# Patient Record
Sex: FEMALE | Race: WHITE | NOT HISPANIC OR LATINO | Employment: OTHER | URBAN - METROPOLITAN AREA
[De-identification: names, ages, dates, MRNs, and addresses within clinical notes are randomized per-mention and may not be internally consistent; named-entity substitution may affect disease eponyms.]

---

## 2017-01-03 ENCOUNTER — TRANSCRIBE ORDERS (OUTPATIENT)
Dept: ADMINISTRATIVE | Facility: HOSPITAL | Age: 67
End: 2017-01-03

## 2017-01-03 DIAGNOSIS — I73.9 CLAUDICATION (HCC): Primary | ICD-10-CM

## 2017-01-05 ENCOUNTER — TELEPHONE (OUTPATIENT)
Dept: GASTROENTEROLOGY | Facility: CLINIC | Age: 67
End: 2017-01-05

## 2017-01-19 ENCOUNTER — HOSPITAL ENCOUNTER (OUTPATIENT)
Facility: HOSPITAL | Age: 67
Setting detail: HOSPITAL OUTPATIENT SURGERY
Discharge: HOME | End: 2017-01-19
Attending: INTERNAL MEDICINE | Admitting: INTERNAL MEDICINE

## 2017-01-19 ENCOUNTER — OFFICE VISIT (OUTPATIENT)
Dept: GASTROENTEROLOGY | Facility: CLINIC | Age: 67
End: 2017-01-19

## 2017-01-19 ENCOUNTER — CONVERSION ENCOUNTER (OUTPATIENT)
Dept: GASTROENTEROLOGY | Facility: CLINIC | Age: 67
End: 2017-01-19

## 2017-01-19 VITALS
BODY MASS INDEX: 35.65 KG/M2 | SYSTOLIC BLOOD PRESSURE: 126 MMHG | WEIGHT: 188.8 LBS | HEIGHT: 61 IN | DIASTOLIC BLOOD PRESSURE: 80 MMHG

## 2017-01-19 DIAGNOSIS — K27.9 PUD (PEPTIC ULCER DISEASE): Primary | ICD-10-CM

## 2017-01-19 PROCEDURE — 99213 OFFICE O/P EST LOW 20 MIN: CPT | Performed by: INTERNAL MEDICINE

## 2017-01-19 RX ORDER — INSULIN DETEMIR 100 [IU]/ML
40 INJECTION, SOLUTION SUBCUTANEOUS NIGHTLY
COMMUNITY
Start: 2016-11-09 | End: 2018-01-18 | Stop reason: SDUPTHER

## 2017-01-19 RX ORDER — ISOPROPYL ALCOHOL 0.75 G/1
SWAB TOPICAL
COMMUNITY
Start: 2016-11-09 | End: 2017-05-25

## 2017-01-19 RX ORDER — METFORMIN HYDROCHLORIDE 500 MG/1
500 TABLET, EXTENDED RELEASE ORAL 2 TIMES DAILY
COMMUNITY
Start: 2016-11-09 | End: 2017-10-30 | Stop reason: SDUPTHER

## 2017-01-19 RX ORDER — BLOOD SUGAR DIAGNOSTIC
STRIP MISCELLANEOUS
COMMUNITY
Start: 2016-11-09 | End: 2017-05-25

## 2017-01-19 RX ORDER — ATORVASTATIN CALCIUM 80 MG/1
80 TABLET, FILM COATED ORAL NIGHTLY
COMMUNITY
Start: 2016-12-26 | End: 2017-10-30 | Stop reason: SDUPTHER

## 2017-01-19 RX ORDER — INSULIN ASPART 100 [IU]/ML
8-12 INJECTION, SOLUTION INTRAVENOUS; SUBCUTANEOUS
COMMUNITY
Start: 2016-11-08 | End: 2017-08-30 | Stop reason: SDUPTHER

## 2017-01-19 RX ORDER — PANTOPRAZOLE SODIUM 40 MG/1
40 TABLET, DELAYED RELEASE ORAL DAILY
Qty: 60 TABLET | Refills: 1 | Status: SHIPPED | OUTPATIENT
Start: 2017-01-19 | End: 2017-10-30 | Stop reason: SDUPTHER

## 2017-01-19 RX ORDER — NIACIN 500 MG/1
TABLET, EXTENDED RELEASE ORAL
COMMUNITY
Start: 2016-11-05 | End: 2017-05-25

## 2017-01-19 RX ORDER — LANCETS 28 GAUGE
EACH MISCELLANEOUS
COMMUNITY
Start: 2016-11-05 | End: 2017-05-25

## 2017-01-19 RX ORDER — BLOOD-GLUCOSE METER
KIT MISCELLANEOUS
COMMUNITY
Start: 2016-12-01 | End: 2017-05-25

## 2017-01-19 RX ORDER — FENOFIBRATE 160 MG/1
160 TABLET ORAL DAILY
COMMUNITY
Start: 2016-11-09 | End: 2018-01-18 | Stop reason: SDUPTHER

## 2017-01-19 RX ORDER — LIRAGLUTIDE 6 MG/ML
1.8 INJECTION SUBCUTANEOUS EVERY MORNING
COMMUNITY
Start: 2016-11-08 | End: 2018-01-18 | Stop reason: SDUPTHER

## 2017-01-19 NOTE — MR AVS SNAPSHOT
Randi Martines   1/19/2017 3:45 PM   Office Visit    Dept Phone:  [phone]   Encounter #:  [id number]    Provider:  Craig Hsu MD   Department:  [facility] GASTROENTEROLOGY                Your Full Care Plan              Where to Get Your Medications      These medications were sent to [address] - [address] [facility] AT [facility] - [phone]  - [phone] [place] [address]     Phone:  [phone]     pantoprazole 40 MG EC tablet            Your Updated Medication List          This list is accurate as of: 1/19/17  5:08 PM.  Always use your most recent med list.                aspirin 81 MG EC tablet       atorvastatin 80 MG tablet   Commonly known as:  LIPITOR       B-D SINGLE USE SWABS REGULAR pads       clopidogrel 75 MG tablet   Commonly known as:  PLAVIX       dicyclomine 20 MG tablet   Commonly known as:  BENTYL       fenofibrate 160 MG tablet       gabapentin 300 MG capsule   Commonly known as:  NEURONTIN       * insulin aspart 100 UNIT/ML injection   Commonly known as:  novoLOG       * NOVOLOG FLEXPEN 100 UNIT/ML solution pen-injector sc pen   Generic drug:  insulin aspart       * insulin detemir 100 UNIT/ML injection   Commonly known as:  LEVEMIR       * LEVEMIR FLEXTOUCH 100 UNIT/ML injection   Generic drug:  insulin detemir       metFORMIN  MG 24 hr tablet   Commonly known as:  GLUCOPHAGE-XR       metoclopramide 5 MG tablet   Commonly known as:  REGLAN       niacin 500 MG CR tablet   Commonly known as:  NIASPAN       pantoprazole 40 MG EC tablet   Commonly known as:  PROTONIX   Take 1 tablet by mouth Daily.       PRODIGY NO CODING BLOOD GLUC test strip   Generic drug:  glucose blood       PRODIGY TWIST TOP LANCETS 28G misc       PRODIGY VOICE BLOOD GLUCOSE W/DEVICE kit       sucralfate 1 G tablet   Commonly known as:  CARAFATE       * VICTOZA SC       * VICTOZA 18 MG/3ML  "solution pen-injector   Generic drug:  Liraglutide       * Notice:  This list has 6 medication(s) that are the same as other medications prescribed for you. Read the directions carefully, and ask your doctor or other care provider to review them with you.            You Were Diagnosed With        Codes Comments    PUD (peptic ulcer disease)    -  Primary ICD-10-CM: K27.9  ICD-9-CM: 533.90       Instructions     None    Patient Instructions History      Upcoming Appointments     Visit Type Date Time Department    FOLLOW UP 1/19/2017  3:45 PM [facility] TG     TG SCHED 1 ARTERIAL LOWER EXTREMITY 3/10/2017  3:45 PM [facility] NON-INV VASC      MyChart Signup     Our records indicate that you have declined [facility] signup. If you would like to sign up for [facility] [place], please email [email or web address] or call [phone] to obtain an activation code.             Other Info from Your Visit           Your Appointments     Mar 10, 2017  3:45 PM EST   ARTERIAL LOWER EXTREMITY VISIT with TG US NIVAS ARTERIAL CRT 1   [facility] NON-INV VASC ([place])    [address] [facility] [address] [phone]              Allergies     Augmentin [Amoxicillin-pot Clavulanate] Intolerance GI Intolerance    Codeine Intolerance GI Intolerance      Reason for Visit     Gastric Ulcer           Vital Signs     Blood Pressure Height Weight Body Mass Index Smoking Status       126/80 61\" (154.9 cm) 188 lb 12.8 oz (85.6 kg) 35.67 kg/m2 Never Smoker       Problems and Diagnoses Noted     PUD (peptic ulcer disease)    -  Primary        "

## 2017-01-19 NOTE — PROGRESS NOTES
Chief Complaint   Patient presents with   • Gastric Ulcer       Randi Martines is a  66 y.o. 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  Underwent an EGD showing a large cratered ulcer of the body and antrum of the stomach.  There was concern for vascular insufficiency and she was seen by the vascular surgeons.  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  H. pylori biopsies not done nor breath test nor serology.  Unfortunately, she did not know to take her Protonix and has been off it for 2 months.  She went back on it last week when she visited with the vascular surgeons.  Currently taking Protonix 40 mg every morning.  No abdominal pain, weight loss, rectal bleeding or melena.  She is here to schedule her 3 month follow-up scope.      Past Medical History   Diagnosis Date   • CAD (coronary artery disease)    • Chronic kidney disease    • Diabetes mellitus    • History of stomach ulcers    • Hyperlipemia    • Hypertension    • Peptic ulcer disease    • PVD (peripheral vascular disease)        Past Surgical History   Procedure Laterality Date   • Coronary artery bypass graft     • Endoscopy N/A 11/23/2016     Procedure: ESOPHAGOGASTRODUODENOSCOPY ;  Surgeon: Katherine Green MD;  Location: [facility] ENDOSCOPY;  Service:    • Appendectomy  2015   • Hernia repair  2015   • Hysterectomy     • Cataract extraction     • Cholecystectomy     • Upper gastrointestinal endoscopy  2015     scar in gastric body, gastric ulcers   • Colonoscopy  2015       Scheduled Meds:    Continuous Infusions:  No current facility-administered medications for this visit.     PRN Meds:.    Allergies   Allergen Reactions   • Augmentin [Amoxicillin-Pot Clavulanate] GI Intolerance   • Codeine GI  Intolerance       Social History     Social History   • Marital status:      Spouse name: N/A   • Number of children: N/A   • Years of education: N/A     Occupational History   • Not on file.     Social History Main Topics   • Smoking status: Never Smoker   • Smokeless tobacco: Never Used   • Alcohol use No   • Drug use: No   • Sexual activity: Defer     Other Topics Concern   • Not on file     Social History Narrative       History reviewed. No pertinent family history.    Review of Systems   All other systems reviewed and are negative.      Vitals:    01/19/17 1611   BP: 126/80       Physical Exam   Constitutional: She is oriented to person, place, and time. She appears well-developed and well-nourished.   HENT:   Head: Normocephalic and atraumatic.   Eyes: Pupils are equal, round, and reactive to light.   Cardiovascular: Normal rate, regular rhythm and normal heart sounds.    Pulmonary/Chest: Effort normal and breath sounds normal.   Abdominal: Soft. Bowel sounds are normal. She exhibits no shifting dullness, no distension, no pulsatile liver, no fluid wave, no abdominal bruit, no ascites, no pulsatile midline mass and no mass. There is no hepatosplenomegaly. There is no tenderness. There is no rigidity and no guarding. No hernia.   Musculoskeletal: Normal range of motion.   Neurological: She is alert and oriented to person, place, and time.   Skin: Skin is warm and dry.   Psychiatric: She has a normal mood and affect. Her behavior is normal. Thought content normal.   Nursing note and vitals reviewed.      No images are attached to the encounter.    Problem list    Chronic mesenteric ischemia, vascular insufficiency, but while likely contributing to the ulcer, the vascular surgeons have asked for medical management at this point only  Peptic ulcer disease  H. pylori status unknown  She has not been taking her proton pump inhibitor as requested      Assessment/Plan    I'm recommending Protonix 40 mg by  mouth every morning.  Nexium 24 hour over-the-counter every afternoon.  She will do this for 3 months and I will perform upper endoscopy to check for healing of the ulcer.  She will also have H. pylori serologies today.  If they are positive I will treat her for H. pylori with quadruple therapy.  If she fails to heal the  ulcer on twice a day PPI we will obtain a workup for gastrinoma and also ask the vascular surgeons to reconsider bypass.    Further recommendations after we perform EGD in April 2017

## 2017-01-20 LAB
H PYLORI IGA SER-ACNC: <9 UNITS (ref 0–8.9)
H PYLORI IGG SER IA-ACNC: <0.9 U/ML (ref 0–0.8)
H PYLORI IGM SER-ACNC: <9 UNITS (ref 0–8.9)

## 2017-01-24 ENCOUNTER — RESULTS ENCOUNTER (OUTPATIENT)
Dept: GASTROENTEROLOGY | Facility: CLINIC | Age: 67
End: 2017-01-24

## 2017-01-24 DIAGNOSIS — K27.9 PUD (PEPTIC ULCER DISEASE): ICD-10-CM

## 2017-02-09 ENCOUNTER — TELEPHONE (OUTPATIENT)
Dept: GASTROENTEROLOGY | Facility: CLINIC | Age: 67
End: 2017-02-09

## 2017-02-10 NOTE — TELEPHONE ENCOUNTER
Pt called back. Advised that per Dr Hsu: the labwork was negative for H pylori. Pt verb understanding.

## 2017-03-10 ENCOUNTER — HOSPITAL ENCOUNTER (OUTPATIENT)
Dept: CARDIOLOGY | Facility: HOSPITAL | Age: 67
Discharge: HOME | End: 2017-03-10
Attending: SURGERY | Admitting: SURGERY

## 2017-03-10 DIAGNOSIS — I73.9 CLAUDICATION (HCC): ICD-10-CM

## 2017-03-10 LAB
BH CV LOWER ARTERIAL LEFT ABI RATIO: 0.22
BH CV LOWER ARTERIAL LEFT GREAT TOE SYS MAX: 0 MMHG
BH CV LOWER ARTERIAL LEFT HIGH THIGH SYS MAX: 61 MMHG
BH CV LOWER ARTERIAL LEFT LOW THIGH SYS MAX: 40 MMHG
BH CV LOWER ARTERIAL LEFT POST TIBIAL SYS MAX: 31 MMHG
BH CV LOWER ARTERIAL LEFT TBI RATIO: 0
BH CV LOWER ARTERIAL RIGHT ABI RATIO: 0.91
BH CV LOWER ARTERIAL RIGHT DORSALIS PEDIS SYS MAX: 131 MMHG
BH CV LOWER ARTERIAL RIGHT GREAT TOE SYS MAX: 148 MMHG
BH CV LOWER ARTERIAL RIGHT POST TIBIAL SYS MAX: 131 MMHG
BH CV LOWER ARTERIAL RIGHT TBI RATIO: 1
UPPER ARTERIAL LEFT ARM BRACHIAL SYS MAX: 144 MMHG
UPPER ARTERIAL RIGHT ARM BRACHIAL SYS MAX: 79 MMHG

## 2017-03-10 PROCEDURE — 93923 UPR/LXTR ART STDY 3+ LVLS: CPT

## 2017-04-04 ENCOUNTER — TRANSCRIBE ORDERS (OUTPATIENT)
Dept: ADMINISTRATIVE | Facility: HOSPITAL | Age: 67
End: 2017-04-04

## 2017-04-04 DIAGNOSIS — I73.9 PAD (PERIPHERAL ARTERY DISEASE) (HCC): Primary | ICD-10-CM

## 2017-04-13 ENCOUNTER — HOSPITAL ENCOUNTER (OUTPATIENT)
Dept: CT IMAGING | Facility: HOSPITAL | Age: 67
Discharge: HOME | End: 2017-04-13
Attending: SURGERY

## 2017-04-13 DIAGNOSIS — I73.9 PAD (PERIPHERAL ARTERY DISEASE) (HCC): ICD-10-CM

## 2017-04-13 LAB — CREAT BLDA-MCNC: 0.9 MG/DL (ref 0.6–1.3)

## 2017-04-13 PROCEDURE — 75635 CT ANGIO ABDOMINAL ARTERIES: CPT

## 2017-04-13 PROCEDURE — 0 IOPAMIDOL 61 % SOLUTION: Performed by: SURGERY

## 2017-04-13 PROCEDURE — 82565 ASSAY OF CREATININE: CPT

## 2017-04-13 RX ADMIN — IOPAMIDOL 95 ML: 612 INJECTION, SOLUTION INTRAVENOUS at 17:07

## 2017-05-25 ENCOUNTER — APPOINTMENT (OUTPATIENT)
Dept: PREADMISSION TESTING | Facility: HOSPITAL | Age: 67
End: 2017-05-25

## 2017-05-25 ENCOUNTER — HOSPITAL ENCOUNTER (OUTPATIENT)
Dept: GENERAL RADIOLOGY | Facility: HOSPITAL | Age: 67
Discharge: HOME | End: 2017-05-25
Admitting: SURGERY

## 2017-05-25 VITALS
OXYGEN SATURATION: 99 % | HEART RATE: 38 BPM | HEIGHT: 61 IN | WEIGHT: 187 LBS | TEMPERATURE: 98.9 F | BODY MASS INDEX: 35.3 KG/M2

## 2017-05-25 LAB
ALBUMIN SERPL-MCNC: 3.2 G/DL (ref 3.5–5.2)
ALBUMIN/GLOB SERPL: 0.7 G/DL
ALP SERPL-CCNC: 62 U/L (ref 39–117)
ALT SERPL W P-5'-P-CCNC: 10 U/L (ref 1–33)
ANION GAP SERPL CALCULATED.3IONS-SCNC: 14.2 MMOL/L
APTT PPP: 28.2 SECONDS (ref 22.7–35.4)
AST SERPL-CCNC: 14 U/L (ref 1–32)
BACTERIA UR QL AUTO: ABNORMAL /HPF
BILIRUB SERPL-MCNC: <0.2 MG/DL (ref 0.1–1.2)
BILIRUB UR QL STRIP: NEGATIVE
BUN BLD-MCNC: 24 MG/DL (ref 8–23)
BUN/CREAT SERPL: 24.7 (ref 7–25)
CALCIUM SPEC-SCNC: 9.6 MG/DL (ref 8.6–10.5)
CHLORIDE SERPL-SCNC: 100 MMOL/L (ref 98–107)
CLARITY UR: CLEAR
CO2 SERPL-SCNC: 25.8 MMOL/L (ref 22–29)
COLOR UR: YELLOW
CREAT BLD-MCNC: 0.97 MG/DL (ref 0.57–1)
DEPRECATED RDW RBC AUTO: 48.1 FL (ref 37–54)
ERYTHROCYTE [DISTWIDTH] IN BLOOD BY AUTOMATED COUNT: 14.3 % (ref 11.7–13)
GFR SERPL CREATININE-BSD FRML MDRD: 57 ML/MIN/1.73
GLOBULIN UR ELPH-MCNC: 4.5 GM/DL
GLUCOSE BLD-MCNC: 144 MG/DL (ref 65–99)
GLUCOSE UR STRIP-MCNC: NEGATIVE MG/DL
HCT VFR BLD AUTO: 35.4 % (ref 35.6–45.5)
HGB BLD-MCNC: 10.7 G/DL (ref 11.9–15.5)
HGB UR QL STRIP.AUTO: NEGATIVE
HYALINE CASTS UR QL AUTO: ABNORMAL /LPF
INR PPP: 1.04 (ref 0.9–1.1)
KETONES UR QL STRIP: NEGATIVE
LEUKOCYTE ESTERASE UR QL STRIP.AUTO: ABNORMAL
MCH RBC QN AUTO: 28.1 PG (ref 26.9–32)
MCHC RBC AUTO-ENTMCNC: 30.2 G/DL (ref 32.4–36.3)
MCV RBC AUTO: 92.9 FL (ref 80.5–98.2)
NITRITE UR QL STRIP: POSITIVE
PH UR STRIP.AUTO: <=5 [PH] (ref 5–8)
PLATELET # BLD AUTO: 450 10*3/MM3 (ref 140–500)
PMV BLD AUTO: 9.9 FL (ref 6–12)
POTASSIUM BLD-SCNC: 4.4 MMOL/L (ref 3.5–5.2)
PROT SERPL-MCNC: 7.7 G/DL (ref 6–8.5)
PROT UR QL STRIP: ABNORMAL
PROTHROMBIN TIME: 13.2 SECONDS (ref 11.7–14.2)
RBC # BLD AUTO: 3.81 10*6/MM3 (ref 3.9–5.2)
RBC # UR: ABNORMAL /HPF
REF LAB TEST METHOD: ABNORMAL
SODIUM BLD-SCNC: 140 MMOL/L (ref 136–145)
SP GR UR STRIP: 1.02 (ref 1–1.03)
SQUAMOUS #/AREA URNS HPF: ABNORMAL /HPF
UROBILINOGEN UR QL STRIP: ABNORMAL
WBC NRBC COR # BLD: 13.97 10*3/MM3 (ref 4.5–10.7)
WBC UR QL AUTO: ABNORMAL /HPF

## 2017-05-25 PROCEDURE — 85730 THROMBOPLASTIN TIME PARTIAL: CPT | Performed by: SURGERY

## 2017-05-25 PROCEDURE — 87086 URINE CULTURE/COLONY COUNT: CPT | Performed by: SURGERY

## 2017-05-25 PROCEDURE — 80053 COMPREHEN METABOLIC PANEL: CPT | Performed by: SURGERY

## 2017-05-25 PROCEDURE — 93005 ELECTROCARDIOGRAM TRACING: CPT

## 2017-05-25 PROCEDURE — 93010 ELECTROCARDIOGRAM REPORT: CPT | Performed by: INTERNAL MEDICINE

## 2017-05-25 PROCEDURE — 36415 COLL VENOUS BLD VENIPUNCTURE: CPT

## 2017-05-25 PROCEDURE — 71020 HC CHEST PA AND LATERAL: CPT

## 2017-05-25 PROCEDURE — 85610 PROTHROMBIN TIME: CPT | Performed by: SURGERY

## 2017-05-25 PROCEDURE — 85027 COMPLETE CBC AUTOMATED: CPT | Performed by: SURGERY

## 2017-05-25 PROCEDURE — 81001 URINALYSIS AUTO W/SCOPE: CPT | Performed by: SURGERY

## 2017-05-25 PROCEDURE — 87186 SC STD MICRODIL/AGAR DIL: CPT | Performed by: SURGERY

## 2017-05-25 RX ORDER — MULTIVITAMIN WITH IRON
1 TABLET ORAL 3 TIMES DAILY
COMMUNITY
End: 2017-11-29 | Stop reason: DRUGHIGH

## 2017-05-27 LAB — BACTERIA SPEC AEROBE CULT: ABNORMAL

## 2017-05-30 ENCOUNTER — HOSPITAL ENCOUNTER (OUTPATIENT)
Dept: CARDIOLOGY | Facility: HOSPITAL | Age: 67
Discharge: HOME | End: 2017-05-30
Attending: INTERNAL MEDICINE | Admitting: INTERNAL MEDICINE

## 2017-05-30 ENCOUNTER — OFFICE VISIT (OUTPATIENT)
Dept: CARDIOLOGY | Facility: CLINIC | Age: 67
End: 2017-05-30

## 2017-05-30 VITALS
HEART RATE: 76 BPM | DIASTOLIC BLOOD PRESSURE: 62 MMHG | SYSTOLIC BLOOD PRESSURE: 98 MMHG | BODY MASS INDEX: 35.3 KG/M2 | WEIGHT: 187 LBS | HEIGHT: 61 IN

## 2017-05-30 DIAGNOSIS — E11.49 DM (DIABETES MELLITUS), TYPE 2 WITH NEUROLOGICAL COMPLICATIONS (HCC): ICD-10-CM

## 2017-05-30 DIAGNOSIS — I25.10 CORONARY ARTERY DISEASE INVOLVING NATIVE CORONARY ARTERY OF NATIVE HEART WITHOUT ANGINA PECTORIS: Primary | ICD-10-CM

## 2017-05-30 DIAGNOSIS — I10 ESSENTIAL HYPERTENSION: ICD-10-CM

## 2017-05-30 DIAGNOSIS — Z95.1 S/P CABG (CORONARY ARTERY BYPASS GRAFT): ICD-10-CM

## 2017-05-30 DIAGNOSIS — K55.9 MESENTERIC ISCHEMIA (HCC): ICD-10-CM

## 2017-05-30 DIAGNOSIS — I73.9 PVD (PERIPHERAL VASCULAR DISEASE) (HCC): ICD-10-CM

## 2017-05-30 LAB
ASCENDING AORTA: 2.6 CM
BH CV ECHO MEAS - ACS: 1.6 CM
BH CV ECHO MEAS - AO MAX PG (FULL): 7.9 MMHG
BH CV ECHO MEAS - AO MAX PG: 12.8 MMHG
BH CV ECHO MEAS - AO MEAN PG (FULL): 4 MMHG
BH CV ECHO MEAS - AO MEAN PG: 7 MMHG
BH CV ECHO MEAS - AO ROOT AREA (BSA CORRECTED): 1.5
BH CV ECHO MEAS - AO ROOT AREA: 6.2 CM^2
BH CV ECHO MEAS - AO ROOT DIAM: 2.8 CM
BH CV ECHO MEAS - AO V2 MAX: 179 CM/SEC
BH CV ECHO MEAS - AO V2 MEAN: 128 CM/SEC
BH CV ECHO MEAS - AO V2 VTI: 44 CM
BH CV ECHO MEAS - AVA(I,A): 1.4 CM^2
BH CV ECHO MEAS - AVA(I,D): 1.4 CM^2
BH CV ECHO MEAS - AVA(V,A): 1.4 CM^2
BH CV ECHO MEAS - AVA(V,D): 1.4 CM^2
BH CV ECHO MEAS - BSA(HAYCOCK): 2 M^2
BH CV ECHO MEAS - BSA: 1.8 M^2
BH CV ECHO MEAS - BZI_BMI: 35.3 KILOGRAMS/M^2
BH CV ECHO MEAS - BZI_METRIC_HEIGHT: 154.9 CM
BH CV ECHO MEAS - BZI_METRIC_WEIGHT: 84.8 KG
BH CV ECHO MEAS - CONTRAST EF (2CH): 58.2 ML/M^2
BH CV ECHO MEAS - CONTRAST EF 4CH: 61.5 ML/M^2
BH CV ECHO MEAS - EDV(MOD-SP2): 79 ML
BH CV ECHO MEAS - EDV(MOD-SP4): 78 ML
BH CV ECHO MEAS - EDV(TEICH): 210 ML
BH CV ECHO MEAS - EF(CUBED): 73.2 %
BH CV ECHO MEAS - EF(MOD-SP2): 58.2 %
BH CV ECHO MEAS - EF(MOD-SP4): 61.5 %
BH CV ECHO MEAS - EF(TEICH): 63.8 %
BH CV ECHO MEAS - ESV(MOD-SP2): 33 ML
BH CV ECHO MEAS - ESV(MOD-SP4): 30 ML
BH CV ECHO MEAS - ESV(TEICH): 75.9 ML
BH CV ECHO MEAS - FS: 35.5 %
BH CV ECHO MEAS - IVS/LVPW: 1
BH CV ECHO MEAS - IVSD: 1.3 CM
BH CV ECHO MEAS - LAT PEAK E' VEL: 8 CM/SEC
BH CV ECHO MEAS - LV DIASTOLIC VOL/BSA (35-75): 42.5 ML/M^2
BH CV ECHO MEAS - LV MASS(C)D: 391 GRAMS
BH CV ECHO MEAS - LV MASS(C)DI: 213 GRAMS/M^2
BH CV ECHO MEAS - LV MAX PG: 4.9 MMHG
BH CV ECHO MEAS - LV MEAN PG: 3 MMHG
BH CV ECHO MEAS - LV SYSTOLIC VOL/BSA (12-30): 16.3 ML/M^2
BH CV ECHO MEAS - LV V1 MAX: 111 CM/SEC
BH CV ECHO MEAS - LV V1 MEAN: 79.1 CM/SEC
BH CV ECHO MEAS - LV V1 VTI: 26.7 CM
BH CV ECHO MEAS - LVIDD: 6.4 CM
BH CV ECHO MEAS - LVIDS: 4.1 CM
BH CV ECHO MEAS - LVLD AP2: 6.9 CM
BH CV ECHO MEAS - LVLD AP4: 7.3 CM
BH CV ECHO MEAS - LVLS AP2: 6.3 CM
BH CV ECHO MEAS - LVLS AP4: 6.7 CM
BH CV ECHO MEAS - LVOT AREA (M): 2.3 CM^2
BH CV ECHO MEAS - LVOT AREA: 2.3 CM^2
BH CV ECHO MEAS - LVOT DIAM: 1.7 CM
BH CV ECHO MEAS - LVPWD: 1.3 CM
BH CV ECHO MEAS - MED PEAK E' VEL: 10 CM/SEC
BH CV ECHO MEAS - MR MAX PG: 17.3 MMHG
BH CV ECHO MEAS - MR MAX VEL: 208 CM/SEC
BH CV ECHO MEAS - MV A DUR: 0.12 SEC
BH CV ECHO MEAS - MV A MAX VEL: 103 CM/SEC
BH CV ECHO MEAS - MV DEC SLOPE: 403 CM/SEC^2
BH CV ECHO MEAS - MV DEC TIME: 0.2 SEC
BH CV ECHO MEAS - MV E MAX VEL: 78.5 CM/SEC
BH CV ECHO MEAS - MV E/A: 0.76
BH CV ECHO MEAS - MV MAX PG: 4.2 MMHG
BH CV ECHO MEAS - MV MEAN PG: 2 MMHG
BH CV ECHO MEAS - MV P1/2T MAX VEL: 78.5 CM/SEC
BH CV ECHO MEAS - MV P1/2T: 57.1 MSEC
BH CV ECHO MEAS - MV V2 MAX: 103 CM/SEC
BH CV ECHO MEAS - MV V2 MEAN: 66.6 CM/SEC
BH CV ECHO MEAS - MV V2 VTI: 29 CM
BH CV ECHO MEAS - MVA P1/2T LCG: 2.8 CM^2
BH CV ECHO MEAS - MVA(P1/2T): 3.9 CM^2
BH CV ECHO MEAS - MVA(VTI): 2.1 CM^2
BH CV ECHO MEAS - PA MAX PG (FULL): 2.7 MMHG
BH CV ECHO MEAS - PA MAX PG: 7.1 MMHG
BH CV ECHO MEAS - PA V2 MAX: 133 CM/SEC
BH CV ECHO MEAS - PULM A REVS DUR: 0.07 SEC
BH CV ECHO MEAS - PULM A REVS VEL: 23.7 CM/SEC
BH CV ECHO MEAS - PULM DIAS VEL: 57.3 CM/SEC
BH CV ECHO MEAS - PULM S/D: 1.5
BH CV ECHO MEAS - PULM SYS VEL: 85.9 CM/SEC
BH CV ECHO MEAS - PVA(V,A): 2.5 CM^2
BH CV ECHO MEAS - PVA(V,D): 2.5 CM^2
BH CV ECHO MEAS - QP/QS: 1.3
BH CV ECHO MEAS - RV MAX PG: 4.3 MMHG
BH CV ECHO MEAS - RV MEAN PG: 3 MMHG
BH CV ECHO MEAS - RV V1 MAX: 104 CM/SEC
BH CV ECHO MEAS - RV V1 MEAN: 75 CM/SEC
BH CV ECHO MEAS - RV V1 VTI: 25.1 CM
BH CV ECHO MEAS - RVOT AREA: 3.1 CM^2
BH CV ECHO MEAS - RVOT DIAM: 2 CM
BH CV ECHO MEAS - SI(AO): 147.6 ML/M^2
BH CV ECHO MEAS - SI(CUBED): 105.5 ML/M^2
BH CV ECHO MEAS - SI(LVOT): 33 ML/M^2
BH CV ECHO MEAS - SI(MOD-SP2): 25.1 ML/M^2
BH CV ECHO MEAS - SI(MOD-SP4): 26.1 ML/M^2
BH CV ECHO MEAS - SI(TEICH): 73 ML/M^2
BH CV ECHO MEAS - SUP REN AO DIAM: 1.5 CM
BH CV ECHO MEAS - SV(AO): 270.9 ML
BH CV ECHO MEAS - SV(CUBED): 193.7 ML
BH CV ECHO MEAS - SV(LVOT): 60.6 ML
BH CV ECHO MEAS - SV(MOD-SP2): 46 ML
BH CV ECHO MEAS - SV(MOD-SP4): 48 ML
BH CV ECHO MEAS - SV(RVOT): 78.9 ML
BH CV ECHO MEAS - SV(TEICH): 134.1 ML
BH CV ECHO MEAS - TAPSE (>1.6): 1.8 CM2
BH CV XLRA - RV BASE: 2.5 CM
BH CV XLRA - TDI S': 14 CM/SEC
E/E' RATIO: 9
LEFT ATRIUM VOLUME INDEX: 28 ML/M2
SINUS: 2.6 CM
STJ: 1.7 CM

## 2017-05-30 PROCEDURE — 93306 TTE W/DOPPLER COMPLETE: CPT | Performed by: INTERNAL MEDICINE

## 2017-05-30 PROCEDURE — 93000 ELECTROCARDIOGRAM COMPLETE: CPT | Performed by: INTERNAL MEDICINE

## 2017-05-30 PROCEDURE — 99214 OFFICE O/P EST MOD 30 MIN: CPT | Performed by: INTERNAL MEDICINE

## 2017-05-30 RX ORDER — SULFAMETHOXAZOLE AND TRIMETHOPRIM 800; 160 MG/1; MG/1
1 TABLET ORAL 2 TIMES DAILY
COMMUNITY
End: 2017-08-30

## 2017-05-31 ENCOUNTER — ANESTHESIA EVENT (OUTPATIENT)
Dept: PERIOP | Facility: HOSPITAL | Age: 67
End: 2017-05-31

## 2017-05-31 ENCOUNTER — APPOINTMENT (OUTPATIENT)
Dept: GENERAL RADIOLOGY | Facility: HOSPITAL | Age: 67
End: 2017-05-31

## 2017-05-31 ENCOUNTER — ANESTHESIA (OUTPATIENT)
Dept: PERIOP | Facility: HOSPITAL | Age: 67
End: 2017-05-31

## 2017-05-31 ENCOUNTER — HOSPITAL ENCOUNTER (INPATIENT)
Facility: HOSPITAL | Age: 67
LOS: 12 days | Discharge: NURSING, REHAB OR LONG-TERM CARE | End: 2017-06-12
Attending: SURGERY | Admitting: SURGERY

## 2017-05-31 DIAGNOSIS — R53.1 GENERALIZED WEAKNESS: Primary | ICD-10-CM

## 2017-05-31 PROBLEM — I70.209 ATHEROSCLEROTIC PVD WITH ULCERATION (HCC): Status: ACTIVE | Noted: 2017-05-31

## 2017-05-31 PROBLEM — L98.499 ATHEROSCLEROTIC PVD WITH ULCERATION (HCC): Status: ACTIVE | Noted: 2017-05-31

## 2017-05-31 LAB
ABO GROUP BLD: NORMAL
APTT PPP: 28.3 SECONDS (ref 22.7–35.4)
BLD GP AB SCN SERPL QL: NEGATIVE
DEPRECATED RDW RBC AUTO: 46.1 FL (ref 37–54)
ERYTHROCYTE [DISTWIDTH] IN BLOOD BY AUTOMATED COUNT: 14.2 % (ref 11.7–13)
GLUCOSE BLDC GLUCOMTR-MCNC: 123 MG/DL (ref 70–130)
GLUCOSE BLDC GLUCOMTR-MCNC: 172 MG/DL (ref 70–130)
GLUCOSE BLDC GLUCOMTR-MCNC: 187 MG/DL (ref 70–130)
GLUCOSE BLDC GLUCOMTR-MCNC: 205 MG/DL (ref 70–130)
HCT VFR BLD AUTO: 36.2 % (ref 35.6–45.5)
HGB BLD-MCNC: 11.7 G/DL (ref 11.9–15.5)
MCH RBC QN AUTO: 28.7 PG (ref 26.9–32)
MCHC RBC AUTO-ENTMCNC: 32.3 G/DL (ref 32.4–36.3)
MCV RBC AUTO: 88.9 FL (ref 80.5–98.2)
PLATELET # BLD AUTO: 307 10*3/MM3 (ref 140–500)
PMV BLD AUTO: 9.5 FL (ref 6–12)
RBC # BLD AUTO: 4.07 10*6/MM3 (ref 3.9–5.2)
RH BLD: POSITIVE
WBC NRBC COR # BLD: 17.82 10*3/MM3 (ref 4.5–10.7)

## 2017-05-31 PROCEDURE — 86850 RBC ANTIBODY SCREEN: CPT | Performed by: SURGERY

## 2017-05-31 PROCEDURE — C1887 CATHETER, GUIDING: HCPCS | Performed by: SURGERY

## 2017-05-31 PROCEDURE — 25010000002 PROTAMINE SULFATE PER 10 MG: Performed by: ANESTHESIOLOGY

## 2017-05-31 PROCEDURE — 25010000002 HEPARIN (PORCINE) PER 1000 UNITS: Performed by: ANESTHESIOLOGY

## 2017-05-31 PROCEDURE — C1769 GUIDE WIRE: HCPCS | Performed by: SURGERY

## 2017-05-31 PROCEDURE — 25010000002 PROMETHAZINE PER 50 MG: Performed by: ANESTHESIOLOGY

## 2017-05-31 PROCEDURE — C1725 CATH, TRANSLUMIN NON-LASER: HCPCS | Performed by: SURGERY

## 2017-05-31 PROCEDURE — C1894 INTRO/SHEATH, NON-LASER: HCPCS | Performed by: SURGERY

## 2017-05-31 PROCEDURE — 36430 TRANSFUSION BLD/BLD COMPNT: CPT

## 2017-05-31 PROCEDURE — 0 IOPAMIDOL PER 1 ML: Performed by: SURGERY

## 2017-05-31 PROCEDURE — 047D3ZZ DILATION OF LEFT COMMON ILIAC ARTERY, PERCUTANEOUS APPROACH: ICD-10-PCS | Performed by: SURGERY

## 2017-05-31 PROCEDURE — 25010000002 MORPHINE PER 10 MG: Performed by: SURGERY

## 2017-05-31 PROCEDURE — 86901 BLOOD TYPING SEROLOGIC RH(D): CPT | Performed by: SURGERY

## 2017-05-31 PROCEDURE — 047J3DZ DILATION OF LEFT EXTERNAL ILIAC ARTERY WITH INTRALUMINAL DEVICE, PERCUTANEOUS APPROACH: ICD-10-PCS | Performed by: SURGERY

## 2017-05-31 PROCEDURE — C1760 CLOSURE DEV, VASC: HCPCS | Performed by: SURGERY

## 2017-05-31 PROCEDURE — C1768 GRAFT, VASCULAR: HCPCS | Performed by: SURGERY

## 2017-05-31 PROCEDURE — 25010000002 HEPARIN (PORCINE) PER 1000 UNITS: Performed by: SURGERY

## 2017-05-31 PROCEDURE — 85730 THROMBOPLASTIN TIME PARTIAL: CPT | Performed by: SURGERY

## 2017-05-31 PROCEDURE — 25010000002 FENTANYL CITRATE (PF) 100 MCG/2ML SOLUTION: Performed by: ANESTHESIOLOGY

## 2017-05-31 PROCEDURE — 86920 COMPATIBILITY TEST SPIN: CPT

## 2017-05-31 PROCEDURE — C1876 STENT, NON-COA/NON-COV W/DEL: HCPCS | Performed by: SURGERY

## 2017-05-31 PROCEDURE — 04CN0ZZ EXTIRPATION OF MATTER FROM LEFT POPLITEAL ARTERY, OPEN APPROACH: ICD-10-PCS | Performed by: SURGERY

## 2017-05-31 PROCEDURE — 25010000003 CEFAZOLIN PER 500 MG: Performed by: SURGERY

## 2017-05-31 PROCEDURE — 04CJ0ZZ EXTIRPATION OF MATTER FROM LEFT EXTERNAL ILIAC ARTERY, OPEN APPROACH: ICD-10-PCS | Performed by: SURGERY

## 2017-05-31 PROCEDURE — P9016 RBC LEUKOCYTES REDUCED: HCPCS

## 2017-05-31 PROCEDURE — 25010000002 ALBUMIN HUMAN 5% PER 50 ML: Performed by: SURGERY

## 2017-05-31 PROCEDURE — 85027 COMPLETE CBC AUTOMATED: CPT | Performed by: SURGERY

## 2017-05-31 PROCEDURE — 047N3ZZ DILATION OF LEFT POPLITEAL ARTERY, PERCUTANEOUS APPROACH: ICD-10-PCS | Performed by: SURGERY

## 2017-05-31 PROCEDURE — 86900 BLOOD TYPING SEROLOGIC ABO: CPT | Performed by: SURGERY

## 2017-05-31 PROCEDURE — 25010000003 CEFAZOLIN IN DEXTROSE 2-4 GM/100ML-% SOLUTION: Performed by: SURGERY

## 2017-05-31 PROCEDURE — 041L0JL BYPASS LEFT FEMORAL ARTERY TO POPLITEAL ARTERY WITH SYNTHETIC SUBSTITUTE, OPEN APPROACH: ICD-10-PCS | Performed by: SURGERY

## 2017-05-31 PROCEDURE — 25010000002 ONDANSETRON PER 1 MG

## 2017-05-31 PROCEDURE — 25010000002 MIDAZOLAM PER 1 MG: Performed by: ANESTHESIOLOGY

## 2017-05-31 PROCEDURE — 25010000002 PHENYLEPHRINE PER 1 ML: Performed by: ANESTHESIOLOGY

## 2017-05-31 PROCEDURE — 04CL0ZZ EXTIRPATION OF MATTER FROM LEFT FEMORAL ARTERY, OPEN APPROACH: ICD-10-PCS | Performed by: SURGERY

## 2017-05-31 PROCEDURE — 25010000002 PROPOFOL 10 MG/ML EMULSION: Performed by: ANESTHESIOLOGY

## 2017-05-31 PROCEDURE — B4101ZZ FLUOROSCOPY OF ABDOMINAL AORTA USING LOW OSMOLAR CONTRAST: ICD-10-PCS | Performed by: SURGERY

## 2017-05-31 PROCEDURE — B41G1ZZ FLUOROSCOPY OF LEFT LOWER EXTREMITY ARTERIES USING LOW OSMOLAR CONTRAST: ICD-10-PCS | Performed by: SURGERY

## 2017-05-31 PROCEDURE — 86900 BLOOD TYPING SEROLOGIC ABO: CPT

## 2017-05-31 PROCEDURE — P9041 ALBUMIN (HUMAN),5%, 50ML: HCPCS | Performed by: SURGERY

## 2017-05-31 PROCEDURE — 04UL0JZ SUPPLEMENT LEFT FEMORAL ARTERY WITH SYNTHETIC SUBSTITUTE, OPEN APPROACH: ICD-10-PCS | Performed by: SURGERY

## 2017-05-31 PROCEDURE — 85347 COAGULATION TIME ACTIVATED: CPT

## 2017-05-31 PROCEDURE — 82962 GLUCOSE BLOOD TEST: CPT

## 2017-05-31 DEVICE — GRFT VASC COLLGN 6MM 45CM: Type: IMPLANTABLE DEVICE | Status: FUNCTIONAL

## 2017-05-31 DEVICE — VASCU-GUARD PERIPHERAL VASCULAR PATCH IS PREPARED FROM BOVINE PERICARDIUM WHICH IS CROSS-LINKED WITH GLUTARALDEHYDE. THE PERICARDIUM IS PROCURED FROM CATTLE ORIGINATING IN THE UNITED STATES. VASCU-GUARD PERIPHERAL VASCULAR PATCH IS CHEMICALLY STERILIZED USING ETHANOL AND PROPYLENE OXIDE. VASCU-GUARD PERIPHERAL VASCULAR PATCH HAS BEEN TREATED WITH 1 MOLAR SODIUM HYDROXIDE FOR A MINIMUM OF 60 MINUTES AT 20 - 25°C.  VASCU-GUARD PERIPHERAL VASCULAR PATCH IS PACKAGED IN A CONTAINER FILLED WITH STERILE, NON-PYROGENIC WATER CONTAINING PROPYLENE OXIDE. THE CONTENTS OF THE UNOPENED, UNDAMAGED CONTAINER ARE STERILE.
Type: IMPLANTABLE DEVICE | Status: FUNCTIONAL
Brand: VASCU-GUARD PERIPHERAL VASCULAR PATCH

## 2017-05-31 DEVICE — IMPLANTABLE DEVICE: Type: IMPLANTABLE DEVICE | Status: FUNCTIONAL

## 2017-05-31 RX ORDER — NALOXONE HCL 0.4 MG/ML
0.2 VIAL (ML) INJECTION AS NEEDED
Status: DISCONTINUED | OUTPATIENT
Start: 2017-05-31 | End: 2017-05-31 | Stop reason: HOSPADM

## 2017-05-31 RX ORDER — PROMETHAZINE HYDROCHLORIDE 25 MG/ML
12.5 INJECTION, SOLUTION INTRAMUSCULAR; INTRAVENOUS ONCE AS NEEDED
Status: COMPLETED | OUTPATIENT
Start: 2017-05-31 | End: 2017-05-31

## 2017-05-31 RX ORDER — PROMETHAZINE HYDROCHLORIDE 25 MG/1
25 TABLET ORAL ONCE AS NEEDED
Status: COMPLETED | OUTPATIENT
Start: 2017-05-31 | End: 2017-05-31

## 2017-05-31 RX ORDER — PROPOFOL 10 MG/ML
VIAL (ML) INTRAVENOUS AS NEEDED
Status: DISCONTINUED | OUTPATIENT
Start: 2017-05-31 | End: 2017-05-31 | Stop reason: SURG

## 2017-05-31 RX ORDER — HYDROMORPHONE HYDROCHLORIDE 1 MG/ML
0.5 INJECTION, SOLUTION INTRAMUSCULAR; INTRAVENOUS; SUBCUTANEOUS
Status: DISCONTINUED | OUTPATIENT
Start: 2017-05-31 | End: 2017-05-31 | Stop reason: HOSPADM

## 2017-05-31 RX ORDER — SULFAMETHOXAZOLE AND TRIMETHOPRIM 800; 160 MG/1; MG/1
1 TABLET ORAL 2 TIMES DAILY
Status: DISCONTINUED | OUTPATIENT
Start: 2017-05-31 | End: 2017-06-13 | Stop reason: HOSPADM

## 2017-05-31 RX ORDER — HEPARIN SODIUM 1000 [USP'U]/ML
INJECTION, SOLUTION INTRAVENOUS; SUBCUTANEOUS AS NEEDED
Status: DISCONTINUED | OUTPATIENT
Start: 2017-05-31 | End: 2017-05-31 | Stop reason: SURG

## 2017-05-31 RX ORDER — ALBUMIN, HUMAN INJ 5% 5 %
500 SOLUTION INTRAVENOUS ONCE
Status: COMPLETED | OUTPATIENT
Start: 2017-05-31 | End: 2017-05-31

## 2017-05-31 RX ORDER — PROTAMINE SULFATE 10 MG/ML
INJECTION, SOLUTION INTRAVENOUS AS NEEDED
Status: DISCONTINUED | OUTPATIENT
Start: 2017-05-31 | End: 2017-05-31 | Stop reason: SURG

## 2017-05-31 RX ORDER — HYDRALAZINE HYDROCHLORIDE 20 MG/ML
5 INJECTION INTRAMUSCULAR; INTRAVENOUS
Status: DISCONTINUED | OUTPATIENT
Start: 2017-05-31 | End: 2017-05-31 | Stop reason: HOSPADM

## 2017-05-31 RX ORDER — ASPIRIN 81 MG/1
81 TABLET ORAL DAILY
Status: DISCONTINUED | OUTPATIENT
Start: 2017-05-31 | End: 2017-06-13 | Stop reason: HOSPADM

## 2017-05-31 RX ORDER — MIDAZOLAM HYDROCHLORIDE 1 MG/ML
1 INJECTION INTRAMUSCULAR; INTRAVENOUS
Status: DISCONTINUED | OUTPATIENT
Start: 2017-05-31 | End: 2017-05-31 | Stop reason: HOSPADM

## 2017-05-31 RX ORDER — FENTANYL CITRATE 50 UG/ML
50 INJECTION, SOLUTION INTRAMUSCULAR; INTRAVENOUS
Status: DISCONTINUED | OUTPATIENT
Start: 2017-05-31 | End: 2017-05-31 | Stop reason: HOSPADM

## 2017-05-31 RX ORDER — OXYCODONE AND ACETAMINOPHEN 7.5; 325 MG/1; MG/1
1 TABLET ORAL ONCE AS NEEDED
Status: DISCONTINUED | OUTPATIENT
Start: 2017-05-31 | End: 2017-05-31 | Stop reason: HOSPADM

## 2017-05-31 RX ORDER — PROMETHAZINE HYDROCHLORIDE 25 MG/ML
12.5 INJECTION, SOLUTION INTRAMUSCULAR; INTRAVENOUS EVERY 6 HOURS PRN
Status: DISCONTINUED | OUTPATIENT
Start: 2017-05-31 | End: 2017-06-13 | Stop reason: HOSPADM

## 2017-05-31 RX ORDER — SODIUM CHLORIDE, SODIUM LACTATE, POTASSIUM CHLORIDE, CALCIUM CHLORIDE 600; 310; 30; 20 MG/100ML; MG/100ML; MG/100ML; MG/100ML
9 INJECTION, SOLUTION INTRAVENOUS CONTINUOUS
Status: DISCONTINUED | OUTPATIENT
Start: 2017-05-31 | End: 2017-06-01

## 2017-05-31 RX ORDER — FAMOTIDINE 10 MG/ML
20 INJECTION, SOLUTION INTRAVENOUS ONCE
Status: COMPLETED | OUTPATIENT
Start: 2017-05-31 | End: 2017-05-31

## 2017-05-31 RX ORDER — METOCLOPRAMIDE 5 MG/1
5 TABLET ORAL EVERY MORNING
Status: DISCONTINUED | OUTPATIENT
Start: 2017-06-01 | End: 2017-06-13 | Stop reason: HOSPADM

## 2017-05-31 RX ORDER — PANTOPRAZOLE SODIUM 40 MG/1
40 TABLET, DELAYED RELEASE ORAL 2 TIMES DAILY
Status: DISCONTINUED | OUTPATIENT
Start: 2017-05-31 | End: 2017-06-13 | Stop reason: HOSPADM

## 2017-05-31 RX ORDER — DIPHENHYDRAMINE HYDROCHLORIDE 50 MG/ML
12.5 INJECTION INTRAMUSCULAR; INTRAVENOUS
Status: DISCONTINUED | OUTPATIENT
Start: 2017-05-31 | End: 2017-05-31 | Stop reason: HOSPADM

## 2017-05-31 RX ORDER — ONDANSETRON 2 MG/ML
INJECTION INTRAMUSCULAR; INTRAVENOUS
Status: COMPLETED
Start: 2017-05-31 | End: 2017-05-31

## 2017-05-31 RX ORDER — ONDANSETRON 2 MG/ML
4 INJECTION INTRAMUSCULAR; INTRAVENOUS ONCE AS NEEDED
Status: COMPLETED | OUTPATIENT
Start: 2017-05-31 | End: 2017-05-31

## 2017-05-31 RX ORDER — PROMETHAZINE HYDROCHLORIDE 25 MG/1
25 SUPPOSITORY RECTAL ONCE AS NEEDED
Status: COMPLETED | OUTPATIENT
Start: 2017-05-31 | End: 2017-05-31

## 2017-05-31 RX ORDER — LABETALOL HYDROCHLORIDE 5 MG/ML
5 INJECTION, SOLUTION INTRAVENOUS
Status: DISCONTINUED | OUTPATIENT
Start: 2017-05-31 | End: 2017-05-31 | Stop reason: HOSPADM

## 2017-05-31 RX ORDER — SUCRALFATE 1 G/1
1 TABLET ORAL 4 TIMES DAILY
Status: DISCONTINUED | OUTPATIENT
Start: 2017-05-31 | End: 2017-06-13 | Stop reason: HOSPADM

## 2017-05-31 RX ORDER — SODIUM CHLORIDE, SODIUM LACTATE, POTASSIUM CHLORIDE, CALCIUM CHLORIDE 600; 310; 30; 20 MG/100ML; MG/100ML; MG/100ML; MG/100ML
75 INJECTION, SOLUTION INTRAVENOUS CONTINUOUS
Status: DISCONTINUED | OUTPATIENT
Start: 2017-05-31 | End: 2017-06-03

## 2017-05-31 RX ORDER — PROMETHAZINE HYDROCHLORIDE 25 MG/1
12.5 TABLET ORAL ONCE AS NEEDED
Status: DISCONTINUED | OUTPATIENT
Start: 2017-05-31 | End: 2017-05-31 | Stop reason: HOSPADM

## 2017-05-31 RX ORDER — HYDROCODONE BITARTRATE AND ACETAMINOPHEN 7.5; 325 MG/1; MG/1
1 TABLET ORAL EVERY 4 HOURS PRN
Status: DISCONTINUED | OUTPATIENT
Start: 2017-05-31 | End: 2017-06-01

## 2017-05-31 RX ORDER — CEFAZOLIN SODIUM 2 G/100ML
2 INJECTION, SOLUTION INTRAVENOUS ONCE
Status: COMPLETED | OUTPATIENT
Start: 2017-05-31 | End: 2017-05-31

## 2017-05-31 RX ORDER — CLOPIDOGREL BISULFATE 75 MG/1
75 TABLET ORAL DAILY
Status: DISCONTINUED | OUTPATIENT
Start: 2017-05-31 | End: 2017-06-13 | Stop reason: HOSPADM

## 2017-05-31 RX ORDER — ROCURONIUM BROMIDE 10 MG/ML
INJECTION, SOLUTION INTRAVENOUS AS NEEDED
Status: DISCONTINUED | OUTPATIENT
Start: 2017-05-31 | End: 2017-05-31 | Stop reason: SURG

## 2017-05-31 RX ORDER — MIDAZOLAM HYDROCHLORIDE 1 MG/ML
2 INJECTION INTRAMUSCULAR; INTRAVENOUS
Status: DISCONTINUED | OUTPATIENT
Start: 2017-05-31 | End: 2017-05-31 | Stop reason: HOSPADM

## 2017-05-31 RX ORDER — FLUMAZENIL 0.1 MG/ML
0.2 INJECTION INTRAVENOUS AS NEEDED
Status: DISCONTINUED | OUTPATIENT
Start: 2017-05-31 | End: 2017-05-31 | Stop reason: HOSPADM

## 2017-05-31 RX ORDER — ROSUVASTATIN CALCIUM 40 MG/1
40 TABLET, COATED ORAL NIGHTLY
Status: DISCONTINUED | OUTPATIENT
Start: 2017-05-31 | End: 2017-06-13 | Stop reason: HOSPADM

## 2017-05-31 RX ORDER — ATORVASTATIN CALCIUM 80 MG/1
80 TABLET, FILM COATED ORAL NIGHTLY
Status: DISCONTINUED | OUTPATIENT
Start: 2017-05-31 | End: 2017-05-31 | Stop reason: CLARIF

## 2017-05-31 RX ORDER — SODIUM CHLORIDE 0.9 % (FLUSH) 0.9 %
1-10 SYRINGE (ML) INJECTION AS NEEDED
Status: DISCONTINUED | OUTPATIENT
Start: 2017-05-31 | End: 2017-05-31 | Stop reason: HOSPADM

## 2017-05-31 RX ORDER — HYDROCODONE BITARTRATE AND ACETAMINOPHEN 7.5; 325 MG/1; MG/1
1 TABLET ORAL ONCE AS NEEDED
Status: DISCONTINUED | OUTPATIENT
Start: 2017-05-31 | End: 2017-05-31 | Stop reason: HOSPADM

## 2017-05-31 RX ADMIN — MORPHINE SULFATE 4 MG: 4 INJECTION, SOLUTION INTRAMUSCULAR; INTRAVENOUS at 18:43

## 2017-05-31 RX ADMIN — PROPOFOL 50 MG: 10 INJECTION, EMULSION INTRAVENOUS at 09:42

## 2017-05-31 RX ADMIN — PROTAMINE SULFATE 40 MG: 10 INJECTION, SOLUTION INTRAVENOUS at 12:28

## 2017-05-31 RX ADMIN — PHENYLEPHRINE HYDROCHLORIDE 100 MCG: 10 INJECTION INTRAVENOUS at 12:04

## 2017-05-31 RX ADMIN — ONDANSETRON 4 MG: 2 INJECTION INTRAMUSCULAR; INTRAVENOUS at 14:05

## 2017-05-31 RX ADMIN — HEPARIN SODIUM 2500 UNITS: 1000 INJECTION, SOLUTION INTRAVENOUS; SUBCUTANEOUS at 09:11

## 2017-05-31 RX ADMIN — ROCURONIUM BROMIDE 50 MG: 10 INJECTION INTRAVENOUS at 08:12

## 2017-05-31 RX ADMIN — SODIUM CHLORIDE, POTASSIUM CHLORIDE, SODIUM LACTATE AND CALCIUM CHLORIDE: 600; 310; 30; 20 INJECTION, SOLUTION INTRAVENOUS at 06:46

## 2017-05-31 RX ADMIN — MORPHINE SULFATE 4 MG: 4 INJECTION, SOLUTION INTRAMUSCULAR; INTRAVENOUS at 16:48

## 2017-05-31 RX ADMIN — ROSUVASTATIN CALCIUM 40 MG: 40 TABLET, FILM COATED ORAL at 20:56

## 2017-05-31 RX ADMIN — PHENYLEPHRINE HYDROCHLORIDE 100 MCG: 10 INJECTION INTRAVENOUS at 09:51

## 2017-05-31 RX ADMIN — FAMOTIDINE 20 MG: 10 INJECTION, SOLUTION INTRAVENOUS at 07:31

## 2017-05-31 RX ADMIN — SODIUM CHLORIDE, POTASSIUM CHLORIDE, SODIUM LACTATE AND CALCIUM CHLORIDE 125 ML/HR: 600; 310; 30; 20 INJECTION, SOLUTION INTRAVENOUS at 16:23

## 2017-05-31 RX ADMIN — SUCRALFATE 1 G: 1 TABLET ORAL at 18:37

## 2017-05-31 RX ADMIN — CLOPIDOGREL 75 MG: 75 TABLET, FILM COATED ORAL at 18:37

## 2017-05-31 RX ADMIN — GABAPENTIN 700 MG: 400 CAPSULE ORAL at 20:57

## 2017-05-31 RX ADMIN — HEPARIN SODIUM 2500 UNITS: 1000 INJECTION, SOLUTION INTRAVENOUS; SUBCUTANEOUS at 09:45

## 2017-05-31 RX ADMIN — FENTANYL CITRATE 50 MCG: 50 INJECTION INTRAMUSCULAR; INTRAVENOUS at 07:25

## 2017-05-31 RX ADMIN — PROTAMINE SULFATE 20 MG: 10 INJECTION, SOLUTION INTRAVENOUS at 12:33

## 2017-05-31 RX ADMIN — FENTANYL CITRATE 50 MCG: 50 INJECTION INTRAMUSCULAR; INTRAVENOUS at 11:30

## 2017-05-31 RX ADMIN — PHENYLEPHRINE HYDROCHLORIDE 100 MCG: 10 INJECTION INTRAVENOUS at 12:57

## 2017-05-31 RX ADMIN — SULFAMETHOXAZOLE AND TRIMETHOPRIM 160 MG: 800; 160 TABLET ORAL at 18:37

## 2017-05-31 RX ADMIN — CEFAZOLIN SODIUM 2 G: 2 INJECTION, SOLUTION INTRAVENOUS at 08:20

## 2017-05-31 RX ADMIN — PROMETHAZINE HYDROCHLORIDE 12.5 MG: 25 INJECTION INTRAMUSCULAR; INTRAVENOUS at 14:15

## 2017-05-31 RX ADMIN — ASPIRIN 81 MG: 81 TABLET ORAL at 18:37

## 2017-05-31 RX ADMIN — FENTANYL CITRATE 100 MCG: 50 INJECTION INTRAMUSCULAR; INTRAVENOUS at 08:42

## 2017-05-31 RX ADMIN — CEFAZOLIN SODIUM 2 G: 2 INJECTION, SOLUTION INTRAVENOUS at 11:51

## 2017-05-31 RX ADMIN — PHENYLEPHRINE HYDROCHLORIDE 100 MCG: 10 INJECTION INTRAVENOUS at 11:39

## 2017-05-31 RX ADMIN — ALBUMIN HUMAN 500 ML: 0.05 INJECTION, SOLUTION INTRAVENOUS at 23:28

## 2017-05-31 RX ADMIN — SODIUM CHLORIDE 500 ML: 9 INJECTION, SOLUTION INTRAVENOUS at 16:23

## 2017-05-31 RX ADMIN — HEPARIN SODIUM 7500 UNITS: 1000 INJECTION, SOLUTION INTRAVENOUS; SUBCUTANEOUS at 08:57

## 2017-05-31 RX ADMIN — FENTANYL CITRATE 50 MCG: 50 INJECTION INTRAMUSCULAR; INTRAVENOUS at 12:53

## 2017-05-31 RX ADMIN — PROPOFOL 150 MG: 10 INJECTION, EMULSION INTRAVENOUS at 08:12

## 2017-05-31 RX ADMIN — PHENYLEPHRINE HYDROCHLORIDE 100 MCG: 10 INJECTION INTRAVENOUS at 11:34

## 2017-05-31 RX ADMIN — IOPAMIDOL 87.8 ML: 510 INJECTION, SOLUTION INTRAVASCULAR at 07:30

## 2017-05-31 RX ADMIN — PHENYLEPHRINE HYDROCHLORIDE 100 MCG: 10 INJECTION INTRAVENOUS at 11:50

## 2017-05-31 RX ADMIN — FENTANYL CITRATE 50 MCG: 50 INJECTION INTRAMUSCULAR; INTRAVENOUS at 08:12

## 2017-05-31 RX ADMIN — PANTOPRAZOLE SODIUM 40 MG: 40 TABLET, DELAYED RELEASE ORAL at 18:37

## 2017-05-31 RX ADMIN — PHENYLEPHRINE HYDROCHLORIDE 200 MCG: 10 INJECTION INTRAVENOUS at 11:49

## 2017-05-31 RX ADMIN — SUCRALFATE 1 G: 1 TABLET ORAL at 20:56

## 2017-05-31 RX ADMIN — MIDAZOLAM 2 MG: 1 INJECTION INTRAMUSCULAR; INTRAVENOUS at 07:26

## 2017-05-31 RX ADMIN — FENTANYL CITRATE 100 MCG: 50 INJECTION INTRAMUSCULAR; INTRAVENOUS at 08:33

## 2017-05-31 RX ADMIN — HEPARIN SODIUM 5000 UNITS: 1000 INJECTION, SOLUTION INTRAVENOUS; SUBCUTANEOUS at 10:14

## 2017-05-31 RX ADMIN — PHENYLEPHRINE HYDROCHLORIDE 100 MCG: 10 INJECTION INTRAVENOUS at 12:12

## 2017-05-31 RX ADMIN — FENTANYL CITRATE 50 MCG: 50 INJECTION INTRAMUSCULAR; INTRAVENOUS at 09:42

## 2017-05-31 RX ADMIN — FENTANYL CITRATE 100 MCG: 50 INJECTION INTRAMUSCULAR; INTRAVENOUS at 13:25

## 2017-06-01 LAB
ABO + RH BLD: NORMAL
ABO + RH BLD: NORMAL
ANION GAP SERPL CALCULATED.3IONS-SCNC: 9.8 MMOL/L
BH BB BLOOD EXPIRATION DATE: NORMAL
BH BB BLOOD EXPIRATION DATE: NORMAL
BH BB BLOOD TYPE BARCODE: 5100
BH BB BLOOD TYPE BARCODE: 5100
BH BB DISPENSE STATUS: NORMAL
BH BB DISPENSE STATUS: NORMAL
BH BB PRODUCT CODE: NORMAL
BH BB PRODUCT CODE: NORMAL
BH BB UNIT NUMBER: NORMAL
BH BB UNIT NUMBER: NORMAL
BUN BLD-MCNC: 14 MG/DL (ref 8–23)
BUN/CREAT SERPL: 16.5 (ref 7–25)
CALCIUM SPEC-SCNC: 8.3 MG/DL (ref 8.6–10.5)
CHLORIDE SERPL-SCNC: 102 MMOL/L (ref 98–107)
CO2 SERPL-SCNC: 23.2 MMOL/L (ref 22–29)
CREAT BLD-MCNC: 0.85 MG/DL (ref 0.57–1)
CROSSMATCH INTERPRETATION: NORMAL
CROSSMATCH INTERPRETATION: NORMAL
DEPRECATED RDW RBC AUTO: 49.8 FL (ref 37–54)
ERYTHROCYTE [DISTWIDTH] IN BLOOD BY AUTOMATED COUNT: 14.6 % (ref 11.7–13)
GFR SERPL CREATININE-BSD FRML MDRD: 67 ML/MIN/1.73
GLUCOSE BLD-MCNC: 130 MG/DL (ref 65–99)
GLUCOSE BLDC GLUCOMTR-MCNC: 124 MG/DL (ref 70–130)
GLUCOSE BLDC GLUCOMTR-MCNC: 141 MG/DL (ref 70–130)
GLUCOSE BLDC GLUCOMTR-MCNC: 180 MG/DL (ref 70–130)
GLUCOSE BLDC GLUCOMTR-MCNC: 218 MG/DL (ref 70–130)
HCT VFR BLD AUTO: 32.6 % (ref 35.6–45.5)
HGB BLD-MCNC: 10.2 G/DL (ref 11.9–15.5)
MCH RBC QN AUTO: 29.1 PG (ref 26.9–32)
MCHC RBC AUTO-ENTMCNC: 31.3 G/DL (ref 32.4–36.3)
MCV RBC AUTO: 92.9 FL (ref 80.5–98.2)
PLATELET # BLD AUTO: 263 10*3/MM3 (ref 140–500)
PMV BLD AUTO: 9.9 FL (ref 6–12)
POTASSIUM BLD-SCNC: 5.1 MMOL/L (ref 3.5–5.2)
RBC # BLD AUTO: 3.51 10*6/MM3 (ref 3.9–5.2)
SODIUM BLD-SCNC: 135 MMOL/L (ref 136–145)
UNIT  ABO: NORMAL
UNIT  ABO: NORMAL
UNIT  RH: NORMAL
UNIT  RH: NORMAL
WBC NRBC COR # BLD: 10.08 10*3/MM3 (ref 4.5–10.7)

## 2017-06-01 PROCEDURE — 25010000002 PROMETHAZINE PER 50 MG: Performed by: SURGERY

## 2017-06-01 PROCEDURE — 63710000001 INSULIN ASPART PER 5 UNITS: Performed by: SURGERY

## 2017-06-01 PROCEDURE — 80048 BASIC METABOLIC PNL TOTAL CA: CPT | Performed by: SURGERY

## 2017-06-01 PROCEDURE — 85027 COMPLETE CBC AUTOMATED: CPT | Performed by: SURGERY

## 2017-06-01 PROCEDURE — 86900 BLOOD TYPING SEROLOGIC ABO: CPT

## 2017-06-01 PROCEDURE — 82962 GLUCOSE BLOOD TEST: CPT

## 2017-06-01 PROCEDURE — 63710000001 INSULIN DETEMER PER 5 UNITS: Performed by: SURGERY

## 2017-06-01 PROCEDURE — 86901 BLOOD TYPING SEROLOGIC RH(D): CPT

## 2017-06-01 PROCEDURE — 25010000002 MORPHINE PER 10 MG: Performed by: SURGERY

## 2017-06-01 RX ORDER — GABAPENTIN 400 MG/1
1200 CAPSULE ORAL NIGHTLY
Status: DISCONTINUED | OUTPATIENT
Start: 2017-06-01 | End: 2017-06-13 | Stop reason: HOSPADM

## 2017-06-01 RX ORDER — GABAPENTIN 400 MG/1
1200 CAPSULE ORAL NIGHTLY
Status: DISCONTINUED | OUTPATIENT
Start: 2017-06-01 | End: 2017-06-01 | Stop reason: SDUPTHER

## 2017-06-01 RX ORDER — OXYCODONE AND ACETAMINOPHEN 7.5; 325 MG/1; MG/1
1 TABLET ORAL EVERY 4 HOURS PRN
Status: DISPENSED | OUTPATIENT
Start: 2017-06-01 | End: 2017-06-11

## 2017-06-01 RX ADMIN — SODIUM CHLORIDE, POTASSIUM CHLORIDE, SODIUM LACTATE AND CALCIUM CHLORIDE 75 ML/HR: 600; 310; 30; 20 INJECTION, SOLUTION INTRAVENOUS at 11:34

## 2017-06-01 RX ADMIN — SUCRALFATE 1 G: 1 TABLET ORAL at 23:15

## 2017-06-01 RX ADMIN — ROSUVASTATIN CALCIUM 40 MG: 40 TABLET, FILM COATED ORAL at 23:16

## 2017-06-01 RX ADMIN — SULFAMETHOXAZOLE AND TRIMETHOPRIM 160 MG: 800; 160 TABLET ORAL at 09:10

## 2017-06-01 RX ADMIN — INSULIN DETEMIR 40 UNITS: 100 INJECTION, SOLUTION SUBCUTANEOUS at 23:16

## 2017-06-01 RX ADMIN — SILVER SULFADIAZINE: 10 CREAM TOPICAL at 11:34

## 2017-06-01 RX ADMIN — INSULIN ASPART 8 UNITS: 100 INJECTION, SOLUTION INTRAVENOUS; SUBCUTANEOUS at 11:37

## 2017-06-01 RX ADMIN — ASPIRIN 81 MG: 81 TABLET ORAL at 09:10

## 2017-06-01 RX ADMIN — OXYCODONE HYDROCHLORIDE AND ACETAMINOPHEN 1 TABLET: 7.5; 325 TABLET ORAL at 19:48

## 2017-06-01 RX ADMIN — PANTOPRAZOLE SODIUM 40 MG: 40 TABLET, DELAYED RELEASE ORAL at 06:16

## 2017-06-01 RX ADMIN — SILVER SULFADIAZINE: 10 CREAM TOPICAL at 17:11

## 2017-06-01 RX ADMIN — SULFAMETHOXAZOLE AND TRIMETHOPRIM 160 MG: 800; 160 TABLET ORAL at 17:12

## 2017-06-01 RX ADMIN — SUCRALFATE 1 G: 1 TABLET ORAL at 17:12

## 2017-06-01 RX ADMIN — PANTOPRAZOLE SODIUM 40 MG: 40 TABLET, DELAYED RELEASE ORAL at 17:12

## 2017-06-01 RX ADMIN — CLOPIDOGREL 75 MG: 75 TABLET, FILM COATED ORAL at 09:10

## 2017-06-01 RX ADMIN — MORPHINE SULFATE 4 MG: 4 INJECTION, SOLUTION INTRAMUSCULAR; INTRAVENOUS at 00:54

## 2017-06-01 RX ADMIN — SUCRALFATE 1 G: 1 TABLET ORAL at 12:07

## 2017-06-01 RX ADMIN — SUCRALFATE 1 G: 1 TABLET ORAL at 09:10

## 2017-06-01 RX ADMIN — LIDOCAINE HYDROCHLORIDE: 20 JELLY TOPICAL at 17:12

## 2017-06-01 RX ADMIN — PROMETHAZINE HYDROCHLORIDE 12.5 MG: 25 INJECTION INTRAMUSCULAR; INTRAVENOUS at 06:14

## 2017-06-01 RX ADMIN — METOCLOPRAMIDE 5 MG: 5 TABLET ORAL at 09:10

## 2017-06-01 RX ADMIN — LIDOCAINE HYDROCHLORIDE: 20 JELLY TOPICAL at 11:34

## 2017-06-01 RX ADMIN — GABAPENTIN 1200 MG: 400 CAPSULE ORAL at 23:15

## 2017-06-01 NOTE — PROGRESS NOTES
Discharge Planning Assessment  [facility]     Patient Name: Randi Martines  MRN: [id number]  Today's Date: 6/1/2017    Admit Date: 5/31/2017          Discharge Needs Assessment       06/01/17 0948    Living Environment    Lives With child(magan), adult   Son Stevan    Living Arrangements house    Home Accessibility stairs to enter home    Number of Stairs to Enter Home 1    Stair Railings at Home none    Type of Financial/Environmental Concern none    Transportation Available car;family or friend will provide    Living Environment    Provides Primary Care For no one    Quality Of Family Relationships supportive;helpful;involved    Able to Return to Prior Living Arrangements yes    Living Arrangement Comments Son can assist.    Discharge Needs Assessment    Concerns To Be Addressed basic needs concerns;discharge planning concerns;home safety concerns    Concerns Comments Patient has used [facility] and would like them again to assist with dressing changes. Called referral to [facility]. Patient is on O2 but does not feel she will need at home. CCP to follow.     Outpatient/Agency/Support Group Needs homecare agency (specify level of care)    Community Agency Name(S) [facility]    Anticipated Changes Related to Illness none    Equipment Currently Used at Home cane, straight;walker, standard    Equipment Needed After Discharge none    Current Discharge Risk physical impairment    Discharge Disposition still a patient    Discharge Contact Information if Applicable Stevan Martines son [phone]    Discharge Planning Comments Plans home with [facility]             Discharge Plan       06/01/17 0951    Case Management/Social Work Plan    Plan Plans home with [facility]     Patient/Family In Agreement With Plan yes    Additional Comments Confirmed face sheet correct. IMM 5/31/17.        Discharge Placement     No information found                Demographic Summary       06/01/17 0947    Referral Information    Arrived From home or  self-care    Referral Source admission list    Reason For Consult discharge planning    Record Reviewed clinical discipline documentation;history and physical;medical record    Contact Information    Permission Granted to Share Information With     Primary Care Physician Information    Name Dr. Shah            Functional Status       06/01/17 0947    Functional Status Current    Ambulation 2-->assistive person    Transferring 2-->assistive person    Toileting 2-->assistive person    Bathing 2-->assistive person    Dressing 2-->assistive person    Eating 0-->independent    Communication 0-->understands/communicates without difficulty    Swallowing (if score 2 or more for any item, consult Rehab Services) 0-->swallows foods/liquids without difficulty    Change in Functional Status Since Onset of Current Illness/Injury yes    Functional Status Prior    Ambulation 1-->assistive equipment    Transferring 1-->assistive equipment    Toileting 1-->assistive equipment    Bathing 0-->independent    Dressing 0-->independent    Eating 0-->independent    Communication 0-->understands/communicates without difficulty    Swallowing 0-->swallows foods/liquids without difficulty    Activity Tolerance    Usual Activity Tolerance good    Current Activity Tolerance moderate            Psychosocial     None            Abuse/Neglect     None            Legal     None            Substance Abuse     None            Patient Forms     None          Evangelina Ball RN  Continued Stay Note  [facility]     Patient Name: Randi Martines  MRN: [id number]  Today's Date: 6/1/2017    Admit Date: 5/31/2017          Discharge Plan       06/01/17 0951    Case Management/Social Work Plan    Plan Plans home with [facility]     Patient/Family In Agreement With Plan yes    Additional Comments Confirmed face sheet correct. IMM 5/31/17.              Discharge Codes     None            Evangelina Ball RN

## 2017-06-01 NOTE — PLAN OF CARE
Problem: Patient Care Overview (Adult)  Goal: Plan of Care Review  Outcome: Ongoing (interventions implemented as appropriate)    06/01/17 3516   Coping/Psychosocial Response Interventions   Plan Of Care Reviewed With patient   Patient Care Overview   Progress progress toward functional goals as expected         Problem: Perioperative Period (Adult)  Goal: Signs and Symptoms of Listed Potential Problems Will be Absent or Manageable (Perioperative Period)  Outcome: Ongoing (interventions implemented as appropriate)    Problem: Diabetes, Type 2 (Adult)  Goal: Signs and Symptoms of Listed Potential Problems Will be Absent or Manageable (Diabetes, Type 2)  Outcome: Ongoing (interventions implemented as appropriate)    Problem: Tissue Perfusion, Ineffective Peripheral (Adult)  Goal: Adequate Tissue Perfusion  Outcome: Ongoing (interventions implemented as appropriate)

## 2017-06-01 NOTE — DISCHARGE PLACEMENT REQUEST
"Marce Martines (66 y.o. Female)     Date of Birth Social Security Number Address Home Phone MRN    1950  [address] [address] [phone] [id number]    [employer] Marital Status          None        Admission Date Admission Type Admitting Provider Attending Provider Department, Room/Bed    5/31/17 Elective Jayda Barrientos MD Rachel, Elizabeth S, MD [address], 549/1    Discharge Date Discharge Disposition Discharge Destination                      Attending Provider: Jayda Barrientos MD     Allergies:  Augmentin [Amoxicillin-pot Clavulanate], Codeine, Dakins [Sodium Hypochlorite]    Isolation:  None   Infection:  None   Code Status:  FULL    Ht:  61\" (154.9 cm)   Wt:  187 lb (84.8 kg)    Admission Cmt:  None   Principal Problem:  None                Active Insurance as of 5/31/2017     Primary Coverage     Payor Plan Insurance Group Employer/Plan Group    HUMANA MEDICARE REPLACEMENT HUMANA MEDICARE REPL [id number]     Payor Plan Address Payor Plan Phone Number Effective From Effective To    [address] [address] [phone] 1/1/2014     [place] [address]       Subscriber Name Subscriber Birth Date Member ID       MARCE MARTINES 1950 [id number]                 Emergency Contacts      (Rel.) Home Phone Work Phone Mobile Phone    Stevan Martines (Son) -- -- [phone]              "

## 2017-06-01 NOTE — PLAN OF CARE
Problem: Patient Care Overview (Adult)  Goal: Plan of Care Review  Outcome: Ongoing (interventions implemented as appropriate)    06/01/17 0407   Coping/Psychosocial Response Interventions   Plan Of Care Reviewed With patient   Patient Care Overview   Progress progress toward functional goals as expected   Outcome Evaluation   Outcome Summary/Follow up Plan albumin given for low bp, medicated for pain per mar, uop adquate,        Goal: Adult Individualization and Mutuality  Outcome: Ongoing (interventions implemented as appropriate)    06/01/17 0407   Individualization   Patient Specific Goals pain control, healing of wound L foot,    Patient Specific Interventions neurovasc checks, pain control, manage bp,          Problem: Pain, Acute (Adult)  Goal: Identify Related Risk Factors and Signs and Symptoms  Outcome: Ongoing (interventions implemented as appropriate)    06/01/17 0407   Pain, Acute   Related Risk Factors (Acute Pain) disease process;persistent pain;surgery   Signs and Symptoms (Acute Pain) verbalization of pain descriptors       Goal: Acceptable Pain Control/Comfort Level  Outcome: Ongoing (interventions implemented as appropriate)    06/01/17 0407   Pain, Acute (Adult)   Acceptable Pain Control/Comfort Level making progress toward outcome         Problem: Diabetes, Type 2 (Adult)  Goal: Signs and Symptoms of Listed Potential Problems Will be Absent or Manageable (Diabetes, Type 2)  Outcome: Ongoing (interventions implemented as appropriate)    06/01/17 0407   Diabetes, Type 2   Problems Assessed (Type 2 Diabetes) all   Problems Present (Type 2 Diabetes) impaired glycemic control         Problem: Tissue Perfusion, Ineffective Peripheral (Adult)  Goal: Identify Related Risk Factors and Signs and Symptoms  Outcome: Ongoing (interventions implemented as appropriate)    06/01/17 0407   Tissue Perfusion, Ineffective Peripheral   Tissue Perfusion, Ineffective Peripheral: Related Risk Factors anemia;arterial  flow reduced;diabetes mellitus;disease process   Signs and Symptoms (Ineffective Peripheral Tissue Perfusion) slow healing wounds;skin ulcerations;peripheral pulses absent/decreased       Goal: Adequate Tissue Perfusion  Outcome: Ongoing (interventions implemented as appropriate)    06/01/17 0407   Tissue Perfusion, Ineffective Peripheral (Adult)   Adequate Tissue Perfusion making progress toward outcome

## 2017-06-01 NOTE — PROGRESS NOTES
Jayda Barrientos MD       LOS: 1 day   Patient Care Team:  Canelo Shah MD as PCP - General  Canelo Shah MD as PCP - Family Medicine  Samantha Uriostegui MD as Consulting Physician (Cardiology)    Subjective     66 y.o. female pod 1 left inflow/outflow PTA, left fem-pop w artegraft  Burn injury left foot    Review of Systems  Review of Systems - nausea improving  Foot pain      Objective     Vital Signs  Temp:  [97.2 °F (36.2 °C)-98.6 °F (37 °C)] 98.6 °F (37 °C)  Heart Rate:  [73-90] 86  Resp:  [16-20] 19  BP: ()/() 102/39  Arterial Line BP: (56-77)/(41-54) 56/47    Physical Exam  General: No acute distress. Alert and oriented x 4  HEENT: No jugular venous distension, trachea is midline  CV: RRR  Resp: unlabored breathing on ra  Extremities: great phasic PT signal  FT skin injury left foot without infection    Results Review:       Recent Results (from the past 12 hour(s))   CBC (No Diff)    Collection Time: 06/01/17  5:38 AM   Result Value Ref Range    WBC 10.08 4.50 - 10.70 10*3/mm3    RBC 3.51 (L) 3.90 - 5.20 10*6/mm3    Hemoglobin 10.2 (L) 11.9 - 15.5 g/dL    Hematocrit 32.6 (L) 35.6 - 45.5 %    MCV 92.9 80.5 - 98.2 fL    MCH 29.1 26.9 - 32.0 pg    MCHC 31.3 (L) 32.4 - 36.3 g/dL    RDW 14.6 (H) 11.7 - 13.0 %    RDW-SD 49.8 37.0 - 54.0 fl    MPV 9.9 6.0 - 12.0 fL    Platelets 263 140 - 500 10*3/mm3   Basic Metabolic Panel    Collection Time: 06/01/17  5:38 AM   Result Value Ref Range    Glucose 130 (H) 65 - 99 mg/dL    BUN 14 8 - 23 mg/dL    Creatinine 0.85 0.57 - 1.00 mg/dL    Sodium 135 (L) 136 - 145 mmol/L    Potassium 5.1 3.5 - 5.2 mmol/L    Chloride 102 98 - 107 mmol/L    CO2 23.2 22.0 - 29.0 mmol/L    Calcium 8.3 (L) 8.6 - 10.5 mg/dL    eGFR Non African Amer 67 >60 mL/min/1.73    BUN/Creatinine Ratio 16.5 7.0 - 25.0    Anion Gap 9.8 mmol/L   Prepare RBC, 2 Units    Collection Time: 06/01/17  6:00 AM   Result Value Ref Range    Product Code H7642F82     Unit Number [id number]     UNIT   ABO O     UNIT  RH POS     CROSSMATCH 1 INTERPRETATION Compatible     Dispense Status PT     Blood Type OPOS     Blood Expiration Date 201706232359     Blood Type Barcode 5100     Product Code U6361R74     Unit Number [id number]     UNIT  ABO O     UNIT  RH POS     CROSSMATCH 1 INTERPRETATION Compatible     Dispense Status PT     Blood Type OPOS     Blood Expiration Date [id number]     Blood Type Barcode 5100    POC Glucose Fingerstick    Collection Time: 06/01/17  7:14 AM   Result Value Ref Range    Glucose 124 70 - 130 mg/dL   ]      Assessment/Plan           Active Problems:    Atherosclerotic PVD with ulceration  burn to left foot  Post op nausea    Assessment & Plan  66 y.o. female pod 1 left Le arterial reconstruction  Anti emetic  Start xylocaine jelly with silvadene to left foot bid after wash/dry  Will ask Dr Quinones to see at his convenience, ?  FT graft  FC out tmrw due to immobilization  Try up to chair today      Jayda Barrientos MD  06/01/17  9:50 AM

## 2017-06-02 LAB
ACT BLD: 141 SECONDS (ref 82–152)
ACT BLD: 209 SECONDS (ref 82–152)
ACT BLD: 240 SECONDS (ref 82–152)
ACT BLD: 265 SECONDS (ref 82–152)
ACT BLD: 265 SECONDS (ref 82–152)
ACT BLD: 307 SECONDS (ref 82–152)
GLUCOSE BLDC GLUCOMTR-MCNC: 142 MG/DL (ref 70–130)
GLUCOSE BLDC GLUCOMTR-MCNC: 161 MG/DL (ref 70–130)
GLUCOSE BLDC GLUCOMTR-MCNC: 201 MG/DL (ref 70–130)
GLUCOSE BLDC GLUCOMTR-MCNC: 237 MG/DL (ref 70–130)

## 2017-06-02 PROCEDURE — 82962 GLUCOSE BLOOD TEST: CPT

## 2017-06-02 PROCEDURE — 63710000001 INSULIN DETEMER PER 5 UNITS: Performed by: SURGERY

## 2017-06-02 PROCEDURE — 63710000001 INSULIN ASPART PER 5 UNITS: Performed by: SURGERY

## 2017-06-02 PROCEDURE — 25010000002 MORPHINE PER 10 MG: Performed by: SURGERY

## 2017-06-02 RX ADMIN — SULFAMETHOXAZOLE AND TRIMETHOPRIM 160 MG: 800; 160 TABLET ORAL at 09:08

## 2017-06-02 RX ADMIN — LIDOCAINE HYDROCHLORIDE: 20 JELLY TOPICAL at 17:19

## 2017-06-02 RX ADMIN — ASPIRIN 81 MG: 81 TABLET ORAL at 09:09

## 2017-06-02 RX ADMIN — SILVER SULFADIAZINE: 10 CREAM TOPICAL at 07:01

## 2017-06-02 RX ADMIN — PANTOPRAZOLE SODIUM 40 MG: 40 TABLET, DELAYED RELEASE ORAL at 09:13

## 2017-06-02 RX ADMIN — SULFAMETHOXAZOLE AND TRIMETHOPRIM 160 MG: 800; 160 TABLET ORAL at 18:50

## 2017-06-02 RX ADMIN — PANTOPRAZOLE SODIUM 40 MG: 40 TABLET, DELAYED RELEASE ORAL at 17:19

## 2017-06-02 RX ADMIN — SUCRALFATE 1 G: 1 TABLET ORAL at 11:29

## 2017-06-02 RX ADMIN — MORPHINE SULFATE 4 MG: 4 INJECTION, SOLUTION INTRAMUSCULAR; INTRAVENOUS at 21:02

## 2017-06-02 RX ADMIN — OXYCODONE HYDROCHLORIDE AND ACETAMINOPHEN 1 TABLET: 7.5; 325 TABLET ORAL at 04:38

## 2017-06-02 RX ADMIN — GABAPENTIN 1200 MG: 400 CAPSULE ORAL at 20:57

## 2017-06-02 RX ADMIN — METOCLOPRAMIDE 5 MG: 5 TABLET ORAL at 09:13

## 2017-06-02 RX ADMIN — SODIUM CHLORIDE, POTASSIUM CHLORIDE, SODIUM LACTATE AND CALCIUM CHLORIDE 75 ML/HR: 600; 310; 30; 20 INJECTION, SOLUTION INTRAVENOUS at 00:54

## 2017-06-02 RX ADMIN — SUCRALFATE 1 G: 1 TABLET ORAL at 17:19

## 2017-06-02 RX ADMIN — LIDOCAINE HYDROCHLORIDE: 20 JELLY TOPICAL at 07:01

## 2017-06-02 RX ADMIN — SUCRALFATE 1 G: 1 TABLET ORAL at 09:09

## 2017-06-02 RX ADMIN — INSULIN DETEMIR 40 UNITS: 100 INJECTION, SOLUTION SUBCUTANEOUS at 21:02

## 2017-06-02 RX ADMIN — INSULIN ASPART 8 UNITS: 100 INJECTION, SOLUTION INTRAVENOUS; SUBCUTANEOUS at 09:13

## 2017-06-02 RX ADMIN — INSULIN ASPART 8 UNITS: 100 INJECTION, SOLUTION INTRAVENOUS; SUBCUTANEOUS at 11:32

## 2017-06-02 RX ADMIN — SUCRALFATE 1 G: 1 TABLET ORAL at 20:57

## 2017-06-02 RX ADMIN — OXYCODONE HYDROCHLORIDE AND ACETAMINOPHEN 1 TABLET: 7.5; 325 TABLET ORAL at 11:28

## 2017-06-02 RX ADMIN — CLOPIDOGREL 75 MG: 75 TABLET, FILM COATED ORAL at 09:08

## 2017-06-02 RX ADMIN — SILVER SULFADIAZINE: 10 CREAM TOPICAL at 17:19

## 2017-06-02 RX ADMIN — ROSUVASTATIN CALCIUM 40 MG: 40 TABLET, FILM COATED ORAL at 20:57

## 2017-06-02 RX ADMIN — OXYCODONE HYDROCHLORIDE AND ACETAMINOPHEN 1 TABLET: 7.5; 325 TABLET ORAL at 18:50

## 2017-06-02 NOTE — PLAN OF CARE
Problem: Patient Care Overview (Adult)  Goal: Plan of Care Review  Outcome: Ongoing (interventions implemented as appropriate)    06/02/17 6459   Coping/Psychosocial Response Interventions   Plan Of Care Reviewed With patient   Patient Care Overview   Progress progress toward functional goals as expected         Problem: Pain, Acute (Adult)  Goal: Acceptable Pain Control/Comfort Level  Outcome: Ongoing (interventions implemented as appropriate)    Problem: Diabetes, Type 2 (Adult)  Goal: Signs and Symptoms of Listed Potential Problems Will be Absent or Manageable (Diabetes, Type 2)  Outcome: Ongoing (interventions implemented as appropriate)    Problem: Tissue Perfusion, Ineffective Peripheral (Adult)  Goal: Adequate Tissue Perfusion  Outcome: Ongoing (interventions implemented as appropriate)

## 2017-06-02 NOTE — PLAN OF CARE
Problem: Patient Care Overview (Adult)  Goal: Plan of Care Review  Outcome: Ongoing (interventions implemented as appropriate)    06/02/17 0548   Coping/Psychosocial Response Interventions   Plan Of Care Reviewed With patient   Patient Care Overview   Progress progress toward functional goals as expected   Outcome Evaluation   Outcome Summary/Follow up Plan v/s stable, medicated for pain control per mar,        Goal: Adult Individualization and Mutuality  Outcome: Ongoing (interventions implemented as appropriate)    06/02/17 0548   Individualization   Patient Specific Goals pain control, healing of wound l foot   Patient Specific Interventions neurovasc checks, pain control          Problem: Pain, Acute (Adult)  Goal: Identify Related Risk Factors and Signs and Symptoms  Outcome: Ongoing (interventions implemented as appropriate)    06/02/17 0548   Pain, Acute   Related Risk Factors (Acute Pain) disease process;persistent pain;surgery   Signs and Symptoms (Acute Pain) verbalization of pain descriptors       Goal: Acceptable Pain Control/Comfort Level  Outcome: Ongoing (interventions implemented as appropriate)    06/02/17 0548   Pain, Acute (Adult)   Acceptable Pain Control/Comfort Level making progress toward outcome         Problem: Diabetes, Type 2 (Adult)  Goal: Signs and Symptoms of Listed Potential Problems Will be Absent or Manageable (Diabetes, Type 2)  Outcome: Ongoing (interventions implemented as appropriate)    06/02/17 0548   Diabetes, Type 2   Problems Assessed (Type 2 Diabetes) all   Problems Present (Type 2 Diabetes) impaired glycemic control         Problem: Tissue Perfusion, Ineffective Peripheral (Adult)  Goal: Identify Related Risk Factors and Signs and Symptoms  Outcome: Ongoing (interventions implemented as appropriate)    06/02/17 0548   Tissue Perfusion, Ineffective Peripheral   Tissue Perfusion, Ineffective Peripheral: Related Risk Factors anemia;arterial flow reduced;diabetes  mellitus;disease process   Signs and Symptoms (Ineffective Peripheral Tissue Perfusion) edema;muscle weakness;peripheral pulses absent/decreased;sensation decreased;slow healing wounds       Goal: Adequate Tissue Perfusion  Outcome: Ongoing (interventions implemented as appropriate)    06/02/17 0548   Tissue Perfusion, Ineffective Peripheral (Adult)   Adequate Tissue Perfusion making progress toward outcome

## 2017-06-02 NOTE — NURSING NOTE
Spoke with Dr. Barrientos regarding Dr. Quinones, Dr. Howard and Dr. Andrews do not take the pts current insurance.

## 2017-06-02 NOTE — DISCHARGE PLACEMENT REQUEST
"Marce Martines (66 y.o. Female)     Date of Birth Social Security Number Address Home Phone MRN    1950  [id number] [facility] [address] [phone] [id number]    Rastafarian Marital Status          None        Admission Date Admission Type Admitting Provider Attending Provider Department, Room/Bed    5/31/17 Elective Jayda Barrientos MD Rachel, Elizabeth S, MD [address], 549/1    Discharge Date Discharge Disposition Discharge Destination                      Attending Provider: Jayda Barrientos MD     Allergies:  Augmentin [Amoxicillin-pot Clavulanate], Codeine, Dakins [Sodium Hypochlorite]    Isolation:  None   Infection:  None   Code Status:  FULL    Ht:  61\" (154.9 cm)   Wt:  187 lb (84.8 kg)    Admission Cmt:  None   Principal Problem:  None                Active Insurance as of 5/31/2017     Primary Coverage     Payor Plan Insurance Group Employer/Plan Group    HUMANA MEDICARE REPLACEMENT HUMANA MEDICARE REPL [id number]     Payor Plan Address Payor Plan Phone Number Effective From Effective To    [address] [address] [phone] 1/1/2014     [place] [address]       Subscriber Name Subscriber Birth Date Member ID       MARCE MARTINES 1950 [id number]                 Emergency Contacts      (Rel.) Home Phone Work Phone Mobile Phone    Stevan Martines (Son) -- -- [phone]              "

## 2017-06-02 NOTE — PROGRESS NOTES
Continued Stay Note  [facility]     Patient Name: Rnadi Martines  MRN: [id number]  Today's Date: 6/2/2017    Admit Date: 5/31/2017          Discharge Plan       06/02/17 1657    Case Management/Social Work Plan    Plan [facility] skilled - will need [employer] precert    Patient/Family In Agreement With Plan yes    Additional Comments Followed up with patient and she is not sure she can reach her foot to do dressing changes. MD says she will need BID changes. She is interested in rehab at [facility] as it is by her house. [facility] She will check to see if patient is in network and will let us know.               Discharge Codes     None            Evangelina Ball RN
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
## 2017-06-03 LAB
ANION GAP SERPL CALCULATED.3IONS-SCNC: 13.9 MMOL/L
BUN BLD-MCNC: 18 MG/DL (ref 8–23)
BUN/CREAT SERPL: 17.3 (ref 7–25)
CALCIUM SPEC-SCNC: 9.6 MG/DL (ref 8.6–10.5)
CHLORIDE SERPL-SCNC: 99 MMOL/L (ref 98–107)
CO2 SERPL-SCNC: 24.1 MMOL/L (ref 22–29)
CREAT BLD-MCNC: 1.04 MG/DL (ref 0.57–1)
DEPRECATED RDW RBC AUTO: 47.6 FL (ref 37–54)
ERYTHROCYTE [DISTWIDTH] IN BLOOD BY AUTOMATED COUNT: 14.2 % (ref 11.7–13)
GFR SERPL CREATININE-BSD FRML MDRD: 53 ML/MIN/1.73
GLUCOSE BLD-MCNC: 205 MG/DL (ref 65–99)
GLUCOSE BLDC GLUCOMTR-MCNC: 117 MG/DL (ref 70–130)
GLUCOSE BLDC GLUCOMTR-MCNC: 148 MG/DL (ref 70–130)
GLUCOSE BLDC GLUCOMTR-MCNC: 173 MG/DL (ref 70–130)
GLUCOSE BLDC GLUCOMTR-MCNC: 199 MG/DL (ref 70–130)
HCT VFR BLD AUTO: 33.7 % (ref 35.6–45.5)
HGB BLD-MCNC: 10.6 G/DL (ref 11.9–15.5)
MAGNESIUM SERPL-MCNC: 1.3 MG/DL (ref 1.6–2.4)
MCH RBC QN AUTO: 28.6 PG (ref 26.9–32)
MCHC RBC AUTO-ENTMCNC: 31.5 G/DL (ref 32.4–36.3)
MCV RBC AUTO: 90.8 FL (ref 80.5–98.2)
PLATELET # BLD AUTO: 246 10*3/MM3 (ref 140–500)
PMV BLD AUTO: 9.8 FL (ref 6–12)
POTASSIUM BLD-SCNC: 5 MMOL/L (ref 3.5–5.2)
RBC # BLD AUTO: 3.71 10*6/MM3 (ref 3.9–5.2)
SODIUM BLD-SCNC: 137 MMOL/L (ref 136–145)
WBC NRBC COR # BLD: 9.6 10*3/MM3 (ref 4.5–10.7)

## 2017-06-03 PROCEDURE — 85027 COMPLETE CBC AUTOMATED: CPT | Performed by: SURGERY

## 2017-06-03 PROCEDURE — 82962 GLUCOSE BLOOD TEST: CPT

## 2017-06-03 PROCEDURE — 80048 BASIC METABOLIC PNL TOTAL CA: CPT | Performed by: SURGERY

## 2017-06-03 PROCEDURE — 83735 ASSAY OF MAGNESIUM: CPT | Performed by: SURGERY

## 2017-06-03 PROCEDURE — 63710000001 INSULIN ASPART PER 5 UNITS: Performed by: SURGERY

## 2017-06-03 RX ORDER — MAGNESIUM OXIDE 400 MG/1
400 TABLET ORAL DAILY
Status: DISCONTINUED | OUTPATIENT
Start: 2017-06-03 | End: 2017-06-13 | Stop reason: HOSPADM

## 2017-06-03 RX ADMIN — GABAPENTIN 1200 MG: 400 CAPSULE ORAL at 21:19

## 2017-06-03 RX ADMIN — CLOPIDOGREL 75 MG: 75 TABLET, FILM COATED ORAL at 09:43

## 2017-06-03 RX ADMIN — SILVER SULFADIAZINE: 10 CREAM TOPICAL at 21:24

## 2017-06-03 RX ADMIN — INSULIN DETEMIR 40 UNITS: 100 INJECTION, SOLUTION SUBCUTANEOUS at 21:19

## 2017-06-03 RX ADMIN — LIDOCAINE HYDROCHLORIDE: 20 JELLY TOPICAL at 21:24

## 2017-06-03 RX ADMIN — OXYCODONE HYDROCHLORIDE AND ACETAMINOPHEN 1 TABLET: 7.5; 325 TABLET ORAL at 21:18

## 2017-06-03 RX ADMIN — ASPIRIN 81 MG: 81 TABLET ORAL at 09:43

## 2017-06-03 RX ADMIN — SUCRALFATE 1 G: 1 TABLET ORAL at 17:56

## 2017-06-03 RX ADMIN — OXYCODONE HYDROCHLORIDE AND ACETAMINOPHEN 1 TABLET: 7.5; 325 TABLET ORAL at 07:20

## 2017-06-03 RX ADMIN — INSULIN ASPART 12 UNITS: 100 INJECTION, SOLUTION INTRAVENOUS; SUBCUTANEOUS at 13:51

## 2017-06-03 RX ADMIN — Medication 400 MG: at 15:14

## 2017-06-03 RX ADMIN — SULFAMETHOXAZOLE AND TRIMETHOPRIM 160 MG: 800; 160 TABLET ORAL at 17:56

## 2017-06-03 RX ADMIN — METOCLOPRAMIDE 5 MG: 5 TABLET ORAL at 07:20

## 2017-06-03 RX ADMIN — SUCRALFATE 1 G: 1 TABLET ORAL at 13:51

## 2017-06-03 RX ADMIN — SILVER SULFADIAZINE: 10 CREAM TOPICAL at 11:07

## 2017-06-03 RX ADMIN — SUCRALFATE 1 G: 1 TABLET ORAL at 21:19

## 2017-06-03 RX ADMIN — ROSUVASTATIN CALCIUM 40 MG: 40 TABLET, FILM COATED ORAL at 21:19

## 2017-06-03 RX ADMIN — SULFAMETHOXAZOLE AND TRIMETHOPRIM 160 MG: 800; 160 TABLET ORAL at 10:02

## 2017-06-03 RX ADMIN — SUCRALFATE 1 G: 1 TABLET ORAL at 09:43

## 2017-06-03 RX ADMIN — PANTOPRAZOLE SODIUM 40 MG: 40 TABLET, DELAYED RELEASE ORAL at 09:44

## 2017-06-03 RX ADMIN — PANTOPRAZOLE SODIUM 40 MG: 40 TABLET, DELAYED RELEASE ORAL at 17:56

## 2017-06-03 RX ADMIN — LIDOCAINE HYDROCHLORIDE: 20 JELLY TOPICAL at 11:07
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
## 2017-06-03 NOTE — PLAN OF CARE
Problem: Patient Care Overview (Adult)  Goal: Plan of Care Review  Outcome: Ongoing (interventions implemented as appropriate)    06/03/17 0259   Coping/Psychosocial Response Interventions   Plan Of Care Reviewed With patient   Patient Care Overview   Progress improving   Outcome Evaluation   Outcome Summary/Follow up Plan VSS. Pain controlled with Percocet and morphine for breakthrough pain. Dr. Waterhouse consulted to see in am, will do foot dressing change then. Plan for home VNA or placement for rehab. Will continue to monitor.        Goal: Adult Individualization and Mutuality  Outcome: Ongoing (interventions implemented as appropriate)  Goal: Discharge Needs Assessment  Outcome: Ongoing (interventions implemented as appropriate)    Problem: Pain, Acute (Adult)  Goal: Identify Related Risk Factors and Signs and Symptoms  Outcome: Ongoing (interventions implemented as appropriate)  Goal: Acceptable Pain Control/Comfort Level  Outcome: Ongoing (interventions implemented as appropriate)    Problem: Diabetes, Type 2 (Adult)  Goal: Signs and Symptoms of Listed Potential Problems Will be Absent or Manageable (Diabetes, Type 2)  Outcome: Ongoing (interventions implemented as appropriate)    Problem: Tissue Perfusion, Ineffective Peripheral (Adult)  Goal: Identify Related Risk Factors and Signs and Symptoms  Outcome: Ongoing (interventions implemented as appropriate)  Goal: Adequate Tissue Perfusion  Outcome: Ongoing (interventions implemented as appropriate)

## 2017-06-03 NOTE — PLAN OF CARE
Problem: Patient Care Overview (Adult)  Goal: Plan of Care Review    06/03/17 0259 06/03/17 1712   Coping/Psychosocial Response Interventions   Plan Of Care Reviewed With patient --    Patient Care Overview   Progress improving --    Outcome Evaluation   Outcome Summary/Follow up Plan --  VSS, pain controlled with Percocet, Dr. Waterhouse saw patient today and recommended at skin becka possibly on Monday       Goal: Adult Individualization and Mutuality  Outcome: Ongoing (interventions implemented as appropriate)    Problem: Perioperative Period (Adult)  Goal: Signs and Symptoms of Listed Potential Problems Will be Absent or Manageable (Perioperative Period)  Outcome: Ongoing (interventions implemented as appropriate)    Problem: Pain, Acute (Adult)  Goal: Identify Related Risk Factors and Signs and Symptoms  Outcome: Ongoing (interventions implemented as appropriate)  Goal: Acceptable Pain Control/Comfort Level  Outcome: Ongoing (interventions implemented as appropriate)    Problem: Diabetes, Type 2 (Adult)  Goal: Signs and Symptoms of Listed Potential Problems Will be Absent or Manageable (Diabetes, Type 2)  Outcome: Ongoing (interventions implemented as appropriate)    Problem: Tissue Perfusion, Ineffective Peripheral (Adult)  Goal: Identify Related Risk Factors and Signs and Symptoms  Outcome: Ongoing (interventions implemented as appropriate)  Goal: Adequate Tissue Perfusion  Outcome: Ongoing (interventions implemented as appropriate)

## 2017-06-04 LAB
GLUCOSE BLDC GLUCOMTR-MCNC: 151 MG/DL (ref 70–130)
GLUCOSE BLDC GLUCOMTR-MCNC: 170 MG/DL (ref 70–130)
GLUCOSE BLDC GLUCOMTR-MCNC: 207 MG/DL (ref 70–130)
GLUCOSE BLDC GLUCOMTR-MCNC: 228 MG/DL (ref 70–130)

## 2017-06-04 PROCEDURE — 63710000001 INSULIN ASPART PER 5 UNITS: Performed by: SURGERY

## 2017-06-04 PROCEDURE — 82962 GLUCOSE BLOOD TEST: CPT

## 2017-06-04 RX ADMIN — SILVER SULFADIAZINE: 10 CREAM TOPICAL at 08:42

## 2017-06-04 RX ADMIN — INSULIN ASPART 10 UNITS: 100 INJECTION, SOLUTION INTRAVENOUS; SUBCUTANEOUS at 17:27

## 2017-06-04 RX ADMIN — SUCRALFATE 1 G: 1 TABLET ORAL at 12:09

## 2017-06-04 RX ADMIN — PANTOPRAZOLE SODIUM 40 MG: 40 TABLET, DELAYED RELEASE ORAL at 08:20

## 2017-06-04 RX ADMIN — GABAPENTIN 1200 MG: 400 CAPSULE ORAL at 20:41

## 2017-06-04 RX ADMIN — LIDOCAINE HYDROCHLORIDE: 20 JELLY TOPICAL at 08:42

## 2017-06-04 RX ADMIN — SUCRALFATE 1 G: 1 TABLET ORAL at 08:21

## 2017-06-04 RX ADMIN — METOCLOPRAMIDE 5 MG: 5 TABLET ORAL at 08:21

## 2017-06-04 RX ADMIN — INSULIN DETEMIR 40 UNITS: 100 INJECTION, SOLUTION SUBCUTANEOUS at 20:41

## 2017-06-04 RX ADMIN — ASPIRIN 81 MG: 81 TABLET ORAL at 08:21

## 2017-06-04 RX ADMIN — CLOPIDOGREL 75 MG: 75 TABLET, FILM COATED ORAL at 08:21

## 2017-06-04 RX ADMIN — SUCRALFATE 1 G: 1 TABLET ORAL at 17:27

## 2017-06-04 RX ADMIN — INSULIN ASPART 8 UNITS: 100 INJECTION, SOLUTION INTRAVENOUS; SUBCUTANEOUS at 12:09

## 2017-06-04 RX ADMIN — SULFAMETHOXAZOLE AND TRIMETHOPRIM 160 MG: 800; 160 TABLET ORAL at 17:27

## 2017-06-04 RX ADMIN — OXYCODONE HYDROCHLORIDE AND ACETAMINOPHEN 1 TABLET: 7.5; 325 TABLET ORAL at 10:54

## 2017-06-04 RX ADMIN — ROSUVASTATIN CALCIUM 40 MG: 40 TABLET, FILM COATED ORAL at 20:41

## 2017-06-04 RX ADMIN — Medication 400 MG: at 08:20

## 2017-06-04 RX ADMIN — Medication 1 APPLICATION: at 20:42

## 2017-06-04 RX ADMIN — OXYCODONE HYDROCHLORIDE AND ACETAMINOPHEN 1 TABLET: 7.5; 325 TABLET ORAL at 20:41

## 2017-06-04 RX ADMIN — PANTOPRAZOLE SODIUM 40 MG: 40 TABLET, DELAYED RELEASE ORAL at 17:27

## 2017-06-04 RX ADMIN — SUCRALFATE 1 G: 1 TABLET ORAL at 20:41

## 2017-06-04 RX ADMIN — SULFAMETHOXAZOLE AND TRIMETHOPRIM 160 MG: 800; 160 TABLET ORAL at 08:21

## 2017-06-04 NOTE — PLAN OF CARE
Problem: Patient Care Overview (Adult)  Goal: Plan of Care Review  Outcome: Ongoing (interventions implemented as appropriate)    06/04/17 1239   Coping/Psychosocial Response Interventions   Plan Of Care Reviewed With patient   Patient Care Overview   Progress progress toward functional goals is gradual   Outcome Evaluation   Outcome Summary/Follow up Plan VSS. pain controlled, drsg changed LFT foot pt. tolerated well. no hyper/hypoglecemia noted. will continue to monitor        Goal: Adult Individualization and Mutuality  Outcome: Ongoing (interventions implemented as appropriate)    Problem: Perioperative Period (Adult)  Goal: Signs and Symptoms of Listed Potential Problems Will be Absent or Manageable (Perioperative Period)  Outcome: Ongoing (interventions implemented as appropriate)    Problem: Pain, Acute (Adult)  Goal: Acceptable Pain Control/Comfort Level  Outcome: Ongoing (interventions implemented as appropriate)    Problem: Diabetes, Type 2 (Adult)  Goal: Signs and Symptoms of Listed Potential Problems Will be Absent or Manageable (Diabetes, Type 2)  Outcome: Ongoing (interventions implemented as appropriate)    Problem: Tissue Perfusion, Ineffective Peripheral (Adult)  Goal: Identify Related Risk Factors and Signs and Symptoms  Outcome: Ongoing (interventions implemented as appropriate)  Goal: Adequate Tissue Perfusion  Outcome: Ongoing (interventions implemented as appropriate)

## 2017-06-04 NOTE — PLAN OF CARE
Problem: Patient Care Overview (Adult)  Goal: Plan of Care Review  Outcome: Ongoing (interventions implemented as appropriate)    06/04/17 0332   Coping/Psychosocial Response Interventions   Plan Of Care Reviewed With patient   Patient Care Overview   Progress progress toward functional goals as expected   Outcome Evaluation   Outcome Summary/Follow up Plan VSS. Pain controlled with oral pain meds as ordered. Dressing change completed as ordered - pt expressed gratefulness for lidocaine as it helps greatly helps her pain. Will continue to monitor.        Goal: Adult Individualization and Mutuality  Outcome: Ongoing (interventions implemented as appropriate)  Goal: Discharge Needs Assessment  Outcome: Ongoing (interventions implemented as appropriate)    Problem: Perioperative Period (Adult)  Goal: Signs and Symptoms of Listed Potential Problems Will be Absent or Manageable (Perioperative Period)  Outcome: Ongoing (interventions implemented as appropriate)    Problem: Pain, Acute (Adult)  Goal: Identify Related Risk Factors and Signs and Symptoms  Outcome: Ongoing (interventions implemented as appropriate)  Goal: Acceptable Pain Control/Comfort Level  Outcome: Ongoing (interventions implemented as appropriate)    Problem: Diabetes, Type 2 (Adult)  Goal: Signs and Symptoms of Listed Potential Problems Will be Absent or Manageable (Diabetes, Type 2)  Outcome: Ongoing (interventions implemented as appropriate)    Problem: Tissue Perfusion, Ineffective Peripheral (Adult)  Goal: Identify Related Risk Factors and Signs and Symptoms  Outcome: Ongoing (interventions implemented as appropriate)  Goal: Adequate Tissue Perfusion  Outcome: Ongoing (interventions implemented as appropriate)

## 2017-06-04 NOTE — PROGRESS NOTES
"Bola Leonardo MD       LOS: 4 days   Patient Care Team:  Canelo Shah MD as PCP - General  Canelo Shah MD as PCP - Family Medicine  Samantha Uriostegui MD as Consulting Physician (Cardiology)    Subjective     66 y.o. female POD #4 s/p left fem pop with iliac stent, popliteal angioplasty    Feels \"pretty good\", having some leg pain    Review of Systems  Review of Systems - ezequiel diet.No emesis.no CP    Objective     Vital Signs  Temp:  [98.3 °F (36.8 °C)-99.1 °F (37.3 °C)] 98.3 °F (36.8 °C)  Heart Rate:  [69-72] 70  Resp:  [16] 16  BP: ()/(37-52) 110/45    Physical Exam  General: No acute distress. Alert and oriented x 4  CV: RRR  Resp: unlabored breathing on ra  Abd: Abdomen is soft  Extremities:groin and AK incisions clean and dry.foot is stable with no new areas of compromise.Great PT signal    Results Review:       Recent Results (from the past 12 hour(s))   POC Glucose Fingerstick    Collection Time: 06/03/17  9:07 PM   Result Value Ref Range    Glucose 173 (H) 70 - 130 mg/dL   POC Glucose Fingerstick    Collection Time: 06/04/17  7:43 AM   Result Value Ref Range    Glucose 151 (H) 70 - 130 mg/dL   ]      Assessment/Plan           Active Problems:    Atherosclerotic PVD with ulceration    Burn to left foot    Assessment & Plan  66 y.o. female POD 3 LLE revasc- doing well, on aspirin, plavix, crestor    -PT    plastic eval by Dr. Waterhouse- possible skin graft tomorrow.   History of hypomagnesemia, - is on scheduled PO magnesium now due to low level yesterday.     May need temp placement for rehab and dressings after surgery        Bola Leonardo MD  06/04/17  8:33 AM      "

## 2017-06-05 LAB
GLUCOSE BLDC GLUCOMTR-MCNC: 127 MG/DL (ref 70–130)
GLUCOSE BLDC GLUCOMTR-MCNC: 128 MG/DL (ref 70–130)
GLUCOSE BLDC GLUCOMTR-MCNC: 133 MG/DL (ref 70–130)
GLUCOSE BLDC GLUCOMTR-MCNC: 198 MG/DL (ref 70–130)
GLUCOSE BLDC GLUCOMTR-MCNC: 205 MG/DL (ref 70–130)

## 2017-06-05 PROCEDURE — 82962 GLUCOSE BLOOD TEST: CPT

## 2017-06-05 PROCEDURE — 63710000001 INSULIN ASPART PER 5 UNITS: Performed by: SURGERY

## 2017-06-05 RX ADMIN — SUCRALFATE 1 G: 1 TABLET ORAL at 12:23

## 2017-06-05 RX ADMIN — SULFAMETHOXAZOLE AND TRIMETHOPRIM 160 MG: 800; 160 TABLET ORAL at 18:41

## 2017-06-05 RX ADMIN — SUCRALFATE 1 G: 1 TABLET ORAL at 08:43

## 2017-06-05 RX ADMIN — INSULIN ASPART 10 UNITS: 100 INJECTION, SOLUTION INTRAVENOUS; SUBCUTANEOUS at 18:41

## 2017-06-05 RX ADMIN — INSULIN DETEMIR 40 UNITS: 100 INJECTION, SOLUTION SUBCUTANEOUS at 21:35

## 2017-06-05 RX ADMIN — SULFAMETHOXAZOLE AND TRIMETHOPRIM 160 MG: 800; 160 TABLET ORAL at 08:43

## 2017-06-05 RX ADMIN — SUCRALFATE 1 G: 1 TABLET ORAL at 21:33

## 2017-06-05 RX ADMIN — PANTOPRAZOLE SODIUM 40 MG: 40 TABLET, DELAYED RELEASE ORAL at 18:41

## 2017-06-05 RX ADMIN — GABAPENTIN 1200 MG: 400 CAPSULE ORAL at 21:33

## 2017-06-05 RX ADMIN — PANTOPRAZOLE SODIUM 40 MG: 40 TABLET, DELAYED RELEASE ORAL at 08:43

## 2017-06-05 RX ADMIN — Medication 400 MG: at 08:43

## 2017-06-05 RX ADMIN — OXYCODONE HYDROCHLORIDE AND ACETAMINOPHEN 1 TABLET: 7.5; 325 TABLET ORAL at 12:26

## 2017-06-05 RX ADMIN — Medication: at 08:44

## 2017-06-05 RX ADMIN — ROSUVASTATIN CALCIUM 40 MG: 40 TABLET, FILM COATED ORAL at 21:33

## 2017-06-05 RX ADMIN — METOCLOPRAMIDE 5 MG: 5 TABLET ORAL at 08:43

## 2017-06-05 RX ADMIN — Medication: at 21:34

## 2017-06-05 RX ADMIN — SUCRALFATE 1 G: 1 TABLET ORAL at 18:41

## 2017-06-05 RX ADMIN — OXYCODONE HYDROCHLORIDE AND ACETAMINOPHEN 1 TABLET: 7.5; 325 TABLET ORAL at 22:21

## 2017-06-05 NOTE — PLAN OF CARE
Problem: Patient Care Overview (Adult)  Goal: Plan of Care Review  Outcome: Ongoing (interventions implemented as appropriate)    06/05/17 0121   Coping/Psychosocial Response Interventions   Plan Of Care Reviewed With patient   Patient Care Overview   Progress progress toward functional goals is gradual   Outcome Evaluation   Outcome Summary/Follow up Plan VSS. Dressing changed to Left foot - pain pill given prior. Pt stated she has an EGD appt on Tue 6-6 and is unsure if she can proceed - need clarification from MDs. TBD about skin graft tomorrow as Waterhouse did not see pt 6-4. Will continue to monitor.        Goal: Adult Individualization and Mutuality  Outcome: Ongoing (interventions implemented as appropriate)  Goal: Discharge Needs Assessment  Outcome: Ongoing (interventions implemented as appropriate)    Problem: Perioperative Period (Adult)  Goal: Signs and Symptoms of Listed Potential Problems Will be Absent or Manageable (Perioperative Period)  Outcome: Ongoing (interventions implemented as appropriate)    Problem: Pain, Acute (Adult)  Goal: Identify Related Risk Factors and Signs and Symptoms  Outcome: Ongoing (interventions implemented as appropriate)    Problem: Diabetes, Type 2 (Adult)  Goal: Signs and Symptoms of Listed Potential Problems Will be Absent or Manageable (Diabetes, Type 2)  Outcome: Ongoing (interventions implemented as appropriate)    Problem: Tissue Perfusion, Ineffective Peripheral (Adult)  Goal: Identify Related Risk Factors and Signs and Symptoms  Outcome: Ongoing (interventions implemented as appropriate)    06/05/17 0121   Tissue Perfusion, Ineffective Peripheral   Tissue Perfusion, Ineffective Peripheral: Related Risk Factors diabetes mellitus;obesity;venous flow reduced       Goal: Adequate Tissue Perfusion  Outcome: Ongoing (interventions implemented as appropriate)

## 2017-06-05 NOTE — PROGRESS NOTES
Continued Stay Note  [facility]     Patient Name: Randi Martines  MRN: [id number]  Today's Date: 6/5/2017    Admit Date: 5/31/2017          Discharge Plan       06/05/17 1406    Case Management/Social Work Plan    Plan [facility] to [facility] 6/6/17.........Garett HUFFMAN    Additional Comments Call back from [facility] stating they are in network and she will come see patient tomorrow. Patient updated..............Garett HUFFMAN       06/05/17 1340    Case Management/Social Work Plan    Plan Referral to [facility]...........Garett HUFFMAN     Patient/Family In Agreement With Plan yes    Additional Comments Call to [facility], states [facility] not in Network with patient's [employer] plan. [facility] is her only facility that can take [employer] Medicare as they are participating/in network if patient interested. Met with patient at bedside, introduced self and role. Informed of [facility] not being in network with provider and reviwed [facility] with patient. Patient would like referral to [facility]. VM left for [facility], await call back..........Garett HUFFMAN               Discharge Codes     None            Marisel De Los Santos, RN

## 2017-06-05 NOTE — DISCHARGE PLACEMENT REQUEST
"Marce Martines (66 y.o. Female)     Date of Birth Social Security Number Address Home Phone MRN    1950  [id number] [phone] [id number]    Sabianism Marital Status          None        Admission Date Admission Type Admitting Provider Attending Provider Department, Room/Bed    5/31/17 Elective Jayda Barrientos MD Rachel, Elizabeth S, MD [address], 549/1    Discharge Date Discharge Disposition Discharge Destination                      Attending Provider: Jayda Barrientos MD     Allergies:  Augmentin [Amoxicillin-pot Clavulanate], Codeine, Dakins [Sodium Hypochlorite]    Isolation:  None   Infection:  None   Code Status:  FULL    Ht:  61\" (154.9 cm)   Wt:  187 lb (84.8 kg)    Admission Cmt:  None   Principal Problem:  None                Active Insurance as of 5/31/2017     Primary Coverage     Payor Plan Insurance Group Employer/Plan Group    [employer] MEDICARE REPLACEMENT [employer] MEDICARE REPL [id number]     Payor Plan Address Payor Plan Phone Number Effective From Effective To    [address] [address] [phone] 1/1/2014     [place] [address]       Subscriber Name Subscriber Birth Date Member ID       MARCE MARTINES 1950 [id number]                 Emergency Contacts      (Rel.) Home Phone Work Phone Mobile Phone    Stevan Martines (Son) -- -- [phone]              "

## 2017-06-05 NOTE — PROGRESS NOTES
Continued Stay Note  [facility]     Patient Name: Randi Martines  MRN: [id number]  Today's Date: 6/5/2017    Admit Date: 5/31/2017          Discharge Plan       06/05/17 1340    Case Management/Social Work Plan    Plan Referral to [facility]...........Garett HUFFMAN     Patient/Family In Agreement With Plan yes    Additional Comments Call to [facility] not in Network with patient's [employer] plan. [facility] is her only facility that can take [employer] Medicare as they are participating/in network if patient interested. Met with patient at bedside, introduced self and role. Informed of [facility] not being in network with provider and reviewed [facility] with patient. Patient would like referral to [facility]. VM left for [facility], await call back..........Garett HUFFMAN               Discharge Codes     None            Marisel De Los Santos, RN

## 2017-06-05 NOTE — PROGRESS NOTES
Will plan for skin graft Wednesday afternoon. Pt will be non weight bearing for approx 10 days after that. Walker will be useful

## 2017-06-06 LAB
GLUCOSE BLDC GLUCOMTR-MCNC: 171 MG/DL (ref 70–130)
GLUCOSE BLDC GLUCOMTR-MCNC: 209 MG/DL (ref 70–130)
GLUCOSE BLDC GLUCOMTR-MCNC: 220 MG/DL (ref 70–130)
GLUCOSE BLDC GLUCOMTR-MCNC: 90 MG/DL (ref 70–130)
MAGNESIUM SERPL-MCNC: 1.4 MG/DL (ref 1.6–2.4)

## 2017-06-06 PROCEDURE — 25010000002 MAGNESIUM SULFATE 2 GM/50ML SOLUTION: Performed by: SURGERY

## 2017-06-06 PROCEDURE — 82962 GLUCOSE BLOOD TEST: CPT

## 2017-06-06 PROCEDURE — 63710000001 INSULIN ASPART PER 5 UNITS: Performed by: SURGERY

## 2017-06-06 PROCEDURE — 83735 ASSAY OF MAGNESIUM: CPT | Performed by: SURGERY

## 2017-06-06 PROCEDURE — 63710000001 INSULIN DETEMER PER 5 UNITS: Performed by: PLASTIC SURGERY

## 2017-06-06 RX ORDER — MAGNESIUM SULFATE HEPTAHYDRATE 40 MG/ML
2 INJECTION, SOLUTION INTRAVENOUS AS NEEDED
Status: DISCONTINUED | OUTPATIENT
Start: 2017-06-06 | End: 2017-06-07

## 2017-06-06 RX ORDER — CEFAZOLIN SODIUM 2 G/100ML
2 INJECTION, SOLUTION INTRAVENOUS
Status: DISCONTINUED | OUTPATIENT
Start: 2017-06-07 | End: 2017-06-07

## 2017-06-06 RX ORDER — MAGNESIUM SULFATE HEPTAHYDRATE 40 MG/ML
4 INJECTION, SOLUTION INTRAVENOUS AS NEEDED
Status: DISCONTINUED | OUTPATIENT
Start: 2017-06-06 | End: 2017-06-07

## 2017-06-06 RX ADMIN — CLOPIDOGREL 75 MG: 75 TABLET, FILM COATED ORAL at 16:11

## 2017-06-06 RX ADMIN — SULFAMETHOXAZOLE AND TRIMETHOPRIM 160 MG: 800; 160 TABLET ORAL at 09:11

## 2017-06-06 RX ADMIN — SUCRALFATE 1 G: 1 TABLET ORAL at 09:11

## 2017-06-06 RX ADMIN — ASPIRIN 81 MG: 81 TABLET ORAL at 09:11

## 2017-06-06 RX ADMIN — OXYCODONE HYDROCHLORIDE AND ACETAMINOPHEN 1 TABLET: 7.5; 325 TABLET ORAL at 15:07

## 2017-06-06 RX ADMIN — METOCLOPRAMIDE 5 MG: 5 TABLET ORAL at 09:11

## 2017-06-06 RX ADMIN — PANTOPRAZOLE SODIUM 40 MG: 40 TABLET, DELAYED RELEASE ORAL at 09:15

## 2017-06-06 RX ADMIN — SUCRALFATE 1 G: 1 TABLET ORAL at 17:11

## 2017-06-06 RX ADMIN — SUCRALFATE 1 G: 1 TABLET ORAL at 11:35

## 2017-06-06 RX ADMIN — OXYCODONE HYDROCHLORIDE AND ACETAMINOPHEN 1 TABLET: 7.5; 325 TABLET ORAL at 21:01

## 2017-06-06 RX ADMIN — Medication 400 MG: at 09:11

## 2017-06-06 RX ADMIN — SULFAMETHOXAZOLE AND TRIMETHOPRIM 160 MG: 800; 160 TABLET ORAL at 17:11

## 2017-06-06 RX ADMIN — Medication: at 21:02

## 2017-06-06 RX ADMIN — INSULIN DETEMIR 30 UNITS: 100 INJECTION, SOLUTION SUBCUTANEOUS at 21:02

## 2017-06-06 RX ADMIN — Medication: at 09:59

## 2017-06-06 RX ADMIN — INSULIN ASPART 10 UNITS: 100 INJECTION, SOLUTION INTRAVENOUS; SUBCUTANEOUS at 17:11

## 2017-06-06 RX ADMIN — MAGNESIUM SULFATE HEPTAHYDRATE 2 G: 40 INJECTION, SOLUTION INTRAVENOUS at 11:37

## 2017-06-06 RX ADMIN — PANTOPRAZOLE SODIUM 40 MG: 40 TABLET, DELAYED RELEASE ORAL at 17:11

## 2017-06-06 RX ADMIN — ROSUVASTATIN CALCIUM 40 MG: 40 TABLET, FILM COATED ORAL at 21:00

## 2017-06-06 RX ADMIN — GABAPENTIN 1200 MG: 400 CAPSULE ORAL at 21:01

## 2017-06-06 RX ADMIN — MAGNESIUM SULFATE HEPTAHYDRATE 2 G: 40 INJECTION, SOLUTION INTRAVENOUS at 09:59

## 2017-06-06 RX ADMIN — SUCRALFATE 1 G: 1 TABLET ORAL at 21:01

## 2017-06-06 NOTE — PROGRESS NOTES
Continued Stay Note  [facility]     Patient Name: Randi Martines  MRN: [id number]  Today's Date: 6/6/2017    Admit Date: 5/31/2017          Discharge Plan       06/06/17 1341    Case Management/Social Work Plan    Plan [facility] - will need [employer] precert     Patient/Family In Agreement With Plan yes    Additional Comments [facility] here to see pt. She will check in network benefits and let me know if they can accept.     6/6/17 1510 - Inbound call from [facility] and they are out of network with pt's insurance.               Discharge Codes     None            Evangelina Ball RN

## 2017-06-06 NOTE — PROGRESS NOTES
Jayda Barrientos MD       LOS: 6 days   Patient Care Team:  Canelo Shah MD as PCP - General  Canelo Shah MD as PCP - Family Medicine  Samantha Uriostegui MD as Consulting Physician (Cardiology)    Subjective     66 y.o. female PAD with burn to L foot with tissue loss s/p revasc LLE.        Review of Systems  Review of Systems - pain is better      Objective     Vital Signs  Temp:  [97.6 °F (36.4 °C)-98.7 °F (37.1 °C)] 98.5 °F (36.9 °C)  Heart Rate:  [65-77] 77  Resp:  [16-20] 16  BP: (112-138)/(53-68) 128/68    Physical Exam  General: No acute distress. Alert and oriented x 4  CV: RRR  Resp: unlabored breathing on ra  Abd: Abdomen is soft  Extremities: great PT signal.  Digit tips ae all viable, even the GT.  Does not look to have infection    Results Review:       Recent Results (from the past 12 hour(s))   Magnesium    Collection Time: 06/06/17  4:55 AM   Result Value Ref Range    Magnesium 1.4 (C) 1.6 - 2.4 mg/dL   POC Glucose Fingerstick    Collection Time: 06/06/17  7:54 AM   Result Value Ref Range    Glucose 90 70 - 130 mg/dL   POC Glucose Fingerstick    Collection Time: 06/06/17 11:14 AM   Result Value Ref Range    Glucose 171 (H) 70 - 130 mg/dL   ]      Assessment/Plan           Active Problems:    Atherosclerotic PVD with ulceration  burn to left foot    Assessment & Plan  66 y.o. female LLE revasc  Pending skin graft  She will need temporary placement due to NWB status   Will need to continue Plavix due to PAD      Jayda Barrientos MD  06/06/17  4:38 PM

## 2017-06-06 NOTE — PROGRESS NOTES
Continued Stay Note  [facility]     Patient Name: Randi Martines  MRN: [id number]  Today's Date: 6/6/2017    Admit Date: 5/31/2017          Discharge Plan       06/06/17 1543    Case Management/Social Work Plan    Additional Comments CCP met with pt to inform [facility] is out of network with her insurance. Pt agreeable to additional referrals and selects [facility] and [facility] as they are near her home. CCP made referrals to [facility] and will follow for facility evals. Abigail Gan LCSW           Discharge Codes     None            Marisel Gan LCSW

## 2017-06-06 NOTE — PLAN OF CARE
Problem: Patient Care Overview (Adult)  Goal: Plan of Care Review  Outcome: Ongoing (interventions implemented as appropriate)    06/06/17 0157   Coping/Psychosocial Response Interventions   Plan Of Care Reviewed With patient   Patient Care Overview   Progress no change   Outcome Evaluation   Outcome Summary/Follow up Plan VSS, dressing changed to left foot per MD order. Patient take oral pain pills which seems to help, patient will have sx Wed afternoon for debridement and skin graft per Dr. Waterhouse, consent is signed and on chart. Will continue to monitor.          Problem: Pain, Acute (Adult)  Goal: Acceptable Pain Control/Comfort Level  Outcome: Ongoing (interventions implemented as appropriate)    Problem: Diabetes, Type 2 (Adult)  Goal: Signs and Symptoms of Listed Potential Problems Will be Absent or Manageable (Diabetes, Type 2)  Outcome: Ongoing (interventions implemented as appropriate)    Problem: Tissue Perfusion, Ineffective Peripheral (Adult)  Goal: Adequate Tissue Perfusion  Outcome: Ongoing (interventions implemented as appropriate)

## 2017-06-06 NOTE — PLAN OF CARE
Problem: Patient Care Overview (Adult)  Goal: Plan of Care Review  Outcome: Ongoing (interventions implemented as appropriate)    06/06/17 0168   Coping/Psychosocial Response Interventions   Plan Of Care Reviewed With patient   Outcome Evaluation   Outcome Summary/Follow up Plan VSS. dressing changed to L foot as ordered. Pain controlled with PRN pain medication. Debridement and skin graft scheduled for tomorrow per Dr. Waterhouse, pt will be NPO after midnight. Magnesium 1.4 this morning, recieved mag sulfae per protocol. Will coninue to monitor.       Goal: Adult Individualization and Mutuality  Outcome: Ongoing (interventions implemented as appropriate)    Problem: Perioperative Period (Adult)  Goal: Signs and Symptoms of Listed Potential Problems Will be Absent or Manageable (Perioperative Period)  Outcome: Ongoing (interventions implemented as appropriate)    Problem: Pain, Acute (Adult)  Goal: Identify Related Risk Factors and Signs and Symptoms  Outcome: Ongoing (interventions implemented as appropriate)  Goal: Acceptable Pain Control/Comfort Level  Outcome: Ongoing (interventions implemented as appropriate)    Problem: Diabetes, Type 2 (Adult)  Goal: Signs and Symptoms of Listed Potential Problems Will be Absent or Manageable (Diabetes, Type 2)  Outcome: Ongoing (interventions implemented as appropriate)    Problem: Tissue Perfusion, Ineffective Peripheral (Adult)  Goal: Identify Related Risk Factors and Signs and Symptoms  Outcome: Ongoing (interventions implemented as appropriate)  Goal: Adequate Tissue Perfusion  Outcome: Ongoing (interventions implemented as appropriate)

## 2017-06-07 ENCOUNTER — ANESTHESIA (OUTPATIENT)
Dept: PERIOP | Facility: HOSPITAL | Age: 67
End: 2017-06-07

## 2017-06-07 ENCOUNTER — ANESTHESIA EVENT (OUTPATIENT)
Dept: PERIOP | Facility: HOSPITAL | Age: 67
End: 2017-06-07

## 2017-06-07 LAB
GLUCOSE BLDC GLUCOMTR-MCNC: 105 MG/DL (ref 70–130)
GLUCOSE BLDC GLUCOMTR-MCNC: 109 MG/DL (ref 70–130)
GLUCOSE BLDC GLUCOMTR-MCNC: 114 MG/DL (ref 70–130)
GLUCOSE BLDC GLUCOMTR-MCNC: 120 MG/DL (ref 70–130)
GLUCOSE BLDC GLUCOMTR-MCNC: 128 MG/DL (ref 70–130)
MAGNESIUM SERPL-MCNC: 2 MG/DL (ref 1.6–2.4)

## 2017-06-07 PROCEDURE — 25010000002 FENTANYL CITRATE (PF) 100 MCG/2ML SOLUTION: Performed by: ANESTHESIOLOGY

## 2017-06-07 PROCEDURE — 0HB7XZZ EXCISION OF ABDOMEN SKIN, EXTERNAL APPROACH: ICD-10-PCS | Performed by: PLASTIC SURGERY

## 2017-06-07 PROCEDURE — 0HRNX73 REPLACEMENT OF LEFT FOOT SKIN WITH AUTOLOGOUS TISSUE SUBSTITUTE, FULL THICKNESS, EXTERNAL APPROACH: ICD-10-PCS | Performed by: PLASTIC SURGERY

## 2017-06-07 PROCEDURE — 25010000002 PROMETHAZINE PER 50 MG: Performed by: ANESTHESIOLOGY

## 2017-06-07 PROCEDURE — 25010000002 ONDANSETRON PER 1 MG: Performed by: ANESTHESIOLOGY

## 2017-06-07 PROCEDURE — 25010000002 PROPOFOL 10 MG/ML EMULSION: Performed by: ANESTHESIOLOGY

## 2017-06-07 PROCEDURE — 0JBR0ZZ EXCISION OF LEFT FOOT SUBCUTANEOUS TISSUE AND FASCIA, OPEN APPROACH: ICD-10-PCS | Performed by: PLASTIC SURGERY

## 2017-06-07 PROCEDURE — 25010000002 PHENYLEPHRINE PER 1 ML: Performed by: ANESTHESIOLOGY

## 2017-06-07 PROCEDURE — 83735 ASSAY OF MAGNESIUM: CPT | Performed by: SURGERY

## 2017-06-07 PROCEDURE — 25010000003 CEFAZOLIN IN DEXTROSE 2-4 GM/100ML-% SOLUTION: Performed by: PLASTIC SURGERY

## 2017-06-07 PROCEDURE — 25010000002 MIDAZOLAM PER 1 MG: Performed by: ANESTHESIOLOGY

## 2017-06-07 PROCEDURE — 82962 GLUCOSE BLOOD TEST: CPT

## 2017-06-07 RX ORDER — FENTANYL CITRATE 50 UG/ML
INJECTION, SOLUTION INTRAMUSCULAR; INTRAVENOUS AS NEEDED
Status: DISCONTINUED | OUTPATIENT
Start: 2017-06-07 | End: 2017-06-07 | Stop reason: SURG

## 2017-06-07 RX ORDER — MIDAZOLAM HYDROCHLORIDE 1 MG/ML
1 INJECTION INTRAMUSCULAR; INTRAVENOUS
Status: DISCONTINUED | OUTPATIENT
Start: 2017-06-07 | End: 2017-06-07 | Stop reason: HOSPADM

## 2017-06-07 RX ORDER — LABETALOL HYDROCHLORIDE 5 MG/ML
5 INJECTION, SOLUTION INTRAVENOUS
Status: DISCONTINUED | OUTPATIENT
Start: 2017-06-07 | End: 2017-06-07 | Stop reason: SDUPTHER

## 2017-06-07 RX ORDER — HYDROCODONE BITARTRATE AND ACETAMINOPHEN 7.5; 325 MG/1; MG/1
1 TABLET ORAL ONCE AS NEEDED
Status: DISCONTINUED | OUTPATIENT
Start: 2017-06-07 | End: 2017-06-07 | Stop reason: HOSPADM

## 2017-06-07 RX ORDER — FLUMAZENIL 0.1 MG/ML
0.2 INJECTION INTRAVENOUS AS NEEDED
Status: DISCONTINUED | OUTPATIENT
Start: 2017-06-07 | End: 2017-06-07 | Stop reason: SDUPTHER

## 2017-06-07 RX ORDER — HYDROMORPHONE HYDROCHLORIDE 1 MG/ML
0.5 INJECTION, SOLUTION INTRAMUSCULAR; INTRAVENOUS; SUBCUTANEOUS
Status: DISCONTINUED | OUTPATIENT
Start: 2017-06-07 | End: 2017-06-07 | Stop reason: HOSPADM

## 2017-06-07 RX ORDER — SODIUM CHLORIDE, SODIUM LACTATE, POTASSIUM CHLORIDE, CALCIUM CHLORIDE 600; 310; 30; 20 MG/100ML; MG/100ML; MG/100ML; MG/100ML
9 INJECTION, SOLUTION INTRAVENOUS CONTINUOUS
Status: DISCONTINUED | OUTPATIENT
Start: 2017-06-07 | End: 2017-06-07

## 2017-06-07 RX ORDER — PROMETHAZINE HYDROCHLORIDE 25 MG/ML
12.5 INJECTION, SOLUTION INTRAMUSCULAR; INTRAVENOUS ONCE AS NEEDED
Status: DISCONTINUED | OUTPATIENT
Start: 2017-06-07 | End: 2017-06-07 | Stop reason: SDUPTHER

## 2017-06-07 RX ORDER — PROMETHAZINE HYDROCHLORIDE 25 MG/1
25 TABLET ORAL ONCE AS NEEDED
Status: DISCONTINUED | OUTPATIENT
Start: 2017-06-07 | End: 2017-06-07 | Stop reason: SDUPTHER

## 2017-06-07 RX ORDER — MIDAZOLAM HYDROCHLORIDE 1 MG/ML
2 INJECTION INTRAMUSCULAR; INTRAVENOUS
Status: DISCONTINUED | OUTPATIENT
Start: 2017-06-07 | End: 2017-06-07 | Stop reason: HOSPADM

## 2017-06-07 RX ORDER — PROMETHAZINE HYDROCHLORIDE 25 MG/1
25 SUPPOSITORY RECTAL ONCE AS NEEDED
Status: DISCONTINUED | OUTPATIENT
Start: 2017-06-07 | End: 2017-06-07 | Stop reason: SDUPTHER

## 2017-06-07 RX ORDER — PROMETHAZINE HYDROCHLORIDE 25 MG/1
12.5 TABLET ORAL ONCE AS NEEDED
Status: DISCONTINUED | OUTPATIENT
Start: 2017-06-07 | End: 2017-06-07 | Stop reason: SDUPTHER

## 2017-06-07 RX ORDER — PROMETHAZINE HYDROCHLORIDE 25 MG/1
25 TABLET ORAL ONCE AS NEEDED
Status: DISCONTINUED | OUTPATIENT
Start: 2017-06-07 | End: 2017-06-07 | Stop reason: HOSPADM

## 2017-06-07 RX ORDER — SODIUM CHLORIDE 0.9 % (FLUSH) 0.9 %
1-10 SYRINGE (ML) INJECTION AS NEEDED
Status: DISCONTINUED | OUTPATIENT
Start: 2017-06-07 | End: 2017-06-07 | Stop reason: HOSPADM

## 2017-06-07 RX ORDER — DIPHENHYDRAMINE HYDROCHLORIDE 50 MG/ML
12.5 INJECTION INTRAMUSCULAR; INTRAVENOUS
Status: DISCONTINUED | OUTPATIENT
Start: 2017-06-07 | End: 2017-06-07 | Stop reason: SDUPTHER

## 2017-06-07 RX ORDER — NALOXONE HCL 0.4 MG/ML
0.2 VIAL (ML) INJECTION AS NEEDED
Status: DISCONTINUED | OUTPATIENT
Start: 2017-06-07 | End: 2017-06-07 | Stop reason: SDUPTHER

## 2017-06-07 RX ORDER — PROMETHAZINE HYDROCHLORIDE 25 MG/1
25 SUPPOSITORY RECTAL ONCE AS NEEDED
Status: DISCONTINUED | OUTPATIENT
Start: 2017-06-07 | End: 2017-06-07 | Stop reason: HOSPADM

## 2017-06-07 RX ORDER — BACITRACIN ZINC 500 [USP'U]/G
OINTMENT TOPICAL AS NEEDED
Status: DISCONTINUED | OUTPATIENT
Start: 2017-06-07 | End: 2017-06-07 | Stop reason: HOSPADM

## 2017-06-07 RX ORDER — NALOXONE HCL 0.4 MG/ML
0.2 VIAL (ML) INJECTION AS NEEDED
Status: DISCONTINUED | OUTPATIENT
Start: 2017-06-07 | End: 2017-06-07 | Stop reason: HOSPADM

## 2017-06-07 RX ORDER — PROMETHAZINE HYDROCHLORIDE 25 MG/1
12.5 TABLET ORAL ONCE AS NEEDED
Status: DISCONTINUED | OUTPATIENT
Start: 2017-06-07 | End: 2017-06-07 | Stop reason: HOSPADM

## 2017-06-07 RX ORDER — FAMOTIDINE 10 MG/ML
20 INJECTION, SOLUTION INTRAVENOUS ONCE
Status: COMPLETED | OUTPATIENT
Start: 2017-06-07 | End: 2017-06-07

## 2017-06-07 RX ORDER — HYDROCODONE BITARTRATE AND ACETAMINOPHEN 7.5; 325 MG/1; MG/1
1 TABLET ORAL EVERY 4 HOURS PRN
Status: DISCONTINUED | OUTPATIENT
Start: 2017-06-07 | End: 2017-06-13 | Stop reason: HOSPADM

## 2017-06-07 RX ORDER — HYDRALAZINE HYDROCHLORIDE 20 MG/ML
5 INJECTION INTRAMUSCULAR; INTRAVENOUS
Status: DISCONTINUED | OUTPATIENT
Start: 2017-06-07 | End: 2017-06-07 | Stop reason: HOSPADM

## 2017-06-07 RX ORDER — EPHEDRINE SULFATE 50 MG/ML
5 INJECTION, SOLUTION INTRAVENOUS ONCE AS NEEDED
Status: DISCONTINUED | OUTPATIENT
Start: 2017-06-07 | End: 2017-06-07 | Stop reason: HOSPADM

## 2017-06-07 RX ORDER — MAGNESIUM HYDROXIDE 1200 MG/15ML
LIQUID ORAL AS NEEDED
Status: DISCONTINUED | OUTPATIENT
Start: 2017-06-07 | End: 2017-06-07 | Stop reason: HOSPADM

## 2017-06-07 RX ORDER — OXYCODONE AND ACETAMINOPHEN 7.5; 325 MG/1; MG/1
1 TABLET ORAL ONCE AS NEEDED
Status: DISCONTINUED | OUTPATIENT
Start: 2017-06-07 | End: 2017-06-07 | Stop reason: SDUPTHER

## 2017-06-07 RX ORDER — OXYCODONE AND ACETAMINOPHEN 7.5; 325 MG/1; MG/1
1 TABLET ORAL ONCE AS NEEDED
Status: DISCONTINUED | OUTPATIENT
Start: 2017-06-07 | End: 2017-06-07 | Stop reason: HOSPADM

## 2017-06-07 RX ORDER — EPHEDRINE SULFATE 50 MG/ML
5 INJECTION, SOLUTION INTRAVENOUS ONCE AS NEEDED
Status: DISCONTINUED | OUTPATIENT
Start: 2017-06-07 | End: 2017-06-07 | Stop reason: SDUPTHER

## 2017-06-07 RX ORDER — ONDANSETRON 2 MG/ML
4 INJECTION INTRAMUSCULAR; INTRAVENOUS ONCE AS NEEDED
Status: COMPLETED | OUTPATIENT
Start: 2017-06-07 | End: 2017-06-07

## 2017-06-07 RX ORDER — LABETALOL HYDROCHLORIDE 5 MG/ML
5 INJECTION, SOLUTION INTRAVENOUS
Status: DISCONTINUED | OUTPATIENT
Start: 2017-06-07 | End: 2017-06-07 | Stop reason: HOSPADM

## 2017-06-07 RX ORDER — MORPHINE SULFATE 2 MG/ML
2 INJECTION, SOLUTION INTRAMUSCULAR; INTRAVENOUS
Status: DISCONTINUED | OUTPATIENT
Start: 2017-06-07 | End: 2017-06-07 | Stop reason: HOSPADM

## 2017-06-07 RX ORDER — ONDANSETRON 2 MG/ML
4 INJECTION INTRAMUSCULAR; INTRAVENOUS ONCE AS NEEDED
Status: DISCONTINUED | OUTPATIENT
Start: 2017-06-07 | End: 2017-06-07 | Stop reason: SDUPTHER

## 2017-06-07 RX ORDER — PROPOFOL 10 MG/ML
VIAL (ML) INTRAVENOUS AS NEEDED
Status: DISCONTINUED | OUTPATIENT
Start: 2017-06-07 | End: 2017-06-07 | Stop reason: SURG

## 2017-06-07 RX ORDER — PROMETHAZINE HYDROCHLORIDE 25 MG/ML
12.5 INJECTION, SOLUTION INTRAMUSCULAR; INTRAVENOUS ONCE AS NEEDED
Status: DISCONTINUED | OUTPATIENT
Start: 2017-06-07 | End: 2017-06-07 | Stop reason: HOSPADM

## 2017-06-07 RX ORDER — LIDOCAINE HYDROCHLORIDE 20 MG/ML
INJECTION, SOLUTION INFILTRATION; PERINEURAL AS NEEDED
Status: DISCONTINUED | OUTPATIENT
Start: 2017-06-07 | End: 2017-06-07 | Stop reason: SURG

## 2017-06-07 RX ORDER — FLUMAZENIL 0.1 MG/ML
0.2 INJECTION INTRAVENOUS AS NEEDED
Status: DISCONTINUED | OUTPATIENT
Start: 2017-06-07 | End: 2017-06-07 | Stop reason: HOSPADM

## 2017-06-07 RX ORDER — BUPIVACAINE HYDROCHLORIDE AND EPINEPHRINE 2.5; 5 MG/ML; UG/ML
INJECTION, SOLUTION INFILTRATION; PERINEURAL AS NEEDED
Status: DISCONTINUED | OUTPATIENT
Start: 2017-06-07 | End: 2017-06-07 | Stop reason: HOSPADM

## 2017-06-07 RX ORDER — HYDRALAZINE HYDROCHLORIDE 20 MG/ML
5 INJECTION INTRAMUSCULAR; INTRAVENOUS
Status: DISCONTINUED | OUTPATIENT
Start: 2017-06-07 | End: 2017-06-07 | Stop reason: SDUPTHER

## 2017-06-07 RX ORDER — HYDROCODONE BITARTRATE AND ACETAMINOPHEN 7.5; 325 MG/1; MG/1
1 TABLET ORAL ONCE AS NEEDED
Status: DISCONTINUED | OUTPATIENT
Start: 2017-06-07 | End: 2017-06-07 | Stop reason: SDUPTHER

## 2017-06-07 RX ORDER — DIPHENHYDRAMINE HYDROCHLORIDE 50 MG/ML
12.5 INJECTION INTRAMUSCULAR; INTRAVENOUS
Status: DISCONTINUED | OUTPATIENT
Start: 2017-06-07 | End: 2017-06-07 | Stop reason: HOSPADM

## 2017-06-07 RX ORDER — FENTANYL CITRATE 50 UG/ML
50 INJECTION, SOLUTION INTRAMUSCULAR; INTRAVENOUS
Status: DISCONTINUED | OUTPATIENT
Start: 2017-06-07 | End: 2017-06-07 | Stop reason: HOSPADM

## 2017-06-07 RX ADMIN — Medication 400 MG: at 09:12

## 2017-06-07 RX ADMIN — GABAPENTIN 1200 MG: 400 CAPSULE ORAL at 21:36

## 2017-06-07 RX ADMIN — PHENYLEPHRINE HYDROCHLORIDE 200 MCG: 10 INJECTION INTRAVENOUS at 17:45

## 2017-06-07 RX ADMIN — SULFAMETHOXAZOLE AND TRIMETHOPRIM 160 MG: 800; 160 TABLET ORAL at 09:12

## 2017-06-07 RX ADMIN — PROPOFOL 50 MG: 10 INJECTION, EMULSION INTRAVENOUS at 19:00

## 2017-06-07 RX ADMIN — ONDANSETRON 4 MG: 2 INJECTION INTRAMUSCULAR; INTRAVENOUS at 20:08

## 2017-06-07 RX ADMIN — CLOPIDOGREL 75 MG: 75 TABLET, FILM COATED ORAL at 09:11

## 2017-06-07 RX ADMIN — FENTANYL CITRATE 50 MCG: 50 INJECTION, SOLUTION INTRAMUSCULAR; INTRAVENOUS at 19:51

## 2017-06-07 RX ADMIN — FENTANYL CITRATE 50 MCG: 50 INJECTION, SOLUTION INTRAMUSCULAR; INTRAVENOUS at 20:03

## 2017-06-07 RX ADMIN — ASPIRIN 81 MG: 81 TABLET ORAL at 09:12

## 2017-06-07 RX ADMIN — PHENYLEPHRINE HYDROCHLORIDE 100 MCG: 10 INJECTION INTRAVENOUS at 17:21

## 2017-06-07 RX ADMIN — CEFAZOLIN SODIUM 2 G: 2 INJECTION, SOLUTION INTRAVENOUS at 16:51

## 2017-06-07 RX ADMIN — HYDROCODONE BITARTRATE AND ACETAMINOPHEN 1 TABLET: 7.5; 325 TABLET ORAL at 21:36

## 2017-06-07 RX ADMIN — FAMOTIDINE 20 MG: 10 INJECTION, SOLUTION INTRAVENOUS at 16:40

## 2017-06-07 RX ADMIN — SODIUM CHLORIDE, POTASSIUM CHLORIDE, SODIUM LACTATE AND CALCIUM CHLORIDE 9 ML/HR: 600; 310; 30; 20 INJECTION, SOLUTION INTRAVENOUS at 16:40

## 2017-06-07 RX ADMIN — SODIUM CHLORIDE, POTASSIUM CHLORIDE, SODIUM LACTATE AND CALCIUM CHLORIDE: 600; 310; 30; 20 INJECTION, SOLUTION INTRAVENOUS at 16:30

## 2017-06-07 RX ADMIN — SUCRALFATE 1 G: 1 TABLET ORAL at 12:44

## 2017-06-07 RX ADMIN — LIDOCAINE HYDROCHLORIDE 60 MG: 20 INJECTION, SOLUTION INFILTRATION; PERINEURAL at 16:50

## 2017-06-07 RX ADMIN — SUCRALFATE 1 G: 1 TABLET ORAL at 09:12

## 2017-06-07 RX ADMIN — PROPOFOL 200 MG: 10 INJECTION, EMULSION INTRAVENOUS at 16:50

## 2017-06-07 RX ADMIN — MIDAZOLAM 1 MG: 1 INJECTION INTRAMUSCULAR; INTRAVENOUS at 16:39

## 2017-06-07 RX ADMIN — PHENYLEPHRINE HYDROCHLORIDE 200 MCG: 10 INJECTION INTRAVENOUS at 17:30

## 2017-06-07 RX ADMIN — PANTOPRAZOLE SODIUM 40 MG: 40 TABLET, DELAYED RELEASE ORAL at 09:12

## 2017-06-07 RX ADMIN — PHENYLEPHRINE HYDROCHLORIDE 100 MCG: 10 INJECTION INTRAVENOUS at 17:29

## 2017-06-07 RX ADMIN — Medication: at 10:24

## 2017-06-07 RX ADMIN — METOCLOPRAMIDE 5 MG: 5 TABLET ORAL at 09:11

## 2017-06-07 RX ADMIN — PROMETHAZINE HYDROCHLORIDE 12.5 MG: 25 INJECTION INTRAMUSCULAR; INTRAVENOUS at 20:27

## 2017-06-07 RX ADMIN — FENTANYL CITRATE 50 MCG: 50 INJECTION INTRAMUSCULAR; INTRAVENOUS at 16:50

## 2017-06-07 NOTE — PROGRESS NOTES
Jayda Barrientos MD       LOS: 7 days   Patient Care Team:  Canelo Shah MD as PCP - General  Canelo Shah MD as PCP - Family Medicine  Samantha Uriostegui MD as Consulting Physician (Cardiology)    Subjective     66 y.o. female left foot burn, POD 7 Left fem pop bypass, pop PTA with DCB    Review of Systems  Review of Systems - no complaints    Objective     Vital Signs  Temp:  [98 °F (36.7 °C)-98.7 °F (37.1 °C)] 98.3 °F (36.8 °C)  Heart Rate:  [65-67] 66  Resp:  [16-20] 20  BP: (122-132)/(47-67) 132/67    Physical Exam  General: No acute distress. Alert and oriented x 4  HEENT: No jugular venous distension, trachea is midline  CV: RRR  Resp:  unlabored breathing on ra  Abd: Abdomen is soft, nontender, nondistended  Extremities: great PT signal.    Results Review:       Recent Results (from the past 12 hour(s))   Magnesium    Collection Time: 06/07/17  4:50 AM   Result Value Ref Range    Magnesium 2.0 1.6 - 2.4 mg/dL   POC Glucose Fingerstick    Collection Time: 06/07/17  7:16 AM   Result Value Ref Range    Glucose 114 70 - 130 mg/dL   POC Glucose Fingerstick    Collection Time: 06/07/17 11:23 AM   Result Value Ref Range    Glucose 128 70 - 130 mg/dL   POC Glucose Fingerstick    Collection Time: 06/07/17  3:25 PM   Result Value Ref Range    Glucose 105 70 - 130 mg/dL   ]      Assessment/Plan           Active Problems:    Atherosclerotic PVD with ulceration      Assessment & Plan  66 y.o. female burn left foot, POD 7 fem poop bypass  Skin graft today  She is waiting on facility for post op care      Jayda Barrientos MD  06/07/17  4:41 PM

## 2017-06-07 NOTE — PLAN OF CARE
Problem: Patient Care Overview (Adult)  Goal: Plan of Care Review  Outcome: Ongoing (interventions implemented as appropriate)    06/07/17 1252   Coping/Psychosocial Response Interventions   Plan Of Care Reviewed With patient   Outcome Evaluation   Outcome Summary/Follow up Plan VSS. Debridement and skin graft scheduled for 1600 today. Pt NPO effective 1200, clear liquid diet this AM. No c/o pain or discomfort. Dressing changed to left foot as ordered.        Goal: Adult Individualization and Mutuality  Outcome: Ongoing (interventions implemented as appropriate)    Problem: Perioperative Period (Adult)  Goal: Signs and Symptoms of Listed Potential Problems Will be Absent or Manageable (Perioperative Period)  Outcome: Ongoing (interventions implemented as appropriate)    Problem: Pain, Acute (Adult)  Goal: Identify Related Risk Factors and Signs and Symptoms  Outcome: Ongoing (interventions implemented as appropriate)  Goal: Acceptable Pain Control/Comfort Level  Outcome: Ongoing (interventions implemented as appropriate)    Problem: Diabetes, Type 2 (Adult)  Goal: Signs and Symptoms of Listed Potential Problems Will be Absent or Manageable (Diabetes, Type 2)  Outcome: Ongoing (interventions implemented as appropriate)    Problem: Tissue Perfusion, Ineffective Peripheral (Adult)  Goal: Identify Related Risk Factors and Signs and Symptoms  Outcome: Ongoing (interventions implemented as appropriate)  Goal: Adequate Tissue Perfusion  Outcome: Ongoing (interventions implemented as appropriate)

## 2017-06-07 NOTE — ANESTHESIA PREPROCEDURE EVALUATION
Anesthesia Evaluation     Patient summary reviewed and Nursing notes reviewed   history of anesthetic complications: PONV  NPO Solid Status: > 8 hours  NPO Liquid Status: > 8 hours     Airway   Mallampati: II  TM distance: >3 FB  Neck ROM: full  no difficulty expected  Dental    (+) edentulous    Pulmonary - normal exam    breath sounds clear to auscultation  (+) a smoker Former,   Cardiovascular - normal exam    ECG reviewed  Rhythm: regular  Rate: normal    (+) CAD, CABG, CHF, PVD, hyperlipidemia  Hypertension: hx , on no meds now.      Neuro/Psych- negative ROS  GI/Hepatic/Renal/Endo    (+) obesity,  PUD, diabetes mellitus type 2 using insulin,     Musculoskeletal (-) negative ROS    Abdominal   (+) obese,    Substance History - negative use     OB/GYN negative ob/gyn ROS         Other - negative ROS                                       Anesthesia Plan    ASA 3     general     intravenous induction   Anesthetic plan and risks discussed with patient.

## 2017-06-07 NOTE — PLAN OF CARE
Problem: Perioperative Period (Adult)  Goal: Signs and Symptoms of Listed Potential Problems Will be Absent or Manageable (Perioperative Period)  Outcome: Ongoing (interventions implemented as appropriate)    06/07/17 1505   Perioperative Period   Problems Assessed (Perioperative Period) pain   Problems Present (Perioperative Period) none

## 2017-06-07 NOTE — PROGRESS NOTES
Continued Stay Note  [facility]     Patient Name: Randi Martines  MRN: [id number]  Today's Date: 6/7/2017    Admit Date: 5/31/2017          Discharge Plan       06/07/17 1054    Case Management/Social Work Plan    Plan SNF - pending precert    Patient/Family In Agreement With Plan yes    Additional Comments Called to [facility] and they iin network with patient's insurance and can accept bed and insurance pending (precert). Called to [facility] and they are in network with pt's insurance. Their beds are tight but can accept bed and insurance pending.               Discharge Codes     None            Evangelina Ball RN
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
## 2017-06-07 NOTE — PLAN OF CARE
Problem: Patient Care Overview (Adult)  Goal: Plan of Care Review  Outcome: Ongoing (interventions implemented as appropriate)    06/07/17 0110   Coping/Psychosocial Response Interventions   Plan Of Care Reviewed With patient   Patient Care Overview   Progress improving   Outcome Evaluation   Outcome Summary/Follow up Plan VSS, dressing changed to the left foot per MD orders. Pain in controlled with PO meds, patient will go to sx 6/7, she may have clear liquids for breakfast and NPO @ noon. Patient understands POC, will continue to monitor patient Mg and pain controll.          Problem: Pain, Acute (Adult)  Goal: Acceptable Pain Control/Comfort Level  Outcome: Ongoing (interventions implemented as appropriate)    Problem: Diabetes, Type 2 (Adult)  Goal: Signs and Symptoms of Listed Potential Problems Will be Absent or Manageable (Diabetes, Type 2)  Outcome: Ongoing (interventions implemented as appropriate)    Problem: Tissue Perfusion, Ineffective Peripheral (Adult)  Goal: Adequate Tissue Perfusion  Outcome: Ongoing (interventions implemented as appropriate)

## 2017-06-08 LAB
GLUCOSE BLDC GLUCOMTR-MCNC: 147 MG/DL (ref 70–130)
GLUCOSE BLDC GLUCOMTR-MCNC: 154 MG/DL (ref 70–130)
GLUCOSE BLDC GLUCOMTR-MCNC: 207 MG/DL (ref 70–130)
GLUCOSE BLDC GLUCOMTR-MCNC: 233 MG/DL (ref 70–130)

## 2017-06-08 PROCEDURE — 82962 GLUCOSE BLOOD TEST: CPT

## 2017-06-08 PROCEDURE — 25010000002 MORPHINE PER 10 MG: Performed by: PLASTIC SURGERY

## 2017-06-08 PROCEDURE — 63710000001 INSULIN ASPART PER 5 UNITS: Performed by: SURGERY

## 2017-06-08 RX ORDER — BISACODYL 5 MG/1
10 TABLET, DELAYED RELEASE ORAL DAILY PRN
Status: DISCONTINUED | OUTPATIENT
Start: 2017-06-08 | End: 2017-06-13 | Stop reason: HOSPADM

## 2017-06-08 RX ORDER — BISACODYL 10 MG
10 SUPPOSITORY, RECTAL RECTAL DAILY PRN
Status: DISCONTINUED | OUTPATIENT
Start: 2017-06-08 | End: 2017-06-13 | Stop reason: HOSPADM

## 2017-06-08 RX ADMIN — SUCRALFATE 1 G: 1 TABLET ORAL at 18:50

## 2017-06-08 RX ADMIN — BISACODYL 10 MG: 10 SUPPOSITORY RECTAL at 18:50

## 2017-06-08 RX ADMIN — CLOPIDOGREL 75 MG: 75 TABLET, FILM COATED ORAL at 09:12

## 2017-06-08 RX ADMIN — SUCRALFATE 1 G: 1 TABLET ORAL at 21:07

## 2017-06-08 RX ADMIN — HYDROCODONE BITARTRATE AND ACETAMINOPHEN 1 TABLET: 7.5; 325 TABLET ORAL at 12:11

## 2017-06-08 RX ADMIN — ROSUVASTATIN CALCIUM 40 MG: 40 TABLET, FILM COATED ORAL at 21:07

## 2017-06-08 RX ADMIN — SULFAMETHOXAZOLE AND TRIMETHOPRIM 160 MG: 800; 160 TABLET ORAL at 09:12

## 2017-06-08 RX ADMIN — HYDROCODONE BITARTRATE AND ACETAMINOPHEN 1 TABLET: 7.5; 325 TABLET ORAL at 04:03

## 2017-06-08 RX ADMIN — ASPIRIN 81 MG: 81 TABLET ORAL at 09:12

## 2017-06-08 RX ADMIN — SUCRALFATE 1 G: 1 TABLET ORAL at 09:12

## 2017-06-08 RX ADMIN — Medication 400 MG: at 09:12

## 2017-06-08 RX ADMIN — SUCRALFATE 1 G: 1 TABLET ORAL at 12:11

## 2017-06-08 RX ADMIN — INSULIN DETEMIR 40 UNITS: 100 INJECTION, SOLUTION SUBCUTANEOUS at 21:42

## 2017-06-08 RX ADMIN — INSULIN ASPART 12 UNITS: 100 INJECTION, SOLUTION INTRAVENOUS; SUBCUTANEOUS at 09:12

## 2017-06-08 RX ADMIN — PANTOPRAZOLE SODIUM 40 MG: 40 TABLET, DELAYED RELEASE ORAL at 04:04

## 2017-06-08 RX ADMIN — PANTOPRAZOLE SODIUM 40 MG: 40 TABLET, DELAYED RELEASE ORAL at 18:51

## 2017-06-08 RX ADMIN — MORPHINE SULFATE 4 MG: 4 INJECTION, SOLUTION INTRAMUSCULAR; INTRAVENOUS at 21:38

## 2017-06-08 RX ADMIN — GABAPENTIN 1200 MG: 400 CAPSULE ORAL at 21:07

## 2017-06-08 RX ADMIN — METOCLOPRAMIDE 5 MG: 5 TABLET ORAL at 09:12

## 2017-06-08 RX ADMIN — SULFAMETHOXAZOLE AND TRIMETHOPRIM 160 MG: 800; 160 TABLET ORAL at 18:50

## 2017-06-08 NOTE — PROGRESS NOTES
Jayda Barrientos MD       LOS: 8 days   Patient Care Team:  Canelo Shah MD as PCP - General  Canelo Shah MD as PCP - Family Medicine  Samantha Uriostegui MD as Consulting Physician (Cardiology)    Subjective     66 y.o. female pod 8 left fem-pop, pop PTA with DCB, pod 1 skin graft left foot  Feels pretty good  No rest pain  Foot splinted  Toes pink with ggod CRF    Review of Systems  Review of Systems - no cp/soa      Objective     Vital Signs  Temp:  [98.2 °F (36.8 °C)-98.7 °F (37.1 °C)] 98.4 °F (36.9 °C)  Heart Rate:  [66-83] 68  Resp:  [16-20] 20  BP: ()/(39-59) 106/54    Physical Exam  General: No acute distress. Alert and oriented x 4  CV: RRR  Resp: unlabored breathing on ra  Abd: Abdomen is soft, nontender, dressing intact  Extremities: both viable    Results Review:       Recent Results (from the past 12 hour(s))   POC Glucose Fingerstick    Collection Time: 06/08/17 11:28 AM   Result Value Ref Range    Glucose 154 (H) 70 - 130 mg/dL   POC Glucose Fingerstick    Collection Time: 06/08/17  4:43 PM   Result Value Ref Range    Glucose 147 (H) 70 - 130 mg/dL   ]      Assessment/Plan           Active Problems:    Atherosclerotic PVD with ulceration  burn    Assessment & Plan  66 y.o. female pod 8 left fem-pop, pod 1 skin graft  Awaiting placement as she needs strict nonwt bearing  Dr Waterhouse wants to do graft eval next week    She is ready for discharge but waiting on placement      Jayda Barrientos MD  06/08/17  7:20 PM

## 2017-06-08 NOTE — PLAN OF CARE
Problem: Patient Care Overview (Adult)  Goal: Plan of Care Review  Outcome: Ongoing (interventions implemented as appropriate)    06/08/17 0506   Coping/Psychosocial Response Interventions   Plan Of Care Reviewed With patient   Patient Care Overview   Progress improving   Outcome Evaluation   Outcome Summary/Follow up Plan VSS, A &Ox4. Minimal complaints of pain post debridement & skin graft. Surgical dressing intact. Will continue to monitor closely          Problem: Perioperative Period (Adult)  Goal: Signs and Symptoms of Listed Potential Problems Will be Absent or Manageable (Perioperative Period)  Outcome: Ongoing (interventions implemented as appropriate)  Goal: Signs and Symptoms of Listed Potential Problems Will be Absent or Manageable (Perioperative Period)  Outcome: Ongoing (interventions implemented as appropriate)    Problem: Pain, Acute (Adult)  Goal: Identify Related Risk Factors and Signs and Symptoms  Outcome: Ongoing (interventions implemented as appropriate)  Goal: Acceptable Pain Control/Comfort Level  Outcome: Ongoing (interventions implemented as appropriate)    Problem: Diabetes, Type 2 (Adult)  Goal: Signs and Symptoms of Listed Potential Problems Will be Absent or Manageable (Diabetes, Type 2)  Outcome: Ongoing (interventions implemented as appropriate)    Problem: Tissue Perfusion, Ineffective Peripheral (Adult)  Goal: Identify Related Risk Factors and Signs and Symptoms  Outcome: Ongoing (interventions implemented as appropriate)  Goal: Adequate Tissue Perfusion  Outcome: Ongoing (interventions implemented as appropriate)

## 2017-06-08 NOTE — PROGRESS NOTES
Continued Stay Note  [facility]     Patient Name: Randi Martines  MRN: [id number]  Today's Date: 6/8/2017    Admit Date: 5/31/2017          Discharge Plan       06/08/17 1313    Case Management/Social Work Plan    Plan [facility] -- will need precert    Patient/Family In Agreement With Plan yes    Additional Comments [facility] and [facility] are both following for discharge. Patient will need [employer] precert. Packet is in the [facility] office.               Discharge Codes     None            Evangelina Ball RN

## 2017-06-08 NOTE — ANESTHESIA POSTPROCEDURE EVALUATION
Patient: Randi Martines    Procedure Summary     Date Anesthesia Start Anesthesia Stop Room / Location    06/07/17 5062 0989  [facility] OR 06 / [facility] MAIN OR       Procedure Diagnosis Surgeon Provider    DEBRIDEMENT AND SKIN GRAFT TO  LEFT DORSAL FOOT AND TOES (Left ) No diagnosis on file. Maurine Waterhouse, MD Louise A Smith, MD          Anesthesia Type: general  Last vitals  /56 (06/07/17 2000)    Temp 36.6 °C (97.9 °F) (06/07/17 1917)    Pulse 70 (06/07/17 2000)   Resp 16 (06/07/17 2000)    SpO2 95 % (06/07/17 2000)      Post Anesthesia Care and Evaluation    Patient location during evaluation: PACU  Patient participation: complete - patient participated  Level of consciousness: awake and alert  Pain management: adequate  Airway patency: patent  Anesthetic complications: No anesthetic complications    Cardiovascular status: acceptable  Respiratory status: acceptable  Hydration status: acceptable

## 2017-06-09 LAB
GLUCOSE BLDC GLUCOMTR-MCNC: 133 MG/DL (ref 70–130)
GLUCOSE BLDC GLUCOMTR-MCNC: 143 MG/DL (ref 70–130)
GLUCOSE BLDC GLUCOMTR-MCNC: 145 MG/DL (ref 70–130)
GLUCOSE BLDC GLUCOMTR-MCNC: 227 MG/DL (ref 70–130)

## 2017-06-09 PROCEDURE — 97162 PT EVAL MOD COMPLEX 30 MIN: CPT

## 2017-06-09 PROCEDURE — 25010000002 ENOXAPARIN PER 10 MG: Performed by: SURGERY

## 2017-06-09 PROCEDURE — 63710000001 INSULIN ASPART PER 5 UNITS: Performed by: SURGERY

## 2017-06-09 PROCEDURE — 25010000002 MORPHINE PER 10 MG: Performed by: PLASTIC SURGERY

## 2017-06-09 PROCEDURE — 97110 THERAPEUTIC EXERCISES: CPT

## 2017-06-09 PROCEDURE — 82962 GLUCOSE BLOOD TEST: CPT

## 2017-06-09 PROCEDURE — 63710000001 INSULIN DETEMER PER 5 UNITS: Performed by: SURGERY

## 2017-06-09 RX ADMIN — Medication 400 MG: at 08:16

## 2017-06-09 RX ADMIN — MORPHINE SULFATE 4 MG: 4 INJECTION, SOLUTION INTRAMUSCULAR; INTRAVENOUS at 04:03

## 2017-06-09 RX ADMIN — SUCRALFATE 1 G: 1 TABLET ORAL at 08:16

## 2017-06-09 RX ADMIN — GABAPENTIN 1200 MG: 400 CAPSULE ORAL at 20:33

## 2017-06-09 RX ADMIN — METOCLOPRAMIDE 5 MG: 5 TABLET ORAL at 08:17

## 2017-06-09 RX ADMIN — SUCRALFATE 1 G: 1 TABLET ORAL at 18:02

## 2017-06-09 RX ADMIN — HYDROCODONE BITARTRATE AND ACETAMINOPHEN 1 TABLET: 7.5; 325 TABLET ORAL at 10:13

## 2017-06-09 RX ADMIN — HYDROCODONE BITARTRATE AND ACETAMINOPHEN 1 TABLET: 7.5; 325 TABLET ORAL at 00:32

## 2017-06-09 RX ADMIN — INSULIN DETEMIR 40 UNITS: 100 INJECTION, SOLUTION SUBCUTANEOUS at 22:21

## 2017-06-09 RX ADMIN — HYDROCODONE BITARTRATE AND ACETAMINOPHEN 1 TABLET: 7.5; 325 TABLET ORAL at 20:33

## 2017-06-09 RX ADMIN — CLOPIDOGREL 75 MG: 75 TABLET, FILM COATED ORAL at 08:16

## 2017-06-09 RX ADMIN — ROSUVASTATIN CALCIUM 40 MG: 40 TABLET, FILM COATED ORAL at 20:33

## 2017-06-09 RX ADMIN — PANTOPRAZOLE SODIUM 40 MG: 40 TABLET, DELAYED RELEASE ORAL at 18:02

## 2017-06-09 RX ADMIN — INSULIN ASPART 12 UNITS: 100 INJECTION, SOLUTION INTRAVENOUS; SUBCUTANEOUS at 12:46

## 2017-06-09 RX ADMIN — SULFAMETHOXAZOLE AND TRIMETHOPRIM 160 MG: 800; 160 TABLET ORAL at 08:16

## 2017-06-09 RX ADMIN — SULFAMETHOXAZOLE AND TRIMETHOPRIM 160 MG: 800; 160 TABLET ORAL at 18:02

## 2017-06-09 RX ADMIN — ENOXAPARIN SODIUM 30 MG: 30 INJECTION SUBCUTANEOUS at 12:46

## 2017-06-09 RX ADMIN — SUCRALFATE 1 G: 1 TABLET ORAL at 20:33

## 2017-06-09 RX ADMIN — BISACODYL 10 MG: 5 TABLET, COATED ORAL at 15:24

## 2017-06-09 RX ADMIN — HYDROCODONE BITARTRATE AND ACETAMINOPHEN 1 TABLET: 7.5; 325 TABLET ORAL at 15:25

## 2017-06-09 RX ADMIN — PANTOPRAZOLE SODIUM 40 MG: 40 TABLET, DELAYED RELEASE ORAL at 08:16

## 2017-06-09 RX ADMIN — SUCRALFATE 1 G: 1 TABLET ORAL at 12:46

## 2017-06-09 RX ADMIN — ASPIRIN 81 MG: 81 TABLET ORAL at 08:16

## 2017-06-09 NOTE — PROGRESS NOTES
Continued Stay Note  [facility]     Patient Name: Randi Martines  MRN: [id number]  Today's Date: 6/9/2017    Admit Date: 5/31/2017          Discharge Plan       06/09/17 0913    Case Management/Social Work Plan    Plan [facility] vs [facility] - will need precert    Patient/Family In Agreement With Plan yes    Additional Comments Emailed to [facility] and [facility] to check bed status. Obtained PT order to eval for rehab precert. Called to inpt PT office and left vmm to see if they could eval patient early as she will need precert. CCP will continue to follow and assist as needed. Packet is in the  [facility] office.               Discharge Codes     None        Expected Discharge Date and Time     Expected Discharge Date Expected Discharge Time    Jun 9, 2017             Evangelina Ball RN

## 2017-06-09 NOTE — PROGRESS NOTES
Continued Stay Note  [facility]     Patient Name: Randi Martines  MRN: [id number]  Today's Date: 6/9/2017    Admit Date: 5/31/2017          Discharge Plan       06/09/17 1117    Case Management/Social Work Plan    Plan [facility] -- Need [employer] precert    Patient/Family In Agreement With Plan yes    Additional Comments Spoke with [facility] and they only have a semi-private room. Spoke with [facility] [place] and they have a private. Spoke with Hayden on Site and she is checking for possible DC today/precert. Followed up with patient and she wants to go to [facility] which is closer to her son and has a private room. Updated [place]. Awaiting PT notes.       06/09/17 0913    Case Management/Social Work Plan    Plan [facility] - will need precert    Patient/Family In Agreement With Plan yes    Additional Comments Emailed to [facility] and [facility] to check bed status. Obtained PT order to eval for rehab precert. Called to inpt PT office and left m to see if they could eval patient early as she will need precert. CCP will continue to follow and assist as needed.               Discharge Codes     None        Expected Discharge Date and Time     Expected Discharge Date Expected Discharge Time    Jun 9, 2017             Evangelina Ball RN

## 2017-06-09 NOTE — PLAN OF CARE
Problem: Patient Care Overview (Adult)  Goal: Plan of Care Review    06/09/17 1458   Outcome Evaluation   Outcome Summary/Follow up Plan patient presents with generalized weakness, decreased activity tolerance, NWB status on LLE which limit independence with functional mobility and patient would benefit from skilled PT. Anticipate patient would benefit from SNU upon d/c.          Problem: Inpatient Physical Therapy  Goal: Bed Mobility Goal LTG- PT    06/09/17 1458   Bed Mobility PT LTG   Bed Mobility PT LTG, Date Established 06/09/17   Bed Mobility PT LTG, Time to Achieve 5 days   Bed Mobility PT LTG, Activity Type all bed mobility   Bed Mobility PT LTG, [place] Level supervision required       Goal: Transfer Training Goal 1 LTG- PT    06/09/17 1458   Transfer Training PT LTG   Transfer Training PT LTG, Date Established 06/09/17   Transfer Training PT LTG, Time to Achieve 5 days   Transfer Training PT LTG, Activity Type all transfers   Transfer Training PT LTG, [place] contact guard assist       Goal: Patient Education Goal LTG- PT    06/09/17 1458   Patient Education PT LTG   Patient Education PT LTG, Date Established 06/09/17   Patient Education PT LTG, Time to Achieve 5 days   Patient Education PT LTG, Education Type HEP   Patient Education PT LTG, Education Understanding demonstrate adequately;verbalize understanding

## 2017-06-09 NOTE — PROGRESS NOTES
Jayda Barrientos MD       LOS: 9 days   Patient Care Team:  Canelo Shah MD as PCP - General  Canelo Shah MD as PCP - Family Medicine  Samantha Uriostegui MD as Consulting Physician (Cardiology)    Subjective     66 y.o. female pod 9 left fem-pop, pod 2 skin graft to foot    Review of Systems  Review of Systems - no complaints      Objective     Vital Signs  Temp:  [97.9 °F (36.6 °C)-98.8 °F (37.1 °C)] 97.9 °F (36.6 °C)  Heart Rate:  [62-73] 64  Resp:  [18-20] 20  BP: ()/(51-67) 108/51    Physical Exam  General: No acute distress. Alert and oriented x 4  CV: RRR  Resp:  unlabored breathing on ra  Abd: Abdomen is soft, nontender  Extremities: good CRF bilat.  Left foot post splint to immobilize skin graft    Results Review:       Recent Results (from the past 12 hour(s))   POC Glucose Fingerstick    Collection Time: 06/09/17  7:22 AM   Result Value Ref Range    Glucose 133 (H) 70 - 130 mg/dL   ]      Assessment/Plan           Active Problems:    Atherosclerotic PVD with ulceration  burn to foot    Assessment & Plan  66 y.o. female pod 9/2 distal bypass and skin graft  Ready for discharge to NH  Start prophylactic lovenox      Jayda Barrientos MD  06/09/17  11:17 AM

## 2017-06-09 NOTE — PLAN OF CARE
Problem: Pain, Acute (Adult)  Goal: Identify Related Risk Factors and Signs and Symptoms  Outcome: Ongoing (interventions implemented as appropriate)    06/08/17 0509   Pain, Acute   Related Risk Factors (Acute Pain) disease process;procedure/treatment   Signs and Symptoms (Acute Pain) verbalization of pain descriptors       Goal: Acceptable Pain Control/Comfort Level  Outcome: Ongoing (interventions implemented as appropriate)    06/09/17 1931   Pain, Acute (Adult)   Acceptable Pain Control/Comfort Level making progress toward outcome         Problem: Tissue Perfusion, Ineffective Peripheral (Adult)  Goal: Identify Related Risk Factors and Signs and Symptoms  Outcome: Ongoing (interventions implemented as appropriate)    06/08/17 0509   Tissue Perfusion, Ineffective Peripheral   Tissue Perfusion, Ineffective Peripheral: Related Risk Factors diabetes mellitus;obesity;venous flow reduced   Signs and Symptoms (Ineffective Peripheral Tissue Perfusion) aching pain;edema;skin ulcerations;motor function decreased;pain at rest       Goal: Adequate Tissue Perfusion  Outcome: Ongoing (interventions implemented as appropriate)    06/09/17 1931   Tissue Perfusion, Ineffective Peripheral (Adult)   Adequate Tissue Perfusion making progress toward outcome

## 2017-06-09 NOTE — THERAPY EVALUATION
Acute Care - Physical Therapy Initial Evaluation  [facility]     Patient Name: Randi Martines  : 1950  MRN: [id number]  Today's Date: 2017   Onset of Illness/Injury or Date of Surgery Date: 17     Referring Physician: Iliana      Admit Date: 2017     Visit Dx:    ICD-10-CM ICD-9-CM   1. Generalized weakness R53.1 780.79     Patient Active Problem List   Diagnosis   • Abdominal pain   • Intractable abdominal pain   • Essential hypertension   • DM (diabetes mellitus), type 2 with neurological complications   • PVD (peripheral vascular disease)   • Mesenteric ischemia   • Acute gastric ulcer   • S/P CABG (coronary artery bypass graft)   • Coronary artery disease involving native coronary artery of native heart without angina pectoris   • Atherosclerotic PVD with ulceration     Past Medical History:   Diagnosis Date   • CAD (coronary artery disease)    • Chronic kidney disease    • Chronic systolic congestive heart failure    • Diabetes mellitus    • Dyslipidemia    • Foot ulcer    • History of diverticulitis    • History of stomach ulcers    • History of transfusion    • Hyperlipemia    • Hypertension    • Peptic ulcer disease    • PONV (postoperative nausea and vomiting)    • PVD (peripheral vascular disease)      Past Surgical History:   Procedure Laterality Date   • APPENDECTOMY     • CATARACT EXTRACTION     • CHOLECYSTECTOMY     • COLONOSCOPY     • CORONARY ARTERY BYPASS GRAFT      7 coronary bypass surgery   • ENDOSCOPY N/A 2016    Procedure: ESOPHAGOGASTRODUODENOSCOPY ;  Surgeon: Katherine Green MD;  Location: [facility] ENDOSCOPY;  Service:    • HERNIA REPAIR     • HYSTERECTOMY     • FL VEIN IN SITU BYPASS GRAFT,FEM-POP Left 2017    Procedure: ULTRASOUND RIGHT AIF, ARTERIOGRAM W/ LEFT COMMON ILIAC ANGIOPLASTY STENT, LT FEMORAL POPLITEAL BYPASS WITH ARTEGRAFT, LEFT POPLITEAL ENDARTERECTOMY PATCH, LEFT COMMON ARTERIOGRAM, LEFT EXTERNAL ILIAC ANGIOPLASTY STENT  PLACEMENT, AORTA,  EXTERNAL, DISTAL PRESSURES;  Surgeon: Jayda Barrientos MD;  Location: [facility] OR 18/19;  Service: Vascular   • SKIN GRAFT SPLIT THICKNESS Left 6/7/2017    Procedure: DEBRIDEMENT AND SKIN GRAFT TO  LEFT DORSAL FOOT AND TOES;  Surgeon: Maurine Waterhouse, MD;  Location: [facility] MAIN OR;  Service:    • UPPER GASTROINTESTINAL ENDOSCOPY  2015    scar in gastric body, gastric ulcers          PT ASSESSMENT (last 72 hours)      PT Evaluation       06/09/17 1450       Rehab Evaluation    Document Type evaluation  -EM     Subjective Information agree to therapy;no complaints  -EM     General Information    Onset of Illness/Injury or Date of Surgery Date 05/31/17  -EM     Referring Physician Iliana  -EM     General Observations obese  female in supine, sleeping but easily awakened   -EM     Pertinent History Of Current Problem atherosclerotic PVD L foot, s/p fem pop bypass and skin graft   -EM     Precautions/Limitations non-weight bearing status  -EM     Prior Level of Function independent:;community mobility  -EM     Equipment Currently Used at Home cane, straight;walker, standard  -EM     Plans/Goals Discussed With patient  -EM     Living Environment    Lives With child(magan), adult  -EM     Living Arrangements house  -EM     Home Accessibility no concerns  -EM     Clinical Impression    Patient/Family Goals Statement maintain NWB so foot can heal  -EM     Criteria for Skilled Therapeutic Interventions Met yes;treatment indicated  -EM     Impairments Found (describe specific impairments) gait, locomotion, and balance  -EM     Rehab Potential good, to achieve stated therapy goals  -EM     Pain Assessment    Pain Assessment No/denies pain  -EM     Vision Assessment/Intervention    Visual Impairment --   pt states she is legally blind  -EM     Cognitive Assessment/Intervention    Current Cognitive/Communication Assessment functional  -EM     Orientation Status oriented x 4  -EM     Follows  "Commands/Answers Questions 100% of the time  -EM     Personal Safety WNL/WFL  -EM     ROM (Range of Motion)    General ROM no range of motion deficits identified  -EM     MMT (Manual Muscle Testing)    General MMT Assessment no strength deficits identified   no focal deficits  -EM     Mobility Assessment/Training    Extremity Weight-Bearing Status left lower extremity  -EM     Left Lower Extremity Weight-Bearing non weight-bearing  -EM     Bed Mobility, Assessment/Treatment    Bed Mob, Supine to Sit, [place] supervision required  -EM     Transfer Assessment/Treatment    Transfers, Bed-Chair [place] minimum assist (75% patient effort)  -EM     Transfers, Bed-Chair-Bed, Assist Device rolling walker  -EM     Transfers, Sit-Stand [place] minimum assist (75% patient effort);verbal cues required  -EM     Transfer, Comment does not \"hop\", scoots right foot along floor, maintains NWB during tsf   -EM     Positioning and Restraints    Pre-Treatment Position in bed  -EM     Post Treatment Position chair  -EM     In Chair reclined;call light within reach;notified nsg  -EM       User Key  (r) = Recorded By, (t) = Taken By, (c) = Cosigned By    Initials Name Provider Type    EM Jayda Ellington PT Physical Therapist          Physical Therapy Education     Title: PT OT SLP Therapies (Active)     Topic: Physical Therapy (Active)     Point: Mobility training (Done)    Learning Progress Summary    Learner Readiness Method Response Comment Documented by Status   Patient Acceptance E [facility] 06/09/17 1457 Done               Point: Precautions (Done)    Learning Progress Summary    Learner Readiness Method Response Comment Documented by Status   Patient Acceptance E [facility] 06/09/17 1457 Done                      User Key     Initials Effective Dates Name Provider Type Discipline     12/01/15 -  Jayda Ellington PT Physical Therapist PT                PT Recommendation and Plan  Anticipated Discharge Disposition: " skilled nursing facility  Planned Therapy Interventions: home exercise program, transfer training  PT Frequency: daily  Plan of Care Review  Outcome Summary/Follow up Plan: patient presents with generalized weakness, decreased activity tolerance, NWB status on LLE which limit independence with functional mobility and patient would benefit from skilled PT. Anticipate patient would benefit from SNU upon d/c.           IP PT Goals       06/09/17 1458          Bed Mobility PT LTG    Bed Mobility PT LTG, Date Established 06/09/17  -EM      Bed Mobility PT LTG, Time to Achieve 5 days  -EM      Bed Mobility PT LTG, Activity Type all bed mobility  -EM      Bed Mobility PT LTG, [place] Level supervision required  -EM      Transfer Training PT LTG    Transfer Training PT LTG, Date Established 06/09/17  -EM      Transfer Training PT LTG, Time to Achieve 5 days  -EM      Transfer Training PT LTG, Activity Type all transfers  -EM      Transfer Training PT LTG, [place] Level contact guard assist  -EM      Patient Education PT LTG    Patient Education PT LTG, Date Established 06/09/17  -EM      Patient Education PT LTG, Time to Achieve 5 days  -EM      Patient Education PT LTG, Education Type HEP  -EM      Patient Education PT LTG, Education Understanding demonstrate adequately;verbalize understanding  -EM        User Key  (r) = Recorded By, (t) = Taken By, (c) = Cosigned By    Initials Name Provider Type    SHAYLEE Ellington, PT Physical Therapist                Outcome Measures       06/09/17 1500          How much help from another person do you currently need...    Turning from your back to your side while in flat bed without using bedrails? 4  -EM      Moving from lying on back to sitting on the side of a flat bed without bedrails? 3  -EM      Moving to and from a bed to a chair (including a wheelchair)? 3  -EM      Standing up from a chair using your arms (e.g., wheelchair, bedside chair)? 3  -EM      Climbing  3-5 steps with a railing? 2  -EM      To walk in hospital room? 2  -EM      AM-PAC 6 Clicks Score 17  -EM      Functional Assessment    Outcome Measure Options AM-PAC 6 Clicks Basic Mobility (PT)  -EM        User Key  (r) = Recorded By, (t) = Taken By, (c) = Cosigned By    Initials Name Provider Type    EM Jayda Ellington PT Physical Therapist           Time Calculation:         PT Charges       06/09/17 1500          Time Calculation    Start Time 1405  -EM      Stop Time 1429  -EM      Time Calculation (min) 24 min  -EM      PT Received On 06/09/17  -EM      PT - Next Appointment 06/10/17  -EM      PT Goal Re-Cert Due Date 06/14/17  -EM        User Key  (r) = Recorded By, (t) = Taken By, (c) = Cosigned By    Initials Name Provider Type    EM Jayda Ellington, PT Physical Therapist          Therapy Charges for Today     Code Description Service Date Service Provider Modifiers Qty    89500060533 HC PT EVAL MOD COMPLEXITY 2 6/9/2017 Jayda Ellington, PT GP 1    64214008798 HC PT THER PROC EA 15 MIN 6/9/2017 Jayda Ellington, PT GP 1          PT G-Codes  Outcome Measure Options: AM-PAC 6 Clicks Basic Mobility (PT)      Jayda Ellington, PT  6/9/2017
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
## 2017-06-10 LAB
GLUCOSE BLDC GLUCOMTR-MCNC: 130 MG/DL (ref 70–130)
GLUCOSE BLDC GLUCOMTR-MCNC: 175 MG/DL (ref 70–130)
GLUCOSE BLDC GLUCOMTR-MCNC: 225 MG/DL (ref 70–130)
GLUCOSE BLDC GLUCOMTR-MCNC: 270 MG/DL (ref 70–130)

## 2017-06-10 PROCEDURE — 25010000002 ENOXAPARIN PER 10 MG: Performed by: SURGERY

## 2017-06-10 PROCEDURE — 63710000001 INSULIN ASPART PER 5 UNITS: Performed by: SURGERY

## 2017-06-10 PROCEDURE — 25010000002 PROMETHAZINE PER 50 MG: Performed by: SURGERY

## 2017-06-10 PROCEDURE — 82962 GLUCOSE BLOOD TEST: CPT

## 2017-06-10 PROCEDURE — 97110 THERAPEUTIC EXERCISES: CPT | Performed by: PHYSICAL THERAPIST

## 2017-06-10 RX ADMIN — INSULIN DETEMIR 40 UNITS: 100 INJECTION, SOLUTION SUBCUTANEOUS at 21:41

## 2017-06-10 RX ADMIN — ENOXAPARIN SODIUM 30 MG: 30 INJECTION SUBCUTANEOUS at 12:33

## 2017-06-10 RX ADMIN — OXYCODONE HYDROCHLORIDE AND ACETAMINOPHEN 1 TABLET: 7.5; 325 TABLET ORAL at 17:37

## 2017-06-10 RX ADMIN — BISACODYL 10 MG: 5 TABLET, COATED ORAL at 17:37

## 2017-06-10 RX ADMIN — METOCLOPRAMIDE 5 MG: 5 TABLET ORAL at 09:21

## 2017-06-10 RX ADMIN — PROMETHAZINE HYDROCHLORIDE 12.5 MG: 25 INJECTION INTRAMUSCULAR; INTRAVENOUS at 06:38

## 2017-06-10 RX ADMIN — OXYCODONE HYDROCHLORIDE AND ACETAMINOPHEN 1 TABLET: 7.5; 325 TABLET ORAL at 21:49

## 2017-06-10 RX ADMIN — ROSUVASTATIN CALCIUM 40 MG: 40 TABLET, FILM COATED ORAL at 20:24

## 2017-06-10 RX ADMIN — SUCRALFATE 1 G: 1 TABLET ORAL at 09:21

## 2017-06-10 RX ADMIN — INSULIN ASPART 12 UNITS: 100 INJECTION, SOLUTION INTRAVENOUS; SUBCUTANEOUS at 21:52

## 2017-06-10 RX ADMIN — INSULIN ASPART 12 UNITS: 100 INJECTION, SOLUTION INTRAVENOUS; SUBCUTANEOUS at 12:33

## 2017-06-10 RX ADMIN — SUCRALFATE 1 G: 1 TABLET ORAL at 17:37

## 2017-06-10 RX ADMIN — SULFAMETHOXAZOLE AND TRIMETHOPRIM 160 MG: 800; 160 TABLET ORAL at 17:37

## 2017-06-10 RX ADMIN — SUCRALFATE 1 G: 1 TABLET ORAL at 20:24

## 2017-06-10 RX ADMIN — Medication 400 MG: at 09:21

## 2017-06-10 RX ADMIN — ASPIRIN 81 MG: 81 TABLET ORAL at 09:21

## 2017-06-10 RX ADMIN — PANTOPRAZOLE SODIUM 40 MG: 40 TABLET, DELAYED RELEASE ORAL at 09:21

## 2017-06-10 RX ADMIN — SULFAMETHOXAZOLE AND TRIMETHOPRIM 160 MG: 800; 160 TABLET ORAL at 09:21

## 2017-06-10 RX ADMIN — SUCRALFATE 1 G: 1 TABLET ORAL at 12:45

## 2017-06-10 RX ADMIN — PANTOPRAZOLE SODIUM 40 MG: 40 TABLET, DELAYED RELEASE ORAL at 17:37

## 2017-06-10 RX ADMIN — GABAPENTIN 1200 MG: 400 CAPSULE ORAL at 20:24

## 2017-06-10 RX ADMIN — CLOPIDOGREL 75 MG: 75 TABLET, FILM COATED ORAL at 09:21

## 2017-06-10 NOTE — OP NOTE
DATE OF PROCEDURE:  06/07/2017    PREOPERATIVE DIAGNOSIS:  Full-thickness skin burn left dorsal foot and toes 1 through 4.     POSTOPERATIVE DIAGNOSIS:  Full-thickness skin burn left dorsal foot and toes 1 through 4.    PROCEDURES PERFORMED:  Debridement and full-thickness skin grafting to left dorsal foot and toes 1 through 4; 54 sq cm in total area.    SURGEON:  Maurine A. Waterhouse, MD    ANESTHESIA:  General endotracheal anesthesia, supplemented with local injection using 0.25% Marcaine with epinephrine    INDICATIONS:  The patient is a 66-year-old female who was recently admitted with a full-thickness skin burn to the left foot and toes. She underwent revascularization including endarterectomies and sent placement to provide improved inflow to the foot. She was maintained on aspirin and Plavix. She has undergone dressing changes to the foot; however due to the full-thickness nature of this burn, she is now brought to the operating room for skin grafting     DESCRIPTION OF PROCEDURE:  The patient was brought to the operating room, where she was placed under general endotracheal anesthesia. The left foot and lower abdomen were prepped and draped out. An ellipse was marked along the left lower abdomen at the site of a previous skin resection. This was chosen in order to allow direct closure and prevent postoperative bleeding from the donor site. This site was injected with local anesthetic using 0.25% Marcaine with epinephrine. A skin ellipse was made sharply and the skin then elevated with a #10 blade in the deep dermal plane. This large ellipse of skin was placed in a moist lap. The donor site incision was then closed, first resecting a small amount of underlying fat in order to improve closure. The incision was then closed with a running 2-0 Vicryl subcutaneous suture, followed by 3-0 Vicryl dermal stitch and 4-0 Monocryl subcuticular skin stitch. Benzoin and Steri-Strips were placed across this incision.      The left foot and toes were then debrided using a #10 blade, and excising the burn directly with this blade in a subcutaneous level, obtaining excellent blood flow over the surface. A full-thickness debridement was done on the great toe along the plantar, medial, and anterior surface, extending into the subcutaneous tissue. The periphery of this wound  was injected with 0.25% Marcaine with epinephrine for postoperative pain control and hemostasis.     The graft was then laid in place over the dorsal foot, and sutured by tacking with 5-0 chromic suture and then trimming to fit the wound. Additional pieces of graft were then placed over the dorsal toes and sutured in place. A separate graft was wrapped over the great toe, including the plantar, medial, and anterior surface.     The foot was then dressed with Xeroform, bacitracin ointment, fluffs, Kerlix, Ace wrap, and then a padded plaster splint, fit to hold the foot in neutral position, and wrapped in place with additional Kerlix and Ace wrap. A sterile dressing was applied over the abdominal incision donor site.     There were no specimens and no cultures taken. Blood loss was approximately 50 mL. The patient tolerated the procedure well and was discharged to recovery room in stable condition.        Maurine A. Waterhouse, M.D. MAW:ms  D:   06/10/2017 18:53:35  T:   06/10/2017 19:33:53  Job ID:   [id number]  Document ID:   [id number]  cc:

## 2017-06-10 NOTE — THERAPY TREATMENT NOTE
Acute Care - Physical Therapy Treatment Note  [facility]     Patient Name: Randi Martines  : 1950  MRN: [id number]  Today's Date: 6/10/2017  Onset of Illness/Injury or Date of Surgery Date: 17     Referring Physician: Iliana    Admit Date: 2017    Visit Dx:    ICD-10-CM ICD-9-CM   1. Generalized weakness R53.1 780.79     Patient Active Problem List   Diagnosis   • Abdominal pain   • Intractable abdominal pain   • Essential hypertension   • DM (diabetes mellitus), type 2 with neurological complications   • PVD (peripheral vascular disease)   • Mesenteric ischemia   • Acute gastric ulcer   • S/P CABG (coronary artery bypass graft)   • Coronary artery disease involving native coronary artery of native heart without angina pectoris   • Atherosclerotic PVD with ulceration               Adult Rehabilitation Note       06/10/17 1411          Rehab Assessment/Intervention    Discipline physical therapist  -SC      Document Type therapy note (daily note)  -SC      Subjective Information agree to therapy;no complaints  -SC      Patient Effort, Rehab Treatment good  -SC      Symptoms Noted During/After Treatment none  -SC      Precautions/Limitations non-weight bearing status  -SC      Recorded by [SC] Katherine Morrell, PT      Pain Assessment    Pain Assessment No/denies pain  -SC      Recorded by [SC] Katherine Morrell PT      Vision Assessment/Intervention    Visual Impairment WFL with corrective lenses   legally blind  -SC      Recorded by [SC] Katherine Morrell, JASPER      Cognitive Assessment/Intervention    Current Cognitive/Communication Assessment functional  -SC      Orientation Status oriented x 4  -SC      Follows Commands/Answers Questions 100% of the time  -SC      Personal Safety WNL/WFL  -SC      Recorded by [SC] Kathreine Morrell, JASPER      Mobility Assessment/Training    Extremity Weight-Bearing Status left upper extremity  -SC      Left Upper Extremity Weight-Bearing non  "weight-bearing  -SC      Recorded by [SC] Katherine Morrell, PT      Bed Mobility, Assessment/Treatment    Bed Mob, Supine to Sit, [place] verbal cues required;supervision required  -SC      Recorded by [SC] Katherine Morrell, PT      Transfer Assessment/Treatment    Transfers, Bed-Chair [place] contact guard assist;verbal cues required;set up required  -SC      Transfers, Bed-Chair-Bed, Assist Device rolling walker  -SC      Transfers, Sit-Stand [place] contact guard assist;set up required;verbal cues required  -SC      Transfer, Comment does not \"hop\", scoots right foot along floor, maintains NWB during tsf   -SC      Recorded by [SC] Katherine Morrell, PT      Positioning and Restraints    Pre-Treatment Position in bed  -SC      Post Treatment Position chair  -SC      In Chair reclined;sitting;call light within reach;encouraged to call for assist;notified nsg  -SC      Recorded by [SC] Katherine Morrell, PT        User Key  (r) = Recorded By, (t) = Taken By, (c) = Cosigned By    Initials Name Effective Dates    SC Katherine Morrell, PT 03/26/15 -                 IP PT Goals       06/09/17 1458          Bed Mobility PT LTG    Bed Mobility PT LTG, Date Established 06/09/17  -EM      Bed Mobility PT LTG, Time to Achieve 5 days  -EM      Bed Mobility PT LTG, Activity Type all bed mobility  -EM      Bed Mobility PT LTG, [place] Level supervision required  -EM      Transfer Training PT LTG    Transfer Training PT LTG, Date Established 06/09/17  -EM      Transfer Training PT LTG, Time to Achieve 5 days  -EM      Transfer Training PT LTG, Activity Type all transfers  -EM      Transfer Training PT LTG, [place] Level contact guard assist  -EM      Patient Education PT LTG    Patient Education PT LTG, Date Established 06/09/17  -EM      Patient Education PT LTG, Time to Achieve 5 days  -EM      Patient Education PT LTG, Education Type HEP  -EM      Patient Education PT LTG, Education " Understanding demonstrate adequately;verbalize understanding  -EM        User Key  (r) = Recorded By, (t) = Taken By, (c) = Cosigned By    Initials Name Provider Type    EM Jayda Ellington, PT Physical Therapist          Physical Therapy Education     Title: PT OT SLP Therapies (Done)     Topic: Physical Therapy (Done)     Point: Mobility training (Done)    Learning Progress Summary    Learner Readiness Method Response Comment Documented by Status   Patient Acceptance E DONTE,[facility] 06/10/17 1445 Done    Acceptance E [facility] 06/09/17 1457 Done               Point: Home exercise program (Done)    Learning Progress Summary    Learner Readiness Method Response Comment Documented by Status   Patient Acceptance E VU,[facility] 06/10/17 1445 Done               Point: Precautions (Done)    Learning Progress Summary    Learner Readiness Method Response Comment Documented by Status   Patient Acceptance E ,[facility] 06/10/17 1445 Done    Acceptance E [facility] 06/09/17 1457 Done                      User Key     Initials Effective Dates Name Provider Type [facility] 03/26/15 -  Katherine Morrell, PT Physical Therapist PT     12/01/15 -  Jayda Ellington, PT Physical Therapist PT                    PT Recommendation and Plan  Anticipated Discharge Disposition: skilled nursing facility  Planned Therapy Interventions: home exercise program, transfer training  PT Frequency: daily  Plan of Care Review  Plan Of Care Reviewed With: patient  Progress: improving  Outcome Summary/Follow up Plan: Noted improved ability to transfer bed to chair with RW with decreased assistance today, Progressing well. Good candadite for rehab.           Outcome Measures       06/10/17 1411 06/09/17 1500       How much help from another person do you currently need...    Turning from your back to your side while in flat bed without using bedrails? 4  -SC 4  -EM     Moving from lying on back to sitting on the side of a flat bed without bedrails? 3   -SC 3  -EM     Moving to and from a bed to a chair (including a wheelchair)? 3  -SC 3  -EM     Standing up from a chair using your arms (e.g., wheelchair, bedside chair)? 3  -SC 3  -EM     Climbing 3-5 steps with a railing? 2  -SC 2  -EM     To walk in hospital room? 2  -SC 2  -EM     AM-PAC 6 Clicks Score 17  -SC 17  -EM     Functional Assessment    Outcome Measure Options AM-PAC 6 Clicks Basic Mobility (PT)  -SC AM-PAC 6 Clicks Basic Mobility (PT)  -EM       User Key  (r) = Recorded By, (t) = Taken By, (c) = Cosigned By    Initials Name Provider Type    SC Katherine Morrell, PT Physical Therapist    EM Jayda Ellington PT Physical Therapist           Time Calculation:         PT Charges       06/10/17 1411          Time Calculation    Start Time 1411  -SC      Stop Time 1425  -SC      Time Calculation (min) 14 min  -SC      PT Received On 06/10/17  -SC      PT - Next Appointment 06/11/17  -SC        User Key  (r) = Recorded By, (t) = Taken By, (c) = Cosigned By    Initials Name Provider Type    SC Katherine Morrell, PT Physical Therapist          Therapy Charges for Today     Code Description Service Date Service Provider Modifiers Qty    [id number]  PT THER PROC EA 15 MIN 6/10/2017 Katherine Morrell, PT GP 1          PT G-Codes  Outcome Measure Options: AM-PAC 6 Clicks Basic Mobility (PT)    Katherine Yusuf PT  6/10/2017

## 2017-06-10 NOTE — PROGRESS NOTES
Name: Randi Martines ADMIT: 2017   : 1950  PCP: Canelo Shah MD    MRN: [id number] LOS: 10 days   AGE/SEX: 66 y.o. female  ROOM: [facility]     Subjective     HPI: 66 y.o. female status post revascularization for tissue loss.  She had a femoropopliteal with bovine carotid artery and popliteal artery angioplasty because of an outflow stenosis.  No complaints overnight.  Overall seems to be doing reasonably well.    Review of Systems    Objective     Vital Signs  Temp:  [98.1 °F (36.7 °C)-98.9 °F (37.2 °C)] 98.6 °F (37 °C)  Heart Rate:  [65-69] 66  Resp:  [20] 20  BP: ()/(35-65) 144/56         Physical Exam  Vascular: Lower left abdomen harvest site is well approximated without problems.  Surgical site in the lower leg are good.  Skin graft not evaluated and dressing remains intact.  Good Doppler signals.      Results from last 7 days  Lab Units 17  1201   WBC 10*3/mm3 9.60   HEMOGLOBIN g/dL 10.6*   PLATELETS 10*3/mm3 246     Results from last 7 days  Lab Units 17  1201   SODIUM mmol/L 137   POTASSIUM mmol/L 5.0   CHLORIDE mmol/L 99   TOTAL CO2 mmol/L 24.1   BUN mg/dL 18   CREATININE mg/dL 1.04*   GLUCOSE mg/dL 205*   Estimated Creatinine Clearance: 51.9 mL/min (by C-G formula based on Cr of 1.04).      Billin, Post Op Global      Assessment/Plan     Active Hospital Problems (** Indicates Principal Problem)    Diagnosis Date Noted   • Atherosclerotic PVD with ulceration [I73.9, L98.499] 2017      Resolved Hospital Problems    Diagnosis Date Noted Date Resolved   No resolved problems to display.        Assessment & Plan  66 y.o. female status post revascularization and skin graft for tissue loss.  We're awaiting placement.      Darrin Calles MD  06/10/17  8:57 AM  Office Number [phone]

## 2017-06-10 NOTE — PLAN OF CARE
Problem: Patient Care Overview (Adult)  Goal: Plan of Care Review  Outcome: Ongoing (interventions implemented as appropriate)    06/10/17 0434   Coping/Psychosocial Response Interventions   Plan Of Care Reviewed With patient   Patient Care Overview   Progress improving   Outcome Evaluation   Outcome Summary/Follow up Plan Noted improved ability to transfer bed to chair with RW with decreased assistance today, Progressing well. Good candadite for rehab.

## 2017-06-10 NOTE — PLAN OF CARE
Problem: Patient Care Overview (Adult)  Goal: Plan of Care Review  Outcome: Ongoing (interventions implemented as appropriate)    06/10/17 1701   Coping/Psychosocial Response Interventions   Plan Of Care Reviewed With patient   Patient Care Overview   Progress improving   Outcome Evaluation   Outcome Summary/Follow up Plan pt vss stable. activity increased. pt up to chair. possible d/c monday if precert obtained.        Goal: Adult Individualization and Mutuality  Outcome: Ongoing (interventions implemented as appropriate)    06/02/17 0548   Individualization   Patient Specific Goals pain control, healing of wound l foot   Patient Specific Interventions neurovasc checks, pain control        Goal: Discharge Needs Assessment  Outcome: Ongoing (interventions implemented as appropriate)    06/10/17 1701   Discharge Needs Assessment   Concerns To Be Addressed basic needs concerns   Discharge Disposition still a patient         Problem: Tissue Perfusion, Ineffective Peripheral (Adult)  Goal: Identify Related Risk Factors and Signs and Symptoms  Outcome: Ongoing (interventions implemented as appropriate)    06/10/17 1701   Tissue Perfusion, Ineffective Peripheral   Tissue Perfusion, Ineffective Peripheral: Related Risk Factors disease process;diabetes mellitus;sedentary lifestyle   Signs and Symptoms (Ineffective Peripheral Tissue Perfusion) aching pain;skin ulcerations       Goal: Adequate Tissue Perfusion  Outcome: Ongoing (interventions implemented as appropriate)    06/10/17 1701   Tissue Perfusion, Ineffective Peripheral (Adult)   Adequate Tissue Perfusion making progress toward outcome

## 2017-06-10 NOTE — PROGRESS NOTES
Continued Stay Note  [facility]     Patient Name: Randi Martines  MRN: [id number]  Today's Date: 6/10/2017    Admit Date: 5/31/2017          Discharge Plan       06/10/17 [facility]    Case Management/Social Work Plan    Plan [facility], await pre-cert from [employer].............Garett HUFFMAN     Patient/Family In Agreement With Plan yes    Additional Comments Call from ARMIDA Bradford requesting update on status of Pre-cert. S/W [facility], patient did not choose [facility] so they signed off. Review of notes and therapy eval from 1500. Call to  who worked with patient yesterday, stated pre-cert started for [facility] but pending. Call to [facility] and  left for Areli Michelle and Weekend Case Management Team, await call back. Call to [facility] and S/W House Supervisor Mercedes who states pre-cert not yet obtained so they are unable to accept patient this weekend. RN updated that pre-cert pending and patient unable to DC before Monday. Samara Langston/Director Patient Mangement Services aware of situation as well..............Garett RN               Discharge Codes     None        Expected Discharge Date and Time     Expected Discharge Date Expected Discharge Time    Jun 9, 2017             Marisel De Los Santos, ARMIDA

## 2017-06-11 LAB
GLUCOSE BLDC GLUCOMTR-MCNC: 105 MG/DL (ref 70–130)
GLUCOSE BLDC GLUCOMTR-MCNC: 153 MG/DL (ref 70–130)
GLUCOSE BLDC GLUCOMTR-MCNC: 161 MG/DL (ref 70–130)
GLUCOSE BLDC GLUCOMTR-MCNC: 239 MG/DL (ref 70–130)

## 2017-06-11 PROCEDURE — 97110 THERAPEUTIC EXERCISES: CPT | Performed by: PHYSICAL THERAPIST

## 2017-06-11 PROCEDURE — 25010000002 ENOXAPARIN PER 10 MG: Performed by: SURGERY

## 2017-06-11 PROCEDURE — 25010000002 MORPHINE PER 10 MG: Performed by: PLASTIC SURGERY

## 2017-06-11 PROCEDURE — 82962 GLUCOSE BLOOD TEST: CPT

## 2017-06-11 RX ORDER — OXYCODONE AND ACETAMINOPHEN 7.5; 325 MG/1; MG/1
1 TABLET ORAL EVERY 4 HOURS PRN
Status: DISCONTINUED | OUTPATIENT
Start: 2017-06-11 | End: 2017-06-13 | Stop reason: HOSPADM

## 2017-06-11 RX ADMIN — BISACODYL 10 MG: 5 TABLET, COATED ORAL at 14:07

## 2017-06-11 RX ADMIN — ASPIRIN 81 MG: 81 TABLET ORAL at 08:52

## 2017-06-11 RX ADMIN — SUCRALFATE 1 G: 1 TABLET ORAL at 08:52

## 2017-06-11 RX ADMIN — OXYCODONE HYDROCHLORIDE AND ACETAMINOPHEN 1 TABLET: 7.5; 325 TABLET ORAL at 08:53

## 2017-06-11 RX ADMIN — SUCRALFATE 1 G: 1 TABLET ORAL at 20:47

## 2017-06-11 RX ADMIN — METOCLOPRAMIDE 5 MG: 5 TABLET ORAL at 08:52

## 2017-06-11 RX ADMIN — SULFAMETHOXAZOLE AND TRIMETHOPRIM 160 MG: 800; 160 TABLET ORAL at 18:38

## 2017-06-11 RX ADMIN — GABAPENTIN 1200 MG: 400 CAPSULE ORAL at 20:47

## 2017-06-11 RX ADMIN — SUCRALFATE 1 G: 1 TABLET ORAL at 11:56

## 2017-06-11 RX ADMIN — ENOXAPARIN SODIUM 30 MG: 30 INJECTION SUBCUTANEOUS at 11:56

## 2017-06-11 RX ADMIN — SUCRALFATE 1 G: 1 TABLET ORAL at 18:38

## 2017-06-11 RX ADMIN — CLOPIDOGREL 75 MG: 75 TABLET, FILM COATED ORAL at 08:52

## 2017-06-11 RX ADMIN — OXYCODONE HYDROCHLORIDE AND ACETAMINOPHEN 1 TABLET: 7.5; 325 TABLET ORAL at 20:46

## 2017-06-11 RX ADMIN — PANTOPRAZOLE SODIUM 40 MG: 40 TABLET, DELAYED RELEASE ORAL at 08:52

## 2017-06-11 RX ADMIN — PANTOPRAZOLE SODIUM 40 MG: 40 TABLET, DELAYED RELEASE ORAL at 18:38

## 2017-06-11 RX ADMIN — MORPHINE SULFATE 4 MG: 4 INJECTION, SOLUTION INTRAMUSCULAR; INTRAVENOUS at 23:21

## 2017-06-11 RX ADMIN — BISACODYL 10 MG: 10 SUPPOSITORY RECTAL at 18:39

## 2017-06-11 RX ADMIN — SULFAMETHOXAZOLE AND TRIMETHOPRIM 160 MG: 800; 160 TABLET ORAL at 08:52

## 2017-06-11 RX ADMIN — OXYCODONE HYDROCHLORIDE AND ACETAMINOPHEN 1 TABLET: 7.5; 325 TABLET ORAL at 14:05

## 2017-06-11 RX ADMIN — Medication 400 MG: at 08:52

## 2017-06-11 RX ADMIN — INSULIN DETEMIR 40 UNITS: 100 INJECTION, SOLUTION SUBCUTANEOUS at 20:43

## 2017-06-11 RX ADMIN — ROSUVASTATIN CALCIUM 40 MG: 40 TABLET, FILM COATED ORAL at 20:47

## 2017-06-11 NOTE — PLAN OF CARE
Problem: Patient Care Overview (Adult)  Goal: Plan of Care Review  Outcome: Ongoing (interventions implemented as appropriate)    06/11/17 1115   Coping/Psychosocial Response Interventions   Plan Of Care Reviewed With patient   Patient Care Overview   Progress improving   Outcome Evaluation   Outcome Summary/Follow up Plan Noted requiring SBA today for transfers, progressing well.

## 2017-06-11 NOTE — PROGRESS NOTES
Name: Randi Martines ADMIT: 2017   : 1950  PCP: Canelo Shah MD    MRN: [id number] LOS: 11 days   AGE/SEX: 66 y.o. female  ROOM: [facility]     Subjective     HPI: 66 y.o. female status post revascularization and split-thickness skin graft for tissue loss    Review of Systems    Objective     Vital Signs  Temp:  [98.1 °F (36.7 °C)-99.5 °F (37.5 °C)] 98.1 °F (36.7 °C)  Heart Rate:  [61-71] 71  Resp:  [16-18] 16  BP: ()/(42-65) 98/50    I/O this shift:  In: 240 [P.O.:240]  Out: -     Physical Exam  Vascular: Surgical site in the groin and the above-knee popliteal location are dry without any evidence of erythema or infection.  Split thickness skin graft dressed        Estimated Creatinine Clearance: 51.9 mL/min (by C-G formula based on Cr of 1.04).      Billin, Post Op Global      Assessment/Plan     Active Hospital Problems (** Indicates Principal Problem)    Diagnosis Date Noted   • Atherosclerotic PVD with ulceration [I73.9, L98.499] 2017      Resolved Hospital Problems    Diagnosis Date Noted Date Resolved   No resolved problems to display.        Assessment & Plan  66 y.o. female status post femoropopliteal bypass to the above-knee popliteal artery with bovine carotid artery.  Patient also had a popliteal artery angioplasty to improve outflow.  Additionally, she has split-thickness skin graft by plastic surgery for tissue loss.  That surgery was done on Wednesday.    Patient is overall doing well.  We were unable to get precertification for nursing home placement this weekend and hopefully we'll be able to do so on Monday.    We will need to discuss with Dr. Waterhouse if she wants to remove dressings prior to discharge or if the patient going to come to our office.      Darrin Calles MD  17  10:42 AM  Office Number [phone]

## 2017-06-11 NOTE — THERAPY TREATMENT NOTE
Acute Care - Physical Therapy Treatment Note  [facility]     Patient Name: Randi Martines  : 1950  MRN: [id number]  Today's Date: 2017  Onset of Illness/Injury or Date of Surgery Date: 17     Referring Physician: Iliana    Admit Date: 2017    Visit Dx:    ICD-10-CM ICD-9-CM   1. Generalized weakness R53.1 780.79     Patient Active Problem List   Diagnosis   • Abdominal pain   • Intractable abdominal pain   • Essential hypertension   • DM (diabetes mellitus), type 2 with neurological complications   • PVD (peripheral vascular disease)   • Mesenteric ischemia   • Acute gastric ulcer   • S/P CABG (coronary artery bypass graft)   • Coronary artery disease involving native coronary artery of native heart without angina pectoris   • Atherosclerotic PVD with ulceration               Adult Rehabilitation Note       17 1050 06/10/17 1411       Rehab Assessment/Intervention    Discipline physical therapist  -SC physical therapist  -SC     Document Type therapy note (daily note)  -SC therapy note (daily note)  -SC     Subjective Information agree to therapy;no complaints  -SC agree to therapy;no complaints  -SC     Patient Effort, Rehab Treatment good  -SC good  -SC     Symptoms Noted During/After Treatment none  -SC none  -SC     Precautions/Limitations fall precautions;non-weight bearing status  -SC non-weight bearing status  -SC     Recorded by [SC] Katherine Morrell PT [SC] Katherine Morrell PT     Pain Assessment    Pain Assessment No/denies pain  -SC No/denies pain  -SC     Recorded by [SC] Katherine Morrell PT [SC] Katherine Morrell PT     Vision Assessment/Intervention    Visual Impairment  WFL with corrective lenses   legally blind  -SC     Recorded by  [SC] Katherine Morrell PT     Cognitive Assessment/Intervention    Current Cognitive/Communication Assessment functional  -SC functional  -SC     Orientation Status oriented x 4  -SC oriented x 4  -SC     Follows  "Commands/Answers Questions 100% of the time  -% of the time  -SC     Personal Safety WNL/WFL  -SC WNL/WFL  -SC     Recorded by [SC] Katherine Morrell PT [SC] Katherine Morrell PT     Mobility Assessment/Training    Extremity Weight-Bearing Status  left upper extremity  -SC     Left Upper Extremity Weight-Bearing  non weight-bearing  -SC     Recorded by  [SC] Katherine Morrell PT     Bed Mobility, Assessment/Treatment    Bed Mob, Supine to Sit, [place] verbal cues required;supervision required  -SC verbal cues required;supervision required  -SC     Recorded by [SC] Katherine Morrell PT [SC] Katherine Morrell PT     Transfer Assessment/Treatment    Transfers, Bed-Chair [place] set up required;contact guard assist  -SC contact guard assist;verbal cues required;set up required  -SC     Transfers, Bed-Chair-Bed, Assist Device rolling walker  -SC rolling walker  -SC     Transfers, Sit-Stand [place] set up required;stand by assist  -SC contact guard assist;set up required;verbal cues required  -SC     Transfer, Comment does not \"hop\", scoots right foot along floor, maintains NWB during tsf   -SC does not \"hop\", scoots right foot along floor, maintains NWB during tsf   -SC     Recorded by [SC] Katherine Morrell PT [SC] Katherine Morrell PT     Positioning and Restraints    Pre-Treatment Position in bed  -SC in bed  -SC     Post Treatment Position chair  -SC chair  -SC     In Chair sitting;call light within reach;encouraged to call for assist;legs elevated  -SC reclined;sitting;call light within reach;encouraged to call for assist;notified nsg  -SC     Recorded by [SC] Katherine Morrell PT [SC] Katherine Morrell PT       User Key  (r) = Recorded By, (t) = Taken By, (c) = Cosigned By    Initials Name Effective Dates    SC Katherine Morrell PT 03/26/15 -                 IP PT Goals       06/09/17 1458          Bed Mobility PT LTG    Bed Mobility PT LTG, Date Established 06/09/17  -EM   "    Bed Mobility PT LTG, Time to Achieve 5 days  -EM      Bed Mobility PT LTG, Activity Type all bed mobility  -EM      Bed Mobility PT LTG, [place] Level supervision required  -EM      Transfer Training PT LTG    Transfer Training PT LTG, Date Established 06/09/17  -EM      Transfer Training PT LTG, Time to Achieve 5 days  -EM      Transfer Training PT LTG, Activity Type all transfers  -EM      Transfer Training PT LTG, [place] Level contact guard assist  -EM      Patient Education PT LTG    Patient Education PT LTG, Date Established 06/09/17  -EM      Patient Education PT LTG, Time to Achieve 5 days  -EM      Patient Education PT LTG, Education Type HEP  -EM      Patient Education PT LTG, Education Understanding demonstrate adequately;verbalize understanding  -EM        User Key  (r) = Recorded By, (t) = Taken By, (c) = Cosigned By    Initials Name Provider Type    EM Jayda Ellington, PT Physical Therapist          Physical Therapy Education     Title: PT OT SLP Therapies (Done)     Topic: Physical Therapy (Done)     Point: Mobility training (Done)    Learning Progress Summary    Learner Readiness Method Response Comment Documented by Status   Patient Acceptance E VU,SHAMAR  SC 06/11/17 1115 Done    Acceptance E VU,DU  SC 06/10/17 1445 Done    Acceptance E VU  EM 06/09/17 1457 Done               Point: Home exercise program (Done)    Learning Progress Summary    Learner Readiness Method Response Comment Documented by Status   Patient Acceptance E VU,SHAMAR  SC 06/11/17 1115 Done    Acceptance E VU,DU  SC 06/10/17 1445 Done               Point: Precautions (Done)    Learning Progress Summary    Learner Readiness Method Response Comment Documented by Status   Patient Acceptance E VU,SHAMAR  SC 06/11/17 1115 Done    Acceptance E VU,[facility] 06/10/17 1445 Done    Acceptance E VU  EM 06/09/17 1457 Done                      User Key     Initials Effective Dates Name Provider Type [facility] 03/26/15 Dayne HEWITT  Porsche, PT Physical Therapist PT    EM 12/01/15 -  Jayda Ellington, PT Physical Therapist PT                    PT Recommendation and Plan  Anticipated Discharge Disposition: skilled nursing facility  Planned Therapy Interventions: home exercise program, transfer training  PT Frequency: daily  Plan of Care Review  Plan Of Care Reviewed With: patient  Progress: improving  Outcome Summary/Follow up Plan: Noted requiring SBA today for transfers, progressing well.           Outcome Measures       06/11/17 1050 06/10/17 1411 06/09/17 1500    How much help from another person do you currently need...    Turning from your back to your side while in flat bed without using bedrails? 4  -SC 4  -SC 4  -EM    Moving from lying on back to sitting on the side of a flat bed without bedrails? 3  -SC 3  -SC 3  -EM    Moving to and from a bed to a chair (including a wheelchair)? 3  -SC 3  -SC 3  -EM    Standing up from a chair using your arms (e.g., wheelchair, bedside chair)? 3  -SC 3  -SC 3  -EM    Climbing 3-5 steps with a railing? 2  -SC 2  -SC 2  -EM    To walk in hospital room? 2  -SC 2  -SC 2  -EM    AM-PAC 6 Clicks Score 17  -SC 17  -SC 17  -EM    Functional Assessment    Outcome Measure Options AM-PAC 6 Clicks Basic Mobility (PT)  -SC AM-PAC 6 Clicks Basic Mobility (PT)  -SC AM-PAC 6 Clicks Basic Mobility (PT)  -EM      User Key  (r) = Recorded By, (t) = Taken By, (c) = Cosigned By    Initials Name Provider Type    SC Katherine Morrell, PT Physical Therapist    EM Jayda Ellington, PT Physical Therapist           Time Calculation:         PT Charges       06/11/17 1050          Time Calculation    Start Time 1050  -SC      Stop Time 1110  -SC      Time Calculation (min) 20 min  -SC      PT Received On 06/11/17  -SC      PT - Next Appointment 06/12/17  -SC        User Key  (r) = Recorded By, (t) = Taken By, (c) = Cosigned By    Initials Name Provider Type    SONYA Morrell, PT Physical Therapist           Therapy Charges for Today     Code Description Service Date Service Provider Modifiers Qty    42564899891 HC PT THER PROC EA 15 MIN 6/10/2017 Katherine Morrell, PT GP 1    23817830079 HC PT THER PROC EA 15 MIN 6/11/2017 Katherine Morrell, PT GP 1          PT G-Codes  Outcome Measure Options: AM-PAC 6 Clicks Basic Mobility (PT)    Katherine Yusuf, PT  6/11/2017

## 2017-06-12 VITALS
OXYGEN SATURATION: 99 % | WEIGHT: 187 LBS | HEART RATE: 59 BPM | SYSTOLIC BLOOD PRESSURE: 107 MMHG | HEIGHT: 61 IN | BODY MASS INDEX: 35.3 KG/M2 | TEMPERATURE: 98.7 F | DIASTOLIC BLOOD PRESSURE: 48 MMHG | RESPIRATION RATE: 18 BRPM

## 2017-06-12 LAB
GLUCOSE BLDC GLUCOMTR-MCNC: 120 MG/DL (ref 70–130)
GLUCOSE BLDC GLUCOMTR-MCNC: 151 MG/DL (ref 70–130)
GLUCOSE BLDC GLUCOMTR-MCNC: 213 MG/DL (ref 70–130)
GLUCOSE BLDC GLUCOMTR-MCNC: 75 MG/DL (ref 70–130)

## 2017-06-12 PROCEDURE — 82962 GLUCOSE BLOOD TEST: CPT

## 2017-06-12 RX ORDER — HYDROCODONE BITARTRATE AND ACETAMINOPHEN 7.5; 325 MG/1; MG/1
1 TABLET ORAL EVERY 4 HOURS PRN
Qty: 30 TABLET | Refills: 0 | Status: SHIPPED | OUTPATIENT
Start: 2017-06-12 | End: 2017-06-17

## 2017-06-12 RX ADMIN — ASPIRIN 81 MG: 81 TABLET ORAL at 09:49

## 2017-06-12 RX ADMIN — SUCRALFATE 1 G: 1 TABLET ORAL at 09:49

## 2017-06-12 RX ADMIN — PANTOPRAZOLE SODIUM 40 MG: 40 TABLET, DELAYED RELEASE ORAL at 18:38

## 2017-06-12 RX ADMIN — PANTOPRAZOLE SODIUM 40 MG: 40 TABLET, DELAYED RELEASE ORAL at 09:52

## 2017-06-12 RX ADMIN — SUCRALFATE 1 G: 1 TABLET ORAL at 18:37

## 2017-06-12 RX ADMIN — SUCRALFATE 1 G: 1 TABLET ORAL at 20:22

## 2017-06-12 RX ADMIN — SULFAMETHOXAZOLE AND TRIMETHOPRIM 160 MG: 800; 160 TABLET ORAL at 18:37

## 2017-06-12 RX ADMIN — CLOPIDOGREL 75 MG: 75 TABLET, FILM COATED ORAL at 09:49

## 2017-06-12 RX ADMIN — OXYCODONE HYDROCHLORIDE AND ACETAMINOPHEN 1 TABLET: 7.5; 325 TABLET ORAL at 09:53

## 2017-06-12 RX ADMIN — ROSUVASTATIN CALCIUM 40 MG: 40 TABLET, FILM COATED ORAL at 20:22

## 2017-06-12 RX ADMIN — OXYCODONE HYDROCHLORIDE AND ACETAMINOPHEN 1 TABLET: 7.5; 325 TABLET ORAL at 18:37

## 2017-06-12 RX ADMIN — SULFAMETHOXAZOLE AND TRIMETHOPRIM 160 MG: 800; 160 TABLET ORAL at 09:49

## 2017-06-12 RX ADMIN — METOCLOPRAMIDE 5 MG: 5 TABLET ORAL at 09:49

## 2017-06-12 RX ADMIN — Medication 400 MG: at 09:49

## 2017-06-12 RX ADMIN — INSULIN DETEMIR 40 UNITS: 100 INJECTION, SOLUTION SUBCUTANEOUS at 20:27

## 2017-06-12 RX ADMIN — OXYCODONE HYDROCHLORIDE AND ACETAMINOPHEN 1 TABLET: 7.5; 325 TABLET ORAL at 14:50

## 2017-06-12 RX ADMIN — GABAPENTIN 1200 MG: 400 CAPSULE ORAL at 20:21

## 2017-06-12 NOTE — NURSING NOTE
Dr. Waterhouse will be over after office rounds to change the dressing before being discharged to [facility].

## 2017-06-12 NOTE — PLAN OF CARE
Problem: Inpatient Physical Therapy  Goal: Bed Mobility Goal LTG- PT  Outcome: Outcome(s) achieved Date Met:  06/12/17 06/12/17 1519   Bed Mobility PT LTG   Bed Mobility PT LTG, Activity Type all bed mobility   Bed Mobility PT LTG, [place] Level supervision required   Bed Mobility PT LTG, Outcome goal met       Goal: Transfer Training Goal 1 LTG- PT  Outcome: Outcome(s) achieved Date Met:  06/12/17 06/12/17 1519   Transfer Training PT LTG   Transfer Training PT LTG, Activity Type all transfers   Transfer Training PT LTG, [place] Level contact guard assist   Transfer Training PT LTG, Assist Device walker, rolling   Transfer Training PT LTG, Outcome goal met       Goal: Patient Education Goal LTG- PT  Outcome: Unable to achieve outcome(s) by discharge Date Met:  06/12/17 06/12/17 1519   Patient Education PT LTG   Patient Education PT LTG Outcome goal not met   Patient Education PT LTG, Reason Goal Not Met discharged from facility

## 2017-06-12 NOTE — DISCHARGE INSTR - ACTIVITY
Dressing Change on left foot every other day with zeroform or adaptic non stick dressing , gauze foam and ace wrap.  Pt may weight bear on left for bathroom and to shower. Can sit in shower chair and keep leg out    Incision Care: paint incision with betadine daily to the left groin and left thigh.

## 2017-06-12 NOTE — PROGRESS NOTES
Continued Stay Note  [facility]     Patient Name: Randi Martines  MRN: [id number]  Today's Date: 6/12/2017    Admit Date: 5/31/2017          Discharge Plan       06/12/17 1143    Case Management/Social Work Plan    Plan [facility] Pre[facility] done    Additional Comments Per Samira Barron has obtained pre-cert.  Report # [phone] and fax# [phone]....................Josefa Conde RN      06/12/17 0910    Case Management/Social Work Plan    Plan [facility], pre-cert pending [employer]    Additional Comments Faxed latest PT and progress notes to [facility] per Samira Mirza request fax #[phone].  Awaiting pre-cert...................................E ARMIDA Conde              Discharge Codes     None        Expected Discharge Date and Time     Expected Discharge Date Expected Discharge Time    Jun 12, 2017             Josefa Conde RN

## 2017-06-12 NOTE — PLAN OF CARE
Problem: Patient Care Overview (Adult)  Goal: Plan of Care Review  Outcome: Ongoing (interventions implemented as appropriate)    06/12/17 0823   Coping/Psychosocial Response Interventions   Plan Of Care Reviewed With patient   Patient Care Overview   Progress improving   Outcome Evaluation   Outcome Summary/Follow up Plan dressing dry and intact to left foot complains of pain medicated with relief

## 2017-06-12 NOTE — PROGRESS NOTES
[facility]    Physicians Statement of Medical Necessity for Ambulance Transportation    It is medically necessary for:    Patient Name: Randi Martines    Insurance Information:      To be transported by ambulance:    From (if nursing facility, specify level of care: skilled, jail, etc): [facility]  To (specify level of care if nursing facility):[facility] skilled rehab    Date of Service:6/12/2017      For dialysis patients state date dialysis began:     Diagnosis: PVD atherosclerotic with ulceration    Past Medical/Surgical History:  Past Medical History:   Diagnosis Date   • CAD (coronary artery disease)    • Chronic kidney disease    • Chronic systolic congestive heart failure    • Diabetes mellitus    • Dyslipidemia    • Foot ulcer    • History of diverticulitis    • History of stomach ulcers    • History of transfusion    • Hyperlipemia    • Hypertension    • Peptic ulcer disease    • PONV (postoperative nausea and vomiting)    • PVD (peripheral vascular disease)       Past Surgical History:   Procedure Laterality Date   • APPENDECTOMY  2015   • CATARACT EXTRACTION     • CHOLECYSTECTOMY     • COLONOSCOPY  2015   • CORONARY ARTERY BYPASS GRAFT      7 coronary bypass surgery   • ENDOSCOPY N/A 11/23/2016    Procedure: ESOPHAGOGASTRODUODENOSCOPY ;  Surgeon: Katherine Green MD;  Location: [facility] ENDOSCOPY;  Service:    • HERNIA REPAIR  2015   • HYSTERECTOMY     • IN VEIN IN SITU BYPASS GRAFT,FEM-POP Left 5/31/2017    Procedure: ULTRASOUND RIGHT AIF, ARTERIOGRAM W/ LEFT COMMON ILIAC ANGIOPLASTY STENT, LT FEMORAL POPLITEAL BYPASS WITH ARTEGRAFT, LEFT POPLITEAL ENDARTERECTOMY PATCH, LEFT COMMON ARTERIOGRAM, LEFT EXTERNAL ILIAC ANGIOPLASTY STENT PLACEMENT, AORTA,  EXTERNAL, DISTAL PRESSURES;  Surgeon: Jayda Barrientos MD;  Location: [facility] HYBRID OR 18/19;  Service: Vascular   • SKIN GRAFT SPLIT THICKNESS Left 6/7/2017    Procedure: DEBRIDEMENT AND SKIN GRAFT TO  LEFT  DORSAL FOOT AND TOES;  Surgeon: Maurine Waterhouse, MD;  Location: [facility] OR;  Service:    • UPPER GASTROINTESTINAL ENDOSCOPY  2015    scar in gastric body, gastric ulcers        Current Objective Medical Evidence(including physical exam finding to support reason for limitations):        Other: needs to elevate LLE, NWB LLE    Physician Signature:           (RN,NP,PA,CAN, Discharge Planner) Josefa Conde RN   Date/Time: 6/12/2017  11:54 AM       Printed Name:    __________________________________    [facility]   Phone: [phone] Phone: [phone] Phone: [phone]   Fax: [phone] Fax: [phone] Fax: [phone]

## 2017-06-12 NOTE — PROGRESS NOTES
Continued Stay Note  [facility]     Patient Name: Randi Martines  MRN: [id number]  Today's Date: 6/12/2017    Admit Date: 5/31/2017          Discharge Plan       06/12/17 1644    Case Management/Social Work Plan    Plan [facility] Pre[facility]cert obtained    Additional Comments Spoke with Nadeen/ Hermes to inform DC held up waiting for MD to round for dressing change.  Nursing was informed of need for neurontin script.  DC summary and Hydocodone script was faxed to [phone].  Nursing will arrange ambulance.  OK to dc tonight to [facility] and they can accept...........................Josefa Conde RN              Discharge Codes     None        Expected Discharge Date and Time     Expected Discharge Date Expected Discharge Time    Jun 12, 2017             Josefa Conde RN

## 2017-06-12 NOTE — PROGRESS NOTES
Stable today.  We'll discuss with plastic surgery on whether they want to do dressing change prior to transfer.  We'll go ahead and set up transferred to subacute nursing facility in case a bed is available.

## 2017-06-12 NOTE — THERAPY DISCHARGE NOTE
Acute Care - Physical Therapy Treatment Note/Discharge  [facility]     Patient Name: Randi Martines  : 1950  MRN: [id number]  Today's Date: 2017  Onset of Illness/Injury or Date of Surgery Date: 17     Referring Physician: Iliana    Admit Date: 2017    Visit Dx:    ICD-10-CM ICD-9-CM   1. Generalized weakness R53.1 780.79     Patient Active Problem List   Diagnosis   • Abdominal pain   • Intractable abdominal pain   • Essential hypertension   • DM (diabetes mellitus), type 2 with neurological complications   • PVD (peripheral vascular disease)   • Mesenteric ischemia   • Acute gastric ulcer   • S/P CABG (coronary artery bypass graft)   • Coronary artery disease involving native coronary artery of native heart without angina pectoris   • Atherosclerotic PVD with ulceration       Physical Therapy Education     Title: PT OT SLP Therapies (Resolved)     Topic: Physical Therapy (Resolved)     Point: Mobility training (Resolved)    Learning Progress Summary    Learner Readiness Method Response Comment Documented by Status   Patient Acceptance E VU,DU  SC 17 1115 Done    Acceptance E VU,[facility] 06/10/17 1445 Done    Acceptance E VU  EM 17 1457 Done               Point: Home exercise program (Resolved)    Learning Progress Summary    Learner Readiness Method Response Comment Documented by Status   Patient Acceptance E VU,DU  SC 17 1115 Done    Acceptance E VU,[facility] 06/10/17 1445 Done               Point: Precautions (Resolved)    Learning Progress Summary    Learner Readiness Method Response Comment Documented by Status   Patient Acceptance E VU,DU  SC 17 1115 Done    Acceptance E VU,[facility] 06/10/17 1445 Done    Acceptance E VU  EM 17 1457 Done                      User Key     Initials Effective Dates Name Provider Type Discipline    SC 03/26/15 -  Katherine Morrell, PT Physical Therapist PT    EM 12/01/15 -  Jayad Ellington, PT Physical Therapist PT                     IP PT Goals       06/12/17 1519 06/09/17 1458       Bed Mobility PT LTG    Bed Mobility PT LTG, Date Established  06/09/17  -EM     Bed Mobility PT LTG, Time to Achieve  5 days  -EM     Bed Mobility PT LTG, Activity Type all bed mobility  -RW all bed mobility  -EM     Bed Mobility PT LTG, [place] Level supervision required  -RW supervision required  -EM     Bed Mobility PT LTG, Outcome goal met  -RW      Transfer Training PT LTG    Transfer Training PT LTG, Date Established  06/09/17  -EM     Transfer Training PT LTG, Time to Achieve  5 days  -EM     Transfer Training PT LTG, Activity Type all transfers  -RW all transfers  -EM     Transfer Training PT LTG, [place] Level contact guard assist  -RW contact guard assist  -EM     Transfer Training PT LTG, Assist Device walker, rolling  -RW      Transfer Training PT LTG, Outcome goal met  -RW      Patient Education PT LTG    Patient Education PT LTG, Date Established  06/09/17  -EM     Patient Education PT LTG, Time to Achieve  5 days  -EM     Patient Education PT LTG, Education Type  HEP  -EM     Patient Education PT LTG, Education Understanding  demonstrate adequately;verbalize understanding  -EM     Patient Education PT LTG Outcome goal not met  -RW      Patient Education PT LTG, Reason Goal Not Met discharged from facility  -RW        User Key  (r) = Recorded By, (t) = Taken By, (c) = Cosigned By    Initials Name Provider Type    EM Jayda Ellington, PT Physical Therapist    MITCH Tejada, PTA Physical Therapy Assistant              Adult Rehabilitation Note       06/11/17 1050 06/10/17 1411       Rehab Assessment/Intervention    Discipline physical therapist  -SC physical therapist  -SC     Document Type therapy note (daily note)  -SC therapy note (daily note)  -SC     Subjective Information agree to therapy;no complaints  -SC agree to therapy;no complaints  -SC     Patient Effort, Rehab Treatment good  -SC good  -SC     Symptoms Noted  "During/After Treatment none  -SC none  -SC     Precautions/Limitations fall precautions;non-weight bearing status  -SC non-weight bearing status  -SC     Recorded by [SC] Katherine Morrell, PT [SC] Katherine Morrell PT     Pain Assessment    Pain Assessment No/denies pain  -SC No/denies pain  -SC     Recorded by [SC] Katherine Morrell PT [SC] Katherine Morrell PT     Vision Assessment/Intervention    Visual Impairment  WFL with corrective lenses   legally blind  -SC     Recorded by  [SC] Katherine Morrell PT     Cognitive Assessment/Intervention    Current Cognitive/Communication Assessment functional  -SC functional  -SC     Orientation Status oriented x 4  -SC oriented x 4  -SC     Follows Commands/Answers Questions 100% of the time  -% of the time  -SC     Personal Safety WNL/WFL  -SC WNL/WFL  -SC     Recorded by [SC] Katherine Morrell PT [SC] Katherine Morrell PT     Mobility Assessment/Training    Extremity Weight-Bearing Status  left upper extremity  -SC     Left Upper Extremity Weight-Bearing  non weight-bearing  -SC     Recorded by  [SC] Katherine Morrell PT     Bed Mobility, Assessment/Treatment    Bed Mob, Supine to Sit, [place] verbal cues required;supervision required  -SC verbal cues required;supervision required  -SC     Recorded by [SC] Katherine Morrell PT [SC] Katherine Morrell PT     Transfer Assessment/Treatment    Transfers, Bed-Chair [place] set up required;contact guard assist  -SC contact guard assist;verbal cues required;set up required  -SC     Transfers, Bed-Chair-Bed, Assist Device rolling walker  -SC rolling walker  -SC     Transfers, Sit-Stand [place] set up required;stand by assist  -SC contact guard assist;set up required;verbal cues required  -SC     Transfer, Comment does not \"hop\", scoots right foot along floor, maintains NWB during tsf   -SC does not \"hop\", scoots right foot along floor, maintains NWB during tsf   -SC     Recorded by [SC] " Katherine Morrell PT [SC] Katherine Morrell PT     Positioning and Restraints    Pre-Treatment Position in bed  -SC in bed  -SC     Post Treatment Position chair  -SC chair  -SC     In Chair sitting;call light within reach;encouraged to call for assist;legs elevated  -SC reclined;sitting;call light within reach;encouraged to call for assist;notified nsg  -SC     Recorded by [SC] Katherine Morrell PT [SC] Katherine Morrell PT       User Key  (r) = Recorded By, (t) = Taken By, (c) = Cosigned By    Initials Name Effective Dates    SONYA Morrell PT 03/26/15 -           PT Recommendation and Plan  Anticipated Discharge Disposition: skilled nursing facility  Planned Therapy Interventions: home exercise program, transfer training  PT Frequency: daily             Outcome Measures       06/11/17 1050 06/10/17 1411       How much help from another person do you currently need...    Turning from your back to your side while in flat bed without using bedrails? 4  -SC 4  -SC     Moving from lying on back to sitting on the side of a flat bed without bedrails? 3  -SC 3  -SC     Moving to and from a bed to a chair (including a wheelchair)? 3  -SC 3  -SC     Standing up from a chair using your arms (e.g., wheelchair, bedside chair)? 3  -SC 3  -SC     Climbing 3-5 steps with a railing? 2  -SC 2  -SC     To walk in hospital room? 2  -SC 2  -SC     AM-PAC 6 Clicks Score 17  -SC 17  -SC     Functional Assessment    Outcome Measure Options AM-PAC 6 Clicks Basic Mobility (PT)  -SC AM-PAC 6 Clicks Basic Mobility (PT)  -SC       User Key  (r) = Recorded By, (t) = Taken By, (c) = Cosigned By    Initials Name Provider Type    SONYA Morrell PT Physical Therapist           Time Calculation:           PT G-Codes  Outcome Measure Options: AM-PAC 6 Clicks Basic Mobility (PT)    PT Discharge Summary  Anticipated Discharge Disposition: skilled nursing facility  Reason for Discharge: Discharge from facility  Outcomes  Achieved: Refer to plan of care for updates on goals achieved  Discharge Destination: SNF    Iliana Tejada, PTA  6/12/2017

## 2017-06-12 NOTE — PROGRESS NOTES
Continued Stay Note  [facility]     Patient Name: Randi Martines  MRN: [id number]  Today's Date: 6/12/2017    Admit Date: 5/31/2017          Discharge Plan       06/12/17 0910    Case Management/Social Work Plan    Plan [facility]cert pending [employer]    Additional Comments Faxed latest PT and progress notes to [facility] per Nadeen/ Hermes request fax #[phone].  Awaiting pre-cert...................................E ARMIDA Conde              Discharge Codes     None        Expected Discharge Date and Time     Expected Discharge Date Expected Discharge Time    Jun 12, 2017             Josefa Conde RN

## 2017-06-12 NOTE — DISCHARGE INSTRUCTIONS
[facility]  Aftab Palomino, Elsa Fletcher Rachel, Scherrer, Thomas  [address]  [place] [address]  [phone]      Discharge Instructions for Angiogram    1. Go home, rest and take it easy today.      2. You may experience some dizziness or memory loss from the anesthesia.  This may last for the next 24 hours.  Someone should plan on staying with you for the first 24 hours for your safety.    3. Do not make any important legal decisions or sign any legal papers for the next 24 hours.      4. Eat and drink lightly today.  Start off with liquids, jello, soup, crackers or other bland foods at first. You may advance your diet tomorrow as tolerated as long as you do not experience any nausea or vomiting.    5. You may resume routine medications including blood thinners. However, Glucophage should be not be started for 72 hours after the dye is given.      6. No lifting over 10 pounds and no strenuous activity for the next 2-3 days.    7. Try not to strain or bear down when your bowels move or when you empty your bladder.    8. Limit going up and down steps for 2 days.    9. No driving for the remainder of the day.  You may resume limited driving tomorrow if necessary.     10.  You may shower tomorrow.  No bath or hot tubs for at least 2 days after the procedure.          11. Leave the pressure dressing on until tomorrow morning.  You may then take this off, as well as the small see through dressing with gauze tomorrow.  If it doesn’t come off easily, do not pull this off.  If it is stuck, shower and let the warm water loosen it before removal.       12. Check your groin dressing regularly for bleeding through the dressing (under the pressure dressing).  A small amount of blood contained by the gauze is normal; the whole dressing should not be filled with blood or leaking out under the sides.     13. If you experience bleeding through the gauze/clear sticky dressing, lay flat  and have someone apply direct pressure for 15 minutes.  If bleeding has stopped after this, you may put a clean gauze and tape over the area.  Continue to lie flat for 1-2 hours.  If bleeding continues after 15 minutes of pressure, call us at the number listed above.  There is an MD available after hours.      14. If you experience heavy bleeding or large swelling, continue to hold firm pressure and              call 911.  Do not call the MD on call.     15.  If you experience pain or discomfort you may take Tylenol or Ibuprofen, whichever you normally use for minor discomfort, unless otherwise instructed.       16.  If the MD gives you a prescription for narcotic pain pills (Tylenol #3, Vicodin, Hydrocodone, Oxycodone or Percocet), you cannot drive a vehicle or operate machinery while taking these.    17.  Severe pain is not expected after this procedure.  If you experience severe pain, please call our office at [phone].    18.  Remember to contact our office for any of the following:    • Fever > 101 degrees  • Severe pain that cannot be controlled by taking your pain pills  • Severe nausea or vomiting   • Significant bleeding of your incisions  • Drainage that has a bad smell or is yellow or green in appearance  • Any other questions or concerns
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
## 2017-06-27 ENCOUNTER — TRANSCRIBE ORDERS (OUTPATIENT)
Dept: ADMINISTRATIVE | Facility: HOSPITAL | Age: 67
End: 2017-06-27

## 2017-06-27 DIAGNOSIS — I73.9 PERIPHERAL VASCULAR DISEASE, UNSPECIFIED (HCC): Primary | ICD-10-CM

## 2017-07-11 ENCOUNTER — HOSPITAL ENCOUNTER (OUTPATIENT)
Dept: NUCLEAR MEDICINE | Facility: HOSPITAL | Age: 67
Discharge: HOME | End: 2017-07-11
Attending: SURGERY

## 2017-07-11 DIAGNOSIS — I73.9 PERIPHERAL VASCULAR DISEASE, UNSPECIFIED (HCC): ICD-10-CM

## 2017-07-11 PROCEDURE — 78315 BONE IMAGING 3 PHASE: CPT

## 2017-07-11 PROCEDURE — A9503 TC99M MEDRONATE: HCPCS | Performed by: SURGERY

## 2017-07-11 PROCEDURE — 0 TECHNETIUM MEDRONATE KIT: Performed by: SURGERY

## 2017-07-11 RX ORDER — TC 99M MEDRONATE 20 MG/10ML
21.3 INJECTION, POWDER, LYOPHILIZED, FOR SOLUTION INTRAVENOUS
Status: COMPLETED | OUTPATIENT
Start: 2017-07-11 | End: 2017-07-11

## 2017-07-11 RX ADMIN — Medication 21.3 MILLICURIE: at 09:00

## 2017-08-30 ENCOUNTER — OFFICE VISIT (OUTPATIENT)
Dept: FAMILY MEDICINE CLINIC | Facility: CLINIC | Age: 67
End: 2017-08-30

## 2017-08-30 VITALS
HEART RATE: 77 BPM | BODY MASS INDEX: 37.76 KG/M2 | SYSTOLIC BLOOD PRESSURE: 108 MMHG | OXYGEN SATURATION: 94 % | WEIGHT: 200 LBS | DIASTOLIC BLOOD PRESSURE: 52 MMHG | HEIGHT: 61 IN | TEMPERATURE: 98.4 F

## 2017-08-30 DIAGNOSIS — Z79.4 TYPE 2 DIABETES MELLITUS WITHOUT COMPLICATION, WITH LONG-TERM CURRENT USE OF INSULIN (HCC): Primary | ICD-10-CM

## 2017-08-30 DIAGNOSIS — K55.9 MESENTERIC ISCHEMIA (HCC): ICD-10-CM

## 2017-08-30 DIAGNOSIS — E78.5 DYSLIPIDEMIA: ICD-10-CM

## 2017-08-30 DIAGNOSIS — K25.3 ACUTE GASTRIC ULCER: ICD-10-CM

## 2017-08-30 DIAGNOSIS — E11.9 TYPE 2 DIABETES MELLITUS WITHOUT COMPLICATION, WITH LONG-TERM CURRENT USE OF INSULIN (HCC): Primary | ICD-10-CM

## 2017-08-30 DIAGNOSIS — I50.22 CHRONIC SYSTOLIC HEART FAILURE (HCC): ICD-10-CM

## 2017-08-30 DIAGNOSIS — I25.10 CORONARY ARTERY DISEASE INVOLVING NATIVE CORONARY ARTERY OF NATIVE HEART WITHOUT ANGINA PECTORIS: ICD-10-CM

## 2017-08-30 DIAGNOSIS — L97.522 TOE ULCER, LEFT, WITH FAT LAYER EXPOSED (HCC): ICD-10-CM

## 2017-08-30 DIAGNOSIS — I73.9 PVD (PERIPHERAL VASCULAR DISEASE) (HCC): ICD-10-CM

## 2017-08-30 DIAGNOSIS — I10 ESSENTIAL HYPERTENSION: ICD-10-CM

## 2017-08-30 PROCEDURE — 99203 OFFICE O/P NEW LOW 30 MIN: CPT | Performed by: FAMILY MEDICINE

## 2017-08-30 RX ORDER — ACETAMINOPHEN 500 MG
500 TABLET ORAL EVERY 6 HOURS PRN
COMMUNITY

## 2017-08-30 RX ORDER — DICYCLOMINE HCL 20 MG
20 TABLET ORAL 3 TIMES DAILY
COMMUNITY
End: 2018-05-31 | Stop reason: SDUPTHER

## 2017-08-31 LAB
ALBUMIN/CREAT UR: 129.6 MG/G CREAT (ref 0–30)
CREAT UR-MCNC: 71.4 MG/DL
MICROALBUMIN UR-MCNC: 92.5 UG/ML

## 2017-09-30 ENCOUNTER — HOSPITAL ENCOUNTER (INPATIENT)
Facility: HOSPITAL | Age: 67
LOS: 3 days | Discharge: HOME HEALTH | End: 2017-10-03
Attending: EMERGENCY MEDICINE | Admitting: INTERNAL MEDICINE

## 2017-09-30 ENCOUNTER — APPOINTMENT (OUTPATIENT)
Dept: CT IMAGING | Facility: HOSPITAL | Age: 67
End: 2017-09-30

## 2017-09-30 ENCOUNTER — APPOINTMENT (OUTPATIENT)
Dept: GENERAL RADIOLOGY | Facility: HOSPITAL | Age: 67
End: 2017-09-30

## 2017-09-30 DIAGNOSIS — K83.1 BILIARY OBSTRUCTION: Primary | ICD-10-CM

## 2017-09-30 DIAGNOSIS — R10.11 RIGHT UPPER QUADRANT ABDOMINAL PAIN: ICD-10-CM

## 2017-09-30 LAB
ALBUMIN SERPL-MCNC: 3.6 G/DL (ref 3.5–5.2)
ALBUMIN/GLOB SERPL: 0.7 G/DL
ALP SERPL-CCNC: 203 U/L (ref 39–117)
ALT SERPL W P-5'-P-CCNC: 341 U/L (ref 1–33)
ANION GAP SERPL CALCULATED.3IONS-SCNC: 16.3 MMOL/L
AST SERPL-CCNC: 406 U/L (ref 1–32)
BACTERIA UR QL AUTO: ABNORMAL /HPF
BASOPHILS # BLD AUTO: 0.04 10*3/MM3 (ref 0–0.2)
BASOPHILS NFR BLD AUTO: 0.3 % (ref 0–1.5)
BILIRUB SERPL-MCNC: 1.8 MG/DL (ref 0.1–1.2)
BILIRUB UR QL STRIP: NEGATIVE
BUN BLD-MCNC: 23 MG/DL (ref 8–23)
BUN/CREAT SERPL: 24.7 (ref 7–25)
CALCIUM SPEC-SCNC: 10.4 MG/DL (ref 8.6–10.5)
CHLORIDE SERPL-SCNC: 99 MMOL/L (ref 98–107)
CLARITY UR: ABNORMAL
CO2 SERPL-SCNC: 22.7 MMOL/L (ref 22–29)
COLOR UR: ABNORMAL
CREAT BLD-MCNC: 0.93 MG/DL (ref 0.57–1)
DEPRECATED RDW RBC AUTO: 45.4 FL (ref 37–54)
EOSINOPHIL # BLD AUTO: 0.14 10*3/MM3 (ref 0–0.7)
EOSINOPHIL NFR BLD AUTO: 1 % (ref 0.3–6.2)
ERYTHROCYTE [DISTWIDTH] IN BLOOD BY AUTOMATED COUNT: 13.7 % (ref 11.7–13)
GFR SERPL CREATININE-BSD FRML MDRD: 60 ML/MIN/1.73
GLOBULIN UR ELPH-MCNC: 4.9 GM/DL
GLUCOSE BLD-MCNC: 187 MG/DL (ref 65–99)
GLUCOSE BLDC GLUCOMTR-MCNC: 161 MG/DL (ref 70–130)
GLUCOSE BLDC GLUCOMTR-MCNC: 192 MG/DL (ref 70–130)
GLUCOSE UR STRIP-MCNC: NEGATIVE MG/DL
HCT VFR BLD AUTO: 40.3 % (ref 35.6–45.5)
HGB BLD-MCNC: 12.8 G/DL (ref 11.9–15.5)
HGB UR QL STRIP.AUTO: NEGATIVE
HOLD SPECIMEN: NORMAL
HOLD SPECIMEN: NORMAL
HYALINE CASTS UR QL AUTO: ABNORMAL /LPF
IMM GRANULOCYTES # BLD: 0.03 10*3/MM3 (ref 0–0.03)
IMM GRANULOCYTES NFR BLD: 0.2 % (ref 0–0.5)
KETONES UR QL STRIP: NEGATIVE
LEUKOCYTE ESTERASE UR QL STRIP.AUTO: NEGATIVE
LIPASE SERPL-CCNC: 66 U/L (ref 13–60)
LYMPHOCYTES # BLD AUTO: 1.39 10*3/MM3 (ref 0.9–4.8)
LYMPHOCYTES NFR BLD AUTO: 10.1 % (ref 19.6–45.3)
MCH RBC QN AUTO: 28.6 PG (ref 26.9–32)
MCHC RBC AUTO-ENTMCNC: 31.8 G/DL (ref 32.4–36.3)
MCV RBC AUTO: 90.2 FL (ref 80.5–98.2)
MONOCYTES # BLD AUTO: 1.13 10*3/MM3 (ref 0.2–1.2)
MONOCYTES NFR BLD AUTO: 8.2 % (ref 5–12)
NEUTROPHILS # BLD AUTO: 11.06 10*3/MM3 (ref 1.9–8.1)
NEUTROPHILS NFR BLD AUTO: 80.2 % (ref 42.7–76)
NITRITE UR QL STRIP: NEGATIVE
NRBC BLD MANUAL-RTO: 0 /100 WBC (ref 0–0)
PH UR STRIP.AUTO: 5.5 [PH] (ref 5–8)
PLATELET # BLD AUTO: 461 10*3/MM3 (ref 140–500)
PMV BLD AUTO: 10.8 FL (ref 6–12)
POTASSIUM BLD-SCNC: 4.1 MMOL/L (ref 3.5–5.2)
PROT SERPL-MCNC: 8.5 G/DL (ref 6–8.5)
PROT UR QL STRIP: ABNORMAL
RBC # BLD AUTO: 4.47 10*6/MM3 (ref 3.9–5.2)
RBC # UR: ABNORMAL /HPF
REF LAB TEST METHOD: ABNORMAL
SODIUM BLD-SCNC: 138 MMOL/L (ref 136–145)
SP GR UR STRIP: 1.02 (ref 1–1.03)
SQUAMOUS #/AREA URNS HPF: ABNORMAL /HPF
TROPONIN T SERPL-MCNC: <0.01 NG/ML (ref 0–0.03)
UROBILINOGEN UR QL STRIP: ABNORMAL
WBC NRBC COR # BLD: 13.79 10*3/MM3 (ref 4.5–10.7)
WBC UR QL AUTO: ABNORMAL /HPF
WHOLE BLOOD HOLD SPECIMEN: NORMAL
WHOLE BLOOD HOLD SPECIMEN: NORMAL

## 2017-09-30 PROCEDURE — 25010000002 HYDROMORPHONE PER 4 MG: Performed by: INTERNAL MEDICINE

## 2017-09-30 PROCEDURE — 84484 ASSAY OF TROPONIN QUANT: CPT | Performed by: EMERGENCY MEDICINE

## 2017-09-30 PROCEDURE — 25010000002 ONDANSETRON PER 1 MG: Performed by: HOSPITALIST

## 2017-09-30 PROCEDURE — 93005 ELECTROCARDIOGRAM TRACING: CPT | Performed by: EMERGENCY MEDICINE

## 2017-09-30 PROCEDURE — 83690 ASSAY OF LIPASE: CPT | Performed by: EMERGENCY MEDICINE

## 2017-09-30 PROCEDURE — 85025 COMPLETE CBC W/AUTO DIFF WBC: CPT | Performed by: EMERGENCY MEDICINE

## 2017-09-30 PROCEDURE — 82962 GLUCOSE BLOOD TEST: CPT

## 2017-09-30 PROCEDURE — 63710000001 INSULIN DETEMER PER 5 UNITS: Performed by: INTERNAL MEDICINE

## 2017-09-30 PROCEDURE — 93010 ELECTROCARDIOGRAM REPORT: CPT | Performed by: INTERNAL MEDICINE

## 2017-09-30 PROCEDURE — 81001 URINALYSIS AUTO W/SCOPE: CPT | Performed by: EMERGENCY MEDICINE

## 2017-09-30 PROCEDURE — 0 IOPAMIDOL 61 % SOLUTION: Performed by: EMERGENCY MEDICINE

## 2017-09-30 PROCEDURE — 80053 COMPREHEN METABOLIC PANEL: CPT | Performed by: EMERGENCY MEDICINE

## 2017-09-30 PROCEDURE — 36415 COLL VENOUS BLD VENIPUNCTURE: CPT | Performed by: EMERGENCY MEDICINE

## 2017-09-30 PROCEDURE — 99284 EMERGENCY DEPT VISIT MOD MDM: CPT

## 2017-09-30 PROCEDURE — 25010000002 LEVOFLOXACIN PER 250 MG: Performed by: EMERGENCY MEDICINE

## 2017-09-30 PROCEDURE — 25010000002 ONDANSETRON PER 1 MG: Performed by: EMERGENCY MEDICINE

## 2017-09-30 PROCEDURE — 25010000002 HYDROMORPHONE PER 4 MG: Performed by: EMERGENCY MEDICINE

## 2017-09-30 PROCEDURE — 71020 HC CHEST PA AND LATERAL: CPT

## 2017-09-30 PROCEDURE — 74177 CT ABD & PELVIS W/CONTRAST: CPT

## 2017-09-30 RX ORDER — HYDROMORPHONE HYDROCHLORIDE 1 MG/ML
0.5 INJECTION, SOLUTION INTRAMUSCULAR; INTRAVENOUS; SUBCUTANEOUS
Status: DISCONTINUED | OUTPATIENT
Start: 2017-09-30 | End: 2017-10-03 | Stop reason: HOSPADM

## 2017-09-30 RX ORDER — GABAPENTIN 400 MG/1
1200 CAPSULE ORAL NIGHTLY
Status: DISCONTINUED | OUTPATIENT
Start: 2017-09-30 | End: 2017-10-03 | Stop reason: HOSPADM

## 2017-09-30 RX ORDER — ASPIRIN 81 MG/1
81 TABLET ORAL DAILY
Status: DISCONTINUED | OUTPATIENT
Start: 2017-10-01 | End: 2017-10-03 | Stop reason: HOSPADM

## 2017-09-30 RX ORDER — SODIUM CHLORIDE 0.9 % (FLUSH) 0.9 %
10 SYRINGE (ML) INJECTION AS NEEDED
Status: DISCONTINUED | OUTPATIENT
Start: 2017-09-30 | End: 2017-10-03 | Stop reason: HOSPADM

## 2017-09-30 RX ORDER — HYDROMORPHONE HYDROCHLORIDE 1 MG/ML
0.5 INJECTION, SOLUTION INTRAMUSCULAR; INTRAVENOUS; SUBCUTANEOUS ONCE
Status: COMPLETED | OUTPATIENT
Start: 2017-09-30 | End: 2017-09-30

## 2017-09-30 RX ORDER — SODIUM CHLORIDE 0.9 % (FLUSH) 0.9 %
1-10 SYRINGE (ML) INJECTION AS NEEDED
Status: DISCONTINUED | OUTPATIENT
Start: 2017-09-30 | End: 2017-10-03 | Stop reason: HOSPADM

## 2017-09-30 RX ORDER — METOCLOPRAMIDE 5 MG/1
5 TABLET ORAL EVERY MORNING
Status: DISCONTINUED | OUTPATIENT
Start: 2017-10-01 | End: 2017-10-03 | Stop reason: HOSPADM

## 2017-09-30 RX ORDER — DICYCLOMINE HYDROCHLORIDE 10 MG/1
20 CAPSULE ORAL 3 TIMES DAILY
Status: DISCONTINUED | OUTPATIENT
Start: 2017-09-30 | End: 2017-10-03 | Stop reason: HOSPADM

## 2017-09-30 RX ORDER — DICYCLOMINE HCL 20 MG
20 TABLET ORAL 3 TIMES DAILY
Status: DISCONTINUED | OUTPATIENT
Start: 2017-09-30 | End: 2017-09-30

## 2017-09-30 RX ORDER — PANTOPRAZOLE SODIUM 40 MG/1
40 TABLET, DELAYED RELEASE ORAL
Status: DISCONTINUED | OUTPATIENT
Start: 2017-10-01 | End: 2017-10-03 | Stop reason: HOSPADM

## 2017-09-30 RX ORDER — NICOTINE POLACRILEX 4 MG
15 LOZENGE BUCCAL
Status: DISCONTINUED | OUTPATIENT
Start: 2017-09-30 | End: 2017-10-03 | Stop reason: HOSPADM

## 2017-09-30 RX ORDER — FENOFIBRATE 145 MG/1
145 TABLET, COATED ORAL DAILY
Status: DISCONTINUED | OUTPATIENT
Start: 2017-09-30 | End: 2017-10-03 | Stop reason: HOSPADM

## 2017-09-30 RX ORDER — LEVOFLOXACIN 5 MG/ML
500 INJECTION, SOLUTION INTRAVENOUS EVERY 24 HOURS
Status: DISCONTINUED | OUTPATIENT
Start: 2017-10-01 | End: 2017-10-01 | Stop reason: DRUGHIGH

## 2017-09-30 RX ORDER — SODIUM CHLORIDE 9 MG/ML
75 INJECTION, SOLUTION INTRAVENOUS CONTINUOUS
Status: DISCONTINUED | OUTPATIENT
Start: 2017-09-30 | End: 2017-10-03 | Stop reason: HOSPADM

## 2017-09-30 RX ORDER — ONDANSETRON 2 MG/ML
4 INJECTION INTRAMUSCULAR; INTRAVENOUS ONCE
Status: COMPLETED | OUTPATIENT
Start: 2017-09-30 | End: 2017-09-30

## 2017-09-30 RX ORDER — SUCRALFATE 1 G/1
1 TABLET ORAL 4 TIMES DAILY
Status: DISCONTINUED | OUTPATIENT
Start: 2017-09-30 | End: 2017-10-03 | Stop reason: HOSPADM

## 2017-09-30 RX ORDER — ONDANSETRON 2 MG/ML
4 INJECTION INTRAMUSCULAR; INTRAVENOUS EVERY 4 HOURS PRN
Status: DISCONTINUED | OUTPATIENT
Start: 2017-09-30 | End: 2017-10-03 | Stop reason: HOSPADM

## 2017-09-30 RX ORDER — DEXTROSE MONOHYDRATE 25 G/50ML
25 INJECTION, SOLUTION INTRAVENOUS
Status: DISCONTINUED | OUTPATIENT
Start: 2017-09-30 | End: 2017-10-03 | Stop reason: HOSPADM

## 2017-09-30 RX ORDER — LEVOFLOXACIN 5 MG/ML
500 INJECTION, SOLUTION INTRAVENOUS ONCE
Status: DISCONTINUED | OUTPATIENT
Start: 2017-09-30 | End: 2017-10-01

## 2017-09-30 RX ADMIN — METRONIDAZOLE 500 MG: 500 INJECTION, SOLUTION INTRAVENOUS at 22:11

## 2017-09-30 RX ADMIN — IOPAMIDOL 85 ML: 612 INJECTION, SOLUTION INTRAVENOUS at 12:15

## 2017-09-30 RX ADMIN — HYDROMORPHONE HYDROCHLORIDE 0.5 MG: 1 INJECTION, SOLUTION INTRAMUSCULAR; INTRAVENOUS; SUBCUTANEOUS at 23:25

## 2017-09-30 RX ADMIN — HYDROMORPHONE HYDROCHLORIDE 0.5 MG: 1 INJECTION, SOLUTION INTRAMUSCULAR; INTRAVENOUS; SUBCUTANEOUS at 15:35

## 2017-09-30 RX ADMIN — GABAPENTIN 1200 MG: 400 CAPSULE ORAL at 22:11

## 2017-09-30 RX ADMIN — SODIUM CHLORIDE 1000 ML: 9 INJECTION, SOLUTION INTRAVENOUS at 11:56

## 2017-09-30 RX ADMIN — DICYCLOMINE HYDROCHLORIDE 20 MG: 10 CAPSULE ORAL at 22:16

## 2017-09-30 RX ADMIN — SODIUM CHLORIDE 75 ML/HR: 9 INJECTION, SOLUTION INTRAVENOUS at 15:49

## 2017-09-30 RX ADMIN — SUCRALFATE 1 G: 1 TABLET ORAL at 16:15

## 2017-09-30 RX ADMIN — FENOFIBRATE 145 MG: 145 TABLET, FILM COATED ORAL at 16:14

## 2017-09-30 RX ADMIN — METRONIDAZOLE 500 MG: 500 INJECTION, SOLUTION INTRAVENOUS at 14:50

## 2017-09-30 RX ADMIN — INSULIN DETEMIR 20 UNITS: 100 INJECTION, SOLUTION SUBCUTANEOUS at 23:21

## 2017-09-30 RX ADMIN — ONDANSETRON 4 MG: 2 INJECTION INTRAMUSCULAR; INTRAVENOUS at 20:17

## 2017-09-30 RX ADMIN — HYDROMORPHONE HYDROCHLORIDE 0.5 MG: 1 INJECTION, SOLUTION INTRAMUSCULAR; INTRAVENOUS; SUBCUTANEOUS at 11:56

## 2017-09-30 RX ADMIN — SUCRALFATE 1 G: 1 TABLET ORAL at 22:11

## 2017-09-30 RX ADMIN — ONDANSETRON 4 MG: 2 INJECTION INTRAMUSCULAR; INTRAVENOUS at 11:56

## 2017-09-30 RX ADMIN — SUCRALFATE 1 G: 1 TABLET ORAL at 17:43

## 2017-10-01 LAB
ALBUMIN SERPL-MCNC: 2.8 G/DL (ref 3.5–5.2)
ALBUMIN/GLOB SERPL: 0.7 G/DL
ALP SERPL-CCNC: 160 U/L (ref 39–117)
ALT SERPL W P-5'-P-CCNC: 233 U/L (ref 1–33)
ANION GAP SERPL CALCULATED.3IONS-SCNC: 11.7 MMOL/L
AST SERPL-CCNC: 220 U/L (ref 1–32)
BASOPHILS # BLD AUTO: 0.03 10*3/MM3 (ref 0–0.2)
BASOPHILS NFR BLD AUTO: 0.2 % (ref 0–1.5)
BILIRUB SERPL-MCNC: 0.8 MG/DL (ref 0.1–1.2)
BUN BLD-MCNC: 17 MG/DL (ref 8–23)
BUN/CREAT SERPL: 22.7 (ref 7–25)
CALCIUM SPEC-SCNC: 9 MG/DL (ref 8.6–10.5)
CHLORIDE SERPL-SCNC: 103 MMOL/L (ref 98–107)
CO2 SERPL-SCNC: 24.3 MMOL/L (ref 22–29)
CREAT BLD-MCNC: 0.75 MG/DL (ref 0.57–1)
DEPRECATED RDW RBC AUTO: 46.8 FL (ref 37–54)
EOSINOPHIL # BLD AUTO: 0.44 10*3/MM3 (ref 0–0.7)
EOSINOPHIL NFR BLD AUTO: 3.4 % (ref 0.3–6.2)
ERYTHROCYTE [DISTWIDTH] IN BLOOD BY AUTOMATED COUNT: 14 % (ref 11.7–13)
GFR SERPL CREATININE-BSD FRML MDRD: 77 ML/MIN/1.73
GLOBULIN UR ELPH-MCNC: 4.3 GM/DL
GLUCOSE BLD-MCNC: 107 MG/DL (ref 65–99)
GLUCOSE BLDC GLUCOMTR-MCNC: 101 MG/DL (ref 70–130)
GLUCOSE BLDC GLUCOMTR-MCNC: 201 MG/DL (ref 70–130)
GLUCOSE BLDC GLUCOMTR-MCNC: 257 MG/DL (ref 70–130)
GLUCOSE BLDC GLUCOMTR-MCNC: 91 MG/DL (ref 70–130)
HCT VFR BLD AUTO: 33.8 % (ref 35.6–45.5)
HGB BLD-MCNC: 10.5 G/DL (ref 11.9–15.5)
IMM GRANULOCYTES # BLD: 0.05 10*3/MM3 (ref 0–0.03)
IMM GRANULOCYTES NFR BLD: 0.4 % (ref 0–0.5)
INR PPP: 1.1 (ref 0.9–1.1)
LYMPHOCYTES # BLD AUTO: 1.84 10*3/MM3 (ref 0.9–4.8)
LYMPHOCYTES NFR BLD AUTO: 14.3 % (ref 19.6–45.3)
MCH RBC QN AUTO: 28.6 PG (ref 26.9–32)
MCHC RBC AUTO-ENTMCNC: 31.1 G/DL (ref 32.4–36.3)
MCV RBC AUTO: 92.1 FL (ref 80.5–98.2)
MONOCYTES # BLD AUTO: 1.34 10*3/MM3 (ref 0.2–1.2)
MONOCYTES NFR BLD AUTO: 10.4 % (ref 5–12)
NEUTROPHILS # BLD AUTO: 9.16 10*3/MM3 (ref 1.9–8.1)
NEUTROPHILS NFR BLD AUTO: 71.3 % (ref 42.7–76)
PLATELET # BLD AUTO: 417 10*3/MM3 (ref 140–500)
PMV BLD AUTO: 10.6 FL (ref 6–12)
POTASSIUM BLD-SCNC: 4 MMOL/L (ref 3.5–5.2)
PROT SERPL-MCNC: 7.1 G/DL (ref 6–8.5)
PROTHROMBIN TIME: 13.8 SECONDS (ref 11.7–14.2)
RBC # BLD AUTO: 3.67 10*6/MM3 (ref 3.9–5.2)
SODIUM BLD-SCNC: 139 MMOL/L (ref 136–145)
WBC NRBC COR # BLD: 12.86 10*3/MM3 (ref 4.5–10.7)

## 2017-10-01 PROCEDURE — 25010000002 ONDANSETRON PER 1 MG: Performed by: HOSPITALIST

## 2017-10-01 PROCEDURE — 63710000001 INSULIN DETEMER PER 5 UNITS: Performed by: INTERNAL MEDICINE

## 2017-10-01 PROCEDURE — 63710000001 INSULIN ASPART PER 5 UNITS: Performed by: INTERNAL MEDICINE

## 2017-10-01 PROCEDURE — 82962 GLUCOSE BLOOD TEST: CPT

## 2017-10-01 PROCEDURE — 25010000002 HYDROMORPHONE PER 4 MG: Performed by: INTERNAL MEDICINE

## 2017-10-01 PROCEDURE — 85610 PROTHROMBIN TIME: CPT | Performed by: INTERNAL MEDICINE

## 2017-10-01 PROCEDURE — 85025 COMPLETE CBC W/AUTO DIFF WBC: CPT | Performed by: INTERNAL MEDICINE

## 2017-10-01 PROCEDURE — 80053 COMPREHEN METABOLIC PANEL: CPT | Performed by: INTERNAL MEDICINE

## 2017-10-01 PROCEDURE — 25010000002 LEVOFLOXACIN PER 250 MG: Performed by: INTERNAL MEDICINE

## 2017-10-01 RX ORDER — LEVOFLOXACIN 5 MG/ML
750 INJECTION, SOLUTION INTRAVENOUS EVERY 24 HOURS
Status: DISCONTINUED | OUTPATIENT
Start: 2017-10-01 | End: 2017-10-03

## 2017-10-01 RX ADMIN — LEVOFLOXACIN 750 MG: 5 INJECTION, SOLUTION INTRAVENOUS at 16:50

## 2017-10-01 RX ADMIN — FENOFIBRATE 145 MG: 145 TABLET, FILM COATED ORAL at 09:58

## 2017-10-01 RX ADMIN — ONDANSETRON 4 MG: 2 INJECTION INTRAMUSCULAR; INTRAVENOUS at 09:59

## 2017-10-01 RX ADMIN — GABAPENTIN 1200 MG: 400 CAPSULE ORAL at 21:28

## 2017-10-01 RX ADMIN — METRONIDAZOLE 500 MG: 500 INJECTION, SOLUTION INTRAVENOUS at 21:29

## 2017-10-01 RX ADMIN — METRONIDAZOLE 500 MG: 500 INJECTION, SOLUTION INTRAVENOUS at 05:18

## 2017-10-01 RX ADMIN — HYDROMORPHONE HYDROCHLORIDE 0.5 MG: 1 INJECTION, SOLUTION INTRAMUSCULAR; INTRAVENOUS; SUBCUTANEOUS at 15:42

## 2017-10-01 RX ADMIN — DICYCLOMINE HYDROCHLORIDE 20 MG: 10 CAPSULE ORAL at 21:29

## 2017-10-01 RX ADMIN — DICYCLOMINE HYDROCHLORIDE 20 MG: 10 CAPSULE ORAL at 09:58

## 2017-10-01 RX ADMIN — ASPIRIN 81 MG: 81 TABLET ORAL at 09:58

## 2017-10-01 RX ADMIN — SUCRALFATE 1 G: 1 TABLET ORAL at 21:29

## 2017-10-01 RX ADMIN — INSULIN ASPART 3 UNITS: 100 INJECTION, SOLUTION INTRAVENOUS; SUBCUTANEOUS at 09:58

## 2017-10-01 RX ADMIN — SUCRALFATE 1 G: 1 TABLET ORAL at 17:55

## 2017-10-01 RX ADMIN — SUCRALFATE 1 G: 1 TABLET ORAL at 14:01

## 2017-10-01 RX ADMIN — SUCRALFATE 1 G: 1 TABLET ORAL at 09:58

## 2017-10-01 RX ADMIN — INSULIN ASPART 6 UNITS: 100 INJECTION, SOLUTION INTRAVENOUS; SUBCUTANEOUS at 21:29

## 2017-10-01 RX ADMIN — INSULIN DETEMIR 20 UNITS: 100 INJECTION, SOLUTION SUBCUTANEOUS at 21:30

## 2017-10-01 RX ADMIN — INSULIN ASPART 3 UNITS: 100 INJECTION, SOLUTION INTRAVENOUS; SUBCUTANEOUS at 17:55

## 2017-10-01 RX ADMIN — METRONIDAZOLE 500 MG: 500 INJECTION, SOLUTION INTRAVENOUS at 15:43

## 2017-10-01 RX ADMIN — HYDROMORPHONE HYDROCHLORIDE 0.5 MG: 1 INJECTION, SOLUTION INTRAMUSCULAR; INTRAVENOUS; SUBCUTANEOUS at 22:42

## 2017-10-01 RX ADMIN — SODIUM CHLORIDE 75 ML/HR: 9 INJECTION, SOLUTION INTRAVENOUS at 05:21

## 2017-10-01 RX ADMIN — PANTOPRAZOLE SODIUM 40 MG: 40 TABLET, DELAYED RELEASE ORAL at 05:25

## 2017-10-01 RX ADMIN — DICYCLOMINE HYDROCHLORIDE 20 MG: 10 CAPSULE ORAL at 16:51

## 2017-10-01 RX ADMIN — HYDROMORPHONE HYDROCHLORIDE 0.5 MG: 1 INJECTION, SOLUTION INTRAMUSCULAR; INTRAVENOUS; SUBCUTANEOUS at 09:58

## 2017-10-01 RX ADMIN — METOCLOPRAMIDE 5 MG: 5 TABLET ORAL at 09:58

## 2017-10-01 NOTE — PROGRESS NOTES
"  Name: Randi Martines  ADMIT: 2017   Age/Sex: 66 y.o.female LOS:  LOS: 1 day    :    1950     ROOM: [facility]   MRN:    [id number]    PCP:    Jose Morrison, DO     Subjective   Had some pain last night but otherwise feels better this am.     Objective   Vital Signs  Temp:  [98 °F (36.7 °C)-99.1 °F (37.3 °C)] 99.1 °F (37.3 °C)  Heart Rate:  [76-85] 76  Resp:  [18-20] 18  BP: (114-163)/(46-83) 131/46  Body mass index is 36.87 kg/(m^2).    Objective:  General Appearance:  Comfortable and well-appearing.    Vital signs: (most recent): Blood pressure 131/46, pulse 76, temperature 99.1 °F (37.3 °C), temperature source Oral, resp. rate 18, height 60\" (152.4 cm), weight 188 lb 12.8 oz (85.6 kg), SpO2 93 %.  Vital signs are normal.    HEENT: Normal HEENT exam.    Lungs:  Normal effort.  Breath sounds clear to auscultation.    Heart: Normal rate.  Regular rhythm.    Abdomen: Abdomen is soft and non-distended.  Bowel sounds are normal.   (Mild RUQ pain that is improved).     Extremities: Normal range of motion.  There is no dependent edema.    Neurological: Patient is alert and oriented to person, place and time.    Skin:  Warm and dry.  No rash.             Results Review:       I reviewed the patient's new clinical results.    Results from last 7 days  Lab Units 10/01/17  0503 17  1047   WBC 10*3/mm3 12.86* 13.79*   HEMOGLOBIN g/dL 10.5* 12.8   PLATELETS 10*3/mm3 417 461     Results from last 7 days  Lab Units 10/01/17  0503 17  1047   SODIUM mmol/L 139 138   POTASSIUM mmol/L 4.0 4.1   CHLORIDE mmol/L 103 99   CO2 mmol/L 24.3 22.7   BUN mg/dL 17 23   CREATININE mg/dL 0.75 0.93   GLUCOSE mg/dL 107* 187*   Estimated Creatinine Clearance: 66.3 mL/min (by C-G formula based on Cr of 0.75).  Results from last 7 days  Lab Units 10/01/17  0503 17  1047   CALCIUM mg/dL 9.0 10.4   ALBUMIN g/dL 2.80* 3.60       RADIOLOGY  10/1/2017  Pending      aspirin 81 mg Oral Daily   dicyclomine 20 mg Oral TID "   fenofibrate 145 mg Oral Daily   gabapentin 1,200 mg Oral Nightly   insulin aspart 0-9 Units Subcutaneous 4x Daily With Meals & Nightly   insulin detemir 20 Units Subcutaneous Nightly   levoFLOXacin 500 mg Intravenous Once   levoFLOXacin 500 mg Intravenous Q24H   metoclopramide 5 mg Oral QAM   metroNIDAZOLE 500 mg Intravenous Q8H   pantoprazole 40 mg Oral Q AM   sucralfate 1 g Oral 4x Daily       sodium chloride 75 mL/hr Last Rate: 75 mL/hr (10/01/17 0521)   NPO Diet Ice chips      Assessment/Plan   Assessment:   Active Problems:    Abdominal pain    Biliary obstruction        Plan:   1. RUQ abdominal pain  - intra- and extrahepatic biliary ductal dilatation with possible stone  - AST/ALT, Alk Phos and TBili improving. May have had stone that passed  - pain control  - WBC improving. Cont abx  - IVF. NPO until GI sees her     2. DM2  - will need to adjust insulin based on NPO status. Follow up after GI sees her  - SSI  - hold metformin     3. PVD/CAD  - holding plavix due to possibility of ERCP  - aspirin restarted       Disposition  TBD.      Wally Harrell MD  [facility]  10/01/17  9:24 AM

## 2017-10-01 NOTE — PLAN OF CARE
Problem: Patient Care Overview (Adult)  Goal: Plan of Care Review  Outcome: Ongoing (interventions implemented as appropriate)    10/01/17 0439   Coping/Psychosocial Response Interventions   Plan Of Care Reviewed With patient   Patient Care Overview   Progress no change   Outcome Evaluation   Outcome Summary/Follow up Plan VSS overnight, required o2 2L during sleep. PRN pain/nausea meds. Tolerating IVF and Abx. Possible ERCP tomorrow, but to be seen per GI. Will closely monitor.         Problem: Infection, Risk/Actual (Adult)  Goal: Identify Related Risk Factors and Signs and Symptoms  Outcome: Outcome(s) achieved Date Met:  10/01/17  Goal: Infection Prevention/Resolution  Outcome: Ongoing (interventions implemented as appropriate)    Problem: Pain, Acute (Adult)  Goal: Identify Related Risk Factors and Signs and Symptoms  Outcome: Outcome(s) achieved Date Met:  10/01/17  Goal: Acceptable Pain Control/Comfort Level  Outcome: Ongoing (interventions implemented as appropriate)    Problem: Skin Integrity Impairment, Risk/Actual (Adult)  Goal: Identify Related Risk Factors and Signs and Symptoms  Outcome: Outcome(s) achieved Date Met:  10/01/17  Goal: Skin Integrity/Wound Healing  Outcome: Ongoing (interventions implemented as appropriate)

## 2017-10-02 ENCOUNTER — APPOINTMENT (OUTPATIENT)
Dept: MRI IMAGING | Facility: HOSPITAL | Age: 67
End: 2017-10-02

## 2017-10-02 ENCOUNTER — ANESTHESIA (OUTPATIENT)
Dept: GASTROENTEROLOGY | Facility: HOSPITAL | Age: 67
End: 2017-10-02

## 2017-10-02 ENCOUNTER — APPOINTMENT (OUTPATIENT)
Dept: GENERAL RADIOLOGY | Facility: HOSPITAL | Age: 67
End: 2017-10-02

## 2017-10-02 ENCOUNTER — ANESTHESIA EVENT (OUTPATIENT)
Dept: GASTROENTEROLOGY | Facility: HOSPITAL | Age: 67
End: 2017-10-02

## 2017-10-02 LAB
ALBUMIN SERPL-MCNC: 2.7 G/DL (ref 3.5–5.2)
ALBUMIN/GLOB SERPL: 0.7 G/DL
ALP SERPL-CCNC: 143 U/L (ref 39–117)
ALT SERPL W P-5'-P-CCNC: 173 U/L (ref 1–33)
ANION GAP SERPL CALCULATED.3IONS-SCNC: 10.8 MMOL/L
AST SERPL-CCNC: 136 U/L (ref 1–32)
BASOPHILS # BLD AUTO: 0.03 10*3/MM3 (ref 0–0.2)
BASOPHILS NFR BLD AUTO: 0.3 % (ref 0–1.5)
BILIRUB SERPL-MCNC: 0.4 MG/DL (ref 0.1–1.2)
BUN BLD-MCNC: 16 MG/DL (ref 8–23)
BUN/CREAT SERPL: 18.2 (ref 7–25)
CALCIUM SPEC-SCNC: 8.6 MG/DL (ref 8.6–10.5)
CHLORIDE SERPL-SCNC: 104 MMOL/L (ref 98–107)
CO2 SERPL-SCNC: 25.2 MMOL/L (ref 22–29)
CREAT BLD-MCNC: 0.88 MG/DL (ref 0.57–1)
DEPRECATED RDW RBC AUTO: 48.1 FL (ref 37–54)
EOSINOPHIL # BLD AUTO: 0.53 10*3/MM3 (ref 0–0.7)
EOSINOPHIL NFR BLD AUTO: 5.9 % (ref 0.3–6.2)
ERYTHROCYTE [DISTWIDTH] IN BLOOD BY AUTOMATED COUNT: 14 % (ref 11.7–13)
GFR SERPL CREATININE-BSD FRML MDRD: 64 ML/MIN/1.73
GLOBULIN UR ELPH-MCNC: 3.8 GM/DL
GLUCOSE BLD-MCNC: 142 MG/DL (ref 65–99)
GLUCOSE BLDC GLUCOMTR-MCNC: 166 MG/DL (ref 70–130)
GLUCOSE BLDC GLUCOMTR-MCNC: 192 MG/DL (ref 70–130)
GLUCOSE BLDC GLUCOMTR-MCNC: 240 MG/DL (ref 70–130)
GLUCOSE BLDC GLUCOMTR-MCNC: 240 MG/DL (ref 70–130)
HCT VFR BLD AUTO: 33.4 % (ref 35.6–45.5)
HGB BLD-MCNC: 10.2 G/DL (ref 11.9–15.5)
IMM GRANULOCYTES # BLD: 0.04 10*3/MM3 (ref 0–0.03)
IMM GRANULOCYTES NFR BLD: 0.4 % (ref 0–0.5)
LYMPHOCYTES # BLD AUTO: 1.52 10*3/MM3 (ref 0.9–4.8)
LYMPHOCYTES NFR BLD AUTO: 16.8 % (ref 19.6–45.3)
MCH RBC QN AUTO: 28.9 PG (ref 26.9–32)
MCHC RBC AUTO-ENTMCNC: 30.5 G/DL (ref 32.4–36.3)
MCV RBC AUTO: 94.6 FL (ref 80.5–98.2)
MONOCYTES # BLD AUTO: 0.98 10*3/MM3 (ref 0.2–1.2)
MONOCYTES NFR BLD AUTO: 10.8 % (ref 5–12)
NEUTROPHILS # BLD AUTO: 5.94 10*3/MM3 (ref 1.9–8.1)
NEUTROPHILS NFR BLD AUTO: 65.8 % (ref 42.7–76)
PLATELET # BLD AUTO: 392 10*3/MM3 (ref 140–500)
PMV BLD AUTO: 10.6 FL (ref 6–12)
POTASSIUM BLD-SCNC: 4.4 MMOL/L (ref 3.5–5.2)
PROT SERPL-MCNC: 6.5 G/DL (ref 6–8.5)
RBC # BLD AUTO: 3.53 10*6/MM3 (ref 3.9–5.2)
SODIUM BLD-SCNC: 140 MMOL/L (ref 136–145)
WBC NRBC COR # BLD: 9.04 10*3/MM3 (ref 4.5–10.7)

## 2017-10-02 PROCEDURE — 25010000002 LEVOFLOXACIN PER 250 MG: Performed by: INTERNAL MEDICINE

## 2017-10-02 PROCEDURE — 99222 1ST HOSP IP/OBS MODERATE 55: CPT | Performed by: INTERNAL MEDICINE

## 2017-10-02 PROCEDURE — 43262 ENDO CHOLANGIOPANCREATOGRAPH: CPT | Performed by: INTERNAL MEDICINE

## 2017-10-02 PROCEDURE — 25010000002 HYDROMORPHONE PER 4 MG: Performed by: INTERNAL MEDICINE

## 2017-10-02 PROCEDURE — 25010000002 PROPOFOL 10 MG/ML EMULSION: Performed by: ANESTHESIOLOGY

## 2017-10-02 PROCEDURE — 63710000001 INSULIN ASPART PER 5 UNITS: Performed by: INTERNAL MEDICINE

## 2017-10-02 PROCEDURE — A9577 INJ MULTIHANCE: HCPCS | Performed by: INTERNAL MEDICINE

## 2017-10-02 PROCEDURE — C1769 GUIDE WIRE: HCPCS | Performed by: INTERNAL MEDICINE

## 2017-10-02 PROCEDURE — 80053 COMPREHEN METABOLIC PANEL: CPT | Performed by: INTERNAL MEDICINE

## 2017-10-02 PROCEDURE — 85025 COMPLETE CBC W/AUTO DIFF WBC: CPT | Performed by: INTERNAL MEDICINE

## 2017-10-02 PROCEDURE — 25010000002 ONDANSETRON PER 1 MG: Performed by: HOSPITALIST

## 2017-10-02 PROCEDURE — 43264 ERCP REMOVE DUCT CALCULI: CPT | Performed by: INTERNAL MEDICINE

## 2017-10-02 PROCEDURE — 0 IOPAMIDOL 61 % SOLUTION: Performed by: INTERNAL MEDICINE

## 2017-10-02 PROCEDURE — 74183 MRI ABD W/O CNTR FLWD CNTR: CPT

## 2017-10-02 PROCEDURE — 0 GADOBENATE DIMEGLUMINE 529 MG/ML SOLUTION: Performed by: INTERNAL MEDICINE

## 2017-10-02 PROCEDURE — 74328 X-RAY BILE DUCT ENDOSCOPY: CPT

## 2017-10-02 PROCEDURE — 82962 GLUCOSE BLOOD TEST: CPT

## 2017-10-02 PROCEDURE — 0FC98ZZ EXTIRPATION OF MATTER FROM COMMON BILE DUCT, VIA NATURAL OR ARTIFICIAL OPENING ENDOSCOPIC: ICD-10-PCS | Performed by: INTERNAL MEDICINE

## 2017-10-02 RX ORDER — SODIUM CHLORIDE, SODIUM LACTATE, POTASSIUM CHLORIDE, CALCIUM CHLORIDE 600; 310; 30; 20 MG/100ML; MG/100ML; MG/100ML; MG/100ML
30 INJECTION, SOLUTION INTRAVENOUS CONTINUOUS PRN
Status: DISCONTINUED | OUTPATIENT
Start: 2017-10-02 | End: 2017-10-03 | Stop reason: HOSPADM

## 2017-10-02 RX ORDER — LIDOCAINE HYDROCHLORIDE 20 MG/ML
INJECTION, SOLUTION INFILTRATION; PERINEURAL AS NEEDED
Status: DISCONTINUED | OUTPATIENT
Start: 2017-10-02 | End: 2017-10-02 | Stop reason: SURG

## 2017-10-02 RX ORDER — PROPOFOL 10 MG/ML
VIAL (ML) INTRAVENOUS CONTINUOUS PRN
Status: DISCONTINUED | OUTPATIENT
Start: 2017-10-02 | End: 2017-10-02 | Stop reason: SURG

## 2017-10-02 RX ADMIN — HYDROMORPHONE HYDROCHLORIDE 0.5 MG: 1 INJECTION, SOLUTION INTRAMUSCULAR; INTRAVENOUS; SUBCUTANEOUS at 13:15

## 2017-10-02 RX ADMIN — HYDROMORPHONE HYDROCHLORIDE 0.5 MG: 1 INJECTION, SOLUTION INTRAMUSCULAR; INTRAVENOUS; SUBCUTANEOUS at 17:30

## 2017-10-02 RX ADMIN — PROPOFOL 160 MCG/KG/MIN: 10 INJECTION, EMULSION INTRAVENOUS at 11:32

## 2017-10-02 RX ADMIN — METRONIDAZOLE 500 MG: 500 INJECTION, SOLUTION INTRAVENOUS at 06:37

## 2017-10-02 RX ADMIN — HYDROMORPHONE HYDROCHLORIDE 0.5 MG: 1 INJECTION, SOLUTION INTRAMUSCULAR; INTRAVENOUS; SUBCUTANEOUS at 21:38

## 2017-10-02 RX ADMIN — INSULIN DETEMIR 20 UNITS: 100 INJECTION, SOLUTION SUBCUTANEOUS at 21:36

## 2017-10-02 RX ADMIN — LEVOFLOXACIN 750 MG: 5 INJECTION, SOLUTION INTRAVENOUS at 15:00

## 2017-10-02 RX ADMIN — SODIUM CHLORIDE, POTASSIUM CHLORIDE, SODIUM LACTATE AND CALCIUM CHLORIDE 30 ML/HR: 600; 310; 30; 20 INJECTION, SOLUTION INTRAVENOUS at 11:14

## 2017-10-02 RX ADMIN — METRONIDAZOLE 500 MG: 500 INJECTION, SOLUTION INTRAVENOUS at 21:26

## 2017-10-02 RX ADMIN — LIDOCAINE HYDROCHLORIDE 40 MG: 20 INJECTION, SOLUTION INFILTRATION; PERINEURAL at 11:32

## 2017-10-02 RX ADMIN — ALFENTANIL HYDROCHLORIDE 250 MCG: 500 INJECTION, SOLUTION INTRAVENOUS at 11:32

## 2017-10-02 RX ADMIN — GABAPENTIN 1200 MG: 400 CAPSULE ORAL at 21:25

## 2017-10-02 RX ADMIN — HYDROMORPHONE HYDROCHLORIDE 1 MG: 1 INJECTION, SOLUTION INTRAMUSCULAR; INTRAVENOUS; SUBCUTANEOUS at 15:16

## 2017-10-02 RX ADMIN — SUCRALFATE 1 G: 1 TABLET ORAL at 21:25

## 2017-10-02 RX ADMIN — HYDROMORPHONE HYDROCHLORIDE 0.5 MG: 1 INJECTION, SOLUTION INTRAMUSCULAR; INTRAVENOUS; SUBCUTANEOUS at 19:49

## 2017-10-02 RX ADMIN — INSULIN ASPART 4 UNITS: 100 INJECTION, SOLUTION INTRAVENOUS; SUBCUTANEOUS at 21:25

## 2017-10-02 RX ADMIN — ONDANSETRON 4 MG: 2 INJECTION INTRAMUSCULAR; INTRAVENOUS at 16:38

## 2017-10-02 RX ADMIN — GADOBENATE DIMEGLUMINE 17 ML: 529 INJECTION, SOLUTION INTRAVENOUS at 08:21

## 2017-10-02 RX ADMIN — DICYCLOMINE HYDROCHLORIDE 20 MG: 10 CAPSULE ORAL at 21:24

## 2017-10-02 RX ADMIN — METRONIDAZOLE 500 MG: 500 INJECTION, SOLUTION INTRAVENOUS at 13:20

## 2017-10-02 RX ADMIN — INSULIN ASPART 4 UNITS: 100 INJECTION, SOLUTION INTRAVENOUS; SUBCUTANEOUS at 16:49

## 2017-10-02 RX ADMIN — SODIUM CHLORIDE, POTASSIUM CHLORIDE, SODIUM LACTATE AND CALCIUM CHLORIDE: 600; 310; 30; 20 INJECTION, SOLUTION INTRAVENOUS at 10:50

## 2017-10-02 NOTE — PROGRESS NOTES
Discharge Planning Assessment  [facility]     Patient Name: Randi Martines  MRN: [id number]  Today's Date: 10/2/2017    Admit Date: 9/30/2017          Discharge Needs Assessment       10/02/17 1546    Living Environment    Lives With child(magan), adult    Living Arrangements house   single story    Home Accessibility stairs to enter home    Number of Stairs to Enter Home 1    Stair Railings at Home none    Type of Financial/Environmental Concern none    Transportation Available car;family or friend will provide    Living Environment    Provides Primary Care For no one    Quality Of Family Relationships supportive    Able to Return to Prior Living Arrangements yes    Discharge Needs Assessment    Concerns To Be Addressed no discharge needs identified;denies needs/concerns at this time    Readmission Within The Last 30 Days no previous admission in last 30 days    Anticipated Changes Related to Illness none    Equipment Currently Used at Home rollator;glucometer;wound care supplies    Equipment Needed After Discharge none    Discharge Facility/Level Of Care Needs home with home health    Discharge Disposition home healthcare service            Discharge Plan       10/02/17 0269    Case Management/Social Work Plan    Plan Home with adult son and [facility]-     Patient/Family In Agreement With Plan yes    Additional Comments Introduced self and explained role of CCP, IMM checked and facesheet verified with patient, who is alert and oriented x 4, at bedside.  Patient states she lives in a single story house and her adult son lives with her.  Patient states she is independent with all ADLs and is current with [facility]-(Piedad notified at [phone]) and wants to continue with them at discharge.  Patient states she currently has a rollator walker, glucometer and wound care supplies and denies DME needs at discharge.  Patient states she uses [facility] on [place], [place].  Patient states she plans to return home  and her son, Mega will probably be the one to provide transportation at discharge.  CCP will continue to follow and assist as needed.....Melody ThaoRN,CCP        Discharge Placement     Facility/Agency Request Status Selected? Address Phone Number Fax Number    [facility] Accepted    Yes [address] [place] [id number] [phone]                Demographic Summary       10/02/17 1548    Referral Information    Arrived From home healthcare service    Contact Information    Permission Granted to Share Information With family/designee   Stevan Martines(son) [phone]    Primary Care Physician Information    Name oJse Morrison             Functional Status       10/02/17 1549    Functional Status Current    Ambulation 2-->assistive person    Transferring 0-->independent    Toileting 2-->assistive person    Bathing 0-->independent    Dressing 0-->independent    Eating 0-->independent    Communication 0-->understands/communicates without difficulty    Change in Functional Status Since Onset of Current Illness/Injury no    Functional Status Prior    Ambulation 0-->independent    Transferring 0-->independent    Toileting 0-->independent    Bathing 0-->independent    Dressing 0-->independent    Eating 0-->independent    Communication 0-->understands/communicates without difficulty    IADL    Medications independent    Meal Preparation independent    Housekeeping independent    Laundry independent    Shopping independent    Oral Care independent    Activity Tolerance    Current Activity Limitations none    Usual Activity Tolerance good    Current Activity Tolerance moderate    Cognitive/Perceptual/Developmental    Current Mental Status/Cognitive Functioning no deficits noted    Recent Changes in Mental Status/Cognitive Functioning no changes    Developmental Stage (Eriksson's Stages of Development) Stage 8 (65 years-death/Late Adulthood) Integrity vs. Despair             Psychosocial     None            Abuse/Neglect     None            Legal     None            Substance Abuse     None            Patient Forms     None          Melody Thao, RN

## 2017-10-02 NOTE — PROGRESS NOTES
"  Name: Randi Martines  ADMIT: 2017   Age/Sex: 66 y.o.female LOS:  LOS: 2 days    :    1950     ROOM: [facility]   MRN:    [id number]    PCP:    Jose Morrison, DO     Subjective   Had a little bit of pain. Much improved though. MRCP this am    Objective   Vital Signs  Temp:  [97.7 °F (36.5 °C)-98.8 °F (37.1 °C)] 98.2 °F (36.8 °C)  Heart Rate:  [68-74] 74  Resp:  [16-20] 16  BP: (114-150)/(57-73) 150/60  Body mass index is 36.44 kg/(m^2).    Objective:  General Appearance:  Comfortable and well-appearing.    Vital signs: (most recent): Blood pressure 150/60, pulse 74, temperature 98.2 °F (36.8 °C), temperature source Oral, resp. rate 16, height 60\" (152.4 cm), weight 186 lb 9.6 oz (84.6 kg), SpO2 97 %.  Vital signs are normal.    HEENT: Normal HEENT exam.    Lungs:  Normal effort.  Breath sounds clear to auscultation.    Heart: Normal rate.  Regular rhythm.    Abdomen: Abdomen is soft and non-distended.  Bowel sounds are normal.   There is no abdominal tenderness.     Extremities: Normal range of motion.  There is no dependent edema.    Neurological: Patient is alert and oriented to person, place and time.    Skin:  Warm and dry.  No rash.             Results Review:       I reviewed the patient's new clinical results.    Results from last 7 days  Lab Units 10/02/17  0350 10/01/17  0503 17  1047   WBC 10*3/mm3 9.04 12.86* 13.79*   HEMOGLOBIN g/dL 10.2* 10.5* 12.8   PLATELETS 10*3/mm3 392 417 461     Results from last 7 days  Lab Units 10/02/17  0350 10/01/17  0503 17  1047   SODIUM mmol/L 140 139 138   POTASSIUM mmol/L 4.4 4.0 4.1   CHLORIDE mmol/L 104 103 99   CO2 mmol/L 25.2 24.3 22.7   BUN mg/dL 16 17 23   CREATININE mg/dL 0.88 0.75 0.93   GLUCOSE mg/dL 142* 107* 187*   Estimated Creatinine Clearance: 59.8 mL/min (by C-G formula based on Cr of 0.88).  Results from last 7 days  Lab Units 10/02/17  0350 10/01/17  0503 17  1047   CALCIUM mg/dL 8.6 9.0 10.4   ALBUMIN g/dL 2.70* 2.80* " 3.60       RADIOLOGY  10/2/2017  Pending      aspirin 81 mg Oral Daily   dicyclomine 20 mg Oral TID   fenofibrate 145 mg Oral Daily   gabapentin 1,200 mg Oral Nightly   insulin aspart 0-9 Units Subcutaneous 4x Daily With Meals & Nightly   insulin detemir 20 Units Subcutaneous Nightly   levoFLOXacin 750 mg Intravenous Q24H   metoclopramide 5 mg Oral QAM   metroNIDAZOLE 500 mg Intravenous Q8H   pantoprazole 40 mg Oral Q AM   sucralfate 1 g Oral 4x Daily       sodium chloride 75 mL/hr Last Rate: 75 mL/hr (10/01/17 0521)   NPO Diet      Assessment/Plan   Assessment:   Active Problems:    Abdominal pain    Biliary obstruction        Plan:   1. RUQ abdominal pain  - intra- and extrahepatic biliary ductal dilatation with possible stone  - AST/ALT, Alk Phos and TBili improving.   - MRCP performed this am. Per pt she is to have ERCP today  - pain control  - WBC normal now. Was on abx for leukocytosis when she came in. Could likely d/c tomorrow after ERCP is performed and stone removed  - IVF.       2. DM2  - will need to adjust insulin based on NPO status. Follow up after GI sees her  - SSI  - hold metformin      3. PVD/CAD  - holding plavix for ERCP  - aspirin restarted       Disposition  Hopefully tomorrow      Wally Harrell MD  [facility]  10/02/17  9:34 AM

## 2017-10-02 NOTE — DISCHARGE PLACEMENT REQUEST
"Marce Martines (66 y.o. Female)     Date of Birth Social Security Number Address Home Phone MRN    1950  [address] [id number] [phone] [id number]    Oriental orthodox Marital Status          None        Admission Date Admission Type Admitting Provider Attending Provider Department, Room/Bed    9/30/17 Emergency Wally Harrell MD Schmitt, Christopher E, MD [address], 419/1    Discharge Date Discharge Disposition Discharge Destination                      Attending Provider: Wally Harrell MD     Allergies:  Augmentin [Amoxicillin-pot Clavulanate], Codeine    Isolation:  None   Infection:  None   Code Status:  FULL    Ht:  60\" (152.4 cm)   Wt:  186 lb 9.6 oz (84.6 kg)    Admission Cmt:  None   Principal Problem:  None                Active Insurance as of 9/30/2017     Primary Coverage     Payor Plan Insurance Group Employer/Plan Group    HUMANA MEDICARE REPLACEMENT HUMANA MEDICARE REPL [id number]     Payor Plan Address Payor Plan Phone Number Effective From Effective To    [address] [address] [phone] 1/1/2014     [place] [address]       Subscriber Name Subscriber Birth Date Member ID       MARCE MARTINES 1950 [id number]                 Emergency Contacts      (Rel.) Home Phone Work Phone Mobile Phone    Stevan Martines (Son) -- -- [phone]              "

## 2017-10-02 NOTE — PLAN OF CARE
Problem: Patient Care Overview (Adult)  Goal: Plan of Care Review  Outcome: Ongoing (interventions implemented as appropriate)    10/01/17 0439 10/02/17 0405   Coping/Psychosocial Response Interventions   Plan Of Care Reviewed With --  patient   Patient Care Overview   Progress no change --    Outcome Evaluation   Outcome Summary/Follow up Plan --  mrcp scheduled for this am; pt resting without complaint; medicated as per orders; vss; will continue to closely monitor       Goal: Adult Individualization and Mutuality  Outcome: Ongoing (interventions implemented as appropriate)  Goal: Discharge Needs Assessment  Outcome: Ongoing (interventions implemented as appropriate)    Problem: Infection, Risk/Actual (Adult)  Goal: Infection Prevention/Resolution  Outcome: Ongoing (interventions implemented as appropriate)    Problem: Pain, Acute (Adult)  Goal: Acceptable Pain Control/Comfort Level  Outcome: Ongoing (interventions implemented as appropriate)    Problem: Skin Integrity Impairment, Risk/Actual (Adult)  Goal: Skin Integrity/Wound Healing  Outcome: Ongoing (interventions implemented as appropriate)

## 2017-10-02 NOTE — ANESTHESIA POSTPROCEDURE EVALUATION
"Patient: Randi DELEON [place]    Procedure Summary     Date Anesthesia Start Anesthesia Stop Room / Location    10/02/17 1124 1206  [facility] 6 /  [facility]       Procedure Diagnosis Surgeon Provider    ENDOSCOPIC RETROGRADE CHOLANGIOPANCREATOGRAPHY with sphincterotomy, balloon sweep (N/A ) Biliary obstruction; Calculus of bile duct without cholangitis with obstruction  (Biliary obstruction [K83.1]) MD Jasiel Mata MD          Anesthesia Type: MAC  Last vitals  BP   135/58 (10/02/17 1208)    Temp        Pulse   84 (10/02/17 1208)   Resp   16 (10/02/17 1208)    SpO2   100 % (10/02/17 1208)      Post Anesthesia Care and Evaluation    Patient location during evaluation: PACU  Patient participation: complete - patient participated  Level of consciousness: awake  Pain score: 0  Pain management: adequate  Airway patency: patent  Anesthetic complications: No anesthetic complications    Cardiovascular status: acceptable  Respiratory status: acceptable  Hydration status: acceptable    Comments: Blood pressure 135/58, pulse 84, temperature 36.8 °C (98.2 °F), temperature source Oral, resp. rate 16, height 60\" (152.4 cm), weight 186 lb 9.6 oz (84.6 kg), SpO2 100 %.    No anesthesia care post op    "

## 2017-10-02 NOTE — ANESTHESIA PREPROCEDURE EVALUATION
Anesthesia Evaluation     Patient summary reviewed and Nursing notes reviewed   history of anesthetic complications: PONV  NPO Solid Status: > 8 hours  NPO Liquid Status: > 8 hours     Airway   Mallampati: II  TM distance: >3 FB  Neck ROM: full  no difficulty expected  Dental - normal exam     Pulmonary - negative pulmonary ROS and normal exam    breath sounds clear to auscultation  (-) rhonchi, decreased breath sounds, wheezes, rales, stridor  Cardiovascular - normal exam    ECG reviewed  Rhythm: regular  Rate: normal    (+) hypertension, past MI  >12 months, CAD, CABG > 6 Months, PVD, hyperlipidemia  (-) murmur, weak pulses, friction rub, systolic click, carotid bruits, JVD, peripheral edema      Neuro/Psych- negative ROS  GI/Hepatic/Renal/Endo    (+) obesity,  PUD, diabetes mellitus type 2 well controlled,     Musculoskeletal (-) negative ROS    Abdominal  - normal exam  (+) obese,     Abdomen: soft.   Substance History - negative use     OB/GYN negative ob/gyn ROS         Other - negative ROS                                       Anesthesia Plan    ASA 3     MAC     intravenous induction   Anesthetic plan and risks discussed with patient.

## 2017-10-02 NOTE — CONSULTS
[facility]  Initial Inpatient Consult Note    Referring Provider: Dr. Wally Harrell    Reason for Consultation: Abnormal CT    Subjective     History of present illness:    66 y.o. female with history of coronary artery disease, peptic ulcer disease, peripheral vascular disease, diabetes presenting with 2-3 weeks intermittent right upper quadrant pain.  Patient reports pain usually starts low and as he gets intense she starts to throw up and then improved.  Patient reports episodes last about an hour and happen several times a day.  The night before admission patient reports the pain did not resolve and came to emergency room.  In the ER patient was found with elevated LFTs and a CT with dilated common bile duct and possible distal common bile duct stone.  Patient this morning had an MRI showing a common duct defects consistent with retained stone.  Patient is 15 years postcholecystectomy and likely high risk for developing primary common duct stones.  Patient now for further evaluation for possible ERCP.    Past Medical History:  Past Medical History:   Diagnosis Date   • CAD (coronary artery disease)    • Chronic kidney disease    • Chronic systolic congestive heart failure    • Diabetes mellitus    • Dyslipidemia    • Foot ulcer    • History of diverticulitis    • History of stomach ulcers    • History of transfusion    • Hyperlipemia    • Hypertension    • Peptic ulcer disease    • PONV (postoperative nausea and vomiting)    • PVD (peripheral vascular disease)      Past Surgical History:  Past Surgical History:   Procedure Laterality Date   • APPENDECTOMY  2015   • CATARACT EXTRACTION     • CHOLECYSTECTOMY     • COLONOSCOPY  2015   • CORONARY ARTERY BYPASS GRAFT      7 coronary bypass surgery   • ENDOSCOPY N/A 11/23/2016    Procedure: ESOPHAGOGASTRODUODENOSCOPY ;  Surgeon: Katherine Green MD;  Location: [facility] ENDOSCOPY;  Service:    • HERNIA REPAIR  2015   • HYSTERECTOMY     • OR  VEIN IN SITU BYPASS GRAFT,FEM-POP Left 5/31/2017    Procedure: ULTRASOUND RIGHT AIF, ARTERIOGRAM W/ LEFT COMMON ILIAC ANGIOPLASTY STENT, LT FEMORAL POPLITEAL BYPASS WITH ARTEGRAFT, LEFT POPLITEAL ENDARTERECTOMY PATCH, LEFT COMMON ARTERIOGRAM, LEFT EXTERNAL ILIAC ANGIOPLASTY STENT PLACEMENT, AORTA,  EXTERNAL, DISTAL PRESSURES;  Surgeon: Jayda Barrientos MD;  Location: [facility] OR 18/19;  Service: Vascular   • SKIN GRAFT SPLIT THICKNESS Left 6/7/2017    Procedure: DEBRIDEMENT AND SKIN GRAFT TO  LEFT DORSAL FOOT AND TOES;  Surgeon: Maurine Waterhouse, MD;  Location: [facility] OR;  Service:    • UPPER GASTROINTESTINAL ENDOSCOPY  2015    scar in gastric body, gastric ulcers      Social History:   Social History   Substance Use Topics   • Smoking status: Former Smoker     Packs/day: 3.00     Years: 20.00   • Smokeless tobacco: Never Used      Comment: caffeine use   • Alcohol use No      Family History:  Family History   Problem Relation Age of Onset   • Heart disease Mother      cardiac disorder   • Cancer Maternal Grandmother    • Stroke Maternal Grandfather    • Hypertension Maternal Grandfather    • Malig Hyperthermia Neg Hx        Home Meds:  Prescriptions Prior to Admission   Medication Sig Dispense Refill Last Dose   • LEVEMIR FLEXTOUCH 100 UNIT/ML injection Inject 40 Units under the skin Every Night.   10/1/2017 at Unknown time   • acetaminophen (TYLENOL) 500 MG tablet Take 500 mg by mouth Every 6 (Six) Hours As Needed for Mild Pain .   Taking   • aspirin 81 MG EC tablet Take 81 mg by mouth Daily. PT HOLDING FOR SURGERY   9/29/2017   • atorvastatin (LIPITOR) 80 MG tablet Take 80 mg by mouth Every Night.   Taking   • clopidogrel (PLAVIX) 75 MG tablet Take 75 mg by mouth Daily. TOLD BY MD TO STOP DAY BEFORE SURGERY   9/29/2017   • dicyclomine (BENTYL) 20 MG tablet Take 20 mg by mouth 3 (Three) Times a Day.   Taking   • fenofibrate 160 MG tablet Take 160 mg by mouth Daily.   Taking   • gabapentin  (NEURONTIN) 300 MG capsule Take 1,200 mg by mouth Every Night.   Taking   • insulin aspart (novoLOG) 100 UNIT/ML injection Inject 8-12 Units under the skin 3 (Three) Times a Day Before Meals. PER SLIDING SCALE   Taking   • Magnesium 250 MG tablet Take 1 tablet by mouth 3 (Three) Times a Day.   Taking   • metFORMIN XR (GLUCOPHAGE-XR) 500 MG 24 hr tablet 500 mg 2 (Two) Times a Day. PT HOLDING FOR SURGERY   Taking   • metoclopramide (REGLAN) 5 MG tablet Take 5 mg by mouth Every Morning.   Taking   • pantoprazole (PROTONIX) 40 MG EC tablet Take 1 tablet by mouth Daily. (Patient taking differently: Take 40 mg by mouth 2 (Two) Times a Day.) 60 tablet 1 Taking   • sucralfate (CARAFATE) 1 G tablet Take 1 g by mouth 4 (Four) Times a Day.   Taking   • VICTOZA 18 MG/3ML solution pen-injector 1.8 mg Every Morning.   Taking     Current Meds:     aspirin 81 mg Oral Daily   dicyclomine 20 mg Oral TID   fenofibrate 145 mg Oral Daily   gabapentin 1,200 mg Oral Nightly   insulin aspart 0-9 Units Subcutaneous 4x Daily With Meals & Nightly   insulin detemir 20 Units Subcutaneous Nightly   levoFLOXacin 750 mg Intravenous Q24H   metoclopramide 5 mg Oral QAM   metroNIDAZOLE 500 mg Intravenous Q8H   pantoprazole 40 mg Oral Q AM   sucralfate 1 g Oral 4x Daily     Allergies:  Allergies   Allergen Reactions   • Augmentin [Amoxicillin-Pot Clavulanate] GI Intolerance and Dizziness   • Codeine GI Intolerance and Dizziness     Review of Systems  All systems were reviewed and negative except for:  Gastrointestinal: postitive for  nausea, pain and vomiting     Objective     Vital Signs  Temp:  [97.7 °F (36.5 °C)-98.8 °F (37.1 °C)] 98.2 °F (36.8 °C)  Heart Rate:  [68-76] 76  Resp:  [16-20] 16  BP: (114-165)/(57-73) 165/69  Physical Exam:  General Appearance:    Alert, cooperative, in no acute distress   Head:    Normocephalic, without obvious abnormality, atraumatic   Eyes:            Lids and lashes normal, conjunctivae and sclerae normal, no    icterus   Throat:   No oral lesions, no thrush, oral mucosa moist   Neck:   No adenopathy, supple, trachea midline, no thyromegaly, no   carotid bruit, no JVD   Lungs:     Clear to auscultation,respirations regular, even and                   unlabored    Heart:    Regular rhythm and normal rate, normal S1 and S2, no            murmur, no gallop, no rub, no click   Chest Wall:    No abnormalities observed   Abdomen:     Normal bowel sounds, no masses, no organomegaly, soft        non-tender, non-distended, no guarding, no rebound                 tenderness   Rectal:     Deferred   Extremities:   no edema, no cyanosis, no redness   Skin:   No bleeding, bruising or rash   Lymph nodes:   No palpable adenopathy   Psychiatric:  Judgement and insight: normal   Orientation to person place and time: normal   Mood and affect: normal   Results Review:   I reviewed the patient's new clinical results.  I reviewed the patient's new imaging results and agree with the interpretation.      Results from last 7 days  Lab Units 10/02/17  0350 10/01/17  0503 09/30/17  1047   WBC 10*3/mm3 9.04 12.86* 13.79*   HEMOGLOBIN g/dL 10.2* 10.5* 12.8   HEMATOCRIT % 33.4* 33.8* 40.3   PLATELETS 10*3/mm3 392 417 461       Results from last 7 days  Lab Units 10/02/17  0350 10/01/17  0503 09/30/17  1047   SODIUM mmol/L 140 139 138   POTASSIUM mmol/L 4.4 4.0 4.1   CHLORIDE mmol/L 104 103 99   CO2 mmol/L 25.2 24.3 22.7   BUN mg/dL 16 17 23   CREATININE mg/dL 0.88 0.75 0.93   CALCIUM mg/dL 8.6 9.0 10.4   BILIRUBIN mg/dL 0.4 0.8 1.8*   ALK PHOS U/L 143* 160* 203*   ALT (SGPT) U/L 173* 233* 341*   AST (SGOT) U/L 136* 220* 406*   GLUCOSE mg/dL 142* 107* 187*       Results from last 7 days  Lab Units 10/01/17  0503   INR  1.10       Lab Results  Lab Value Date/Time   LIPASE 66 (H) 09/30/2017 1047   LIPASE 44 11/21/2016 2056   LIPASE 75 (H) 10/17/2015 1647   LIPASE 30 09/22/2015 0558       Radiology:  CT Abdomen Pelvis With Contrast   Final Result   1.  Intrahepatic and extrahepatic biliary duct dilatation has developed   since exam 04/13/2017. There is a new 1 cm mildly hyperdense filling   defect within the intrapancreatic segment of the common duct that may   represent a stone or mass. There has been previous cholecystectomy.   2. Diffuse vascular disease with SMA and iliac stents. Previous mesh   repair anterior abdominal pelvic wall. Diffuse colonic diverticulosis.   Small hiatal hernia.       Discussed with Dr. Whitt 09/30/2017 at 12:35 PM.       Radiation dose reduction techniques were utilized, including automated   exposure control and exposure modulation based on body size.       This report was finalized on 9/30/2017 6:34 PM by Dr. Grady Castelan MD.          XR Chest 2 View   Final Result   No focal pulmonary consolidation. Borderline heart size.   Follow-up as clinical indications persist.       This report was finalized on 9/30/2017 10:32 AM by Dr. Aftab Esparza MD.          MRI abdomen w wo contrast mrcp    (Results Pending)       Assessment/Plan   Patient Active Problem List   Diagnosis   • Abdominal pain   • Intractable abdominal pain   • Essential hypertension   • DM (diabetes mellitus), type 2 with neurological complications   • PVD (peripheral vascular disease)   • Mesenteric ischemia   • Acute gastric ulcer   • S/P CABG (coronary artery bypass graft)   • Coronary artery disease involving native coronary artery of native heart without angina pectoris   • Atherosclerotic PVD with ulceration   • Chronic systolic heart failure   • Dyslipidemia   • Type 2 diabetes mellitus without complication, with long-term current use of insulin   • Biliary obstruction     Patient with several weeks of recurrent right upper quadrant pain with nausea and vomiting found with abnormal CT confirmed with common duct stone on MRCP on my evaluation.  Radiology interpretation still pending we'll proceed with ERCP for stone removal and follow clinical  response.  I discussed the patients findings and my recommendations with patient.    Christopher Graf MD

## 2017-10-02 NOTE — BRIEF OP NOTE
ENDOSCOPIC RETROGRADE CHOLANGIOPANCREATOGRAPHY  Progress Note    Randi Martines  9/30/2017 - 10/2/2017    Pre-op Diagnosis:   Biliary obstruction [K83.1]    Post-op Diagnosis:     Post-Op Diagnosis Codes:     * Biliary obstruction [K83.1]     * Calculus of bile duct without cholangitis with obstruction [K80.51]    Procedure/CPT® Codes:      Procedure(s):  ENDOSCOPIC RETROGRADE CHOLANGIOPANCREATOGRAPHY with sphincterotomy, balloon sweep    Surgeon(s):  Christopher Graf MD    Anesthesia: Monitor Anesthesia Care    Staff:   Radiology Technologist: Aylin Blackburn  Endo Technician: Gisselle Morrison  Endo Nurse: Bertha Wiseman RN    Estimated Blood Loss: minimal    Urine Voided: * No values recorded between 10/2/2017 11:26 AM and 10/2/2017 11:58 AM *    Specimens:                None      Drains:           Findings: Common bile duct stone    Complications: None      Christopher Graf MD     Date: 10/2/2017  Time: 12:01 PM

## 2017-10-02 NOTE — PLAN OF CARE
Problem: Patient Care Overview (Adult)  Goal: Discharge Needs Assessment  Outcome: Ongoing (interventions implemented as appropriate)    09/30/17 1506 09/30/17 1847   Discharge Needs Assessment   Concerns To Be Addressed --  denies needs/concerns at this time   Readmission Within The Last 30 Days --  no previous admission in last 30 days   Equipment Needed After Discharge --  none   Discharge Facility/Level Of Care Needs --  home with home health   Discharge Disposition --  still a patient   Current Health   Anticipated Changes Related to Illness --  none   Self-Care   Equipment Currently Used at Home --  walker, rolling;wound care supplies   Living Environment   Transportation Available car;family or friend will provide --          Problem: Infection, Risk/Actual (Adult)  Goal: Infection Prevention/Resolution  Outcome: Ongoing (interventions implemented as appropriate)    Problem: Pain, Acute (Adult)  Goal: Acceptable Pain Control/Comfort Level  Outcome: Ongoing (interventions implemented as appropriate)

## 2017-10-02 NOTE — NURSING NOTE
10/02/17 1600   Wound 09/30/17 1900 Left medial first toe   Date first assessed/Time first assessed: 09/30/17 1900   Side: Left  Orientation: medial  Location: first toe   Wound WDL ex   Dressing Appearance dry;intact   Base reddened  (dry)   Periwound Area pink  (tissue soft)   Edges irregular   Length (cm) 2   Width (cm) 2   Drainage Amount none   Dressing (dry dressing applied)   WOCN: Patient has been seen per Dr Barrientos and Waterhouse skin graft left great toe surgery.  Currently seen per HH and treatment silvadene 2 times per day. Instructed patient to follow up with Dr Barrientos.  Patient verbalizes understand and will make an appt. Plan to continue current treatment during hospitalization

## 2017-10-03 VITALS
WEIGHT: 186.6 LBS | RESPIRATION RATE: 18 BRPM | HEIGHT: 60 IN | DIASTOLIC BLOOD PRESSURE: 55 MMHG | HEART RATE: 68 BPM | TEMPERATURE: 98.5 F | BODY MASS INDEX: 36.63 KG/M2 | SYSTOLIC BLOOD PRESSURE: 135 MMHG | OXYGEN SATURATION: 94 %

## 2017-10-03 LAB
ALBUMIN SERPL-MCNC: 2.6 G/DL (ref 3.5–5.2)
ALBUMIN/GLOB SERPL: 0.6 G/DL
ALP SERPL-CCNC: 189 U/L (ref 39–117)
ALT SERPL W P-5'-P-CCNC: 168 U/L (ref 1–33)
ANION GAP SERPL CALCULATED.3IONS-SCNC: 11.5 MMOL/L
AST SERPL-CCNC: 149 U/L (ref 1–32)
BASOPHILS # BLD AUTO: 0.02 10*3/MM3 (ref 0–0.2)
BASOPHILS NFR BLD AUTO: 0.2 % (ref 0–1.5)
BILIRUB SERPL-MCNC: 0.5 MG/DL (ref 0.1–1.2)
BUN BLD-MCNC: 12 MG/DL (ref 8–23)
BUN/CREAT SERPL: 16 (ref 7–25)
CALCIUM SPEC-SCNC: 8.8 MG/DL (ref 8.6–10.5)
CHLORIDE SERPL-SCNC: 103 MMOL/L (ref 98–107)
CO2 SERPL-SCNC: 27.5 MMOL/L (ref 22–29)
CREAT BLD-MCNC: 0.75 MG/DL (ref 0.57–1)
DEPRECATED RDW RBC AUTO: 47.3 FL (ref 37–54)
EOSINOPHIL # BLD AUTO: 0.19 10*3/MM3 (ref 0–0.7)
EOSINOPHIL NFR BLD AUTO: 1.6 % (ref 0.3–6.2)
ERYTHROCYTE [DISTWIDTH] IN BLOOD BY AUTOMATED COUNT: 13.9 % (ref 11.7–13)
GFR SERPL CREATININE-BSD FRML MDRD: 77 ML/MIN/1.73
GLOBULIN UR ELPH-MCNC: 4.2 GM/DL
GLUCOSE BLD-MCNC: 190 MG/DL (ref 65–99)
GLUCOSE BLDC GLUCOMTR-MCNC: 133 MG/DL (ref 70–130)
GLUCOSE BLDC GLUCOMTR-MCNC: 190 MG/DL (ref 70–130)
GLUCOSE BLDC GLUCOMTR-MCNC: 272 MG/DL (ref 70–130)
HCT VFR BLD AUTO: 34.7 % (ref 35.6–45.5)
HGB BLD-MCNC: 10.8 G/DL (ref 11.9–15.5)
IMM GRANULOCYTES # BLD: 0.04 10*3/MM3 (ref 0–0.03)
IMM GRANULOCYTES NFR BLD: 0.3 % (ref 0–0.5)
LYMPHOCYTES # BLD AUTO: 1.92 10*3/MM3 (ref 0.9–4.8)
LYMPHOCYTES NFR BLD AUTO: 16.3 % (ref 19.6–45.3)
MCH RBC QN AUTO: 29.1 PG (ref 26.9–32)
MCHC RBC AUTO-ENTMCNC: 31.1 G/DL (ref 32.4–36.3)
MCV RBC AUTO: 93.5 FL (ref 80.5–98.2)
MONOCYTES # BLD AUTO: 1 10*3/MM3 (ref 0.2–1.2)
MONOCYTES NFR BLD AUTO: 8.5 % (ref 5–12)
NEUTROPHILS # BLD AUTO: 8.62 10*3/MM3 (ref 1.9–8.1)
NEUTROPHILS NFR BLD AUTO: 73.1 % (ref 42.7–76)
PLATELET # BLD AUTO: 434 10*3/MM3 (ref 140–500)
PMV BLD AUTO: 10.4 FL (ref 6–12)
POTASSIUM BLD-SCNC: 4.2 MMOL/L (ref 3.5–5.2)
PROT SERPL-MCNC: 6.8 G/DL (ref 6–8.5)
RBC # BLD AUTO: 3.71 10*6/MM3 (ref 3.9–5.2)
SODIUM BLD-SCNC: 142 MMOL/L (ref 136–145)
WBC NRBC COR # BLD: 11.79 10*3/MM3 (ref 4.5–10.7)

## 2017-10-03 PROCEDURE — 63710000001 INSULIN ASPART PER 5 UNITS: Performed by: INTERNAL MEDICINE

## 2017-10-03 PROCEDURE — 82962 GLUCOSE BLOOD TEST: CPT

## 2017-10-03 PROCEDURE — 97161 PT EVAL LOW COMPLEX 20 MIN: CPT

## 2017-10-03 PROCEDURE — 90661 CCIIV3 VAC ABX FR 0.5 ML IM: CPT | Performed by: INTERNAL MEDICINE

## 2017-10-03 PROCEDURE — G0008 ADMIN INFLUENZA VIRUS VAC: HCPCS | Performed by: INTERNAL MEDICINE

## 2017-10-03 PROCEDURE — 97110 THERAPEUTIC EXERCISES: CPT

## 2017-10-03 PROCEDURE — 80053 COMPREHEN METABOLIC PANEL: CPT | Performed by: INTERNAL MEDICINE

## 2017-10-03 PROCEDURE — 85025 COMPLETE CBC W/AUTO DIFF WBC: CPT | Performed by: INTERNAL MEDICINE

## 2017-10-03 PROCEDURE — 25010000002 INFLUENZA VAC SUBUNIT QUAD 0.5 ML SUSPENSION PREFILLED SYRINGE: Performed by: INTERNAL MEDICINE

## 2017-10-03 PROCEDURE — 25010000002 HYDROMORPHONE PER 4 MG: Performed by: INTERNAL MEDICINE

## 2017-10-03 RX ADMIN — SUCRALFATE 1 G: 1 TABLET ORAL at 18:02

## 2017-10-03 RX ADMIN — A/SINGAPORE/GP1908/2015 IVR-180 (H1N1) (AN A/MICHIGAN/45/2015-LIKE VIRUS), A/SINGAPORE/GP2050/2015 (H3N2) (AN A/HONG KONG/4801/2014 - LIKE VIRUS), B/UTAH/9/2014 (A B/PHUKET/3073/2013-LIKE VIRUS), B/HONG KONG/259/2010 (A B/BRISBANE/60/08-LIKE VIRUS) 0.5 ML: 15; 15; 15; 15 INJECTION, SUSPENSION INTRAMUSCULAR at 18:33

## 2017-10-03 RX ADMIN — DICYCLOMINE HYDROCHLORIDE 20 MG: 10 CAPSULE ORAL at 08:08

## 2017-10-03 RX ADMIN — INSULIN ASPART 6 UNITS: 100 INJECTION, SOLUTION INTRAVENOUS; SUBCUTANEOUS at 18:02

## 2017-10-03 RX ADMIN — HYDROMORPHONE HYDROCHLORIDE 0.5 MG: 1 INJECTION, SOLUTION INTRAMUSCULAR; INTRAVENOUS; SUBCUTANEOUS at 02:57

## 2017-10-03 RX ADMIN — METOCLOPRAMIDE 5 MG: 5 TABLET ORAL at 06:36

## 2017-10-03 RX ADMIN — INSULIN ASPART 2 UNITS: 100 INJECTION, SOLUTION INTRAVENOUS; SUBCUTANEOUS at 11:24

## 2017-10-03 RX ADMIN — FENOFIBRATE 145 MG: 145 TABLET, FILM COATED ORAL at 08:08

## 2017-10-03 RX ADMIN — PANTOPRAZOLE SODIUM 40 MG: 40 TABLET, DELAYED RELEASE ORAL at 05:12

## 2017-10-03 RX ADMIN — SODIUM CHLORIDE 75 ML/HR: 9 INJECTION, SOLUTION INTRAVENOUS at 06:35

## 2017-10-03 RX ADMIN — SUCRALFATE 1 G: 1 TABLET ORAL at 08:08

## 2017-10-03 RX ADMIN — METRONIDAZOLE 500 MG: 500 INJECTION, SOLUTION INTRAVENOUS at 05:12

## 2017-10-03 RX ADMIN — SILVER SULFADIAZINE: 10 CREAM TOPICAL at 08:08

## 2017-10-03 RX ADMIN — SUCRALFATE 1 G: 1 TABLET ORAL at 11:24

## 2017-10-03 RX ADMIN — ASPIRIN 81 MG: 81 TABLET ORAL at 08:08

## 2017-10-03 RX ADMIN — DICYCLOMINE HYDROCHLORIDE 20 MG: 10 CAPSULE ORAL at 16:14

## 2017-10-03 NOTE — PLAN OF CARE
Problem: Patient Care Overview (Adult)  Goal: Plan of Care Review  Outcome: Ongoing (interventions implemented as appropriate)    10/03/17 1430   Coping/Psychosocial Response Interventions   Plan Of Care Reviewed With patient   Patient Care Overview   Progress improving   Outcome Evaluation   Outcome Summary/Follow up Plan no c/o pain today after eating, md okay to d/c as long as patient remains pain free after dinner, will continue to monitor        Goal: Adult Individualization and Mutuality  Outcome: Ongoing (interventions implemented as appropriate)  Goal: Discharge Needs Assessment  Outcome: Ongoing (interventions implemented as appropriate)    Problem: Infection, Risk/Actual (Adult)  Goal: Infection Prevention/Resolution  Outcome: Ongoing (interventions implemented as appropriate)    Problem: Pain, Acute (Adult)  Goal: Acceptable Pain Control/Comfort Level  Outcome: Ongoing (interventions implemented as appropriate)    Problem: Skin Integrity Impairment, Risk/Actual (Adult)  Goal: Skin Integrity/Wound Healing  Outcome: Ongoing (interventions implemented as appropriate)

## 2017-10-03 NOTE — DISCHARGE INSTR - APPOINTMENTS
Appointment scheduled with EBEN Schultz(For Dr. Jose Morrison) on October 10,2017 at 1:00pm for hospital follow up

## 2017-10-03 NOTE — THERAPY DISCHARGE NOTE
Acute Care - Physical Therapy Initial Eval/Discharge  [facility]     Patient Name: Randi Martines  : 1950  MRN: [id number]  Today's Date: 10/3/2017   Onset of Illness/Injury or Date of Surgery Date: 17            Admit Date: 2017    Visit Dx:    ICD-10-CM ICD-9-CM   1. Biliary obstruction K83.1 576.2   2. Right upper quadrant abdominal pain R10.11 789.01     Patient Active Problem List   Diagnosis   • Abdominal pain   • Intractable abdominal pain   • Essential hypertension   • DM (diabetes mellitus), type 2 with neurological complications   • PVD (peripheral vascular disease)   • Mesenteric ischemia   • Acute gastric ulcer   • S/P CABG (coronary artery bypass graft)   • Coronary artery disease involving native coronary artery of native heart without angina pectoris   • Atherosclerotic PVD with ulceration   • Chronic systolic heart failure   • Dyslipidemia   • Type 2 diabetes mellitus without complication, with long-term current use of insulin   • Biliary obstruction     Past Medical History:   Diagnosis Date   • CAD (coronary artery disease)    • Chronic kidney disease    • Chronic systolic congestive heart failure    • Diabetes mellitus    • Dyslipidemia    • Foot ulcer    • History of diverticulitis    • History of stomach ulcers    • History of transfusion    • Hyperlipemia    • Hypertension    • Peptic ulcer disease    • PONV (postoperative nausea and vomiting)    • PVD (peripheral vascular disease)      Past Surgical History:   Procedure Laterality Date   • APPENDECTOMY     • CATARACT EXTRACTION     • CHOLECYSTECTOMY     • COLONOSCOPY     • CORONARY ARTERY BYPASS GRAFT      7 coronary bypass surgery   • ENDOSCOPY N/A 2016    Procedure: ESOPHAGOGASTRODUODENOSCOPY ;  Surgeon: Katherine Green MD;  Location: [facility] ENDOSCOPY;  Service:    • ERCP N/A 10/2/2017    Procedure: ENDOSCOPIC RETROGRADE CHOLANGIOPANCREATOGRAPHY with sphincterotomy, balloon sweep;  Surgeon: Christopher  OTIS Graf MD;  Location: [facility] ENDOSCOPY;  Service:    • HERNIA REPAIR  2015   • HYSTERECTOMY     • MD VEIN IN SITU BYPASS GRAFT,FEM-POP Left 5/31/2017    Procedure: ULTRASOUND RIGHT AIF, ARTERIOGRAM W/ LEFT COMMON ILIAC ANGIOPLASTY STENT, LT FEMORAL POPLITEAL BYPASS WITH ARTEGRAFT, LEFT POPLITEAL ENDARTERECTOMY PATCH, LEFT COMMON ARTERIOGRAM, LEFT EXTERNAL ILIAC ANGIOPLASTY STENT PLACEMENT, AORTA,  EXTERNAL, DISTAL PRESSURES;  Surgeon: Jayda Barrientos MD;  Location: [facility] HYBRID OR 18/19;  Service: Vascular   • SKIN GRAFT SPLIT THICKNESS Left 6/7/2017    Procedure: DEBRIDEMENT AND SKIN GRAFT TO  LEFT DORSAL FOOT AND TOES;  Surgeon: Maurine Waterhouse, MD;  Location: [facility] MAIN OR;  Service:    • UPPER GASTROINTESTINAL ENDOSCOPY  2015    scar in gastric body, gastric ulcers          PT ASSESSMENT (last 72 hours)      PT Evaluation       10/03/17 1056 10/02/17 1549    Rehab Evaluation    Document Type evaluation;discharge summary  -AR     Subjective Information agree to therapy  -AR     Patient Effort, Rehab Treatment good  -AR     Symptoms Noted During/After Treatment none  -AR     General Information    Patient Profile Review yes  -AR     Onset of Illness/Injury or Date of Surgery Date 09/30/17  -AR     Precautions/Limitations fall precautions  -AR     Prior Level of Function independent:  -AR     Equipment Currently Used at Home none   was not using RW  -AR rollator;glucometer;wound care supplies  -SM    Barriers to Rehab none identified  -AR     Living Environment    Lives With child(magan), adult  -AR child(magan), adult  -SM    Living Arrangements house  -AR house   single story  -SM    Home Accessibility no concerns  -AR stairs to enter home  -SM    Number of Stairs to Enter Home  1  -SM    Stair Railings at Home  none  -SM    Type of Financial/Environmental Concern  none  -SM    Transportation Available  car;family or friend will provide  -SM    Clinical Impression    Patient/Family Goals Statement DC  home  -AR     Criteria for Skilled Therapeutic Interventions Met no  -AR     Pain Assessment    Pain Assessment No/denies pain  -AR     Cognitive Assessment/Intervention    Current Cognitive/Communication Assessment functional  -AR     Orientation Status oriented x 4  -AR     Follows Commands/Answers Questions 100% of the time  -AR     Personal Safety WNL/WFL  -AR     Personal Safety Interventions gait belt;muscle strengthening facilitated  -AR     ROM (Range of Motion)    General ROM no range of motion deficits identified  -AR     MMT (Manual Muscle Testing)    General MMT Assessment no strength deficits identified  -AR     Bed Mobility, Assessment/Treatment    Bed Mob, Supine to Sit, Gates conditional independence  -AR     Bed Mob, Sit to Supine, Gates conditional independence  -AR     Transfer Assessment/Treatment    Transfers, Sit-Stand Gates independent  -AR     Transfers, Stand-Sit Gates independent  -AR     Gait Assessment/Treatment    Gait, Gates Level stand by assist;contact guard assist  -AR     Gait, Distance (Feet) 200  -AR     Gait, Gait Deviations kiki decreased  -AR     Gait, Safety Issues step length decreased  -AR     Positioning and Restraints    Pre-Treatment Position in bed  -AR     Post Treatment Position bed  -AR     In Bed supine;call light within reach;encouraged to call for assist  -AR       09/30/17 1842 09/30/17 1506    General Information    Equipment Currently Used at Home walker, rolling;wound care supplies  -AU     Living Environment    Lives With  child(magan), adult  -AU    Living Arrangements  house  -AU    Home Accessibility  no concerns  -AU    Stair Railings at Home  none  -AU    Type of Financial/Environmental Concern  none  -AU    Transportation Available  car;family or friend will provide  -AU      09/30/17 1443       General Information    Equipment Currently Used at Home walker, rolling  -AU       User Key  (r) = Recorded By, (t) = Taken  By, (c) = Cosigned By    Initials Name Provider Type     Melody Thao, RN Case Manager    TALHA Ren PT Physical Therapist    JA Driscoll RN Registered Nurse              PT Recommendation and Plan  Anticipated Equipment Needs At Discharge:  (No equipment needs)  Anticipated Discharge Disposition: home with assist  PT Frequency: evaluation only  Plan of Care Review  Outcome Summary/Follow up Plan: Pt admitted to hospital 9/30 w/ abdominal pain and nausea.  S/p MRCP 10/2.  Pt ambulating in landin w/ stand by assist, no assistive device or safety concerns.  No equipment needs.  Discussed activity recommendations/walking program while inpatient.   DC PT, pt aware.                Outcome Measures       10/03/17 1100          How much help from another person do you currently need...    Turning from your back to your side while in flat bed without using bedrails? 4  -AR      Moving from lying on back to sitting on the side of a flat bed without bedrails? 4  -AR      Moving to and from a bed to a chair (including a wheelchair)? 4  -AR      Standing up from a chair using your arms (e.g., wheelchair, bedside chair)? 4  -AR      Climbing 3-5 steps with a railing? 3  -AR      To walk in hospital room? 3  -AR      AM-PAC 6 Clicks Score 22  -AR      Functional Assessment    Outcome Measure Options AM-PAC 6 Clicks Basic Mobility (PT)  -AR        User Key  (r) = Recorded By, (t) = Taken By, (c) = Cosigned By    Initials Name Provider Type    TALHA Ren PT Physical Therapist           Time Calculation:         PT Charges       10/03/17 1101          Time Calculation    Start Time 1043  -AR      Stop Time 1101  -AR      Time Calculation (min) 18 min  -AR      PT Received On 10/03/17  -AR        User Key  (r) = Recorded By, (t) = Taken By, (c) = Cosigned By    Initials Name Provider Type    TALHA Ren PT Physical Therapist          Therapy Charges for Today     Code Description Service Date  Service Provider Modifiers Qty    47473109460 HC PT EVAL LOW COMPLEXITY 2 10/3/2017 Elisabeth Ren, PT GP 1    81467226124 HC PT THER PROC EA 15 MIN 10/3/2017 Elisabeth Ren PT GP 1          PT G-Codes  Outcome Measure Options: AM-PAC 6 Clicks Basic Mobility (PT)    PT Discharge Summary  Anticipated Discharge Disposition: home with assist  Reason for Discharge: Maximum functional potential achieved  Discharge Destination: Home with assist    Elisabeth Ren PT  10/3/2017

## 2017-10-03 NOTE — DISCHARGE SUMMARY
[facility]    Date of Discharge:  10/3/2017    PCP: Jose Morrison, DO    Discharge Diagnosis:   Active Hospital Problems (** Indicates Principal Problem)    Diagnosis Date Noted   • Biliary obstruction [K83.1] 09/30/2017   • Abdominal pain [R10.9] 11/22/2016      Resolved Hospital Problems    Diagnosis Date Noted Date Resolved   No resolved problems to display.     Procedures Performed  Procedure(s):  ENDOSCOPIC RETROGRADE CHOLANGIOPANCREATOGRAPHY with sphincterotomy, balloon sweep  10/02 1110 ERCP  Consulting Physician(s)     Provider Relationship Specialty    Christopher Graf MD Consulting Physician Gastroenterology        Hospital Course  Please see history and physical for details. Patient is a 66 y.o. female initially admitted by my colleague Dr. Elliott for complaints of abdominal pain and nausea.  She had a remote medical history of cholecystectomy though imaging demonstrated aspects of hepatic biliary ductal dilatation and possible stone retainment.  MRCP demonstrated an 8 mm calculus of the intrahepatic portion of the common bile duct and ERCP was endorsed.  See ERCP report for further clarification but basically was found to have choledocholithiasis and biliary tree was swept as well as sphincterotomy performed.  Patient has tolerated advancement of diet at this juncture.  If she continues to tolerate lunch and dinner she'll be deemed safe for disposition to home.  IV antibiotics have been discontinued at this juncture.  She is already followed by home health and that can be resumed as previously ordered at the time of discharge.  She is followed by Dr. Barrientos with vascular surgery for left lower extremity great toe.  All questions were answered to patient prior to disposition and she is amenable to the above plan.  Case also discussed with RN to ensure smooth transition to home pending diet    Condition on Discharge: Improved    Temp:  [98.1  °F (36.7 °C)-99 °F (37.2 °C)] 98.2 °F (36.8 °C)  Heart Rate:  [68-85] 68  Resp:  [18-20] 18  BP: (140-172)/(59-92) 140/61  Body mass index is 36.44 kg/(m^2).    Physical Exam   Constitutional: She is oriented to person, place, and time. She appears well-developed.   Neck: Neck supple. No JVD present.   Cardiovascular: Normal rate, regular rhythm and normal heart sounds.    Pulmonary/Chest: Effort normal and breath sounds normal. No respiratory distress.   Abdominal: Soft. Bowel sounds are normal. She exhibits no distension. There is no tenderness.   Musculoskeletal: She exhibits no edema.   Neurological: She is alert and oriented to person, place, and time.     Discharge Medications   Randi Martines   Home Medication Instructions PRESLEY:[id number]    Printed on:10/03/17 1223   Medication Information                      acetaminophen (TYLENOL) 500 MG tablet  Take 500 mg by mouth Every 6 (Six) Hours As Needed for Mild Pain .             aspirin 81 MG EC tablet  Take 81 mg by mouth Daily. PT HOLDING FOR SURGERY             atorvastatin (LIPITOR) 80 MG tablet  Take 80 mg by mouth Every Night.             clopidogrel (PLAVIX) 75 MG tablet  Take 75 mg by mouth Daily. TOLD BY MD TO STOP DAY BEFORE SURGERY             dicyclomine (BENTYL) 20 MG tablet  Take 20 mg by mouth 3 (Three) Times a Day.             fenofibrate 160 MG tablet  Take 160 mg by mouth Daily.             gabapentin (NEURONTIN) 300 MG capsule  Take 1,200 mg by mouth Every Night.             insulin aspart (novoLOG) 100 UNIT/ML injection  Inject 8-12 Units under the skin 3 (Three) Times a Day Before Meals. PER SLIDING SCALE             LEVEMIR FLEXTOUCH 100 UNIT/ML injection  Inject 40 Units under the skin Every Night.             Magnesium 250 MG tablet  Take 1 tablet by mouth 3 (Three) Times a Day.             metFORMIN XR (GLUCOPHAGE-XR) 500 MG 24 hr tablet  500 mg 2 (Two) Times a Day. PT HOLDING FOR SURGERY             metoclopramide (REGLAN) 5 MG  tablet  Take 5 mg by mouth Every Morning.             pantoprazole (PROTONIX) 40 MG EC tablet  Take 1 tablet by mouth Daily.             silver sulfadiazine (SILVADENE, SSD) 1 % cream  Apply  topically Every 12 (Twelve) Hours.             sucralfate (CARAFATE) 1 G tablet  Take 1 g by mouth 4 (Four) Times a Day.             VICTOZA 18 MG/3ML solution pen-injector  1.8 mg Every Morning.                  Additional Instructions for the Follow-ups that You Need to Schedule     Discharge Follow-up with PCP    As directed    Follow Up Details:  pcp 2 weeksHomar per his recs, keep Vascular appt as scheduled             Follow-up Information     Follow up with [facility] .    Specialty:  Home Health Services    Contact information:    [address] [id number] [phone]        Follow up with Jose Morrison DO .    Specialties:  Family Medicine, Emergency Medicine    Why:  pcp 2 Homar enrique per his recs, keep Vascular appt as scheduled    Contact information:    [address] [id number] [phone]          Test Results Pending at Discharge     Segun Moreno MD  10/03/17  12:23 PM    Discharge time spent greater than 30 minutes.

## 2017-10-03 NOTE — PLAN OF CARE
Problem: Patient Care Overview (Adult)  Goal: Plan of Care Review  Outcome: Ongoing (interventions implemented as appropriate)    10/03/17 0415   Coping/Psychosocial Response Interventions   Plan Of Care Reviewed With patient   Patient Care Overview   Progress improving   Outcome Evaluation   Outcome Summary/Follow up Plan s/p MRCP yesterday, had considerable pain after procedure, but has improved overnight w/PRN pain meds and rest. IVFs, O2 during sleep at 2L/NC. Anxious to get home. Will closely monitor.         Problem: Infection, Risk/Actual (Adult)  Goal: Infection Prevention/Resolution  Outcome: Ongoing (interventions implemented as appropriate)    Problem: Pain, Acute (Adult)  Goal: Acceptable Pain Control/Comfort Level  Outcome: Ongoing (interventions implemented as appropriate)    Problem: Skin Integrity Impairment, Risk/Actual (Adult)  Goal: Skin Integrity/Wound Healing  Outcome: Ongoing (interventions implemented as appropriate)

## 2017-10-03 NOTE — PLAN OF CARE
Problem: Patient Care Overview (Adult)  Goal: Plan of Care Review    10/03/17 1059   Outcome Evaluation   Outcome Summary/Follow up Plan Pt admitted to hospital 9/30 w/ abdominal pain and nausea. S/p MRCP 10/2. Pt ambulating in [place] w/ stand by assist, no assistive device or safety concerns. No equipment needs. Discussed activity recommendations/walking program while inpatient. DC PT, pt aware.

## 2017-10-04 NOTE — PROGRESS NOTES
Case Management Discharge Note    Final Note: Discharged  10/3/17 to home with [facility]-    Discharge Placement     Facility/Agency Request Status Selected? Address Phone Number Fax Number    [facility] Accepted    Yes [address] [id number] [phone]        Other: Other (family provided transportation at discharge)    Discharge Codes: 06  Discharged/transferred to home under care of organized home health service organization in anticipation of skilled care

## 2017-10-30 ENCOUNTER — TELEPHONE (OUTPATIENT)
Dept: FAMILY MEDICINE CLINIC | Facility: CLINIC | Age: 67
End: 2017-10-30

## 2017-10-30 RX ORDER — PANTOPRAZOLE SODIUM 40 MG/1
40 TABLET, DELAYED RELEASE ORAL 2 TIMES DAILY
Qty: 180 TABLET | Refills: 1 | Status: SHIPPED | OUTPATIENT
Start: 2017-10-30 | End: 2018-05-21 | Stop reason: SDUPTHER

## 2017-10-30 RX ORDER — GABAPENTIN 300 MG/1
1200 CAPSULE ORAL NIGHTLY
Qty: 360 CAPSULE | Refills: 0 | Status: SHIPPED | OUTPATIENT
Start: 2017-10-30 | End: 2018-01-18 | Stop reason: SDUPTHER

## 2017-10-30 RX ORDER — ATORVASTATIN CALCIUM 80 MG/1
80 TABLET, FILM COATED ORAL NIGHTLY
Qty: 90 TABLET | Refills: 3 | Status: SHIPPED | OUTPATIENT
Start: 2017-10-30 | End: 2019-06-26 | Stop reason: SDUPTHER

## 2017-10-30 RX ORDER — METFORMIN HYDROCHLORIDE 500 MG/1
500 TABLET, EXTENDED RELEASE ORAL 2 TIMES DAILY
Qty: 180 TABLET | Refills: 1 | Status: SHIPPED | OUTPATIENT
Start: 2017-10-30 | End: 2018-01-18 | Stop reason: SDUPTHER

## 2017-11-27 ENCOUNTER — OFFICE VISIT (OUTPATIENT)
Dept: FAMILY MEDICINE CLINIC | Facility: CLINIC | Age: 67
End: 2017-11-27

## 2017-11-27 VITALS
BODY MASS INDEX: 37.69 KG/M2 | WEIGHT: 192 LBS | HEART RATE: 70 BPM | TEMPERATURE: 98.6 F | DIASTOLIC BLOOD PRESSURE: 70 MMHG | HEIGHT: 60 IN | OXYGEN SATURATION: 97 % | SYSTOLIC BLOOD PRESSURE: 148 MMHG

## 2017-11-27 DIAGNOSIS — Z79.4 TYPE 2 DIABETES MELLITUS WITHOUT COMPLICATION, WITH LONG-TERM CURRENT USE OF INSULIN (HCC): Primary | ICD-10-CM

## 2017-11-27 DIAGNOSIS — E11.9 TYPE 2 DIABETES MELLITUS WITHOUT COMPLICATION, WITH LONG-TERM CURRENT USE OF INSULIN (HCC): Primary | ICD-10-CM

## 2017-11-27 DIAGNOSIS — E83.42 HYPOMAGNESEMIA: ICD-10-CM

## 2017-11-27 PROCEDURE — 99213 OFFICE O/P EST LOW 20 MIN: CPT | Performed by: FAMILY MEDICINE

## 2017-11-27 RX ORDER — VALSARTAN AND HYDROCHLOROTHIAZIDE 160; 25 MG/1; MG/1
TABLET ORAL
COMMUNITY
Start: 2017-10-27 | End: 2018-01-18 | Stop reason: SDUPTHER

## 2017-11-27 NOTE — PROGRESS NOTES
Subjective   Randi Martines is a 67 y.o. female. Presents today for   Chief Complaint   Patient presents with   • Diabetes     pt here for 3 month follow up visit.       Diabetes   She presents for her follow-up diabetic visit. She has type 2 diabetes mellitus. Her disease course has been stable. Associated symptoms include foot paresthesias and foot ulcerations (great toe ulceration, cannot see wound;  Has wound care from [facility] coming.). Pertinent negatives for diabetes include no chest pain. Symptoms are stable. Current diabetic treatment includes diet and insulin injections. She is compliant with treatment most of the time (Doing victoza;  levemir at night and occly short acting, checking twice daily.). Her weight is stable. An ACE inhibitor/angiotensin II receptor blocker is not being taken.   patient post-d/c for choledocolithiasis, s/p ERCP with stone removal.  Doing well;   Tolerating PO;  Was found to have very low mag, due recheck as well.    Review of Systems   Respiratory: Negative for shortness of breath.    Cardiovascular: Negative for chest pain.   Gastrointestinal: Negative for abdominal pain, nausea and vomiting.   Skin: Positive for wound.       The following portions of the patient's history were reviewed and updated as appropriate: allergies, current medications, past medical history, past surgical history and problem list.    Patient Active Problem List   Diagnosis   • Abdominal pain   • Intractable abdominal pain   • Essential hypertension   • DM (diabetes mellitus), type 2 with neurological complications   • PVD (peripheral vascular disease)   • Mesenteric ischemia   • Acute gastric ulcer   • S/P CABG (coronary artery bypass graft)   • Coronary artery disease involving native coronary artery of native heart without angina pectoris   • Atherosclerotic PVD with ulceration   • Chronic systolic heart failure   • Dyslipidemia   • Type 2 diabetes mellitus without complication, with long-term  current use of insulin   • Biliary obstruction       Allergies   Allergen Reactions   • Augmentin [Amoxicillin-Pot Clavulanate] GI Intolerance and Dizziness   • Codeine GI Intolerance and Dizziness       Current Outpatient Prescriptions on File Prior to Visit   Medication Sig Dispense Refill   • acetaminophen (TYLENOL) 500 MG tablet Take 500 mg by mouth Every 6 (Six) Hours As Needed for Mild Pain .     • aspirin 81 MG EC tablet Take 81 mg by mouth Daily. PT HOLDING FOR SURGERY     • atorvastatin (LIPITOR) 80 MG tablet Take 1 tablet by mouth Every Night. 90 tablet 3   • clopidogrel (PLAVIX) 75 MG tablet Take 75 mg by mouth Daily. TOLD BY MD TO STOP DAY BEFORE SURGERY     • dicyclomine (BENTYL) 20 MG tablet Take 20 mg by mouth 3 (Three) Times a Day.     • fenofibrate 160 MG tablet Take 160 mg by mouth Daily.     • gabapentin (NEURONTIN) 300 MG capsule Take 4 capsules by mouth Every Night. 360 capsule 0   • insulin aspart (novoLOG) 100 UNIT/ML injection Inject 8-12 Units under the skin 3 (Three) Times a Day Before Meals. PER SLIDING SCALE     • LEVEMIR FLEXTOUCH 100 UNIT/ML injection Inject 40 Units under the skin Every Night.     • Magnesium 250 MG tablet Take 1 tablet by mouth 3 (Three) Times a Day.     • metFORMIN ER (GLUCOPHAGE-XR) 500 MG 24 hr tablet Take 1 tablet by mouth 2 (Two) Times a Day. PT HOLDING FOR SURGERY 180 tablet 1   • metoclopramide (REGLAN) 5 MG tablet Take 5 mg by mouth Every Morning.     • pantoprazole (PROTONIX) 40 MG EC tablet Take 1 tablet by mouth 2 (Two) Times a Day. 180 tablet 1   • silver sulfadiazine (SILVADENE, SSD) 1 % cream Apply  topically Every 12 (Twelve) Hours.     • sucralfate (CARAFATE) 1 G tablet Take 1 g by mouth 4 (Four) Times a Day.     • VICTOZA 18 MG/3ML solution pen-injector 1.8 mg Every Morning.       No current facility-administered medications on file prior to visit.        Objective   Vitals:    11/27/17 1654   BP: 148/70   BP Location: Left arm   Patient Position:  "Sitting   Cuff Size: Large Adult   Pulse: 70   Temp: 98.6 °F (37 °C)   TempSrc: Oral   SpO2: 97%   Weight: 192 lb (87.1 kg)   Height: 60\" (152.4 cm)       Physical Exam   Constitutional: She appears well-developed and well-nourished.   HENT:   Head: Normocephalic and atraumatic.   Neck: Neck supple. No JVD present. No thyromegaly present.   Cardiovascular: Normal rate, regular rhythm and normal heart sounds.  Exam reveals no gallop and no friction rub.    No murmur heard.  Pulmonary/Chest: Effort normal and breath sounds normal. No respiratory distress. She has no wheezes. She has no rales.   Abdominal: Soft. Bowel sounds are normal. She exhibits no distension. There is no tenderness. There is no rebound and no guarding.   Musculoskeletal: She exhibits no edema.   Neurological: She is alert.   Skin: Skin is warm and dry.   Psychiatric: She has a normal mood and affect. Her behavior is normal.   Nursing note and vitals reviewed.      Assessment/Plan   Randi was seen today for diabetes.    Diagnoses and all orders for this visit:    Type 2 diabetes mellitus without complication, with long-term current use of insulin  -     Hemoglobin A1c  -     Basic Metabolic Panel    Hypomagnesemia  -     Magnesium    -work on diet, recheck a1c today  -recheck mag;  Continue oral       -Follow up: 6 months       Current Outpatient Prescriptions:   •  acetaminophen (TYLENOL) 500 MG tablet, Take 500 mg by mouth Every 6 (Six) Hours As Needed for Mild Pain ., Disp: , Rfl:   •  aspirin 81 MG EC tablet, Take 81 mg by mouth Daily. PT HOLDING FOR SURGERY, Disp: , Rfl:   •  atorvastatin (LIPITOR) 80 MG tablet, Take 1 tablet by mouth Every Night., Disp: 90 tablet, Rfl: 3  •  clopidogrel (PLAVIX) 75 MG tablet, Take 75 mg by mouth Daily. TOLD BY MD TO STOP DAY BEFORE SURGERY, Disp: , Rfl:   •  dicyclomine (BENTYL) 20 MG tablet, Take 20 mg by mouth 3 (Three) Times a Day., Disp: , Rfl:   •  fenofibrate 160 MG tablet, Take 160 mg by mouth " Daily., Disp: , Rfl:   •  gabapentin (NEURONTIN) 300 MG capsule, Take 4 capsules by mouth Every Night., Disp: 360 capsule, Rfl: 0  •  insulin aspart (novoLOG) 100 UNIT/ML injection, Inject 8-12 Units under the skin 3 (Three) Times a Day Before Meals. PER SLIDING SCALE, Disp: , Rfl:   •  LEVEMIR FLEXTOUCH 100 UNIT/ML injection, Inject 40 Units under the skin Every Night., Disp: , Rfl:   •  Magnesium 250 MG tablet, Take 1 tablet by mouth 3 (Three) Times a Day., Disp: , Rfl:   •  metFORMIN ER (GLUCOPHAGE-XR) 500 MG 24 hr tablet, Take 1 tablet by mouth 2 (Two) Times a Day. PT HOLDING FOR SURGERY, Disp: 180 tablet, Rfl: 1  •  metoclopramide (REGLAN) 5 MG tablet, Take 5 mg by mouth Every Morning., Disp: , Rfl:   •  pantoprazole (PROTONIX) 40 MG EC tablet, Take 1 tablet by mouth 2 (Two) Times a Day., Disp: 180 tablet, Rfl: 1  •  silver sulfadiazine (SILVADENE, SSD) 1 % cream, Apply  topically Every 12 (Twelve) Hours., Disp: , Rfl:   •  sucralfate (CARAFATE) 1 G tablet, Take 1 g by mouth 4 (Four) Times a Day., Disp: , Rfl:   •  valsartan-hydrochlorothiazide (DIOVAN-HCT) 160-25 MG per tablet, , Disp: , Rfl:   •  VICTOZA 18 MG/3ML solution pen-injector, 1.8 mg Every Morning., Disp: , Rfl:

## 2017-11-28 LAB
BUN SERPL-MCNC: 21 MG/DL (ref 8–27)
BUN/CREAT SERPL: 23 (ref 12–28)
CALCIUM SERPL-MCNC: 9.6 MG/DL (ref 8.7–10.3)
CHLORIDE SERPL-SCNC: 104 MMOL/L (ref 96–106)
CO2 SERPL-SCNC: 26 MMOL/L (ref 18–29)
CREAT SERPL-MCNC: 0.93 MG/DL (ref 0.57–1)
GFR SERPLBLD CREATININE-BSD FMLA CKD-EPI: 63 ML/MIN/1.73
GFR SERPLBLD CREATININE-BSD FMLA CKD-EPI: 73 ML/MIN/1.73
GLUCOSE SERPL-MCNC: 165 MG/DL (ref 65–99)
HBA1C MFR BLD: 6.3 % (ref 4.8–5.6)
MAGNESIUM SERPL-MCNC: 1.2 MG/DL (ref 1.6–2.3)
POTASSIUM SERPL-SCNC: 5.9 MMOL/L (ref 3.5–5.2)
SODIUM SERPL-SCNC: 143 MMOL/L (ref 134–144)

## 2017-11-28 NOTE — PROGRESS NOTES
Call results to patient.  Magnesium is very low;  Increase Magnesium oxide 400mg to 3 times daily.  Also order infusion for Magnesium Sulfate IV 4gm x1.  Recheck Mag after infusion and 1 week later.  A1C 6.3 - diabetes is well controlled  Potassium is high, ? Hemolyzed, recheck BMP.

## 2017-11-29 DIAGNOSIS — E87.5 SERUM POTASSIUM ELEVATED: Primary | ICD-10-CM

## 2017-11-29 DIAGNOSIS — R79.0 LOW MAGNESIUM LEVEL: ICD-10-CM

## 2017-11-29 RX ORDER — MAGNESIUM OXIDE 400 MG/1
400 TABLET ORAL 3 TIMES DAILY
Qty: 90 TABLET | Refills: 5 | Status: SHIPPED | OUTPATIENT
Start: 2017-11-29 | End: 2018-01-26 | Stop reason: SDUPTHER

## 2017-12-15 ENCOUNTER — OFFICE VISIT (OUTPATIENT)
Dept: FAMILY MEDICINE CLINIC | Facility: CLINIC | Age: 67
End: 2017-12-15

## 2017-12-15 VITALS
DIASTOLIC BLOOD PRESSURE: 74 MMHG | TEMPERATURE: 98.2 F | HEART RATE: 83 BPM | WEIGHT: 188 LBS | HEIGHT: 60 IN | SYSTOLIC BLOOD PRESSURE: 126 MMHG | OXYGEN SATURATION: 96 % | BODY MASS INDEX: 36.91 KG/M2

## 2017-12-15 DIAGNOSIS — J40 BRONCHITIS: Primary | ICD-10-CM

## 2017-12-15 DIAGNOSIS — E87.6 HYPOKALEMIA: ICD-10-CM

## 2017-12-15 PROCEDURE — 99213 OFFICE O/P EST LOW 20 MIN: CPT | Performed by: NURSE PRACTITIONER

## 2017-12-15 RX ORDER — CEPHALEXIN 500 MG/1
500 CAPSULE ORAL 3 TIMES DAILY
Qty: 30 CAPSULE | Refills: 0 | Status: SHIPPED | OUTPATIENT
Start: 2017-12-15 | End: 2018-06-04

## 2017-12-15 RX ORDER — GUAIFENESIN 600 MG/1
1200 TABLET, EXTENDED RELEASE ORAL 2 TIMES DAILY
Qty: 120 TABLET | Refills: 0 | Status: SHIPPED | OUTPATIENT
Start: 2017-12-15 | End: 2018-06-04

## 2017-12-15 RX ORDER — BENZONATATE 100 MG/1
100 CAPSULE ORAL 3 TIMES DAILY PRN
Qty: 30 CAPSULE | Refills: 0 | Status: SHIPPED | OUTPATIENT
Start: 2017-12-15 | End: 2018-06-04

## 2017-12-15 RX ORDER — FLUTICASONE PROPIONATE 50 MCG
2 SPRAY, SUSPENSION (ML) NASAL DAILY
Qty: 16 G | Refills: 0 | Status: SHIPPED | OUTPATIENT
Start: 2017-12-15 | End: 2018-06-04

## 2017-12-15 NOTE — PROGRESS NOTES
Subjective   Randi Martines is a 67 y.o. female. Cough x 2 or 3 days. 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  States intermittent fevers, chills. Hx: bronchitis and this feels familiar. She has nonproductive cough. She also has nasal congestion. Took Sudafed, Tylenol, Coricidin HBP, nothing really helped.  She also states she was called by Dr. Morrison to have her potassium test repeated because there was something wrong with the previous one. Wants to know if I will put in the order to redraw.     Cough   This is a new problem. The current episode started in the past 7 days. The problem has been gradually worsening. The cough is non-productive. Associated symptoms include chills, a fever, nasal congestion, shortness of breath and sweats. Treatments tried: dayquil, Sudafed, Tylenol, Coricidin. The treatment provided no relief.   Sinusitis   This is a new problem. The current episode started in the past 7 days. The problem has been gradually worsening since onset. The maximum temperature recorded prior to her arrival was 100.4 - 100.9 F. Associated symptoms include chills, congestion, coughing, shortness of breath and sinus pressure. Past treatments include acetaminophen and oral decongestants. The treatment provided mild relief.        The following portions of the patient's history were reviewed and updated as appropriate: allergies, current medications, past medical history, past social history, past surgical history and problem list.    Review of Systems   Constitutional: Positive for chills and fever.   HENT: Positive for congestion and sinus pressure.    Respiratory: Positive for cough, chest tightness and shortness of breath.    Cardiovascular: Negative.        Objective   Physical Exam   Constitutional: Vital signs are normal. She appears well-developed and well-nourished. She is cooperative.   HENT:   Right Ear: Hearing normal. A middle ear effusion is present.   Left Ear: Hearing normal. A middle ear  "effusion is present.   Nose: Mucosal edema and rhinorrhea present.   Mouth/Throat: Uvula is midline, oropharynx is clear and moist and mucous membranes are normal.   Cardiovascular: Normal rate, regular rhythm and normal heart sounds.    Pulmonary/Chest: Effort normal. She has decreased breath sounds. She has wheezes in the right upper field and the left upper field.   Neurological: She is alert.   Nursing note and vitals reviewed.    Vitals:    12/15/17 1542   BP: 126/74   Pulse: 83   Temp: 98.2 °F (36.8 °C)   TempSrc: Oral   SpO2: 96%   Weight: 85.3 kg (188 lb)   Height: 152.4 cm (60\")       Assessment/Plan   Diagnoses and all orders for this visit:    Bronchitis  -     cephalexin (KEFLEX) 500 MG capsule; Take 1 capsule by mouth 3 (Three) Times a Day.  -     benzonatate (TESSALON PERLES) 100 MG capsule; Take 1 capsule by mouth 3 (Three) Times a Day As Needed for Cough.  -     guaiFENesin (MUCINEX) 600 MG 12 hr tablet; Take 2 tablets by mouth 2 (Two) Times a Day.  -     fluticasone (FLONASE) 50 MCG/ACT nasal spray; 2 sprays into each nostril Daily.    Hypokalemia  -     Basic Metabolic Panel    Lots of fluids.  Medication as ordered.  RTC if not improved.             "

## 2017-12-16 LAB
BUN SERPL-MCNC: 26 MG/DL (ref 8–27)
BUN/CREAT SERPL: 25 (ref 12–28)
CALCIUM SERPL-MCNC: 10.5 MG/DL (ref 8.7–10.3)
CHLORIDE SERPL-SCNC: 101 MMOL/L (ref 96–106)
CO2 SERPL-SCNC: 27 MMOL/L (ref 18–29)
CREAT SERPL-MCNC: 1.05 MG/DL (ref 0.57–1)
GFR SERPLBLD CREATININE-BSD FMLA CKD-EPI: 55 ML/MIN/1.73
GFR SERPLBLD CREATININE-BSD FMLA CKD-EPI: 63 ML/MIN/1.73
GLUCOSE SERPL-MCNC: 127 MG/DL (ref 65–99)
POTASSIUM SERPL-SCNC: 4.6 MMOL/L (ref 3.5–5.2)
SODIUM SERPL-SCNC: 141 MMOL/L (ref 134–144)

## 2018-01-17 RX ORDER — GABAPENTIN 300 MG/1
CAPSULE ORAL
Qty: 360 CAPSULE | Refills: 0 | Status: CANCELLED | OUTPATIENT
Start: 2018-01-17

## 2018-01-18 RX ORDER — GABAPENTIN 300 MG/1
1200 CAPSULE ORAL NIGHTLY
Qty: 360 CAPSULE | Refills: 0 | Status: SHIPPED | OUTPATIENT
Start: 2018-01-18 | End: 2018-04-24 | Stop reason: SDUPTHER

## 2018-01-18 RX ORDER — FENOFIBRATE 160 MG/1
160 TABLET ORAL DAILY
Qty: 90 TABLET | Refills: 1 | Status: SHIPPED | OUTPATIENT
Start: 2018-01-18 | End: 2018-10-05 | Stop reason: SDUPTHER

## 2018-01-18 RX ORDER — METOCLOPRAMIDE 5 MG/1
5 TABLET ORAL EVERY MORNING
Qty: 90 TABLET | Refills: 1 | Status: SHIPPED | OUTPATIENT
Start: 2018-01-18 | End: 2018-10-05 | Stop reason: SDUPTHER

## 2018-01-18 RX ORDER — VALSARTAN AND HYDROCHLOROTHIAZIDE 160; 25 MG/1; MG/1
1 TABLET ORAL DAILY
Qty: 90 TABLET | Refills: 1 | Status: SHIPPED | OUTPATIENT
Start: 2018-01-18 | End: 2018-05-21 | Stop reason: SDUPTHER

## 2018-01-18 RX ORDER — METFORMIN HYDROCHLORIDE 500 MG/1
500 TABLET, EXTENDED RELEASE ORAL 2 TIMES DAILY
Qty: 180 TABLET | Refills: 1 | Status: SHIPPED | OUTPATIENT
Start: 2018-01-18 | End: 2018-09-24 | Stop reason: SDDI

## 2018-01-18 RX ORDER — CLOPIDOGREL BISULFATE 75 MG/1
75 TABLET ORAL DAILY
Qty: 90 TABLET | Refills: 1 | Status: SHIPPED | OUTPATIENT
Start: 2018-01-18 | End: 2018-10-05 | Stop reason: SDUPTHER

## 2018-01-18 RX ORDER — INSULIN DETEMIR 100 [IU]/ML
40 INJECTION, SOLUTION SUBCUTANEOUS NIGHTLY
Qty: 3 ML | Refills: 3 | Status: SHIPPED | OUTPATIENT
Start: 2018-01-18 | End: 2018-12-27

## 2018-01-18 RX ORDER — LIRAGLUTIDE 6 MG/ML
1.8 INJECTION SUBCUTANEOUS EVERY MORNING
Qty: 3 PEN | Refills: 1 | Status: SHIPPED | OUTPATIENT
Start: 2018-01-18 | End: 2019-01-14 | Stop reason: SDUPTHER

## 2018-01-26 RX ORDER — MAGNESIUM OXIDE 400 MG/1
400 TABLET ORAL 3 TIMES DAILY
Qty: 270 TABLET | Refills: 1 | Status: SHIPPED | OUTPATIENT
Start: 2018-01-26 | End: 2018-06-04

## 2018-04-03 ENCOUNTER — TELEPHONE (OUTPATIENT)
Dept: FAMILY MEDICINE CLINIC | Facility: CLINIC | Age: 68
End: 2018-04-03

## 2018-04-23 RX ORDER — SUCRALFATE 1 G/1
1 TABLET ORAL 4 TIMES DAILY
Qty: 360 TABLET | Refills: 1 | Status: SHIPPED | OUTPATIENT
Start: 2018-04-23 | End: 2018-04-26 | Stop reason: SDUPTHER

## 2018-04-23 RX ORDER — GABAPENTIN 300 MG/1
CAPSULE ORAL
Qty: 360 CAPSULE | Refills: 0 | Status: CANCELLED | OUTPATIENT
Start: 2018-04-23

## 2018-04-23 RX ORDER — INSULIN ASPART 100 [IU]/ML
INJECTION, SOLUTION INTRAVENOUS; SUBCUTANEOUS
Qty: 45 ML | Refills: 3 | Status: SHIPPED | OUTPATIENT
Start: 2018-04-23 | End: 2019-03-25 | Stop reason: SDUPTHER

## 2018-04-24 RX ORDER — GABAPENTIN 300 MG/1
1200 CAPSULE ORAL NIGHTLY
Qty: 360 CAPSULE | Refills: 0 | Status: SHIPPED | OUTPATIENT
Start: 2018-04-24 | End: 2018-07-19 | Stop reason: SDUPTHER

## 2018-04-26 RX ORDER — SUCRALFATE 1 G/1
1 TABLET ORAL 4 TIMES DAILY
Qty: 360 TABLET | Refills: 1 | Status: SHIPPED | OUTPATIENT
Start: 2018-04-26 | End: 2018-05-04 | Stop reason: SDUPTHER

## 2018-05-04 RX ORDER — SUCRALFATE 1 G/1
1 TABLET ORAL 4 TIMES DAILY
Qty: 360 TABLET | Refills: 1 | Status: SHIPPED | OUTPATIENT
Start: 2018-05-04 | End: 2019-01-14 | Stop reason: SDUPTHER

## 2018-05-22 RX ORDER — VALSARTAN AND HYDROCHLOROTHIAZIDE 160; 25 MG/1; MG/1
TABLET ORAL
Qty: 90 TABLET | Refills: 0 | Status: SHIPPED | OUTPATIENT
Start: 2018-05-22 | End: 2018-10-05 | Stop reason: SDUPTHER

## 2018-05-22 RX ORDER — ATORVASTATIN CALCIUM 40 MG/1
TABLET, FILM COATED ORAL
Qty: 90 TABLET | Refills: 0 | Status: SHIPPED | OUTPATIENT
Start: 2018-05-22 | End: 2018-06-04

## 2018-05-22 RX ORDER — PANTOPRAZOLE SODIUM 40 MG/1
TABLET, DELAYED RELEASE ORAL
Qty: 180 TABLET | Refills: 0 | Status: SHIPPED | OUTPATIENT
Start: 2018-05-22 | End: 2018-07-26 | Stop reason: SDUPTHER

## 2018-05-31 RX ORDER — DICYCLOMINE HCL 20 MG
20 TABLET ORAL 3 TIMES DAILY
Qty: 270 TABLET | Refills: 1 | Status: SHIPPED | OUTPATIENT
Start: 2018-05-31 | End: 2019-01-14 | Stop reason: SDUPTHER

## 2018-06-04 ENCOUNTER — OFFICE VISIT (OUTPATIENT)
Dept: FAMILY MEDICINE CLINIC | Facility: CLINIC | Age: 68
End: 2018-06-04

## 2018-06-04 VITALS
WEIGHT: 200 LBS | BODY MASS INDEX: 39.27 KG/M2 | HEIGHT: 60 IN | SYSTOLIC BLOOD PRESSURE: 138 MMHG | OXYGEN SATURATION: 93 % | TEMPERATURE: 98.3 F | HEART RATE: 78 BPM | DIASTOLIC BLOOD PRESSURE: 68 MMHG

## 2018-06-04 DIAGNOSIS — I73.9 PVD (PERIPHERAL VASCULAR DISEASE) (HCC): ICD-10-CM

## 2018-06-04 DIAGNOSIS — E83.42 HYPOMAGNESEMIA: ICD-10-CM

## 2018-06-04 DIAGNOSIS — R10.11 RUQ ABDOMINAL PAIN: ICD-10-CM

## 2018-06-04 DIAGNOSIS — I25.10 CORONARY ARTERY DISEASE INVOLVING NATIVE CORONARY ARTERY OF NATIVE HEART WITHOUT ANGINA PECTORIS: ICD-10-CM

## 2018-06-04 DIAGNOSIS — Z79.4 TYPE 2 DIABETES MELLITUS WITHOUT COMPLICATION, WITH LONG-TERM CURRENT USE OF INSULIN (HCC): Primary | ICD-10-CM

## 2018-06-04 DIAGNOSIS — I10 ESSENTIAL HYPERTENSION: ICD-10-CM

## 2018-06-04 DIAGNOSIS — J20.9 ACUTE BRONCHITIS, UNSPECIFIED ORGANISM: ICD-10-CM

## 2018-06-04 DIAGNOSIS — E11.9 TYPE 2 DIABETES MELLITUS WITHOUT COMPLICATION, WITH LONG-TERM CURRENT USE OF INSULIN (HCC): Primary | ICD-10-CM

## 2018-06-04 DIAGNOSIS — E78.5 DYSLIPIDEMIA: ICD-10-CM

## 2018-06-04 DIAGNOSIS — I50.22 CHRONIC SYSTOLIC HEART FAILURE (HCC): ICD-10-CM

## 2018-06-04 PROCEDURE — 99214 OFFICE O/P EST MOD 30 MIN: CPT | Performed by: FAMILY MEDICINE

## 2018-06-04 RX ORDER — DOXYCYCLINE HYCLATE 100 MG/1
100 CAPSULE ORAL 2 TIMES DAILY
Qty: 20 CAPSULE | Refills: 0 | Status: SHIPPED | OUTPATIENT
Start: 2018-06-04 | End: 2018-06-06 | Stop reason: SDUPTHER

## 2018-06-04 RX ORDER — MULTIVITAMIN WITH IRON
250 TABLET ORAL 3 TIMES DAILY
COMMUNITY
End: 2019-02-01

## 2018-06-04 NOTE — PROGRESS NOTES
Subjective   Randi Martines is a 67 y.o. female. Presents today for   Chief Complaint   Patient presents with   • Diabetes     pt here for 6 month med review and labs. she has lesion on her toe that she needs looked at. she wants to know what she needs to do to prevent a fib.   • Urinary Tract Infection     pt is having uti symptoms.   • Cough     pt has had cough for over one month. it makes her throw up and her ribs hurt from coughing.   • Hypertension   • Hyperlipidemia   • Coronary Artery Disease       Cough   This is a recurrent problem. Episode onset: 4 or more weeks;  had cold, but settled in chest and not improving. The problem has been unchanged. The problem occurs constantly. The cough is productive of sputum (grey, purulent sputum). Associated symptoms include shortness of breath and wheezing. Pertinent negatives include no chest pain, chills, fever, hemoptysis, nasal congestion (Mild, but better) or postnasal drip. Nothing aggravates the symptoms. Treatments tried: given abx, sinuses better but not chest. The treatment provided mild relief. There is no history of environmental allergies or pneumonia.   Coronary Artery Disease   Presents for follow-up visit. Symptoms include shortness of breath. Pertinent negatives include no chest pain, chest pressure, chest tightness, leg swelling or palpitations. (Patient prior hx of CHF, doing well.   No prior hx of atrial fib, family member had stroke after a. Fib and so frightened.) The symptoms have been stable. Compliance with diet is good. Compliance with medications is good.   Diabetes   She presents for her follow-up diabetic visit. She has type 2 diabetes mellitus. Associated symptoms include foot paresthesias and foot ulcerations (left hallux small ulcer;  Has wound care nurse helping with wound care and healing over time.). Pertinent negatives for diabetes include no chest pain. Symptoms are stable. Diabetic complications include heart disease (hx of  cabg), nephropathy and PVD (hx of fem-pop, reports no claudication). Pertinent negatives for diabetic complications include no CVA. Risk factors for coronary artery disease include diabetes mellitus, dyslipidemia, obesity and post-menopausal. Current diabetic treatment includes insulin injections, diet and oral agent (monotherapy). She is compliant with treatment most of the time. She is following a generally healthy diet. Her home blood glucose trend is fluctuating dramatically (Running 200s in evening;  Am 100s, occly higher.). An ACE inhibitor/angiotensin II receptor blocker is being taken.   Hypertension   This is a chronic problem. The current episode started more than 1 year ago. The problem is unchanged. The problem is controlled. Associated symptoms include peripheral edema (no worse than usual.) and shortness of breath. Pertinent negatives include no chest pain, orthopnea, palpitations or PND. There are no associated agents to hypertension. Risk factors for coronary artery disease include dyslipidemia, diabetes mellitus, post-menopausal state and obesity. Past treatments include angiotensin blockers and diuretics. Current antihypertension treatment includes angiotensin blockers and diuretics. The current treatment provides moderate improvement. Hypertensive end-organ damage includes kidney disease, CAD/MI (hx of cabg) and PVD (hx of fem-pop, reports no claudication). There is no history of CVA. Identifiable causes of hypertension include chronic renal disease.     Has of hx choledocholiathisis, reports off/on RUQ abd pain, can tolerate but wonders if still stone.  No n/v except on sat and sun if sleeps in.    Hx of low mag and on chronic ppi;   Due recheck;  Taking daily.  Review of Systems   Constitutional: Negative for chills and fever.   HENT: Negative for postnasal drip.    Respiratory: Positive for cough, shortness of breath and wheezing. Negative for hemoptysis and chest tightness.    Cardiovascular:  Negative for chest pain, palpitations, orthopnea, leg swelling and PND.   Allergic/Immunologic: Negative for environmental allergies.       The following portions of the patient's history were reviewed and updated as appropriate: allergies, current medications, past medical history, past surgical history and problem list.    Patient Active Problem List   Diagnosis   • Abdominal pain   • Intractable abdominal pain   • Essential hypertension   • DM (diabetes mellitus), type 2 with neurological complications   • PVD (peripheral vascular disease)   • Mesenteric ischemia   • Acute gastric ulcer   • S/P CABG (coronary artery bypass graft)   • Coronary artery disease involving native coronary artery of native heart without angina pectoris   • Atherosclerotic PVD with ulceration   • Chronic systolic heart failure   • Dyslipidemia   • Type 2 diabetes mellitus without complication, with long-term current use of insulin   • Biliary obstruction       Allergies   Allergen Reactions   • Augmentin [Amoxicillin-Pot Clavulanate] GI Intolerance and Dizziness   • Codeine GI Intolerance and Dizziness       Current Outpatient Prescriptions on File Prior to Visit   Medication Sig Dispense Refill   • aspirin 81 MG EC tablet Take 81 mg by mouth Daily. PT HOLDING FOR SURGERY     • atorvastatin (LIPITOR) 80 MG tablet Take 1 tablet by mouth Every Night. 90 tablet 3   • clopidogrel (PLAVIX) 75 MG tablet Take 1 tablet by mouth Daily. TOLD BY MD TO STOP DAY BEFORE SURGERY 90 tablet 1   • dicyclomine (BENTYL) 20 MG tablet Take 1 tablet by mouth 3 (Three) Times a Day. 270 tablet 1   • fenofibrate 160 MG tablet Take 1 tablet by mouth Daily. 90 tablet 1   • gabapentin (NEURONTIN) 300 MG capsule Take 4 capsules by mouth Every Night. 360 capsule 0   • glucose blood (PRODIGY NO CODING BLOOD GLUC) test strip Use as instructed to test glucose three times daily. Diagnosis code E11.9 IDDM 300 each 12   • Insulin Pen Needle 31G X 8 MM misc Use as directed 4  times daily 360 each 1   • LEVEMIR FLEXTOUCH 100 UNIT/ML injection Inject 40 Units under the skin Every Night. 3 mL 3   • metFORMIN ER (GLUCOPHAGE-XR) 500 MG 24 hr tablet Take 1 tablet by mouth 2 (Two) Times a Day. PT HOLDING FOR SURGERY 180 tablet 1   • metoclopramide (REGLAN) 5 MG tablet Take 1 tablet by mouth Every Morning. 90 tablet 1   • NOVOLOG FLEXPEN 100 UNIT/ML solution pen-injector sc pen INJECT  8 TO 12 UNITS SUBCUTANEOUSLY THREE TIMES DAILY BEFORE MEALS PER SLIDING SCALE DISCARD PEN 28 DAYS AFTER OPENING 45 mL 3   • pantoprazole (PROTONIX) 40 MG EC tablet TAKE 1 TABLET TWICE DAILY 180 tablet 0   • sucralfate (CARAFATE) 1 g tablet Take 1 tablet by mouth 4 (Four) Times a Day. 360 tablet 1   • valsartan-hydrochlorothiazide (DIOVAN-HCT) 160-25 MG per tablet TAKE 1 TABLET EVERY DAY 90 tablet 0   • VICTOZA 18 MG/3ML solution pen-injector injection Inject 1.8 mg under the skin Every Morning. 3 pen 1   • [DISCONTINUED] insulin aspart (novoLOG) 100 UNIT/ML injection Inject 8-12 Units under the skin 3 (Three) Times a Day Before Meals. PER SLIDING SCALE 10 mL 3   • acetaminophen (TYLENOL) 500 MG tablet Take 500 mg by mouth Every 6 (Six) Hours As Needed for Mild Pain .     • [DISCONTINUED] atorvastatin (LIPITOR) 40 MG tablet TAKE 1 TABLET EVERY DAY 90 tablet 0   • [DISCONTINUED] benzonatate (TESSALON PERLES) 100 MG capsule Take 1 capsule by mouth 3 (Three) Times a Day As Needed for Cough. 30 capsule 0   • [DISCONTINUED] cephalexin (KEFLEX) 500 MG capsule Take 1 capsule by mouth 3 (Three) Times a Day. 30 capsule 0   • [DISCONTINUED] fluticasone (FLONASE) 50 MCG/ACT nasal spray 2 sprays into each nostril Daily. 16 g 0   • [DISCONTINUED] guaiFENesin (MUCINEX) 600 MG 12 hr tablet Take 2 tablets by mouth 2 (Two) Times a Day. 120 tablet 0   • [DISCONTINUED] magnesium oxide (HM MAGNESIUM) 400 MG tablet Take 1 tablet by mouth 3 (Three) Times a Day. 270 tablet 1   • [DISCONTINUED] silver sulfadiazine (SILVADENE, SSD) 1 %  "cream Apply  topically Every 12 (Twelve) Hours.       No current facility-administered medications on file prior to visit.        Objective   Vitals:    06/04/18 1707   BP: 138/68   BP Location: Left arm   Patient Position: Sitting   Cuff Size: Adult   Pulse: 78   Temp: 98.3 °F (36.8 °C)   TempSrc: Oral   SpO2: 93%   Weight: 90.7 kg (200 lb)   Height: 152.4 cm (60\")       Physical Exam   Constitutional: She appears well-developed and well-nourished.   HENT:   Head: Normocephalic and atraumatic.   Right Ear: External ear normal.   Left Ear: External ear normal.   Nose: Nose normal.   Mouth/Throat: Oropharynx is clear and moist. No oropharyngeal exudate.   Neck: Neck supple. No JVD present. No thyromegaly present.   Cardiovascular: Normal rate, regular rhythm and normal heart sounds.  Exam reveals no gallop and no friction rub.    No murmur heard.  Pulmonary/Chest: Effort normal. No respiratory distress. She has no wheezes. She has rhonchi. She has no rales.   Abdominal: Soft. Bowel sounds are normal. She exhibits no distension. There is no tenderness. There is no rebound and no guarding.   Musculoskeletal: She exhibits no edema.   Neurological: She is alert.   Skin: Skin is warm and dry.   Left hallux [id number] ulceration, healing;  Callous formation.   Psychiatric: She has a normal mood and affect. Her behavior is normal.   Nursing note and vitals reviewed.      Assessment/Plan   Randi was seen today for diabetes, urinary tract infection, cough, hypertension, hyperlipidemia and coronary artery disease.    Diagnoses and all orders for this visit:    Type 2 diabetes mellitus without complication, with long-term current use of insulin  -     Hemoglobin A1c  -     Comprehensive Metabolic Panel  -     Lipid Panel  -     Magnesium  -     Empagliflozin (JARDIANCE) 10 MG tablet; Take 10 mg by mouth Daily.    Essential hypertension  -     Hemoglobin A1c  -     Comprehensive Metabolic Panel  -     Lipid Panel  -     " Magnesium    Dyslipidemia  -     Hemoglobin A1c  -     Comprehensive Metabolic Panel  -     Lipid Panel  -     Magnesium    Coronary artery disease involving native coronary artery of native heart without angina pectoris  -     Hemoglobin A1c  -     Comprehensive Metabolic Panel  -     Lipid Panel  -     Magnesium  -     Empagliflozin (JARDIANCE) 10 MG tablet; Take 10 mg by mouth Daily.    Chronic systolic heart failure  -     Hemoglobin A1c  -     Comprehensive Metabolic Panel  -     Lipid Panel  -     Magnesium    Hypomagnesemia    PVD (peripheral vascular disease)    RUQ abdominal pain  -     US Gallbladder; Future    Acute bronchitis, unspecified organism  -     doxycycline (VIBRAMYCIN) 100 MG capsule; Take 1 capsule by mouth 2 (Two) Times a Day.    -d/w options and hold insulin R and start jardiance 10mg;  Will titrate;  D/w side effects and benefits as hx of CAD.  -hypertension - controlled, continue medications  -cad RF reduction, Lipid, BP and BS control.  -dm2 - high risk foot loss, patient doesn't want to go to wound care and is aware of risks to toe.  Will continue current therapy;  Has PVD as well, but pedal pulses intact and cap ref <3sec.  Will monitor  -RUQ pain - ? GB, will check;  Avoid greasy/fried foods  -recheck mag, continue oral as on ppi  -seek care immediately if respiratory distress;  Will start doxy; Prn - RTC if worse or no improvement.         -Follow up: 3 months       Current Outpatient Prescriptions:   •  aspirin 81 MG EC tablet, Take 81 mg by mouth Daily. PT HOLDING FOR SURGERY, Disp: , Rfl:   •  atorvastatin (LIPITOR) 80 MG tablet, Take 1 tablet by mouth Every Night., Disp: 90 tablet, Rfl: 3  •  clopidogrel (PLAVIX) 75 MG tablet, Take 1 tablet by mouth Daily. TOLD BY MD TO STOP DAY BEFORE SURGERY, Disp: 90 tablet, Rfl: 1  •  dicyclomine (BENTYL) 20 MG tablet, Take 1 tablet by mouth 3 (Three) Times a Day., Disp: 270 tablet, Rfl: 1  •  fenofibrate 160 MG tablet, Take 1 tablet by mouth  Daily., Disp: 90 tablet, Rfl: 1  •  gabapentin (NEURONTIN) 300 MG capsule, Take 4 capsules by mouth Every Night., Disp: 360 capsule, Rfl: 0  •  glucose blood (PRODIGY NO CODING BLOOD GLUC) test strip, Use as instructed to test glucose three times daily. Diagnosis code E11.9 IDDM, Disp: 300 each, Rfl: 12  •  Insulin Pen Needle 31G X 8 MM misc, Use as directed 4 times daily, Disp: 360 each, Rfl: 1  •  LEVEMIR FLEXTOUCH 100 UNIT/ML injection, Inject 40 Units under the skin Every Night., Disp: 3 mL, Rfl: 3  •  Magnesium 250 MG tablet, Take 250 mg by mouth 3 (Three) Times a Day., Disp: , Rfl:   •  metFORMIN ER (GLUCOPHAGE-XR) 500 MG 24 hr tablet, Take 1 tablet by mouth 2 (Two) Times a Day. PT HOLDING FOR SURGERY, Disp: 180 tablet, Rfl: 1  •  metoclopramide (REGLAN) 5 MG tablet, Take 1 tablet by mouth Every Morning., Disp: 90 tablet, Rfl: 1  •  NOVOLOG FLEXPEN 100 UNIT/ML solution pen-injector sc pen, INJECT  8 TO 12 UNITS SUBCUTANEOUSLY THREE TIMES DAILY BEFORE MEALS PER SLIDING SCALE DISCARD PEN 28 DAYS AFTER OPENING, Disp: 45 mL, Rfl: 3  •  pantoprazole (PROTONIX) 40 MG EC tablet, TAKE 1 TABLET TWICE DAILY, Disp: 180 tablet, Rfl: 0  •  sucralfate (CARAFATE) 1 g tablet, Take 1 tablet by mouth 4 (Four) Times a Day., Disp: 360 tablet, Rfl: 1  •  valsartan-hydrochlorothiazide (DIOVAN-HCT) 160-25 MG per tablet, TAKE 1 TABLET EVERY DAY, Disp: 90 tablet, Rfl: 0  •  VICTOZA 18 MG/3ML solution pen-injector injection, Inject 1.8 mg under the skin Every Morning., Disp: 3 pen, Rfl: 1  •  acetaminophen (TYLENOL) 500 MG tablet, Take 500 mg by mouth Every 6 (Six) Hours As Needed for Mild Pain ., Disp: , Rfl:   •  doxycycline (VIBRAMYCIN) 100 MG capsule, Take 1 capsule by mouth 2 (Two) Times a Day., Disp: 20 capsule, Rfl: 0  •  Empagliflozin (JARDIANCE) 10 MG tablet, Take 10 mg by mouth Daily., Disp: 90 tablet, Rfl: 3

## 2018-06-05 LAB
ALBUMIN SERPL-MCNC: 3.9 G/DL (ref 3.6–4.8)
ALBUMIN/GLOB SERPL: 1 {RATIO} (ref 1.2–2.2)
ALP SERPL-CCNC: 49 IU/L (ref 39–117)
ALT SERPL-CCNC: 9 IU/L (ref 0–32)
AST SERPL-CCNC: 14 IU/L (ref 0–40)
BILIRUB SERPL-MCNC: 0.3 MG/DL (ref 0–1.2)
BUN SERPL-MCNC: 36 MG/DL (ref 8–27)
BUN/CREAT SERPL: 28 (ref 12–28)
CALCIUM SERPL-MCNC: 9.6 MG/DL (ref 8.7–10.3)
CHLORIDE SERPL-SCNC: 104 MMOL/L (ref 96–106)
CHOLEST SERPL-MCNC: 128 MG/DL (ref 100–199)
CO2 SERPL-SCNC: 23 MMOL/L (ref 18–29)
CREAT SERPL-MCNC: 1.27 MG/DL (ref 0.57–1)
GFR SERPLBLD CREATININE-BSD FMLA CKD-EPI: 43 ML/MIN/1.73
GFR SERPLBLD CREATININE-BSD FMLA CKD-EPI: 50 ML/MIN/1.73
GLOBULIN SER CALC-MCNC: 3.8 G/DL (ref 1.5–4.5)
GLUCOSE SERPL-MCNC: 131 MG/DL (ref 65–99)
HBA1C MFR BLD: 6.5 % (ref 4.8–5.6)
HDLC SERPL-MCNC: 42 MG/DL
LDLC SERPL CALC-MCNC: 64 MG/DL (ref 0–99)
MAGNESIUM SERPL-MCNC: 2 MG/DL (ref 1.6–2.3)
POTASSIUM SERPL-SCNC: 5.1 MMOL/L (ref 3.5–5.2)
PROT SERPL-MCNC: 7.7 G/DL (ref 6–8.5)
SODIUM SERPL-SCNC: 144 MMOL/L (ref 134–144)
TRIGL SERPL-MCNC: 109 MG/DL (ref 0–149)
VLDLC SERPL CALC-MCNC: 22 MG/DL (ref 5–40)

## 2018-06-06 ENCOUNTER — TELEPHONE (OUTPATIENT)
Dept: FAMILY MEDICINE CLINIC | Facility: CLINIC | Age: 68
End: 2018-06-06

## 2018-06-06 DIAGNOSIS — J20.9 ACUTE BRONCHITIS, UNSPECIFIED ORGANISM: ICD-10-CM

## 2018-06-06 RX ORDER — DOXYCYCLINE HYCLATE 100 MG/1
100 CAPSULE ORAL 2 TIMES DAILY
Qty: 20 CAPSULE | Refills: 0 | Status: SHIPPED | OUTPATIENT
Start: 2018-06-06 | End: 2018-09-24

## 2018-06-06 RX ORDER — DOXYCYCLINE HYCLATE 100 MG/1
100 CAPSULE ORAL 2 TIMES DAILY
Qty: 20 CAPSULE | Refills: 0 | Status: SHIPPED | OUTPATIENT
Start: 2018-06-06 | End: 2018-06-06 | Stop reason: SDUPTHER

## 2018-06-06 NOTE — PROGRESS NOTES
Diabetes is adequately controlled.  Cholesterol is adequately controlled.  Magnesium looks good, continue mag for now  Kidney function decline, avoid nsaids.  Rest of labs normal or stable.

## 2018-06-19 ENCOUNTER — HOSPITAL ENCOUNTER (OUTPATIENT)
Dept: ULTRASOUND IMAGING | Facility: HOSPITAL | Age: 68
Discharge: HOME | End: 2018-06-19
Admitting: FAMILY MEDICINE

## 2018-06-19 DIAGNOSIS — R10.11 RUQ ABDOMINAL PAIN: ICD-10-CM

## 2018-06-19 PROCEDURE — 76705 ECHO EXAM OF ABDOMEN: CPT

## 2018-07-02 RX ORDER — GABAPENTIN 300 MG/1
CAPSULE ORAL
Qty: 360 CAPSULE | Refills: 0 | OUTPATIENT
Start: 2018-07-02

## 2018-07-13 ENCOUNTER — OFFICE VISIT (OUTPATIENT)
Dept: CARDIOLOGY | Facility: CLINIC | Age: 68
End: 2018-07-13

## 2018-07-13 VITALS
BODY MASS INDEX: 38.46 KG/M2 | HEART RATE: 77 BPM | WEIGHT: 209 LBS | SYSTOLIC BLOOD PRESSURE: 152 MMHG | DIASTOLIC BLOOD PRESSURE: 84 MMHG | HEIGHT: 62 IN

## 2018-07-13 DIAGNOSIS — K55.9 MESENTERIC ISCHEMIA (HCC): ICD-10-CM

## 2018-07-13 DIAGNOSIS — I25.10 CORONARY ARTERY DISEASE INVOLVING NATIVE CORONARY ARTERY OF NATIVE HEART WITHOUT ANGINA PECTORIS: Primary | ICD-10-CM

## 2018-07-13 DIAGNOSIS — I10 ESSENTIAL HYPERTENSION: ICD-10-CM

## 2018-07-13 DIAGNOSIS — E78.5 DYSLIPIDEMIA: ICD-10-CM

## 2018-07-13 DIAGNOSIS — I73.9 PVD (PERIPHERAL VASCULAR DISEASE) (HCC): ICD-10-CM

## 2018-07-13 DIAGNOSIS — Z95.1 S/P CABG (CORONARY ARTERY BYPASS GRAFT): ICD-10-CM

## 2018-07-13 DIAGNOSIS — E11.49 DM (DIABETES MELLITUS), TYPE 2 WITH NEUROLOGICAL COMPLICATIONS (HCC): ICD-10-CM

## 2018-07-13 PROBLEM — I50.22 CHRONIC SYSTOLIC HEART FAILURE (HCC): Status: RESOLVED | Noted: 2017-08-30 | Resolved: 2018-07-13

## 2018-07-13 PROCEDURE — 93000 ELECTROCARDIOGRAM COMPLETE: CPT | Performed by: INTERNAL MEDICINE

## 2018-07-13 PROCEDURE — 99213 OFFICE O/P EST LOW 20 MIN: CPT | Performed by: INTERNAL MEDICINE
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
## 2018-07-18 ENCOUNTER — TELEPHONE (OUTPATIENT)
Dept: FAMILY MEDICINE CLINIC | Facility: CLINIC | Age: 68
End: 2018-07-18

## 2018-07-19 RX ORDER — GABAPENTIN 300 MG/1
1200 CAPSULE ORAL NIGHTLY
Qty: 360 CAPSULE | Refills: 0 | Status: SHIPPED | OUTPATIENT
Start: 2018-07-19 | End: 2019-01-08 | Stop reason: SDUPTHER

## 2018-07-26 RX ORDER — PANTOPRAZOLE SODIUM 40 MG/1
TABLET, DELAYED RELEASE ORAL
Qty: 180 TABLET | Refills: 0 | Status: SHIPPED | OUTPATIENT
Start: 2018-07-26 | End: 2018-10-05 | Stop reason: SDUPTHER

## 2018-07-26 RX ORDER — ATORVASTATIN CALCIUM 40 MG/1
TABLET, FILM COATED ORAL
Qty: 90 TABLET | Refills: 0 | Status: SHIPPED | OUTPATIENT
Start: 2018-07-26 | End: 2018-09-24 | Stop reason: DRUGHIGH

## 2018-09-24 ENCOUNTER — OFFICE VISIT (OUTPATIENT)
Dept: FAMILY MEDICINE CLINIC | Facility: CLINIC | Age: 68
End: 2018-09-24

## 2018-09-24 VITALS
DIASTOLIC BLOOD PRESSURE: 78 MMHG | OXYGEN SATURATION: 93 % | HEART RATE: 72 BPM | BODY MASS INDEX: 39.01 KG/M2 | SYSTOLIC BLOOD PRESSURE: 132 MMHG | WEIGHT: 212 LBS | HEIGHT: 62 IN | TEMPERATURE: 98.6 F

## 2018-09-24 DIAGNOSIS — Z79.4 TYPE 2 DIABETES MELLITUS WITHOUT COMPLICATION, WITH LONG-TERM CURRENT USE OF INSULIN (HCC): ICD-10-CM

## 2018-09-24 DIAGNOSIS — B37.31 YEAST VAGINITIS: ICD-10-CM

## 2018-09-24 DIAGNOSIS — I73.9 PVD (PERIPHERAL VASCULAR DISEASE) (HCC): ICD-10-CM

## 2018-09-24 DIAGNOSIS — E11.9 TYPE 2 DIABETES MELLITUS WITHOUT COMPLICATION, WITH LONG-TERM CURRENT USE OF INSULIN (HCC): ICD-10-CM

## 2018-09-24 DIAGNOSIS — I25.10 CORONARY ARTERY DISEASE INVOLVING NATIVE CORONARY ARTERY OF NATIVE HEART WITHOUT ANGINA PECTORIS: ICD-10-CM

## 2018-09-24 DIAGNOSIS — Z95.1 S/P CABG (CORONARY ARTERY BYPASS GRAFT): ICD-10-CM

## 2018-09-24 DIAGNOSIS — I10 ESSENTIAL HYPERTENSION: ICD-10-CM

## 2018-09-24 DIAGNOSIS — Z00.00 MEDICARE ANNUAL WELLNESS VISIT, INITIAL: Primary | ICD-10-CM

## 2018-09-24 DIAGNOSIS — K55.9 MESENTERIC ISCHEMIA (HCC): ICD-10-CM

## 2018-09-24 DIAGNOSIS — E83.42 HYPOMAGNESEMIA: ICD-10-CM

## 2018-09-24 DIAGNOSIS — E78.2 MIXED HYPERLIPIDEMIA: ICD-10-CM

## 2018-09-24 PROBLEM — E78.5 HYPERLIPEMIA: Status: ACTIVE | Noted: 2018-09-24

## 2018-09-24 LAB
POC CREATININE URINE: 100
POC MICROALBUMIN URINE: 150

## 2018-09-24 PROCEDURE — G0438 PPPS, INITIAL VISIT: HCPCS | Performed by: FAMILY MEDICINE

## 2018-09-24 PROCEDURE — 96160 PT-FOCUSED HLTH RISK ASSMT: CPT | Performed by: FAMILY MEDICINE

## 2018-09-24 PROCEDURE — 82044 UR ALBUMIN SEMIQUANTITATIVE: CPT | Performed by: FAMILY MEDICINE

## 2018-09-24 RX ORDER — FLUCONAZOLE 150 MG/1
150 TABLET ORAL DAILY
Qty: 6 TABLET | Refills: 0 | Status: SHIPPED | OUTPATIENT
Start: 2018-09-24 | End: 2019-01-02 | Stop reason: SDUPTHER

## 2018-09-24 NOTE — PROGRESS NOTES
QUICK REFERENCE INFORMATION:  The ABCs of the Annual Wellness Visit    Initial Medicare Wellness Visit    HEALTH RISK ASSESSMENT    1950    Recent Hospitalizations:  No hospitalization(s) within the last year..        Current Medical Providers:  Patient Care Team:  Jose Morrison DO as PCP - General (Family Medicine)  Samantha Uriostegui MD as Consulting Physician (Cardiology)        Smoking Status:  History   Smoking Status   • Former Smoker   • Packs/day: 3.00   • Years: 20.00   Smokeless Tobacco   • Never Used     Comment: caffeine use       Alcohol Consumption:  History   Alcohol Use No       Depression Screen:   PHQ-2/PHQ-9 Depression Screening 9/24/2018   Little interest or pleasure in doing things 0   Feeling down, depressed, or hopeless 0   Trouble falling or staying asleep, or sleeping too much 1   Feeling tired or having little energy 0   Poor appetite or overeating 0   Feeling bad about yourself - or that you are a failure or have let yourself or your family down 2   Trouble concentrating on things, such as reading the newspaper or watching television 0   Moving or speaking so slowly that other people could have noticed. Or the opposite - being so fidgety or restless that you have been moving around a lot more than usual 0   Thoughts that you would be better off dead, or of hurting yourself in some way 0   Total Score 3   If you checked off any problems, how difficult have these problems made it for you to do your work, take care of things at home, or get along with other people? Somewhat difficult       Health Habits and Functional and Cognitive Screening:  Functional & Cognitive Status 9/24/2018   Do you have difficulty preparing food and eating? No   Do you have difficulty bathing yourself, getting dressed or grooming yourself? No   Do you have difficulty using the toilet? No   Do you have difficulty moving around from place to place? No   Do you have trouble with steps or getting out of a bed  or a chair? Yes   In the past year have you fallen or experienced a near fall? No   Current Diet Well Balanced Diet   Dental Exam Not up to date   Eye Exam Up to date   Exercise (times per week) 5 times per week   Current Exercise Activities Include Walking   Do you need help using the phone?  No   Are you deaf or do you have serious difficulty hearing?  No   Do you need help with transportation? Yes   Do you need help shopping? No   Do you need help preparing meals?  No   Do you need help with housework?  No   Do you need help with laundry? No   Do you need help taking your medications? No   Do you need help managing money? No   Do you ever drive or ride in a car without wearing a seat belt? No   Have you felt unusual stress, anger or loneliness in the last month? Yes   Who do you live with? Child   If you need help, do you have trouble finding someone available to you? No   Have you been bothered in the last four weeks by sexual problems? No   Do you have difficulty concentrating, remembering or making decisions? No           Does the patient have evidence of cognitive impairment? No    Asiprin use counseling: Taking ASA appropriately as indicated      Recent Lab Results:    Visual Acuity:  No exam data present    Age-appropriate Screening Schedule:  Refer to the list below for future screening recommendations based on patient's age, sex and/or medical conditions. Orders for these recommended tests are listed in the plan section. The patient has been provided with a written plan.    Health Maintenance   Topic Date Due   • TDAP/TD VACCINES (1 - Tdap) 10/23/1968   • ZOSTER VACCINE (1 of 2) 10/23/1999   • PNEUMOCOCCAL VACCINES (65+ LOW/MEDIUM RISK) (1 of 2 - PCV13) 10/23/2014   • DIABETIC EYE EXAM  05/21/2017   • INFLUENZA VACCINE  08/01/2018   • MAMMOGRAM  08/30/2018   • HEMOGLOBIN A1C  12/04/2018   • LIPID PANEL  06/04/2019   • DIABETIC FOOT EXAM  09/24/2019   • URINE MICROALBUMIN  09/24/2019   • COLONOSCOPY   06/04/2023        Subjective   History of Present Illness    Randi Martines is a 67 y.o. female who presents for an Annual Wellness Visit.  Patient dm2, jardiance causing yeast;  Metformin caused diarrhea;  HLD on statin and fibrate;  CAD and PVD stable, on max meds;  Vaginal d/c non-stop on jardiance.  Dm2 ulcer 99% healed.  Hx of cabg and fem-pop.  CKD III, sees nephrology, has lab orders;  Added a1c, mag and lipid profile to this and copy me.    The following portions of the patient's history were reviewed and updated as appropriate: allergies, current medications, past family history, past medical history, past social history, past surgical history and problem list.    Outpatient Medications Prior to Visit   Medication Sig Dispense Refill   • acetaminophen (TYLENOL) 500 MG tablet Take 500 mg by mouth Every 6 (Six) Hours As Needed for Mild Pain .     • aspirin 81 MG EC tablet Take 81 mg by mouth Daily. PT HOLDING FOR SURGERY     • atorvastatin (LIPITOR) 80 MG tablet Take 1 tablet by mouth Every Night. 90 tablet 3   • clopidogrel (PLAVIX) 75 MG tablet Take 1 tablet by mouth Daily. TOLD BY MD TO STOP DAY BEFORE SURGERY 90 tablet 1   • dicyclomine (BENTYL) 20 MG tablet Take 1 tablet by mouth 3 (Three) Times a Day. 270 tablet 1   • fenofibrate 160 MG tablet Take 1 tablet by mouth Daily. 90 tablet 1   • gabapentin (NEURONTIN) 300 MG capsule Take 4 capsules by mouth Every Night. 360 capsule 0   • glucose blood (PRODIGY NO CODING BLOOD GLUC) test strip Use as instructed to test glucose three times daily. Diagnosis code E11.9 IDDM 300 each 12   • Insulin Pen Needle 31G X 8 MM misc Use as directed 4 times daily 360 each 1   • LEVEMIR FLEXTOUCH 100 UNIT/ML injection Inject 40 Units under the skin Every Night. 3 mL 3   • Magnesium 250 MG tablet Take 250 mg by mouth 3 (Three) Times a Day.     • magnesium oxide (MAGOX) 400 (241.3 Mg) MG tablet tablet TAKE 1 TABLET THREE TIMES DAILY 270 tablet 1   • metoclopramide (REGLAN)  5 MG tablet Take 1 tablet by mouth Every Morning. 90 tablet 1   • NOVOLOG FLEXPEN 100 UNIT/ML solution pen-injector sc pen INJECT  8 TO 12 UNITS SUBCUTANEOUSLY THREE TIMES DAILY BEFORE MEALS PER SLIDING SCALE DISCARD PEN 28 DAYS AFTER OPENING 45 mL 3   • pantoprazole (PROTONIX) 40 MG EC tablet TAKE 1 TABLET TWICE DAILY 180 tablet 0   • sucralfate (CARAFATE) 1 g tablet Take 1 tablet by mouth 4 (Four) Times a Day. 360 tablet 1   • valsartan-hydrochlorothiazide (DIOVAN-HCT) 160-25 MG per tablet TAKE 1 TABLET EVERY DAY 90 tablet 0   • VICTOZA 18 MG/3ML solution pen-injector injection Inject 1.8 mg under the skin Every Morning. 3 pen 1   • Empagliflozin (JARDIANCE) 10 MG tablet Take 10 mg by mouth Daily. 90 tablet 3   • atorvastatin (LIPITOR) 40 MG tablet TAKE 1 TABLET EVERY DAY 90 tablet 0   • doxycycline (VIBRAMYCIN) 100 MG capsule Take 1 capsule by mouth 2 (Two) Times a Day. 20 capsule 0   • metFORMIN ER (GLUCOPHAGE-XR) 500 MG 24 hr tablet Take 1 tablet by mouth 2 (Two) Times a Day. PT HOLDING FOR SURGERY 180 tablet 1     No facility-administered medications prior to visit.        Patient Active Problem List   Diagnosis   • Abdominal pain   • Intractable abdominal pain   • Essential hypertension   • DM (diabetes mellitus), type 2 with neurological complications (CMS/HCC)   • PVD (peripheral vascular disease) (CMS/HCC)   • Mesenteric ischemia (CMS/HCC)   • Acute gastric ulcer   • S/P CABG (coronary artery bypass graft)   • Coronary artery disease involving native coronary artery of native heart without angina pectoris   • Atherosclerotic PVD with ulceration (CMS/HCC)   • Dyslipidemia   • Type 2 diabetes mellitus without complication, with long-term current use of insulin (CMS/HCC)   • Biliary obstruction   • Hyperlipemia       Advance Care Planning:  has NO advance directive - information provided to the patient today    Identification of Risk Factors:  Risk factors include: weight .    Review of Systems   Respiratory:  "Negative for shortness of breath.    Cardiovascular: Negative for chest pain.   Gastrointestinal: Negative for abdominal pain, nausea and vomiting.   Genitourinary: Positive for vaginal discharge.       Compared to one year ago, the patient feels her physical health is the same.  Compared to one year ago, the patient feels her mental health is the same.    Objective     Physical Exam   Constitutional: She appears well-developed and well-nourished.   HENT:   Head: Normocephalic and atraumatic.   Neck: Neck supple. No JVD present. No thyromegaly present.   Cardiovascular: Normal rate, regular rhythm and normal heart sounds.  Exam reveals no gallop and no friction rub.    No murmur heard.  Pulmonary/Chest: Effort normal and breath sounds normal. No respiratory distress. She has no wheezes. She has no rales.   Abdominal: Soft. Bowel sounds are normal. She exhibits no distension. There is no tenderness. There is no rebound and no guarding.   Musculoskeletal: She exhibits no edema.    Randi had a diabetic foot exam performed (see scanned report) today.   During the foot exam she had a monofilament test performed.  Neurological: She is alert.   Skin: Skin is warm and dry.   Psychiatric: She has a normal mood and affect. Her behavior is normal.   Nursing note and vitals reviewed.      Vitals:    09/24/18 1828   BP: 132/78   BP Location: Left arm   Patient Position: Sitting   Cuff Size: Large Adult   Pulse: 72   Temp: 98.6 °F (37 °C)   TempSrc: Oral   SpO2: 93%   Weight: 96.2 kg (212 lb)   Height: 157.5 cm (62.01\")       Patient's Body mass index is 38.77 kg/m². BMI is above normal parameters. Recommendations include: educational material.    MAL/Cr ordered and reviewed.      Assessment/Plan   Patient Self-Management and Personalized Health Advice  The patient has been provided with information about: diet and exercise and preventive services including:   · Nutrition counseling provided.    Visit Diagnoses:    ICD-10-CM " ICD-9-CM   1. Medicare annual wellness visit, initial Z00.00 V70.0   2. Type 2 diabetes mellitus without complication, with long-term current use of insulin (CMS/HCC) E11.9 250.00    Z79.4 V58.67   3. S/P CABG (coronary artery bypass graft) Z95.1 V45.81   4. PVD (peripheral vascular disease) (CMS/HCC) I73.9 443.9   5. Mesenteric ischemia (CMS/HCC) K55.9 557.9   6. Coronary artery disease involving native coronary artery of native heart without angina pectoris I25.10 414.01   7. Essential hypertension I10 401.9   8. Mixed hyperlipidemia E78.2 272.2   9. Yeast vaginitis B37.3 112.1   10. Hypomagnesemia E83.42 275.2       Orders Placed This Encounter   Procedures   • POCT microalbumin       Outpatient Encounter Prescriptions as of 9/24/2018   Medication Sig Dispense Refill   • acetaminophen (TYLENOL) 500 MG tablet Take 500 mg by mouth Every 6 (Six) Hours As Needed for Mild Pain .     • aspirin 81 MG EC tablet Take 81 mg by mouth Daily. PT HOLDING FOR SURGERY     • atorvastatin (LIPITOR) 80 MG tablet Take 1 tablet by mouth Every Night. 90 tablet 3   • clopidogrel (PLAVIX) 75 MG tablet Take 1 tablet by mouth Daily. TOLD BY MD TO STOP DAY BEFORE SURGERY 90 tablet 1   • dicyclomine (BENTYL) 20 MG tablet Take 1 tablet by mouth 3 (Three) Times a Day. 270 tablet 1   • fenofibrate 160 MG tablet Take 1 tablet by mouth Daily. 90 tablet 1   • gabapentin (NEURONTIN) 300 MG capsule Take 4 capsules by mouth Every Night. 360 capsule 0   • glucose blood (PRODIGY NO CODING BLOOD GLUC) test strip Use as instructed to test glucose three times daily. Diagnosis code E11.9 IDDM 300 each 12   • Insulin Pen Needle 31G X 8 MM misc Use as directed 4 times daily 360 each 1   • LEVEMIR FLEXTOUCH 100 UNIT/ML injection Inject 40 Units under the skin Every Night. 3 mL 3   • Magnesium 250 MG tablet Take 250 mg by mouth 3 (Three) Times a Day.     • magnesium oxide (MAGOX) 400 (241.3 Mg) MG tablet tablet TAKE 1 TABLET THREE TIMES DAILY 270 tablet 1   •  metoclopramide (REGLAN) 5 MG tablet Take 1 tablet by mouth Every Morning. 90 tablet 1   • NOVOLOG FLEXPEN 100 UNIT/ML solution pen-injector sc pen INJECT  8 TO 12 UNITS SUBCUTANEOUSLY THREE TIMES DAILY BEFORE MEALS PER SLIDING SCALE DISCARD PEN 28 DAYS AFTER OPENING 45 mL 3   • pantoprazole (PROTONIX) 40 MG EC tablet TAKE 1 TABLET TWICE DAILY 180 tablet 0   • sucralfate (CARAFATE) 1 g tablet Take 1 tablet by mouth 4 (Four) Times a Day. 360 tablet 1   • valsartan-hydrochlorothiazide (DIOVAN-HCT) 160-25 MG per tablet TAKE 1 TABLET EVERY DAY 90 tablet 0   • VICTOZA 18 MG/3ML solution pen-injector injection Inject 1.8 mg under the skin Every Morning. 3 pen 1   • [DISCONTINUED] Empagliflozin (JARDIANCE) 10 MG tablet Take 10 mg by mouth Daily. 90 tablet 3   • Clotrimazole (CLOTRIMAZOLE 3) 2 % vaginal cream Per vagina daily x 3 days, repeat 1 week later 21 g 1   • fluconazole (DIFLUCAN) 150 MG tablet Take 1 tablet by mouth Daily. Then repeat 1 week later 6 tablet 0   • SITagliptin (JANUVIA) 50 MG tablet Take 1 tablet by mouth Daily. 30 tablet 5   • [DISCONTINUED] atorvastatin (LIPITOR) 40 MG tablet TAKE 1 TABLET EVERY DAY 90 tablet 0   • [DISCONTINUED] doxycycline (VIBRAMYCIN) 100 MG capsule Take 1 capsule by mouth 2 (Two) Times a Day. 20 capsule 0   • [DISCONTINUED] metFORMIN ER (GLUCOPHAGE-XR) 500 MG 24 hr tablet Take 1 tablet by mouth 2 (Two) Times a Day. PT HOLDING FOR SURGERY 180 tablet 1     No facility-administered encounter medications on file as of 9/24/2018.        Reviewed use of high risk medication in the elderly: yes  Reviewed for potential of harmful drug interactions in the elderly: yes    Follow Up:  Return in about 3 months (around 12/24/2018), or if symptoms worsen or fail to improve.     An After Visit Summary and PPPS with all of these plans were given to the patient.        Yeast infection with jardiance severe, white d/c.

## 2018-09-24 NOTE — PATIENT INSTRUCTIONS
Medicare Wellness  Personal Prevention Plan of Service     Date of Office Visit:  2018  Encounter Provider:  Jose Morrison DO  Place of Service:  [facility]  Patient Name: Randi Martines  :  1950    As part of the Medicare Wellness portion of your visit today, we are providing you with this personalized preventive plan of services (PPPS). This plan is based upon recommendations of the [place] Preventive Services Task Force (USPSTF) and the Advisory Committee on Immunization Practices (ACIP).    This lists the preventive care services that should be considered, and provides dates of when you are due. Items listed as completed are up-to-date and do not require any further intervention.    Health Maintenance   Topic Date Due   • TDAP/TD VACCINES (1 - Tdap) 10/23/1968   • ZOSTER VACCINE (1 of 2) 10/23/1999   • PNEUMOCOCCAL VACCINES (65+ LOW/MEDIUM RISK) (1 of 2 - PCV13) 10/23/2014   • HEPATITIS C SCREENING  2017   • DIABETIC EYE EXAM  2017   • INFLUENZA VACCINE  2018   • MAMMOGRAM  2018   • HEMOGLOBIN A1C  2018   • LIPID PANEL  2019   • MEDICARE ANNUAL WELLNESS  2019   • DIABETIC FOOT EXAM  2019   • URINE MICROALBUMIN  2019   • COLONOSCOPY  2023       Orders Placed This Encounter   Procedures   • POCT microalbumin       Return in about 3 months (around 2018), or if symptoms worsen or fail to improve.      Exercising to Lose Weight  Exercising can help you to lose weight. In order to lose weight through exercise, you need to do vigorous-intensity exercise. You can tell that you are exercising with vigorous intensity if you are breathing very hard and fast and cannot hold a conversation while exercising.  Moderate-intensity exercise helps to maintain your current weight. You can tell that you are exercising at a moderate level if you have a higher heart rate and faster breathing, but you are  still able to hold a conversation.  How often should I exercise?  Choose an activity that you enjoy and set realistic goals. Your health care provider can help you to make an activity plan that works for you. Exercise regularly as directed by your health care provider. This may include:  · Doing resistance training twice each week, such as:  ? Push-ups.  ? Sit-ups.  ? Lifting weights.  ? Using resistance bands.  · Doing a given intensity of exercise for a given amount of time. Choose from these options:  ? 150 minutes of moderate-intensity exercise every week.  ? 75 minutes of vigorous-intensity exercise every week.  ? A mix of moderate-intensity and vigorous-intensity exercise every week.    Children, pregnant women, people who are out of shape, people who are overweight, and older adults may need to consult a health care provider for individual recommendations. If you have any sort of medical condition, be sure to consult your health care provider before starting a new exercise program.  What are some activities that can help me to lose weight?  · Walking at a rate of at least 4.5 miles an hour.  · Jogging or running at a rate of 5 miles per hour.  · Biking at a rate of at least 10 miles per hour.  · Lap swimming.  · Roller-skating or in-line skating.  · Cross-country skiing.  · Vigorous competitive sports, such as football, basketball, and soccer.  · Jumping rope.  · Aerobic dancing.  How can I be more active in my day-to-day activities?  · Use the stairs instead of the elevator.  · Take a walk during your lunch break.  · If you drive, park your car farther away from work or school.  · If you take public transportation, get off one stop early and walk the rest of the way.  · Make all of your phone calls while standing up and walking around.  · Get up, stretch, and walk around every 30 minutes throughout the day.  What guidelines should I follow while exercising?  · Do not exercise so much that you hurt yourself,  feel dizzy, or get very short of breath.  · Consult your health care provider prior to starting a new exercise program.  · Wear comfortable clothes and shoes with good support.  · Drink plenty of water while you exercise to prevent dehydration or heat stroke. Body water is lost during exercise and must be replaced.  · Work out until you breathe faster and your heart beats faster.  This information is not intended to replace advice given to you by your health care provider. Make sure you discuss any questions you have with your health care provider.  Document Released: 01/20/2012 Document Revised: 05/25/2017 Document Reviewed: 05/21/2015  [employer] Interactive Patient Education © 2018 [employer]    Calorie Counting for Weight Loss  Calories are units of energy. Your body needs a certain amount of calories from food to keep you going throughout the day. When you eat more calories than your body needs, your body stores the extra calories as fat. When you eat fewer calories than your body needs, your body burns fat to get the energy it needs.  Calorie counting means keeping track of how many calories you eat and drink each day. Calorie counting can be helpful if you need to lose weight. If you make sure to eat fewer calories than your body needs, you should lose weight. Ask your health care provider what a healthy weight is for you.  For calorie counting to work, you will need to eat the right number of calories in a day in order to lose a healthy amount of weight per week. A dietitian can help you determine how many calories you need in a day and will give you suggestions on how to reach your calorie goal.  · A healthy amount of weight to lose per week is usually 1-2 lb (0.5-0.9 kg). This usually means that your daily calorie intake should be reduced by 500-750 calories.  · Eating 1,200 - 1,500 calories per day can help most women lose weight.  · Eating 1,500 - 1,800 calories per day can help most men lose  weight.    What do I need to know about calorie counting?  In order to meet your daily calorie goal, you will need to:  · Find out how many calories are in each food you would like to eat. Try to do this before you eat.  · Decide how much of the food you plan to eat.  · Write down what you ate and how many calories it had. Doing this is called keeping a food log.    To successfully lose weight, it is important to balance calorie counting with a healthy lifestyle that includes regular activity. Aim for 150 minutes of moderate exercise (such as walking) or 75 minutes of vigorous exercise (such as running) each week.  Where do I find calorie information?    The number of calories in a food can be found on a Nutrition Facts label. If a food does not have a Nutrition Facts label, try to look up the calories online or ask your dietitian for help.  Remember that calories are listed per serving. If you choose to have more than one serving of a food, you will have to multiply the calories per serving by the amount of servings you plan to eat. For example, the label on a package of bread might say that a serving size is 1 slice and that there are 90 calories in a serving. If you eat 1 slice, you will have eaten 90 calories. If you eat 2 slices, you will have eaten 180 calories.  How do I keep a food log?  Immediately after each meal, record the following information in your food log:  · What you ate. Don't forget to include toppings, sauces, and other extras on the food.  · How much you ate. This can be measured in cups, ounces, or number of items.  · How many calories each food and drink had.  · The total number of calories in the meal.    Keep your food log near you, such as in a small notebook in your pocket, or use a mobile doug or website. Some programs will calculate calories for you and show you how many calories you have left for the day to meet your goal.  What are some calorie counting tips?  · Use your calories on  "foods and drinks that will fill you up and not leave you hungry:  ? Some examples of foods that fill you up are nuts and nut butters, vegetables, lean proteins, and high-fiber foods like whole grains. High-fiber foods are foods with more than 5 g fiber per serving.  ? Drinks such as sodas, specialty coffee drinks, alcohol, and juices have a lot of calories, yet do not fill you up.  · Eat nutritious foods and avoid empty calories. Empty calories are calories you get from foods or beverages that do not have many vitamins or protein, such as candy, sweets, and soda. It is better to have a nutritious high-calorie food (such as an avocado) than a food with few nutrients (such as a bag of chips).  · Know how many calories are in the foods you eat most often. This will help you calculate calorie counts faster.  · Pay attention to calories in drinks. Low-calorie drinks include water and unsweetened drinks.  · Pay attention to nutrition labels for \"low fat\" or \"fat free\" foods. These foods sometimes have the same amount of calories or more calories than the full fat versions. They also often have added sugar, starch, or salt, to make up for flavor that was removed with the fat.  · Find a way of tracking calories that works for you. Get creative. Try different apps or programs if writing down calories does not work for you.  What are some portion control tips?  · Know how many calories are in a serving. This will help you know how many servings of a certain food you can have.  · Use a measuring cup to measure serving sizes. You could also try weighing out portions on a kitchen scale. With time, you will be able to estimate serving sizes for some foods.  · Take some time to put servings of different foods on your favorite plates, bowls, and cups so you know what a serving looks like.  · Try not to eat straight from a bag or box. Doing this can lead to overeating. Put the amount you would like to eat in a cup or on a plate to " make sure you are eating the right portion.  · Use smaller plates, glasses, and bowls to prevent overeating.  · Try not to multitask (for example, watch TV or use your computer) while eating. If it is time to eat, sit down at a table and enjoy your food. This will help you to know when you are full. It will also help you to be aware of what you are eating and how much you are eating.  What are tips for following this plan?  Reading food labels  · Check the calorie count compared to the serving size. The serving size may be smaller than what you are used to eating.  · Check the source of the calories. Make sure the food you are eating is high in vitamins and protein and low in saturated and trans fats.  Shopping  · Read nutrition labels while you shop. This will help you make healthy decisions before you decide to purchase your food.  · Make a grocery list and stick to it.  Cooking  · Try to cook your favorite foods in a healthier way. For example, try baking instead of frying.  · Use low-fat dairy products.  Meal planning  · Use more fruits and vegetables. Half of your plate should be fruits and vegetables.  · Include lean proteins like poultry and fish.  How do I count calories when eating out?  · Ask for smaller portion sizes.  · Consider sharing an entree and sides instead of getting your own entree.  · If you get your own entree, eat only half. Ask for a box at the beginning of your meal and put the rest of your entree in it so you are not tempted to eat it.  · If calories are listed on the menu, choose the lower calorie options.  · Choose dishes that include vegetables, fruits, whole grains, low-fat dairy products, and lean protein.  · Choose items that are boiled, broiled, grilled, or steamed. Stay away from items that are buttered, battered, fried, or served with cream sauce. Items labeled “crispy” are usually fried, unless stated otherwise.  · Choose water, low-fat milk, unsweetened iced tea, or other drinks  without added sugar. If you want an alcoholic beverage, choose a lower calorie option such as a glass of wine or light beer.  · Ask for dressings, sauces, and syrups on the side. These are usually high in calories, so you should limit the amount you eat.  · If you want a salad, choose a garden salad and ask for grilled meats. Avoid extra toppings like jefferson, cheese, or fried items. Ask for the dressing on the side, or ask for olive oil and vinegar or lemon to use as dressing.  · Estimate how many servings of a food you are given. For example, a serving of cooked rice is ½ cup or about the size of half a baseball. Knowing serving sizes will help you be aware of how much food you are eating at restaurants. The list below tells you how big or small some common portion sizes are based on everyday objects:  ? 1 oz--4 stacked dice.  ? 3 oz--1 deck of cards.  ? 1 tsp--1 die.  ? 1 Tbsp--½ a ping-pong ball.  ? 2 Tbsp--1 ping-pong ball.  ? ½ cup--½ baseball.  ? 1 cup--1 baseball.  Summary  · Calorie counting means keeping track of how many calories you eat and drink each day. If you eat fewer calories than your body needs, you should lose weight.  · A healthy amount of weight to lose per week is usually 1-2 lb (0.5-0.9 kg). This usually means reducing your daily calorie intake by 500-750 calories.  · The number of calories in a food can be found on a Nutrition Facts label. If a food does not have a Nutrition Facts label, try to look up the calories online or ask your dietitian for help.  · Use your calories on foods and drinks that will fill you up, and not on foods and drinks that will leave you hungry.  · Use smaller plates, glasses, and bowls to prevent overeating.  This information is not intended to replace advice given to you by your health care provider. Make sure you discuss any questions you have with your health care provider.  Document Released: 12/18/2006 Document Revised: 11/17/2017 Document Reviewed:  11/17/2017  [employer] Interactive Patient Education © 2018 [employer]

## 2018-09-24 NOTE — PROGRESS NOTES
Subjective   Randi Martines is a 67 y.o. female. Presents today for   Chief Complaint   Patient presents with   • Diabetes     pt here for follow up visit for diabetes. she quit taking metformin. pt is having bad side effects from jardiance       History of Present Illness    Review of Systems    Patient Active Problem List   Diagnosis   • Abdominal pain   • Intractable abdominal pain   • Essential hypertension   • DM (diabetes mellitus), type 2 with neurological complications (CMS/HCC)   • PVD (peripheral vascular disease) (CMS/HCC)   • Mesenteric ischemia (CMS/HCC)   • Acute gastric ulcer   • S/P CABG (coronary artery bypass graft)   • Coronary artery disease involving native coronary artery of native heart without angina pectoris   • Atherosclerotic PVD with ulceration (CMS/HCC)   • Dyslipidemia   • Type 2 diabetes mellitus without complication, with long-term current use of insulin (CMS/HCC)   • Biliary obstruction       Social History     Social History   • Marital status:      Social History Main Topics   • Smoking status: Former Smoker     Packs/day: 3.00     Years: 20.00   • Smokeless tobacco: Never Used      Comment: caffeine use   • Alcohol use No   • Drug use: No   • Sexual activity: Defer     Other Topics Concern   • Not on file       Allergies   Allergen Reactions   • Augmentin [Amoxicillin-Pot Clavulanate] GI Intolerance and Dizziness   • Codeine GI Intolerance and Dizziness       Current Outpatient Prescriptions on File Prior to Visit   Medication Sig Dispense Refill   • acetaminophen (TYLENOL) 500 MG tablet Take 500 mg by mouth Every 6 (Six) Hours As Needed for Mild Pain .     • aspirin 81 MG EC tablet Take 81 mg by mouth Daily. PT HOLDING FOR SURGERY     • atorvastatin (LIPITOR) 80 MG tablet Take 1 tablet by mouth Every Night. 90 tablet 3   • clopidogrel (PLAVIX) 75 MG tablet Take 1 tablet by mouth Daily. TOLD BY MD TO STOP DAY BEFORE SURGERY 90 tablet 1   • dicyclomine (BENTYL) 20 MG  tablet Take 1 tablet by mouth 3 (Three) Times a Day. 270 tablet 1   • Empagliflozin (JARDIANCE) 10 MG tablet Take 10 mg by mouth Daily. 90 tablet 3   • fenofibrate 160 MG tablet Take 1 tablet by mouth Daily. 90 tablet 1   • gabapentin (NEURONTIN) 300 MG capsule Take 4 capsules by mouth Every Night. 360 capsule 0   • glucose blood (PRODIGY NO CODING BLOOD GLUC) test strip Use as instructed to test glucose three times daily. Diagnosis code E11.9 IDDM 300 each 12   • Insulin Pen Needle 31G X 8 MM misc Use as directed 4 times daily 360 each 1   • LEVEMIR FLEXTOUCH 100 UNIT/ML injection Inject 40 Units under the skin Every Night. 3 mL 3   • Magnesium 250 MG tablet Take 250 mg by mouth 3 (Three) Times a Day.     • magnesium oxide (MAGOX) 400 (241.3 Mg) MG tablet tablet TAKE 1 TABLET THREE TIMES DAILY 270 tablet 1   • metoclopramide (REGLAN) 5 MG tablet Take 1 tablet by mouth Every Morning. 90 tablet 1   • NOVOLOG FLEXPEN 100 UNIT/ML solution pen-injector sc pen INJECT  8 TO 12 UNITS SUBCUTANEOUSLY THREE TIMES DAILY BEFORE MEALS PER SLIDING SCALE DISCARD PEN 28 DAYS AFTER OPENING 45 mL 3   • pantoprazole (PROTONIX) 40 MG EC tablet TAKE 1 TABLET TWICE DAILY 180 tablet 0   • sucralfate (CARAFATE) 1 g tablet Take 1 tablet by mouth 4 (Four) Times a Day. 360 tablet 1   • valsartan-hydrochlorothiazide (DIOVAN-HCT) 160-25 MG per tablet TAKE 1 TABLET EVERY DAY 90 tablet 0   • VICTOZA 18 MG/3ML solution pen-injector injection Inject 1.8 mg under the skin Every Morning. 3 pen 1   • [DISCONTINUED] atorvastatin (LIPITOR) 40 MG tablet TAKE 1 TABLET EVERY DAY 90 tablet 0   • [DISCONTINUED] doxycycline (VIBRAMYCIN) 100 MG capsule Take 1 capsule by mouth 2 (Two) Times a Day. 20 capsule 0   • [DISCONTINUED] metFORMIN ER (GLUCOPHAGE-XR) 500 MG 24 hr tablet Take 1 tablet by mouth 2 (Two) Times a Day. PT HOLDING FOR SURGERY 180 tablet 1     No current facility-administered medications on file prior to visit.        Objective   Vitals:     "09/24/18 1828   BP: 132/78   BP Location: Left arm   Patient Position: Sitting   Cuff Size: Large Adult   Pulse: 72   Temp: 98.6 °F (37 °C)   TempSrc: Oral   SpO2: 93%   Weight: 96.2 kg (212 lb)   Height: 157.5 cm (62.01\")       Physical Exam    Assessment/Plan   {Assess/PlanSmartLinks:[id number]}         -Follow up:   "

## 2018-10-07 RX ORDER — PANTOPRAZOLE SODIUM 40 MG/1
TABLET, DELAYED RELEASE ORAL
Qty: 180 TABLET | Refills: 0 | Status: SHIPPED | OUTPATIENT
Start: 2018-10-07 | End: 2019-01-14 | Stop reason: SDUPTHER

## 2018-10-07 RX ORDER — FENOFIBRATE 160 MG/1
TABLET ORAL
Qty: 90 TABLET | Refills: 1 | Status: SHIPPED | OUTPATIENT
Start: 2018-10-07 | End: 2019-03-25 | Stop reason: SDUPTHER

## 2018-10-07 RX ORDER — METOCLOPRAMIDE 5 MG/1
TABLET ORAL
Qty: 90 TABLET | Refills: 1 | Status: SHIPPED | OUTPATIENT
Start: 2018-10-07 | End: 2019-03-25 | Stop reason: SDUPTHER

## 2018-10-07 RX ORDER — ATORVASTATIN CALCIUM 40 MG/1
TABLET, FILM COATED ORAL
Qty: 90 TABLET | Refills: 0 | Status: SHIPPED | OUTPATIENT
Start: 2018-10-07 | End: 2019-01-14 | Stop reason: SDUPTHER

## 2018-10-07 RX ORDER — VALSARTAN AND HYDROCHLOROTHIAZIDE 160; 25 MG/1; MG/1
TABLET ORAL
Qty: 90 TABLET | Refills: 1 | Status: SHIPPED | OUTPATIENT
Start: 2018-10-07 | End: 2019-03-25 | Stop reason: SDUPTHER

## 2018-10-07 RX ORDER — CLOPIDOGREL BISULFATE 75 MG/1
TABLET ORAL
Qty: 90 TABLET | Refills: 1 | Status: SHIPPED | OUTPATIENT
Start: 2018-10-07 | End: 2019-03-25 | Stop reason: SDUPTHER

## 2018-10-22 ENCOUNTER — OFFICE VISIT (OUTPATIENT)
Dept: FAMILY MEDICINE CLINIC | Facility: CLINIC | Age: 68
End: 2018-10-22

## 2018-10-22 VITALS
DIASTOLIC BLOOD PRESSURE: 70 MMHG | SYSTOLIC BLOOD PRESSURE: 122 MMHG | HEART RATE: 75 BPM | OXYGEN SATURATION: 94 % | TEMPERATURE: 98.5 F

## 2018-10-22 DIAGNOSIS — R50.9 FEVER, UNSPECIFIED FEVER CAUSE: ICD-10-CM

## 2018-10-22 DIAGNOSIS — J11.1 INFLUENZA-LIKE ILLNESS: Primary | ICD-10-CM

## 2018-10-22 LAB
EXPIRATION DATE: NORMAL
FLUAV AG NPH QL: NORMAL
FLUBV AG NPH QL: NORMAL
INTERNAL CONTROL: NORMAL
Lab: NORMAL

## 2018-10-22 PROCEDURE — 87804 INFLUENZA ASSAY W/OPTIC: CPT | Performed by: FAMILY MEDICINE

## 2018-10-22 PROCEDURE — 99213 OFFICE O/P EST LOW 20 MIN: CPT | Performed by: FAMILY MEDICINE

## 2018-10-22 RX ORDER — OSELTAMIVIR PHOSPHATE 75 MG/1
75 CAPSULE ORAL 2 TIMES DAILY
Qty: 10 CAPSULE | Refills: 0 | Status: SHIPPED | OUTPATIENT
Start: 2018-10-22 | End: 2018-12-27

## 2018-10-22 NOTE — PROGRESS NOTES
Subjective   Randi Martines is a 67 y.o. female. Presents today for   Chief Complaint   Patient presents with   • Illness     pt has fever, chills, body aches       Fever    This is a new problem. Episode onset: 2 days. The problem occurs intermittently. The problem has been waxing and waning. Maximum temperature: subjective. Associated symptoms include muscle aches. Pertinent negatives include no abdominal pain, chest pain, congestion, coughing, diarrhea, ear pain, nausea, sore throat, vomiting or wheezing. Associated symptoms comments: Hurts to breath. She has tried acetaminophen for the symptoms. The treatment provided mild relief.       Review of Systems   Constitutional: Positive for fever.   HENT: Negative for congestion, ear pain and sore throat.    Respiratory: Negative for cough and wheezing.    Cardiovascular: Negative for chest pain.   Gastrointestinal: Negative for abdominal pain, diarrhea, nausea and vomiting.       Patient Active Problem List   Diagnosis   • Abdominal pain   • Intractable abdominal pain   • Essential hypertension   • DM (diabetes mellitus), type 2 with neurological complications (CMS/HCC)   • PVD (peripheral vascular disease) (CMS/[facility])   • Mesenteric ischemia (CMS/HCC)   • Acute gastric ulcer   • S/P CABG (coronary artery bypass graft)   • Coronary artery disease involving native coronary artery of native heart without angina pectoris   • Atherosclerotic PVD with ulceration (CMS/HCC)   • Dyslipidemia   • Type 2 diabetes mellitus without complication, with long-term current use of insulin (CMS/HCC)   • Biliary obstruction   • Hyperlipemia       Social History     Social History   • Marital status:      Social History Main Topics   • Smoking status: Former Smoker     Packs/day: 3.00     Years: 20.00   • Smokeless tobacco: Never Used      Comment: caffeine use   • Alcohol use No   • Drug use: No   • Sexual activity: Defer     Other Topics Concern   • Not on file       Allergies    Allergen Reactions   • Augmentin [Amoxicillin-Pot Clavulanate] GI Intolerance and Dizziness   • Codeine GI Intolerance and Dizziness       Current Outpatient Prescriptions on File Prior to Visit   Medication Sig Dispense Refill   • acetaminophen (TYLENOL) 500 MG tablet Take 500 mg by mouth Every 6 (Six) Hours As Needed for Mild Pain .     • aspirin 81 MG EC tablet Take 81 mg by mouth Daily. PT HOLDING FOR SURGERY     • atorvastatin (LIPITOR) 40 MG tablet TAKE 1 TABLET EVERY DAY 90 tablet 0   • atorvastatin (LIPITOR) 80 MG tablet Take 1 tablet by mouth Every Night. 90 tablet 3   • clopidogrel (PLAVIX) 75 MG tablet TAKE 1 TABLET EVERY DAY 90 tablet 1   • Clotrimazole (CLOTRIMAZOLE 3) 2 % vaginal cream Per vagina daily x 3 days, repeat 1 week later 21 g 1   • dicyclomine (BENTYL) 20 MG tablet Take 1 tablet by mouth 3 (Three) Times a Day. 270 tablet 1   • fenofibrate 160 MG tablet TAKE 1 TABLET EVERY DAY 90 tablet 1   • fluconazole (DIFLUCAN) 150 MG tablet Take 1 tablet by mouth Daily. Then repeat 1 week later 6 tablet 0   • gabapentin (NEURONTIN) 300 MG capsule Take 4 capsules by mouth Every Night. 360 capsule 0   • glucose blood (PRODIGY NO CODING BLOOD GLUC) test strip Use as instructed to test glucose three times daily. Diagnosis code E11.9 IDDM 300 each 12   • Insulin Pen Needle 31G X 8 MM misc Use as directed 4 times daily 360 each 1   • LEVEMIR FLEXTOUCH 100 UNIT/ML injection Inject 40 Units under the skin Every Night. 3 mL 3   • Magnesium 250 MG tablet Take 250 mg by mouth 3 (Three) Times a Day.     • magnesium oxide (MAGOX) 400 (241.3 Mg) MG tablet tablet TAKE 1 TABLET THREE TIMES DAILY 270 tablet 1   • metoclopramide (REGLAN) 5 MG tablet TAKE 1 TABLET EVERY MORNING 90 tablet 1   • NOVOLOG FLEXPEN 100 UNIT/ML solution pen-injector sc pen INJECT  8 TO 12 UNITS SUBCUTANEOUSLY THREE TIMES DAILY BEFORE MEALS PER SLIDING SCALE DISCARD PEN 28 DAYS AFTER OPENING 45 mL 3   • pantoprazole (PROTONIX) 40 MG EC tablet  TAKE 1 TABLET TWICE DAILY 180 tablet 0   • SITagliptin (JANUVIA) 50 MG tablet Take 1 tablet by mouth Daily. 30 tablet 5   • sucralfate (CARAFATE) 1 g tablet Take 1 tablet by mouth 4 (Four) Times a Day. 360 tablet 1   • valsartan-hydrochlorothiazide (DIOVAN-HCT) 160-25 MG per tablet TAKE 1 TABLET EVERY DAY 90 tablet 1   • VICTOZA 18 MG/3ML solution pen-injector injection Inject 1.8 mg under the skin Every Morning. 3 pen 1     No current facility-administered medications on file prior to visit.        Objective   Vitals:    10/22/18 1631   BP: 122/70   BP Location: Left arm   Patient Position: Sitting   Cuff Size: Large Adult   Pulse: 75   Temp: 98.5 °F (36.9 °C)   TempSrc: Oral   SpO2: 94%       Physical Exam   Constitutional: She appears well-developed and well-nourished.   HENT:   Head: Normocephalic and atraumatic.   Right Ear: Tympanic membrane normal.   Left Ear: Tympanic membrane normal.   Nose: Nose normal.   Mouth/Throat: Uvula is midline, oropharynx is clear and moist and mucous membranes are normal.   Neck: Neck supple. No JVD present. No thyromegaly present.   Cardiovascular: Normal rate, regular rhythm and normal heart sounds.  Exam reveals no gallop and no friction rub.    No murmur heard.  Pulmonary/Chest: Effort normal and breath sounds normal. No respiratory distress. She has no wheezes. She has no rales.   Abdominal: Soft. Bowel sounds are normal. She exhibits no distension. There is no tenderness. There is no rebound and no guarding.   Musculoskeletal: She exhibits no edema.   Neurological: She is alert.   Skin: Skin is warm and dry.   Psychiatric: She has a normal mood and affect. Her behavior is normal.   Nursing note and vitals reviewed.      Flu negative  Assessment/Plan   Randi was seen today for illness.    Diagnoses and all orders for this visit:    Influenza-like illness  -     oseltamivir (TAMIFLU) 75 MG capsule; Take 1 capsule by mouth 2 (Two) Times a Day.    Fever, unspecified fever  cause  -     Comprehensive Metabolic Panel  -     CBC & Differential    flu negative, but symptoms consistent, will give tamiflu;  GO er if respiratory issues.  Son concerned as had severe illnesses in past and tends to minimize symptoms.  If any recurrences of mesenteric ischemia, go ER immediately;  Reports currently no abd pain; no n/v;      Last lab check CBC - the WBC up primarily neutrophils, Cr 1.56 and K+ 5.9;  Recheck today and send to nephrology.       -Follow up: Prn - RTC if worse or no improvement.

## 2018-10-23 LAB
ALBUMIN SERPL-MCNC: 3.5 G/DL (ref 3.6–4.8)
ALBUMIN/GLOB SERPL: 1.1 {RATIO} (ref 1.2–2.2)
ALP SERPL-CCNC: 34 IU/L (ref 39–117)
ALT SERPL-CCNC: 10 IU/L (ref 0–32)
AST SERPL-CCNC: 20 IU/L (ref 0–40)
BASOPHILS # BLD AUTO: 0 X10E3/UL (ref 0–0.2)
BASOPHILS NFR BLD AUTO: 0 %
BILIRUB SERPL-MCNC: 0.3 MG/DL (ref 0–1.2)
BUN SERPL-MCNC: 46 MG/DL (ref 8–27)
BUN/CREAT SERPL: 29 (ref 12–28)
CALCIUM SERPL-MCNC: 9.6 MG/DL (ref 8.7–10.3)
CHLORIDE SERPL-SCNC: 99 MMOL/L (ref 96–106)
CO2 SERPL-SCNC: 22 MMOL/L (ref 20–29)
CREAT SERPL-MCNC: 1.59 MG/DL (ref 0.57–1)
EOSINOPHIL # BLD AUTO: 0.2 X10E3/UL (ref 0–0.4)
EOSINOPHIL NFR BLD AUTO: 1 %
ERYTHROCYTE [DISTWIDTH] IN BLOOD BY AUTOMATED COUNT: 13.2 % (ref 12.3–15.4)
GLOBULIN SER CALC-MCNC: 3.1 G/DL (ref 1.5–4.5)
GLUCOSE SERPL-MCNC: 163 MG/DL (ref 65–99)
HCT VFR BLD AUTO: 33.3 % (ref 34–46.6)
HGB BLD-MCNC: 10.9 G/DL (ref 11.1–15.9)
IMM GRANULOCYTES # BLD: 0 X10E3/UL (ref 0–0.1)
IMM GRANULOCYTES NFR BLD: 0 %
LYMPHOCYTES # BLD AUTO: 2.5 X10E3/UL (ref 0.7–3.1)
LYMPHOCYTES NFR BLD AUTO: 11 %
MCH RBC QN AUTO: 28.9 PG (ref 26.6–33)
MCHC RBC AUTO-ENTMCNC: 32.7 G/DL (ref 31.5–35.7)
MCV RBC AUTO: 88 FL (ref 79–97)
MONOCYTES # BLD AUTO: 1.2 X10E3/UL (ref 0.1–0.9)
MONOCYTES NFR BLD AUTO: 6 %
NEUTROPHILS # BLD AUTO: 17.8 X10E3/UL (ref 1.4–7)
NEUTROPHILS NFR BLD AUTO: 82 %
PLATELET # BLD AUTO: 287 X10E3/UL (ref 150–379)
POTASSIUM SERPL-SCNC: 4.7 MMOL/L (ref 3.5–5.2)
PROT SERPL-MCNC: 6.6 G/DL (ref 6–8.5)
RBC # BLD AUTO: 3.77 X10E6/UL (ref 3.77–5.28)
SODIUM SERPL-SCNC: 138 MMOL/L (ref 134–144)
WBC # BLD AUTO: 21.8 X10E3/UL (ref 3.4–10.8)

## 2018-10-23 NOTE — PROGRESS NOTES
Call results to patient.  Patient's wbc is very high, looks primarily inflammation or infectious.  Patient was here for vague flu like symptoms.  How is she feeling?  Cough, shortness of breath?

## 2018-12-27 ENCOUNTER — OFFICE VISIT (OUTPATIENT)
Dept: FAMILY MEDICINE CLINIC | Facility: CLINIC | Age: 68
End: 2018-12-27

## 2018-12-27 VITALS
HEART RATE: 80 BPM | WEIGHT: 215 LBS | BODY MASS INDEX: 39.56 KG/M2 | OXYGEN SATURATION: 93 % | SYSTOLIC BLOOD PRESSURE: 126 MMHG | HEIGHT: 62 IN | DIASTOLIC BLOOD PRESSURE: 70 MMHG

## 2018-12-27 DIAGNOSIS — L97.529 ULCER OF TOE OF LEFT FOOT, UNSPECIFIED ULCER STAGE (HCC): ICD-10-CM

## 2018-12-27 DIAGNOSIS — E11.9 TYPE 2 DIABETES MELLITUS WITHOUT COMPLICATION, WITH LONG-TERM CURRENT USE OF INSULIN (HCC): Primary | ICD-10-CM

## 2018-12-27 DIAGNOSIS — Z79.4 TYPE 2 DIABETES MELLITUS WITHOUT COMPLICATION, WITH LONG-TERM CURRENT USE OF INSULIN (HCC): Primary | ICD-10-CM

## 2018-12-27 DIAGNOSIS — I73.9 PVD (PERIPHERAL VASCULAR DISEASE) (HCC): ICD-10-CM

## 2018-12-27 DIAGNOSIS — D72.828 OTHER ELEVATED WHITE BLOOD CELL (WBC) COUNT: ICD-10-CM

## 2018-12-27 PROCEDURE — 99213 OFFICE O/P EST LOW 20 MIN: CPT | Performed by: FAMILY MEDICINE

## 2018-12-27 RX ORDER — INSULIN DETEMIR 100 [IU]/ML
25 INJECTION, SOLUTION SUBCUTANEOUS 2 TIMES DAILY
Qty: 5 PEN | Refills: 3 | Status: SHIPPED | OUTPATIENT
Start: 2018-12-27 | End: 2019-01-02 | Stop reason: SDUPTHER

## 2018-12-28 LAB
BASOPHILS # BLD AUTO: 0.04 10*3/MM3 (ref 0–0.2)
BASOPHILS NFR BLD AUTO: 0.4 % (ref 0–1.5)
BUN SERPL-MCNC: 42 MG/DL (ref 8–23)
BUN/CREAT SERPL: 29.8 (ref 7–25)
CALCIUM SERPL-MCNC: 10.3 MG/DL (ref 8.6–10.5)
CHLORIDE SERPL-SCNC: 105 MMOL/L (ref 98–107)
CO2 SERPL-SCNC: 23.9 MMOL/L (ref 22–29)
CREAT SERPL-MCNC: 1.41 MG/DL (ref 0.57–1)
EOSINOPHIL # BLD AUTO: 0.4 10*3/MM3 (ref 0–0.7)
EOSINOPHIL NFR BLD AUTO: 3.7 % (ref 0.3–6.2)
ERYTHROCYTE [DISTWIDTH] IN BLOOD BY AUTOMATED COUNT: 14.1 % (ref 11.7–13)
ERYTHROCYTE [SEDIMENTATION RATE] IN BLOOD BY WESTERGREN METHOD: 40 MM/HR (ref 0–30)
GLUCOSE SERPL-MCNC: 131 MG/DL (ref 65–99)
HBA1C MFR BLD: 5.9 % (ref 4.8–5.6)
HCT VFR BLD AUTO: 37.7 % (ref 35.6–45.5)
HGB BLD-MCNC: 11.7 G/DL (ref 11.9–15.5)
IMM GRANULOCYTES # BLD: 0.03 10*3/MM3 (ref 0–0.03)
IMM GRANULOCYTES NFR BLD: 0.3 % (ref 0–0.5)
LYMPHOCYTES # BLD AUTO: 3.13 10*3/MM3 (ref 0.9–4.8)
LYMPHOCYTES NFR BLD AUTO: 28.6 % (ref 19.6–45.3)
MCH RBC QN AUTO: 29.1 PG (ref 26.9–32)
MCHC RBC AUTO-ENTMCNC: 31 G/DL (ref 32.4–36.3)
MCV RBC AUTO: 93.8 FL (ref 80.5–98.2)
MONOCYTES # BLD AUTO: 0.75 10*3/MM3 (ref 0.2–1.2)
MONOCYTES NFR BLD AUTO: 6.9 % (ref 5–12)
NEUTROPHILS # BLD AUTO: 6.61 10*3/MM3 (ref 1.9–8.1)
NEUTROPHILS NFR BLD AUTO: 60.4 % (ref 42.7–76)
PLATELET # BLD AUTO: 301 10*3/MM3 (ref 140–500)
POTASSIUM SERPL-SCNC: 5.9 MMOL/L (ref 3.5–5.2)
RBC # BLD AUTO: 4.02 10*6/MM3 (ref 3.9–5.2)
SODIUM SERPL-SCNC: 140 MMOL/L (ref 136–145)
WBC # BLD AUTO: 10.93 10*3/MM3 (ref 4.5–10.7)

## 2019-01-01 ENCOUNTER — ANESTHESIA (OUTPATIENT)
Dept: GASTROENTEROLOGY | Facility: HOSPITAL | Age: 69
End: 2019-01-01

## 2019-01-01 ENCOUNTER — APPOINTMENT (OUTPATIENT)
Dept: ULTRASOUND IMAGING | Facility: HOSPITAL | Age: 69
End: 2019-01-01

## 2019-01-01 ENCOUNTER — APPOINTMENT (OUTPATIENT)
Dept: GENERAL RADIOLOGY | Facility: HOSPITAL | Age: 69
End: 2019-01-01

## 2019-01-01 ENCOUNTER — ANESTHESIA EVENT (OUTPATIENT)
Dept: GASTROENTEROLOGY | Facility: HOSPITAL | Age: 69
End: 2019-01-01

## 2019-01-01 LAB
ALBUMIN SERPL-MCNC: 3.1 G/DL (ref 3.5–5.2)
ALBUMIN SERPL-MCNC: 3.1 G/DL (ref 3.5–5.2)
ALBUMIN SERPL-MCNC: 3.2 G/DL (ref 3.5–5.2)
ALBUMIN SERPL-MCNC: 3.3 G/DL (ref 3.5–5.2)
ALBUMIN SERPL-MCNC: 3.4 G/DL (ref 3.5–5.2)
ALBUMIN SERPL-MCNC: 3.4 G/DL (ref 3.5–5.2)
ALBUMIN SERPL-MCNC: 3.5 G/DL (ref 3.5–5.2)
ALBUMIN SERPL-MCNC: 3.5 G/DL (ref 3.5–5.2)
ALBUMIN SERPL-MCNC: 3.8 G/DL (ref 3.5–5.2)
ALBUMIN/GLOB SERPL: 0.9 G/DL
ALP SERPL-CCNC: 59 U/L (ref 39–117)
ALT SERPL W P-5'-P-CCNC: 12 U/L (ref 1–33)
ANION GAP SERPL CALCULATED.3IONS-SCNC: 13.2 MMOL/L (ref 5–15)
ANION GAP SERPL CALCULATED.3IONS-SCNC: 14.1 MMOL/L (ref 5–15)
ANION GAP SERPL CALCULATED.3IONS-SCNC: 14.3 MMOL/L (ref 5–15)
ANION GAP SERPL CALCULATED.3IONS-SCNC: 14.6 MMOL/L (ref 5–15)
ANION GAP SERPL CALCULATED.3IONS-SCNC: 15.3 MMOL/L (ref 5–15)
ANION GAP SERPL CALCULATED.3IONS-SCNC: 15.4 MMOL/L (ref 5–15)
ANION GAP SERPL CALCULATED.3IONS-SCNC: 16.6 MMOL/L (ref 5–15)
ANION GAP SERPL CALCULATED.3IONS-SCNC: 17.1 MMOL/L (ref 5–15)
ANION GAP SERPL CALCULATED.3IONS-SCNC: 18.1 MMOL/L (ref 5–15)
ANION GAP SERPL CALCULATED.3IONS-SCNC: 19.3 MMOL/L (ref 5–15)
AST SERPL-CCNC: 17 U/L (ref 1–32)
BACTERIA UR CULT: ABNORMAL
BACTERIA UR CULT: ABNORMAL
BASOPHILS # BLD AUTO: 0.03 10*3/MM3 (ref 0–0.2)
BASOPHILS NFR BLD AUTO: 0.2 % (ref 0–1.5)
BILIRUB SERPL-MCNC: 0.4 MG/DL (ref 0.2–1.2)
BUN BLD-MCNC: 61 MG/DL (ref 8–23)
BUN BLD-MCNC: 65 MG/DL (ref 8–23)
BUN BLD-MCNC: 69 MG/DL (ref 8–23)
BUN BLD-MCNC: 70 MG/DL (ref 8–23)
BUN BLD-MCNC: 79 MG/DL (ref 8–23)
BUN BLD-MCNC: 80 MG/DL (ref 8–23)
BUN BLD-MCNC: 84 MG/DL (ref 8–23)
BUN BLD-MCNC: 88 MG/DL (ref 8–23)
BUN BLD-MCNC: 89 MG/DL (ref 8–23)
BUN BLD-MCNC: 91 MG/DL (ref 8–23)
BUN/CREAT SERPL: 22.7 (ref 7–25)
BUN/CREAT SERPL: 23.4 (ref 7–25)
BUN/CREAT SERPL: 23.6 (ref 7–25)
BUN/CREAT SERPL: 24.3 (ref 7–25)
BUN/CREAT SERPL: 27.9 (ref 7–25)
BUN/CREAT SERPL: 28.1 (ref 7–25)
BUN/CREAT SERPL: 29.9 (ref 7–25)
BUN/CREAT SERPL: 35 (ref 7–25)
BUN/CREAT SERPL: 36.3 (ref 7–25)
BUN/CREAT SERPL: 41.3 (ref 7–25)
CALCIUM SPEC-SCNC: 9.1 MG/DL (ref 8.6–10.5)
CALCIUM SPEC-SCNC: 9.1 MG/DL (ref 8.6–10.5)
CALCIUM SPEC-SCNC: 9.3 MG/DL (ref 8.6–10.5)
CALCIUM SPEC-SCNC: 9.4 MG/DL (ref 8.6–10.5)
CALCIUM SPEC-SCNC: 9.7 MG/DL (ref 8.6–10.5)
CHLORIDE SERPL-SCNC: 100 MMOL/L (ref 98–107)
CHLORIDE SERPL-SCNC: 86 MMOL/L (ref 98–107)
CHLORIDE SERPL-SCNC: 87 MMOL/L (ref 98–107)
CHLORIDE SERPL-SCNC: 89 MMOL/L (ref 98–107)
CHLORIDE SERPL-SCNC: 93 MMOL/L (ref 98–107)
CHLORIDE SERPL-SCNC: 93 MMOL/L (ref 98–107)
CHLORIDE SERPL-SCNC: 95 MMOL/L (ref 98–107)
CHLORIDE SERPL-SCNC: 95 MMOL/L (ref 98–107)
CHLORIDE SERPL-SCNC: 96 MMOL/L (ref 98–107)
CHLORIDE SERPL-SCNC: 98 MMOL/L (ref 98–107)
CHLORIDE UR-SCNC: <20 MMOL/L
CO2 SERPL-SCNC: 24.7 MMOL/L (ref 22–29)
CO2 SERPL-SCNC: 25.9 MMOL/L (ref 22–29)
CO2 SERPL-SCNC: 26.9 MMOL/L (ref 22–29)
CO2 SERPL-SCNC: 27.4 MMOL/L (ref 22–29)
CO2 SERPL-SCNC: 28.9 MMOL/L (ref 22–29)
CO2 SERPL-SCNC: 30.6 MMOL/L (ref 22–29)
CO2 SERPL-SCNC: 31.4 MMOL/L (ref 22–29)
CO2 SERPL-SCNC: 31.7 MMOL/L (ref 22–29)
CO2 SERPL-SCNC: 31.7 MMOL/L (ref 22–29)
CO2 SERPL-SCNC: 33.8 MMOL/L (ref 22–29)
CREAT BLD-MCNC: 2.13 MG/DL (ref 0.57–1)
CREAT BLD-MCNC: 2.31 MG/DL (ref 0.57–1)
CREAT BLD-MCNC: 2.47 MG/DL (ref 0.57–1)
CREAT BLD-MCNC: 2.51 MG/DL (ref 0.57–1)
CREAT BLD-MCNC: 2.54 MG/DL (ref 0.57–1)
CREAT BLD-MCNC: 2.59 MG/DL (ref 0.57–1)
CREAT BLD-MCNC: 2.81 MG/DL (ref 0.57–1)
CREAT BLD-MCNC: 2.99 MG/DL (ref 0.57–1)
CREAT BLD-MCNC: 3.25 MG/DL (ref 0.57–1)
CREAT BLD-MCNC: 3.52 MG/DL (ref 0.57–1)
DEPRECATED RDW RBC AUTO: 41.4 FL (ref 37–54)
DEPRECATED RDW RBC AUTO: 42.9 FL (ref 37–54)
DEPRECATED RDW RBC AUTO: 43.9 FL (ref 37–54)
DEPRECATED RDW RBC AUTO: 44 FL (ref 37–54)
DEPRECATED RDW RBC AUTO: 44.1 FL (ref 37–54)
DEPRECATED RDW RBC AUTO: 44.2 FL (ref 37–54)
DEPRECATED RDW RBC AUTO: 45 FL (ref 37–54)
DEPRECATED RDW RBC AUTO: 45.8 FL (ref 37–54)
EOSINOPHIL # BLD AUTO: 0 10*3/MM3 (ref 0–0.4)
EOSINOPHIL NFR BLD AUTO: 0 % (ref 0.3–6.2)
ERYTHROCYTE [DISTWIDTH] IN BLOOD BY AUTOMATED COUNT: 13.9 % (ref 12.3–15.4)
ERYTHROCYTE [DISTWIDTH] IN BLOOD BY AUTOMATED COUNT: 14 % (ref 12.3–15.4)
ERYTHROCYTE [DISTWIDTH] IN BLOOD BY AUTOMATED COUNT: 14.2 % (ref 12.3–15.4)
ERYTHROCYTE [DISTWIDTH] IN BLOOD BY AUTOMATED COUNT: 14.2 % (ref 12.3–15.4)
ERYTHROCYTE [DISTWIDTH] IN BLOOD BY AUTOMATED COUNT: 14.3 % (ref 12.3–15.4)
ERYTHROCYTE [DISTWIDTH] IN BLOOD BY AUTOMATED COUNT: 14.3 % (ref 12.3–15.4)
ERYTHROCYTE [DISTWIDTH] IN BLOOD BY AUTOMATED COUNT: 14.4 % (ref 12.3–15.4)
ERYTHROCYTE [DISTWIDTH] IN BLOOD BY AUTOMATED COUNT: 14.7 % (ref 12.3–15.4)
GFR SERPL CREATININE-BSD FRML MDRD: 13 ML/MIN/1.73
GFR SERPL CREATININE-BSD FRML MDRD: 14 ML/MIN/1.73
GFR SERPL CREATININE-BSD FRML MDRD: 15 ML/MIN/1.73
GFR SERPL CREATININE-BSD FRML MDRD: 17 ML/MIN/1.73
GFR SERPL CREATININE-BSD FRML MDRD: 18 ML/MIN/1.73
GFR SERPL CREATININE-BSD FRML MDRD: 19 ML/MIN/1.73
GFR SERPL CREATININE-BSD FRML MDRD: 21 ML/MIN/1.73
GFR SERPL CREATININE-BSD FRML MDRD: 23 ML/MIN/1.73
GFR SERPL CREATININE-BSD FRML MDRD: ABNORMAL ML/MIN/{1.73_M2}
GFR SERPL CREATININE-BSD FRML MDRD: ABNORMAL ML/MIN/{1.73_M2}
GLOBULIN UR ELPH-MCNC: 3.8 GM/DL
GLUCOSE BLD-MCNC: 102 MG/DL (ref 65–99)
GLUCOSE BLD-MCNC: 107 MG/DL (ref 65–99)
GLUCOSE BLD-MCNC: 139 MG/DL (ref 65–99)
GLUCOSE BLD-MCNC: 180 MG/DL (ref 65–99)
GLUCOSE BLD-MCNC: 184 MG/DL (ref 65–99)
GLUCOSE BLD-MCNC: 216 MG/DL (ref 65–99)
GLUCOSE BLD-MCNC: 253 MG/DL (ref 65–99)
GLUCOSE BLD-MCNC: 329 MG/DL (ref 65–99)
GLUCOSE BLD-MCNC: 414 MG/DL (ref 65–99)
GLUCOSE BLD-MCNC: 71 MG/DL (ref 65–99)
GLUCOSE BLDC GLUCOMTR-MCNC: 102 MG/DL (ref 70–130)
GLUCOSE BLDC GLUCOMTR-MCNC: 105 MG/DL (ref 70–130)
GLUCOSE BLDC GLUCOMTR-MCNC: 106 MG/DL (ref 70–130)
GLUCOSE BLDC GLUCOMTR-MCNC: 111 MG/DL (ref 70–130)
GLUCOSE BLDC GLUCOMTR-MCNC: 111 MG/DL (ref 70–130)
GLUCOSE BLDC GLUCOMTR-MCNC: 113 MG/DL (ref 70–130)
GLUCOSE BLDC GLUCOMTR-MCNC: 113 MG/DL (ref 70–130)
GLUCOSE BLDC GLUCOMTR-MCNC: 120 MG/DL (ref 70–130)
GLUCOSE BLDC GLUCOMTR-MCNC: 121 MG/DL (ref 70–130)
GLUCOSE BLDC GLUCOMTR-MCNC: 123 MG/DL (ref 70–130)
GLUCOSE BLDC GLUCOMTR-MCNC: 125 MG/DL (ref 70–130)
GLUCOSE BLDC GLUCOMTR-MCNC: 126 MG/DL (ref 70–130)
GLUCOSE BLDC GLUCOMTR-MCNC: 128 MG/DL (ref 70–130)
GLUCOSE BLDC GLUCOMTR-MCNC: 145 MG/DL (ref 70–130)
GLUCOSE BLDC GLUCOMTR-MCNC: 167 MG/DL (ref 70–130)
GLUCOSE BLDC GLUCOMTR-MCNC: 169 MG/DL (ref 70–130)
GLUCOSE BLDC GLUCOMTR-MCNC: 170 MG/DL (ref 70–130)
GLUCOSE BLDC GLUCOMTR-MCNC: 175 MG/DL (ref 70–130)
GLUCOSE BLDC GLUCOMTR-MCNC: 182 MG/DL (ref 70–130)
GLUCOSE BLDC GLUCOMTR-MCNC: 186 MG/DL (ref 70–130)
GLUCOSE BLDC GLUCOMTR-MCNC: 190 MG/DL (ref 70–130)
GLUCOSE BLDC GLUCOMTR-MCNC: 193 MG/DL (ref 70–130)
GLUCOSE BLDC GLUCOMTR-MCNC: 212 MG/DL (ref 70–130)
GLUCOSE BLDC GLUCOMTR-MCNC: 220 MG/DL (ref 70–130)
GLUCOSE BLDC GLUCOMTR-MCNC: 222 MG/DL (ref 70–130)
GLUCOSE BLDC GLUCOMTR-MCNC: 229 MG/DL (ref 70–130)
GLUCOSE BLDC GLUCOMTR-MCNC: 235 MG/DL (ref 70–130)
GLUCOSE BLDC GLUCOMTR-MCNC: 246 MG/DL (ref 70–130)
GLUCOSE BLDC GLUCOMTR-MCNC: 272 MG/DL (ref 70–130)
GLUCOSE BLDC GLUCOMTR-MCNC: 281 MG/DL (ref 70–130)
GLUCOSE BLDC GLUCOMTR-MCNC: 291 MG/DL (ref 70–130)
GLUCOSE BLDC GLUCOMTR-MCNC: 297 MG/DL (ref 70–130)
GLUCOSE BLDC GLUCOMTR-MCNC: 311 MG/DL (ref 70–130)
GLUCOSE BLDC GLUCOMTR-MCNC: 331 MG/DL (ref 70–130)
GLUCOSE BLDC GLUCOMTR-MCNC: 336 MG/DL (ref 70–130)
GLUCOSE BLDC GLUCOMTR-MCNC: 344 MG/DL (ref 70–130)
GLUCOSE BLDC GLUCOMTR-MCNC: 351 MG/DL (ref 70–130)
GLUCOSE BLDC GLUCOMTR-MCNC: 371 MG/DL (ref 70–130)
GLUCOSE BLDC GLUCOMTR-MCNC: 393 MG/DL (ref 70–130)
GLUCOSE BLDC GLUCOMTR-MCNC: 412 MG/DL (ref 70–130)
GLUCOSE BLDC GLUCOMTR-MCNC: 78 MG/DL (ref 70–130)
GLUCOSE BLDC GLUCOMTR-MCNC: 92 MG/DL (ref 70–130)
HCT VFR BLD AUTO: 25.2 % (ref 34–46.6)
HCT VFR BLD AUTO: 26.4 % (ref 34–46.6)
HCT VFR BLD AUTO: 27.3 % (ref 34–46.6)
HCT VFR BLD AUTO: 27.3 % (ref 34–46.6)
HCT VFR BLD AUTO: 27.7 % (ref 34–46.6)
HCT VFR BLD AUTO: 28 % (ref 34–46.6)
HCT VFR BLD AUTO: 28.6 % (ref 34–46.6)
HCT VFR BLD AUTO: 28.7 % (ref 34–46.6)
HCT VFR BLD AUTO: 30.3 % (ref 34–46.6)
HCT VFR BLD AUTO: 30.9 % (ref 34–46.6)
HCT VFR BLD AUTO: 31.1 % (ref 34–46.6)
HCT VFR BLD AUTO: 31.3 % (ref 34–46.6)
HGB BLD-MCNC: 10 G/DL (ref 12–15.9)
HGB BLD-MCNC: 10 G/DL (ref 12–15.9)
HGB BLD-MCNC: 8.2 G/DL (ref 12–15.9)
HGB BLD-MCNC: 8.4 G/DL (ref 12–15.9)
HGB BLD-MCNC: 9 G/DL (ref 12–15.9)
HGB BLD-MCNC: 9 G/DL (ref 12–15.9)
HGB BLD-MCNC: 9.1 G/DL (ref 12–15.9)
HGB BLD-MCNC: 9.1 G/DL (ref 12–15.9)
HGB BLD-MCNC: 9.2 G/DL (ref 12–15.9)
HGB BLD-MCNC: 9.4 G/DL (ref 12–15.9)
HGB BLD-MCNC: 9.6 G/DL (ref 12–15.9)
HGB BLD-MCNC: 9.9 G/DL (ref 12–15.9)
IMM GRANULOCYTES # BLD AUTO: 0.12 10*3/MM3 (ref 0–0.05)
IMM GRANULOCYTES NFR BLD AUTO: 0.6 % (ref 0–0.5)
LAB AP CASE REPORT: NORMAL
LYMPHOCYTES # BLD AUTO: 1.6 10*3/MM3 (ref 0.7–3.1)
LYMPHOCYTES NFR BLD AUTO: 8.3 % (ref 19.6–45.3)
MAGNESIUM SERPL-MCNC: 1.7 MG/DL (ref 1.6–2.4)
MAGNESIUM SERPL-MCNC: 1.8 MG/DL (ref 1.6–2.4)
MAGNESIUM SERPL-MCNC: 2.8 MG/DL (ref 1.6–2.4)
MCH RBC QN AUTO: 27.2 PG (ref 26.6–33)
MCH RBC QN AUTO: 27.2 PG (ref 26.6–33)
MCH RBC QN AUTO: 27.5 PG (ref 26.6–33)
MCH RBC QN AUTO: 27.7 PG (ref 26.6–33)
MCH RBC QN AUTO: 28.1 PG (ref 26.6–33)
MCH RBC QN AUTO: 28.2 PG (ref 26.6–33)
MCH RBC QN AUTO: 28.3 PG (ref 26.6–33)
MCH RBC QN AUTO: 28.5 PG (ref 26.6–33)
MCH RBC QN AUTO: 28.5 PG (ref 26.6–33)
MCH RBC QN AUTO: 29.1 PG (ref 26.6–33)
MCHC RBC AUTO-ENTMCNC: 31.4 G/DL (ref 31.5–35.7)
MCHC RBC AUTO-ENTMCNC: 31.6 G/DL (ref 31.5–35.7)
MCHC RBC AUTO-ENTMCNC: 31.7 G/DL (ref 31.5–35.7)
MCHC RBC AUTO-ENTMCNC: 32.2 G/DL (ref 31.5–35.7)
MCHC RBC AUTO-ENTMCNC: 32.4 G/DL (ref 31.5–35.7)
MCHC RBC AUTO-ENTMCNC: 32.5 G/DL (ref 31.5–35.7)
MCHC RBC AUTO-ENTMCNC: 32.9 G/DL (ref 31.5–35.7)
MCHC RBC AUTO-ENTMCNC: 32.9 G/DL (ref 31.5–35.7)
MCHC RBC AUTO-ENTMCNC: 33 G/DL (ref 31.5–35.7)
MCHC RBC AUTO-ENTMCNC: 33.3 G/DL (ref 31.5–35.7)
MCV RBC AUTO: 84.7 FL (ref 79–97)
MCV RBC AUTO: 86 FL (ref 79–97)
MCV RBC AUTO: 86.4 FL (ref 79–97)
MCV RBC AUTO: 86.6 FL (ref 79–97)
MCV RBC AUTO: 86.7 FL (ref 79–97)
MCV RBC AUTO: 86.7 FL (ref 79–97)
MCV RBC AUTO: 86.8 FL (ref 79–97)
MCV RBC AUTO: 86.9 FL (ref 79–97)
MCV RBC AUTO: 87.2 FL (ref 79–97)
MCV RBC AUTO: 87.6 FL (ref 79–97)
MONOCYTES # BLD AUTO: 0.96 10*3/MM3 (ref 0.1–0.9)
MONOCYTES NFR BLD AUTO: 5 % (ref 5–12)
NEUTROPHILS # BLD AUTO: 16.62 10*3/MM3 (ref 1.7–7)
NEUTROPHILS NFR BLD AUTO: 85.9 % (ref 42.7–76)
NRBC BLD AUTO-RTO: 0 /100 WBC (ref 0–0.2)
OSMOLALITY UR: 467 MOSM/KG
OTHER ANTIBIOTIC SUSC ISLT: ABNORMAL
PATH REPORT.ADDENDUM SPEC: NORMAL
PATH REPORT.FINAL DX SPEC: NORMAL
PATH REPORT.GROSS SPEC: NORMAL
PHOSPHATE SERPL-MCNC: 2.5 MG/DL (ref 2.5–4.5)
PHOSPHATE SERPL-MCNC: 2.7 MG/DL (ref 2.5–4.5)
PHOSPHATE SERPL-MCNC: 2.7 MG/DL (ref 2.5–4.5)
PHOSPHATE SERPL-MCNC: 3.8 MG/DL (ref 2.5–4.5)
PHOSPHATE SERPL-MCNC: 3.9 MG/DL (ref 2.5–4.5)
PHOSPHATE SERPL-MCNC: 4.5 MG/DL (ref 2.5–4.5)
PHOSPHATE SERPL-MCNC: 4.7 MG/DL (ref 2.5–4.5)
PHOSPHATE SERPL-MCNC: 5 MG/DL (ref 2.5–4.5)
PLATELET # BLD AUTO: 264 10*3/MM3 (ref 140–450)
PLATELET # BLD AUTO: 277 10*3/MM3 (ref 140–450)
PLATELET # BLD AUTO: 280 10*3/MM3 (ref 140–450)
PLATELET # BLD AUTO: 307 10*3/MM3 (ref 140–450)
PLATELET # BLD AUTO: 313 10*3/MM3 (ref 140–450)
PLATELET # BLD AUTO: 329 10*3/MM3 (ref 140–450)
PLATELET # BLD AUTO: 336 10*3/MM3 (ref 140–450)
PLATELET # BLD AUTO: 348 10*3/MM3 (ref 140–450)
PLATELET # BLD AUTO: 361 10*3/MM3 (ref 140–450)
PLATELET # BLD AUTO: 362 10*3/MM3 (ref 140–450)
PMV BLD AUTO: 10.4 FL (ref 6–12)
PMV BLD AUTO: 10.5 FL (ref 6–12)
PMV BLD AUTO: 10.6 FL (ref 6–12)
PMV BLD AUTO: 10.7 FL (ref 6–12)
PMV BLD AUTO: 10.9 FL (ref 6–12)
PMV BLD AUTO: 10.9 FL (ref 6–12)
PMV BLD AUTO: 11 FL (ref 6–12)
POTASSIUM BLD-SCNC: 3.3 MMOL/L (ref 3.5–5.2)
POTASSIUM BLD-SCNC: 3.5 MMOL/L (ref 3.5–5.2)
POTASSIUM BLD-SCNC: 3.8 MMOL/L (ref 3.5–5.2)
POTASSIUM BLD-SCNC: 3.9 MMOL/L (ref 3.5–5.2)
POTASSIUM BLD-SCNC: 3.9 MMOL/L (ref 3.5–5.2)
POTASSIUM BLD-SCNC: 4 MMOL/L (ref 3.5–5.2)
POTASSIUM BLD-SCNC: 4.1 MMOL/L (ref 3.5–5.2)
POTASSIUM BLD-SCNC: 4.2 MMOL/L (ref 3.5–5.2)
PROT SERPL-MCNC: 7.3 G/DL (ref 6–8.5)
RBC # BLD AUTO: 2.91 10*6/MM3 (ref 3.77–5.28)
RBC # BLD AUTO: 3.13 10*6/MM3 (ref 3.77–5.28)
RBC # BLD AUTO: 3.16 10*6/MM3 (ref 3.77–5.28)
RBC # BLD AUTO: 3.22 10*6/MM3 (ref 3.77–5.28)
RBC # BLD AUTO: 3.23 10*6/MM3 (ref 3.77–5.28)
RBC # BLD AUTO: 3.31 10*6/MM3 (ref 3.77–5.28)
RBC # BLD AUTO: 3.46 10*6/MM3 (ref 3.77–5.28)
RBC # BLD AUTO: 3.56 10*6/MM3 (ref 3.77–5.28)
RBC # BLD AUTO: 3.6 10*6/MM3 (ref 3.77–5.28)
RBC # BLD AUTO: 3.67 10*6/MM3 (ref 3.77–5.28)
SODIUM BLD-SCNC: 131 MMOL/L (ref 136–145)
SODIUM BLD-SCNC: 135 MMOL/L (ref 136–145)
SODIUM BLD-SCNC: 136 MMOL/L (ref 136–145)
SODIUM BLD-SCNC: 137 MMOL/L (ref 136–145)
SODIUM BLD-SCNC: 137 MMOL/L (ref 136–145)
SODIUM BLD-SCNC: 140 MMOL/L (ref 136–145)
SODIUM BLD-SCNC: 140 MMOL/L (ref 136–145)
SODIUM BLD-SCNC: 141 MMOL/L (ref 136–145)
SODIUM BLD-SCNC: 142 MMOL/L (ref 136–145)
SODIUM BLD-SCNC: 144 MMOL/L (ref 136–145)
SODIUM UR-SCNC: 60 MMOL/L
TROPONIN T SERPL-MCNC: 0.03 NG/ML (ref 0–0.03)
TROPONIN T SERPL-MCNC: 0.37 NG/ML (ref 0–0.03)
TROPONIN T SERPL-MCNC: 0.49 NG/ML (ref 0–0.03)
URATE SERPL-MCNC: 13.1 MG/DL (ref 2.4–5.7)
URATE SERPL-MCNC: 13.2 MG/DL (ref 2.4–5.7)
URATE SERPL-MCNC: 13.3 MG/DL (ref 2.4–5.7)
URATE SERPL-MCNC: 13.6 MG/DL (ref 2.4–5.7)
URATE SERPL-MCNC: 14.2 MG/DL (ref 2.4–5.7)
URATE SERPL-MCNC: 14.2 MG/DL (ref 2.4–5.7)
URATE SERPL-MCNC: 15 MG/DL (ref 2.4–5.7)
URATE SERPL-MCNC: 15.3 MG/DL (ref 2.4–5.7)
URATE SERPL-MCNC: 15.7 MG/DL (ref 2.4–5.7)
URATE SERPL-MCNC: 16.6 MG/DL (ref 2.4–5.7)
WBC NRBC COR # BLD: 13 10*3/MM3 (ref 3.4–10.8)
WBC NRBC COR # BLD: 15.43 10*3/MM3 (ref 3.4–10.8)
WBC NRBC COR # BLD: 15.51 10*3/MM3 (ref 3.4–10.8)
WBC NRBC COR # BLD: 16.57 10*3/MM3 (ref 3.4–10.8)
WBC NRBC COR # BLD: 17.12 10*3/MM3 (ref 3.4–10.8)
WBC NRBC COR # BLD: 17.26 10*3/MM3 (ref 3.4–10.8)
WBC NRBC COR # BLD: 17.54 10*3/MM3 (ref 3.4–10.8)
WBC NRBC COR # BLD: 19.03 10*3/MM3 (ref 3.4–10.8)
WBC NRBC COR # BLD: 19.33 10*3/MM3 (ref 3.4–10.8)
WBC NRBC COR # BLD: 19.66 10*3/MM3 (ref 3.4–10.8)
WRITTEN AUTHORIZATION: NORMAL

## 2019-01-01 PROCEDURE — 99232 SBSQ HOSP IP/OBS MODERATE 35: CPT | Performed by: NURSE PRACTITIONER

## 2019-01-01 PROCEDURE — 63710000001 INSULIN GLARGINE PER 5 UNITS: Performed by: NURSE PRACTITIONER

## 2019-01-01 PROCEDURE — 63710000001 PREDNISONE PER 1 MG: Performed by: INTERNAL MEDICINE

## 2019-01-01 PROCEDURE — 94640 AIRWAY INHALATION TREATMENT: CPT

## 2019-01-01 PROCEDURE — 82962 GLUCOSE BLOOD TEST: CPT

## 2019-01-01 PROCEDURE — 25010000002 MAGNESIUM SULFATE IN D5W 1G/100ML (PREMIX) 1-5 GM/100ML-% SOLUTION: Performed by: INTERNAL MEDICINE

## 2019-01-01 PROCEDURE — 94799 UNLISTED PULMONARY SVC/PX: CPT

## 2019-01-01 PROCEDURE — 84484 ASSAY OF TROPONIN QUANT: CPT | Performed by: INTERNAL MEDICINE

## 2019-01-01 PROCEDURE — 84300 ASSAY OF URINE SODIUM: CPT | Performed by: INTERNAL MEDICINE

## 2019-01-01 PROCEDURE — 84550 ASSAY OF BLOOD/URIC ACID: CPT | Performed by: INTERNAL MEDICINE

## 2019-01-01 PROCEDURE — 85014 HEMATOCRIT: CPT | Performed by: HOSPITALIST

## 2019-01-01 PROCEDURE — P9047 ALBUMIN (HUMAN), 25%, 50ML: HCPCS | Performed by: INTERNAL MEDICINE

## 2019-01-01 PROCEDURE — 63710000001 INSULIN LISPRO (HUMAN) PER 5 UNITS: Performed by: INTERNAL MEDICINE

## 2019-01-01 PROCEDURE — 25010000002 FUROSEMIDE PER 20 MG: Performed by: INTERNAL MEDICINE

## 2019-01-01 PROCEDURE — 63710000001 INSULIN GLARGINE PER 5 UNITS: Performed by: INTERNAL MEDICINE

## 2019-01-01 PROCEDURE — 83935 ASSAY OF URINE OSMOLALITY: CPT | Performed by: INTERNAL MEDICINE

## 2019-01-01 PROCEDURE — 63710000001 PREDNISONE PER 5 MG: Performed by: INTERNAL MEDICINE

## 2019-01-01 PROCEDURE — 93010 ELECTROCARDIOGRAM REPORT: CPT | Performed by: INTERNAL MEDICINE

## 2019-01-01 PROCEDURE — 97110 THERAPEUTIC EXERCISES: CPT

## 2019-01-01 PROCEDURE — 99232 SBSQ HOSP IP/OBS MODERATE 35: CPT | Performed by: INTERNAL MEDICINE

## 2019-01-01 PROCEDURE — 84550 ASSAY OF BLOOD/URIC ACID: CPT | Performed by: HOSPITALIST

## 2019-01-01 PROCEDURE — 99231 SBSQ HOSP IP/OBS SF/LOW 25: CPT | Performed by: INTERNAL MEDICINE

## 2019-01-01 PROCEDURE — 43239 EGD BIOPSY SINGLE/MULTIPLE: CPT | Performed by: INTERNAL MEDICINE

## 2019-01-01 PROCEDURE — 85025 COMPLETE CBC W/AUTO DIFF WBC: CPT | Performed by: NURSE PRACTITIONER

## 2019-01-01 PROCEDURE — 80069 RENAL FUNCTION PANEL: CPT | Performed by: INTERNAL MEDICINE

## 2019-01-01 PROCEDURE — 85027 COMPLETE CBC AUTOMATED: CPT | Performed by: HOSPITALIST

## 2019-01-01 PROCEDURE — 88305 TISSUE EXAM BY PATHOLOGIST: CPT | Performed by: INTERNAL MEDICINE

## 2019-01-01 PROCEDURE — 88342 IMHCHEM/IMCYTCHM 1ST ANTB: CPT | Performed by: INTERNAL MEDICINE

## 2019-01-01 PROCEDURE — 0DB68ZX EXCISION OF STOMACH, VIA NATURAL OR ARTIFICIAL OPENING ENDOSCOPIC, DIAGNOSTIC: ICD-10-PCS | Performed by: INTERNAL MEDICINE

## 2019-01-01 PROCEDURE — 85027 COMPLETE CBC AUTOMATED: CPT | Performed by: INTERNAL MEDICINE

## 2019-01-01 PROCEDURE — 63710000001 INSULIN LISPRO (HUMAN) PER 5 UNITS: Performed by: NURSE PRACTITIONER

## 2019-01-01 PROCEDURE — 25010000002 PROPOFOL 10 MG/ML EMULSION: Performed by: ANESTHESIOLOGY

## 2019-01-01 PROCEDURE — 25010000002 ALBUMIN HUMAN 25% PER 50 ML: Performed by: INTERNAL MEDICINE

## 2019-01-01 PROCEDURE — 25010000002 ONDANSETRON PER 1 MG: Performed by: INTERNAL MEDICINE

## 2019-01-01 PROCEDURE — 63710000001 PROMETHAZINE PER 12.5 MG: Performed by: INTERNAL MEDICINE

## 2019-01-01 PROCEDURE — 80048 BASIC METABOLIC PNL TOTAL CA: CPT | Performed by: INTERNAL MEDICINE

## 2019-01-01 PROCEDURE — 84132 ASSAY OF SERUM POTASSIUM: CPT | Performed by: INTERNAL MEDICINE

## 2019-01-01 PROCEDURE — 25010000002 ONDANSETRON PER 1 MG: Performed by: NURSE PRACTITIONER

## 2019-01-01 PROCEDURE — 85018 HEMOGLOBIN: CPT | Performed by: HOSPITALIST

## 2019-01-01 PROCEDURE — 82436 ASSAY OF URINE CHLORIDE: CPT | Performed by: INTERNAL MEDICINE

## 2019-01-01 PROCEDURE — 71046 X-RAY EXAM CHEST 2 VIEWS: CPT

## 2019-01-01 PROCEDURE — 71045 X-RAY EXAM CHEST 1 VIEW: CPT

## 2019-01-01 PROCEDURE — 83735 ASSAY OF MAGNESIUM: CPT | Performed by: HOSPITALIST

## 2019-01-01 PROCEDURE — 74247: CPT

## 2019-01-01 PROCEDURE — 83735 ASSAY OF MAGNESIUM: CPT | Performed by: INTERNAL MEDICINE

## 2019-01-01 PROCEDURE — 93005 ELECTROCARDIOGRAM TRACING: CPT | Performed by: INTERNAL MEDICINE

## 2019-01-01 PROCEDURE — 80053 COMPREHEN METABOLIC PANEL: CPT | Performed by: INTERNAL MEDICINE

## 2019-01-01 PROCEDURE — 76705 ECHO EXAM OF ABDOMEN: CPT

## 2019-01-01 RX ORDER — FUROSEMIDE 10 MG/ML
80 INJECTION INTRAMUSCULAR; INTRAVENOUS ONCE
Status: COMPLETED | OUTPATIENT
Start: 2019-01-01 | End: 2019-01-01

## 2019-01-01 RX ORDER — PREDNISONE 20 MG/1
40 TABLET ORAL
Status: COMPLETED | OUTPATIENT
Start: 2019-01-01 | End: 2019-01-01

## 2019-01-01 RX ORDER — PREDNISONE 10 MG/1
10 TABLET ORAL
Status: DISCONTINUED | OUTPATIENT
Start: 2020-01-01 | End: 2020-01-01

## 2019-01-01 RX ORDER — PROPOFOL 10 MG/ML
VIAL (ML) INTRAVENOUS AS NEEDED
Status: DISCONTINUED | OUTPATIENT
Start: 2019-01-01 | End: 2019-01-01 | Stop reason: SURG

## 2019-01-01 RX ORDER — SODIUM CHLORIDE 9 MG/ML
30 INJECTION, SOLUTION INTRAVENOUS CONTINUOUS PRN
Status: DISCONTINUED | OUTPATIENT
Start: 2019-01-01 | End: 2020-01-01 | Stop reason: HOSPADM

## 2019-01-01 RX ORDER — LIDOCAINE HYDROCHLORIDE 20 MG/ML
INJECTION, SOLUTION INFILTRATION; PERINEURAL AS NEEDED
Status: DISCONTINUED | OUTPATIENT
Start: 2019-01-01 | End: 2019-01-01 | Stop reason: SURG

## 2019-01-01 RX ORDER — MAGNESIUM SULFATE 1 G/100ML
1 INJECTION INTRAVENOUS ONCE
Status: COMPLETED | OUTPATIENT
Start: 2019-01-01 | End: 2019-01-01

## 2019-01-01 RX ORDER — ASPIRIN 81 MG/1
81 TABLET ORAL DAILY
Status: DISCONTINUED | OUTPATIENT
Start: 2019-01-01 | End: 2020-01-01 | Stop reason: HOSPADM

## 2019-01-01 RX ORDER — PREDNISONE 20 MG/1
40 TABLET ORAL DAILY
Status: DISCONTINUED | OUTPATIENT
Start: 2019-01-01 | End: 2019-01-01

## 2019-01-01 RX ORDER — DEXTROSE MONOHYDRATE 25 G/50ML
25 INJECTION, SOLUTION INTRAVENOUS
Status: DISCONTINUED | OUTPATIENT
Start: 2019-01-01 | End: 2020-01-01 | Stop reason: HOSPADM

## 2019-01-01 RX ORDER — PREDNISONE 20 MG/1
20 TABLET ORAL
Status: COMPLETED | OUTPATIENT
Start: 2019-01-01 | End: 2019-01-01

## 2019-01-01 RX ORDER — FUROSEMIDE 10 MG/ML
120 INJECTION INTRAMUSCULAR; INTRAVENOUS ONCE
Status: COMPLETED | OUTPATIENT
Start: 2019-01-01 | End: 2019-01-01

## 2019-01-01 RX ORDER — INSULIN GLARGINE 100 [IU]/ML
55 INJECTION, SOLUTION SUBCUTANEOUS NIGHTLY
Status: DISCONTINUED | OUTPATIENT
Start: 2019-01-01 | End: 2020-01-01

## 2019-01-01 RX ORDER — ALBUMIN (HUMAN) 12.5 G/50ML
25 SOLUTION INTRAVENOUS EVERY 8 HOURS
Status: COMPLETED | OUTPATIENT
Start: 2019-01-01 | End: 2019-01-01

## 2019-01-01 RX ORDER — MAGNESIUM SULFATE 1 G/100ML
2 INJECTION INTRAVENOUS ONCE
Status: COMPLETED | OUTPATIENT
Start: 2019-01-01 | End: 2019-01-01

## 2019-01-01 RX ORDER — NICOTINE POLACRILEX 4 MG
15 LOZENGE BUCCAL
Status: DISCONTINUED | OUTPATIENT
Start: 2019-01-01 | End: 2020-01-01 | Stop reason: HOSPADM

## 2019-01-01 RX ORDER — GABAPENTIN 300 MG/1
300 CAPSULE ORAL NIGHTLY
Status: DISCONTINUED | OUTPATIENT
Start: 2019-01-01 | End: 2020-01-01 | Stop reason: HOSPADM

## 2019-01-01 RX ORDER — METOLAZONE 5 MG/1
5 TABLET ORAL ONCE
Status: COMPLETED | OUTPATIENT
Start: 2019-01-01 | End: 2019-01-01

## 2019-01-01 RX ORDER — POTASSIUM CHLORIDE 750 MG/1
40 CAPSULE, EXTENDED RELEASE ORAL AS NEEDED
Status: DISCONTINUED | OUTPATIENT
Start: 2019-01-01 | End: 2020-01-01 | Stop reason: HOSPADM

## 2019-01-01 RX ORDER — POTASSIUM CHLORIDE 750 MG/1
40 CAPSULE, EXTENDED RELEASE ORAL ONCE
Status: COMPLETED | OUTPATIENT
Start: 2019-01-01 | End: 2019-01-01

## 2019-01-01 RX ORDER — POTASSIUM CHLORIDE 750 MG/1
20 CAPSULE, EXTENDED RELEASE ORAL ONCE
Status: COMPLETED | OUTPATIENT
Start: 2019-01-01 | End: 2019-01-01

## 2019-01-01 RX ORDER — POTASSIUM CHLORIDE 1.5 G/1.77G
40 POWDER, FOR SOLUTION ORAL AS NEEDED
Status: DISCONTINUED | OUTPATIENT
Start: 2019-01-01 | End: 2020-01-01 | Stop reason: HOSPADM

## 2019-01-01 RX ORDER — FUROSEMIDE 10 MG/ML
INJECTION INTRAMUSCULAR; INTRAVENOUS
Status: DISPENSED
Start: 2019-01-01 | End: 2019-01-01

## 2019-01-01 RX ORDER — MAGNESIUM CARB/ALUMINUM HYDROX 105-160MG
300 TABLET,CHEWABLE ORAL ONCE
Status: COMPLETED | OUTPATIENT
Start: 2019-01-01 | End: 2019-01-01

## 2019-01-01 RX ORDER — SODIUM CHLORIDE, SODIUM LACTATE, POTASSIUM CHLORIDE, CALCIUM CHLORIDE 600; 310; 30; 20 MG/100ML; MG/100ML; MG/100ML; MG/100ML
30 INJECTION, SOLUTION INTRAVENOUS CONTINUOUS
Status: DISCONTINUED | OUTPATIENT
Start: 2019-01-01 | End: 2020-01-01

## 2019-01-01 RX ORDER — PROMETHAZINE HYDROCHLORIDE 12.5 MG/1
12.5 TABLET ORAL EVERY 6 HOURS PRN
Status: DISCONTINUED | OUTPATIENT
Start: 2019-01-01 | End: 2020-01-01 | Stop reason: HOSPADM

## 2019-01-01 RX ORDER — POTASSIUM CHLORIDE 750 MG/1
20 CAPSULE, EXTENDED RELEASE ORAL ONCE
Status: DISCONTINUED | OUTPATIENT
Start: 2019-01-01 | End: 2019-01-01

## 2019-01-01 RX ORDER — SODIUM CHLORIDE 9 MG/ML
100 INJECTION, SOLUTION INTRAVENOUS CONTINUOUS
Status: ACTIVE | OUTPATIENT
Start: 2019-01-01 | End: 2019-01-01

## 2019-01-01 RX ORDER — POLYETHYLENE GLYCOL 3350 17 G/17G
17 POWDER, FOR SOLUTION ORAL DAILY PRN
Status: DISCONTINUED | OUTPATIENT
Start: 2019-01-01 | End: 2020-01-01 | Stop reason: HOSPADM

## 2019-01-01 RX ADMIN — SUCRALFATE 1 G: 1 TABLET ORAL at 09:07

## 2019-01-01 RX ADMIN — INSULIN LISPRO 8 UNITS: 100 INJECTION, SOLUTION INTRAVENOUS; SUBCUTANEOUS at 21:02

## 2019-01-01 RX ADMIN — SODIUM CHLORIDE, PRESERVATIVE FREE 10 ML: 5 INJECTION INTRAVENOUS at 08:50

## 2019-01-01 RX ADMIN — SUCRALFATE 1 G: 1 TABLET ORAL at 11:54

## 2019-01-01 RX ADMIN — ASPIRIN 81 MG: 81 TABLET, COATED ORAL at 10:26

## 2019-01-01 RX ADMIN — METOPROLOL TARTRATE 12.5 MG: 25 TABLET ORAL at 08:51

## 2019-01-01 RX ADMIN — PREDNISONE 40 MG: 20 TABLET ORAL at 12:57

## 2019-01-01 RX ADMIN — SUCRALFATE 1 G: 1 TABLET ORAL at 08:16

## 2019-01-01 RX ADMIN — INSULIN LISPRO 8 UNITS: 100 INJECTION, SOLUTION INTRAVENOUS; SUBCUTANEOUS at 17:46

## 2019-01-01 RX ADMIN — SUCRALFATE 1 G: 1 TABLET ORAL at 13:00

## 2019-01-01 RX ADMIN — PANTOPRAZOLE SODIUM 40 MG: 40 TABLET, DELAYED RELEASE ORAL at 16:57

## 2019-01-01 RX ADMIN — INSULIN LISPRO 8 UNITS: 100 INJECTION, SOLUTION INTRAVENOUS; SUBCUTANEOUS at 11:54

## 2019-01-01 RX ADMIN — IPRATROPIUM BROMIDE AND ALBUTEROL SULFATE 3 ML: 2.5; .5 SOLUTION RESPIRATORY (INHALATION) at 08:30

## 2019-01-01 RX ADMIN — PREDNISONE 20 MG: 20 TABLET ORAL at 11:27

## 2019-01-01 RX ADMIN — FUROSEMIDE 10 MG/HR: 10 INJECTION, SOLUTION INTRAMUSCULAR; INTRAVENOUS at 08:53

## 2019-01-01 RX ADMIN — PANTOPRAZOLE SODIUM 40 MG: 40 TABLET, DELAYED RELEASE ORAL at 17:46

## 2019-01-01 RX ADMIN — SUCRALFATE 1 G: 1 TABLET ORAL at 13:13

## 2019-01-01 RX ADMIN — POTASSIUM CHLORIDE 40 MEQ: 750 CAPSULE, EXTENDED RELEASE ORAL at 12:44

## 2019-01-01 RX ADMIN — INSULIN GLARGINE 55 UNITS: 100 INJECTION, SOLUTION SUBCUTANEOUS at 21:11

## 2019-01-01 RX ADMIN — PANTOPRAZOLE SODIUM 40 MG: 40 TABLET, DELAYED RELEASE ORAL at 18:05

## 2019-01-01 RX ADMIN — ONDANSETRON 4 MG: 2 INJECTION INTRAMUSCULAR; INTRAVENOUS at 07:44

## 2019-01-01 RX ADMIN — MAGNESIUM SULFATE 1 G: 1 INJECTION INTRAVENOUS at 10:27

## 2019-01-01 RX ADMIN — PANTOPRAZOLE SODIUM 40 MG: 40 TABLET, DELAYED RELEASE ORAL at 06:41

## 2019-01-01 RX ADMIN — PANTOPRAZOLE SODIUM 40 MG: 40 TABLET, DELAYED RELEASE ORAL at 18:00

## 2019-01-01 RX ADMIN — Medication 5 MG: at 23:09

## 2019-01-01 RX ADMIN — PROPOFOL 200 MG: 10 INJECTION, EMULSION INTRAVENOUS at 09:15

## 2019-01-01 RX ADMIN — POLYETHYLENE GLYCOL 3350 17 G: 17 POWDER, FOR SOLUTION ORAL at 11:54

## 2019-01-01 RX ADMIN — IPRATROPIUM BROMIDE AND ALBUTEROL SULFATE 3 ML: 2.5; .5 SOLUTION RESPIRATORY (INHALATION) at 06:39

## 2019-01-01 RX ADMIN — PANTOPRAZOLE SODIUM 40 MG: 40 TABLET, DELAYED RELEASE ORAL at 17:34

## 2019-01-01 RX ADMIN — Medication 5 MG: at 22:37

## 2019-01-01 RX ADMIN — ATORVASTATIN CALCIUM 80 MG: 80 TABLET, FILM COATED ORAL at 13:07

## 2019-01-01 RX ADMIN — IPRATROPIUM BROMIDE AND ALBUTEROL SULFATE 3 ML: 2.5; .5 SOLUTION RESPIRATORY (INHALATION) at 11:21

## 2019-01-01 RX ADMIN — IPRATROPIUM BROMIDE AND ALBUTEROL SULFATE 3 ML: 2.5; .5 SOLUTION RESPIRATORY (INHALATION) at 13:18

## 2019-01-01 RX ADMIN — INSULIN LISPRO 12 UNITS: 100 INJECTION, SOLUTION INTRAVENOUS; SUBCUTANEOUS at 21:11

## 2019-01-01 RX ADMIN — ATORVASTATIN CALCIUM 80 MG: 80 TABLET, FILM COATED ORAL at 12:01

## 2019-01-01 RX ADMIN — SUCRALFATE 1 G: 1 TABLET ORAL at 08:51

## 2019-01-01 RX ADMIN — SODIUM CHLORIDE, PRESERVATIVE FREE 10 ML: 5 INJECTION INTRAVENOUS at 08:56

## 2019-01-01 RX ADMIN — METOPROLOL TARTRATE 12.5 MG: 25 TABLET ORAL at 09:27

## 2019-01-01 RX ADMIN — INSULIN LISPRO 2 UNITS: 100 INJECTION, SOLUTION INTRAVENOUS; SUBCUTANEOUS at 12:57

## 2019-01-01 RX ADMIN — PROMETHAZINE HYDROCHLORIDE 12.5 MG: 12.5 TABLET ORAL at 22:51

## 2019-01-01 RX ADMIN — ALBUMIN HUMAN 25 G: 0.25 SOLUTION INTRAVENOUS at 16:14

## 2019-01-01 RX ADMIN — SUCRALFATE 1 G: 1 TABLET ORAL at 17:41

## 2019-01-01 RX ADMIN — SUCRALFATE 1 G: 1 TABLET ORAL at 17:25

## 2019-01-01 RX ADMIN — PROMETHAZINE HYDROCHLORIDE 12.5 MG: 12.5 TABLET ORAL at 09:26

## 2019-01-01 RX ADMIN — SUCRALFATE 1 G: 1 TABLET ORAL at 14:46

## 2019-01-01 RX ADMIN — SUCRALFATE 1 G: 1 TABLET ORAL at 21:06

## 2019-01-01 RX ADMIN — ATORVASTATIN CALCIUM 80 MG: 80 TABLET, FILM COATED ORAL at 08:55

## 2019-01-01 RX ADMIN — INSULIN GLARGINE 50 UNITS: 100 INJECTION, SOLUTION SUBCUTANEOUS at 21:06

## 2019-01-01 RX ADMIN — Medication 5 MG: at 21:41

## 2019-01-01 RX ADMIN — PREDNISONE 20 MG: 20 TABLET ORAL at 11:54

## 2019-01-01 RX ADMIN — INSULIN LISPRO 2 UNITS: 100 INJECTION, SOLUTION INTRAVENOUS; SUBCUTANEOUS at 08:24

## 2019-01-01 RX ADMIN — SUCRALFATE 1 G: 1 TABLET ORAL at 12:08

## 2019-01-01 RX ADMIN — DICYCLOMINE HYDROCHLORIDE 20 MG: 10 CAPSULE ORAL at 16:57

## 2019-01-01 RX ADMIN — Medication 5 MG: at 21:03

## 2019-01-01 RX ADMIN — DICYCLOMINE HYDROCHLORIDE 20 MG: 10 CAPSULE ORAL at 22:36

## 2019-01-01 RX ADMIN — FUROSEMIDE 10 MG/HR: 10 INJECTION, SOLUTION INTRAMUSCULAR; INTRAVENOUS at 22:55

## 2019-01-01 RX ADMIN — POTASSIUM CHLORIDE 40 MEQ: 750 CAPSULE, EXTENDED RELEASE ORAL at 22:36

## 2019-01-01 RX ADMIN — ANTACID/ANTIFLATULENT 1 TABLET: 380; 550; 10; 10 GRANULE, EFFERVESCENT ORAL at 11:30

## 2019-01-01 RX ADMIN — IPRATROPIUM BROMIDE AND ALBUTEROL SULFATE 3 ML: 2.5; .5 SOLUTION RESPIRATORY (INHALATION) at 21:29

## 2019-01-01 RX ADMIN — SUCRALFATE 1 G: 1 TABLET ORAL at 21:41

## 2019-01-01 RX ADMIN — SUCRALFATE 1 G: 1 TABLET ORAL at 10:26

## 2019-01-01 RX ADMIN — INSULIN LISPRO 16 UNITS: 100 INJECTION, SOLUTION INTRAVENOUS; SUBCUTANEOUS at 23:08

## 2019-01-01 RX ADMIN — INSULIN GLARGINE 50 UNITS: 100 INJECTION, SOLUTION SUBCUTANEOUS at 23:18

## 2019-01-01 RX ADMIN — INSULIN LISPRO 2 UNITS: 100 INJECTION, SOLUTION INTRAVENOUS; SUBCUTANEOUS at 11:18

## 2019-01-01 RX ADMIN — INSULIN LISPRO 4 UNITS: 100 INJECTION, SOLUTION INTRAVENOUS; SUBCUTANEOUS at 18:27

## 2019-01-01 RX ADMIN — FUROSEMIDE 10 MG/HR: 10 INJECTION, SOLUTION INTRAMUSCULAR; INTRAVENOUS at 14:46

## 2019-01-01 RX ADMIN — INSULIN LISPRO 12 UNITS: 100 INJECTION, SOLUTION INTRAVENOUS; SUBCUTANEOUS at 09:08

## 2019-01-01 RX ADMIN — PANTOPRAZOLE SODIUM 40 MG: 40 TABLET, DELAYED RELEASE ORAL at 09:27

## 2019-01-01 RX ADMIN — ONDANSETRON 4 MG: 2 INJECTION INTRAMUSCULAR; INTRAVENOUS at 16:24

## 2019-01-01 RX ADMIN — ATORVASTATIN CALCIUM 80 MG: 80 TABLET, FILM COATED ORAL at 09:28

## 2019-01-01 RX ADMIN — Medication 5 MG: at 21:27

## 2019-01-01 RX ADMIN — ALBUMIN HUMAN 25 G: 0.25 SOLUTION INTRAVENOUS at 00:24

## 2019-01-01 RX ADMIN — SUCRALFATE 1 G: 1 TABLET ORAL at 18:27

## 2019-01-01 RX ADMIN — SODIUM CHLORIDE, PRESERVATIVE FREE 10 ML: 5 INJECTION INTRAVENOUS at 23:13

## 2019-01-01 RX ADMIN — SODIUM CHLORIDE 100 ML/HR: 9 INJECTION, SOLUTION INTRAVENOUS at 13:08

## 2019-01-01 RX ADMIN — IPRATROPIUM BROMIDE AND ALBUTEROL SULFATE 3 ML: 2.5; .5 SOLUTION RESPIRATORY (INHALATION) at 11:32

## 2019-01-01 RX ADMIN — METOPROLOL TARTRATE 12.5 MG: 25 TABLET ORAL at 21:41

## 2019-01-01 RX ADMIN — INSULIN LISPRO 8 UNITS: 100 INJECTION, SOLUTION INTRAVENOUS; SUBCUTANEOUS at 20:58

## 2019-01-01 RX ADMIN — SUCRALFATE 1 G: 1 TABLET ORAL at 09:28

## 2019-01-01 RX ADMIN — ONDANSETRON 4 MG: 2 INJECTION INTRAMUSCULAR; INTRAVENOUS at 09:32

## 2019-01-01 RX ADMIN — PANTOPRAZOLE SODIUM 40 MG: 40 TABLET, DELAYED RELEASE ORAL at 06:30

## 2019-01-01 RX ADMIN — METOPROLOL TARTRATE 12.5 MG: 25 TABLET ORAL at 21:38

## 2019-01-01 RX ADMIN — PANTOPRAZOLE SODIUM 40 MG: 40 TABLET, DELAYED RELEASE ORAL at 18:36

## 2019-01-01 RX ADMIN — METOPROLOL TARTRATE 12.5 MG: 25 TABLET ORAL at 08:55

## 2019-01-01 RX ADMIN — FUROSEMIDE 80 MG: 40 INJECTION, SOLUTION INTRAMUSCULAR; INTRAVENOUS at 22:37

## 2019-01-01 RX ADMIN — METOPROLOL TARTRATE 12.5 MG: 25 TABLET ORAL at 22:36

## 2019-01-01 RX ADMIN — PANTOPRAZOLE SODIUM 40 MG: 40 TABLET, DELAYED RELEASE ORAL at 08:16

## 2019-01-01 RX ADMIN — ATORVASTATIN CALCIUM 80 MG: 80 TABLET, FILM COATED ORAL at 08:51

## 2019-01-01 RX ADMIN — DICYCLOMINE HYDROCHLORIDE 20 MG: 10 CAPSULE ORAL at 18:05

## 2019-01-01 RX ADMIN — INSULIN LISPRO 7 UNITS: 100 INJECTION, SOLUTION INTRAVENOUS; SUBCUTANEOUS at 21:41

## 2019-01-01 RX ADMIN — Medication 5 MG: at 21:38

## 2019-01-01 RX ADMIN — PANTOPRAZOLE SODIUM 40 MG: 40 TABLET, DELAYED RELEASE ORAL at 06:45

## 2019-01-01 RX ADMIN — PANTOPRAZOLE SODIUM 40 MG: 40 TABLET, DELAYED RELEASE ORAL at 17:25

## 2019-01-01 RX ADMIN — Medication 300 ML: at 12:08

## 2019-01-01 RX ADMIN — MAGNESIUM SULFATE 2 G: 1 INJECTION INTRAVENOUS at 14:50

## 2019-01-01 RX ADMIN — POTASSIUM CHLORIDE 20 MEQ: 750 CAPSULE, EXTENDED RELEASE ORAL at 14:46

## 2019-01-01 RX ADMIN — Medication 5 MG: at 20:58

## 2019-01-01 RX ADMIN — INSULIN LISPRO 2 UNITS: 100 INJECTION, SOLUTION INTRAVENOUS; SUBCUTANEOUS at 18:05

## 2019-01-01 RX ADMIN — PREDNISONE 40 MG: 20 TABLET ORAL at 13:00

## 2019-01-01 RX ADMIN — METOLAZONE 5 MG: 5 TABLET ORAL at 17:33

## 2019-01-01 RX ADMIN — ATORVASTATIN CALCIUM 80 MG: 80 TABLET, FILM COATED ORAL at 09:07

## 2019-01-01 RX ADMIN — SUCRALFATE 1 G: 1 TABLET ORAL at 13:07

## 2019-01-01 RX ADMIN — METOPROLOL TARTRATE 12.5 MG: 25 TABLET ORAL at 21:11

## 2019-01-01 RX ADMIN — SODIUM CHLORIDE, PRESERVATIVE FREE 10 ML: 5 INJECTION INTRAVENOUS at 08:24

## 2019-01-01 RX ADMIN — INSULIN GLARGINE 50 UNITS: 100 INJECTION, SOLUTION SUBCUTANEOUS at 21:52

## 2019-01-01 RX ADMIN — SUCRALFATE 1 G: 1 TABLET ORAL at 08:56

## 2019-01-01 RX ADMIN — SUCRALFATE 1 G: 1 TABLET ORAL at 21:38

## 2019-01-01 RX ADMIN — INSULIN GLARGINE 50 UNITS: 100 INJECTION, SOLUTION SUBCUTANEOUS at 21:26

## 2019-01-01 RX ADMIN — INSULIN LISPRO 8 UNITS: 100 INJECTION, SOLUTION INTRAVENOUS; SUBCUTANEOUS at 18:14

## 2019-01-01 RX ADMIN — BARIUM SULFATE 135 ML: 980 POWDER, FOR SUSPENSION ORAL at 11:30

## 2019-01-01 RX ADMIN — IPRATROPIUM BROMIDE AND ALBUTEROL SULFATE 3 ML: 2.5; .5 SOLUTION RESPIRATORY (INHALATION) at 02:23

## 2019-01-01 RX ADMIN — ONDANSETRON 4 MG: 2 INJECTION INTRAMUSCULAR; INTRAVENOUS at 12:13

## 2019-01-01 RX ADMIN — ONDANSETRON 4 MG: 2 INJECTION INTRAMUSCULAR; INTRAVENOUS at 05:21

## 2019-01-01 RX ADMIN — INSULIN LISPRO 4 UNITS: 100 INJECTION, SOLUTION INTRAVENOUS; SUBCUTANEOUS at 08:50

## 2019-01-01 RX ADMIN — SUCRALFATE 1 G: 1 TABLET ORAL at 12:01

## 2019-01-01 RX ADMIN — PANTOPRAZOLE SODIUM 40 MG: 40 TABLET, DELAYED RELEASE ORAL at 08:55

## 2019-01-01 RX ADMIN — SODIUM CHLORIDE, PRESERVATIVE FREE 10 ML: 5 INJECTION INTRAVENOUS at 21:28

## 2019-01-01 RX ADMIN — PANTOPRAZOLE SODIUM 40 MG: 40 TABLET, DELAYED RELEASE ORAL at 18:30

## 2019-01-01 RX ADMIN — SUCRALFATE 1 G: 1 TABLET ORAL at 12:44

## 2019-01-01 RX ADMIN — SUCRALFATE 1 G: 1 TABLET ORAL at 17:46

## 2019-01-01 RX ADMIN — Medication 5 MG: at 21:35

## 2019-01-01 RX ADMIN — INSULIN GLARGINE 55 UNITS: 100 INJECTION, SOLUTION SUBCUTANEOUS at 21:41

## 2019-01-01 RX ADMIN — SUCRALFATE 1 G: 1 TABLET ORAL at 23:09

## 2019-01-01 RX ADMIN — METOPROLOL TARTRATE 12.5 MG: 25 TABLET ORAL at 21:04

## 2019-01-01 RX ADMIN — INSULIN GLARGINE 55 UNITS: 100 INJECTION, SOLUTION SUBCUTANEOUS at 23:08

## 2019-01-01 RX ADMIN — IPRATROPIUM BROMIDE AND ALBUTEROL SULFATE 3 ML: 2.5; .5 SOLUTION RESPIRATORY (INHALATION) at 20:52

## 2019-01-01 RX ADMIN — METOPROLOL TARTRATE 12.5 MG: 25 TABLET ORAL at 13:07

## 2019-01-01 RX ADMIN — SUCRALFATE 1 G: 1 TABLET ORAL at 18:30

## 2019-01-01 RX ADMIN — SODIUM CHLORIDE, PRESERVATIVE FREE 10 ML: 5 INJECTION INTRAVENOUS at 10:26

## 2019-01-01 RX ADMIN — IPRATROPIUM BROMIDE AND ALBUTEROL SULFATE 3 ML: 2.5; .5 SOLUTION RESPIRATORY (INHALATION) at 17:48

## 2019-01-01 RX ADMIN — SUCRALFATE 1 G: 1 TABLET ORAL at 22:36

## 2019-01-01 RX ADMIN — SODIUM CHLORIDE, PRESERVATIVE FREE 10 ML: 5 INJECTION INTRAVENOUS at 09:35

## 2019-01-01 RX ADMIN — SUCRALFATE 1 G: 1 TABLET ORAL at 08:55

## 2019-01-01 RX ADMIN — FUROSEMIDE 120 MG: 40 INJECTION, SOLUTION INTRAMUSCULAR; INTRAVENOUS at 12:18

## 2019-01-01 RX ADMIN — ANTACID TABLETS 2 TABLET: 500 TABLET, CHEWABLE ORAL at 14:46

## 2019-01-01 RX ADMIN — ONDANSETRON 4 MG: 2 INJECTION INTRAMUSCULAR; INTRAVENOUS at 02:27

## 2019-01-01 RX ADMIN — METOPROLOL TARTRATE 12.5 MG: 25 TABLET ORAL at 13:13

## 2019-01-01 RX ADMIN — ONDANSETRON 4 MG: 2 INJECTION INTRAMUSCULAR; INTRAVENOUS at 21:32

## 2019-01-01 RX ADMIN — INSULIN LISPRO 6 UNITS: 100 INJECTION, SOLUTION INTRAVENOUS; SUBCUTANEOUS at 18:30

## 2019-01-01 RX ADMIN — POTASSIUM CHLORIDE 40 MEQ: 750 CAPSULE, EXTENDED RELEASE ORAL at 13:13

## 2019-01-01 RX ADMIN — INSULIN LISPRO 4 UNITS: 100 INJECTION, SOLUTION INTRAVENOUS; SUBCUTANEOUS at 21:26

## 2019-01-01 RX ADMIN — SUCRALFATE 1 G: 1 TABLET ORAL at 21:27

## 2019-01-01 RX ADMIN — DICYCLOMINE HYDROCHLORIDE 20 MG: 10 CAPSULE ORAL at 08:56

## 2019-01-01 RX ADMIN — INSULIN LISPRO 8 UNITS: 100 INJECTION, SOLUTION INTRAVENOUS; SUBCUTANEOUS at 05:59

## 2019-01-01 RX ADMIN — SUCRALFATE 1 G: 1 TABLET ORAL at 18:00

## 2019-01-01 RX ADMIN — SODIUM CHLORIDE, PRESERVATIVE FREE 10 ML: 5 INJECTION INTRAVENOUS at 20:58

## 2019-01-01 RX ADMIN — INSULIN GLARGINE 55 UNITS: 100 INJECTION, SOLUTION SUBCUTANEOUS at 21:38

## 2019-01-01 RX ADMIN — FUROSEMIDE 5 MG/HR: 10 INJECTION, SOLUTION INTRAMUSCULAR; INTRAVENOUS at 05:23

## 2019-01-01 RX ADMIN — FUROSEMIDE 80 MG: 10 INJECTION, SOLUTION INTRAMUSCULAR; INTRAVENOUS at 16:23

## 2019-01-01 RX ADMIN — GABAPENTIN 300 MG: 300 CAPSULE ORAL at 23:13

## 2019-01-01 RX ADMIN — FUROSEMIDE 80 MG: 40 INJECTION, SOLUTION INTRAMUSCULAR; INTRAVENOUS at 14:38

## 2019-01-01 RX ADMIN — Medication 5 MG: at 21:07

## 2019-01-01 RX ADMIN — PREDNISONE 30 MG: 20 TABLET ORAL at 12:00

## 2019-01-01 RX ADMIN — INSULIN LISPRO 10 UNITS: 100 INJECTION, SOLUTION INTRAVENOUS; SUBCUTANEOUS at 09:28

## 2019-01-01 RX ADMIN — ONDANSETRON 4 MG: 2 INJECTION INTRAMUSCULAR; INTRAVENOUS at 06:35

## 2019-01-01 RX ADMIN — INSULIN LISPRO 2 UNITS: 100 INJECTION, SOLUTION INTRAVENOUS; SUBCUTANEOUS at 21:35

## 2019-01-01 RX ADMIN — SUCRALFATE 1 G: 1 TABLET ORAL at 12:57

## 2019-01-01 RX ADMIN — INSULIN LISPRO 7 UNITS: 100 INJECTION, SOLUTION INTRAVENOUS; SUBCUTANEOUS at 13:00

## 2019-01-01 RX ADMIN — METOPROLOL TARTRATE 12.5 MG: 25 TABLET ORAL at 08:17

## 2019-01-01 RX ADMIN — INSULIN LISPRO 4 UNITS: 100 INJECTION, SOLUTION INTRAVENOUS; SUBCUTANEOUS at 10:25

## 2019-01-01 RX ADMIN — INSULIN GLARGINE 50 UNITS: 100 INJECTION, SOLUTION SUBCUTANEOUS at 20:58

## 2019-01-01 RX ADMIN — SUCRALFATE 1 G: 1 TABLET ORAL at 21:11

## 2019-01-01 RX ADMIN — SUCRALFATE 1 G: 1 TABLET ORAL at 11:27

## 2019-01-01 RX ADMIN — SUCRALFATE 1 G: 1 TABLET ORAL at 18:05

## 2019-01-01 RX ADMIN — POTASSIUM CHLORIDE 40 MEQ: 750 CAPSULE, EXTENDED RELEASE ORAL at 06:41

## 2019-01-01 RX ADMIN — SUCRALFATE 1 G: 1 TABLET ORAL at 21:35

## 2019-01-01 RX ADMIN — SUCRALFATE 1 G: 1 TABLET ORAL at 17:00

## 2019-01-01 RX ADMIN — METOPROLOL TARTRATE 12.5 MG: 25 TABLET ORAL at 23:09

## 2019-01-01 RX ADMIN — SUCRALFATE 1 G: 1 TABLET ORAL at 18:14

## 2019-01-01 RX ADMIN — ATORVASTATIN CALCIUM 80 MG: 80 TABLET, FILM COATED ORAL at 11:54

## 2019-01-01 RX ADMIN — GABAPENTIN 300 MG: 300 CAPSULE ORAL at 21:11

## 2019-01-01 RX ADMIN — INSULIN GLARGINE 50 UNITS: 100 INJECTION, SOLUTION SUBCUTANEOUS at 21:03

## 2019-01-01 RX ADMIN — POLYETHYLENE GLYCOL 3350 17 G: 17 POWDER, FOR SOLUTION ORAL at 17:25

## 2019-01-01 RX ADMIN — INSULIN LISPRO 24 UNITS: 100 INJECTION, SOLUTION INTRAVENOUS; SUBCUTANEOUS at 21:38

## 2019-01-01 RX ADMIN — SUCRALFATE 1 G: 1 TABLET ORAL at 20:58

## 2019-01-01 RX ADMIN — ATORVASTATIN CALCIUM 80 MG: 80 TABLET, FILM COATED ORAL at 08:56

## 2019-01-01 RX ADMIN — FUROSEMIDE 5 MG/HR: 10 INJECTION, SOLUTION INTRAMUSCULAR; INTRAVENOUS at 22:37

## 2019-01-01 RX ADMIN — BARIUM SULFATE 183 ML: 960 POWDER, FOR SUSPENSION ORAL at 11:30

## 2019-01-01 RX ADMIN — Medication 5 MG: at 21:10

## 2019-01-01 RX ADMIN — GABAPENTIN 300 MG: 300 CAPSULE ORAL at 21:42

## 2019-01-01 RX ADMIN — INSULIN LISPRO 4 UNITS: 100 INJECTION, SOLUTION INTRAVENOUS; SUBCUTANEOUS at 11:58

## 2019-01-01 RX ADMIN — PANTOPRAZOLE SODIUM 40 MG: 40 TABLET, DELAYED RELEASE ORAL at 06:42

## 2019-01-01 RX ADMIN — PANTOPRAZOLE SODIUM 40 MG: 40 TABLET, DELAYED RELEASE ORAL at 18:14

## 2019-01-01 RX ADMIN — PREDNISONE 30 MG: 20 TABLET ORAL at 13:06

## 2019-01-01 RX ADMIN — ASPIRIN 81 MG: 81 TABLET, COATED ORAL at 11:27

## 2019-01-01 RX ADMIN — LIDOCAINE HYDROCHLORIDE 100 MG: 20 INJECTION, SOLUTION INFILTRATION; PERINEURAL at 09:15

## 2019-01-01 RX ADMIN — SODIUM CHLORIDE, PRESERVATIVE FREE 10 ML: 5 INJECTION INTRAVENOUS at 07:44

## 2019-01-01 RX ADMIN — ATORVASTATIN CALCIUM 80 MG: 80 TABLET, FILM COATED ORAL at 08:16

## 2019-01-01 RX ADMIN — METOPROLOL TARTRATE 12.5 MG: 25 TABLET ORAL at 20:58

## 2019-01-01 RX ADMIN — GABAPENTIN 300 MG: 300 CAPSULE ORAL at 21:38

## 2019-01-01 RX ADMIN — METOPROLOL TARTRATE 12.5 MG: 25 TABLET ORAL at 21:35

## 2019-01-01 RX ADMIN — METOPROLOL TARTRATE 12.5 MG: 25 TABLET ORAL at 21:03

## 2019-01-02 DIAGNOSIS — B37.31 YEAST VAGINITIS: ICD-10-CM

## 2019-01-02 DIAGNOSIS — E11.9 TYPE 2 DIABETES MELLITUS WITHOUT COMPLICATION, WITH LONG-TERM CURRENT USE OF INSULIN (HCC): ICD-10-CM

## 2019-01-02 DIAGNOSIS — Z79.4 TYPE 2 DIABETES MELLITUS WITHOUT COMPLICATION, WITH LONG-TERM CURRENT USE OF INSULIN (HCC): ICD-10-CM

## 2019-01-02 RX ORDER — FLUCONAZOLE 150 MG/1
150 TABLET ORAL DAILY
Qty: 6 TABLET | Refills: 0 | Status: SHIPPED | OUTPATIENT
Start: 2019-01-02 | End: 2019-02-01

## 2019-01-02 RX ORDER — INSULIN DETEMIR 100 [IU]/ML
25 INJECTION, SOLUTION SUBCUTANEOUS 2 TIMES DAILY
Qty: 12 PEN | Refills: 3 | Status: SHIPPED | OUTPATIENT
Start: 2019-01-02 | End: 2019-12-16 | Stop reason: SDUPTHER

## 2019-01-07 RX ORDER — GABAPENTIN 300 MG/1
CAPSULE ORAL
Qty: 360 CAPSULE | Refills: 1 | Status: CANCELLED | OUTPATIENT
Start: 2019-01-07

## 2019-01-08 RX ORDER — GABAPENTIN 300 MG/1
CAPSULE ORAL
Qty: 360 CAPSULE | Refills: 0 | Status: SHIPPED | OUTPATIENT
Start: 2019-01-08 | End: 2019-06-26 | Stop reason: SDUPTHER

## 2019-01-09 ENCOUNTER — TELEPHONE (OUTPATIENT)
Dept: FAMILY MEDICINE CLINIC | Facility: CLINIC | Age: 69
End: 2019-01-09

## 2019-01-14 RX ORDER — ATORVASTATIN CALCIUM 40 MG/1
TABLET, FILM COATED ORAL
Qty: 90 TABLET | Refills: 0 | Status: SHIPPED | OUTPATIENT
Start: 2019-01-14 | End: 2019-02-01

## 2019-01-14 RX ORDER — LIRAGLUTIDE 6 MG/ML
1.8 INJECTION SUBCUTANEOUS EVERY MORNING
Qty: 27 ML | Refills: 1 | Status: SHIPPED | OUTPATIENT
Start: 2019-01-14 | End: 2019-04-16 | Stop reason: SDUPTHER

## 2019-01-14 RX ORDER — PANTOPRAZOLE SODIUM 40 MG/1
TABLET, DELAYED RELEASE ORAL
Qty: 180 TABLET | Refills: 0 | Status: SHIPPED | OUTPATIENT
Start: 2019-01-14 | End: 2019-03-25 | Stop reason: SDUPTHER

## 2019-01-14 RX ORDER — DICYCLOMINE HCL 20 MG
TABLET ORAL
Qty: 270 TABLET | Refills: 1 | Status: SHIPPED | OUTPATIENT
Start: 2019-01-14 | End: 2019-12-18

## 2019-01-14 RX ORDER — SUCRALFATE 1 G/1
TABLET ORAL
Qty: 360 TABLET | Refills: 1 | Status: SHIPPED | OUTPATIENT
Start: 2019-01-14 | End: 2019-06-26 | Stop reason: SDUPTHER

## 2019-02-01 ENCOUNTER — OFFICE VISIT (OUTPATIENT)
Dept: FAMILY MEDICINE CLINIC | Facility: CLINIC | Age: 69
End: 2019-02-01

## 2019-02-01 VITALS
HEART RATE: 63 BPM | HEIGHT: 62 IN | OXYGEN SATURATION: 96 % | SYSTOLIC BLOOD PRESSURE: 138 MMHG | DIASTOLIC BLOOD PRESSURE: 60 MMHG | WEIGHT: 212 LBS | BODY MASS INDEX: 39.01 KG/M2

## 2019-02-01 DIAGNOSIS — Z20.828 EXPOSURE TO THE FLU: ICD-10-CM

## 2019-02-01 DIAGNOSIS — E11.9 TYPE 2 DIABETES MELLITUS WITHOUT COMPLICATION, WITH LONG-TERM CURRENT USE OF INSULIN (HCC): Primary | ICD-10-CM

## 2019-02-01 DIAGNOSIS — L97.522 SKIN ULCER OF LEFT FOOT WITH FAT LAYER EXPOSED (HCC): ICD-10-CM

## 2019-02-01 DIAGNOSIS — Z79.4 TYPE 2 DIABETES MELLITUS WITHOUT COMPLICATION, WITH LONG-TERM CURRENT USE OF INSULIN (HCC): Primary | ICD-10-CM

## 2019-02-01 PROBLEM — L97.509 FOOT ULCER (HCC): Status: ACTIVE | Noted: 2019-02-01

## 2019-02-01 PROCEDURE — 99213 OFFICE O/P EST LOW 20 MIN: CPT | Performed by: FAMILY MEDICINE

## 2019-02-01 RX ORDER — OSELTAMIVIR PHOSPHATE 75 MG/1
75 CAPSULE ORAL DAILY
Qty: 10 CAPSULE | Refills: 0 | Status: SHIPPED | OUTPATIENT
Start: 2019-02-01 | End: 2019-02-01 | Stop reason: SDUPTHER

## 2019-02-01 RX ORDER — OSELTAMIVIR PHOSPHATE 75 MG/1
75 CAPSULE ORAL DAILY
Qty: 10 CAPSULE | Refills: 0 | Status: SHIPPED | OUTPATIENT
Start: 2019-02-01 | End: 2019-03-05

## 2019-02-01 NOTE — PROGRESS NOTES
Subjective   Randi Martines is a 68 y.o. female. Presents today for   Chief Complaint   Patient presents with   • Diabetes     Folllow Up     Here for toe ulcer f/u;  Reports doing better.    Diabetes   She presents for her follow-up diabetic visit. She has type 2 diabetes mellitus. Her disease course has been stable. Associated symptoms include foot paresthesias and foot ulcerations (left great toe ulceration, doing better and seeing podiatry.). She is following a generally healthy diet.     Watched grandchild today has tested + for influenza B, no current symptoms in patient.    Review of Systems   Skin: Positive for wound.       Patient Active Problem List   Diagnosis   • Abdominal pain   • Intractable abdominal pain   • Essential hypertension   • DM (diabetes mellitus), type 2 with neurological complications (CMS/HCC)   • PVD (peripheral vascular disease) (CMS/HCC)   • Mesenteric ischemia (CMS/HCC)   • Acute gastric ulcer   • S/P CABG (coronary artery bypass graft)   • Coronary artery disease involving native coronary artery of native heart without angina pectoris   • Atherosclerotic PVD with ulceration (CMS/HCC)   • Dyslipidemia   • Type 2 diabetes mellitus without complication, with long-term current use of insulin (CMS/HCC)   • Biliary obstruction   • Hyperlipemia   • Foot ulcer (CMS/HCC)       Social History     Socioeconomic History   • Marital status:      Spouse name: Not on file   • Number of children: Not on file   • Years of education: Not on file   • Highest education level: Not on file   Tobacco Use   • Smoking status: Former Smoker     Packs/day: 3.00     Years: 20.00     Pack years: 60.00   • Smokeless tobacco: Never Used   • Tobacco comment: caffeine use   Substance and Sexual Activity   • Alcohol use: No   • Drug use: No   • Sexual activity: Defer     Partners: Female     Birth control/protection: None       Allergies   Allergen Reactions   • Augmentin [Amoxicillin-Pot Clavulanate] GI  Intolerance and Dizziness   • Codeine GI Intolerance and Dizziness       Current Outpatient Medications on File Prior to Visit   Medication Sig Dispense Refill   • atorvastatin (LIPITOR) 80 MG tablet Take 1 tablet by mouth Every Night. 90 tablet 3   • clopidogrel (PLAVIX) 75 MG tablet TAKE 1 TABLET EVERY DAY 90 tablet 1   • Clotrimazole (CLOTRIMAZOLE 3) 2 % vaginal cream Per vagina daily x 3 days, repeat 1 week later 21 g 1   • dicyclomine (BENTYL) 20 MG tablet TAKE 1 TABLET THREE TIMES A DAY. 270 tablet 1   • fenofibrate 160 MG tablet TAKE 1 TABLET EVERY DAY 90 tablet 1   • gabapentin (NEURONTIN) 300 MG capsule 4 capsules nightly 360 capsule 0   • LEVEMIR FLEXTOUCH 100 UNIT/ML injection Inject 25 Units under the skin into the appropriate area as directed 2 (Two) Times a Day. 12 pen 3   • magnesium oxide (MAGOX) 400 (241.3 Mg) MG tablet tablet TAKE 1 TABLET THREE TIMES DAILY 270 tablet 1   • metoclopramide (REGLAN) 5 MG tablet TAKE 1 TABLET EVERY MORNING 90 tablet 1   • NOVOLOG FLEXPEN 100 UNIT/ML solution pen-injector sc pen INJECT  8 TO 12 UNITS SUBCUTANEOUSLY THREE TIMES DAILY BEFORE MEALS PER SLIDING SCALE DISCARD PEN 28 DAYS AFTER OPENING 45 mL 3   • pantoprazole (PROTONIX) 40 MG EC tablet TAKE 1 TABLET TWICE DAILY 180 tablet 0   • SITagliptin (JANUVIA) 50 MG tablet Take 1 tablet by mouth Daily. 30 tablet 5   • sucralfate (CARAFATE) 1 g tablet TAKE 1 TABLET FOUR TIMES DAILY 360 tablet 1   • valsartan-hydrochlorothiazide (DIOVAN-HCT) 160-25 MG per tablet TAKE 1 TABLET EVERY DAY 90 tablet 1   • VICTOZA 18 MG/3ML solution pen-injector injection INJECT 1.8 MG UNDER THE SKIN EVERY MORNING. 27 mL 1   • [DISCONTINUED] Magnesium 250 MG tablet Take 250 mg by mouth 3 (Three) Times a Day.     • acetaminophen (TYLENOL) 500 MG tablet Take 500 mg by mouth Every 6 (Six) Hours As Needed for Mild Pain .     • aspirin 81 MG EC tablet Take 81 mg by mouth Daily. PT HOLDING FOR SURGERY     • glucose blood (PRODIGY NO CODING BLOOD  "GLUC) test strip Use as instructed to test glucose three times daily. Diagnosis code E11.9 IDDM 300 each 12   • Insulin Pen Needle 31G X 8 MM misc Use as directed 4 times daily 360 each 1   • [DISCONTINUED] atorvastatin (LIPITOR) 40 MG tablet TAKE 1 TABLET EVERY DAY 90 tablet 0   • [DISCONTINUED] fluconazole (DIFLUCAN) 150 MG tablet Take 1 tablet by mouth Daily. Then repeat 1 week later 6 tablet 0     No current facility-administered medications on file prior to visit.        Objective   Vitals:    02/01/19 1543   BP: 138/60   Pulse: 63   SpO2: 96%   Weight: 96.2 kg (212 lb)   Height: 157.5 cm (62\")       Physical Exam   Constitutional: She is oriented to person, place, and time. She appears well-developed and well-nourished.   Neurological: She is alert and oriented to person, place, and time.   Skin: Skin is warm and dry.   Left toe - small ulcer 3 to 5 mm, smaller.   Psychiatric: She has a normal mood and affect. Her behavior is normal.   Nursing note and vitals reviewed.      Assessment/Plan   Randi was seen today for diabetes.    Diagnoses and all orders for this visit:    Type 2 diabetes mellitus without complication, with long-term current use of insulin (CMS/HCC)    Skin ulcer of left foot with fat layer exposed (CMS/HCC)    Exposure to the flu  -     oseltamivir (TAMIFLU) 75 MG capsule; Take 1 capsule by mouth Daily.        -patient high risk, will give tamiflu as prophylaxis  -wound improved, continue current wound care  -contineu work on blood sugar control       -Follow up: 3 months and prn  "

## 2019-03-05 ENCOUNTER — OFFICE VISIT (OUTPATIENT)
Dept: FAMILY MEDICINE CLINIC | Facility: CLINIC | Age: 69
End: 2019-03-05

## 2019-03-05 VITALS
SYSTOLIC BLOOD PRESSURE: 128 MMHG | TEMPERATURE: 98.5 F | HEART RATE: 81 BPM | WEIGHT: 218 LBS | DIASTOLIC BLOOD PRESSURE: 72 MMHG | OXYGEN SATURATION: 96 % | HEIGHT: 62 IN | BODY MASS INDEX: 40.12 KG/M2

## 2019-03-05 DIAGNOSIS — R06.09 DYSPNEA ON EXERTION: Primary | ICD-10-CM

## 2019-03-05 LAB
HCT VFR BLDA CALC: 37 % (ref 38–51)
HGB BLDA-MCNC: 11.6 G/DL (ref 12–17)
MCH, POC: 28.9
MCHC, POC: 31.4
MCV, POC: 91.9
PLATELET # BLD AUTO: 259 10*3/MM3
PMV BLD: 9 FL
RBC, POC: 4.03
RDW, POC: 15.3
WBC # BLD: 10.3 10*3/UL

## 2019-03-05 PROCEDURE — 99213 OFFICE O/P EST LOW 20 MIN: CPT | Performed by: NURSE PRACTITIONER

## 2019-03-05 PROCEDURE — 93000 ELECTROCARDIOGRAM COMPLETE: CPT | Performed by: NURSE PRACTITIONER

## 2019-03-05 PROCEDURE — 85027 COMPLETE CBC AUTOMATED: CPT | Performed by: NURSE PRACTITIONER

## 2019-03-05 PROCEDURE — 36415 COLL VENOUS BLD VENIPUNCTURE: CPT | Performed by: NURSE PRACTITIONER

## 2019-03-05 NOTE — PROGRESS NOTES
"Subjective   Randi Martines is a 68 y.o. female who presents c/o SOA x 1 month.   History of Present Illness   Former smoker 3ppd for 20 yrs, now reports SOA x 1 month. Onset was sudden, initially was mild, noticed when picking up grandson, this has progressed to point of either sitting down or will fall. Primarily exertional. Does not do stairs, does have PVD. But if any hurry much more SOA. No chest pain, though no chest pain with bypass. Years ago had an episode of back pain, SOA, does not think associated with any medication changes.   The following portions of the patient's history were reviewed and updated as appropriate: allergies, current medications, past family history, past medical history, past social history, past surgical history and problem list.    Review of Systems   Constitutional: Negative for chills and fever.   HENT: Negative for congestion and rhinorrhea.         No URI   Eyes: Negative.    Respiratory: Positive for shortness of breath. Negative for cough, chest tightness and stridor.    Cardiovascular: Positive for leg swelling (trace, but history of fem pop). Negative for chest pain and palpitations.   Gastrointestinal: Negative for blood in stool.        Does get anemic at times,    Genitourinary: Negative.    Skin: Positive for skin lesions (diabetic foot ulcer, no change).   Hematological: Negative.    Psychiatric/Behavioral: Negative.      /72   Pulse 81   Temp 98.5 °F (36.9 °C) (Oral)   Ht 157.5 cm (62\")   Wt 98.9 kg (218 lb)   SpO2 96%   BMI 39.87 kg/m²     Objective   Physical Exam   Constitutional: She appears well-developed and well-nourished.   Neck: Normal range of motion. Neck supple. No tracheal deviation present. No thyromegaly present.   Cardiovascular: Normal rate, regular rhythm and normal heart sounds.   Pulmonary/Chest: Effort normal and breath sounds normal.   Musculoskeletal: She exhibits edema (trace at ankles abdoul calves supple, nontender). "   Lymphadenopathy:     She has no cervical adenopathy.   Skin: Skin is warm and dry.   Psychiatric: She has a normal mood and affect. Her behavior is normal. Judgment and thought content normal.   Nursing note and vitals reviewed.    Assessment/Plan   Problems Addressed this Visit     None      Visit Diagnoses     Dyspnea on exertion    -  Primary    Relevant Orders    ECG 12 Lead    POC CBC (Completed)          ECG 12 Lead  Date/Time: 3/5/2019 4:17 PM  Performed by: Jayda Perdomo APRN  Authorized by: Jayda Perdomo APRN   Comparison: compared with previous ECG from 7/13/2018  Similar to previous ECG  Rhythm: sinus rhythm  Rate: normal  Conduction: conduction normal  ST Segments: ST segments normal  T inversion: V5, V6, II, III and aVF  QRS axis: normal  Other: no other findings    Clinical impression: abnormal EKG          Results for orders placed or performed in visit on 03/05/19   POC CBC   Result Value Ref Range    Hematocrit 37.0 (A) 38 - 51 %    Hemoglobin 11.6 (A) 12.0 - 17.0 g/dL    RBC 4.03     WBC 10.3     MCV 91.9     MCH 28.9     MCHC 31.4     RDW-CV 15.3     Platelets 259 10*3/mm3    MPV 9.0      Blood counts and EKG stable, suspect SOA mostly from deconditioning secondary to diabetic shoe/foot ulcer. Encouraged to gradually increase exercise tolerance. FU if not settling 1 month. Consider CXR PFTs if not settling. No sign of CHF.

## 2019-03-26 RX ORDER — DOXYCYCLINE HYCLATE 100 MG/1
CAPSULE ORAL
Qty: 20 CAPSULE | Refills: 0 | OUTPATIENT
Start: 2019-03-26

## 2019-03-26 RX ORDER — ATORVASTATIN CALCIUM 40 MG/1
TABLET, FILM COATED ORAL
Qty: 90 TABLET | Refills: 0 | Status: SHIPPED | OUTPATIENT
Start: 2019-03-26 | End: 2019-12-18

## 2019-03-26 RX ORDER — FENOFIBRATE 160 MG/1
TABLET ORAL
Qty: 90 TABLET | Refills: 1 | Status: SHIPPED | OUTPATIENT
Start: 2019-03-26 | End: 2019-07-22

## 2019-03-26 RX ORDER — CLOPIDOGREL BISULFATE 75 MG/1
TABLET ORAL
Qty: 90 TABLET | Refills: 1 | Status: SHIPPED | OUTPATIENT
Start: 2019-03-26 | End: 2019-08-28 | Stop reason: SDUPTHER

## 2019-03-26 RX ORDER — OSELTAMIVIR PHOSPHATE 75 MG/1
CAPSULE ORAL
Qty: 10 CAPSULE | Refills: 0 | OUTPATIENT
Start: 2019-03-26

## 2019-03-26 RX ORDER — PANTOPRAZOLE SODIUM 40 MG/1
TABLET, DELAYED RELEASE ORAL
Qty: 180 TABLET | Refills: 0 | Status: SHIPPED | OUTPATIENT
Start: 2019-03-26 | End: 2019-06-26 | Stop reason: SDUPTHER

## 2019-03-26 RX ORDER — METOCLOPRAMIDE 5 MG/1
TABLET ORAL
Qty: 90 TABLET | Refills: 1 | Status: SHIPPED | OUTPATIENT
Start: 2019-03-26 | End: 2019-12-16 | Stop reason: SDUPTHER

## 2019-03-26 RX ORDER — INSULIN ASPART 100 [IU]/ML
INJECTION, SOLUTION INTRAVENOUS; SUBCUTANEOUS
Qty: 45 ML | Refills: 3 | Status: ON HOLD | OUTPATIENT
Start: 2019-03-26 | End: 2020-01-01 | Stop reason: SDUPTHER

## 2019-03-26 RX ORDER — VALSARTAN AND HYDROCHLOROTHIAZIDE 160; 25 MG/1; MG/1
TABLET ORAL
Qty: 90 TABLET | Refills: 1 | Status: SHIPPED | OUTPATIENT
Start: 2019-03-26 | End: 2019-08-28 | Stop reason: SDUPTHER

## 2019-03-27 DIAGNOSIS — E11.9 TYPE 2 DIABETES MELLITUS WITHOUT COMPLICATION, WITH LONG-TERM CURRENT USE OF INSULIN (HCC): ICD-10-CM

## 2019-03-27 DIAGNOSIS — Z79.4 TYPE 2 DIABETES MELLITUS WITHOUT COMPLICATION, WITH LONG-TERM CURRENT USE OF INSULIN (HCC): ICD-10-CM

## 2019-03-29 DIAGNOSIS — Z79.4 TYPE 2 DIABETES MELLITUS WITHOUT COMPLICATION, WITH LONG-TERM CURRENT USE OF INSULIN (HCC): ICD-10-CM

## 2019-03-29 DIAGNOSIS — E11.9 TYPE 2 DIABETES MELLITUS WITHOUT COMPLICATION, WITH LONG-TERM CURRENT USE OF INSULIN (HCC): ICD-10-CM

## 2019-04-16 RX ORDER — LIRAGLUTIDE 6 MG/ML
1.8 INJECTION SUBCUTANEOUS EVERY MORNING
Qty: 27 ML | Refills: 1 | Status: SHIPPED | OUTPATIENT
Start: 2019-04-16 | End: 2019-06-26 | Stop reason: SDUPTHER

## 2019-06-26 DIAGNOSIS — E11.9 TYPE 2 DIABETES MELLITUS WITHOUT COMPLICATION, WITH LONG-TERM CURRENT USE OF INSULIN (HCC): ICD-10-CM

## 2019-06-26 DIAGNOSIS — Z79.4 TYPE 2 DIABETES MELLITUS WITHOUT COMPLICATION, WITH LONG-TERM CURRENT USE OF INSULIN (HCC): ICD-10-CM

## 2019-06-26 RX ORDER — GABAPENTIN 300 MG/1
CAPSULE ORAL
Qty: 360 CAPSULE | Refills: 0 | Status: SHIPPED | OUTPATIENT
Start: 2019-06-26 | End: 2019-06-26 | Stop reason: SDUPTHER

## 2019-06-26 RX ORDER — PANTOPRAZOLE SODIUM 40 MG/1
40 TABLET, DELAYED RELEASE ORAL 2 TIMES DAILY
Qty: 180 TABLET | Refills: 1 | Status: SHIPPED | OUTPATIENT
Start: 2019-06-26 | End: 2019-12-16 | Stop reason: SDUPTHER

## 2019-06-26 RX ORDER — PANTOPRAZOLE SODIUM 40 MG/1
TABLET, DELAYED RELEASE ORAL
Qty: 180 TABLET | Refills: 0 | OUTPATIENT
Start: 2019-06-26

## 2019-06-26 RX ORDER — LIRAGLUTIDE 6 MG/ML
1.8 INJECTION SUBCUTANEOUS EVERY MORNING
Qty: 27 ML | Refills: 1 | Status: SHIPPED | OUTPATIENT
Start: 2019-06-26 | End: 2019-12-16 | Stop reason: SDUPTHER

## 2019-06-26 RX ORDER — GABAPENTIN 300 MG/1
CAPSULE ORAL
Qty: 360 CAPSULE | Refills: 1 | OUTPATIENT
Start: 2019-06-26

## 2019-06-26 RX ORDER — ATORVASTATIN CALCIUM 40 MG/1
TABLET, FILM COATED ORAL
Qty: 90 TABLET | Refills: 0 | OUTPATIENT
Start: 2019-06-26

## 2019-06-26 RX ORDER — EMPAGLIFLOZIN 10 MG/1
TABLET, FILM COATED ORAL
Qty: 90 TABLET | Refills: 3 | OUTPATIENT
Start: 2019-06-26

## 2019-06-26 RX ORDER — ATORVASTATIN CALCIUM 80 MG/1
80 TABLET, FILM COATED ORAL NIGHTLY
Qty: 90 TABLET | Refills: 1 | Status: SHIPPED | OUTPATIENT
Start: 2019-06-26 | End: 2019-09-09 | Stop reason: DRUGHIGH

## 2019-06-26 RX ORDER — SUCRALFATE 1 G/1
1 TABLET ORAL 4 TIMES DAILY
Qty: 360 TABLET | Refills: 1 | Status: SHIPPED | OUTPATIENT
Start: 2019-06-26 | End: 2019-12-16 | Stop reason: SDUPTHER

## 2019-06-26 RX ORDER — SUCRALFATE 1 G/1
TABLET ORAL
Qty: 360 TABLET | Refills: 1 | OUTPATIENT
Start: 2019-06-26

## 2019-06-26 RX ORDER — LIRAGLUTIDE 6 MG/ML
1.8 INJECTION SUBCUTANEOUS EVERY MORNING
Qty: 27 ML | Refills: 1 | OUTPATIENT
Start: 2019-06-26

## 2019-06-27 RX ORDER — GABAPENTIN 300 MG/1
CAPSULE ORAL
Qty: 360 CAPSULE | Refills: 0 | Status: SHIPPED | OUTPATIENT
Start: 2019-06-27 | End: 2019-09-09

## 2019-07-22 ENCOUNTER — OFFICE VISIT (OUTPATIENT)
Dept: FAMILY MEDICINE CLINIC | Facility: CLINIC | Age: 69
End: 2019-07-22

## 2019-07-22 VITALS
HEART RATE: 85 BPM | SYSTOLIC BLOOD PRESSURE: 132 MMHG | WEIGHT: 212 LBS | BODY MASS INDEX: 39.01 KG/M2 | OXYGEN SATURATION: 93 % | HEIGHT: 62 IN | DIASTOLIC BLOOD PRESSURE: 70 MMHG

## 2019-07-22 DIAGNOSIS — G89.29 CHRONIC RIGHT-SIDED LOW BACK PAIN WITH RIGHT-SIDED SCIATICA: ICD-10-CM

## 2019-07-22 DIAGNOSIS — F41.8 DEPRESSION WITH ANXIETY: ICD-10-CM

## 2019-07-22 DIAGNOSIS — N18.30 CKD (CHRONIC KIDNEY DISEASE), STAGE III (HCC): ICD-10-CM

## 2019-07-22 DIAGNOSIS — E11.49 DM (DIABETES MELLITUS), TYPE 2 WITH NEUROLOGICAL COMPLICATIONS (HCC): Primary | ICD-10-CM

## 2019-07-22 DIAGNOSIS — I25.10 CORONARY ARTERY DISEASE INVOLVING NATIVE CORONARY ARTERY OF NATIVE HEART WITHOUT ANGINA PECTORIS: ICD-10-CM

## 2019-07-22 DIAGNOSIS — I10 ESSENTIAL HYPERTENSION: ICD-10-CM

## 2019-07-22 DIAGNOSIS — M54.41 CHRONIC RIGHT-SIDED LOW BACK PAIN WITH RIGHT-SIDED SCIATICA: ICD-10-CM

## 2019-07-22 DIAGNOSIS — I70.25 ATHEROSCLEROSIS OF NATIVE ARTERY OF EXTREMITY WITH ULCERATION, UNSPECIFIED EXTREMITY (HCC): ICD-10-CM

## 2019-07-22 DIAGNOSIS — E78.2 MIXED HYPERLIPIDEMIA: ICD-10-CM

## 2019-07-22 PROCEDURE — 99214 OFFICE O/P EST MOD 30 MIN: CPT | Performed by: FAMILY MEDICINE

## 2019-07-22 RX ORDER — TIZANIDINE 2 MG/1
2 TABLET ORAL EVERY 8 HOURS PRN
Qty: 90 TABLET | Refills: 1 | Status: SHIPPED | OUTPATIENT
Start: 2019-07-22 | End: 2019-09-09

## 2019-07-22 RX ORDER — DULOXETIN HYDROCHLORIDE 30 MG/1
30 CAPSULE, DELAYED RELEASE ORAL DAILY
Qty: 30 CAPSULE | Refills: 5 | Status: SHIPPED | OUTPATIENT
Start: 2019-07-22 | End: 2019-09-09
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
## 2019-07-23 LAB
25(OH)D3+25(OH)D2 SERPL-MCNC: 14.2 NG/ML (ref 30–100)
ALBUMIN SERPL-MCNC: 3.8 G/DL (ref 3.5–5.2)
ALBUMIN/GLOB SERPL: 1.4 G/DL
ALP SERPL-CCNC: 56 U/L (ref 39–117)
ALT SERPL-CCNC: 11 U/L (ref 1–33)
AST SERPL-CCNC: 14 U/L (ref 1–32)
BASOPHILS # BLD AUTO: 0.09 10*3/MM3 (ref 0–0.2)
BASOPHILS NFR BLD AUTO: 0.8 % (ref 0–1.5)
BILIRUB SERPL-MCNC: 0.2 MG/DL (ref 0.2–1.2)
BUN SERPL-MCNC: 65 MG/DL (ref 8–23)
BUN/CREAT SERPL: 26.3 (ref 7–25)
CALCIUM SERPL-MCNC: 9.3 MG/DL (ref 8.6–10.5)
CHLORIDE SERPL-SCNC: 105 MMOL/L (ref 98–107)
CHOLEST SERPL-MCNC: 158 MG/DL (ref 0–200)
CO2 SERPL-SCNC: 25.7 MMOL/L (ref 22–29)
CREAT SERPL-MCNC: 2.47 MG/DL (ref 0.57–1)
EOSINOPHIL # BLD AUTO: 0.81 10*3/MM3 (ref 0–0.4)
EOSINOPHIL NFR BLD AUTO: 7.3 % (ref 0.3–6.2)
ERYTHROCYTE [DISTWIDTH] IN BLOOD BY AUTOMATED COUNT: 16 % (ref 12.3–15.4)
GLOBULIN SER CALC-MCNC: 2.7 GM/DL
GLUCOSE SERPL-MCNC: 121 MG/DL (ref 65–99)
HBA1C MFR BLD: 6 % (ref 4.8–5.6)
HCT VFR BLD AUTO: 27.1 % (ref 34–46.6)
HDLC SERPL-MCNC: 33 MG/DL (ref 40–60)
HGB BLD-MCNC: 7.4 G/DL (ref 12–15.9)
IMM GRANULOCYTES # BLD AUTO: 0.04 10*3/MM3 (ref 0–0.05)
IMM GRANULOCYTES NFR BLD AUTO: 0.4 % (ref 0–0.5)
LDLC SERPL CALC-MCNC: 56 MG/DL (ref 0–100)
LYMPHOCYTES # BLD AUTO: 2.65 10*3/MM3 (ref 0.7–3.1)
LYMPHOCYTES NFR BLD AUTO: 23.8 % (ref 19.6–45.3)
MCH RBC QN AUTO: 28.5 PG (ref 26.6–33)
MCHC RBC AUTO-ENTMCNC: 27.3 G/DL (ref 31.5–35.7)
MCV RBC AUTO: 104.2 FL (ref 79–97)
MONOCYTES # BLD AUTO: 1.05 10*3/MM3 (ref 0.1–0.9)
MONOCYTES NFR BLD AUTO: 9.4 % (ref 5–12)
NEUTROPHILS # BLD AUTO: 6.5 10*3/MM3 (ref 1.7–7)
NEUTROPHILS NFR BLD AUTO: 58.3 % (ref 42.7–76)
NRBC BLD AUTO-RTO: 0.2 /100 WBC (ref 0–0.2)
PLATELET # BLD AUTO: 333 10*3/MM3 (ref 140–450)
POTASSIUM SERPL-SCNC: 5.5 MMOL/L (ref 3.5–5.2)
PROT SERPL-MCNC: 6.5 G/DL (ref 6–8.5)
RBC # BLD AUTO: 2.6 10*6/MM3 (ref 3.77–5.28)
SODIUM SERPL-SCNC: 145 MMOL/L (ref 136–145)
TRIGL SERPL-MCNC: 345 MG/DL (ref 0–150)
VLDLC SERPL CALC-MCNC: 69 MG/DL
WBC # BLD AUTO: 11.14 10*3/MM3 (ref 3.4–10.8)

## 2019-07-24 ENCOUNTER — TELEPHONE (OUTPATIENT)
Dept: FAMILY MEDICINE CLINIC | Facility: CLINIC | Age: 69
End: 2019-07-24

## 2019-07-24 DIAGNOSIS — D64.9 ANEMIA, UNSPECIFIED TYPE: ICD-10-CM

## 2019-07-24 DIAGNOSIS — N18.4 CHRONIC KIDNEY DISEASE (CKD), STAGE IV (SEVERE) (HCC): Primary | ICD-10-CM

## 2019-07-25 NOTE — PROGRESS NOTES
Diabetes is adequately controlled.  Cholesterol is adequately controlled.  Has had severe kidney function decline, check renal u/s dx CKD stage IV (I put in order);  Has seen nephrolgoy in past or seeing?  If not, need to refer same dx.  Potassium is high and 2nd time high elevated.  We need to have change bp meds to make sure not holding onto K+  Stop valsartan hct and start amlodipine 10mg 1 po daily.  The patient is very anemic, likley need iron infusion.  1st need to have return to check further labs (I placed orders) and have pick-up FOBT x3.  Very possible anemia due to kidney function decline.  Refer also to hematology dx anemia.  Stop pantoprazole (protonix) can lead to renal issues, start pepcid 40mg 1 po daily.  Vitamin D low, start ergocalciferol 50,000 iu po weekly.  Follow-up with me in 4 weeks to recheck.

## 2019-08-01 ENCOUNTER — TELEPHONE (OUTPATIENT)
Dept: FAMILY MEDICINE CLINIC | Facility: CLINIC | Age: 69
End: 2019-08-01

## 2019-08-01 DIAGNOSIS — N18.4 CKD (CHRONIC KIDNEY DISEASE), STAGE IV (HCC): ICD-10-CM

## 2019-08-01 DIAGNOSIS — D64.9 ANEMIA, UNSPECIFIED TYPE: Primary | ICD-10-CM

## 2019-08-01 RX ORDER — DOXYCYCLINE HYCLATE 100 MG/1
100 CAPSULE ORAL 2 TIMES DAILY
Qty: 14 CAPSULE | Refills: 0 | Status: SHIPPED | OUTPATIENT
Start: 2019-08-01 | End: 2019-09-09

## 2019-08-01 NOTE — TELEPHONE ENCOUNTER
Pt is aware. Pt sees Dr. López for nephrology. Samantha from [facility] is aware and will have nurse draw labs on Monday. Med sent to [facility] pharmacy.     Per pt, she said yesterday the nurse couldn't find her BP and finally decided it was around 90. Pt said she felt terrible yesterday and couldn't hardly go. She didn't take her depression med last night and today she feels better and her bones don't feel so heavy.

## 2019-08-01 NOTE — TELEPHONE ENCOUNTER
I am very concerned by additional labs taken by [facility] her sed rate is over 100, u/a with micro suggests bad infection.  Given anemia, kidney decline, if has any abd pain, vomiting, fevers, feeling bad, I recommend go ER now.  May need to ask Opal have nurse re-evaluate.   Also, start doxycycline 100mg 1 po bid x 7 days.  Have [facility] redraw for BMP, CBC, sed rate and urine culture on Monday.   I did put in referrals to hematology and nephrology.  RRJ

## 2019-08-05 ENCOUNTER — TELEPHONE (OUTPATIENT)
Dept: FAMILY MEDICINE CLINIC | Facility: CLINIC | Age: 69
End: 2019-08-05

## 2019-08-05 DIAGNOSIS — D50.8 OTHER IRON DEFICIENCY ANEMIA: ICD-10-CM

## 2019-08-05 DIAGNOSIS — K90.9 MALABSORPTION OF IRON: ICD-10-CM

## 2019-08-05 DIAGNOSIS — N18.4 CHRONIC KIDNEY DISEASE (CKD), STAGE IV (SEVERE) (HCC): Primary | ICD-10-CM

## 2019-08-05 PROBLEM — D64.9 ABSOLUTE ANEMIA: Status: ACTIVE | Noted: 2019-08-05

## 2019-08-05 RX ORDER — ACETAMINOPHEN 325 MG/1
650 TABLET ORAL ONCE
Status: CANCELLED | OUTPATIENT
Start: 2019-08-06

## 2019-08-05 RX ORDER — DIPHENHYDRAMINE HCL 25 MG
25 CAPSULE ORAL ONCE
Status: CANCELLED | OUTPATIENT
Start: 2019-08-06

## 2019-08-05 RX ORDER — FAMOTIDINE 10 MG/ML
20 INJECTION, SOLUTION INTRAVENOUS ONCE
Status: CANCELLED | OUTPATIENT
Start: 2019-08-06

## 2019-08-05 RX ORDER — SODIUM CHLORIDE 9 MG/ML
250 INJECTION, SOLUTION INTRAVENOUS ONCE
Status: CANCELLED | OUTPATIENT
Start: 2019-08-06

## 2019-08-05 NOTE — TELEPHONE ENCOUNTER
Per Samantha from [facility], pt's insurance won't pay for 1 time iron infusion in her home and it will cost pt $790 out of pocket and this doesn't include IV supplies. It will probably be best if pt does outpatient at [facility].

## 2019-08-06 ENCOUNTER — TELEPHONE (OUTPATIENT)
Dept: FAMILY MEDICINE CLINIC | Facility: CLINIC | Age: 69
End: 2019-08-06

## 2019-08-06 NOTE — TELEPHONE ENCOUNTER
Annalee, please reschedule pt's iron infusion at [facility]. Pt has appt with Dr. Plunkett on 8/12 at 2:30PM also. Thank you.

## 2019-08-10 ENCOUNTER — TELEPHONE (OUTPATIENT)
Dept: FAMILY MEDICINE CLINIC | Facility: CLINIC | Age: 69
End: 2019-08-10

## 2019-08-11 NOTE — TELEPHONE ENCOUNTER
Has patient been able to get back to see nephrology?  How is she feeling?    Have [facility] recheck BMP, cbc and sed rate again dx anemia, acute kidney injury and CKD stage iv

## 2019-08-12 NOTE — TELEPHONE ENCOUNTER
Per pt, she has appt with Dr. López's PA on Thursday, 8/15 and she is doing fine.     I called Samantha from [facility] and asked her to blood work on pt.

## 2019-08-17 ENCOUNTER — TELEPHONE (OUTPATIENT)
Dept: FAMILY MEDICINE CLINIC | Facility: CLINIC | Age: 69
End: 2019-08-17

## 2019-08-17 NOTE — TELEPHONE ENCOUNTER
Kidney u/s notes some mild changes seen with chronic kidney disease.  No blockages.  Fax copy to nephrology

## 2019-08-28 RX ORDER — VALSARTAN AND HYDROCHLOROTHIAZIDE 160; 25 MG/1; MG/1
TABLET ORAL
Qty: 90 TABLET | Refills: 1 | Status: SHIPPED | OUTPATIENT
Start: 2019-08-28 | End: 2019-12-18

## 2019-08-28 RX ORDER — CLOPIDOGREL BISULFATE 75 MG/1
TABLET ORAL
Qty: 90 TABLET | Refills: 1 | Status: SHIPPED | OUTPATIENT
Start: 2019-08-28 | End: 2019-12-18

## 2019-08-28 RX ORDER — FENOFIBRATE 160 MG/1
TABLET ORAL
Qty: 90 TABLET | Refills: 1 | OUTPATIENT
Start: 2019-08-28

## 2019-09-09 ENCOUNTER — OFFICE VISIT (OUTPATIENT)
Dept: FAMILY MEDICINE CLINIC | Facility: CLINIC | Age: 69
End: 2019-09-09

## 2019-09-09 VITALS
HEIGHT: 62 IN | BODY MASS INDEX: 36.07 KG/M2 | SYSTOLIC BLOOD PRESSURE: 124 MMHG | WEIGHT: 196 LBS | HEART RATE: 73 BPM | OXYGEN SATURATION: 96 % | DIASTOLIC BLOOD PRESSURE: 60 MMHG

## 2019-09-09 DIAGNOSIS — N18.4 CKD (CHRONIC KIDNEY DISEASE), STAGE IV (HCC): ICD-10-CM

## 2019-09-09 DIAGNOSIS — D64.9 ANEMIA, UNSPECIFIED TYPE: Primary | ICD-10-CM

## 2019-09-09 PROCEDURE — 99213 OFFICE O/P EST LOW 20 MIN: CPT | Performed by: FAMILY MEDICINE

## 2019-09-09 RX ORDER — GABAPENTIN 300 MG/1
CAPSULE ORAL
Qty: 360 CAPSULE | Refills: 0
Start: 2019-09-09 | End: 2019-10-04 | Stop reason: SDUPTHER

## 2019-09-09 RX ORDER — SERTRALINE HYDROCHLORIDE 25 MG/1
1 TABLET, FILM COATED ORAL DAILY
COMMUNITY
Start: 2019-08-12 | End: 2019-12-16 | Stop reason: ALTCHOICE

## 2019-09-09 NOTE — PROGRESS NOTES
Subjective   Randi Martines is a 68 y.o. female. Presents today for   Chief Complaint   Patient presents with   • Diabetes   • Anxiety   • Depression   • Hyperlipidemia       History of Present Illness    Cymablta very sedating.  However when asked still taking;  Hematology gave SSRI and doing well on;  Nephrology [id number] cut gabapentin in half.  Tizanidine over sedated so stopped;  Recheck hgb improved after iron infusion;  Cr slight improvement;  Blood sugars have been well controlled;  No cp/soa;  Still very fatigued;  Still grieving loss of son.  Review of Systems   Constitutional: Positive for fatigue.   Cardiovascular: Positive for leg swelling. Negative for chest pain and palpitations.       Patient Active Problem List   Diagnosis   • Abdominal pain   • Intractable abdominal pain   • Essential hypertension   • DM (diabetes mellitus), type 2 with neurological complications (CMS/HCC)   • PVD (peripheral vascular disease) (CMS/HCC)   • Mesenteric ischemia (CMS/HCC)   • Acute gastric ulcer   • S/P CABG (coronary artery bypass graft)   • Coronary artery disease involving native coronary artery of native heart without angina pectoris   • Atherosclerotic PVD with ulceration (CMS/HCC)   • Dyslipidemia   • Type 2 diabetes mellitus without complication, with long-term current use of insulin (CMS/HCC)   • Biliary obstruction   • Hyperlipemia   • Foot ulcer (CMS/HCC)   • Malabsorption of iron   • Chronic kidney disease (CKD), stage IV (severe) (CMS/HCC)   • Absolute anemia       Social History     Socioeconomic History   • Marital status:      Spouse name: Not on file   • Number of children: Not on file   • Years of education: Not on file   • Highest education level: Not on file   Tobacco Use   • Smoking status: Former Smoker     Packs/day: 3.00     Years: 20.00     Pack years: 60.00   • Smokeless tobacco: Never Used   • Tobacco comment: caffeine use   Substance and Sexual Activity   • Alcohol use: No   •  Drug use: No   • Sexual activity: Defer     Partners: Female     Birth control/protection: None       Allergies   Allergen Reactions   • Augmentin [Amoxicillin-Pot Clavulanate] GI Intolerance and Dizziness   • Codeine GI Intolerance and Dizziness       Current Outpatient Medications on File Prior to Visit   Medication Sig Dispense Refill   • acetaminophen (TYLENOL) 500 MG tablet Take 500 mg by mouth Every 6 (Six) Hours As Needed for Mild Pain .     • aspirin 81 MG EC tablet Take 81 mg by mouth Daily. PT HOLDING FOR SURGERY     • atorvastatin (LIPITOR) 40 MG tablet TAKE 1 TABLET EVERY DAY 90 tablet 0   • clopidogrel (PLAVIX) 75 MG tablet TAKE 1 TABLET EVERY DAY 90 tablet 1   • dicyclomine (BENTYL) 20 MG tablet TAKE 1 TABLET THREE TIMES A DAY. 270 tablet 1   • DULoxetine (CYMBALTA) 30 MG capsule Take 1 capsule by mouth Daily. 30 capsule 5   • gabapentin (NEURONTIN) 300 MG capsule 4 capsules nightly 360 capsule 0   • glucose blood (PRODIGY NO CODING BLOOD GLUC) test strip Use as instructed to test glucose three times daily. Diagnosis code E11.9 IDDM 300 each 12   • Insulin Pen Needle (B-D ULTRAFINE III SHORT PEN) 31G X 8 MM misc USE AS DIRECTED 4 TIMES DAILY 400 each 3   • LEVEMIR FLEXTOUCH 100 UNIT/ML injection Inject 25 Units under the skin into the appropriate area as directed 2 (Two) Times a Day. 12 pen 3   • magnesium oxide (MAGOX) 400 (241.3 Mg) MG tablet tablet TAKE 1 TABLET THREE TIMES DAILY 270 tablet 1   • metoclopramide (REGLAN) 5 MG tablet TAKE 1 TABLET EVERY MORNING 90 tablet 1   • NOVOLOG FLEXPEN 100 UNIT/ML solution pen-injector sc pen INJECT 8 TO 12 UNITS SUBCUTANEOUSLY THREE TIMES DAILY BEFORE MEALS PER SLIDING SCALE (DISCARD PEN 28 DAYS AFTER FIRST USE) 45 mL 3   • pantoprazole (PROTONIX) 40 MG EC tablet Take 1 tablet by mouth 2 (Two) Times a Day. 180 tablet 1   • sertraline (ZOLOFT) 25 MG tablet Take 1 tablet by mouth Daily.     • SITagliptin (JANUVIA) 50 MG tablet Take 1 tablet by mouth Daily. 90  "tablet 1   • sucralfate (CARAFATE) 1 g tablet Take 1 tablet by mouth 4 (Four) Times a Day. 360 tablet 1   • tiZANidine (ZANAFLEX) 2 MG tablet Take 1 tablet by mouth Every 8 (Eight) Hours As Needed for Muscle Spasms. 90 tablet 1   • valsartan-hydrochlorothiazide (DIOVAN-HCT) 160-25 MG per tablet TAKE 1 TABLET EVERY DAY 90 tablet 1   • VICTOZA 18 MG/3ML solution pen-injector injection Inject 1.8 mg under the skin into the appropriate area as directed Every Morning. 27 mL 1   • [DISCONTINUED] atorvastatin (LIPITOR) 80 MG tablet Take 1 tablet by mouth Every Night. 90 tablet 1   • [DISCONTINUED] doxycycline (VIBRAMYCIN) 100 MG capsule Take 1 capsule by mouth 2 (Two) Times a Day. 14 capsule 0     No current facility-administered medications on file prior to visit.        Objective   Vitals:    09/09/19 1615   BP: 124/60   BP Location: Left arm   Patient Position: Sitting   Cuff Size: Adult   Pulse: 73   SpO2: 96%   Weight: 88.9 kg (196 lb)   Height: 157.5 cm (62\")       Physical Exam   Constitutional: She appears well-developed and well-nourished.   HENT:   Head: Normocephalic and atraumatic.   Neck: Neck supple. No JVD present. No thyromegaly present.   Cardiovascular: Normal rate, regular rhythm and normal heart sounds. Exam reveals no gallop and no friction rub.   No murmur heard.  Pulmonary/Chest: Effort normal and breath sounds normal. No respiratory distress. She has no wheezes. She has no rales.   Abdominal: Soft. Bowel sounds are normal. She exhibits no distension. There is no tenderness. There is no rebound and no guarding.   Musculoskeletal: She exhibits edema.   Neurological: She is alert.   Skin: Skin is warm and dry.   Psychiatric: She has a normal mood and affect. Her behavior is normal.   Nursing note and vitals reviewed.      Assessment/Plan   Randi was seen today for diabetes, anxiety, depression and hyperlipidemia.    Diagnoses and all orders for this visit:    Anemia, unspecified type  -     CBC & " Differential  -     Basic Metabolic Panel    CKD (chronic kidney disease), stage IV (CMS/HCC)  -     CBC & Differential  -     Basic Metabolic Panel    Other orders  -     gabapentin (NEURONTIN) 300 MG capsule; 2 capsules nightly    -recheck anemia and blood counts  -CKD - tight blood pressure, blood sugar control and avoid nsaids.  -cut dose of gabapentin         -Follow up: 3 months and prn

## 2019-09-10 LAB
BASOPHILS # BLD AUTO: 0.1 X10E3/UL (ref 0–0.2)
BASOPHILS NFR BLD AUTO: 1 %
BUN SERPL-MCNC: 38 MG/DL (ref 8–27)
BUN/CREAT SERPL: 20 (ref 12–28)
CALCIUM SERPL-MCNC: 9.7 MG/DL (ref 8.7–10.3)
CHLORIDE SERPL-SCNC: 102 MMOL/L (ref 96–106)
CO2 SERPL-SCNC: 25 MMOL/L (ref 20–29)
CREAT SERPL-MCNC: 1.94 MG/DL (ref 0.57–1)
EOSINOPHIL # BLD AUTO: 0.5 X10E3/UL (ref 0–0.4)
EOSINOPHIL NFR BLD AUTO: 5 %
ERYTHROCYTE [DISTWIDTH] IN BLOOD BY AUTOMATED COUNT: 15.8 % (ref 12.3–15.4)
GLUCOSE SERPL-MCNC: 94 MG/DL (ref 65–99)
HCT VFR BLD AUTO: 34.5 % (ref 34–46.6)
HGB BLD-MCNC: 10.9 G/DL (ref 11.1–15.9)
IMM GRANULOCYTES # BLD AUTO: 0 X10E3/UL (ref 0–0.1)
IMM GRANULOCYTES NFR BLD AUTO: 0 %
LYMPHOCYTES # BLD AUTO: 2.5 X10E3/UL (ref 0.7–3.1)
LYMPHOCYTES NFR BLD AUTO: 27 %
MCH RBC QN AUTO: 27.8 PG (ref 26.6–33)
MCHC RBC AUTO-ENTMCNC: 31.6 G/DL (ref 31.5–35.7)
MCV RBC AUTO: 88 FL (ref 79–97)
MONOCYTES # BLD AUTO: 0.7 X10E3/UL (ref 0.1–0.9)
MONOCYTES NFR BLD AUTO: 7 %
NEUTROPHILS # BLD AUTO: 5.7 X10E3/UL (ref 1.4–7)
NEUTROPHILS NFR BLD AUTO: 60 %
PLATELET # BLD AUTO: 349 X10E3/UL (ref 150–450)
POTASSIUM SERPL-SCNC: 4.7 MMOL/L (ref 3.5–5.2)
RBC # BLD AUTO: 3.92 X10E6/UL (ref 3.77–5.28)
SODIUM SERPL-SCNC: 143 MMOL/L (ref 134–144)
WBC # BLD AUTO: 9.4 X10E3/UL (ref 3.4–10.8)

## 2019-09-11 NOTE — PROGRESS NOTES
Call results to patient.  Fax copy of labs to Hematology and Nephrology  Hgb further improved to 10.9  Cr improved a little to 1.94

## 2019-10-07 RX ORDER — GABAPENTIN 300 MG/1
CAPSULE ORAL
Qty: 360 CAPSULE | Refills: 0 | Status: SHIPPED | OUTPATIENT
Start: 2019-10-07 | End: 2019-12-18

## 2019-12-16 ENCOUNTER — OFFICE VISIT (OUTPATIENT)
Dept: FAMILY MEDICINE CLINIC | Facility: CLINIC | Age: 69
End: 2019-12-16

## 2019-12-16 VITALS
DIASTOLIC BLOOD PRESSURE: 60 MMHG | HEART RATE: 87 BPM | TEMPERATURE: 98.7 F | BODY MASS INDEX: 38.64 KG/M2 | HEIGHT: 62 IN | OXYGEN SATURATION: 94 % | WEIGHT: 210 LBS | SYSTOLIC BLOOD PRESSURE: 100 MMHG

## 2019-12-16 DIAGNOSIS — J06.9 ACUTE URI: Primary | ICD-10-CM

## 2019-12-16 DIAGNOSIS — E11.9 TYPE 2 DIABETES MELLITUS WITHOUT COMPLICATION, WITH LONG-TERM CURRENT USE OF INSULIN (HCC): ICD-10-CM

## 2019-12-16 DIAGNOSIS — E11.39 TYPE 2 DIABETES MELLITUS WITH OTHER OPHTHALMIC COMPLICATION, UNSPECIFIED WHETHER LONG TERM INSULIN USE (HCC): ICD-10-CM

## 2019-12-16 DIAGNOSIS — R39.9 UTI SYMPTOMS: Primary | ICD-10-CM

## 2019-12-16 DIAGNOSIS — Z79.4 TYPE 2 DIABETES MELLITUS WITHOUT COMPLICATION, WITH LONG-TERM CURRENT USE OF INSULIN (HCC): ICD-10-CM

## 2019-12-16 PROBLEM — L97.509 FOOT ULCER (HCC): Status: RESOLVED | Noted: 2019-02-01 | Resolved: 2019-12-16

## 2019-12-16 LAB
BILIRUB BLD-MCNC: NEGATIVE MG/DL
CLARITY, POC: ABNORMAL
COLOR UR: YELLOW
GLUCOSE UR STRIP-MCNC: NEGATIVE MG/DL
KETONES UR QL: NEGATIVE
LEUKOCYTE EST, POC: ABNORMAL
NITRITE UR-MCNC: NEGATIVE MG/ML
PH UR: 5.5 [PH] (ref 5–8)
PROT UR STRIP-MCNC: ABNORMAL MG/DL
RBC # UR STRIP: ABNORMAL /UL
SP GR UR: 1.02 (ref 1–1.03)
UROBILINOGEN UR QL: NORMAL

## 2019-12-16 PROCEDURE — 96160 PT-FOCUSED HLTH RISK ASSMT: CPT | Performed by: NURSE PRACTITIONER

## 2019-12-16 PROCEDURE — 81003 URINALYSIS AUTO W/O SCOPE: CPT | Performed by: NURSE PRACTITIONER

## 2019-12-16 PROCEDURE — 99214 OFFICE O/P EST MOD 30 MIN: CPT | Performed by: NURSE PRACTITIONER

## 2019-12-16 PROCEDURE — G0439 PPPS, SUBSEQ VISIT: HCPCS | Performed by: NURSE PRACTITIONER

## 2019-12-16 RX ORDER — CLOTRIMAZOLE 1 %
1 CREAM WITH APPLICATOR VAGINAL AS NEEDED
COMMUNITY
Start: 2019-10-24 | End: 2020-01-01

## 2019-12-16 RX ORDER — FLUTICASONE PROPIONATE 50 MCG
2 SPRAY, SUSPENSION (ML) NASAL DAILY
Qty: 9.9 ML | Refills: 0 | Status: SHIPPED | OUTPATIENT
Start: 2019-12-16 | End: 2020-01-01

## 2019-12-16 RX ORDER — PSEUDOEPHEDRINE HCL 30 MG
30 TABLET ORAL EVERY 4 HOURS PRN
Qty: 30 TABLET | Refills: 0 | Status: SHIPPED | OUTPATIENT
Start: 2019-12-16 | End: 2019-12-18

## 2019-12-16 RX ORDER — PSEUDOEPHEDRINE HCL 30 MG
30 TABLET ORAL EVERY 4 HOURS PRN
Qty: 30 TABLET | Refills: 0 | Status: SHIPPED | OUTPATIENT
Start: 2019-12-16 | End: 2019-12-16

## 2019-12-16 RX ORDER — FLUTICASONE PROPIONATE 50 MCG
2 SPRAY, SUSPENSION (ML) NASAL DAILY
Qty: 9.9 ML | Refills: 0 | Status: SHIPPED | OUTPATIENT
Start: 2019-12-16 | End: 2019-12-16

## 2019-12-16 NOTE — PROGRESS NOTES
QUICK REFERENCE INFORMATION:  The ABCs of the Annual Wellness Visit    Subsequent Medicare Wellness Visit    HEALTH RISK ASSESSMENT    1950    Recent Hospitalizations:  No hospitalization(s) within the last year..        Current Medical Providers:  Patient Care Team:  Jose Morrison DO as PCP - General (Family Medicine)  Samantha Uriostegui MD as Consulting Physician (Cardiology)        Smoking Status:  Social History     Tobacco Use   Smoking Status Former Smoker   • Packs/day: 3.00   • Years: 20.00   • Pack years: 60.00   Smokeless Tobacco Never Used   Tobacco Comment    caffeine use       Alcohol Consumption:  Social History     Substance and Sexual Activity   Alcohol Use No       Depression Screen:   PHQ-2/PHQ-9 Depression Screening 9/24/2018   Little interest or pleasure in doing things 0   Feeling down, depressed, or hopeless 0   Trouble falling or staying asleep, or sleeping too much 1   Feeling tired or having little energy 0   Poor appetite or overeating 0   Feeling bad about yourself - or that you are a failure or have let yourself or your family down 2   Trouble concentrating on things, such as reading the newspaper or watching television 0   Moving or speaking so slowly that other people could have noticed. Or the opposite - being so fidgety or restless that you have been moving around a lot more than usual 0   Thoughts that you would be better off dead, or of hurting yourself in some way 0   Total Score 3   If you checked off any problems, how difficult have these problems made it for you to do your work, take care of things at home, or get along with other people? Somewhat difficult       Health Habits and Functional and Cognitive Screening:  Functional & Cognitive Status 9/24/2018   Do you have difficulty preparing food and eating? No   Do you have difficulty bathing yourself, getting dressed or grooming yourself? No   Do you have difficulty using the toilet? No   Do you have difficulty moving  around from place to place? No   Do you have trouble with steps or getting out of a bed or a chair? Yes   Current Diet Well Balanced Diet   Dental Exam Not up to date   Eye Exam Up to date   Exercise (times per week) 5 times per week   Current Exercise Activities Include Walking   Do you need help using the phone?  No   Are you deaf or do you have serious difficulty hearing?  No   Do you need help with transportation? Yes   Do you need help shopping? No   Do you need help preparing meals?  No   Do you need help with housework?  No   Do you need help with laundry? No   Do you need help taking your medications? No   Do you need help managing money? No   Do you ever drive or ride in a car without wearing a seat belt? No   Have you felt unusual stress, anger or loneliness in the last month? Yes   Who do you live with? Child   If you need help, do you have trouble finding someone available to you? No   Have you been bothered in the last four weeks by sexual problems? No   Do you have difficulty concentrating, remembering or making decisions? No           Does the patient have evidence of cognitive impairment? No    Aspirin use counseling: Taking ASA appropriately as indicated      Recent Lab Results:  CMP:  Lab Results   Component Value Date    GLU 94 09/09/2019    BUN 38 (H) 09/09/2019    CREATININE 1.94 (H) 09/09/2019    EGFRIFNONA 26 (L) 09/09/2019    EGFRIFAFRI 30 (L) 09/09/2019    BCR 20 09/09/2019     09/09/2019    K 4.7 09/09/2019    CO2 25 09/09/2019    CALCIUM 9.7 09/09/2019    PROTENTOTREF 6.5 07/23/2019    ALBUMIN 3.80 07/23/2019    LABGLOBREF 2.7 07/23/2019    LABIL2 1.4 07/23/2019    BILITOT 0.2 07/23/2019    ALKPHOS 56 07/23/2019    AST 14 07/23/2019    ALT 11 07/23/2019     Lipid Panel:  Lab Results   Component Value Date    TRIG 345 (H) 07/23/2019    HDL 33 (L) 07/23/2019    VLDL 69 07/23/2019     HbA1c:  Lab Results   Component Value Date    HGBA1C 6.00 (H) 07/23/2019       Visual Acuity:  No exam  data present    Age-appropriate Screening Schedule:  Refer to the list below for future screening recommendations based on patient's age, sex and/or medical conditions. Orders for these recommended tests are listed in the plan section. The patient has been provided with a written plan.    Health Maintenance   Topic Date Due   • TDAP/TD VACCINES (1 - Tdap) 10/23/1960   • PNEUMOCOCCAL VACCINE (65+ HIGH RISK) (1 of 2 - PCV13) 10/23/2014   • MAMMOGRAM  08/30/2018   • INFLUENZA VACCINE  08/01/2019   • DIABETIC FOOT EXAM  09/24/2019   • URINE MICROALBUMIN  09/24/2019   • HEMOGLOBIN A1C  01/23/2020   • DIABETIC EYE EXAM  02/25/2020   • LIPID PANEL  07/23/2020   • COLONOSCOPY  06/04/2023        Subjective   History of Present Illness    Randi Martines is a 69 y.o. female who presents for an Subsequent Wellness Visit.    The following portions of the patient's history were reviewed and updated as appropriate: allergies, current medications, past family history, past social history, past surgical history and problem list.    Outpatient Medications Prior to Visit   Medication Sig Dispense Refill   • acetaminophen (TYLENOL) 500 MG tablet Take 500 mg by mouth Every 6 (Six) Hours As Needed for Mild Pain .     • aspirin 81 MG EC tablet Take 81 mg by mouth Daily. PT HOLDING FOR SURGERY     • atorvastatin (LIPITOR) 40 MG tablet TAKE 1 TABLET EVERY DAY 90 tablet 0   • clopidogrel (PLAVIX) 75 MG tablet TAKE 1 TABLET EVERY DAY 90 tablet 1   • dicyclomine (BENTYL) 20 MG tablet TAKE 1 TABLET THREE TIMES A DAY. 270 tablet 1   • gabapentin (NEURONTIN) 300 MG capsule TAKE 4 CAPSULES EVERY NIGHT 360 capsule 0   • glucose blood (PRODIGY NO CODING BLOOD GLUC) test strip Use as instructed to test glucose three times daily. Diagnosis code E11.9 IDDM 300 each 12   • Insulin Pen Needle (B-D ULTRAFINE III SHORT PEN) 31G X 8 MM misc USE AS DIRECTED 4 TIMES DAILY 400 each 3   • LEVEMIR FLEXTOUCH 100 UNIT/ML injection Inject 25 Units under the  skin into the appropriate area as directed 2 (Two) Times a Day. 12 pen 3   • magnesium oxide (MAGOX) 400 (241.3 Mg) MG tablet tablet TAKE 1 TABLET THREE TIMES DAILY 270 tablet 1   • metoclopramide (REGLAN) 5 MG tablet TAKE 1 TABLET EVERY MORNING 90 tablet 1   • NOVOLOG FLEXPEN 100 UNIT/ML solution pen-injector sc pen INJECT 8 TO 12 UNITS SUBCUTANEOUSLY THREE TIMES DAILY BEFORE MEALS PER SLIDING SCALE (DISCARD PEN 28 DAYS AFTER FIRST USE) 45 mL 3   • pantoprazole (PROTONIX) 40 MG EC tablet Take 1 tablet by mouth 2 (Two) Times a Day. 180 tablet 1   • SITagliptin (JANUVIA) 50 MG tablet Take 1 tablet by mouth Daily. 90 tablet 1   • sucralfate (CARAFATE) 1 g tablet Take 1 tablet by mouth 4 (Four) Times a Day. 360 tablet 1   • valsartan-hydrochlorothiazide (DIOVAN-HCT) 160-25 MG per tablet TAKE 1 TABLET EVERY DAY 90 tablet 1   • VICTOZA 18 MG/3ML solution pen-injector injection Inject 1.8 mg under the skin into the appropriate area as directed Every Morning. 27 mL 1   • sertraline (ZOLOFT) 25 MG tablet Take 1 tablet by mouth Daily.     • clotrimazole (LOTRIMIN) 1 % vaginal cream        No facility-administered medications prior to visit.        Patient Active Problem List   Diagnosis   • Abdominal pain   • Intractable abdominal pain   • Essential hypertension   • DM (diabetes mellitus), type 2 with neurological complications (CMS/HCC)   • PVD (peripheral vascular disease) (CMS/HCC)   • Mesenteric ischemia (CMS/HCC)   • Acute gastric ulcer   • S/P CABG (coronary artery bypass graft)   • Coronary artery disease involving native coronary artery of native heart without angina pectoris   • Atherosclerotic PVD with ulceration (CMS/HCC)   • Dyslipidemia   • Type 2 diabetes mellitus without complication, with long-term current use of insulin (CMS/HCC)   • Biliary obstruction   • Hyperlipemia   • Foot ulcer (CMS/HCC)   • Malabsorption of iron   • Chronic kidney disease (CKD), stage IV (severe) (CMS/HCC)   • Absolute anemia  "      Advance Care Planning:  Patient does not have an advance directive - information provided to the patient today    Identification of Risk Factors:  Risk factors include: Advance Directive Discussion  Cardiovascular risk  Obesity/Overweight   Osteoprorosis Risk.    Review of Systems    Compared to one year ago, the patient feels her physical health is the same.  Compared to one year ago, the patient feels her mental health is worse.    Objective     Physical Exam    Vitals:    12/16/19 1625   BP: 100/60   BP Location: Left arm   Patient Position: Sitting   Cuff Size: Adult   Pulse: 87   Temp: 98.7 °F (37.1 °C)   TempSrc: Oral   SpO2: 94%   Weight: 95.3 kg (210 lb)   Height: 157.5 cm (62\")       Patient's Body mass index is 38.41 kg/m². BMI is above normal parameters. Recommendations include: educational material, exercise counseling and none (medical contraindication).      Assessment/Plan   Patient Self-Management and Personalized Health Advice  The patient has been provided with information about: diet, exercise and weight management and preventive services including:   · Annual Wellness Visit (AWV).    Visit Diagnoses:    ICD-10-CM ICD-9-CM   1. Acute URI J06.9 465.9       No orders of the defined types were placed in this encounter.      Outpatient Encounter Medications as of 12/16/2019   Medication Sig Dispense Refill   • acetaminophen (TYLENOL) 500 MG tablet Take 500 mg by mouth Every 6 (Six) Hours As Needed for Mild Pain .     • aspirin 81 MG EC tablet Take 81 mg by mouth Daily. PT HOLDING FOR SURGERY     • atorvastatin (LIPITOR) 40 MG tablet TAKE 1 TABLET EVERY DAY 90 tablet 0   • clopidogrel (PLAVIX) 75 MG tablet TAKE 1 TABLET EVERY DAY 90 tablet 1   • dicyclomine (BENTYL) 20 MG tablet TAKE 1 TABLET THREE TIMES A DAY. 270 tablet 1   • gabapentin (NEURONTIN) 300 MG capsule TAKE 4 CAPSULES EVERY NIGHT 360 capsule 0   • glucose blood (PRODIGY NO CODING BLOOD GLUC) test strip Use as instructed to test " glucose three times daily. Diagnosis code E11.9 IDDM 300 each 12   • Insulin Pen Needle (B-D ULTRAFINE III SHORT PEN) 31G X 8 MM misc USE AS DIRECTED 4 TIMES DAILY 400 each 3   • LEVEMIR FLEXTOUCH 100 UNIT/ML injection Inject 25 Units under the skin into the appropriate area as directed 2 (Two) Times a Day. 12 pen 3   • magnesium oxide (MAGOX) 400 (241.3 Mg) MG tablet tablet TAKE 1 TABLET THREE TIMES DAILY 270 tablet 1   • metoclopramide (REGLAN) 5 MG tablet TAKE 1 TABLET EVERY MORNING 90 tablet 1   • NOVOLOG FLEXPEN 100 UNIT/ML solution pen-injector sc pen INJECT 8 TO 12 UNITS SUBCUTANEOUSLY THREE TIMES DAILY BEFORE MEALS PER SLIDING SCALE (DISCARD PEN 28 DAYS AFTER FIRST USE) 45 mL 3   • pantoprazole (PROTONIX) 40 MG EC tablet Take 1 tablet by mouth 2 (Two) Times a Day. 180 tablet 1   • SITagliptin (JANUVIA) 50 MG tablet Take 1 tablet by mouth Daily. 90 tablet 1   • sucralfate (CARAFATE) 1 g tablet Take 1 tablet by mouth 4 (Four) Times a Day. 360 tablet 1   • valsartan-hydrochlorothiazide (DIOVAN-HCT) 160-25 MG per tablet TAKE 1 TABLET EVERY DAY 90 tablet 1   • VICTOZA 18 MG/3ML solution pen-injector injection Inject 1.8 mg under the skin into the appropriate area as directed Every Morning. 27 mL 1   • [DISCONTINUED] sertraline (ZOLOFT) 25 MG tablet Take 1 tablet by mouth Daily.     • clotrimazole (LOTRIMIN) 1 % vaginal cream      • fluticasone (FLONASE) 50 MCG/ACT nasal spray 2 sprays into the nostril(s) as directed by provider Daily. 9.9 mL 0   • pseudoephedrine (SUDAFED) 30 MG tablet Take 1 tablet by mouth Every 4 (Four) Hours As Needed for Congestion. 30 tablet 0   • Vortioxetine HBr (TRINTELLIX) 10 MG tablet Take 10 mg by mouth Daily. 30 tablet 0   • [DISCONTINUED] fluticasone (FLONASE) 50 MCG/ACT nasal spray 2 sprays into the nostril(s) as directed by provider Daily. 9.9 mL 0   • [DISCONTINUED] pseudoephedrine (SUDAFED) 30 MG tablet Take 1 tablet by mouth Every 4 (Four) Hours As Needed for Congestion. 30  "tablet 0     No facility-administered encounter medications on file as of 12/16/2019.        Reviewed use of high risk medication in the elderly: yes  Reviewed for potential of harmful drug interactions in the elderly: yes    Follow Up:  No follow-ups on file.     An After Visit Summary and PPPS with all of these plans were given to the patient.           ++++++++++++++++++++++++++++++++++++++++++++++++++++++++++++++++++     CC:URI /Depression   HPI    ROS    Social History     Tobacco Use   • Smoking status: Former Smoker     Packs/day: 3.00     Years: 20.00     Pack years: 60.00   • Smokeless tobacco: Never Used   • Tobacco comment: caffeine use   Substance Use Topics   • Alcohol use: No     O:   Vitals:    12/16/19 1625   BP: 100/60   BP Location: Left arm   Patient Position: Sitting   Cuff Size: Adult   Pulse: 87   Temp: 98.7 °F (37.1 °C)   TempSrc: Oral   SpO2: 94%   Weight: 95.3 kg (210 lb)   Height: 157.5 cm (62\")       Physical Exam    Randi was seen today for anxiety, insomnia and depression.    Diagnoses and all orders for this visit:    Acute URI  -     Discontinue: pseudoephedrine (SUDAFED) 30 MG tablet; Take 1 tablet by mouth Every 4 (Four) Hours As Needed for Congestion.  -     Discontinue: fluticasone (FLONASE) 50 MCG/ACT nasal spray; 2 sprays into the nostril(s) as directed by provider Daily.  -     fluticasone (FLONASE) 50 MCG/ACT nasal spray; 2 sprays into the nostril(s) as directed by provider Daily.  -     pseudoephedrine (SUDAFED) 30 MG tablet; Take 1 tablet by mouth Every 4 (Four) Hours As Needed for Congestion.    Other orders  -     Vortioxetine HBr (TRINTELLIX) 10 MG tablet; Take 10 mg by mouth Daily.        No follow-ups on file.    "

## 2019-12-16 NOTE — PROGRESS NOTES
Subjective     Randi Martines is a 69 y.o. female who presents with   Chief Complaint   Patient presents with   • Anxiety     Since July.     • Insomnia   • Depression       Lost Son July of this year and the Holidays are coming and I'm having severe anxiety cant sleep panic attacks. Nervous stomach and headache and leg cramps everything getting worse. Stage 4 Renal failure 10 mg of melatonin and not able to sleep. Sleeps maybe a hour or 2 every night. Tried Cymbalta and Zoloft nothing has helped.   Insomnia : nightly not controlled.    Anxiety   Presents for initial visit. Onset was 1 to 6 months ago. The problem has been unchanged. Symptoms include decreased concentration, excessive worry, insomnia, irritability, nervous/anxious behavior and panic. Patient reports no chest pain, confusion, shortness of breath or suicidal ideas. Symptoms occur constantly. The severity of symptoms is mild. The symptoms are aggravated by family issues. The quality of sleep is poor. Nighttime awakenings: several.     Past treatments include SSRIs and non-SSRI antidepressants. The treatment provided no relief. Compliance with prior treatments has been good.        Review of Systems   Constitutional: Positive for activity change, fatigue and irritability. Negative for chills, fever and unexpected weight change.   HENT: Negative for ear discharge, facial swelling, hearing loss, nosebleeds, sinus pressure and trouble swallowing.    Respiratory: Negative for shortness of breath.    Cardiovascular: Negative.  Negative for chest pain.   Gastrointestinal: Negative.  Negative for abdominal pain.   Endocrine: Negative.    Musculoskeletal: Negative for arthralgias.   Allergic/Immunologic: Negative for environmental allergies.   Psychiatric/Behavioral: Positive for decreased concentration, dysphoric mood and sleep disturbance. Negative for agitation, confusion, hallucinations, self-injury and suicidal ideas. The patient is nervous/anxious and  has insomnia. The patient is not hyperactive.    All other systems reviewed and are negative.        The following portions of the patient's history were reviewed and updated as appropriate: allergies, current medications, past family history, past medical history, past social history, past surgical history and problem list.      Patient Active Problem List    Diagnosis Date Noted   • Malabsorption of iron 08/05/2019   • Chronic kidney disease (CKD), stage IV (severe) (CMS/[facility]) 08/05/2019   • Absolute anemia 08/05/2019   • Foot ulcer (CMS/HCC) 02/01/2019   • Hyperlipemia 09/24/2018   • Biliary obstruction 09/30/2017   • Dyslipidemia 08/30/2017   • Type 2 diabetes mellitus without complication, with long-term current use of insulin (CMS/HCC) 08/30/2017   • Atherosclerotic PVD with ulceration (CMS/HCC) 05/31/2017   • S/P CABG (coronary artery bypass graft) 05/30/2017   • Coronary artery disease involving native coronary artery of native heart without angina pectoris 05/30/2017   • Acute gastric ulcer 11/24/2016   • Essential hypertension 11/23/2016   • DM (diabetes mellitus), type 2 with neurological complications (CMS/[facility]) 11/23/2016   • PVD (peripheral vascular disease) (CMS/[facility]) 11/23/2016   • Mesenteric ischemia (CMS/HCC) 11/23/2016     Note Last Updated: 11/23/2016     With previous stent     • Abdominal pain 11/22/2016   • Intractable abdominal pain 11/22/2016       Current Outpatient Medications on File Prior to Visit   Medication Sig Dispense Refill   • acetaminophen (TYLENOL) 500 MG tablet Take 500 mg by mouth Every 6 (Six) Hours As Needed for Mild Pain .     • aspirin 81 MG EC tablet Take 81 mg by mouth Daily. PT HOLDING FOR SURGERY     • atorvastatin (LIPITOR) 40 MG tablet TAKE 1 TABLET EVERY DAY 90 tablet 0   • clopidogrel (PLAVIX) 75 MG tablet TAKE 1 TABLET EVERY DAY 90 tablet 1   • dicyclomine (BENTYL) 20 MG tablet TAKE 1 TABLET THREE TIMES A DAY. 270 tablet 1   • gabapentin (NEURONTIN) 300 MG capsule  "TAKE 4 CAPSULES EVERY NIGHT 360 capsule 0   • glucose blood (PRODIGY NO CODING BLOOD GLUC) test strip Use as instructed to test glucose three times daily. Diagnosis code E11.9 IDDM 300 each 12   • Insulin Pen Needle (B-D ULTRAFINE III SHORT PEN) 31G X 8 MM misc USE AS DIRECTED 4 TIMES DAILY 400 each 3   • LEVEMIR FLEXTOUCH 100 UNIT/ML injection Inject 25 Units under the skin into the appropriate area as directed 2 (Two) Times a Day. 12 pen 3   • magnesium oxide (MAGOX) 400 (241.3 Mg) MG tablet tablet TAKE 1 TABLET THREE TIMES DAILY 270 tablet 1   • metoclopramide (REGLAN) 5 MG tablet TAKE 1 TABLET EVERY MORNING 90 tablet 1   • NOVOLOG FLEXPEN 100 UNIT/ML solution pen-injector sc pen INJECT 8 TO 12 UNITS SUBCUTANEOUSLY THREE TIMES DAILY BEFORE MEALS PER SLIDING SCALE (DISCARD PEN 28 DAYS AFTER FIRST USE) 45 mL 3   • pantoprazole (PROTONIX) 40 MG EC tablet Take 1 tablet by mouth 2 (Two) Times a Day. 180 tablet 1   • SITagliptin (JANUVIA) 50 MG tablet Take 1 tablet by mouth Daily. 90 tablet 1   • sucralfate (CARAFATE) 1 g tablet Take 1 tablet by mouth 4 (Four) Times a Day. 360 tablet 1   • valsartan-hydrochlorothiazide (DIOVAN-HCT) 160-25 MG per tablet TAKE 1 TABLET EVERY DAY 90 tablet 1   • VICTOZA 18 MG/3ML solution pen-injector injection Inject 1.8 mg under the skin into the appropriate area as directed Every Morning. 27 mL 1   • [DISCONTINUED] sertraline (ZOLOFT) 25 MG tablet Take 1 tablet by mouth Daily.     • clotrimazole (LOTRIMIN) 1 % vaginal cream        No current facility-administered medications on file prior to visit.        Objective     /60 (BP Location: Left arm, Patient Position: Sitting, Cuff Size: Adult)   Pulse 87   Temp 98.7 °F (37.1 °C) (Oral)   Ht 157.5 cm (62\")   Wt 95.3 kg (210 lb)   SpO2 94%   BMI 38.41 kg/m²     Physical Exam   Constitutional: She is oriented to person, place, and time. She appears well-developed and well-nourished. No distress.   HENT:   Head: Normocephalic. "   Right Ear: Tympanic membrane is not retracted. No middle ear effusion.   Left Ear: Tympanic membrane is not retracted.  No middle ear effusion.   Nose: Mucosal edema and rhinorrhea present.   Mouth/Throat: Posterior oropharyngeal erythema present. No oropharyngeal exudate.   Eyes: Pupils are equal, round, and reactive to light. Conjunctivae are normal.   Neck: Normal range of motion. Neck supple. No JVD present. No tracheal deviation present. No thyromegaly present.   Cardiovascular: Normal rate, regular rhythm, normal heart sounds and intact distal pulses.   Pulmonary/Chest: Effort normal and breath sounds normal. No respiratory distress. She has no wheezes. She has no rales.   Abdominal: Soft. Bowel sounds are normal. There is no tenderness.   Lymphadenopathy:     She has no cervical adenopathy.   Neurological: She is alert and oriented to person, place, and time. No cranial nerve deficit or sensory deficit.   Skin: Skin is warm and dry. She is not diaphoretic.   Psychiatric: Her speech is normal and behavior is normal. Judgment and thought content normal. Her affect is not angry, not blunt, not labile and not inappropriate. Cognition and memory are normal. She expresses no homicidal and no suicidal ideation. She expresses no suicidal plans and no homicidal plans.   Nursing note and vitals reviewed.      Procedures    Assessment/Plan   Randi was seen today for anxiety, insomnia and depression.    Diagnoses and all orders for this visit:    Acute URI  -     Discontinue: pseudoephedrine (SUDAFED) 30 MG tablet; Take 1 tablet by mouth Every 4 (Four) Hours As Needed for Congestion.  -     Discontinue: fluticasone (FLONASE) 50 MCG/ACT nasal spray; 2 sprays into the nostril(s) as directed by provider Daily.  -     fluticasone (FLONASE) 50 MCG/ACT nasal spray; 2 sprays into the nostril(s) as directed by provider Daily.  -     pseudoephedrine (SUDAFED) 30 MG tablet; Take 1 tablet by mouth Every 4 (Four) Hours As Needed  for Congestion.    Other orders  -     Vortioxetine HBr (TRINTELLIX) 10 MG tablet; Take 10 mg by mouth Daily.    Insomnia: uncontrolled melatonin 10 mg at night   Eye Injections: receiving steroids in eye and they are affecting her blood sugars will have to use high dose sliding scale at night . Thursday  72hrs after last inj to regulate blood sugars Discussion      Trintellix 10mg daily follow up in 1 month   No future appointments. High dose sliding scale     Viral in nature OTC medications as directed. Follow up in 1-2 wks as directed

## 2019-12-17 LAB — SPECIMEN STATUS: NORMAL

## 2019-12-17 RX ORDER — LIRAGLUTIDE 6 MG/ML
1.8 INJECTION SUBCUTANEOUS EVERY MORNING
Qty: 27 ML | Refills: 0 | Status: SHIPPED | OUTPATIENT
Start: 2019-12-17 | End: 2020-01-01 | Stop reason: HOSPADM

## 2019-12-17 RX ORDER — PANTOPRAZOLE SODIUM 40 MG/1
TABLET, DELAYED RELEASE ORAL
Qty: 180 TABLET | Refills: 0 | Status: SHIPPED | OUTPATIENT
Start: 2019-12-17 | End: 2019-12-18

## 2019-12-17 RX ORDER — SUCRALFATE 1 G/1
TABLET ORAL
Qty: 360 TABLET | Refills: 0 | Status: SHIPPED | OUTPATIENT
Start: 2019-12-17 | End: 2019-12-18

## 2019-12-17 RX ORDER — METOCLOPRAMIDE 5 MG/1
TABLET ORAL
Qty: 90 TABLET | Refills: 0 | Status: SHIPPED | OUTPATIENT
Start: 2019-12-17 | End: 2019-12-18

## 2019-12-17 RX ORDER — ATORVASTATIN CALCIUM 80 MG/1
TABLET, FILM COATED ORAL
Qty: 90 TABLET | Refills: 0 | Status: SHIPPED | OUTPATIENT
Start: 2019-12-17 | End: 2019-12-18

## 2019-12-17 RX ORDER — INSULIN DETEMIR 100 [IU]/ML
INJECTION, SOLUTION SUBCUTANEOUS
Qty: 45 ML | Refills: 0 | Status: ON HOLD | OUTPATIENT
Start: 2019-12-17 | End: 2020-01-01 | Stop reason: SDUPTHER

## 2019-12-18 ENCOUNTER — TELEPHONE (OUTPATIENT)
Dept: FAMILY MEDICINE CLINIC | Facility: CLINIC | Age: 69
End: 2019-12-18

## 2019-12-18 ENCOUNTER — HOSPITAL ENCOUNTER (INPATIENT)
Facility: HOSPITAL | Age: 69
LOS: 18 days | Discharge: HOME | End: 2020-01-05
Attending: EMERGENCY MEDICINE | Admitting: INTERNAL MEDICINE

## 2019-12-18 ENCOUNTER — APPOINTMENT (OUTPATIENT)
Dept: GENERAL RADIOLOGY | Facility: HOSPITAL | Age: 69
End: 2019-12-18

## 2019-12-18 ENCOUNTER — APPOINTMENT (OUTPATIENT)
Dept: CT IMAGING | Facility: HOSPITAL | Age: 69
End: 2019-12-18

## 2019-12-18 DIAGNOSIS — D64.9 ANEMIA, UNSPECIFIED TYPE: Primary | ICD-10-CM

## 2019-12-18 DIAGNOSIS — E11.9 TYPE 2 DIABETES MELLITUS WITHOUT COMPLICATION, WITH LONG-TERM CURRENT USE OF INSULIN (HCC): ICD-10-CM

## 2019-12-18 DIAGNOSIS — E11.49 DM (DIABETES MELLITUS), TYPE 2 WITH NEUROLOGICAL COMPLICATIONS (HCC): ICD-10-CM

## 2019-12-18 DIAGNOSIS — D50.0 BLOOD LOSS ANEMIA: ICD-10-CM

## 2019-12-18 DIAGNOSIS — K92.1 MELENA: ICD-10-CM

## 2019-12-18 DIAGNOSIS — R06.02 SHORT OF BREATH ON EXERTION: ICD-10-CM

## 2019-12-18 DIAGNOSIS — N17.9 AKI (ACUTE KIDNEY INJURY) (HCC): ICD-10-CM

## 2019-12-18 DIAGNOSIS — Z79.4 TYPE 2 DIABETES MELLITUS WITHOUT COMPLICATION, WITH LONG-TERM CURRENT USE OF INSULIN (HCC): ICD-10-CM

## 2019-12-18 DIAGNOSIS — I21.4 NSTEMI (NON-ST ELEVATED MYOCARDIAL INFARCTION) (HCC): ICD-10-CM

## 2019-12-18 DIAGNOSIS — N18.4 CKD (CHRONIC KIDNEY DISEASE) STAGE 4, GFR 15-29 ML/MIN (HCC): ICD-10-CM

## 2019-12-18 DIAGNOSIS — K92.2 GASTROINTESTINAL HEMORRHAGE, UNSPECIFIED GASTROINTESTINAL HEMORRHAGE TYPE: Primary | ICD-10-CM

## 2019-12-18 PROBLEM — D63.8 ANEMIA OF CHRONIC DISEASE: Status: ACTIVE | Noted: 2019-08-05

## 2019-12-18 LAB
ABO GROUP BLD: NORMAL
ALBUMIN SERPL-MCNC: 3.5 G/DL (ref 3.5–5.2)
ALBUMIN/GLOB SERPL: 1 G/DL
ALP SERPL-CCNC: 60 U/L (ref 39–117)
ALT SERPL W P-5'-P-CCNC: 27 U/L (ref 1–33)
ANION GAP SERPL CALCULATED.3IONS-SCNC: 15.6 MMOL/L (ref 5–15)
APTT PPP: 25.3 SECONDS (ref 22.7–35.4)
AST SERPL-CCNC: 21 U/L (ref 1–32)
BASOPHILS # BLD AUTO: 0.07 10*3/MM3 (ref 0–0.2)
BASOPHILS NFR BLD AUTO: 0.5 % (ref 0–1.5)
BILIRUB SERPL-MCNC: 0.2 MG/DL (ref 0.2–1.2)
BLD GP AB SCN SERPL QL: NEGATIVE
BUN BLD-MCNC: 96 MG/DL (ref 8–23)
BUN/CREAT SERPL: 30.4 (ref 7–25)
CALCIUM SPEC-SCNC: 9 MG/DL (ref 8.6–10.5)
CHLORIDE SERPL-SCNC: 100 MMOL/L (ref 98–107)
CO2 SERPL-SCNC: 23.4 MMOL/L (ref 22–29)
CREAT BLD-MCNC: 3.16 MG/DL (ref 0.57–1)
D-LACTATE SERPL-SCNC: 1.7 MMOL/L (ref 0.5–2)
DEPRECATED RDW RBC AUTO: 47.7 FL (ref 37–54)
EOSINOPHIL # BLD AUTO: 0.21 10*3/MM3 (ref 0–0.4)
EOSINOPHIL NFR BLD AUTO: 1.5 % (ref 0.3–6.2)
ERYTHROCYTE [DISTWIDTH] IN BLOOD BY AUTOMATED COUNT: 15.2 % (ref 12.3–15.4)
EXPIRATION DATE: ABNORMAL
FECAL OCCULT BLOOD SCREEN, POC: POSITIVE
GFR SERPL CREATININE-BSD FRML MDRD: 15 ML/MIN/1.73
GLOBULIN UR ELPH-MCNC: 3.6 GM/DL
GLUCOSE BLD-MCNC: 187 MG/DL (ref 65–99)
HCT VFR BLD AUTO: 21.5 % (ref 34–46.6)
HGB BLD-MCNC: 7.2 G/DL (ref 12–15.9)
HOLD SPECIMEN: NORMAL
HOLD SPECIMEN: NORMAL
IMM GRANULOCYTES # BLD AUTO: 0.1 10*3/MM3 (ref 0–0.05)
IMM GRANULOCYTES NFR BLD AUTO: 0.7 % (ref 0–0.5)
INR PPP: 0.99 (ref 0.9–1.1)
LYMPHOCYTES # BLD AUTO: 1.68 10*3/MM3 (ref 0.7–3.1)
LYMPHOCYTES NFR BLD AUTO: 12.3 % (ref 19.6–45.3)
Lab: 128
MCH RBC QN AUTO: 29.5 PG (ref 26.6–33)
MCHC RBC AUTO-ENTMCNC: 33.5 G/DL (ref 31.5–35.7)
MCV RBC AUTO: 88.1 FL (ref 79–97)
MONOCYTES # BLD AUTO: 0.63 10*3/MM3 (ref 0.1–0.9)
MONOCYTES NFR BLD AUTO: 4.6 % (ref 5–12)
NEGATIVE CONTROL: NEGATIVE
NEUTROPHILS # BLD AUTO: 10.93 10*3/MM3 (ref 1.7–7)
NEUTROPHILS NFR BLD AUTO: 80.4 % (ref 42.7–76)
NRBC BLD AUTO-RTO: 0.1 /100 WBC (ref 0–0.2)
NT-PROBNP SERPL-MCNC: ABNORMAL PG/ML (ref 5–900)
PLATELET # BLD AUTO: 237 10*3/MM3 (ref 140–450)
PMV BLD AUTO: 11.2 FL (ref 6–12)
POSITIVE CONTROL: POSITIVE
POTASSIUM BLD-SCNC: 4.5 MMOL/L (ref 3.5–5.2)
PROT SERPL-MCNC: 7.1 G/DL (ref 6–8.5)
PROTHROMBIN TIME: 12.8 SECONDS (ref 11.7–14.2)
RBC # BLD AUTO: 2.44 10*6/MM3 (ref 3.77–5.28)
RH BLD: POSITIVE
SODIUM BLD-SCNC: 139 MMOL/L (ref 136–145)
T&S EXPIRATION DATE: NORMAL
TROPONIN T SERPL-MCNC: 0.22 NG/ML (ref 0–0.03)
WBC NRBC COR # BLD: 13.62 10*3/MM3 (ref 3.4–10.8)
WHOLE BLOOD HOLD SPECIMEN: NORMAL
WHOLE BLOOD HOLD SPECIMEN: NORMAL

## 2019-12-18 PROCEDURE — 36415 COLL VENOUS BLD VENIPUNCTURE: CPT | Performed by: NURSE PRACTITIONER

## 2019-12-18 PROCEDURE — 83880 ASSAY OF NATRIURETIC PEPTIDE: CPT | Performed by: EMERGENCY MEDICINE

## 2019-12-18 PROCEDURE — 93005 ELECTROCARDIOGRAM TRACING: CPT | Performed by: EMERGENCY MEDICINE

## 2019-12-18 PROCEDURE — 86900 BLOOD TYPING SEROLOGIC ABO: CPT

## 2019-12-18 PROCEDURE — 84484 ASSAY OF TROPONIN QUANT: CPT | Performed by: EMERGENCY MEDICINE

## 2019-12-18 PROCEDURE — P9016 RBC LEUKOCYTES REDUCED: HCPCS

## 2019-12-18 PROCEDURE — 83605 ASSAY OF LACTIC ACID: CPT | Performed by: EMERGENCY MEDICINE

## 2019-12-18 PROCEDURE — 36430 TRANSFUSION BLD/BLD COMPNT: CPT

## 2019-12-18 PROCEDURE — 93010 ELECTROCARDIOGRAM REPORT: CPT | Performed by: INTERNAL MEDICINE

## 2019-12-18 PROCEDURE — 71045 X-RAY EXAM CHEST 1 VIEW: CPT

## 2019-12-18 PROCEDURE — 80053 COMPREHEN METABOLIC PANEL: CPT | Performed by: EMERGENCY MEDICINE

## 2019-12-18 PROCEDURE — 74176 CT ABD & PELVIS W/O CONTRAST: CPT

## 2019-12-18 PROCEDURE — 85610 PROTHROMBIN TIME: CPT | Performed by: EMERGENCY MEDICINE

## 2019-12-18 PROCEDURE — 82270 OCCULT BLOOD FECES: CPT | Performed by: EMERGENCY MEDICINE

## 2019-12-18 PROCEDURE — 84484 ASSAY OF TROPONIN QUANT: CPT | Performed by: NURSE PRACTITIONER

## 2019-12-18 PROCEDURE — 86900 BLOOD TYPING SEROLOGIC ABO: CPT | Performed by: EMERGENCY MEDICINE

## 2019-12-18 PROCEDURE — 86901 BLOOD TYPING SEROLOGIC RH(D): CPT | Performed by: EMERGENCY MEDICINE

## 2019-12-18 PROCEDURE — 85025 COMPLETE CBC W/AUTO DIFF WBC: CPT | Performed by: EMERGENCY MEDICINE

## 2019-12-18 PROCEDURE — 86850 RBC ANTIBODY SCREEN: CPT | Performed by: EMERGENCY MEDICINE

## 2019-12-18 PROCEDURE — 85730 THROMBOPLASTIN TIME PARTIAL: CPT | Performed by: EMERGENCY MEDICINE

## 2019-12-18 PROCEDURE — 25010000002 FUROSEMIDE PER 20 MG: Performed by: NURSE PRACTITIONER

## 2019-12-18 PROCEDURE — 86923 COMPATIBILITY TEST ELECTRIC: CPT

## 2019-12-18 PROCEDURE — 99285 EMERGENCY DEPT VISIT HI MDM: CPT

## 2019-12-18 RX ORDER — SODIUM CHLORIDE 0.9 % (FLUSH) 0.9 %
10 SYRINGE (ML) INJECTION EVERY 12 HOURS SCHEDULED
Status: DISCONTINUED | OUTPATIENT
Start: 2019-12-18 | End: 2019-01-01

## 2019-12-18 RX ORDER — ATORVASTATIN CALCIUM 80 MG/1
80 TABLET, FILM COATED ORAL NIGHTLY
COMMUNITY
End: 2020-01-01

## 2019-12-18 RX ORDER — NITROGLYCERIN 0.4 MG/1
0.4 TABLET SUBLINGUAL
Status: DISCONTINUED | OUTPATIENT
Start: 2019-12-18 | End: 2020-01-01 | Stop reason: HOSPADM

## 2019-12-18 RX ORDER — DICYCLOMINE HCL 20 MG
20 TABLET ORAL 3 TIMES DAILY PRN
COMMUNITY
End: 2020-01-01

## 2019-12-18 RX ORDER — DOCUSATE SODIUM 100 MG/1
200 CAPSULE, LIQUID FILLED ORAL
COMMUNITY

## 2019-12-18 RX ORDER — ACETAMINOPHEN 650 MG/1
650 SUPPOSITORY RECTAL EVERY 4 HOURS PRN
Status: DISCONTINUED | OUTPATIENT
Start: 2019-12-18 | End: 2020-01-01 | Stop reason: HOSPADM

## 2019-12-18 RX ORDER — CHOLECALCIFEROL (VITAMIN D3) 125 MCG
10 CAPSULE ORAL NIGHTLY
COMMUNITY

## 2019-12-18 RX ORDER — SUCRALFATE 1 G/1
1 TABLET ORAL 4 TIMES DAILY
COMMUNITY
End: 2020-01-01

## 2019-12-18 RX ORDER — ONDANSETRON 2 MG/ML
4 INJECTION INTRAMUSCULAR; INTRAVENOUS EVERY 6 HOURS PRN
Status: DISCONTINUED | OUTPATIENT
Start: 2019-12-18 | End: 2020-01-01 | Stop reason: HOSPADM

## 2019-12-18 RX ORDER — SODIUM CHLORIDE 0.9 % (FLUSH) 0.9 %
10 SYRINGE (ML) INJECTION AS NEEDED
Status: DISCONTINUED | OUTPATIENT
Start: 2019-12-18 | End: 2020-01-01 | Stop reason: HOSPADM

## 2019-12-18 RX ORDER — VALSARTAN AND HYDROCHLOROTHIAZIDE 160; 25 MG/1; MG/1
1 TABLET ORAL DAILY
COMMUNITY
End: 2020-01-01 | Stop reason: HOSPADM

## 2019-12-18 RX ORDER — FUROSEMIDE 10 MG/ML
40 INJECTION INTRAMUSCULAR; INTRAVENOUS ONCE
Status: DISCONTINUED | OUTPATIENT
Start: 2019-12-18 | End: 2019-12-18

## 2019-12-18 RX ORDER — ACETAMINOPHEN 160 MG/5ML
650 SOLUTION ORAL EVERY 4 HOURS PRN
Status: DISCONTINUED | OUTPATIENT
Start: 2019-12-18 | End: 2020-01-01 | Stop reason: HOSPADM

## 2019-12-18 RX ORDER — FUROSEMIDE 10 MG/ML
40 INJECTION INTRAMUSCULAR; INTRAVENOUS ONCE
Status: COMPLETED | OUTPATIENT
Start: 2019-12-18 | End: 2019-12-18

## 2019-12-18 RX ORDER — CLOPIDOGREL BISULFATE 75 MG/1
75 TABLET ORAL DAILY
COMMUNITY
End: 2020-01-01 | Stop reason: HOSPADM

## 2019-12-18 RX ORDER — NICOTINE POLACRILEX 4 MG
15 LOZENGE BUCCAL
Status: DISCONTINUED | OUTPATIENT
Start: 2019-12-18 | End: 2020-01-01 | Stop reason: HOSPADM

## 2019-12-18 RX ORDER — LOPERAMIDE HYDROCHLORIDE 2 MG/1
6 CAPSULE ORAL DAILY PRN
COMMUNITY
End: 2020-01-01 | Stop reason: HOSPADM

## 2019-12-18 RX ORDER — ONDANSETRON 4 MG/1
4 TABLET, FILM COATED ORAL EVERY 6 HOURS PRN
Status: DISCONTINUED | OUTPATIENT
Start: 2019-12-18 | End: 2020-01-01 | Stop reason: HOSPADM

## 2019-12-18 RX ORDER — PANTOPRAZOLE SODIUM 40 MG/10ML
80 INJECTION, POWDER, LYOPHILIZED, FOR SOLUTION INTRAVENOUS ONCE
Status: COMPLETED | OUTPATIENT
Start: 2019-12-18 | End: 2019-12-18

## 2019-12-18 RX ORDER — PANTOPRAZOLE SODIUM 40 MG/1
40 TABLET, DELAYED RELEASE ORAL 2 TIMES DAILY
COMMUNITY
End: 2020-01-01

## 2019-12-18 RX ORDER — GABAPENTIN 300 MG/1
1200 CAPSULE ORAL
Status: ON HOLD | COMMUNITY
End: 2020-01-01 | Stop reason: SDUPTHER

## 2019-12-18 RX ORDER — DEXTROSE MONOHYDRATE 25 G/50ML
25 INJECTION, SOLUTION INTRAVENOUS
Status: DISCONTINUED | OUTPATIENT
Start: 2019-12-18 | End: 2020-01-01 | Stop reason: HOSPADM

## 2019-12-18 RX ORDER — SODIUM CHLORIDE 9 MG/ML
125 INJECTION, SOLUTION INTRAVENOUS CONTINUOUS
Status: DISCONTINUED | OUTPATIENT
Start: 2019-12-18 | End: 2019-12-18

## 2019-12-18 RX ORDER — CALCIUM CARBONATE 200(500)MG
2 TABLET,CHEWABLE ORAL 2 TIMES DAILY PRN
Status: DISCONTINUED | OUTPATIENT
Start: 2019-12-18 | End: 2020-01-01 | Stop reason: HOSPADM

## 2019-12-18 RX ORDER — BISACODYL 5 MG/1
5 TABLET, DELAYED RELEASE ORAL DAILY PRN
Status: DISCONTINUED | OUTPATIENT
Start: 2019-12-18 | End: 2020-01-01 | Stop reason: HOSPADM

## 2019-12-18 RX ORDER — ACETAMINOPHEN 325 MG/1
650 TABLET ORAL EVERY 4 HOURS PRN
Status: DISCONTINUED | OUTPATIENT
Start: 2019-12-18 | End: 2020-01-01 | Stop reason: HOSPADM

## 2019-12-18 RX ADMIN — SODIUM CHLORIDE 125 ML/HR: 9 INJECTION, SOLUTION INTRAVENOUS at 21:16

## 2019-12-18 RX ADMIN — SODIUM CHLORIDE 8 MG/HR: 900 INJECTION INTRAVENOUS at 21:50

## 2019-12-18 RX ADMIN — PANTOPRAZOLE SODIUM 80 MG: 40 INJECTION, POWDER, FOR SOLUTION INTRAVENOUS at 21:14

## 2019-12-18 RX ADMIN — SODIUM CHLORIDE, PRESERVATIVE FREE 10 ML: 5 INJECTION INTRAVENOUS at 19:14

## 2019-12-18 RX ADMIN — FUROSEMIDE 40 MG: 40 INJECTION, SOLUTION INTRAMUSCULAR; INTRAVENOUS at 23:34

## 2019-12-18 RX ADMIN — ACETAMINOPHEN 650 MG: 325 TABLET, FILM COATED ORAL at 22:52

## 2019-12-18 NOTE — TELEPHONE ENCOUNTER
I entered labs for pt to come in and have done today. She is very fatigued and worried her HGB is low again. She saw Jodi on 12/16/19 for anxiety.     Pt's son asked if we would send in results to Dr. López.

## 2019-12-19 ENCOUNTER — APPOINTMENT (OUTPATIENT)
Dept: CARDIOLOGY | Facility: HOSPITAL | Age: 69
End: 2019-12-19

## 2019-12-19 PROBLEM — I50.23 ACUTE ON CHRONIC SYSTOLIC CHF (CONGESTIVE HEART FAILURE) (HCC): Status: ACTIVE | Noted: 2019-12-19

## 2019-12-19 PROBLEM — I21.4 NSTEMI (NON-ST ELEVATED MYOCARDIAL INFARCTION) (HCC): Status: ACTIVE | Noted: 2019-12-19

## 2019-12-19 LAB
ABO + RH BLD: NORMAL
ABO + RH BLD: NORMAL
ALBUMIN SERPL ELPH-MCNC: 2.8 G/DL (ref 2.9–4.4)
ALBUMIN SERPL-MCNC: 3 G/DL (ref 3.5–4.8)
ALBUMIN/CREAT UR: 1336.2 MG/G CREAT (ref 0–30)
ALBUMIN/GLOB SERPL: 0.8 {RATIO} (ref 0.7–1.7)
ALBUMIN/GLOB SERPL: 0.9 {RATIO} (ref 1.2–2.2)
ALP SERPL-CCNC: 59 IU/L (ref 39–117)
ALPHA1 GLOB SERPL ELPH-MCNC: 0.3 G/DL (ref 0–0.4)
ALPHA2 GLOB SERPL ELPH-MCNC: 1.1 G/DL (ref 0.4–1)
ALT SERPL-CCNC: 26 IU/L (ref 0–32)
ANION GAP SERPL CALCULATED.3IONS-SCNC: 16.6 MMOL/L (ref 5–15)
AORTIC DIMENSIONLESS INDEX: 0.6 (DI)
AST SERPL-CCNC: 18 IU/L (ref 0–40)
B-GLOBULIN SERPL ELPH-MCNC: 0.9 G/DL (ref 0.7–1.3)
BASOPHILS # BLD AUTO: 0 X10E3/UL (ref 0–0.2)
BASOPHILS NFR BLD AUTO: 0 %
BH BB BLOOD EXPIRATION DATE: NORMAL
BH BB BLOOD EXPIRATION DATE: NORMAL
BH BB BLOOD TYPE BARCODE: 6200
BH BB BLOOD TYPE BARCODE: 6200
BH BB DISPENSE STATUS: NORMAL
BH BB DISPENSE STATUS: NORMAL
BH BB PRODUCT CODE: NORMAL
BH BB PRODUCT CODE: NORMAL
BH BB UNIT NUMBER: NORMAL
BH BB UNIT NUMBER: NORMAL
BH CV ECHO MEAS - ACS: 1.4 CM
BH CV ECHO MEAS - AO MAX PG: 12.5 MMHG
BH CV ECHO MEAS - AO MEAN PG (FULL): 5 MMHG
BH CV ECHO MEAS - AO MEAN PG: 7 MMHG
BH CV ECHO MEAS - AO ROOT AREA (BSA CORRECTED): 1.3
BH CV ECHO MEAS - AO ROOT AREA: 4.5 CM^2
BH CV ECHO MEAS - AO ROOT DIAM: 2.4 CM
BH CV ECHO MEAS - AO V2 MAX: 176 CM/SEC
BH CV ECHO MEAS - AO V2 MEAN: 128 CM/SEC
BH CV ECHO MEAS - AO V2 VTI: 41.4 CM
BH CV ECHO MEAS - AVA(I,A): 1.3 CM^2
BH CV ECHO MEAS - AVA(I,D): 1.3 CM^2
BH CV ECHO MEAS - BSA(HAYCOCK): 2 M^2
BH CV ECHO MEAS - BSA: 1.9 M^2
BH CV ECHO MEAS - BZI_BMI: 40.3 KILOGRAMS/M^2
BH CV ECHO MEAS - BZI_METRIC_HEIGHT: 152 CM
BH CV ECHO MEAS - BZI_METRIC_WEIGHT: 93 KG
BH CV ECHO MEAS - EDV(CUBED): 157.5 ML
BH CV ECHO MEAS - EDV(MOD-SP2): 128 ML
BH CV ECHO MEAS - EDV(MOD-SP4): 82 ML
BH CV ECHO MEAS - EDV(TEICH): 141.3 ML
BH CV ECHO MEAS - EF(CUBED): 52.9 %
BH CV ECHO MEAS - EF(MOD-BP): 45 %
BH CV ECHO MEAS - EF(MOD-SP2): 50 %
BH CV ECHO MEAS - EF(MOD-SP4): 39 %
BH CV ECHO MEAS - EF(TEICH): 44.4 %
BH CV ECHO MEAS - ESV(CUBED): 74.1 ML
BH CV ECHO MEAS - ESV(MOD-SP2): 64 ML
BH CV ECHO MEAS - ESV(MOD-SP4): 50 ML
BH CV ECHO MEAS - ESV(TEICH): 78.6 ML
BH CV ECHO MEAS - FS: 22.2 %
BH CV ECHO MEAS - IVS/LVPW: 1.1
BH CV ECHO MEAS - IVSD: 1.1 CM
BH CV ECHO MEAS - LAT PEAK E' VEL: 6.4 CM/SEC
BH CV ECHO MEAS - LV DIASTOLIC VOL/BSA (35-75): 43.6 ML/M^2
BH CV ECHO MEAS - LV MASS(C)D: 220.6 GRAMS
BH CV ECHO MEAS - LV MASS(C)DI: 117.2 GRAMS/M^2
BH CV ECHO MEAS - LV MAX PG: 4.3 MMHG
BH CV ECHO MEAS - LV MEAN PG: 2 MMHG
BH CV ECHO MEAS - LV SYSTOLIC VOL/BSA (12-30): 26.6 ML/M^2
BH CV ECHO MEAS - LV V1 MAX: 103 CM/SEC
BH CV ECHO MEAS - LV V1 MEAN: 66.3 CM/SEC
BH CV ECHO MEAS - LV V1 VTI: 24.4 CM
BH CV ECHO MEAS - LVIDD: 5.4 CM
BH CV ECHO MEAS - LVIDS: 4.2 CM
BH CV ECHO MEAS - LVLD AP2: 8.2 CM
BH CV ECHO MEAS - LVLD AP4: 8 CM
BH CV ECHO MEAS - LVLS AP2: 7.5 CM
BH CV ECHO MEAS - LVLS AP4: 7.1 CM
BH CV ECHO MEAS - LVOT AREA (M): 2.3 CM^2
BH CV ECHO MEAS - LVOT AREA: 2.3 CM^2
BH CV ECHO MEAS - LVOT DIAM: 1.7 CM
BH CV ECHO MEAS - LVPWD: 1 CM
BH CV ECHO MEAS - MED PEAK E' VEL: 7.63 CM/SEC
BH CV ECHO MEAS - MV A DUR: 158 SEC
BH CV ECHO MEAS - MV A MAX VEL: 69.1 CM/SEC
BH CV ECHO MEAS - MV DEC SLOPE: 779 CM/SEC^2
BH CV ECHO MEAS - MV DEC TIME: 141 SEC
BH CV ECHO MEAS - MV E MAX VEL: 144 CM/SEC
BH CV ECHO MEAS - MV E/A: 2.1
BH CV ECHO MEAS - MV MEAN PG: 4 MMHG
BH CV ECHO MEAS - MV P1/2T MAX VEL: 174 CM/SEC
BH CV ECHO MEAS - MV P1/2T: 65.4 MSEC
BH CV ECHO MEAS - MV V2 MEAN: 92 CM/SEC
BH CV ECHO MEAS - MV V2 VTI: 37.3 CM
BH CV ECHO MEAS - MVA P1/2T LCG: 1.3 CM^2
BH CV ECHO MEAS - MVA(P1/2T): 3.4 CM^2
BH CV ECHO MEAS - MVA(VTI): 1.5 CM^2
BH CV ECHO MEAS - PA ACC SLOPE: 891 CM/SEC^2
BH CV ECHO MEAS - PA ACC TIME: 0.1 SEC
BH CV ECHO MEAS - PA MAX PG: 8.3 MMHG
BH CV ECHO MEAS - PA PR(ACCEL): 34.5 MMHG
BH CV ECHO MEAS - PA V2 MAX: 144 CM/SEC
BH CV ECHO MEAS - PULM A REVS DUR: 0.12 SEC
BH CV ECHO MEAS - PULM A REVS VEL: 29 CM/SEC
BH CV ECHO MEAS - PULM DIAS VEL: 77.1 CM/SEC
BH CV ECHO MEAS - PULM S/D: 0.61
BH CV ECHO MEAS - PULM SYS VEL: 46.8 CM/SEC
BH CV ECHO MEAS - QP/QS: 1.7
BH CV ECHO MEAS - RAP SYSTOLE: 8 MMHG
BH CV ECHO MEAS - RV MEAN PG: 1 MMHG
BH CV ECHO MEAS - RV V1 MEAN: 49.1 CM/SEC
BH CV ECHO MEAS - RV V1 VTI: 17.3 CM
BH CV ECHO MEAS - RVOT AREA: 5.3 CM^2
BH CV ECHO MEAS - RVOT DIAM: 2.6 CM
BH CV ECHO MEAS - RVSP: 82.3 MMHG
BH CV ECHO MEAS - SI(AO): 99.5 ML/M^2
BH CV ECHO MEAS - SI(CUBED): 44.3 ML/M^2
BH CV ECHO MEAS - SI(LVOT): 29.4 ML/M^2
BH CV ECHO MEAS - SI(MOD-SP2): 34 ML/M^2
BH CV ECHO MEAS - SI(MOD-SP4): 17 ML/M^2
BH CV ECHO MEAS - SI(TEICH): 33.3 ML/M^2
BH CV ECHO MEAS - SV(AO): 187.3 ML
BH CV ECHO MEAS - SV(CUBED): 83.4 ML
BH CV ECHO MEAS - SV(LVOT): 55.4 ML
BH CV ECHO MEAS - SV(MOD-SP2): 64 ML
BH CV ECHO MEAS - SV(MOD-SP4): 32 ML
BH CV ECHO MEAS - SV(RVOT): 91.9 ML
BH CV ECHO MEAS - SV(TEICH): 62.7 ML
BH CV ECHO MEAS - TAPSE (>1.6): 3 CM2
BH CV ECHO MEAS - TR MAX PG: 57
BH CV ECHO MEAS - TR MAX VEL: 431 CM/SEC
BH CV ECHO MEASUREMENTS AVERAGE E/E' RATIO: 20.53
BH CV XLRA - RV BASE: 3.8 CM
BH CV XLRA - TDI S': 11.5 CM/SEC
BILIRUB SERPL-MCNC: 0.2 MG/DL (ref 0–1.2)
BUN BLD-MCNC: 92 MG/DL (ref 8–23)
BUN SERPL-MCNC: 98 MG/DL (ref 8–27)
BUN/CREAT SERPL: 29.7 (ref 7–25)
BUN/CREAT SERPL: 31 (ref 12–28)
CALCIUM SERPL-MCNC: 8.8 MG/DL (ref 8.7–10.3)
CALCIUM SPEC-SCNC: 8.9 MG/DL (ref 8.6–10.5)
CHLORIDE SERPL-SCNC: 103 MMOL/L (ref 98–107)
CHLORIDE SERPL-SCNC: 104 MMOL/L (ref 96–106)
CO2 SERPL-SCNC: 20.4 MMOL/L (ref 22–29)
CO2 SERPL-SCNC: 23 MMOL/L (ref 20–29)
CREAT BLD-MCNC: 3.1 MG/DL (ref 0.57–1)
CREAT SERPL-MCNC: 3.16 MG/DL (ref 0.57–1)
CREAT UR-MCNC: 74 MG/DL
DEPRECATED RDW RBC AUTO: 46.5 FL (ref 37–54)
DEPRECATED RDW RBC AUTO: 48.9 FL (ref 37–54)
EOSINOPHIL # BLD AUTO: 0.3 X10E3/UL (ref 0–0.4)
EOSINOPHIL NFR BLD AUTO: 3 %
ERYTHROCYTE [DISTWIDTH] IN BLOOD BY AUTOMATED COUNT: 14.7 % (ref 12.3–15.4)
ERYTHROCYTE [DISTWIDTH] IN BLOOD BY AUTOMATED COUNT: 15 % (ref 12.3–15.4)
ERYTHROCYTE [DISTWIDTH] IN BLOOD BY AUTOMATED COUNT: 16.5 % (ref 12.3–15.4)
FOLATE SERPL-MCNC: 15 NG/ML
GAMMA GLOB SERPL ELPH-MCNC: 1 G/DL (ref 0.4–1.8)
GFR SERPL CREATININE-BSD FRML MDRD: 15 ML/MIN/1.73
GLOBULIN SER CALC-MCNC: 3.2 G/DL (ref 1.5–4.5)
GLOBULIN SER CALC-MCNC: 3.4 G/DL (ref 2.2–3.9)
GLUCOSE BLD-MCNC: 145 MG/DL (ref 65–99)
GLUCOSE BLDC GLUCOMTR-MCNC: 154 MG/DL (ref 70–130)
GLUCOSE BLDC GLUCOMTR-MCNC: 158 MG/DL (ref 70–130)
GLUCOSE BLDC GLUCOMTR-MCNC: 169 MG/DL (ref 70–130)
GLUCOSE BLDC GLUCOMTR-MCNC: 204 MG/DL (ref 70–130)
GLUCOSE BLDC GLUCOMTR-MCNC: 248 MG/DL (ref 70–130)
GLUCOSE SERPL-MCNC: 56 MG/DL (ref 65–99)
HBA1C MFR BLD: 6.6 % (ref 4.8–5.6)
HBA1C MFR BLD: 6.6 % (ref 4.8–5.6)
HCT VFR BLD AUTO: 20.4 % (ref 34–46.6)
HCT VFR BLD AUTO: 25.7 % (ref 34–46.6)
HCT VFR BLD AUTO: 25.9 % (ref 34–46.6)
HGB BLD-MCNC: 6.6 G/DL (ref 11.1–15.9)
HGB BLD-MCNC: 8.1 G/DL (ref 12–15.9)
HGB BLD-MCNC: 8.4 G/DL (ref 12–15.9)
IMM GRANULOCYTES # BLD AUTO: 0 X10E3/UL (ref 0–0.1)
IMM GRANULOCYTES NFR BLD AUTO: 0 %
IRON SATN MFR SERPL: 13 % (ref 15–55)
IRON SERPL-MCNC: 37 UG/DL (ref 27–139)
LABORATORY COMMENT REPORT: ABNORMAL
LEFT ATRIUM VOLUME INDEX: 36.7 ML/M2
LYMPHOCYTES # BLD AUTO: 3 X10E3/UL (ref 0.7–3.1)
LYMPHOCYTES NFR BLD AUTO: 22 %
M PROTEIN SERPL ELPH-MCNC: ABNORMAL G/DL
MCH RBC QN AUTO: 27.9 PG (ref 26.6–33)
MCH RBC QN AUTO: 28.1 PG (ref 26.6–33)
MCH RBC QN AUTO: 28.6 PG (ref 26.6–33)
MCHC RBC AUTO-ENTMCNC: 31.5 G/DL (ref 31.5–35.7)
MCHC RBC AUTO-ENTMCNC: 32.4 G/DL (ref 31.5–35.7)
MCHC RBC AUTO-ENTMCNC: 32.4 G/DL (ref 31.5–35.7)
MCV RBC AUTO: 87 FL (ref 79–97)
MCV RBC AUTO: 88.1 FL (ref 79–97)
MCV RBC AUTO: 88.6 FL (ref 79–97)
MICROALBUMIN UR-MCNC: 988.8 UG/ML
MONOCYTES # BLD AUTO: 0.7 X10E3/UL (ref 0.1–0.9)
MONOCYTES NFR BLD AUTO: 5 %
NEUTROPHILS # BLD AUTO: 9.6 X10E3/UL (ref 1.4–7)
NEUTROPHILS NFR BLD AUTO: 70 %
PLATELET # BLD AUTO: 207 10*3/MM3 (ref 140–450)
PLATELET # BLD AUTO: 212 10*3/MM3 (ref 140–450)
PLATELET # BLD AUTO: 246 X10E3/UL (ref 150–450)
PMV BLD AUTO: 11.2 FL (ref 6–12)
PMV BLD AUTO: 11.3 FL (ref 6–12)
POTASSIUM BLD-SCNC: 4.2 MMOL/L (ref 3.5–5.2)
POTASSIUM SERPL-SCNC: 4.3 MMOL/L (ref 3.5–5.2)
PROT PATTERN SERPL ELPH-IMP: ABNORMAL
PROT SERPL-MCNC: 6.2 G/DL (ref 6–8.5)
RBC # BLD AUTO: 2.35 X10E6/UL (ref 3.77–5.28)
RBC # BLD AUTO: 2.9 10*6/MM3 (ref 3.77–5.28)
RBC # BLD AUTO: 2.94 10*6/MM3 (ref 3.77–5.28)
SODIUM BLD-SCNC: 140 MMOL/L (ref 136–145)
SODIUM SERPL-SCNC: 145 MMOL/L (ref 134–144)
TIBC SERPL-MCNC: 283 UG/DL (ref 250–450)
TROPONIN T SERPL-MCNC: 0.2 NG/ML (ref 0–0.03)
TROPONIN T SERPL-MCNC: 0.24 NG/ML (ref 0–0.03)
TROPONIN T SERPL-MCNC: 0.27 NG/ML (ref 0–0.03)
UIBC SERPL-MCNC: 246 UG/DL (ref 118–369)
UNIT  ABO: NORMAL
UNIT  ABO: NORMAL
UNIT  RH: NORMAL
UNIT  RH: NORMAL
URATE SERPL-MCNC: 10.6 MG/DL (ref 2.4–5.7)
WBC # BLD AUTO: 13.7 X10E3/UL (ref 3.4–10.8)
WBC NRBC COR # BLD: 14.38 10*3/MM3 (ref 3.4–10.8)
WBC NRBC COR # BLD: 14.84 10*3/MM3 (ref 3.4–10.8)

## 2019-12-19 PROCEDURE — 84484 ASSAY OF TROPONIN QUANT: CPT | Performed by: NURSE PRACTITIONER

## 2019-12-19 PROCEDURE — 99222 1ST HOSP IP/OBS MODERATE 55: CPT | Performed by: INTERNAL MEDICINE

## 2019-12-19 PROCEDURE — 63710000001 INSULIN GLARGINE PER 5 UNITS: Performed by: NURSE PRACTITIONER

## 2019-12-19 PROCEDURE — 93306 TTE W/DOPPLER COMPLETE: CPT

## 2019-12-19 PROCEDURE — 82962 GLUCOSE BLOOD TEST: CPT

## 2019-12-19 PROCEDURE — 25010000002 PERFLUTREN (DEFINITY) 8.476 MG IN SODIUM CHLORIDE 0.9 % 10 ML INJECTION: Performed by: INTERNAL MEDICINE

## 2019-12-19 PROCEDURE — 93010 ELECTROCARDIOGRAM REPORT: CPT | Performed by: INTERNAL MEDICINE

## 2019-12-19 PROCEDURE — 84550 ASSAY OF BLOOD/URIC ACID: CPT | Performed by: HOSPITALIST

## 2019-12-19 PROCEDURE — 93306 TTE W/DOPPLER COMPLETE: CPT | Performed by: INTERNAL MEDICINE

## 2019-12-19 PROCEDURE — 94799 UNLISTED PULMONARY SVC/PX: CPT

## 2019-12-19 PROCEDURE — 85027 COMPLETE CBC AUTOMATED: CPT | Performed by: INTERNAL MEDICINE

## 2019-12-19 PROCEDURE — 25010000002 ONDANSETRON PER 1 MG: Performed by: NURSE PRACTITIONER

## 2019-12-19 PROCEDURE — 93005 ELECTROCARDIOGRAM TRACING: CPT | Performed by: NURSE PRACTITIONER

## 2019-12-19 PROCEDURE — 25010000002 FUROSEMIDE PER 20 MG: Performed by: INTERNAL MEDICINE

## 2019-12-19 PROCEDURE — 83036 HEMOGLOBIN GLYCOSYLATED A1C: CPT | Performed by: NURSE PRACTITIONER

## 2019-12-19 PROCEDURE — 80048 BASIC METABOLIC PNL TOTAL CA: CPT | Performed by: NURSE PRACTITIONER

## 2019-12-19 PROCEDURE — 85027 COMPLETE CBC AUTOMATED: CPT | Performed by: NURSE PRACTITIONER

## 2019-12-19 PROCEDURE — 63710000001 INSULIN LISPRO (HUMAN) PER 5 UNITS: Performed by: NURSE PRACTITIONER

## 2019-12-19 RX ORDER — LIDOCAINE HYDROCHLORIDE 20 MG/ML
JELLY TOPICAL ONCE
Status: DISCONTINUED | OUTPATIENT
Start: 2019-12-19 | End: 2019-01-01

## 2019-12-19 RX ORDER — DOCUSATE SODIUM 100 MG/1
100 CAPSULE, LIQUID FILLED ORAL DAILY
Status: DISCONTINUED | OUTPATIENT
Start: 2019-12-19 | End: 2019-12-19

## 2019-12-19 RX ORDER — INSULIN GLARGINE 100 [IU]/ML
50 INJECTION, SOLUTION SUBCUTANEOUS NIGHTLY
Status: DISCONTINUED | OUTPATIENT
Start: 2019-12-19 | End: 2019-01-01

## 2019-12-19 RX ORDER — ATORVASTATIN CALCIUM 80 MG/1
80 TABLET, FILM COATED ORAL DAILY
Status: DISCONTINUED | OUTPATIENT
Start: 2019-12-19 | End: 2020-01-01 | Stop reason: HOSPADM

## 2019-12-19 RX ORDER — IPRATROPIUM BROMIDE AND ALBUTEROL SULFATE 2.5; .5 MG/3ML; MG/3ML
3 SOLUTION RESPIRATORY (INHALATION) EVERY 4 HOURS PRN
Status: DISCONTINUED | OUTPATIENT
Start: 2019-12-19 | End: 2020-01-01 | Stop reason: HOSPADM

## 2019-12-19 RX ORDER — SUCRALFATE 1 G/1
1 TABLET ORAL 4 TIMES DAILY
Status: DISCONTINUED | OUTPATIENT
Start: 2019-12-19 | End: 2020-01-01 | Stop reason: HOSPADM

## 2019-12-19 RX ORDER — OXYCODONE AND ACETAMINOPHEN 10; 325 MG/1; MG/1
1 TABLET ORAL ONCE AS NEEDED
Status: COMPLETED | OUTPATIENT
Start: 2019-12-19 | End: 2019-12-19

## 2019-12-19 RX ORDER — CHOLECALCIFEROL (VITAMIN D3) 125 MCG
5 CAPSULE ORAL NIGHTLY
Status: DISCONTINUED | OUTPATIENT
Start: 2019-12-19 | End: 2020-01-01 | Stop reason: HOSPADM

## 2019-12-19 RX ORDER — DICYCLOMINE HYDROCHLORIDE 10 MG/1
20 CAPSULE ORAL 3 TIMES DAILY
Status: DISCONTINUED | OUTPATIENT
Start: 2019-12-19 | End: 2019-01-01

## 2019-12-19 RX ADMIN — Medication 5 MG: at 20:54

## 2019-12-19 RX ADMIN — SODIUM CHLORIDE 8 MG/HR: 900 INJECTION INTRAVENOUS at 02:25

## 2019-12-19 RX ADMIN — SUCRALFATE 1 G: 1 TABLET ORAL at 11:32

## 2019-12-19 RX ADMIN — INSULIN LISPRO 2 UNITS: 100 INJECTION, SOLUTION INTRAVENOUS; SUBCUTANEOUS at 21:34

## 2019-12-19 RX ADMIN — ATORVASTATIN CALCIUM 80 MG: 80 TABLET, FILM COATED ORAL at 08:05

## 2019-12-19 RX ADMIN — SUCRALFATE 1 G: 1 TABLET ORAL at 17:06

## 2019-12-19 RX ADMIN — SODIUM CHLORIDE, PRESERVATIVE FREE 10 ML: 5 INJECTION INTRAVENOUS at 00:55

## 2019-12-19 RX ADMIN — SODIUM CHLORIDE 8 MG/HR: 900 INJECTION INTRAVENOUS at 22:03

## 2019-12-19 RX ADMIN — INSULIN LISPRO 4 UNITS: 100 INJECTION, SOLUTION INTRAVENOUS; SUBCUTANEOUS at 17:05

## 2019-12-19 RX ADMIN — SUCRALFATE 1 G: 1 TABLET ORAL at 08:05

## 2019-12-19 RX ADMIN — DICYCLOMINE HYDROCHLORIDE 20 MG: 10 CAPSULE ORAL at 08:05

## 2019-12-19 RX ADMIN — SODIUM CHLORIDE, PRESERVATIVE FREE 10 ML: 5 INJECTION INTRAVENOUS at 08:05

## 2019-12-19 RX ADMIN — FUROSEMIDE 5 MG/HR: 10 INJECTION, SOLUTION INTRAMUSCULAR; INTRAVENOUS at 11:32

## 2019-12-19 RX ADMIN — PERFLUTREN 2 ML: 6.52 INJECTION, SUSPENSION INTRAVENOUS at 10:35

## 2019-12-19 RX ADMIN — DICYCLOMINE HYDROCHLORIDE 20 MG: 10 CAPSULE ORAL at 16:01

## 2019-12-19 RX ADMIN — INSULIN LISPRO 2 UNITS: 100 INJECTION, SOLUTION INTRAVENOUS; SUBCUTANEOUS at 01:01

## 2019-12-19 RX ADMIN — IPRATROPIUM BROMIDE AND ALBUTEROL SULFATE 3 ML: 2.5; .5 SOLUTION RESPIRATORY (INHALATION) at 07:31

## 2019-12-19 RX ADMIN — SODIUM CHLORIDE 8 MG/HR: 900 INJECTION INTRAVENOUS at 07:21

## 2019-12-19 RX ADMIN — DOCUSATE SODIUM 100 MG: 100 CAPSULE, LIQUID FILLED ORAL at 08:05

## 2019-12-19 RX ADMIN — SUCRALFATE 1 G: 1 TABLET ORAL at 20:55

## 2019-12-19 RX ADMIN — OXYCODONE HYDROCHLORIDE AND ACETAMINOPHEN 1 TABLET: 10; 325 TABLET ORAL at 05:23

## 2019-12-19 RX ADMIN — INSULIN LISPRO 2 UNITS: 100 INJECTION, SOLUTION INTRAVENOUS; SUBCUTANEOUS at 07:56

## 2019-12-19 RX ADMIN — ONDANSETRON 4 MG: 2 INJECTION INTRAMUSCULAR; INTRAVENOUS at 17:00

## 2019-12-19 RX ADMIN — INSULIN LISPRO 4 UNITS: 100 INJECTION, SOLUTION INTRAVENOUS; SUBCUTANEOUS at 11:36

## 2019-12-19 RX ADMIN — DICYCLOMINE HYDROCHLORIDE 20 MG: 10 CAPSULE ORAL at 20:54

## 2019-12-19 RX ADMIN — INSULIN GLARGINE 50 UNITS: 100 INJECTION, SOLUTION SUBCUTANEOUS at 21:33

## 2019-12-20 LAB
ALBUMIN SERPL-MCNC: 3 G/DL (ref 3.5–5.2)
ANION GAP SERPL CALCULATED.3IONS-SCNC: 18.6 MMOL/L (ref 5–15)
BUN BLD-MCNC: 85 MG/DL (ref 8–23)
BUN/CREAT SERPL: 30.4 (ref 7–25)
CALCIUM SPEC-SCNC: 9 MG/DL (ref 8.6–10.5)
CHLORIDE SERPL-SCNC: 102 MMOL/L (ref 98–107)
CO2 SERPL-SCNC: 22.4 MMOL/L (ref 22–29)
CREAT BLD-MCNC: 2.8 MG/DL (ref 0.57–1)
DEPRECATED RDW RBC AUTO: 46.7 FL (ref 37–54)
DEPRECATED RDW RBC AUTO: 48.7 FL (ref 37–54)
ERYTHROCYTE [DISTWIDTH] IN BLOOD BY AUTOMATED COUNT: 15.1 % (ref 12.3–15.4)
ERYTHROCYTE [DISTWIDTH] IN BLOOD BY AUTOMATED COUNT: 15.3 % (ref 12.3–15.4)
FERRITIN SERPL-MCNC: 119 NG/ML (ref 13–150)
GFR SERPL CREATININE-BSD FRML MDRD: 17 ML/MIN/1.73
GLUCOSE BLD-MCNC: 121 MG/DL (ref 65–99)
GLUCOSE BLDC GLUCOMTR-MCNC: 126 MG/DL (ref 70–130)
GLUCOSE BLDC GLUCOMTR-MCNC: 128 MG/DL (ref 70–130)
GLUCOSE BLDC GLUCOMTR-MCNC: 149 MG/DL (ref 70–130)
GLUCOSE BLDC GLUCOMTR-MCNC: 151 MG/DL (ref 70–130)
HCT VFR BLD AUTO: 24.8 % (ref 34–46.6)
HCT VFR BLD AUTO: 25.8 % (ref 34–46.6)
HCT VFR BLD AUTO: 25.9 % (ref 34–46.6)
HGB BLD-MCNC: 8.2 G/DL (ref 12–15.9)
HGB BLD-MCNC: 8.3 G/DL (ref 12–15.9)
HGB BLD-MCNC: 8.4 G/DL (ref 12–15.9)
IRON 24H UR-MRATE: 25 MCG/DL (ref 37–145)
IRON SATN MFR SERPL: 8 % (ref 20–50)
MCH RBC QN AUTO: 27.9 PG (ref 26.6–33)
MCH RBC QN AUTO: 28.4 PG (ref 26.6–33)
MCHC RBC AUTO-ENTMCNC: 32.2 G/DL (ref 31.5–35.7)
MCHC RBC AUTO-ENTMCNC: 32.4 G/DL (ref 31.5–35.7)
MCV RBC AUTO: 86.6 FL (ref 79–97)
MCV RBC AUTO: 87.5 FL (ref 79–97)
PHOSPHATE SERPL-MCNC: 5.7 MG/DL (ref 2.5–4.5)
PLATELET # BLD AUTO: 160 10*3/MM3 (ref 140–450)
PLATELET # BLD AUTO: 214 10*3/MM3 (ref 140–450)
PMV BLD AUTO: 10.9 FL (ref 6–12)
PMV BLD AUTO: 11.1 FL (ref 6–12)
POTASSIUM BLD-SCNC: 4.3 MMOL/L (ref 3.5–5.2)
RBC # BLD AUTO: 2.96 10*6/MM3 (ref 3.77–5.28)
RBC # BLD AUTO: 2.98 10*6/MM3 (ref 3.77–5.28)
SODIUM BLD-SCNC: 143 MMOL/L (ref 136–145)
TIBC SERPL-MCNC: 332 MCG/DL (ref 298–536)
TRANSFERRIN SERPL-MCNC: 223 MG/DL (ref 200–360)
URATE SERPL-MCNC: 10.6 MG/DL (ref 2.4–5.7)
WBC NRBC COR # BLD: 14.77 10*3/MM3 (ref 3.4–10.8)
WBC NRBC COR # BLD: 15.11 10*3/MM3 (ref 3.4–10.8)

## 2019-12-20 PROCEDURE — 94799 UNLISTED PULMONARY SVC/PX: CPT

## 2019-12-20 PROCEDURE — 85014 HEMATOCRIT: CPT | Performed by: HOSPITALIST

## 2019-12-20 PROCEDURE — 99232 SBSQ HOSP IP/OBS MODERATE 35: CPT | Performed by: INTERNAL MEDICINE

## 2019-12-20 PROCEDURE — 84466 ASSAY OF TRANSFERRIN: CPT | Performed by: INTERNAL MEDICINE

## 2019-12-20 PROCEDURE — 63710000001 INSULIN LISPRO (HUMAN) PER 5 UNITS: Performed by: NURSE PRACTITIONER

## 2019-12-20 PROCEDURE — 82728 ASSAY OF FERRITIN: CPT | Performed by: INTERNAL MEDICINE

## 2019-12-20 PROCEDURE — 97162 PT EVAL MOD COMPLEX 30 MIN: CPT

## 2019-12-20 PROCEDURE — 83540 ASSAY OF IRON: CPT | Performed by: INTERNAL MEDICINE

## 2019-12-20 PROCEDURE — 85027 COMPLETE CBC AUTOMATED: CPT | Performed by: INTERNAL MEDICINE

## 2019-12-20 PROCEDURE — 97110 THERAPEUTIC EXERCISES: CPT

## 2019-12-20 PROCEDURE — 25010000002 ONDANSETRON PER 1 MG: Performed by: NURSE PRACTITIONER

## 2019-12-20 PROCEDURE — 80069 RENAL FUNCTION PANEL: CPT | Performed by: INTERNAL MEDICINE

## 2019-12-20 PROCEDURE — 85018 HEMOGLOBIN: CPT | Performed by: HOSPITALIST

## 2019-12-20 PROCEDURE — 82962 GLUCOSE BLOOD TEST: CPT

## 2019-12-20 PROCEDURE — 25010000002 FUROSEMIDE PER 20 MG: Performed by: INTERNAL MEDICINE

## 2019-12-20 PROCEDURE — 63710000001 INSULIN GLARGINE PER 5 UNITS: Performed by: NURSE PRACTITIONER

## 2019-12-20 PROCEDURE — 84550 ASSAY OF BLOOD/URIC ACID: CPT | Performed by: HOSPITALIST

## 2019-12-20 RX ADMIN — SUCRALFATE 1 G: 1 TABLET ORAL at 17:49

## 2019-12-20 RX ADMIN — ONDANSETRON 4 MG: 2 INJECTION INTRAMUSCULAR; INTRAVENOUS at 06:31

## 2019-12-20 RX ADMIN — BISACODYL 5 MG: 5 TABLET ORAL at 06:31

## 2019-12-20 RX ADMIN — DICYCLOMINE HYDROCHLORIDE 20 MG: 10 CAPSULE ORAL at 21:07

## 2019-12-20 RX ADMIN — SODIUM CHLORIDE 8 MG/HR: 900 INJECTION INTRAVENOUS at 02:43

## 2019-12-20 RX ADMIN — SODIUM CHLORIDE 8 MG/HR: 900 INJECTION INTRAVENOUS at 08:28

## 2019-12-20 RX ADMIN — SODIUM CHLORIDE, PRESERVATIVE FREE 10 ML: 5 INJECTION INTRAVENOUS at 21:31

## 2019-12-20 RX ADMIN — SODIUM CHLORIDE, PRESERVATIVE FREE 10 ML: 5 INJECTION INTRAVENOUS at 08:30

## 2019-12-20 RX ADMIN — SUCRALFATE 1 G: 1 TABLET ORAL at 11:22

## 2019-12-20 RX ADMIN — FUROSEMIDE 5 MG/HR: 10 INJECTION, SOLUTION INTRAMUSCULAR; INTRAVENOUS at 02:43

## 2019-12-20 RX ADMIN — ATORVASTATIN CALCIUM 80 MG: 80 TABLET, FILM COATED ORAL at 09:34

## 2019-12-20 RX ADMIN — SUCRALFATE 1 G: 1 TABLET ORAL at 09:34

## 2019-12-20 RX ADMIN — IPRATROPIUM BROMIDE AND ALBUTEROL SULFATE 3 ML: 2.5; .5 SOLUTION RESPIRATORY (INHALATION) at 01:55

## 2019-12-20 RX ADMIN — SODIUM CHLORIDE 8 MG/HR: 900 INJECTION INTRAVENOUS at 19:16

## 2019-12-20 RX ADMIN — SODIUM CHLORIDE 8 MG/HR: 900 INJECTION INTRAVENOUS at 13:41

## 2019-12-20 RX ADMIN — INSULIN GLARGINE 50 UNITS: 100 INJECTION, SOLUTION SUBCUTANEOUS at 21:07

## 2019-12-20 RX ADMIN — INSULIN LISPRO 2 UNITS: 100 INJECTION, SOLUTION INTRAVENOUS; SUBCUTANEOUS at 17:49

## 2019-12-20 RX ADMIN — SODIUM CHLORIDE 8 MG/HR: 900 INJECTION INTRAVENOUS at 23:50

## 2019-12-20 RX ADMIN — SUCRALFATE 1 G: 1 TABLET ORAL at 21:07

## 2019-12-20 RX ADMIN — Medication 5 MG: at 21:07

## 2019-12-20 RX ADMIN — FUROSEMIDE 5 MG/HR: 10 INJECTION, SOLUTION INTRAMUSCULAR; INTRAVENOUS at 23:50

## 2019-12-20 RX ADMIN — IPRATROPIUM BROMIDE AND ALBUTEROL SULFATE 3 ML: 2.5; .5 SOLUTION RESPIRATORY (INHALATION) at 13:06

## 2019-12-21 LAB
ANION GAP SERPL CALCULATED.3IONS-SCNC: 14.6 MMOL/L (ref 5–15)
BUN BLD-MCNC: 85 MG/DL (ref 8–23)
BUN/CREAT SERPL: 27.7 (ref 7–25)
CALCIUM SPEC-SCNC: 8.9 MG/DL (ref 8.6–10.5)
CHLORIDE SERPL-SCNC: 99 MMOL/L (ref 98–107)
CO2 SERPL-SCNC: 26.4 MMOL/L (ref 22–29)
CREAT BLD-MCNC: 3.07 MG/DL (ref 0.57–1)
DEPRECATED RDW RBC AUTO: 45.3 FL (ref 37–54)
ERYTHROCYTE [DISTWIDTH] IN BLOOD BY AUTOMATED COUNT: 14.6 % (ref 12.3–15.4)
GFR SERPL CREATININE-BSD FRML MDRD: 15 ML/MIN/1.73
GLUCOSE BLD-MCNC: 140 MG/DL (ref 65–99)
GLUCOSE BLDC GLUCOMTR-MCNC: 123 MG/DL (ref 70–130)
GLUCOSE BLDC GLUCOMTR-MCNC: 132 MG/DL (ref 70–130)
GLUCOSE BLDC GLUCOMTR-MCNC: 138 MG/DL (ref 70–130)
GLUCOSE BLDC GLUCOMTR-MCNC: 170 MG/DL (ref 70–130)
HCT VFR BLD AUTO: 24.4 % (ref 34–46.6)
HCT VFR BLD AUTO: 26 % (ref 34–46.6)
HGB BLD-MCNC: 8 G/DL (ref 12–15.9)
HGB BLD-MCNC: 8.5 G/DL (ref 12–15.9)
MCH RBC QN AUTO: 28.3 PG (ref 26.6–33)
MCHC RBC AUTO-ENTMCNC: 32.8 G/DL (ref 31.5–35.7)
MCV RBC AUTO: 86.2 FL (ref 79–97)
NT-PROBNP SERPL-MCNC: ABNORMAL PG/ML (ref 5–900)
PLATELET # BLD AUTO: 224 10*3/MM3 (ref 140–450)
PMV BLD AUTO: 11 FL (ref 6–12)
POTASSIUM BLD-SCNC: 3.4 MMOL/L (ref 3.5–5.2)
RBC # BLD AUTO: 2.83 10*6/MM3 (ref 3.77–5.28)
SODIUM BLD-SCNC: 140 MMOL/L (ref 136–145)
URATE SERPL-MCNC: 11.6 MG/DL (ref 2.4–5.7)
WBC NRBC COR # BLD: 11.49 10*3/MM3 (ref 3.4–10.8)

## 2019-12-21 PROCEDURE — 94799 UNLISTED PULMONARY SVC/PX: CPT

## 2019-12-21 PROCEDURE — 25010000002 ONDANSETRON PER 1 MG: Performed by: NURSE PRACTITIONER

## 2019-12-21 PROCEDURE — 63710000001 INSULIN GLARGINE PER 5 UNITS: Performed by: NURSE PRACTITIONER

## 2019-12-21 PROCEDURE — 85027 COMPLETE CBC AUTOMATED: CPT | Performed by: HOSPITALIST

## 2019-12-21 PROCEDURE — 93005 ELECTROCARDIOGRAM TRACING: CPT | Performed by: INTERNAL MEDICINE

## 2019-12-21 PROCEDURE — 94640 AIRWAY INHALATION TREATMENT: CPT

## 2019-12-21 PROCEDURE — 99232 SBSQ HOSP IP/OBS MODERATE 35: CPT | Performed by: INTERNAL MEDICINE

## 2019-12-21 PROCEDURE — 85014 HEMATOCRIT: CPT | Performed by: HOSPITALIST

## 2019-12-21 PROCEDURE — 85018 HEMOGLOBIN: CPT | Performed by: HOSPITALIST

## 2019-12-21 PROCEDURE — 83880 ASSAY OF NATRIURETIC PEPTIDE: CPT | Performed by: INTERNAL MEDICINE

## 2019-12-21 PROCEDURE — 93010 ELECTROCARDIOGRAM REPORT: CPT | Performed by: INTERNAL MEDICINE

## 2019-12-21 PROCEDURE — 80048 BASIC METABOLIC PNL TOTAL CA: CPT | Performed by: HOSPITALIST

## 2019-12-21 PROCEDURE — 82962 GLUCOSE BLOOD TEST: CPT

## 2019-12-21 PROCEDURE — 84550 ASSAY OF BLOOD/URIC ACID: CPT | Performed by: HOSPITALIST

## 2019-12-21 PROCEDURE — 97110 THERAPEUTIC EXERCISES: CPT

## 2019-12-21 RX ORDER — POTASSIUM CHLORIDE 750 MG/1
30 CAPSULE, EXTENDED RELEASE ORAL ONCE
Status: COMPLETED | OUTPATIENT
Start: 2019-12-21 | End: 2019-12-21

## 2019-12-21 RX ORDER — PANTOPRAZOLE SODIUM 40 MG/1
40 TABLET, DELAYED RELEASE ORAL
Status: DISCONTINUED | OUTPATIENT
Start: 2019-12-21 | End: 2020-01-01 | Stop reason: HOSPADM

## 2019-12-21 RX ORDER — BISACODYL 10 MG
10 SUPPOSITORY, RECTAL RECTAL DAILY PRN
Status: DISCONTINUED | OUTPATIENT
Start: 2019-12-21 | End: 2020-01-01 | Stop reason: HOSPADM

## 2019-12-21 RX ADMIN — SUCRALFATE 1 G: 1 TABLET ORAL at 07:57

## 2019-12-21 RX ADMIN — PANTOPRAZOLE SODIUM 40 MG: 40 TABLET, DELAYED RELEASE ORAL at 11:48

## 2019-12-21 RX ADMIN — Medication 5 MG: at 20:51

## 2019-12-21 RX ADMIN — ATORVASTATIN CALCIUM 80 MG: 80 TABLET, FILM COATED ORAL at 07:57

## 2019-12-21 RX ADMIN — IPRATROPIUM BROMIDE AND ALBUTEROL SULFATE 3 ML: 2.5; .5 SOLUTION RESPIRATORY (INHALATION) at 16:46

## 2019-12-21 RX ADMIN — ONDANSETRON 4 MG: 2 INJECTION INTRAMUSCULAR; INTRAVENOUS at 05:33

## 2019-12-21 RX ADMIN — INSULIN GLARGINE 50 UNITS: 100 INJECTION, SOLUTION SUBCUTANEOUS at 20:51

## 2019-12-21 RX ADMIN — SODIUM CHLORIDE 8 MG/HR: 900 INJECTION INTRAVENOUS at 05:02

## 2019-12-21 RX ADMIN — POTASSIUM CHLORIDE 30 MEQ: 750 CAPSULE, EXTENDED RELEASE ORAL at 16:43

## 2019-12-21 RX ADMIN — SODIUM CHLORIDE, PRESERVATIVE FREE 10 ML: 5 INJECTION INTRAVENOUS at 07:57

## 2019-12-21 RX ADMIN — DICYCLOMINE HYDROCHLORIDE 20 MG: 10 CAPSULE ORAL at 20:51

## 2019-12-21 RX ADMIN — IPRATROPIUM BROMIDE AND ALBUTEROL SULFATE 3 ML: 2.5; .5 SOLUTION RESPIRATORY (INHALATION) at 08:39

## 2019-12-21 RX ADMIN — PANTOPRAZOLE SODIUM 40 MG: 40 TABLET, DELAYED RELEASE ORAL at 16:43

## 2019-12-21 RX ADMIN — SUCRALFATE 1 G: 1 TABLET ORAL at 16:43

## 2019-12-21 RX ADMIN — SUCRALFATE 1 G: 1 TABLET ORAL at 20:51

## 2019-12-21 RX ADMIN — ACETAMINOPHEN 650 MG: 325 TABLET, FILM COATED ORAL at 08:01

## 2019-12-21 RX ADMIN — BISACODYL 5 MG: 5 TABLET ORAL at 17:40

## 2019-12-21 RX ADMIN — SODIUM CHLORIDE, PRESERVATIVE FREE 10 ML: 5 INJECTION INTRAVENOUS at 20:52

## 2019-12-21 RX ADMIN — SUCRALFATE 1 G: 1 TABLET ORAL at 11:48

## 2019-12-21 RX ADMIN — BISACODYL 10 MG: 10 SUPPOSITORY RECTAL at 21:03

## 2019-12-28 PROBLEM — D50.0 BLOOD LOSS ANEMIA: Status: ACTIVE | Noted: 2019-12-18

## 2019-12-28 PROBLEM — K92.1 MELENA: Status: ACTIVE | Noted: 2019-12-18

## 2019-12-29 NOTE — ANESTHESIA PREPROCEDURE EVALUATION
Anesthesia Evaluation     Patient summary reviewed and Nursing notes reviewed   history of anesthetic complications: PONV  NPO Solid Status: > 8 hours  NPO Liquid Status: > 8 hours           Airway   Mallampati: II  TM distance: >3 FB  Neck ROM: full  no difficulty expected  Dental - normal exam     Pulmonary - normal exam   Cardiovascular - normal exam    (+) hypertension, past MI , CAD, CABG, CHF , PVD, hyperlipidemia,       Neuro/Psych  GI/Hepatic/Renal/Endo    (+) obesity, morbid obesity, PUD, GI bleeding , renal disease ESRD, diabetes mellitus type 2,     Musculoskeletal     Abdominal  - normal exam   Substance History      OB/GYN          Other                        Anesthesia Plan    ASA 4     MAC       Anesthetic plan, all risks, benefits, and alternatives have been provided, discussed and informed consent has been obtained with: patient.

## 2019-12-29 NOTE — ANESTHESIA POSTPROCEDURE EVALUATION
"Patient: Randi Martines    Procedure Summary     Date:  12/29/19 Room / Location:   [facility] /  [facility]    Anesthesia Start:  0913 Anesthesia Stop:  0930    Procedure:  ESOPHAGOGASTRODUODENOSCOPYwith bx (N/A Esophagus) Diagnosis:       Gastrointestinal hemorrhage, unspecified gastrointestinal hemorrhage type      Blood loss anemia      Melena      (Gastrointestinal hemorrhage, unspecified gastrointestinal hemorrhage type [K92.2])      (Blood loss anemia [D50.0])      (Melena [K92.1])    Surgeon:  Ziyad Manzo MD Provider:  Carlton Sandra MD    Anesthesia Type:  MAC ASA Status:  4          Anesthesia Type: MAC    Vitals  Vitals Value Taken Time   /53 12/29/2019  9:53 AM   Temp     Pulse 63 12/29/2019  9:53 AM   Resp 16 12/29/2019  9:53 AM   SpO2 93 % 12/29/2019  9:53 AM           Post Anesthesia Care and Evaluation    Patient location during evaluation: bedside  Patient participation: complete - patient participated  Level of consciousness: awake and alert  Pain management: adequate  Airway patency: patent  Anesthetic complications: No anesthetic complications    Cardiovascular status: acceptable  Respiratory status: acceptable  Hydration status: acceptable    Comments: /53 (BP Location: Right leg, Patient Position: Lying)   Pulse 63   Temp 36.9 °C (98.4 °F) (Oral)   Resp 16   Ht 152 cm (59.84\")   Wt 85.3 kg (188 lb 1.6 oz)   SpO2 93%   BMI 36.93 kg/m²       "

## 2019-12-31 PROBLEM — N18.30 CHRONIC KIDNEY DISEASE, STAGE 3 (MODERATE): Status: ACTIVE | Noted: 2019-08-05

## 2019-12-31 PROBLEM — I51.9 LEFT VENTRICULAR SYSTOLIC DYSFUNCTION: Status: ACTIVE | Noted: 2019-12-19

## 2020-01-01 ENCOUNTER — READMISSION MANAGEMENT (OUTPATIENT)
Dept: CALL CENTER | Facility: HOSPITAL | Age: 70
End: 2020-01-01

## 2020-01-01 ENCOUNTER — TELEPHONE (OUTPATIENT)
Dept: FAMILY MEDICINE CLINIC | Facility: CLINIC | Age: 70
End: 2020-01-01

## 2020-01-01 ENCOUNTER — TRANSCRIBE ORDERS (OUTPATIENT)
Dept: PREADMISSION TESTING | Facility: HOSPITAL | Age: 70
End: 2020-01-01

## 2020-01-01 ENCOUNTER — OFFICE VISIT (OUTPATIENT)
Dept: FAMILY MEDICINE CLINIC | Facility: CLINIC | Age: 70
End: 2020-01-01

## 2020-01-01 ENCOUNTER — OFFICE VISIT (OUTPATIENT)
Dept: CARDIOLOGY | Facility: CLINIC | Age: 70
End: 2020-01-01

## 2020-01-01 ENCOUNTER — ANESTHESIA EVENT (OUTPATIENT)
Dept: PERIOP | Facility: HOSPITAL | Age: 70
End: 2020-01-01

## 2020-01-01 ENCOUNTER — EPISODE CHANGES (OUTPATIENT)
Dept: CASE MANAGEMENT | Facility: OTHER | Age: 70
End: 2020-01-01

## 2020-01-01 ENCOUNTER — APPOINTMENT (OUTPATIENT)
Dept: GENERAL RADIOLOGY | Facility: HOSPITAL | Age: 70
End: 2020-01-01

## 2020-01-01 ENCOUNTER — LAB (OUTPATIENT)
Dept: LAB | Facility: HOSPITAL | Age: 70
End: 2020-01-01

## 2020-01-01 ENCOUNTER — TRANSITIONAL CARE MANAGEMENT TELEPHONE ENCOUNTER (OUTPATIENT)
Dept: CALL CENTER | Facility: HOSPITAL | Age: 70
End: 2020-01-01

## 2020-01-01 ENCOUNTER — HOSPITAL ENCOUNTER (INPATIENT)
Facility: HOSPITAL | Age: 70
LOS: 5 days | End: 2020-12-21
Attending: EMERGENCY MEDICINE | Admitting: INTERNAL MEDICINE

## 2020-01-01 ENCOUNTER — APPOINTMENT (OUTPATIENT)
Dept: CARDIOLOGY | Facility: HOSPITAL | Age: 70
End: 2020-01-01

## 2020-01-01 ENCOUNTER — TELEPHONE (OUTPATIENT)
Dept: ONCOLOGY | Facility: CLINIC | Age: 70
End: 2020-01-01

## 2020-01-01 ENCOUNTER — HOSPITAL ENCOUNTER (OUTPATIENT)
Facility: HOSPITAL | Age: 70
Setting detail: HOSPITAL OUTPATIENT SURGERY
Discharge: HOME | End: 2020-07-23
Attending: SURGERY | Admitting: SURGERY

## 2020-01-01 ENCOUNTER — TRANSCRIBE ORDERS (OUTPATIENT)
Dept: ADMINISTRATIVE | Facility: HOSPITAL | Age: 70
End: 2020-01-01

## 2020-01-01 ENCOUNTER — TELEPHONE (OUTPATIENT)
Dept: CARDIOLOGY | Facility: CLINIC | Age: 70
End: 2020-01-01

## 2020-01-01 ENCOUNTER — HOSPITAL ENCOUNTER (INPATIENT)
Facility: HOSPITAL | Age: 70
LOS: 10 days | Discharge: NURSING, REHAB OR LONG-TERM CARE | End: 2020-11-30
Attending: EMERGENCY MEDICINE | Admitting: INTERNAL MEDICINE

## 2020-01-01 ENCOUNTER — APPOINTMENT (OUTPATIENT)
Dept: CT IMAGING | Facility: HOSPITAL | Age: 70
End: 2020-01-01

## 2020-01-01 ENCOUNTER — PATIENT OUTREACH (OUTPATIENT)
Dept: CASE MANAGEMENT | Facility: OTHER | Age: 70
End: 2020-01-01

## 2020-01-01 ENCOUNTER — APPOINTMENT (OUTPATIENT)
Dept: LAB | Facility: HOSPITAL | Age: 70
End: 2020-01-01

## 2020-01-01 ENCOUNTER — ANESTHESIA (OUTPATIENT)
Dept: PERIOP | Facility: HOSPITAL | Age: 70
End: 2020-01-01

## 2020-01-01 ENCOUNTER — TRANSCRIBE ORDERS (OUTPATIENT)
Dept: SLEEP MEDICINE | Facility: HOSPITAL | Age: 70
End: 2020-01-01

## 2020-01-01 ENCOUNTER — CONSULT (OUTPATIENT)
Dept: ONCOLOGY | Facility: CLINIC | Age: 70
End: 2020-01-01

## 2020-01-01 ENCOUNTER — HOSPITAL ENCOUNTER (OUTPATIENT)
Facility: HOSPITAL | Age: 70
Setting detail: HOSPITAL OUTPATIENT SURGERY
End: 2020-01-01
Attending: INTERNAL MEDICINE | Admitting: INTERNAL MEDICINE

## 2020-01-01 ENCOUNTER — TRANSCRIBE ORDERS (OUTPATIENT)
Dept: CARDIOLOGY | Facility: CLINIC | Age: 70
End: 2020-01-01

## 2020-01-01 ENCOUNTER — HOSPITAL ENCOUNTER (INPATIENT)
Facility: HOSPITAL | Age: 70
LOS: 4 days | Discharge: HOME | End: 2020-07-01
Attending: EMERGENCY MEDICINE | Admitting: INTERNAL MEDICINE

## 2020-01-01 ENCOUNTER — HOSPITAL ENCOUNTER (OUTPATIENT)
Dept: INFUSION THERAPY | Facility: HOSPITAL | Age: 70
Setting detail: INFUSION SERIES
Discharge: HOME | End: 2020-12-14

## 2020-01-01 ENCOUNTER — HOSPITAL ENCOUNTER (INPATIENT)
Facility: HOSPITAL | Age: 70
LOS: 1 days | End: 2020-12-22
Attending: INTERNAL MEDICINE | Admitting: HOSPITALIST

## 2020-01-01 ENCOUNTER — HOSPITAL ENCOUNTER (INPATIENT)
Facility: HOSPITAL | Age: 70
LOS: 3 days | Discharge: HOME | End: 2020-06-12
Attending: HOSPITALIST | Admitting: HOSPITALIST

## 2020-01-01 ENCOUNTER — NURSE TRIAGE (OUTPATIENT)
Dept: CALL CENTER | Facility: HOSPITAL | Age: 70
End: 2020-01-01

## 2020-01-01 ENCOUNTER — HOSPITAL ENCOUNTER (EMERGENCY)
Facility: HOSPITAL | Age: 70
Discharge: HOME | End: 2020-05-30
Attending: EMERGENCY MEDICINE | Admitting: EMERGENCY MEDICINE

## 2020-01-01 ENCOUNTER — HOSPITAL ENCOUNTER (OUTPATIENT)
Facility: HOSPITAL | Age: 70
Setting detail: OBSERVATION
Discharge: HOME | End: 2020-09-24
Attending: EMERGENCY MEDICINE | Admitting: HOSPITALIST

## 2020-01-01 VITALS
BODY MASS INDEX: 30.21 KG/M2 | TEMPERATURE: 97.5 F | OXYGEN SATURATION: 99 % | HEIGHT: 61 IN | SYSTOLIC BLOOD PRESSURE: 100 MMHG | HEART RATE: 91 BPM | DIASTOLIC BLOOD PRESSURE: 62 MMHG | WEIGHT: 160 LBS

## 2020-01-01 VITALS
WEIGHT: 134.7 LBS | SYSTOLIC BLOOD PRESSURE: 147 MMHG | RESPIRATION RATE: 12 BRPM | HEART RATE: 95 BPM | OXYGEN SATURATION: 76 % | BODY MASS INDEX: 26.45 KG/M2 | TEMPERATURE: 98.7 F | DIASTOLIC BLOOD PRESSURE: 74 MMHG | HEIGHT: 60 IN

## 2020-01-01 VITALS
HEIGHT: 61 IN | BODY MASS INDEX: 30.21 KG/M2 | OXYGEN SATURATION: 92 % | HEART RATE: 89 BPM | DIASTOLIC BLOOD PRESSURE: 76 MMHG | RESPIRATION RATE: 16 BRPM | TEMPERATURE: 97.4 F | WEIGHT: 160 LBS | SYSTOLIC BLOOD PRESSURE: 103 MMHG

## 2020-01-01 VITALS
HEART RATE: 77 BPM | RESPIRATION RATE: 16 BRPM | TEMPERATURE: 98 F | HEIGHT: 61 IN | OXYGEN SATURATION: 100 % | WEIGHT: 147.5 LBS | BODY MASS INDEX: 27.85 KG/M2 | SYSTOLIC BLOOD PRESSURE: 91 MMHG | DIASTOLIC BLOOD PRESSURE: 54 MMHG

## 2020-01-01 VITALS
HEART RATE: 81 BPM | DIASTOLIC BLOOD PRESSURE: 70 MMHG | SYSTOLIC BLOOD PRESSURE: 110 MMHG | BODY MASS INDEX: 30.82 KG/M2 | HEIGHT: 60 IN | OXYGEN SATURATION: 99 % | WEIGHT: 157 LBS

## 2020-01-01 VITALS
HEART RATE: 79 BPM | TEMPERATURE: 98.2 F | BODY MASS INDEX: 32.64 KG/M2 | HEIGHT: 60 IN | RESPIRATION RATE: 16 BRPM | SYSTOLIC BLOOD PRESSURE: 155 MMHG | OXYGEN SATURATION: 95 % | DIASTOLIC BLOOD PRESSURE: 73 MMHG | WEIGHT: 166.23 LBS

## 2020-01-01 VITALS
DIASTOLIC BLOOD PRESSURE: 51 MMHG | BODY MASS INDEX: 39.32 KG/M2 | RESPIRATION RATE: 18 BRPM | SYSTOLIC BLOOD PRESSURE: 102 MMHG | OXYGEN SATURATION: 95 % | HEART RATE: 80 BPM | TEMPERATURE: 98.7 F | HEIGHT: 60 IN | WEIGHT: 200.3 LBS

## 2020-01-01 VITALS
SYSTOLIC BLOOD PRESSURE: 98 MMHG | OXYGEN SATURATION: 99 % | WEIGHT: 205 LBS | BODY MASS INDEX: 40.25 KG/M2 | DIASTOLIC BLOOD PRESSURE: 60 MMHG | TEMPERATURE: 98.2 F | HEART RATE: 69 BPM

## 2020-01-01 VITALS
HEIGHT: 61 IN | SYSTOLIC BLOOD PRESSURE: 127 MMHG | HEART RATE: 75 BPM | DIASTOLIC BLOOD PRESSURE: 82 MMHG | TEMPERATURE: 97.3 F | WEIGHT: 190 LBS | RESPIRATION RATE: 14 BRPM | BODY MASS INDEX: 35.87 KG/M2 | OXYGEN SATURATION: 96 %

## 2020-01-01 VITALS
BODY MASS INDEX: 37.95 KG/M2 | SYSTOLIC BLOOD PRESSURE: 100 MMHG | HEART RATE: 79 BPM | WEIGHT: 201 LBS | HEIGHT: 61 IN | DIASTOLIC BLOOD PRESSURE: 62 MMHG

## 2020-01-01 VITALS
WEIGHT: 165.8 LBS | BODY MASS INDEX: 31.3 KG/M2 | SYSTOLIC BLOOD PRESSURE: 113 MMHG | HEIGHT: 61 IN | HEART RATE: 94 BPM | DIASTOLIC BLOOD PRESSURE: 92 MMHG | TEMPERATURE: 97.5 F | OXYGEN SATURATION: 97 % | RESPIRATION RATE: 16 BRPM

## 2020-01-01 VITALS
WEIGHT: 160 LBS | DIASTOLIC BLOOD PRESSURE: 89 MMHG | RESPIRATION RATE: 18 BRPM | TEMPERATURE: 98.1 F | HEART RATE: 91 BPM | HEIGHT: 61 IN | SYSTOLIC BLOOD PRESSURE: 191 MMHG | BODY MASS INDEX: 30.21 KG/M2 | OXYGEN SATURATION: 95 %

## 2020-01-01 VITALS
HEART RATE: 89 BPM | WEIGHT: 198 LBS | DIASTOLIC BLOOD PRESSURE: 76 MMHG | SYSTOLIC BLOOD PRESSURE: 126 MMHG | BODY MASS INDEX: 37.38 KG/M2 | OXYGEN SATURATION: 90 % | HEIGHT: 61 IN

## 2020-01-01 VITALS
TEMPERATURE: 98 F | OXYGEN SATURATION: 95 % | RESPIRATION RATE: 18 BRPM | SYSTOLIC BLOOD PRESSURE: 113 MMHG | HEART RATE: 76 BPM | DIASTOLIC BLOOD PRESSURE: 61 MMHG

## 2020-01-01 VITALS
DIASTOLIC BLOOD PRESSURE: 64 MMHG | OXYGEN SATURATION: 98 % | SYSTOLIC BLOOD PRESSURE: 92 MMHG | RESPIRATION RATE: 16 BRPM | WEIGHT: 161.7 LBS | BODY MASS INDEX: 29.76 KG/M2 | HEIGHT: 62 IN | HEART RATE: 83 BPM | TEMPERATURE: 97.1 F

## 2020-01-01 VITALS — HEART RATE: 90 BPM | RESPIRATION RATE: 12 BRPM | OXYGEN SATURATION: 50 %

## 2020-01-01 VITALS
HEIGHT: 61 IN | WEIGHT: 194.8 LBS | DIASTOLIC BLOOD PRESSURE: 82 MMHG | BODY MASS INDEX: 36.78 KG/M2 | HEART RATE: 86 BPM | SYSTOLIC BLOOD PRESSURE: 162 MMHG | OXYGEN SATURATION: 98 %

## 2020-01-01 DIAGNOSIS — Z91.81 AT HIGH RISK FOR FALLS: ICD-10-CM

## 2020-01-01 DIAGNOSIS — Z79.4 TYPE 2 DIABETES MELLITUS WITH CHRONIC KIDNEY DISEASE ON CHRONIC DIALYSIS, WITH LONG-TERM CURRENT USE OF INSULIN (HCC): Primary | ICD-10-CM

## 2020-01-01 DIAGNOSIS — R39.9 UTI SYMPTOMS: Primary | ICD-10-CM

## 2020-01-01 DIAGNOSIS — G93.41 METABOLIC ENCEPHALOPATHY: Primary | ICD-10-CM

## 2020-01-01 DIAGNOSIS — Z79.4 TYPE 2 DIABETES MELLITUS WITH CHRONIC KIDNEY DISEASE ON CHRONIC DIALYSIS, WITH LONG-TERM CURRENT USE OF INSULIN (HCC): ICD-10-CM

## 2020-01-01 DIAGNOSIS — I77.1 SUBCLAVIAN ARTERY STENOSIS, LEFT (HCC): ICD-10-CM

## 2020-01-01 DIAGNOSIS — I73.9 PVD (PERIPHERAL VASCULAR DISEASE) (HCC): ICD-10-CM

## 2020-01-01 DIAGNOSIS — Z01.818 OTHER SPECIFIED PRE-OPERATIVE EXAMINATION: ICD-10-CM

## 2020-01-01 DIAGNOSIS — I25.10 CORONARY ARTERY DISEASE INVOLVING NATIVE CORONARY ARTERY OF NATIVE HEART WITHOUT ANGINA PECTORIS: ICD-10-CM

## 2020-01-01 DIAGNOSIS — N18.6 ESRD (END STAGE RENAL DISEASE) (HCC): ICD-10-CM

## 2020-01-01 DIAGNOSIS — I21.4 NSTEMI (NON-ST ELEVATED MYOCARDIAL INFARCTION) (HCC): ICD-10-CM

## 2020-01-01 DIAGNOSIS — N18.6 TYPE 2 DIABETES MELLITUS WITH CHRONIC KIDNEY DISEASE ON CHRONIC DIALYSIS, WITH LONG-TERM CURRENT USE OF INSULIN (HCC): Primary | ICD-10-CM

## 2020-01-01 DIAGNOSIS — E11.42 DIABETIC PERIPHERAL NEUROPATHY (HCC): ICD-10-CM

## 2020-01-01 DIAGNOSIS — J90 PLEURAL EFFUSION: ICD-10-CM

## 2020-01-01 DIAGNOSIS — Z99.2 TYPE 2 DIABETES MELLITUS WITH CHRONIC KIDNEY DISEASE ON CHRONIC DIALYSIS, WITH LONG-TERM CURRENT USE OF INSULIN (HCC): Primary | ICD-10-CM

## 2020-01-01 DIAGNOSIS — Z13.6 SCREENING FOR CARDIOVASCULAR CONDITION: Primary | ICD-10-CM

## 2020-01-01 DIAGNOSIS — Z01.810 PREPROCEDURAL CARDIOVASCULAR EXAMINATION: ICD-10-CM

## 2020-01-01 DIAGNOSIS — D17.1 LIPOMA OF TORSO: ICD-10-CM

## 2020-01-01 DIAGNOSIS — E79.0 HYPERURICEMIA: ICD-10-CM

## 2020-01-01 DIAGNOSIS — E11.42 DIABETIC PERIPHERAL NEUROPATHY (HCC): Primary | ICD-10-CM

## 2020-01-01 DIAGNOSIS — Z79.4 LONG TERM (CURRENT) USE OF INSULIN (HCC): ICD-10-CM

## 2020-01-01 DIAGNOSIS — Z01.818 OTHER SPECIFIED PRE-OPERATIVE EXAMINATION: Primary | ICD-10-CM

## 2020-01-01 DIAGNOSIS — R53.1 WEAKNESS: Primary | ICD-10-CM

## 2020-01-01 DIAGNOSIS — N18.30 CHRONIC KIDNEY DISEASE, STAGE 3 (MODERATE): ICD-10-CM

## 2020-01-01 DIAGNOSIS — Z01.810 PRE-OPERATIVE CARDIOVASCULAR EXAMINATION: Primary | ICD-10-CM

## 2020-01-01 DIAGNOSIS — Z79.4 TYPE 2 DIABETES MELLITUS WITH FOOT ULCER, WITH LONG-TERM CURRENT USE OF INSULIN (HCC): ICD-10-CM

## 2020-01-01 DIAGNOSIS — I51.9 LEFT VENTRICULAR SYSTOLIC DYSFUNCTION: ICD-10-CM

## 2020-01-01 DIAGNOSIS — R29.818 SUSPECTED SLEEP APNEA: ICD-10-CM

## 2020-01-01 DIAGNOSIS — I27.20 PULMONARY HYPERTENSION (HCC): ICD-10-CM

## 2020-01-01 DIAGNOSIS — R09.89 PULMONARY VASCULAR CONGESTION: ICD-10-CM

## 2020-01-01 DIAGNOSIS — N18.6 ESRD (END STAGE RENAL DISEASE) (HCC): Primary | ICD-10-CM

## 2020-01-01 DIAGNOSIS — I10 ESSENTIAL HYPERTENSION: ICD-10-CM

## 2020-01-01 DIAGNOSIS — D50.0 IRON DEFICIENCY ANEMIA DUE TO CHRONIC BLOOD LOSS: ICD-10-CM

## 2020-01-01 DIAGNOSIS — I95.1 ORTHOSTATIC HYPOTENSION: Primary | ICD-10-CM

## 2020-01-01 DIAGNOSIS — Z79.4 TYPE 2 DIABETES MELLITUS WITHOUT COMPLICATION, WITH LONG-TERM CURRENT USE OF INSULIN (HCC): ICD-10-CM

## 2020-01-01 DIAGNOSIS — E11.9 TYPE 2 DIABETES MELLITUS WITHOUT COMPLICATION, WITH LONG-TERM CURRENT USE OF INSULIN (HCC): ICD-10-CM

## 2020-01-01 DIAGNOSIS — R09.89 PULMONARY VASCULAR CONGESTION: Primary | ICD-10-CM

## 2020-01-01 DIAGNOSIS — N39.0 ACUTE UTI: Primary | ICD-10-CM

## 2020-01-01 DIAGNOSIS — R26.9 ABNORMAL GAIT: ICD-10-CM

## 2020-01-01 DIAGNOSIS — D64.9 LOW HEMOGLOBIN: ICD-10-CM

## 2020-01-01 DIAGNOSIS — E11.49 DM (DIABETES MELLITUS), TYPE 2 WITH NEUROLOGICAL COMPLICATIONS (HCC): ICD-10-CM

## 2020-01-01 DIAGNOSIS — D72.829 LEUKOCYTOSIS, UNSPECIFIED TYPE: ICD-10-CM

## 2020-01-01 DIAGNOSIS — E44.1 MILD PROTEIN-CALORIE MALNUTRITION (HCC): ICD-10-CM

## 2020-01-01 DIAGNOSIS — N18.9 CHRONIC RENAL FAILURE, UNSPECIFIED CKD STAGE: ICD-10-CM

## 2020-01-01 DIAGNOSIS — E87.6 HYPOKALEMIA: ICD-10-CM

## 2020-01-01 DIAGNOSIS — E16.2 HYPOGLYCEMIA, UNSPECIFIED: ICD-10-CM

## 2020-01-01 DIAGNOSIS — Z13.6 SCREENING FOR ISCHEMIC HEART DISEASE: ICD-10-CM

## 2020-01-01 DIAGNOSIS — Z95.1 S/P CABG (CORONARY ARTERY BYPASS GRAFT): ICD-10-CM

## 2020-01-01 DIAGNOSIS — N17.9 AKI (ACUTE KIDNEY INJURY) (HCC): ICD-10-CM

## 2020-01-01 DIAGNOSIS — E66.01 MORBID OBESITY WITH BMI OF 40.0-44.9, ADULT (HCC): ICD-10-CM

## 2020-01-01 DIAGNOSIS — D50.0 BLOOD LOSS ANEMIA: ICD-10-CM

## 2020-01-01 DIAGNOSIS — R10.32 LLQ ABDOMINAL PAIN: Primary | ICD-10-CM

## 2020-01-01 DIAGNOSIS — Z87.891 PERSONAL HISTORY OF NICOTINE DEPENDENCE: ICD-10-CM

## 2020-01-01 DIAGNOSIS — D72.829 LEUKOCYTOSIS, UNSPECIFIED TYPE: Primary | ICD-10-CM

## 2020-01-01 DIAGNOSIS — R79.89 ELEVATED PROCALCITONIN: ICD-10-CM

## 2020-01-01 DIAGNOSIS — F33.41 RECURRENT MAJOR DEPRESSIVE DISORDER, IN PARTIAL REMISSION (HCC): ICD-10-CM

## 2020-01-01 DIAGNOSIS — E87.70 HYPERVOLEMIA, UNSPECIFIED HYPERVOLEMIA TYPE: Primary | ICD-10-CM

## 2020-01-01 DIAGNOSIS — R10.30 LOWER ABDOMINAL PAIN: ICD-10-CM

## 2020-01-01 DIAGNOSIS — N18.6 TYPE 2 DIABETES MELLITUS WITH CHRONIC KIDNEY DISEASE ON CHRONIC DIALYSIS, WITH LONG-TERM CURRENT USE OF INSULIN (HCC): ICD-10-CM

## 2020-01-01 DIAGNOSIS — E11.49 DM (DIABETES MELLITUS), TYPE 2 WITH NEUROLOGICAL COMPLICATIONS (HCC): Primary | ICD-10-CM

## 2020-01-01 DIAGNOSIS — I73.9 PAD (PERIPHERAL ARTERY DISEASE) (HCC): ICD-10-CM

## 2020-01-01 DIAGNOSIS — R06.00 DYSPNEA, UNSPECIFIED TYPE: ICD-10-CM

## 2020-01-01 DIAGNOSIS — D64.9 LOW HEMOGLOBIN: Primary | ICD-10-CM

## 2020-01-01 DIAGNOSIS — I73.9 PVD (PERIPHERAL VASCULAR DISEASE) (HCC): Primary | ICD-10-CM

## 2020-01-01 DIAGNOSIS — R06.02 SHORTNESS OF BREATH: ICD-10-CM

## 2020-01-01 DIAGNOSIS — L97.509 TYPE 2 DIABETES MELLITUS WITH FOOT ULCER, WITH LONG-TERM CURRENT USE OF INSULIN (HCC): ICD-10-CM

## 2020-01-01 DIAGNOSIS — E11.22 TYPE 2 DIABETES MELLITUS WITH CHRONIC KIDNEY DISEASE ON CHRONIC DIALYSIS, WITH LONG-TERM CURRENT USE OF INSULIN (HCC): Primary | ICD-10-CM

## 2020-01-01 DIAGNOSIS — E78.2 MIXED HYPERLIPIDEMIA: ICD-10-CM

## 2020-01-01 DIAGNOSIS — Z99.2 DIALYSIS PATIENT (HCC): ICD-10-CM

## 2020-01-01 DIAGNOSIS — I25.10 CORONARY ARTERY DISEASE INVOLVING NATIVE CORONARY ARTERY OF NATIVE HEART WITHOUT ANGINA PECTORIS: Primary | ICD-10-CM

## 2020-01-01 DIAGNOSIS — E87.70 HYPERVOLEMIA, UNSPECIFIED HYPERVOLEMIA TYPE: ICD-10-CM

## 2020-01-01 DIAGNOSIS — D63.8 ANEMIA OF CHRONIC DISEASE: ICD-10-CM

## 2020-01-01 DIAGNOSIS — E11.22 TYPE 2 DIABETES MELLITUS WITH CHRONIC KIDNEY DISEASE ON CHRONIC DIALYSIS, WITH LONG-TERM CURRENT USE OF INSULIN (HCC): ICD-10-CM

## 2020-01-01 DIAGNOSIS — Z99.2 TYPE 2 DIABETES MELLITUS WITH CHRONIC KIDNEY DISEASE ON CHRONIC DIALYSIS, WITH LONG-TERM CURRENT USE OF INSULIN (HCC): ICD-10-CM

## 2020-01-01 DIAGNOSIS — E11.621 TYPE 2 DIABETES MELLITUS WITH FOOT ULCER, WITH LONG-TERM CURRENT USE OF INSULIN (HCC): ICD-10-CM

## 2020-01-01 LAB
ABO GROUP BLD: NORMAL
ALBUMIN SERPL-MCNC: 2.2 G/DL (ref 3.5–5.2)
ALBUMIN SERPL-MCNC: 2.3 G/DL (ref 3.5–5.2)
ALBUMIN SERPL-MCNC: 2.5 G/DL (ref 3.5–5.2)
ALBUMIN SERPL-MCNC: 2.6 G/DL (ref 3.5–5.2)
ALBUMIN SERPL-MCNC: 2.7 G/DL (ref 3.5–5.2)
ALBUMIN SERPL-MCNC: 2.7 G/DL (ref 3.5–5.2)
ALBUMIN SERPL-MCNC: 2.8 G/DL (ref 3.5–5.2)
ALBUMIN SERPL-MCNC: 2.9 G/DL (ref 3.5–5.2)
ALBUMIN SERPL-MCNC: 3 G/DL (ref 3.5–5.2)
ALBUMIN SERPL-MCNC: 3.1 G/DL (ref 3.5–5.2)
ALBUMIN SERPL-MCNC: 3.2 G/DL (ref 3.5–5.2)
ALBUMIN SERPL-MCNC: 3.3 G/DL (ref 3.5–5.2)
ALBUMIN SERPL-MCNC: 3.6 G/DL (ref 3.5–5.2)
ALBUMIN/GLOB SERPL: 0.6 G/DL
ALBUMIN/GLOB SERPL: 0.7 G/DL
ALBUMIN/GLOB SERPL: 0.8 G/DL
ALBUMIN/GLOB SERPL: 0.8 G/DL
ALBUMIN/GLOB SERPL: 0.9 G/DL
ALBUMIN/GLOB SERPL: 1.1 G/DL
ALDOLASE SERPL-CCNC: 4.3 U/L (ref 3.3–10.3)
ALP SERPL-CCNC: 56 U/L (ref 39–117)
ALP SERPL-CCNC: 60 U/L (ref 39–117)
ALP SERPL-CCNC: 67 U/L (ref 39–117)
ALP SERPL-CCNC: 68 U/L (ref 39–117)
ALP SERPL-CCNC: 72 U/L (ref 39–117)
ALP SERPL-CCNC: 77 U/L (ref 39–117)
ALP SERPL-CCNC: 87 U/L (ref 39–117)
ALP SERPL-CCNC: 90 U/L (ref 39–117)
ALT SERPL W P-5'-P-CCNC: 10 U/L (ref 1–33)
ALT SERPL W P-5'-P-CCNC: 14 U/L (ref 1–33)
ALT SERPL W P-5'-P-CCNC: 17 U/L (ref 1–33)
ALT SERPL W P-5'-P-CCNC: 7 U/L (ref 1–33)
ALT SERPL W P-5'-P-CCNC: 8 U/L (ref 1–33)
ALT SERPL W P-5'-P-CCNC: 8 U/L (ref 1–33)
ALT SERPL W P-5'-P-CCNC: 82 U/L (ref 1–33)
ALT SERPL W P-5'-P-CCNC: 92 U/L (ref 1–33)
AMMONIA BLD-SCNC: 16 UMOL/L (ref 11–51)
ANION GAP SERPL CALCULATED.3IONS-SCNC: 10 MMOL/L (ref 5–15)
ANION GAP SERPL CALCULATED.3IONS-SCNC: 10.2 MMOL/L (ref 5–15)
ANION GAP SERPL CALCULATED.3IONS-SCNC: 10.3 MMOL/L (ref 5–15)
ANION GAP SERPL CALCULATED.3IONS-SCNC: 10.4 MMOL/L (ref 5–15)
ANION GAP SERPL CALCULATED.3IONS-SCNC: 10.4 MMOL/L (ref 5–15)
ANION GAP SERPL CALCULATED.3IONS-SCNC: 10.5 MMOL/L (ref 5–15)
ANION GAP SERPL CALCULATED.3IONS-SCNC: 11.1 MMOL/L (ref 5–15)
ANION GAP SERPL CALCULATED.3IONS-SCNC: 11.2 MMOL/L (ref 5–15)
ANION GAP SERPL CALCULATED.3IONS-SCNC: 11.3 MMOL/L (ref 5–15)
ANION GAP SERPL CALCULATED.3IONS-SCNC: 11.4 MMOL/L (ref 5–15)
ANION GAP SERPL CALCULATED.3IONS-SCNC: 11.5 MMOL/L (ref 5–15)
ANION GAP SERPL CALCULATED.3IONS-SCNC: 11.5 MMOL/L (ref 5–15)
ANION GAP SERPL CALCULATED.3IONS-SCNC: 11.6 MMOL/L (ref 5–15)
ANION GAP SERPL CALCULATED.3IONS-SCNC: 11.8 MMOL/L (ref 5–15)
ANION GAP SERPL CALCULATED.3IONS-SCNC: 12 MMOL/L (ref 5–15)
ANION GAP SERPL CALCULATED.3IONS-SCNC: 12.1 MMOL/L (ref 5–15)
ANION GAP SERPL CALCULATED.3IONS-SCNC: 12.3 MMOL/L (ref 5–15)
ANION GAP SERPL CALCULATED.3IONS-SCNC: 12.3 MMOL/L (ref 5–15)
ANION GAP SERPL CALCULATED.3IONS-SCNC: 13.2 MMOL/L (ref 5–15)
ANION GAP SERPL CALCULATED.3IONS-SCNC: 13.2 MMOL/L (ref 5–15)
ANION GAP SERPL CALCULATED.3IONS-SCNC: 13.4 MMOL/L (ref 5–15)
ANION GAP SERPL CALCULATED.3IONS-SCNC: 14.5 MMOL/L (ref 5–15)
ANION GAP SERPL CALCULATED.3IONS-SCNC: 14.6 MMOL/L (ref 5–15)
ANION GAP SERPL CALCULATED.3IONS-SCNC: 15 MMOL/L (ref 5–15)
ANION GAP SERPL CALCULATED.3IONS-SCNC: 15.4 MMOL/L (ref 5–15)
ANION GAP SERPL CALCULATED.3IONS-SCNC: 6 MMOL/L (ref 5–15)
ANION GAP SERPL CALCULATED.3IONS-SCNC: 6.5 MMOL/L (ref 5–15)
ANION GAP SERPL CALCULATED.3IONS-SCNC: 8.1 MMOL/L (ref 5–15)
ANION GAP SERPL CALCULATED.3IONS-SCNC: 9.2 MMOL/L (ref 5–15)
ANION GAP SERPL CALCULATED.3IONS-SCNC: 9.2 MMOL/L (ref 5–15)
ANION GAP SERPL CALCULATED.3IONS-SCNC: 9.7 MMOL/L (ref 5–15)
ANION GAP SERPL CALCULATED.3IONS-SCNC: 9.8 MMOL/L (ref 5–15)
ANION GAP SERPL CALCULATED.3IONS-SCNC: 9.8 MMOL/L (ref 5–15)
ANION GAP SERPL CALCULATED.3IONS-SCNC: 9.9 MMOL/L (ref 5–15)
AORTIC DIMENSIONLESS INDEX: 0.6 (DI)
APTT PPP: 31 SECONDS (ref 22.7–35.4)
ARTERIAL PATENCY WRIST A: ABNORMAL
AST SERPL-CCNC: 111 U/L (ref 1–32)
AST SERPL-CCNC: 115 U/L (ref 1–32)
AST SERPL-CCNC: 15 U/L (ref 1–32)
AST SERPL-CCNC: 15 U/L (ref 1–32)
AST SERPL-CCNC: 16 U/L (ref 1–32)
AST SERPL-CCNC: 18 U/L (ref 1–32)
AST SERPL-CCNC: 19 U/L (ref 1–32)
AST SERPL-CCNC: 21 U/L (ref 1–32)
ATMOSPHERIC PRESS: 752.2 MMHG
B PARAPERT DNA SPEC QL NAA+PROBE: NOT DETECTED
B PERT DNA SPEC QL NAA+PROBE: NOT DETECTED
BACTERIA BLD CULT: ABNORMAL
BACTERIA SPEC AEROBE CULT: ABNORMAL
BACTERIA SPEC AEROBE CULT: NORMAL
BACTERIA UR CULT: ABNORMAL
BACTERIA UR CULT: ABNORMAL
BACTERIA UR CULT: NORMAL
BACTERIA UR CULT: NORMAL
BACTERIA UR QL AUTO: ABNORMAL /HPF
BASE EXCESS BLDA CALC-SCNC: -3.7 MMOL/L (ref 0–2)
BASOPHILS # BLD AUTO: 0.06 10*3/MM3 (ref 0–0.2)
BASOPHILS # BLD AUTO: 0.06 10*3/MM3 (ref 0–0.2)
BASOPHILS # BLD AUTO: 0.07 10*3/MM3 (ref 0–0.2)
BASOPHILS # BLD AUTO: 0.08 10*3/MM3 (ref 0–0.2)
BASOPHILS # BLD AUTO: 0.09 10*3/MM3 (ref 0–0.2)
BASOPHILS # BLD AUTO: 0.1 10*3/MM3 (ref 0–0.2)
BASOPHILS # BLD AUTO: 0.1 X10E3/UL (ref 0–0.2)
BASOPHILS # BLD AUTO: 0.12 10*3/MM3 (ref 0–0.2)
BASOPHILS # BLD AUTO: 0.14 10*3/MM3 (ref 0–0.2)
BASOPHILS # BLD AUTO: 0.17 10*3/MM3 (ref 0–0.2)
BASOPHILS NFR BLD AUTO: 0.3 % (ref 0–1.5)
BASOPHILS NFR BLD AUTO: 0.4 % (ref 0–1.5)
BASOPHILS NFR BLD AUTO: 0.5 % (ref 0–1.5)
BASOPHILS NFR BLD AUTO: 0.6 % (ref 0–1.5)
BASOPHILS NFR BLD AUTO: 0.7 % (ref 0–1.5)
BASOPHILS NFR BLD AUTO: 0.9 % (ref 0–1.5)
BASOPHILS NFR BLD AUTO: 1 %
BASOPHILS NFR BLD AUTO: 1 % (ref 0–1.5)
BASOPHILS NFR BLD AUTO: 1.1 % (ref 0–1.5)
BASOPHILS NFR BLD AUTO: 1.1 % (ref 0–1.5)
BASOPHILS NFR BLD AUTO: 1.2 % (ref 0–1.5)
BASOPHILS NFR BLD AUTO: 1.3 % (ref 0–1.5)
BASOPHILS NFR BLD AUTO: 1.3 % (ref 0–1.5)
BDY SITE: ABNORMAL
BH BB BLOOD EXPIRATION DATE: NORMAL
BH BB BLOOD TYPE BARCODE: 6200
BH BB DISPENSE STATUS: NORMAL
BH BB PRODUCT CODE: NORMAL
BH BB UNIT NUMBER: NORMAL
BH CV ECHO MEAS - AO MAX PG (FULL): 6.8 MMHG
BH CV ECHO MEAS - AO MAX PG: 10.8 MMHG
BH CV ECHO MEAS - AO MEAN PG (FULL): 5 MMHG
BH CV ECHO MEAS - AO MEAN PG: 7 MMHG
BH CV ECHO MEAS - AO ROOT AREA (BSA CORRECTED): 1.3
BH CV ECHO MEAS - AO ROOT AREA: 3.8 CM^2
BH CV ECHO MEAS - AO ROOT DIAM: 2.2 CM
BH CV ECHO MEAS - AO V2 MAX: 164 CM/SEC
BH CV ECHO MEAS - AO V2 MEAN: 124 CM/SEC
BH CV ECHO MEAS - AO V2 VTI: 39.1 CM
BH CV ECHO MEAS - ASC AORTA: 2.7 CM
BH CV ECHO MEAS - AVA(I,A): 1.4 CM^2
BH CV ECHO MEAS - AVA(I,D): 1.4 CM^2
BH CV ECHO MEAS - AVA(V,A): 1.4 CM^2
BH CV ECHO MEAS - AVA(V,D): 1.4 CM^2
BH CV ECHO MEAS - BSA(HAYCOCK): 1.7 M^2
BH CV ECHO MEAS - BSA: 1.7 M^2
BH CV ECHO MEAS - BZI_BMI: 29.7 KILOGRAMS/M^2
BH CV ECHO MEAS - BZI_METRIC_HEIGHT: 152.4 CM
BH CV ECHO MEAS - BZI_METRIC_WEIGHT: 68.9 KG
BH CV ECHO MEAS - EDV(CUBED): 140.6 ML
BH CV ECHO MEAS - EDV(MOD-SP2): 161 ML
BH CV ECHO MEAS - EDV(MOD-SP4): 153 ML
BH CV ECHO MEAS - EDV(TEICH): 129.5 ML
BH CV ECHO MEAS - EF(CUBED): 47.3 %
BH CV ECHO MEAS - EF(MOD-BP): 32.5 %
BH CV ECHO MEAS - EF(MOD-SP2): 29.8 %
BH CV ECHO MEAS - EF(MOD-SP4): 36.6 %
BH CV ECHO MEAS - EF(TEICH): 39.3 %
BH CV ECHO MEAS - ESV(CUBED): 74.1 ML
BH CV ECHO MEAS - ESV(MOD-SP2): 113 ML
BH CV ECHO MEAS - ESV(MOD-SP4): 97 ML
BH CV ECHO MEAS - ESV(TEICH): 78.6 ML
BH CV ECHO MEAS - FS: 19.2 %
BH CV ECHO MEAS - IVS/LVPW: 1
BH CV ECHO MEAS - IVSD: 1.3 CM
BH CV ECHO MEAS - LAT PEAK E' VEL: 5.3 CM/SEC
BH CV ECHO MEAS - LV DIASTOLIC VOL/BSA (35-75): 92.1 ML/M^2
BH CV ECHO MEAS - LV MASS(C)D: 278.4 GRAMS
BH CV ECHO MEAS - LV MASS(C)DI: 167.6 GRAMS/M^2
BH CV ECHO MEAS - LV MAX PG: 4 MMHG
BH CV ECHO MEAS - LV MEAN PG: 2 MMHG
BH CV ECHO MEAS - LV SYSTOLIC VOL/BSA (12-30): 58.4 ML/M^2
BH CV ECHO MEAS - LV V1 MAX: 100 CM/SEC
BH CV ECHO MEAS - LV V1 MEAN: 71.7 CM/SEC
BH CV ECHO MEAS - LV V1 VTI: 23.8 CM
BH CV ECHO MEAS - LVIDD: 5.2 CM
BH CV ECHO MEAS - LVIDS: 4.2 CM
BH CV ECHO MEAS - LVLD AP2: 8.2 CM
BH CV ECHO MEAS - LVLD AP4: 8.2 CM
BH CV ECHO MEAS - LVLS AP2: 7.4 CM
BH CV ECHO MEAS - LVLS AP4: 7.6 CM
BH CV ECHO MEAS - LVOT AREA (M): 2.3 CM^2
BH CV ECHO MEAS - LVOT AREA: 2.3 CM^2
BH CV ECHO MEAS - LVOT DIAM: 1.7 CM
BH CV ECHO MEAS - LVPWD: 1.3 CM
BH CV ECHO MEAS - MED PEAK E' VEL: 3.8 CM/SEC
BH CV ECHO MEAS - MV A MAX VEL: 41.1 CM/SEC
BH CV ECHO MEAS - MV DEC SLOPE: 678 CM/SEC^2
BH CV ECHO MEAS - MV DEC TIME: 151 SEC
BH CV ECHO MEAS - MV E MAX VEL: 137 CM/SEC
BH CV ECHO MEAS - MV E/A: 3.3
BH CV ECHO MEAS - MV MAX PG: 10.4 MMHG
BH CV ECHO MEAS - MV MEAN PG: 3 MMHG
BH CV ECHO MEAS - MV P1/2T MAX VEL: 158 CM/SEC
BH CV ECHO MEAS - MV P1/2T: 68.3 MSEC
BH CV ECHO MEAS - MV V2 MAX: 161 CM/SEC
BH CV ECHO MEAS - MV V2 MEAN: 78.9 CM/SEC
BH CV ECHO MEAS - MV V2 VTI: 31.2 CM
BH CV ECHO MEAS - MVA P1/2T LCG: 1.4 CM^2
BH CV ECHO MEAS - MVA(P1/2T): 3.2 CM^2
BH CV ECHO MEAS - MVA(VTI): 1.7 CM^2
BH CV ECHO MEAS - RAP SYSTOLE: 8 MMHG
BH CV ECHO MEAS - RVSP: 37 MMHG
BH CV ECHO MEAS - SI(AO): 89.5 ML/M^2
BH CV ECHO MEAS - SI(CUBED): 40 ML/M^2
BH CV ECHO MEAS - SI(LVOT): 32.5 ML/M^2
BH CV ECHO MEAS - SI(MOD-SP2): 28.9 ML/M^2
BH CV ECHO MEAS - SI(MOD-SP4): 33.7 ML/M^2
BH CV ECHO MEAS - SI(TEICH): 30.7 ML/M^2
BH CV ECHO MEAS - SV(AO): 148.6 ML
BH CV ECHO MEAS - SV(CUBED): 66.5 ML
BH CV ECHO MEAS - SV(LVOT): 54 ML
BH CV ECHO MEAS - SV(MOD-SP2): 48 ML
BH CV ECHO MEAS - SV(MOD-SP4): 56 ML
BH CV ECHO MEAS - SV(TEICH): 50.9 ML
BH CV ECHO MEAS - TAPSE (>1.6): 1.5 CM
BH CV ECHO MEAS - TR MAX VEL: 273 CM/SEC
BH CV ECHO MEASUREMENTS AVERAGE E/E' RATIO: 30.11
BH CV UPPER ARTERIAL LEFT 2ND DIGIT SYS MAX: 127 MMHG
BH CV UPPER ARTERIAL LEFT FBI 2ND DIGIT RATIO: 0.72
BH CV UPPER ARTERIAL RIGHT 2ND DIGIT SYS MAX: 101 MMHG
BH CV UPPER ARTERIAL RIGHT FBI 2ND DIGIT RATIO: 0.57
BH CV VAS MEAS BASILIC ANTECUBITAL FOSSA LEFT: 0.26 CM
BH CV VAS MEAS BASILIC ANTECUBITAL FOSSA RIGHT: 0.18 CM
BH CV VAS MEAS BASILIC FOREARM LEFT - DIST: 0.08 CM
BH CV VAS MEAS BASILIC FOREARM LEFT - MID: 0.17 CM
BH CV VAS MEAS BASILIC FOREARM LEFT - PROX: 0.16 CM
BH CV VAS MEAS BASILIC FOREARM RIGHT - DIST: 0.11 CM
BH CV VAS MEAS BASILIC FOREARM RIGHT - MID: 0.09 CM
BH CV VAS MEAS BASILIC FOREARM RIGHT - PROX: 0.16 CM
BH CV VAS MEAS BASILIC UPPER ARM LEFT - DIST: 0.25 CM
BH CV VAS MEAS BASILIC UPPER ARM LEFT - MID: 0.26 CM
BH CV VAS MEAS BASILIC UPPER ARM RIGHT - DIST: 0.13 CM
BH CV VAS MEAS BASILIC UPPER ARM RIGHT - MID: 0.17 CM
BH CV VAS MEAS CEPHALIC ANTECUBITAL FOSSA LEFT: 0.26 CM
BH CV VAS MEAS CEPHALIC ANTECUBITAL FOSSA RIGHT: 0.45 CM
BH CV VAS MEAS CEPHALIC FOREARM LEFT - DIST: 0.16 CM
BH CV VAS MEAS CEPHALIC FOREARM LEFT - MID: 0.17 CM
BH CV VAS MEAS CEPHALIC FOREARM LEFT - PROX: 0.21 CM
BH CV VAS MEAS CEPHALIC FOREARM RIGHT - DIST: 0.06 CM
BH CV VAS MEAS CEPHALIC FOREARM RIGHT - MID: 0.08 CM
BH CV VAS MEAS CEPHALIC FOREARM RIGHT - PROX: 0.07 CM
BH CV VAS MEAS CEPHALIC UPPER ARM LEFT - DIST: 0.08 CM
BH CV VAS MEAS CEPHALIC UPPER ARM LEFT - MID: 0.1 CM
BH CV VAS MEAS CEPHALIC UPPER ARM LEFT - PROX: 0.11 CM
BH CV VAS MEAS CEPHALIC UPPER ARM RIGHT - DIST: 0.38 CM
BH CV VAS MEAS CEPHALIC UPPER ARM RIGHT - MID: 0.2 CM
BH CV VAS MEAS CEPHALIC UPPER ARM RIGHT - PROX: 0.26 CM
BH CV VAS MEAS RADIAL UPPER ARM LEFT - DIST: 0.1 CM
BH CV VAS MEAS RADIAL UPPER ARM LEFT - MID: 0.07 CM
BH CV VAS MEAS RADIAL UPPER ARM LEFT - PROX: 0.11 CM
BH CV VAS MEAS RADIAL UPPER ARM RIGHT - DIST: 0.11 CM
BH CV VAS MEAS RADIAL UPPER ARM RIGHT - MID: 0.15 CM
BH CV VAS MEAS RADIAL UPPER ARM RIGHT - PROX: 0.18 CM
BH CV XLRA - RV BASE: 3.8 CM
BH CV XLRA - TDI S': 8.9 CM/SEC
BILIRUB SERPL-MCNC: 0.2 MG/DL (ref 0–1.2)
BILIRUB SERPL-MCNC: 0.2 MG/DL (ref 0–1.2)
BILIRUB SERPL-MCNC: 0.3 MG/DL (ref 0.2–1.2)
BILIRUB SERPL-MCNC: 0.4 MG/DL (ref 0–1.2)
BILIRUB SERPL-MCNC: 0.5 MG/DL (ref 0–1.2)
BILIRUB SERPL-MCNC: 0.5 MG/DL (ref 0–1.2)
BILIRUB SERPL-MCNC: <0.2 MG/DL (ref 0.2–1.2)
BILIRUB SERPL-MCNC: <0.2 MG/DL (ref 0.2–1.2)
BILIRUB UR QL STRIP: NEGATIVE
BLD GP AB SCN SERPL QL: NEGATIVE
BUN BLD-MCNC: 16 MG/DL (ref 8–23)
BUN BLD-MCNC: 24 MG/DL (ref 8–23)
BUN BLD-MCNC: 31 MG/DL (ref 8–23)
BUN BLD-MCNC: 34 MG/DL (ref 8–23)
BUN BLD-MCNC: 42 MG/DL (ref 8–23)
BUN BLD-MCNC: 48 MG/DL (ref 8–23)
BUN BLD-MCNC: 50 MG/DL (ref 8–23)
BUN BLD-MCNC: 52 MG/DL (ref 8–23)
BUN BLD-MCNC: 55 MG/DL (ref 8–23)
BUN BLD-MCNC: 58 MG/DL (ref 8–23)
BUN BLD-MCNC: 67 MG/DL (ref 8–23)
BUN BLD-MCNC: 74 MG/DL (ref 8–23)
BUN BLD-MCNC: 85 MG/DL (ref 8–23)
BUN SERPL-MCNC: 10 MG/DL (ref 8–23)
BUN SERPL-MCNC: 10 MG/DL (ref 8–23)
BUN SERPL-MCNC: 11 MG/DL (ref 8–23)
BUN SERPL-MCNC: 17 MG/DL (ref 8–23)
BUN SERPL-MCNC: 17 MG/DL (ref 8–23)
BUN SERPL-MCNC: 18 MG/DL (ref 8–23)
BUN SERPL-MCNC: 19 MG/DL (ref 8–23)
BUN SERPL-MCNC: 21 MG/DL (ref 8–23)
BUN SERPL-MCNC: 26 MG/DL (ref 8–23)
BUN SERPL-MCNC: 27 MG/DL (ref 8–23)
BUN SERPL-MCNC: 31 MG/DL (ref 8–23)
BUN SERPL-MCNC: 32 MG/DL (ref 8–23)
BUN SERPL-MCNC: 33 MG/DL (ref 8–23)
BUN SERPL-MCNC: 39 MG/DL (ref 8–23)
BUN SERPL-MCNC: 50 MG/DL (ref 8–27)
BUN SERPL-MCNC: 8 MG/DL (ref 8–23)
BUN SERPL-MCNC: 9 MG/DL (ref 8–23)
BUN SERPL-MCNC: 9 MG/DL (ref 8–23)
BUN/CREAT SERPL: 11 (ref 7–25)
BUN/CREAT SERPL: 11.1 (ref 7–25)
BUN/CREAT SERPL: 11.7 (ref 7–25)
BUN/CREAT SERPL: 13 (ref 12–28)
BUN/CREAT SERPL: 13.2 (ref 7–25)
BUN/CREAT SERPL: 14.1 (ref 7–25)
BUN/CREAT SERPL: 14.5 (ref 7–25)
BUN/CREAT SERPL: 25.3 (ref 7–25)
BUN/CREAT SERPL: 25.9 (ref 7–25)
BUN/CREAT SERPL: 3.4 (ref 7–25)
BUN/CREAT SERPL: 3.5 (ref 7–25)
BUN/CREAT SERPL: 3.9 (ref 7–25)
BUN/CREAT SERPL: 31.6 (ref 7–25)
BUN/CREAT SERPL: 35.2 (ref 7–25)
BUN/CREAT SERPL: 39.7 (ref 7–25)
BUN/CREAT SERPL: 4.2 (ref 7–25)
BUN/CREAT SERPL: 4.4 (ref 7–25)
BUN/CREAT SERPL: 4.6 (ref 7–25)
BUN/CREAT SERPL: 4.7 (ref 7–25)
BUN/CREAT SERPL: 4.9 (ref 7–25)
BUN/CREAT SERPL: 5.2 (ref 7–25)
BUN/CREAT SERPL: 5.3 (ref 7–25)
BUN/CREAT SERPL: 5.4 (ref 7–25)
BUN/CREAT SERPL: 5.4 (ref 7–25)
BUN/CREAT SERPL: 5.8 (ref 7–25)
BUN/CREAT SERPL: 5.9 (ref 7–25)
BUN/CREAT SERPL: 6.8 (ref 7–25)
BUN/CREAT SERPL: 7.5 (ref 7–25)
BUN/CREAT SERPL: 7.8 (ref 7–25)
BUN/CREAT SERPL: 8 (ref 7–25)
BUN/CREAT SERPL: 8.3 (ref 7–25)
BUN/CREAT SERPL: 8.7 (ref 7–25)
BUN/CREAT SERPL: 9 (ref 7–25)
BUN/CREAT SERPL: 9.2 (ref 7–25)
BUN/CREAT SERPL: 9.7 (ref 7–25)
C PNEUM DNA NPH QL NAA+NON-PROBE: NOT DETECTED
CALCIUM SERPL-MCNC: 9.5 MG/DL (ref 8.7–10.3)
CALCIUM SPEC-SCNC: 8.1 MG/DL (ref 8.6–10.5)
CALCIUM SPEC-SCNC: 8.3 MG/DL (ref 8.6–10.5)
CALCIUM SPEC-SCNC: 8.3 MG/DL (ref 8.6–10.5)
CALCIUM SPEC-SCNC: 8.5 MG/DL (ref 8.6–10.5)
CALCIUM SPEC-SCNC: 8.5 MG/DL (ref 8.6–10.5)
CALCIUM SPEC-SCNC: 8.6 MG/DL (ref 8.6–10.5)
CALCIUM SPEC-SCNC: 8.7 MG/DL (ref 8.6–10.5)
CALCIUM SPEC-SCNC: 8.7 MG/DL (ref 8.6–10.5)
CALCIUM SPEC-SCNC: 8.8 MG/DL (ref 8.6–10.5)
CALCIUM SPEC-SCNC: 8.8 MG/DL (ref 8.6–10.5)
CALCIUM SPEC-SCNC: 8.9 MG/DL (ref 8.6–10.5)
CALCIUM SPEC-SCNC: 8.9 MG/DL (ref 8.6–10.5)
CALCIUM SPEC-SCNC: 9 MG/DL (ref 8.6–10.5)
CALCIUM SPEC-SCNC: 9.1 MG/DL (ref 8.6–10.5)
CALCIUM SPEC-SCNC: 9.1 MG/DL (ref 8.6–10.5)
CALCIUM SPEC-SCNC: 9.2 MG/DL (ref 8.6–10.5)
CALCIUM SPEC-SCNC: 9.3 MG/DL (ref 8.6–10.5)
CALCIUM SPEC-SCNC: 9.5 MG/DL (ref 8.6–10.5)
CALCIUM SPEC-SCNC: 9.6 MG/DL (ref 8.6–10.5)
CALCIUM SPEC-SCNC: 9.8 MG/DL (ref 8.6–10.5)
CATHETER CULTURE: NORMAL
CHLORIDE SERPL-SCNC: 100 MMOL/L (ref 98–107)
CHLORIDE SERPL-SCNC: 101 MMOL/L (ref 98–107)
CHLORIDE SERPL-SCNC: 102 MMOL/L (ref 98–107)
CHLORIDE SERPL-SCNC: 102 MMOL/L (ref 98–107)
CHLORIDE SERPL-SCNC: 103 MMOL/L (ref 98–107)
CHLORIDE SERPL-SCNC: 103 MMOL/L (ref 98–107)
CHLORIDE SERPL-SCNC: 104 MMOL/L (ref 98–107)
CHLORIDE SERPL-SCNC: 105 MMOL/L (ref 98–107)
CHLORIDE SERPL-SCNC: 105 MMOL/L (ref 98–107)
CHLORIDE SERPL-SCNC: 92 MMOL/L (ref 98–107)
CHLORIDE SERPL-SCNC: 92 MMOL/L (ref 98–107)
CHLORIDE SERPL-SCNC: 93 MMOL/L (ref 98–107)
CHLORIDE SERPL-SCNC: 94 MMOL/L (ref 98–107)
CHLORIDE SERPL-SCNC: 95 MMOL/L (ref 98–107)
CHLORIDE SERPL-SCNC: 96 MMOL/L (ref 96–106)
CHLORIDE SERPL-SCNC: 96 MMOL/L (ref 98–107)
CHLORIDE SERPL-SCNC: 96 MMOL/L (ref 98–107)
CHLORIDE SERPL-SCNC: 97 MMOL/L (ref 98–107)
CHLORIDE SERPL-SCNC: 98 MMOL/L (ref 98–107)
CHLORIDE SERPL-SCNC: 98 MMOL/L (ref 98–107)
CHLORIDE SERPL-SCNC: 99 MMOL/L (ref 98–107)
CK SERPL-CCNC: 69 U/L (ref 20–180)
CLARITY UR: ABNORMAL
CLARITY UR: CLEAR
CLARITY UR: CLEAR
CO2 SERPL-SCNC: 21.6 MMOL/L (ref 22–29)
CO2 SERPL-SCNC: 21.7 MMOL/L (ref 22–29)
CO2 SERPL-SCNC: 22 MMOL/L (ref 22–29)
CO2 SERPL-SCNC: 23.2 MMOL/L (ref 22–29)
CO2 SERPL-SCNC: 23.7 MMOL/L (ref 22–29)
CO2 SERPL-SCNC: 23.7 MMOL/L (ref 22–29)
CO2 SERPL-SCNC: 23.9 MMOL/L (ref 22–29)
CO2 SERPL-SCNC: 24 MMOL/L (ref 20–29)
CO2 SERPL-SCNC: 24 MMOL/L (ref 22–29)
CO2 SERPL-SCNC: 24.4 MMOL/L (ref 22–29)
CO2 SERPL-SCNC: 24.6 MMOL/L (ref 22–29)
CO2 SERPL-SCNC: 24.8 MMOL/L (ref 22–29)
CO2 SERPL-SCNC: 24.8 MMOL/L (ref 22–29)
CO2 SERPL-SCNC: 24.9 MMOL/L (ref 22–29)
CO2 SERPL-SCNC: 25 MMOL/L (ref 22–29)
CO2 SERPL-SCNC: 25.2 MMOL/L (ref 22–29)
CO2 SERPL-SCNC: 25.4 MMOL/L (ref 22–29)
CO2 SERPL-SCNC: 25.7 MMOL/L (ref 22–29)
CO2 SERPL-SCNC: 25.8 MMOL/L (ref 22–29)
CO2 SERPL-SCNC: 26.3 MMOL/L (ref 22–29)
CO2 SERPL-SCNC: 26.5 MMOL/L (ref 22–29)
CO2 SERPL-SCNC: 26.5 MMOL/L (ref 22–29)
CO2 SERPL-SCNC: 27.1 MMOL/L (ref 22–29)
CO2 SERPL-SCNC: 27.5 MMOL/L (ref 22–29)
CO2 SERPL-SCNC: 27.6 MMOL/L (ref 22–29)
CO2 SERPL-SCNC: 27.6 MMOL/L (ref 22–29)
CO2 SERPL-SCNC: 28.6 MMOL/L (ref 22–29)
CO2 SERPL-SCNC: 29.2 MMOL/L (ref 22–29)
CO2 SERPL-SCNC: 35 MMOL/L (ref 22–29)
CO2 SERPL-SCNC: 36.9 MMOL/L (ref 22–29)
COLOR UR: ABNORMAL
COLOR UR: ABNORMAL
COLOR UR: YELLOW
CREAT BLD-MCNC: 2.1 MG/DL (ref 0.57–1)
CREAT BLD-MCNC: 2.12 MG/DL (ref 0.57–1)
CREAT BLD-MCNC: 2.14 MG/DL (ref 0.57–1)
CREAT BLD-MCNC: 2.17 MG/DL (ref 0.57–1)
CREAT BLD-MCNC: 2.24 MG/DL (ref 0.57–1)
CREAT BLD-MCNC: 2.66 MG/DL (ref 0.57–1)
CREAT BLD-MCNC: 2.73 MG/DL (ref 0.57–1)
CREAT BLD-MCNC: 3.18 MG/DL (ref 0.57–1)
CREAT BLD-MCNC: 3.45 MG/DL (ref 0.57–1)
CREAT BLD-MCNC: 3.52 MG/DL (ref 0.57–1)
CREAT BLD-MCNC: 3.64 MG/DL (ref 0.57–1)
CREAT BLD-MCNC: 3.68 MG/DL (ref 0.57–1)
CREAT BLD-MCNC: 3.78 MG/DL (ref 0.57–1)
CREAT SERPL-MCNC: 1.63 MG/DL (ref 0.57–1)
CREAT SERPL-MCNC: 1.63 MG/DL (ref 0.57–1)
CREAT SERPL-MCNC: 2.07 MG/DL (ref 0.57–1)
CREAT SERPL-MCNC: 2.16 MG/DL (ref 0.57–1)
CREAT SERPL-MCNC: 2.19 MG/DL (ref 0.57–1)
CREAT SERPL-MCNC: 2.28 MG/DL (ref 0.57–1)
CREAT SERPL-MCNC: 2.38 MG/DL (ref 0.57–1)
CREAT SERPL-MCNC: 2.65 MG/DL (ref 0.57–1)
CREAT SERPL-MCNC: 2.88 MG/DL (ref 0.57–1)
CREAT SERPL-MCNC: 3.11 MG/DL (ref 0.57–1)
CREAT SERPL-MCNC: 3.35 MG/DL (ref 0.57–1)
CREAT SERPL-MCNC: 3.56 MG/DL (ref 0.57–1)
CREAT SERPL-MCNC: 3.61 MG/DL (ref 0.57–1)
CREAT SERPL-MCNC: 3.65 MG/DL (ref 0.57–1)
CREAT SERPL-MCNC: 3.82 MG/DL (ref 0.57–1)
CREAT SERPL-MCNC: 3.96 MG/DL (ref 0.57–1)
CREAT SERPL-MCNC: 3.97 MG/DL (ref 0.57–1)
CREAT SERPL-MCNC: 4.05 MG/DL (ref 0.57–1)
CREAT SERPL-MCNC: 4.31 MG/DL (ref 0.57–1)
CREAT SERPL-MCNC: 4.46 MG/DL (ref 0.57–1)
CREAT SERPL-MCNC: 4.82 MG/DL (ref 0.57–1)
CREAT SERPL-MCNC: 5.23 MG/DL (ref 0.57–1)
CROSSMATCH INTERPRETATION: NORMAL
D-LACTATE SERPL-SCNC: 0.9 MMOL/L (ref 0.5–2)
D-LACTATE SERPL-SCNC: 0.9 MMOL/L (ref 0.5–2)
D-LACTATE SERPL-SCNC: 1.4 MMOL/L (ref 0.5–2)
D-LACTATE SERPL-SCNC: 2.2 MMOL/L (ref 0.5–2)
D-LACTATE SERPL-SCNC: 2.7 MMOL/L (ref 0.5–2)
DEPRECATED RDW RBC AUTO: 42.3 FL (ref 37–54)
DEPRECATED RDW RBC AUTO: 42.5 FL (ref 37–54)
DEPRECATED RDW RBC AUTO: 43.2 FL (ref 37–54)
DEPRECATED RDW RBC AUTO: 43.3 FL (ref 37–54)
DEPRECATED RDW RBC AUTO: 44.5 FL (ref 37–54)
DEPRECATED RDW RBC AUTO: 44.9 FL (ref 37–54)
DEPRECATED RDW RBC AUTO: 45.2 FL (ref 37–54)
DEPRECATED RDW RBC AUTO: 46.1 FL (ref 37–54)
DEPRECATED RDW RBC AUTO: 47 FL (ref 37–54)
DEPRECATED RDW RBC AUTO: 47.2 FL (ref 37–54)
DEPRECATED RDW RBC AUTO: 48 FL (ref 37–54)
DEPRECATED RDW RBC AUTO: 48.1 FL (ref 37–54)
DEPRECATED RDW RBC AUTO: 48.8 FL (ref 37–54)
DEPRECATED RDW RBC AUTO: 48.8 FL (ref 37–54)
DEPRECATED RDW RBC AUTO: 49.6 FL (ref 37–54)
DEPRECATED RDW RBC AUTO: 49.8 FL (ref 37–54)
DEPRECATED RDW RBC AUTO: 50.1 FL (ref 37–54)
DEPRECATED RDW RBC AUTO: 50.4 FL (ref 37–54)
DEPRECATED RDW RBC AUTO: 50.5 FL (ref 37–54)
DEPRECATED RDW RBC AUTO: 50.7 FL (ref 37–54)
DEPRECATED RDW RBC AUTO: 50.8 FL (ref 37–54)
DEPRECATED RDW RBC AUTO: 51 FL (ref 37–54)
DEPRECATED RDW RBC AUTO: 51.1 FL (ref 37–54)
DEPRECATED RDW RBC AUTO: 51.4 FL (ref 37–54)
DEPRECATED RDW RBC AUTO: 51.9 FL (ref 37–54)
DEPRECATED RDW RBC AUTO: 52.6 FL (ref 37–54)
DEPRECATED RDW RBC AUTO: 52.8 FL (ref 37–54)
DEPRECATED RDW RBC AUTO: 53 FL (ref 37–54)
DEPRECATED RDW RBC AUTO: 53.4 FL (ref 37–54)
DEPRECATED RDW RBC AUTO: 54.1 FL (ref 37–54)
DEPRECATED RDW RBC AUTO: 54.4 FL (ref 37–54)
DEPRECATED RDW RBC AUTO: 54.4 FL (ref 37–54)
DEPRECATED RDW RBC AUTO: 54.6 FL (ref 37–54)
EOSINOPHIL # BLD AUTO: 0.01 10*3/MM3 (ref 0–0.4)
EOSINOPHIL # BLD AUTO: 0.15 10*3/MM3 (ref 0–0.4)
EOSINOPHIL # BLD AUTO: 0.18 10*3/MM3 (ref 0–0.4)
EOSINOPHIL # BLD AUTO: 0.18 10*3/MM3 (ref 0–0.4)
EOSINOPHIL # BLD AUTO: 0.25 10*3/MM3 (ref 0–0.4)
EOSINOPHIL # BLD AUTO: 0.27 10*3/MM3 (ref 0–0.4)
EOSINOPHIL # BLD AUTO: 0.28 10*3/MM3 (ref 0–0.4)
EOSINOPHIL # BLD AUTO: 0.3 X10E3/UL (ref 0–0.4)
EOSINOPHIL # BLD AUTO: 0.31 10*3/MM3 (ref 0–0.4)
EOSINOPHIL # BLD AUTO: 0.32 10*3/MM3 (ref 0–0.4)
EOSINOPHIL # BLD AUTO: 0.38 10*3/MM3 (ref 0–0.4)
EOSINOPHIL # BLD AUTO: 0.46 10*3/MM3 (ref 0–0.4)
EOSINOPHIL # BLD AUTO: 0.47 10*3/MM3 (ref 0–0.4)
EOSINOPHIL # BLD AUTO: 0.5 10*3/MM3 (ref 0–0.4)
EOSINOPHIL # BLD AUTO: 0.54 10*3/MM3 (ref 0–0.4)
EOSINOPHIL # BLD AUTO: 0.58 10*3/MM3 (ref 0–0.4)
EOSINOPHIL # BLD AUTO: 0.63 10*3/MM3 (ref 0–0.4)
EOSINOPHIL # BLD AUTO: 0.64 10*3/MM3 (ref 0–0.4)
EOSINOPHIL # BLD AUTO: 0.65 10*3/MM3 (ref 0–0.4)
EOSINOPHIL # BLD AUTO: 0.68 10*3/MM3 (ref 0–0.4)
EOSINOPHIL # BLD AUTO: 0.69 10*3/MM3 (ref 0–0.4)
EOSINOPHIL # BLD AUTO: 0.69 10*3/MM3 (ref 0–0.4)
EOSINOPHIL # BLD AUTO: 0.71 10*3/MM3 (ref 0–0.4)
EOSINOPHIL # BLD AUTO: 1 10*3/MM3 (ref 0–0.4)
EOSINOPHIL NFR BLD AUTO: 0 % (ref 0.3–6.2)
EOSINOPHIL NFR BLD AUTO: 1 % (ref 0.3–6.2)
EOSINOPHIL NFR BLD AUTO: 1.5 % (ref 0.3–6.2)
EOSINOPHIL NFR BLD AUTO: 1.8 % (ref 0.3–6.2)
EOSINOPHIL NFR BLD AUTO: 2 %
EOSINOPHIL NFR BLD AUTO: 2 % (ref 0.3–6.2)
EOSINOPHIL NFR BLD AUTO: 2.1 % (ref 0.3–6.2)
EOSINOPHIL NFR BLD AUTO: 2.2 % (ref 0.3–6.2)
EOSINOPHIL NFR BLD AUTO: 2.8 % (ref 0.3–6.2)
EOSINOPHIL NFR BLD AUTO: 3.5 % (ref 0.3–6.2)
EOSINOPHIL NFR BLD AUTO: 3.6 % (ref 0.3–6.2)
EOSINOPHIL NFR BLD AUTO: 4.1 % (ref 0.3–6.2)
EOSINOPHIL NFR BLD AUTO: 4.2 % (ref 0.3–6.2)
EOSINOPHIL NFR BLD AUTO: 4.2 % (ref 0.3–6.2)
EOSINOPHIL NFR BLD AUTO: 4.6 % (ref 0.3–6.2)
EOSINOPHIL NFR BLD AUTO: 4.8 % (ref 0.3–6.2)
EOSINOPHIL NFR BLD AUTO: 4.8 % (ref 0.3–6.2)
EOSINOPHIL NFR BLD AUTO: 5.1 % (ref 0.3–6.2)
EOSINOPHIL NFR BLD AUTO: 5.5 % (ref 0.3–6.2)
EOSINOPHIL NFR BLD AUTO: 6.3 % (ref 0.3–6.2)
EOSINOPHIL NFR BLD AUTO: 6.6 % (ref 0.3–6.2)
EOSINOPHIL NFR BLD AUTO: 7.4 % (ref 0.3–6.2)
EOSINOPHIL NFR BLD AUTO: 7.5 % (ref 0.3–6.2)
EOSINOPHIL NFR BLD AUTO: 7.6 % (ref 0.3–6.2)
ERYTHROCYTE [DISTWIDTH] IN BLOOD BY AUTOMATED COUNT: 13.9 % (ref 12.3–15.4)
ERYTHROCYTE [DISTWIDTH] IN BLOOD BY AUTOMATED COUNT: 13.9 % (ref 12.3–15.4)
ERYTHROCYTE [DISTWIDTH] IN BLOOD BY AUTOMATED COUNT: 14 % (ref 12.3–15.4)
ERYTHROCYTE [DISTWIDTH] IN BLOOD BY AUTOMATED COUNT: 14.1 % (ref 12.3–15.4)
ERYTHROCYTE [DISTWIDTH] IN BLOOD BY AUTOMATED COUNT: 14.2 % (ref 12.3–15.4)
ERYTHROCYTE [DISTWIDTH] IN BLOOD BY AUTOMATED COUNT: 14.4 % (ref 11.7–15.4)
ERYTHROCYTE [DISTWIDTH] IN BLOOD BY AUTOMATED COUNT: 14.6 % (ref 12.3–15.4)
ERYTHROCYTE [DISTWIDTH] IN BLOOD BY AUTOMATED COUNT: 14.9 % (ref 12.3–15.4)
ERYTHROCYTE [DISTWIDTH] IN BLOOD BY AUTOMATED COUNT: 15 % (ref 12.3–15.4)
ERYTHROCYTE [DISTWIDTH] IN BLOOD BY AUTOMATED COUNT: 15 % (ref 12.3–15.4)
ERYTHROCYTE [DISTWIDTH] IN BLOOD BY AUTOMATED COUNT: 15.1 % (ref 12.3–15.4)
ERYTHROCYTE [DISTWIDTH] IN BLOOD BY AUTOMATED COUNT: 15.1 % (ref 12.3–15.4)
ERYTHROCYTE [DISTWIDTH] IN BLOOD BY AUTOMATED COUNT: 15.2 % (ref 12.3–15.4)
ERYTHROCYTE [DISTWIDTH] IN BLOOD BY AUTOMATED COUNT: 15.3 % (ref 12.3–15.4)
ERYTHROCYTE [DISTWIDTH] IN BLOOD BY AUTOMATED COUNT: 15.4 % (ref 12.3–15.4)
ERYTHROCYTE [DISTWIDTH] IN BLOOD BY AUTOMATED COUNT: 15.6 % (ref 12.3–15.4)
ERYTHROCYTE [DISTWIDTH] IN BLOOD BY AUTOMATED COUNT: 15.6 % (ref 12.3–15.4)
ERYTHROCYTE [DISTWIDTH] IN BLOOD BY AUTOMATED COUNT: 15.7 % (ref 12.3–15.4)
ERYTHROCYTE [DISTWIDTH] IN BLOOD BY AUTOMATED COUNT: 15.8 % (ref 12.3–15.4)
ERYTHROCYTE [DISTWIDTH] IN BLOOD BY AUTOMATED COUNT: 15.9 % (ref 12.3–15.4)
ERYTHROCYTE [DISTWIDTH] IN BLOOD BY AUTOMATED COUNT: 16 % (ref 12.3–15.4)
ERYTHROCYTE [DISTWIDTH] IN BLOOD BY AUTOMATED COUNT: 16.3 % (ref 12.3–15.4)
FERRITIN SERPL-MCNC: 1228 NG/ML (ref 13–150)
FLUAV H1 2009 PAND RNA NPH QL NAA+PROBE: NOT DETECTED
FLUAV H1 HA GENE NPH QL NAA+PROBE: NOT DETECTED
FLUAV H3 RNA NPH QL NAA+PROBE: NOT DETECTED
FLUAV SUBTYP SPEC NAA+PROBE: NOT DETECTED
FLUBV RNA ISLT QL NAA+PROBE: NOT DETECTED
GAS FLOW AIRWAY: 2 LPM
GFR SERPL CREATININE-BSD FRML MDRD: 10 ML/MIN/1.73
GFR SERPL CREATININE-BSD FRML MDRD: 10 ML/MIN/1.73
GFR SERPL CREATININE-BSD FRML MDRD: 11 ML/MIN/1.73
GFR SERPL CREATININE-BSD FRML MDRD: 12 ML/MIN/1.73
GFR SERPL CREATININE-BSD FRML MDRD: 13 ML/MIN/1.73
GFR SERPL CREATININE-BSD FRML MDRD: 14 ML/MIN/1.73
GFR SERPL CREATININE-BSD FRML MDRD: 14 ML/MIN/1.73
GFR SERPL CREATININE-BSD FRML MDRD: 15 ML/MIN/1.73
GFR SERPL CREATININE-BSD FRML MDRD: 16 ML/MIN/1.73
GFR SERPL CREATININE-BSD FRML MDRD: 17 ML/MIN/1.73
GFR SERPL CREATININE-BSD FRML MDRD: 18 ML/MIN/1.73
GFR SERPL CREATININE-BSD FRML MDRD: 18 ML/MIN/1.73
GFR SERPL CREATININE-BSD FRML MDRD: 20 ML/MIN/1.73
GFR SERPL CREATININE-BSD FRML MDRD: 21 ML/MIN/1.73
GFR SERPL CREATININE-BSD FRML MDRD: 22 ML/MIN/1.73
GFR SERPL CREATININE-BSD FRML MDRD: 23 ML/MIN/1.73
GFR SERPL CREATININE-BSD FRML MDRD: 24 ML/MIN/1.73
GFR SERPL CREATININE-BSD FRML MDRD: 31 ML/MIN/1.73
GFR SERPL CREATININE-BSD FRML MDRD: 31 ML/MIN/1.73
GFR SERPL CREATININE-BSD FRML MDRD: 8 ML/MIN/1.73
GFR SERPL CREATININE-BSD FRML MDRD: 9 ML/MIN/1.73
GFR SERPL CREATININE-BSD FRML MDRD: ABNORMAL ML/MIN/{1.73_M2}
GLOBULIN UR ELPH-MCNC: 3.3 GM/DL
GLOBULIN UR ELPH-MCNC: 3.4 GM/DL
GLOBULIN UR ELPH-MCNC: 3.5 GM/DL
GLOBULIN UR ELPH-MCNC: 3.7 GM/DL
GLOBULIN UR ELPH-MCNC: 3.9 GM/DL
GLOBULIN UR ELPH-MCNC: 4.4 GM/DL
GLUCOSE BLD-MCNC: 109 MG/DL (ref 65–99)
GLUCOSE BLD-MCNC: 113 MG/DL (ref 65–99)
GLUCOSE BLD-MCNC: 136 MG/DL (ref 65–99)
GLUCOSE BLD-MCNC: 156 MG/DL (ref 65–99)
GLUCOSE BLD-MCNC: 198 MG/DL (ref 65–99)
GLUCOSE BLD-MCNC: 284 MG/DL (ref 65–99)
GLUCOSE BLD-MCNC: 306 MG/DL (ref 65–99)
GLUCOSE BLD-MCNC: 66 MG/DL (ref 65–99)
GLUCOSE BLD-MCNC: 74 MG/DL (ref 65–99)
GLUCOSE BLD-MCNC: 84 MG/DL (ref 65–99)
GLUCOSE BLD-MCNC: 86 MG/DL (ref 65–99)
GLUCOSE BLD-MCNC: 86 MG/DL (ref 65–99)
GLUCOSE BLD-MCNC: 95 MG/DL (ref 65–99)
GLUCOSE BLDC GLUCOMTR-MCNC: 100 MG/DL (ref 70–130)
GLUCOSE BLDC GLUCOMTR-MCNC: 103 MG/DL (ref 70–130)
GLUCOSE BLDC GLUCOMTR-MCNC: 107 MG/DL (ref 70–130)
GLUCOSE BLDC GLUCOMTR-MCNC: 110 MG/DL (ref 70–130)
GLUCOSE BLDC GLUCOMTR-MCNC: 111 MG/DL (ref 70–130)
GLUCOSE BLDC GLUCOMTR-MCNC: 112 MG/DL (ref 70–130)
GLUCOSE BLDC GLUCOMTR-MCNC: 112 MG/DL (ref 70–130)
GLUCOSE BLDC GLUCOMTR-MCNC: 114 MG/DL (ref 70–130)
GLUCOSE BLDC GLUCOMTR-MCNC: 115 MG/DL (ref 70–130)
GLUCOSE BLDC GLUCOMTR-MCNC: 115 MG/DL (ref 70–130)
GLUCOSE BLDC GLUCOMTR-MCNC: 116 MG/DL (ref 70–130)
GLUCOSE BLDC GLUCOMTR-MCNC: 116 MG/DL (ref 70–130)
GLUCOSE BLDC GLUCOMTR-MCNC: 117 MG/DL (ref 70–130)
GLUCOSE BLDC GLUCOMTR-MCNC: 119 MG/DL (ref 70–130)
GLUCOSE BLDC GLUCOMTR-MCNC: 120 MG/DL (ref 70–130)
GLUCOSE BLDC GLUCOMTR-MCNC: 120 MG/DL (ref 70–130)
GLUCOSE BLDC GLUCOMTR-MCNC: 126 MG/DL (ref 70–130)
GLUCOSE BLDC GLUCOMTR-MCNC: 127 MG/DL (ref 70–130)
GLUCOSE BLDC GLUCOMTR-MCNC: 128 MG/DL (ref 70–130)
GLUCOSE BLDC GLUCOMTR-MCNC: 129 MG/DL (ref 70–130)
GLUCOSE BLDC GLUCOMTR-MCNC: 130 MG/DL (ref 70–130)
GLUCOSE BLDC GLUCOMTR-MCNC: 131 MG/DL (ref 70–130)
GLUCOSE BLDC GLUCOMTR-MCNC: 132 MG/DL (ref 70–130)
GLUCOSE BLDC GLUCOMTR-MCNC: 133 MG/DL (ref 70–130)
GLUCOSE BLDC GLUCOMTR-MCNC: 134 MG/DL (ref 70–130)
GLUCOSE BLDC GLUCOMTR-MCNC: 135 MG/DL (ref 70–130)
GLUCOSE BLDC GLUCOMTR-MCNC: 137 MG/DL (ref 70–130)
GLUCOSE BLDC GLUCOMTR-MCNC: 137 MG/DL (ref 70–130)
GLUCOSE BLDC GLUCOMTR-MCNC: 139 MG/DL (ref 70–130)
GLUCOSE BLDC GLUCOMTR-MCNC: 141 MG/DL (ref 70–130)
GLUCOSE BLDC GLUCOMTR-MCNC: 144 MG/DL (ref 70–130)
GLUCOSE BLDC GLUCOMTR-MCNC: 146 MG/DL (ref 70–130)
GLUCOSE BLDC GLUCOMTR-MCNC: 146 MG/DL (ref 70–130)
GLUCOSE BLDC GLUCOMTR-MCNC: 150 MG/DL (ref 70–130)
GLUCOSE BLDC GLUCOMTR-MCNC: 151 MG/DL (ref 70–130)
GLUCOSE BLDC GLUCOMTR-MCNC: 152 MG/DL (ref 70–130)
GLUCOSE BLDC GLUCOMTR-MCNC: 152 MG/DL (ref 70–130)
GLUCOSE BLDC GLUCOMTR-MCNC: 153 MG/DL (ref 70–130)
GLUCOSE BLDC GLUCOMTR-MCNC: 160 MG/DL (ref 70–130)
GLUCOSE BLDC GLUCOMTR-MCNC: 160 MG/DL (ref 70–130)
GLUCOSE BLDC GLUCOMTR-MCNC: 161 MG/DL (ref 70–130)
GLUCOSE BLDC GLUCOMTR-MCNC: 171 MG/DL (ref 70–130)
GLUCOSE BLDC GLUCOMTR-MCNC: 172 MG/DL (ref 70–130)
GLUCOSE BLDC GLUCOMTR-MCNC: 177 MG/DL (ref 70–130)
GLUCOSE BLDC GLUCOMTR-MCNC: 180 MG/DL (ref 70–130)
GLUCOSE BLDC GLUCOMTR-MCNC: 181 MG/DL (ref 70–130)
GLUCOSE BLDC GLUCOMTR-MCNC: 181 MG/DL (ref 70–130)
GLUCOSE BLDC GLUCOMTR-MCNC: 183 MG/DL (ref 70–130)
GLUCOSE BLDC GLUCOMTR-MCNC: 184 MG/DL (ref 70–130)
GLUCOSE BLDC GLUCOMTR-MCNC: 184 MG/DL (ref 70–130)
GLUCOSE BLDC GLUCOMTR-MCNC: 186 MG/DL (ref 70–130)
GLUCOSE BLDC GLUCOMTR-MCNC: 187 MG/DL (ref 70–130)
GLUCOSE BLDC GLUCOMTR-MCNC: 194 MG/DL (ref 70–130)
GLUCOSE BLDC GLUCOMTR-MCNC: 198 MG/DL (ref 70–130)
GLUCOSE BLDC GLUCOMTR-MCNC: 202 MG/DL (ref 70–130)
GLUCOSE BLDC GLUCOMTR-MCNC: 205 MG/DL (ref 70–130)
GLUCOSE BLDC GLUCOMTR-MCNC: 218 MG/DL (ref 70–130)
GLUCOSE BLDC GLUCOMTR-MCNC: 221 MG/DL (ref 70–130)
GLUCOSE BLDC GLUCOMTR-MCNC: 226 MG/DL (ref 70–130)
GLUCOSE BLDC GLUCOMTR-MCNC: 234 MG/DL (ref 70–130)
GLUCOSE BLDC GLUCOMTR-MCNC: 236 MG/DL (ref 70–130)
GLUCOSE BLDC GLUCOMTR-MCNC: 241 MG/DL (ref 70–130)
GLUCOSE BLDC GLUCOMTR-MCNC: 241 MG/DL (ref 70–130)
GLUCOSE BLDC GLUCOMTR-MCNC: 245 MG/DL (ref 70–130)
GLUCOSE BLDC GLUCOMTR-MCNC: 245 MG/DL (ref 70–130)
GLUCOSE BLDC GLUCOMTR-MCNC: 247 MG/DL (ref 70–130)
GLUCOSE BLDC GLUCOMTR-MCNC: 267 MG/DL (ref 70–130)
GLUCOSE BLDC GLUCOMTR-MCNC: 271 MG/DL (ref 70–130)
GLUCOSE BLDC GLUCOMTR-MCNC: 272 MG/DL (ref 70–130)
GLUCOSE BLDC GLUCOMTR-MCNC: 321 MG/DL (ref 70–130)
GLUCOSE BLDC GLUCOMTR-MCNC: 377 MG/DL (ref 70–130)
GLUCOSE BLDC GLUCOMTR-MCNC: 61 MG/DL (ref 70–130)
GLUCOSE BLDC GLUCOMTR-MCNC: 63 MG/DL (ref 70–130)
GLUCOSE BLDC GLUCOMTR-MCNC: 65 MG/DL (ref 70–130)
GLUCOSE BLDC GLUCOMTR-MCNC: 70 MG/DL (ref 70–130)
GLUCOSE BLDC GLUCOMTR-MCNC: 73 MG/DL (ref 70–130)
GLUCOSE BLDC GLUCOMTR-MCNC: 74 MG/DL (ref 70–130)
GLUCOSE BLDC GLUCOMTR-MCNC: 78 MG/DL (ref 70–130)
GLUCOSE BLDC GLUCOMTR-MCNC: 78 MG/DL (ref 70–130)
GLUCOSE BLDC GLUCOMTR-MCNC: 79 MG/DL (ref 70–130)
GLUCOSE BLDC GLUCOMTR-MCNC: 80 MG/DL (ref 70–130)
GLUCOSE BLDC GLUCOMTR-MCNC: 82 MG/DL (ref 70–130)
GLUCOSE BLDC GLUCOMTR-MCNC: 83 MG/DL (ref 70–130)
GLUCOSE BLDC GLUCOMTR-MCNC: 83 MG/DL (ref 70–130)
GLUCOSE BLDC GLUCOMTR-MCNC: 84 MG/DL (ref 70–130)
GLUCOSE BLDC GLUCOMTR-MCNC: 86 MG/DL (ref 70–130)
GLUCOSE BLDC GLUCOMTR-MCNC: 87 MG/DL (ref 70–130)
GLUCOSE BLDC GLUCOMTR-MCNC: 89 MG/DL (ref 70–130)
GLUCOSE BLDC GLUCOMTR-MCNC: 90 MG/DL (ref 70–130)
GLUCOSE BLDC GLUCOMTR-MCNC: 92 MG/DL (ref 70–130)
GLUCOSE BLDC GLUCOMTR-MCNC: 92 MG/DL (ref 70–130)
GLUCOSE BLDC GLUCOMTR-MCNC: 94 MG/DL (ref 70–130)
GLUCOSE BLDC GLUCOMTR-MCNC: 96 MG/DL (ref 70–130)
GLUCOSE BLDC GLUCOMTR-MCNC: 98 MG/DL (ref 70–130)
GLUCOSE BLDC GLUCOMTR-MCNC: 99 MG/DL (ref 70–130)
GLUCOSE SERPL-MCNC: 102 MG/DL (ref 65–99)
GLUCOSE SERPL-MCNC: 109 MG/DL (ref 65–99)
GLUCOSE SERPL-MCNC: 115 MG/DL (ref 65–99)
GLUCOSE SERPL-MCNC: 124 MG/DL (ref 65–99)
GLUCOSE SERPL-MCNC: 126 MG/DL (ref 65–99)
GLUCOSE SERPL-MCNC: 139 MG/DL (ref 65–99)
GLUCOSE SERPL-MCNC: 140 MG/DL (ref 65–99)
GLUCOSE SERPL-MCNC: 140 MG/DL (ref 65–99)
GLUCOSE SERPL-MCNC: 149 MG/DL (ref 65–99)
GLUCOSE SERPL-MCNC: 173 MG/DL (ref 65–99)
GLUCOSE SERPL-MCNC: 231 MG/DL (ref 65–99)
GLUCOSE SERPL-MCNC: 60 MG/DL (ref 65–99)
GLUCOSE SERPL-MCNC: 62 MG/DL (ref 65–99)
GLUCOSE SERPL-MCNC: 63 MG/DL (ref 65–99)
GLUCOSE SERPL-MCNC: 66 MG/DL (ref 65–99)
GLUCOSE SERPL-MCNC: 69 MG/DL (ref 65–99)
GLUCOSE SERPL-MCNC: 77 MG/DL (ref 65–99)
GLUCOSE SERPL-MCNC: 80 MG/DL (ref 65–99)
GLUCOSE SERPL-MCNC: 80 MG/DL (ref 65–99)
GLUCOSE SERPL-MCNC: 84 MG/DL (ref 65–99)
GLUCOSE SERPL-MCNC: 97 MG/DL (ref 65–99)
GLUCOSE SERPL-MCNC: 99 MG/DL (ref 65–99)
GLUCOSE UR STRIP-MCNC: ABNORMAL MG/DL
GLUCOSE UR STRIP-MCNC: ABNORMAL MG/DL
GLUCOSE UR STRIP-MCNC: NEGATIVE MG/DL
GRAM STN SPEC: ABNORMAL
HADV DNA SPEC NAA+PROBE: NOT DETECTED
HBA1C MFR BLD: 5.5 % (ref 4.8–5.6)
HBA1C MFR BLD: 5.9 % (ref 4.8–5.6)
HBA1C MFR BLD: 6.2 % (ref 4.8–5.6)
HBV SURFACE AB SER RIA-ACNC: REACTIVE
HBV SURFACE AG SERPL QL IA: NORMAL
HCO3 BLDA-SCNC: 23.7 MMOL/L (ref 22–28)
HCOV 229E RNA SPEC QL NAA+PROBE: NOT DETECTED
HCOV HKU1 RNA SPEC QL NAA+PROBE: NOT DETECTED
HCOV NL63 RNA SPEC QL NAA+PROBE: NOT DETECTED
HCOV OC43 RNA SPEC QL NAA+PROBE: NOT DETECTED
HCT VFR BLD AUTO: 24.8 % (ref 34–46.6)
HCT VFR BLD AUTO: 25.3 % (ref 34–46.6)
HCT VFR BLD AUTO: 26.6 % (ref 34–46.6)
HCT VFR BLD AUTO: 26.6 % (ref 34–46.6)
HCT VFR BLD AUTO: 26.9 % (ref 34–46.6)
HCT VFR BLD AUTO: 27.1 % (ref 34–46.6)
HCT VFR BLD AUTO: 27.7 % (ref 34–46.6)
HCT VFR BLD AUTO: 27.8 % (ref 34–46.6)
HCT VFR BLD AUTO: 27.8 % (ref 34–46.6)
HCT VFR BLD AUTO: 28.1 % (ref 34–46.6)
HCT VFR BLD AUTO: 28.7 % (ref 34–46.6)
HCT VFR BLD AUTO: 28.8 % (ref 34–46.6)
HCT VFR BLD AUTO: 28.8 % (ref 34–46.6)
HCT VFR BLD AUTO: 29.1 % (ref 34–46.6)
HCT VFR BLD AUTO: 29.2 % (ref 34–46.6)
HCT VFR BLD AUTO: 29.5 % (ref 34–46.6)
HCT VFR BLD AUTO: 29.9 % (ref 34–46.6)
HCT VFR BLD AUTO: 30.8 % (ref 34–46.6)
HCT VFR BLD AUTO: 31.1 % (ref 34–46.6)
HCT VFR BLD AUTO: 31.3 % (ref 34–46.6)
HCT VFR BLD AUTO: 32.6 % (ref 34–46.6)
HCT VFR BLD AUTO: 32.7 % (ref 34–46.6)
HCT VFR BLD AUTO: 33.2 % (ref 34–46.6)
HCT VFR BLD AUTO: 33.4 % (ref 34–46.6)
HCT VFR BLD AUTO: 34.3 % (ref 34–46.6)
HCT VFR BLD AUTO: 34.3 % (ref 34–46.6)
HCT VFR BLD AUTO: 34.9 % (ref 34–46.6)
HCT VFR BLD AUTO: 35.5 % (ref 34–46.6)
HCT VFR BLD AUTO: 37.6 % (ref 34–46.6)
HCT VFR BLD AUTO: 38.7 % (ref 34–46.6)
HCT VFR BLD AUTO: 39.6 % (ref 34–46.6)
HCT VFR BLD AUTO: 40.2 % (ref 34–46.6)
HCT VFR BLD AUTO: 42.5 % (ref 34–46.6)
HCT VFR BLD AUTO: 45.7 % (ref 34–46.6)
HEMOCCULT STL QL: POSITIVE
HGB BLD-MCNC: 10.1 G/DL (ref 12–15.9)
HGB BLD-MCNC: 10.1 G/DL (ref 12–15.9)
HGB BLD-MCNC: 10.4 G/DL (ref 12–15.9)
HGB BLD-MCNC: 10.5 G/DL (ref 12–15.9)
HGB BLD-MCNC: 10.5 G/DL (ref 12–15.9)
HGB BLD-MCNC: 10.8 G/DL (ref 12–15.9)
HGB BLD-MCNC: 11 G/DL (ref 12–15.9)
HGB BLD-MCNC: 11 G/DL (ref 12–15.9)
HGB BLD-MCNC: 11.2 G/DL (ref 12–15.9)
HGB BLD-MCNC: 11.4 G/DL (ref 12–15.9)
HGB BLD-MCNC: 12.2 G/DL (ref 12–15.9)
HGB BLD-MCNC: 12.3 G/DL (ref 12–15.9)
HGB BLD-MCNC: 12.6 G/DL (ref 11.1–15.9)
HGB BLD-MCNC: 13.2 G/DL (ref 12–15.9)
HGB BLD-MCNC: 13.8 G/DL (ref 12–15.9)
HGB BLD-MCNC: 13.8 G/DL (ref 12–15.9)
HGB BLD-MCNC: 8 G/DL (ref 12–15.9)
HGB BLD-MCNC: 8.3 G/DL (ref 12–15.9)
HGB BLD-MCNC: 8.4 G/DL (ref 12–15.9)
HGB BLD-MCNC: 8.6 G/DL (ref 12–15.9)
HGB BLD-MCNC: 8.7 G/DL (ref 12–15.9)
HGB BLD-MCNC: 8.7 G/DL (ref 12–15.9)
HGB BLD-MCNC: 9 G/DL (ref 12–15.9)
HGB BLD-MCNC: 9 G/DL (ref 12–15.9)
HGB BLD-MCNC: 9.1 G/DL (ref 12–15.9)
HGB BLD-MCNC: 9.1 G/DL (ref 12–15.9)
HGB BLD-MCNC: 9.3 G/DL (ref 12–15.9)
HGB BLD-MCNC: 9.5 G/DL (ref 12–15.9)
HGB BLD-MCNC: 9.6 G/DL (ref 12–15.9)
HGB BLD-MCNC: 9.6 G/DL (ref 12–15.9)
HGB BLD-MCNC: 9.7 G/DL (ref 12–15.9)
HGB BLD-MCNC: 9.8 G/DL (ref 12–15.9)
HGB UR QL STRIP.AUTO: ABNORMAL
HMPV RNA NPH QL NAA+NON-PROBE: NOT DETECTED
HOLD SPECIMEN: NORMAL
HPIV1 RNA SPEC QL NAA+PROBE: NOT DETECTED
HPIV2 RNA SPEC QL NAA+PROBE: NOT DETECTED
HPIV3 RNA NPH QL NAA+PROBE: NOT DETECTED
HPIV4 P GENE NPH QL NAA+PROBE: NOT DETECTED
HYALINE CASTS UR QL AUTO: ABNORMAL /LPF
IMM GRANULOCYTES # BLD AUTO: 0 X10E3/UL (ref 0–0.1)
IMM GRANULOCYTES # BLD AUTO: 0.03 10*3/MM3 (ref 0–0.05)
IMM GRANULOCYTES # BLD AUTO: 0.04 10*3/MM3 (ref 0–0.05)
IMM GRANULOCYTES # BLD AUTO: 0.04 10*3/MM3 (ref 0–0.05)
IMM GRANULOCYTES # BLD AUTO: 0.05 10*3/MM3 (ref 0–0.05)
IMM GRANULOCYTES # BLD AUTO: 0.06 10*3/MM3 (ref 0–0.05)
IMM GRANULOCYTES # BLD AUTO: 0.06 10*3/MM3 (ref 0–0.05)
IMM GRANULOCYTES # BLD AUTO: 0.07 10*3/MM3 (ref 0–0.05)
IMM GRANULOCYTES # BLD AUTO: 0.08 10*3/MM3 (ref 0–0.05)
IMM GRANULOCYTES # BLD AUTO: 0.08 10*3/MM3 (ref 0–0.05)
IMM GRANULOCYTES # BLD AUTO: 0.11 10*3/MM3 (ref 0–0.05)
IMM GRANULOCYTES # BLD AUTO: 0.11 10*3/MM3 (ref 0–0.05)
IMM GRANULOCYTES # BLD AUTO: 0.12 10*3/MM3 (ref 0–0.05)
IMM GRANULOCYTES # BLD AUTO: 0.15 10*3/MM3 (ref 0–0.05)
IMM GRANULOCYTES NFR BLD AUTO: 0 %
IMM GRANULOCYTES NFR BLD AUTO: 0.3 % (ref 0–0.5)
IMM GRANULOCYTES NFR BLD AUTO: 0.4 % (ref 0–0.5)
IMM GRANULOCYTES NFR BLD AUTO: 0.5 % (ref 0–0.5)
IMM GRANULOCYTES NFR BLD AUTO: 0.6 % (ref 0–0.5)
IMM GRANULOCYTES NFR BLD AUTO: 0.7 % (ref 0–0.5)
IMM GRANULOCYTES NFR BLD AUTO: 0.8 % (ref 0–0.5)
IMM GRANULOCYTES NFR BLD AUTO: 0.9 % (ref 0–0.5)
INR PPP: 0.98 (ref 0.9–1.1)
IRON 24H UR-MRATE: 55 MCG/DL (ref 37–145)
IRON SATN MFR SERPL: 19 % (ref 20–50)
ISOLATED FROM: ABNORMAL
KETONES UR QL STRIP: ABNORMAL
KETONES UR QL STRIP: ABNORMAL
KETONES UR QL STRIP: NEGATIVE
LACTATE HOLD SPECIMEN: NORMAL
LEFT ATRIUM VOLUME INDEX: 45 ML/M2
LEUKOCYTE ESTERASE UR QL STRIP.AUTO: ABNORMAL
LYMPHOCYTES # BLD AUTO: 0.85 10*3/MM3 (ref 0.7–3.1)
LYMPHOCYTES # BLD AUTO: 1.05 10*3/MM3 (ref 0.7–3.1)
LYMPHOCYTES # BLD AUTO: 1.09 10*3/MM3 (ref 0.7–3.1)
LYMPHOCYTES # BLD AUTO: 1.13 10*3/MM3 (ref 0.7–3.1)
LYMPHOCYTES # BLD AUTO: 1.15 10*3/MM3 (ref 0.7–3.1)
LYMPHOCYTES # BLD AUTO: 1.16 10*3/MM3 (ref 0.7–3.1)
LYMPHOCYTES # BLD AUTO: 1.17 10*3/MM3 (ref 0.7–3.1)
LYMPHOCYTES # BLD AUTO: 1.21 10*3/MM3 (ref 0.7–3.1)
LYMPHOCYTES # BLD AUTO: 1.29 10*3/MM3 (ref 0.7–3.1)
LYMPHOCYTES # BLD AUTO: 1.32 10*3/MM3 (ref 0.7–3.1)
LYMPHOCYTES # BLD AUTO: 1.66 10*3/MM3 (ref 0.7–3.1)
LYMPHOCYTES # BLD AUTO: 1.72 10*3/MM3 (ref 0.7–3.1)
LYMPHOCYTES # BLD AUTO: 1.73 10*3/MM3 (ref 0.7–3.1)
LYMPHOCYTES # BLD AUTO: 1.76 10*3/MM3 (ref 0.7–3.1)
LYMPHOCYTES # BLD AUTO: 1.87 10*3/MM3 (ref 0.7–3.1)
LYMPHOCYTES # BLD AUTO: 1.88 10*3/MM3 (ref 0.7–3.1)
LYMPHOCYTES # BLD AUTO: 1.93 10*3/MM3 (ref 0.7–3.1)
LYMPHOCYTES # BLD AUTO: 2.05 10*3/MM3 (ref 0.7–3.1)
LYMPHOCYTES # BLD AUTO: 2.26 10*3/MM3 (ref 0.7–3.1)
LYMPHOCYTES # BLD AUTO: 2.28 10*3/MM3 (ref 0.7–3.1)
LYMPHOCYTES # BLD AUTO: 2.3 X10E3/UL (ref 0.7–3.1)
LYMPHOCYTES # BLD AUTO: 2.34 10*3/MM3 (ref 0.7–3.1)
LYMPHOCYTES # BLD AUTO: 2.37 10*3/MM3 (ref 0.7–3.1)
LYMPHOCYTES # BLD AUTO: 2.55 10*3/MM3 (ref 0.7–3.1)
LYMPHOCYTES NFR BLD AUTO: 11.5 % (ref 19.6–45.3)
LYMPHOCYTES NFR BLD AUTO: 11.9 % (ref 19.6–45.3)
LYMPHOCYTES NFR BLD AUTO: 12.3 % (ref 19.6–45.3)
LYMPHOCYTES NFR BLD AUTO: 12.4 % (ref 19.6–45.3)
LYMPHOCYTES NFR BLD AUTO: 12.8 % (ref 19.6–45.3)
LYMPHOCYTES NFR BLD AUTO: 12.9 % (ref 19.6–45.3)
LYMPHOCYTES NFR BLD AUTO: 12.9 % (ref 19.6–45.3)
LYMPHOCYTES NFR BLD AUTO: 13.3 % (ref 19.6–45.3)
LYMPHOCYTES NFR BLD AUTO: 13.4 % (ref 19.6–45.3)
LYMPHOCYTES NFR BLD AUTO: 13.8 % (ref 19.6–45.3)
LYMPHOCYTES NFR BLD AUTO: 13.8 % (ref 19.6–45.3)
LYMPHOCYTES NFR BLD AUTO: 14.3 % (ref 19.6–45.3)
LYMPHOCYTES NFR BLD AUTO: 15.5 % (ref 19.6–45.3)
LYMPHOCYTES NFR BLD AUTO: 17 %
LYMPHOCYTES NFR BLD AUTO: 17.9 % (ref 19.6–45.3)
LYMPHOCYTES NFR BLD AUTO: 18.8 % (ref 19.6–45.3)
LYMPHOCYTES NFR BLD AUTO: 19 % (ref 19.6–45.3)
LYMPHOCYTES NFR BLD AUTO: 19.5 % (ref 19.6–45.3)
LYMPHOCYTES NFR BLD AUTO: 20 % (ref 19.6–45.3)
LYMPHOCYTES NFR BLD AUTO: 21.2 % (ref 19.6–45.3)
LYMPHOCYTES NFR BLD AUTO: 5 % (ref 19.6–45.3)
LYMPHOCYTES NFR BLD AUTO: 7.1 % (ref 19.6–45.3)
LYMPHOCYTES NFR BLD AUTO: 9.3 % (ref 19.6–45.3)
LYMPHOCYTES NFR BLD AUTO: 9.7 % (ref 19.6–45.3)
M PNEUMO IGG SER IA-ACNC: NOT DETECTED
MAGNESIUM SERPL-MCNC: 1.6 MG/DL (ref 1.6–2.4)
MAGNESIUM SERPL-MCNC: 1.7 MG/DL (ref 1.6–2.4)
MAGNESIUM SERPL-MCNC: 1.8 MG/DL (ref 1.6–2.4)
MAGNESIUM SERPL-MCNC: 1.9 MG/DL (ref 1.6–2.4)
MAGNESIUM SERPL-MCNC: 1.9 MG/DL (ref 1.6–2.4)
MAGNESIUM SERPL-MCNC: 2.2 MG/DL (ref 1.6–2.4)
MAXIMAL PREDICTED HEART RATE: 149 BPM
MCH RBC QN AUTO: 26.6 PG (ref 26.6–33)
MCH RBC QN AUTO: 27.1 PG (ref 26.6–33)
MCH RBC QN AUTO: 27.2 PG (ref 26.6–33)
MCH RBC QN AUTO: 27.4 PG (ref 26.6–33)
MCH RBC QN AUTO: 27.5 PG (ref 26.6–33)
MCH RBC QN AUTO: 27.6 PG (ref 26.6–33)
MCH RBC QN AUTO: 27.7 PG (ref 26.6–33)
MCH RBC QN AUTO: 27.7 PG (ref 26.6–33)
MCH RBC QN AUTO: 27.8 PG (ref 26.6–33)
MCH RBC QN AUTO: 27.9 PG (ref 26.6–33)
MCH RBC QN AUTO: 28.1 PG (ref 26.6–33)
MCH RBC QN AUTO: 28.2 PG (ref 26.6–33)
MCH RBC QN AUTO: 28.2 PG (ref 26.6–33)
MCH RBC QN AUTO: 28.3 PG (ref 26.6–33)
MCH RBC QN AUTO: 29.1 PG (ref 26.6–33)
MCH RBC QN AUTO: 29.1 PG (ref 26.6–33)
MCH RBC QN AUTO: 29.3 PG (ref 26.6–33)
MCH RBC QN AUTO: 29.3 PG (ref 26.6–33)
MCH RBC QN AUTO: 29.4 PG (ref 26.6–33)
MCH RBC QN AUTO: 29.4 PG (ref 26.6–33)
MCH RBC QN AUTO: 29.7 PG (ref 26.6–33)
MCH RBC QN AUTO: 29.8 PG (ref 26.6–33)
MCH RBC QN AUTO: 29.9 PG (ref 26.6–33)
MCH RBC QN AUTO: 30.2 PG (ref 26.6–33)
MCH RBC QN AUTO: 30.3 PG (ref 26.6–33)
MCH RBC QN AUTO: 30.3 PG (ref 26.6–33)
MCH RBC QN AUTO: 30.4 PG (ref 26.6–33)
MCH RBC QN AUTO: 30.4 PG (ref 26.6–33)
MCH RBC QN AUTO: 30.6 PG (ref 26.6–33)
MCH RBC QN AUTO: 31.1 PG (ref 26.6–33)
MCHC RBC AUTO-ENTMCNC: 30.2 G/DL (ref 31.5–35.7)
MCHC RBC AUTO-ENTMCNC: 30.3 G/DL (ref 31.5–35.7)
MCHC RBC AUTO-ENTMCNC: 31 G/DL (ref 31.5–35.7)
MCHC RBC AUTO-ENTMCNC: 31.1 G/DL (ref 31.5–35.7)
MCHC RBC AUTO-ENTMCNC: 31.5 G/DL (ref 31.5–35.7)
MCHC RBC AUTO-ENTMCNC: 31.6 G/DL (ref 31.5–35.7)
MCHC RBC AUTO-ENTMCNC: 31.8 G/DL (ref 31.5–35.7)
MCHC RBC AUTO-ENTMCNC: 32 G/DL (ref 31.5–35.7)
MCHC RBC AUTO-ENTMCNC: 32 G/DL (ref 31.5–35.7)
MCHC RBC AUTO-ENTMCNC: 32.1 G/DL (ref 31.5–35.7)
MCHC RBC AUTO-ENTMCNC: 32.2 G/DL (ref 31.5–35.7)
MCHC RBC AUTO-ENTMCNC: 32.3 G/DL (ref 31.5–35.7)
MCHC RBC AUTO-ENTMCNC: 32.4 G/DL (ref 31.5–35.7)
MCHC RBC AUTO-ENTMCNC: 32.5 G/DL (ref 31.5–35.7)
MCHC RBC AUTO-ENTMCNC: 32.6 G/DL (ref 31.5–35.7)
MCHC RBC AUTO-ENTMCNC: 32.7 G/DL (ref 31.5–35.7)
MCHC RBC AUTO-ENTMCNC: 32.7 G/DL (ref 31.5–35.7)
MCHC RBC AUTO-ENTMCNC: 32.8 G/DL (ref 31.5–35.7)
MCHC RBC AUTO-ENTMCNC: 32.9 G/DL (ref 31.5–35.7)
MCHC RBC AUTO-ENTMCNC: 33.5 G/DL (ref 31.5–35.7)
MCV RBC AUTO: 83.5 FL (ref 79–97)
MCV RBC AUTO: 83.8 FL (ref 79–97)
MCV RBC AUTO: 84 FL (ref 79–97)
MCV RBC AUTO: 84.6 FL (ref 79–97)
MCV RBC AUTO: 85.2 FL (ref 79–97)
MCV RBC AUTO: 85.4 FL (ref 79–97)
MCV RBC AUTO: 85.5 FL (ref 79–97)
MCV RBC AUTO: 85.5 FL (ref 79–97)
MCV RBC AUTO: 86.1 FL (ref 79–97)
MCV RBC AUTO: 86.2 FL (ref 79–97)
MCV RBC AUTO: 86.4 FL (ref 79–97)
MCV RBC AUTO: 86.9 FL (ref 79–97)
MCV RBC AUTO: 87 FL (ref 79–97)
MCV RBC AUTO: 87.2 FL (ref 79–97)
MCV RBC AUTO: 87.2 FL (ref 79–97)
MCV RBC AUTO: 87.3 FL (ref 79–97)
MCV RBC AUTO: 90 FL (ref 79–97)
MCV RBC AUTO: 90.8 FL (ref 79–97)
MCV RBC AUTO: 91.2 FL (ref 79–97)
MCV RBC AUTO: 92.2 FL (ref 79–97)
MCV RBC AUTO: 92.6 FL (ref 79–97)
MCV RBC AUTO: 92.7 FL (ref 79–97)
MCV RBC AUTO: 92.8 FL (ref 79–97)
MCV RBC AUTO: 92.9 FL (ref 79–97)
MCV RBC AUTO: 93 FL (ref 79–97)
MCV RBC AUTO: 93.6 FL (ref 79–97)
MCV RBC AUTO: 94.8 FL (ref 79–97)
MCV RBC AUTO: 95.1 FL (ref 79–97)
MCV RBC AUTO: 95.6 FL (ref 79–97)
MCV RBC AUTO: 95.7 FL (ref 79–97)
MCV RBC AUTO: 96 FL (ref 79–97)
MCV RBC AUTO: 96.4 FL (ref 79–97)
MCV RBC AUTO: 97.5 FL (ref 79–97)
MCV RBC AUTO: 98 FL (ref 79–97)
MODALITY: ABNORMAL
MONOCYTES # BLD AUTO: 0.68 10*3/MM3 (ref 0.1–0.9)
MONOCYTES # BLD AUTO: 0.7 X10E3/UL (ref 0.1–0.9)
MONOCYTES # BLD AUTO: 0.74 10*3/MM3 (ref 0.1–0.9)
MONOCYTES # BLD AUTO: 0.77 10*3/MM3 (ref 0.1–0.9)
MONOCYTES # BLD AUTO: 0.78 10*3/MM3 (ref 0.1–0.9)
MONOCYTES # BLD AUTO: 0.81 10*3/MM3 (ref 0.1–0.9)
MONOCYTES # BLD AUTO: 0.81 10*3/MM3 (ref 0.1–0.9)
MONOCYTES # BLD AUTO: 0.82 10*3/MM3 (ref 0.1–0.9)
MONOCYTES # BLD AUTO: 0.88 10*3/MM3 (ref 0.1–0.9)
MONOCYTES # BLD AUTO: 0.89 10*3/MM3 (ref 0.1–0.9)
MONOCYTES # BLD AUTO: 0.89 10*3/MM3 (ref 0.1–0.9)
MONOCYTES # BLD AUTO: 0.9 10*3/MM3 (ref 0.1–0.9)
MONOCYTES # BLD AUTO: 0.92 10*3/MM3 (ref 0.1–0.9)
MONOCYTES # BLD AUTO: 0.93 10*3/MM3 (ref 0.1–0.9)
MONOCYTES # BLD AUTO: 0.97 10*3/MM3 (ref 0.1–0.9)
MONOCYTES # BLD AUTO: 0.97 10*3/MM3 (ref 0.1–0.9)
MONOCYTES # BLD AUTO: 1.08 10*3/MM3 (ref 0.1–0.9)
MONOCYTES # BLD AUTO: 1.09 10*3/MM3 (ref 0.1–0.9)
MONOCYTES # BLD AUTO: 1.19 10*3/MM3 (ref 0.1–0.9)
MONOCYTES # BLD AUTO: 1.22 10*3/MM3 (ref 0.1–0.9)
MONOCYTES # BLD AUTO: 1.27 10*3/MM3 (ref 0.1–0.9)
MONOCYTES # BLD AUTO: 1.27 10*3/MM3 (ref 0.1–0.9)
MONOCYTES NFR BLD AUTO: 10 % (ref 5–12)
MONOCYTES NFR BLD AUTO: 10.1 % (ref 5–12)
MONOCYTES NFR BLD AUTO: 10.5 % (ref 5–12)
MONOCYTES NFR BLD AUTO: 10.6 % (ref 5–12)
MONOCYTES NFR BLD AUTO: 4.2 % (ref 5–12)
MONOCYTES NFR BLD AUTO: 5 %
MONOCYTES NFR BLD AUTO: 6.1 % (ref 5–12)
MONOCYTES NFR BLD AUTO: 6.2 % (ref 5–12)
MONOCYTES NFR BLD AUTO: 6.7 % (ref 5–12)
MONOCYTES NFR BLD AUTO: 7.1 % (ref 5–12)
MONOCYTES NFR BLD AUTO: 7.1 % (ref 5–12)
MONOCYTES NFR BLD AUTO: 7.2 % (ref 5–12)
MONOCYTES NFR BLD AUTO: 7.2 % (ref 5–12)
MONOCYTES NFR BLD AUTO: 7.4 % (ref 5–12)
MONOCYTES NFR BLD AUTO: 7.4 % (ref 5–12)
MONOCYTES NFR BLD AUTO: 7.5 % (ref 5–12)
MONOCYTES NFR BLD AUTO: 7.7 % (ref 5–12)
MONOCYTES NFR BLD AUTO: 7.8 % (ref 5–12)
MONOCYTES NFR BLD AUTO: 8.2 % (ref 5–12)
MONOCYTES NFR BLD AUTO: 8.5 % (ref 5–12)
MONOCYTES NFR BLD AUTO: 8.5 % (ref 5–12)
MONOCYTES NFR BLD AUTO: 8.9 % (ref 5–12)
MONOCYTES NFR BLD AUTO: 9.1 % (ref 5–12)
MONOCYTES NFR BLD AUTO: 9.5 % (ref 5–12)
NEUTROPHILS # BLD AUTO: 10.4 X10E3/UL (ref 1.4–7)
NEUTROPHILS # BLD AUTO: 10.8 10*3/MM3 (ref 1.7–7)
NEUTROPHILS # BLD AUTO: 11.48 10*3/MM3 (ref 1.7–7)
NEUTROPHILS # BLD AUTO: 11.88 10*3/MM3 (ref 1.7–7)
NEUTROPHILS # BLD AUTO: 12.28 10*3/MM3 (ref 1.7–7)
NEUTROPHILS # BLD AUTO: 12.38 10*3/MM3 (ref 1.7–7)
NEUTROPHILS # BLD AUTO: 13.57 10*3/MM3 (ref 1.7–7)
NEUTROPHILS # BLD AUTO: 20.89 10*3/MM3 (ref 1.7–7)
NEUTROPHILS # BLD AUTO: 7.04 10*3/MM3 (ref 1.7–7)
NEUTROPHILS # BLD AUTO: 8.6 10*3/MM3 (ref 1.7–7)
NEUTROPHILS NFR BLD AUTO: 10.06 10*3/MM3 (ref 1.7–7)
NEUTROPHILS NFR BLD AUTO: 6 10*3/MM3 (ref 1.7–7)
NEUTROPHILS NFR BLD AUTO: 6.21 10*3/MM3 (ref 1.7–7)
NEUTROPHILS NFR BLD AUTO: 6.29 10*3/MM3 (ref 1.7–7)
NEUTROPHILS NFR BLD AUTO: 6.3 10*3/MM3 (ref 1.7–7)
NEUTROPHILS NFR BLD AUTO: 6.42 10*3/MM3 (ref 1.7–7)
NEUTROPHILS NFR BLD AUTO: 6.44 10*3/MM3 (ref 1.7–7)
NEUTROPHILS NFR BLD AUTO: 6.47 10*3/MM3 (ref 1.7–7)
NEUTROPHILS NFR BLD AUTO: 6.53 10*3/MM3 (ref 1.7–7)
NEUTROPHILS NFR BLD AUTO: 6.78 10*3/MM3 (ref 1.7–7)
NEUTROPHILS NFR BLD AUTO: 65.1 % (ref 42.7–76)
NEUTROPHILS NFR BLD AUTO: 65.9 % (ref 42.7–76)
NEUTROPHILS NFR BLD AUTO: 66 % (ref 42.7–76)
NEUTROPHILS NFR BLD AUTO: 67.5 % (ref 42.7–76)
NEUTROPHILS NFR BLD AUTO: 68.1 % (ref 42.7–76)
NEUTROPHILS NFR BLD AUTO: 68.3 % (ref 42.7–76)
NEUTROPHILS NFR BLD AUTO: 68.3 % (ref 42.7–76)
NEUTROPHILS NFR BLD AUTO: 68.7 % (ref 42.7–76)
NEUTROPHILS NFR BLD AUTO: 68.9 % (ref 42.7–76)
NEUTROPHILS NFR BLD AUTO: 7.17 10*3/MM3 (ref 1.7–7)
NEUTROPHILS NFR BLD AUTO: 70.5 % (ref 42.7–76)
NEUTROPHILS NFR BLD AUTO: 71.5 % (ref 42.7–76)
NEUTROPHILS NFR BLD AUTO: 72.1 % (ref 42.7–76)
NEUTROPHILS NFR BLD AUTO: 72.6 % (ref 42.7–76)
NEUTROPHILS NFR BLD AUTO: 73.3 % (ref 42.7–76)
NEUTROPHILS NFR BLD AUTO: 74.1 % (ref 42.7–76)
NEUTROPHILS NFR BLD AUTO: 74.3 % (ref 42.7–76)
NEUTROPHILS NFR BLD AUTO: 74.4 % (ref 42.7–76)
NEUTROPHILS NFR BLD AUTO: 75 %
NEUTROPHILS NFR BLD AUTO: 76.9 % (ref 42.7–76)
NEUTROPHILS NFR BLD AUTO: 77 % (ref 42.7–76)
NEUTROPHILS NFR BLD AUTO: 78 % (ref 42.7–76)
NEUTROPHILS NFR BLD AUTO: 8.02 10*3/MM3 (ref 1.7–7)
NEUTROPHILS NFR BLD AUTO: 80.6 % (ref 42.7–76)
NEUTROPHILS NFR BLD AUTO: 84.1 % (ref 42.7–76)
NEUTROPHILS NFR BLD AUTO: 89.9 % (ref 42.7–76)
NEUTROPHILS NFR BLD AUTO: 9.19 10*3/MM3 (ref 1.7–7)
NEUTROPHILS NFR BLD AUTO: 9.83 10*3/MM3 (ref 1.7–7)
NITRITE UR QL STRIP: NEGATIVE
NITRITE UR QL STRIP: POSITIVE
NRBC BLD AUTO-RTO: 0 /100 WBC (ref 0–0.2)
NRBC BLD AUTO-RTO: 0.1 /100 WBC (ref 0–0.2)
NRBC BLD AUTO-RTO: 0.2 /100 WBC (ref 0–0.2)
NT-PROBNP SERPL-MCNC: ABNORMAL PG/ML (ref 0–900)
OSMOLALITY UR: 366 MOSM/KG
OTHER ANTIBIOTIC SUSC ISLT: ABNORMAL
PCO2 BLDA: 53.8 MM HG (ref 35–45)
PH BLDA: 7.25 PH UNITS (ref 7.35–7.45)
PH UR STRIP.AUTO: 5.5 [PH] (ref 5–8)
PH UR STRIP.AUTO: 6 [PH] (ref 5–8)
PH UR STRIP.AUTO: 6 [PH] (ref 5–8)
PH UR STRIP.AUTO: 6.5 [PH] (ref 5–8)
PH UR STRIP.AUTO: 7 [PH] (ref 5–8)
PHOSPHATE SERPL-MCNC: 1.6 MG/DL (ref 2.5–4.5)
PHOSPHATE SERPL-MCNC: 2.4 MG/DL (ref 2.5–4.5)
PHOSPHATE SERPL-MCNC: 2.7 MG/DL (ref 2.5–4.5)
PHOSPHATE SERPL-MCNC: 3.1 MG/DL (ref 2.5–4.5)
PHOSPHATE SERPL-MCNC: 3.2 MG/DL (ref 2.5–4.5)
PHOSPHATE SERPL-MCNC: 3.3 MG/DL (ref 2.5–4.5)
PHOSPHATE SERPL-MCNC: 3.4 MG/DL (ref 2.5–4.5)
PHOSPHATE SERPL-MCNC: 3.5 MG/DL (ref 2.5–4.5)
PHOSPHATE SERPL-MCNC: 3.8 MG/DL (ref 2.5–4.5)
PHOSPHATE SERPL-MCNC: 3.8 MG/DL (ref 2.5–4.5)
PHOSPHATE SERPL-MCNC: 4.2 MG/DL (ref 2.5–4.5)
PHOSPHATE SERPL-MCNC: 4.3 MG/DL (ref 2.5–4.5)
PHOSPHATE SERPL-MCNC: 4.5 MG/DL (ref 2.5–4.5)
PHOSPHATE SERPL-MCNC: 4.5 MG/DL (ref 2.5–4.5)
PHOSPHATE SERPL-MCNC: 4.6 MG/DL (ref 2.5–4.5)
PHOSPHATE SERPL-MCNC: 4.7 MG/DL (ref 2.5–4.5)
PHOSPHATE SERPL-MCNC: 4.8 MG/DL (ref 2.5–4.5)
PHOSPHATE SERPL-MCNC: 4.9 MG/DL (ref 2.5–4.5)
PHOSPHATE SERPL-MCNC: 4.9 MG/DL (ref 2.5–4.5)
PLATELET # BLD AUTO: 184 10*3/MM3 (ref 140–450)
PLATELET # BLD AUTO: 186 10*3/MM3 (ref 140–450)
PLATELET # BLD AUTO: 191 10*3/MM3 (ref 140–450)
PLATELET # BLD AUTO: 194 10*3/MM3 (ref 140–450)
PLATELET # BLD AUTO: 212 10*3/MM3 (ref 140–450)
PLATELET # BLD AUTO: 217 10*3/MM3 (ref 140–450)
PLATELET # BLD AUTO: 228 10*3/MM3 (ref 140–450)
PLATELET # BLD AUTO: 230 10*3/MM3 (ref 140–450)
PLATELET # BLD AUTO: 235 10*3/MM3 (ref 140–450)
PLATELET # BLD AUTO: 239 10*3/MM3 (ref 140–450)
PLATELET # BLD AUTO: 239 10*3/MM3 (ref 140–450)
PLATELET # BLD AUTO: 240 10*3/MM3 (ref 140–450)
PLATELET # BLD AUTO: 244 10*3/MM3 (ref 140–450)
PLATELET # BLD AUTO: 245 10*3/MM3 (ref 140–450)
PLATELET # BLD AUTO: 246 10*3/MM3 (ref 140–450)
PLATELET # BLD AUTO: 247 10*3/MM3 (ref 140–450)
PLATELET # BLD AUTO: 251 10*3/MM3 (ref 140–450)
PLATELET # BLD AUTO: 251 10*3/MM3 (ref 140–450)
PLATELET # BLD AUTO: 255 10*3/MM3 (ref 140–450)
PLATELET # BLD AUTO: 262 10*3/MM3 (ref 140–450)
PLATELET # BLD AUTO: 265 10*3/MM3 (ref 140–450)
PLATELET # BLD AUTO: 266 10*3/MM3 (ref 140–450)
PLATELET # BLD AUTO: 273 10*3/MM3 (ref 140–450)
PLATELET # BLD AUTO: 291 10*3/MM3 (ref 140–450)
PLATELET # BLD AUTO: 291 10*3/MM3 (ref 140–450)
PLATELET # BLD AUTO: 315 10*3/MM3 (ref 140–450)
PLATELET # BLD AUTO: 317 10*3/MM3 (ref 140–450)
PLATELET # BLD AUTO: 321 10*3/MM3 (ref 140–450)
PLATELET # BLD AUTO: 321 10*3/MM3 (ref 140–450)
PLATELET # BLD AUTO: 325 10*3/MM3 (ref 140–450)
PLATELET # BLD AUTO: 331 10*3/MM3 (ref 140–450)
PLATELET # BLD AUTO: 335 10*3/MM3 (ref 140–450)
PLATELET # BLD AUTO: 340 X10E3/UL (ref 150–450)
PLATELET # BLD AUTO: 349 10*3/MM3 (ref 140–450)
PMV BLD AUTO: 10 FL (ref 6–12)
PMV BLD AUTO: 10.1 FL (ref 6–12)
PMV BLD AUTO: 10.1 FL (ref 6–12)
PMV BLD AUTO: 10.3 FL (ref 6–12)
PMV BLD AUTO: 10.4 FL (ref 6–12)
PMV BLD AUTO: 10.5 FL (ref 6–12)
PMV BLD AUTO: 10.5 FL (ref 6–12)
PMV BLD AUTO: 10.9 FL (ref 6–12)
PMV BLD AUTO: 11 FL (ref 6–12)
PMV BLD AUTO: 11.1 FL (ref 6–12)
PMV BLD AUTO: 11.1 FL (ref 6–12)
PMV BLD AUTO: 11.4 FL (ref 6–12)
PMV BLD AUTO: 9.2 FL (ref 6–12)
PMV BLD AUTO: 9.3 FL (ref 6–12)
PMV BLD AUTO: 9.4 FL (ref 6–12)
PMV BLD AUTO: 9.5 FL (ref 6–12)
PMV BLD AUTO: 9.6 FL (ref 6–12)
PMV BLD AUTO: 9.6 FL (ref 6–12)
PMV BLD AUTO: 9.7 FL (ref 6–12)
PMV BLD AUTO: 9.8 FL (ref 6–12)
PMV BLD AUTO: 9.9 FL (ref 6–12)
PO2 BLDA: 161 MM HG (ref 80–100)
POTASSIUM BLD-SCNC: 2.8 MMOL/L (ref 3.5–5.2)
POTASSIUM BLD-SCNC: 2.9 MMOL/L (ref 3.5–5.2)
POTASSIUM BLD-SCNC: 3 MMOL/L (ref 3.5–5.2)
POTASSIUM BLD-SCNC: 3.2 MMOL/L (ref 3.5–5.2)
POTASSIUM BLD-SCNC: 3.5 MMOL/L (ref 3.5–5.2)
POTASSIUM BLD-SCNC: 3.6 MMOL/L (ref 3.5–5.2)
POTASSIUM BLD-SCNC: 4.1 MMOL/L (ref 3.5–5.2)
POTASSIUM BLD-SCNC: 4.3 MMOL/L (ref 3.5–5.2)
POTASSIUM BLD-SCNC: 4.4 MMOL/L (ref 3.5–5.2)
POTASSIUM BLD-SCNC: 4.6 MMOL/L (ref 3.5–5.2)
POTASSIUM BLD-SCNC: 4.7 MMOL/L (ref 3.5–5.2)
POTASSIUM BLD-SCNC: 4.8 MMOL/L (ref 3.5–5.2)
POTASSIUM SERPL-SCNC: 3.1 MMOL/L (ref 3.5–5.2)
POTASSIUM SERPL-SCNC: 3.2 MMOL/L (ref 3.5–5.2)
POTASSIUM SERPL-SCNC: 3.2 MMOL/L (ref 3.5–5.2)
POTASSIUM SERPL-SCNC: 3.3 MMOL/L (ref 3.5–5.2)
POTASSIUM SERPL-SCNC: 3.3 MMOL/L (ref 3.5–5.2)
POTASSIUM SERPL-SCNC: 3.4 MMOL/L (ref 3.5–5.2)
POTASSIUM SERPL-SCNC: 3.6 MMOL/L (ref 3.5–5.2)
POTASSIUM SERPL-SCNC: 3.9 MMOL/L (ref 3.5–5.2)
POTASSIUM SERPL-SCNC: 3.9 MMOL/L (ref 3.5–5.2)
POTASSIUM SERPL-SCNC: 4 MMOL/L (ref 3.5–5.2)
POTASSIUM SERPL-SCNC: 4 MMOL/L (ref 3.5–5.2)
POTASSIUM SERPL-SCNC: 4.1 MMOL/L (ref 3.5–5.2)
POTASSIUM SERPL-SCNC: 4.3 MMOL/L (ref 3.5–5.2)
POTASSIUM SERPL-SCNC: 4.3 MMOL/L (ref 3.5–5.2)
POTASSIUM SERPL-SCNC: 4.6 MMOL/L (ref 3.5–5.2)
POTASSIUM SERPL-SCNC: 4.6 MMOL/L (ref 3.5–5.2)
POTASSIUM SERPL-SCNC: 4.9 MMOL/L (ref 3.5–5.2)
POTASSIUM SERPL-SCNC: 5.4 MMOL/L (ref 3.5–5.2)
POTASSIUM UR-SCNC: 54.4 MMOL/L
PROCALCITONIN SERPL-MCNC: 0.13 NG/ML (ref 0–0.25)
PROCALCITONIN SERPL-MCNC: 0.18 NG/ML (ref 0–0.25)
PROCALCITONIN SERPL-MCNC: 0.21 NG/ML (ref 0–0.25)
PROCALCITONIN SERPL-MCNC: 0.96 NG/ML (ref 0.1–0.25)
PROT SERPL-MCNC: 5.8 G/DL (ref 6–8.5)
PROT SERPL-MCNC: 6.1 G/DL (ref 6–8.5)
PROT SERPL-MCNC: 6.4 G/DL (ref 6–8.5)
PROT SERPL-MCNC: 6.4 G/DL (ref 6–8.5)
PROT SERPL-MCNC: 6.8 G/DL (ref 6–8.5)
PROT SERPL-MCNC: 6.8 G/DL (ref 6–8.5)
PROT SERPL-MCNC: 6.9 G/DL (ref 6–8.5)
PROT SERPL-MCNC: 7.6 G/DL (ref 6–8.5)
PROT UR QL STRIP: ABNORMAL
PROTHROMBIN TIME: 12.7 SECONDS (ref 11.7–14.2)
QT INTERVAL: 321 MS
QT INTERVAL: 382 MS
QT INTERVAL: 390 MS
QT INTERVAL: 398 MS
RBC # BLD AUTO: 2.67 10*6/MM3 (ref 3.77–5.28)
RBC # BLD AUTO: 2.73 10*6/MM3 (ref 3.77–5.28)
RBC # BLD AUTO: 2.78 10*6/MM3 (ref 3.77–5.28)
RBC # BLD AUTO: 2.83 10*6/MM3 (ref 3.77–5.28)
RBC # BLD AUTO: 2.84 10*6/MM3 (ref 3.77–5.28)
RBC # BLD AUTO: 2.92 10*6/MM3 (ref 3.77–5.28)
RBC # BLD AUTO: 2.93 10*6/MM3 (ref 3.77–5.28)
RBC # BLD AUTO: 2.94 10*6/MM3 (ref 3.77–5.28)
RBC # BLD AUTO: 2.99 10*6/MM3 (ref 3.77–5.28)
RBC # BLD AUTO: 3 10*6/MM3 (ref 3.77–5.28)
RBC # BLD AUTO: 3.06 10*6/MM3 (ref 3.77–5.28)
RBC # BLD AUTO: 3.08 10*6/MM3 (ref 3.77–5.28)
RBC # BLD AUTO: 3.3 10*6/MM3 (ref 3.77–5.28)
RBC # BLD AUTO: 3.34 10*6/MM3 (ref 3.77–5.28)
RBC # BLD AUTO: 3.38 10*6/MM3 (ref 3.77–5.28)
RBC # BLD AUTO: 3.39 10*6/MM3 (ref 3.77–5.28)
RBC # BLD AUTO: 3.45 10*6/MM3 (ref 3.77–5.28)
RBC # BLD AUTO: 3.51 10*6/MM3 (ref 3.77–5.28)
RBC # BLD AUTO: 3.53 10*6/MM3 (ref 3.77–5.28)
RBC # BLD AUTO: 3.57 10*6/MM3 (ref 3.77–5.28)
RBC # BLD AUTO: 3.58 10*6/MM3 (ref 3.77–5.28)
RBC # BLD AUTO: 3.59 10*6/MM3 (ref 3.77–5.28)
RBC # BLD AUTO: 3.64 10*6/MM3 (ref 3.77–5.28)
RBC # BLD AUTO: 3.68 10*6/MM3 (ref 3.77–5.28)
RBC # BLD AUTO: 3.74 10*6/MM3 (ref 3.77–5.28)
RBC # BLD AUTO: 3.81 10*6/MM3 (ref 3.77–5.28)
RBC # BLD AUTO: 4.01 10*6/MM3 (ref 3.77–5.28)
RBC # BLD AUTO: 4.12 10*6/MM3 (ref 3.77–5.28)
RBC # BLD AUTO: 4.4 10*6/MM3 (ref 3.77–5.28)
RBC # BLD AUTO: 4.53 10*6/MM3 (ref 3.77–5.28)
RBC # BLD AUTO: 4.74 10*6/MM3 (ref 3.77–5.28)
RBC # BLD AUTO: 4.74 X10E6/UL (ref 3.77–5.28)
RBC # BLD AUTO: 4.8 10*6/MM3 (ref 3.77–5.28)
RBC # BLD AUTO: 5.09 10*6/MM3 (ref 3.77–5.28)
RBC # UR: ABNORMAL /HPF
REF LAB TEST METHOD: ABNORMAL
REF LAB TEST METHOD: NORMAL
REF LAB TEST METHOD: NORMAL
RH BLD: POSITIVE
RHINOVIRUS RNA SPEC NAA+PROBE: NOT DETECTED
RSV RNA NPH QL NAA+NON-PROBE: NOT DETECTED
SAO2 % BLDCOA: 99.1 % (ref 92–99)
SARS-COV-2 RNA NPH QL NAA+NON-PROBE: NOT DETECTED
SARS-COV-2 RNA PNL SPEC NAA+PROBE: NOT DETECTED
SARS-COV-2 RNA RESP QL NAA+PROBE: NOT DETECTED
SARS-COV-2 RNA RESP QL NAA+PROBE: NOT DETECTED
SODIUM BLD-SCNC: 128 MMOL/L (ref 136–145)
SODIUM BLD-SCNC: 134 MMOL/L (ref 136–145)
SODIUM BLD-SCNC: 135 MMOL/L (ref 136–145)
SODIUM BLD-SCNC: 136 MMOL/L (ref 136–145)
SODIUM BLD-SCNC: 138 MMOL/L (ref 136–145)
SODIUM BLD-SCNC: 138 MMOL/L (ref 136–145)
SODIUM BLD-SCNC: 139 MMOL/L (ref 136–145)
SODIUM BLD-SCNC: 139 MMOL/L (ref 136–145)
SODIUM BLD-SCNC: 140 MMOL/L (ref 136–145)
SODIUM BLD-SCNC: 140 MMOL/L (ref 136–145)
SODIUM BLD-SCNC: 141 MMOL/L (ref 136–145)
SODIUM BLD-SCNC: 142 MMOL/L (ref 136–145)
SODIUM BLD-SCNC: 143 MMOL/L (ref 136–145)
SODIUM SERPL-SCNC: 128 MMOL/L (ref 136–145)
SODIUM SERPL-SCNC: 130 MMOL/L (ref 136–145)
SODIUM SERPL-SCNC: 131 MMOL/L (ref 136–145)
SODIUM SERPL-SCNC: 131 MMOL/L (ref 136–145)
SODIUM SERPL-SCNC: 134 MMOL/L (ref 136–145)
SODIUM SERPL-SCNC: 134 MMOL/L (ref 136–145)
SODIUM SERPL-SCNC: 135 MMOL/L (ref 136–145)
SODIUM SERPL-SCNC: 135 MMOL/L (ref 136–145)
SODIUM SERPL-SCNC: 136 MMOL/L (ref 136–145)
SODIUM SERPL-SCNC: 137 MMOL/L (ref 136–145)
SODIUM SERPL-SCNC: 138 MMOL/L (ref 136–145)
SODIUM SERPL-SCNC: 138 MMOL/L (ref 136–145)
SODIUM SERPL-SCNC: 139 MMOL/L (ref 134–144)
SODIUM SERPL-SCNC: 139 MMOL/L (ref 136–145)
SODIUM SERPL-SCNC: 139 MMOL/L (ref 136–145)
SP GR UR STRIP: 1.01 (ref 1–1.03)
SP GR UR STRIP: 1.01 (ref 1–1.03)
SP GR UR STRIP: 1.02 (ref 1–1.03)
SQUAMOUS #/AREA URNS HPF: ABNORMAL /HPF
STRESS TARGET HR: 127 BPM
T&S EXPIRATION DATE: NORMAL
TIBC SERPL-MCNC: 285 MCG/DL (ref 298–536)
TOTAL RATE: 12 BREATHS/MINUTE
TRANSFERRIN SERPL-MCNC: 191 MG/DL (ref 200–360)
TROPONIN T SERPL-MCNC: 0.06 NG/ML (ref 0–0.03)
TROPONIN T SERPL-MCNC: 0.06 NG/ML (ref 0–0.03)
TROPONIN T SERPL-MCNC: 0.1 NG/ML (ref 0–0.03)
TROPONIN T SERPL-MCNC: 0.1 NG/ML (ref 0–0.03)
TROPONIN T SERPL-MCNC: 0.11 NG/ML (ref 0–0.03)
TROPONIN T SERPL-MCNC: 0.13 NG/ML (ref 0–0.03)
TROPONIN T SERPL-MCNC: 0.14 NG/ML (ref 0–0.03)
TROPONIN T SERPL-MCNC: 0.15 NG/ML (ref 0–0.03)
TSH SERPL DL<=0.05 MIU/L-ACNC: 2.02 UIU/ML (ref 0.27–4.2)
TSH SERPL DL<=0.05 MIU/L-ACNC: 2.13 UIU/ML (ref 0.27–4.2)
TSH SERPL DL<=0.05 MIU/L-ACNC: 3.59 UIU/ML (ref 0.27–4.2)
UNIT  ABO: NORMAL
UNIT  RH: NORMAL
UPPER ARTERIAL LEFT ARM BRACHIAL LENGTH: 0.29 CM
UPPER ARTERIAL LEFT ARM BRACHIAL SYS MAX: 176 MMHG
UPPER ARTERIAL LEFT ARM RADIAL SYS MAX: 176 MMHG
UPPER ARTERIAL RIGHT ARM BRACHIAL LENGTH: 0.25 CM
UPPER ARTERIAL RIGHT ARM BRACHIAL SYS MAX: 108 MMHG
URATE SERPL-MCNC: 10.1 MG/DL (ref 2.4–5.7)
URATE SERPL-MCNC: 10.3 MG/DL (ref 2.4–5.7)
URATE SERPL-MCNC: 10.7 MG/DL (ref 2.4–5.7)
URATE SERPL-MCNC: 11.9 MG/DL (ref 2.4–5.7)
URATE SERPL-MCNC: 9.6 MG/DL (ref 2.4–5.7)
UROBILINOGEN UR QL STRIP: ABNORMAL
VIT B12 BLD-MCNC: 338 PG/ML (ref 211–946)
WBC # BLD AUTO: 10.5 10*3/MM3 (ref 3.4–10.8)
WBC # BLD AUTO: 10.82 10*3/MM3 (ref 3.4–10.8)
WBC # BLD AUTO: 10.91 10*3/MM3 (ref 3.4–10.8)
WBC # BLD AUTO: 11.96 10*3/MM3 (ref 3.4–10.8)
WBC # BLD AUTO: 12.37 10*3/MM3 (ref 3.4–10.8)
WBC # BLD AUTO: 12.46 10*3/MM3 (ref 3.4–10.8)
WBC # BLD AUTO: 12.6 10*3/MM3 (ref 3.4–10.8)
WBC # BLD AUTO: 13.35 10*3/MM3 (ref 3.4–10.8)
WBC # BLD AUTO: 13.8 X10E3/UL (ref 3.4–10.8)
WBC # BLD AUTO: 8.17 10*3/MM3 (ref 3.4–10.8)
WBC # BLD AUTO: 8.25 10*3/MM3 (ref 3.4–10.8)
WBC # BLD AUTO: 8.68 10*3/MM3 (ref 3.4–10.8)
WBC # BLD AUTO: 8.8 10*3/MM3 (ref 3.4–10.8)
WBC # BLD AUTO: 8.99 10*3/MM3 (ref 3.4–10.8)
WBC # BLD AUTO: 9.13 10*3/MM3 (ref 3.4–10.8)
WBC # BLD AUTO: 9.2 10*3/MM3 (ref 3.4–10.8)
WBC # BLD AUTO: 9.37 10*3/MM3 (ref 3.4–10.8)
WBC # BLD AUTO: 9.53 10*3/MM3 (ref 3.4–10.8)
WBC # BLD AUTO: 9.57 10*3/MM3 (ref 3.4–10.8)
WBC # BLD AUTO: 9.66 10*3/MM3 (ref 3.4–10.8)
WBC # BLD AUTO: 9.82 10*3/MM3 (ref 3.4–10.8)
WBC NRBC COR # BLD: 10.04 10*3/MM3 (ref 3.4–10.8)
WBC NRBC COR # BLD: 10.67 10*3/MM3 (ref 3.4–10.8)
WBC NRBC COR # BLD: 12.04 10*3/MM3 (ref 3.4–10.8)
WBC NRBC COR # BLD: 12.18 10*3/MM3 (ref 3.4–10.8)
WBC NRBC COR # BLD: 14.02 10*3/MM3 (ref 3.4–10.8)
WBC NRBC COR # BLD: 14.33 10*3/MM3 (ref 3.4–10.8)
WBC NRBC COR # BLD: 15.24 10*3/MM3 (ref 3.4–10.8)
WBC NRBC COR # BLD: 15.42 10*3/MM3 (ref 3.4–10.8)
WBC NRBC COR # BLD: 16.48 10*3/MM3 (ref 3.4–10.8)
WBC NRBC COR # BLD: 16.9 10*3/MM3 (ref 3.4–10.8)
WBC NRBC COR # BLD: 17.64 10*3/MM3 (ref 3.4–10.8)
WBC NRBC COR # BLD: 18.03 10*3/MM3 (ref 3.4–10.8)
WBC NRBC COR # BLD: 23.23 10*3/MM3 (ref 3.4–10.8)
WBC UR QL AUTO: ABNORMAL /HPF
WHOLE BLOOD HOLD SPECIMEN: NORMAL

## 2020-01-01 PROCEDURE — 84550 ASSAY OF BLOOD/URIC ACID: CPT | Performed by: INTERNAL MEDICINE

## 2020-01-01 PROCEDURE — 25010000003 CEFTRIAXONE PER 250 MG: Performed by: INTERNAL MEDICINE

## 2020-01-01 PROCEDURE — 93010 ELECTROCARDIOGRAM REPORT: CPT | Performed by: INTERNAL MEDICINE

## 2020-01-01 PROCEDURE — 5A1D70Z PERFORMANCE OF URINARY FILTRATION, INTERMITTENT, LESS THAN 6 HOURS PER DAY: ICD-10-PCS | Performed by: INTERNAL MEDICINE

## 2020-01-01 PROCEDURE — 85025 COMPLETE CBC W/AUTO DIFF WBC: CPT | Performed by: NURSE PRACTITIONER

## 2020-01-01 PROCEDURE — 82962 GLUCOSE BLOOD TEST: CPT

## 2020-01-01 PROCEDURE — 83735 ASSAY OF MAGNESIUM: CPT | Performed by: INTERNAL MEDICINE

## 2020-01-01 PROCEDURE — 25010000002 HYDROMORPHONE PER 4 MG: Performed by: HOSPITALIST

## 2020-01-01 PROCEDURE — 85027 COMPLETE CBC AUTOMATED: CPT | Performed by: INTERNAL MEDICINE

## 2020-01-01 PROCEDURE — 97530 THERAPEUTIC ACTIVITIES: CPT | Performed by: PHYSICAL THERAPIST

## 2020-01-01 PROCEDURE — 25010000002 LORAZEPAM PER 2 MG: Performed by: HOSPITALIST

## 2020-01-01 PROCEDURE — 99214 OFFICE O/P EST MOD 30 MIN: CPT | Performed by: NURSE PRACTITIONER

## 2020-01-01 PROCEDURE — 25010000002 PROPOFOL 10 MG/ML EMULSION: Performed by: NURSE ANESTHETIST, CERTIFIED REGISTERED

## 2020-01-01 PROCEDURE — 86850 RBC ANTIBODY SCREEN: CPT | Performed by: NURSE PRACTITIONER

## 2020-01-01 PROCEDURE — 87040 BLOOD CULTURE FOR BACTERIA: CPT | Performed by: NURSE PRACTITIONER

## 2020-01-01 PROCEDURE — 80069 RENAL FUNCTION PANEL: CPT | Performed by: INTERNAL MEDICINE

## 2020-01-01 PROCEDURE — 87340 HEPATITIS B SURFACE AG IA: CPT | Performed by: INTERNAL MEDICINE

## 2020-01-01 PROCEDURE — 63710000001 INSULIN LISPRO (HUMAN) PER 5 UNITS: Performed by: INTERNAL MEDICINE

## 2020-01-01 PROCEDURE — 85025 COMPLETE CBC W/AUTO DIFF WBC: CPT | Performed by: INTERNAL MEDICINE

## 2020-01-01 PROCEDURE — 99215 OFFICE O/P EST HI 40 MIN: CPT | Performed by: NURSE PRACTITIONER

## 2020-01-01 PROCEDURE — 99222 1ST HOSP IP/OBS MODERATE 55: CPT | Performed by: INTERNAL MEDICINE

## 2020-01-01 PROCEDURE — 93005 ELECTROCARDIOGRAM TRACING: CPT | Performed by: NURSE PRACTITIONER

## 2020-01-01 PROCEDURE — G0378 HOSPITAL OBSERVATION PER HR: HCPCS

## 2020-01-01 PROCEDURE — 63710000001 INSULIN GLARGINE PER 5 UNITS: Performed by: SURGERY

## 2020-01-01 PROCEDURE — 87150 DNA/RNA AMPLIFIED PROBE: CPT | Performed by: INTERNAL MEDICINE

## 2020-01-01 PROCEDURE — C1750 CATH, HEMODIALYSIS,LONG-TERM: HCPCS | Performed by: SURGERY

## 2020-01-01 PROCEDURE — 93000 ELECTROCARDIOGRAM COMPLETE: CPT | Performed by: NURSE PRACTITIONER

## 2020-01-01 PROCEDURE — 83036 HEMOGLOBIN GLYCOSYLATED A1C: CPT | Performed by: INTERNAL MEDICINE

## 2020-01-01 PROCEDURE — 71046 X-RAY EXAM CHEST 2 VIEWS: CPT

## 2020-01-01 PROCEDURE — 25010000002 ONDANSETRON PER 1 MG: Performed by: INTERNAL MEDICINE

## 2020-01-01 PROCEDURE — 71045 X-RAY EXAM CHEST 1 VIEW: CPT

## 2020-01-01 PROCEDURE — 25010000002 FENTANYL CITRATE (PF) 100 MCG/2ML SOLUTION: Performed by: INTERNAL MEDICINE

## 2020-01-01 PROCEDURE — 85025 COMPLETE CBC W/AUTO DIFF WBC: CPT | Performed by: EMERGENCY MEDICINE

## 2020-01-01 PROCEDURE — 25010000002 ONDANSETRON PER 1 MG: Performed by: NURSE ANESTHETIST, CERTIFIED REGISTERED

## 2020-01-01 PROCEDURE — 99232 SBSQ HOSP IP/OBS MODERATE 35: CPT | Performed by: INTERNAL MEDICINE

## 2020-01-01 PROCEDURE — 25010000002 ERTAPENEM PER 500 MG: Performed by: INTERNAL MEDICINE

## 2020-01-01 PROCEDURE — 25010000002 HEPARIN (PORCINE) PER 1000 UNITS: Performed by: INTERNAL MEDICINE

## 2020-01-01 PROCEDURE — 63710000001 INSULIN GLARGINE PER 5 UNITS: Performed by: NURSE PRACTITIONER

## 2020-01-01 PROCEDURE — 93000 ELECTROCARDIOGRAM COMPLETE: CPT | Performed by: INTERNAL MEDICINE

## 2020-01-01 PROCEDURE — 36415 COLL VENOUS BLD VENIPUNCTURE: CPT

## 2020-01-01 PROCEDURE — 99233 SBSQ HOSP IP/OBS HIGH 50: CPT | Performed by: NURSE PRACTITIONER

## 2020-01-01 PROCEDURE — 87070 CULTURE OTHR SPECIMN AEROBIC: CPT | Performed by: SURGERY

## 2020-01-01 PROCEDURE — B2131ZZ FLUOROSCOPY OF MULTIPLE CORONARY ARTERY BYPASS GRAFTS USING LOW OSMOLAR CONTRAST: ICD-10-PCS | Performed by: INTERNAL MEDICINE

## 2020-01-01 PROCEDURE — 99231 SBSQ HOSP IP/OBS SF/LOW 25: CPT | Performed by: NURSE PRACTITIONER

## 2020-01-01 PROCEDURE — 87040 BLOOD CULTURE FOR BACTERIA: CPT | Performed by: INTERNAL MEDICINE

## 2020-01-01 PROCEDURE — 63710000001 INSULIN GLARGINE PER 5 UNITS: Performed by: HOSPITALIST

## 2020-01-01 PROCEDURE — 80053 COMPREHEN METABOLIC PANEL: CPT | Performed by: NURSE PRACTITIONER

## 2020-01-01 PROCEDURE — 93005 ELECTROCARDIOGRAM TRACING: CPT | Performed by: EMERGENCY MEDICINE

## 2020-01-01 PROCEDURE — 83880 ASSAY OF NATRIURETIC PEPTIDE: CPT | Performed by: NURSE PRACTITIONER

## 2020-01-01 PROCEDURE — 83605 ASSAY OF LACTIC ACID: CPT | Performed by: NURSE PRACTITIONER

## 2020-01-01 PROCEDURE — 84132 ASSAY OF SERUM POTASSIUM: CPT | Performed by: SURGERY

## 2020-01-01 PROCEDURE — 63710000001 INSULIN LISPRO (HUMAN) PER 5 UNITS: Performed by: SURGERY

## 2020-01-01 PROCEDURE — 97110 THERAPEUTIC EXERCISES: CPT

## 2020-01-01 PROCEDURE — 70450 CT HEAD/BRAIN W/O DYE: CPT

## 2020-01-01 PROCEDURE — 25010000003 LIDOCAINE 1 % SOLUTION 20 ML VIAL: Performed by: SURGERY

## 2020-01-01 PROCEDURE — 4A023N7 MEASUREMENT OF CARDIAC SAMPLING AND PRESSURE, LEFT HEART, PERCUTANEOUS APPROACH: ICD-10-PCS | Performed by: INTERNAL MEDICINE

## 2020-01-01 PROCEDURE — 85027 COMPLETE CBC AUTOMATED: CPT | Performed by: NURSE PRACTITIONER

## 2020-01-01 PROCEDURE — C9803 HOPD COVID-19 SPEC COLLECT: HCPCS

## 2020-01-01 PROCEDURE — C1768 GRAFT, VASCULAR: HCPCS | Performed by: SURGERY

## 2020-01-01 PROCEDURE — 84132 ASSAY OF SERUM POTASSIUM: CPT | Performed by: INTERNAL MEDICINE

## 2020-01-01 PROCEDURE — 99232 SBSQ HOSP IP/OBS MODERATE 35: CPT | Performed by: NURSE PRACTITIONER

## 2020-01-01 PROCEDURE — 84100 ASSAY OF PHOSPHORUS: CPT | Performed by: HOSPITALIST

## 2020-01-01 PROCEDURE — 93005 ELECTROCARDIOGRAM TRACING: CPT | Performed by: HOSPITALIST

## 2020-01-01 PROCEDURE — 74176 CT ABD & PELVIS W/O CONTRAST: CPT

## 2020-01-01 PROCEDURE — 80053 COMPREHEN METABOLIC PANEL: CPT | Performed by: INTERNAL MEDICINE

## 2020-01-01 PROCEDURE — 84484 ASSAY OF TROPONIN QUANT: CPT | Performed by: PHYSICIAN ASSISTANT

## 2020-01-01 PROCEDURE — 84484 ASSAY OF TROPONIN QUANT: CPT | Performed by: INTERNAL MEDICINE

## 2020-01-01 PROCEDURE — 99214 OFFICE O/P EST MOD 30 MIN: CPT | Performed by: INTERNAL MEDICINE

## 2020-01-01 PROCEDURE — 80048 BASIC METABOLIC PNL TOTAL CA: CPT | Performed by: INTERNAL MEDICINE

## 2020-01-01 PROCEDURE — 0202U NFCT DS 22 TRGT SARS-COV-2: CPT | Performed by: PHYSICIAN ASSISTANT

## 2020-01-01 PROCEDURE — 25010000002 PROTAMINE SULFATE PER 10 MG: Performed by: NURSE ANESTHETIST, CERTIFIED REGISTERED

## 2020-01-01 PROCEDURE — 99284 EMERGENCY DEPT VISIT MOD MDM: CPT

## 2020-01-01 PROCEDURE — 87635 SARS-COV-2 COVID-19 AMP PRB: CPT | Performed by: SURGERY

## 2020-01-01 PROCEDURE — 25010000002 AMIODARONE IN DEXTROSE 5% 360-4.14 MG/200ML-% SOLUTION: Performed by: NURSE PRACTITIONER

## 2020-01-01 PROCEDURE — 36415 COLL VENOUS BLD VENIPUNCTURE: CPT | Performed by: EMERGENCY MEDICINE

## 2020-01-01 PROCEDURE — 76000 FLUOROSCOPY <1 HR PHYS/QHP: CPT

## 2020-01-01 PROCEDURE — 63710000001 INSULIN GLARGINE PER 5 UNITS: Performed by: INTERNAL MEDICINE

## 2020-01-01 PROCEDURE — 83735 ASSAY OF MAGNESIUM: CPT | Performed by: EMERGENCY MEDICINE

## 2020-01-01 PROCEDURE — 84145 PROCALCITONIN (PCT): CPT | Performed by: PHYSICIAN ASSISTANT

## 2020-01-01 PROCEDURE — 80069 RENAL FUNCTION PANEL: CPT | Performed by: SURGERY

## 2020-01-01 PROCEDURE — 85027 COMPLETE CBC AUTOMATED: CPT | Performed by: HOSPITALIST

## 2020-01-01 PROCEDURE — 25010000002 HEPARIN (PORCINE) PER 1000 UNITS: Performed by: SURGERY

## 2020-01-01 PROCEDURE — 25010000002 PERFLUTREN (DEFINITY) 8.476 MG IN SODIUM CHLORIDE 0.9 % 10 ML INJECTION: Performed by: NURSE PRACTITIONER

## 2020-01-01 PROCEDURE — 25010000002 FENTANYL CITRATE (PF) 100 MCG/2ML SOLUTION: Performed by: NURSE ANESTHETIST, CERTIFIED REGISTERED

## 2020-01-01 PROCEDURE — 25010000002 MORPHINE PER 10 MG: Performed by: HOSPITALIST

## 2020-01-01 PROCEDURE — 84133 ASSAY OF URINE POTASSIUM: CPT | Performed by: INTERNAL MEDICINE

## 2020-01-01 PROCEDURE — 25010000002 ONDANSETRON PER 1 MG

## 2020-01-01 PROCEDURE — 97165 OT EVAL LOW COMPLEX 30 MIN: CPT

## 2020-01-01 PROCEDURE — G0257 UNSCHED DIALYSIS ESRD PT HOS: HCPCS

## 2020-01-01 PROCEDURE — 80048 BASIC METABOLIC PNL TOTAL CA: CPT | Performed by: EMERGENCY MEDICINE

## 2020-01-01 PROCEDURE — 25010000002 VANCOMYCIN PER 500 MG: Performed by: SURGERY

## 2020-01-01 PROCEDURE — 84443 ASSAY THYROID STIM HORMONE: CPT | Performed by: INTERNAL MEDICINE

## 2020-01-01 PROCEDURE — 80048 BASIC METABOLIC PNL TOTAL CA: CPT | Performed by: SURGERY

## 2020-01-01 PROCEDURE — 99285 EMERGENCY DEPT VISIT HI MDM: CPT

## 2020-01-01 PROCEDURE — 84145 PROCALCITONIN (PCT): CPT | Performed by: EMERGENCY MEDICINE

## 2020-01-01 PROCEDURE — 85027 COMPLETE CBC AUTOMATED: CPT | Performed by: SURGERY

## 2020-01-01 PROCEDURE — 93306 TTE W/DOPPLER COMPLETE: CPT | Performed by: INTERNAL MEDICINE

## 2020-01-01 PROCEDURE — 0JH63XZ INSERTION OF TUNNELED VASCULAR ACCESS DEVICE INTO CHEST SUBCUTANEOUS TISSUE AND FASCIA, PERCUTANEOUS APPROACH: ICD-10-PCS | Performed by: SURGERY

## 2020-01-01 PROCEDURE — 81001 URINALYSIS AUTO W/SCOPE: CPT | Performed by: HOSPITALIST

## 2020-01-01 PROCEDURE — 25010000002 HEPARIN (PORCINE) PER 1000 UNITS: Performed by: REGISTERED NURSE

## 2020-01-01 PROCEDURE — 84484 ASSAY OF TROPONIN QUANT: CPT | Performed by: NURSE PRACTITIONER

## 2020-01-01 PROCEDURE — 97162 PT EVAL MOD COMPLEX 30 MIN: CPT

## 2020-01-01 PROCEDURE — 63710000001 PROMETHAZINE PER 25 MG: Performed by: INTERNAL MEDICINE

## 2020-01-01 PROCEDURE — 25010000002 ROPIVACAINE PER 1 MG: Performed by: ANESTHESIOLOGY

## 2020-01-01 PROCEDURE — 85025 COMPLETE CBC W/AUTO DIFF WBC: CPT | Performed by: PHYSICIAN ASSISTANT

## 2020-01-01 PROCEDURE — 80069 RENAL FUNCTION PANEL: CPT | Performed by: HOSPITALIST

## 2020-01-01 PROCEDURE — 99214 OFFICE O/P EST MOD 30 MIN: CPT | Performed by: FAMILY MEDICINE

## 2020-01-01 PROCEDURE — 83605 ASSAY OF LACTIC ACID: CPT | Performed by: PHYSICIAN ASSISTANT

## 2020-01-01 PROCEDURE — 94799 UNLISTED PULMONARY SVC/PX: CPT

## 2020-01-01 PROCEDURE — 25010000002 CEFTRIAXONE PER 250 MG: Performed by: NURSE PRACTITIONER

## 2020-01-01 PROCEDURE — 36415 COLL VENOUS BLD VENIPUNCTURE: CPT | Performed by: INTERNAL MEDICINE

## 2020-01-01 PROCEDURE — 80053 COMPREHEN METABOLIC PANEL: CPT | Performed by: EMERGENCY MEDICINE

## 2020-01-01 PROCEDURE — 82272 OCCULT BLD FECES 1-3 TESTS: CPT | Performed by: INTERNAL MEDICINE

## 2020-01-01 PROCEDURE — 84100 ASSAY OF PHOSPHORUS: CPT | Performed by: INTERNAL MEDICINE

## 2020-01-01 PROCEDURE — 84145 PROCALCITONIN (PCT): CPT | Performed by: NURSE PRACTITIONER

## 2020-01-01 PROCEDURE — 81001 URINALYSIS AUTO W/SCOPE: CPT | Performed by: EMERGENCY MEDICINE

## 2020-01-01 PROCEDURE — 83880 ASSAY OF NATRIURETIC PEPTIDE: CPT | Performed by: PHYSICIAN ASSISTANT

## 2020-01-01 PROCEDURE — 97530 THERAPEUTIC ACTIVITIES: CPT

## 2020-01-01 PROCEDURE — 80069 RENAL FUNCTION PANEL: CPT | Performed by: NURSE PRACTITIONER

## 2020-01-01 PROCEDURE — B518ZZA FLUOROSCOPY OF SUPERIOR VENA CAVA, GUIDANCE: ICD-10-PCS | Performed by: SURGERY

## 2020-01-01 PROCEDURE — 25010000003 POTASSIUM CHLORIDE 10 MEQ/100ML SOLUTION: Performed by: SURGERY

## 2020-01-01 PROCEDURE — B2151ZZ FLUOROSCOPY OF LEFT HEART USING LOW OSMOLAR CONTRAST: ICD-10-PCS | Performed by: INTERNAL MEDICINE

## 2020-01-01 PROCEDURE — 97535 SELF CARE MNGMENT TRAINING: CPT

## 2020-01-01 PROCEDURE — 0202U NFCT DS 22 TRGT SARS-COV-2: CPT | Performed by: INTERNAL MEDICINE

## 2020-01-01 PROCEDURE — 25010000002 FUROSEMIDE PER 20 MG: Performed by: NURSE PRACTITIONER

## 2020-01-01 PROCEDURE — 86901 BLOOD TYPING SEROLOGIC RH(D): CPT | Performed by: NURSE PRACTITIONER

## 2020-01-01 PROCEDURE — 25010000002 HYDRALAZINE PER 20 MG: Performed by: INTERNAL MEDICINE

## 2020-01-01 PROCEDURE — U0004 COV-19 TEST NON-CDC HGH THRU: HCPCS

## 2020-01-01 PROCEDURE — 83605 ASSAY OF LACTIC ACID: CPT | Performed by: INTERNAL MEDICINE

## 2020-01-01 PROCEDURE — 87186 SC STD MICRODIL/AGAR DIL: CPT | Performed by: INTERNAL MEDICINE

## 2020-01-01 PROCEDURE — 25010000002 PIPERACILLIN SOD-TAZOBACTAM PER 1 G: Performed by: INTERNAL MEDICINE

## 2020-01-01 PROCEDURE — 63710000001 PREDNISONE PER 5 MG: Performed by: INTERNAL MEDICINE

## 2020-01-01 PROCEDURE — 82728 ASSAY OF FERRITIN: CPT | Performed by: NURSE PRACTITIONER

## 2020-01-01 PROCEDURE — 5A1D70Z PERFORMANCE OF URINARY FILTRATION, INTERMITTENT, LESS THAN 6 HOURS PER DAY: ICD-10-PCS | Performed by: HOSPITALIST

## 2020-01-01 PROCEDURE — 63710000001 ONDANSETRON PER 8 MG: Performed by: NURSE PRACTITIONER

## 2020-01-01 PROCEDURE — 25010000002 MIDAZOLAM PER 1 MG: Performed by: INTERNAL MEDICINE

## 2020-01-01 PROCEDURE — 82550 ASSAY OF CK (CPK): CPT | Performed by: INTERNAL MEDICINE

## 2020-01-01 PROCEDURE — 83880 ASSAY OF NATRIURETIC PEPTIDE: CPT | Performed by: EMERGENCY MEDICINE

## 2020-01-01 PROCEDURE — 84484 ASSAY OF TROPONIN QUANT: CPT | Performed by: HOSPITALIST

## 2020-01-01 PROCEDURE — C1769 GUIDE WIRE: HCPCS | Performed by: INTERNAL MEDICINE

## 2020-01-01 PROCEDURE — 36430 TRANSFUSION BLD/BLD COMPNT: CPT

## 2020-01-01 PROCEDURE — 25010000003 CEFAZOLIN IN DEXTROSE 2-4 GM/100ML-% SOLUTION: Performed by: SURGERY

## 2020-01-01 PROCEDURE — 97166 OT EVAL MOD COMPLEX 45 MIN: CPT

## 2020-01-01 PROCEDURE — 84484 ASSAY OF TROPONIN QUANT: CPT | Performed by: EMERGENCY MEDICINE

## 2020-01-01 PROCEDURE — 83540 ASSAY OF IRON: CPT | Performed by: NURSE PRACTITIONER

## 2020-01-01 PROCEDURE — 05PY03Z REMOVAL OF INFUSION DEVICE FROM UPPER VEIN, OPEN APPROACH: ICD-10-PCS | Performed by: SURGERY

## 2020-01-01 PROCEDURE — 0202U NFCT DS 22 TRGT SARS-COV-2: CPT | Performed by: NURSE PRACTITIONER

## 2020-01-01 PROCEDURE — 0JPT3XZ REMOVAL OF TUNNELED VASCULAR ACCESS DEVICE FROM TRUNK SUBCUTANEOUS TISSUE AND FASCIA, PERCUTANEOUS APPROACH: ICD-10-PCS | Performed by: SURGERY

## 2020-01-01 PROCEDURE — 93005 ELECTROCARDIOGRAM TRACING: CPT

## 2020-01-01 PROCEDURE — 80048 BASIC METABOLIC PNL TOTAL CA: CPT | Performed by: NURSE PRACTITIONER

## 2020-01-01 PROCEDURE — 90791 PSYCH DIAGNOSTIC EVALUATION: CPT

## 2020-01-01 PROCEDURE — 63710000001 INSULIN LISPRO (HUMAN) PER 5 UNITS: Performed by: NURSE PRACTITIONER

## 2020-01-01 PROCEDURE — 86900 BLOOD TYPING SEROLOGIC ABO: CPT

## 2020-01-01 PROCEDURE — 25010000003 CEFAZOLIN IN DEXTROSE 2-4 GM/100ML-% SOLUTION: Performed by: NURSE ANESTHETIST, CERTIFIED REGISTERED

## 2020-01-01 PROCEDURE — 93459 L HRT ART/GRFT ANGIO: CPT | Performed by: INTERNAL MEDICINE

## 2020-01-01 PROCEDURE — 25010000002 CEFTRIAXONE PER 250 MG: Performed by: INTERNAL MEDICINE

## 2020-01-01 PROCEDURE — 82803 BLOOD GASES ANY COMBINATION: CPT

## 2020-01-01 PROCEDURE — 85025 COMPLETE CBC W/AUTO DIFF WBC: CPT

## 2020-01-01 PROCEDURE — 99443 PR PHYS/QHP TELEPHONE EVALUATION 21-30 MIN: CPT | Performed by: NURSE PRACTITIONER

## 2020-01-01 PROCEDURE — 80053 COMPREHEN METABOLIC PANEL: CPT | Performed by: HOSPITALIST

## 2020-01-01 PROCEDURE — 93005 ELECTROCARDIOGRAM TRACING: CPT | Performed by: INTERNAL MEDICINE

## 2020-01-01 PROCEDURE — 81001 URINALYSIS AUTO W/SCOPE: CPT | Performed by: NURSE PRACTITIONER

## 2020-01-01 PROCEDURE — 93923 UPR/LXTR ART STDY 3+ LVLS: CPT

## 2020-01-01 PROCEDURE — P9612 CATHETERIZE FOR URINE SPEC: HCPCS

## 2020-01-01 PROCEDURE — 0 IOPAMIDOL PER 1 ML: Performed by: INTERNAL MEDICINE

## 2020-01-01 PROCEDURE — B2111ZZ FLUOROSCOPY OF MULTIPLE CORONARY ARTERIES USING LOW OSMOLAR CONTRAST: ICD-10-PCS | Performed by: INTERNAL MEDICINE

## 2020-01-01 PROCEDURE — 25010000002 AMIODARONE IN DEXTROSE 5% 150-4.21 MG/100ML-% SOLUTION: Performed by: NURSE PRACTITIONER

## 2020-01-01 PROCEDURE — 75710 ARTERY X-RAYS ARM/LEG: CPT | Performed by: INTERNAL MEDICINE

## 2020-01-01 PROCEDURE — 93005 ELECTROCARDIOGRAM TRACING: CPT | Performed by: PHYSICIAN ASSISTANT

## 2020-01-01 PROCEDURE — 36415 COLL VENOUS BLD VENIPUNCTURE: CPT | Performed by: NURSE PRACTITIONER

## 2020-01-01 PROCEDURE — 85610 PROTHROMBIN TIME: CPT | Performed by: HOSPITALIST

## 2020-01-01 PROCEDURE — 02HV33Z INSERTION OF INFUSION DEVICE INTO SUPERIOR VENA CAVA, PERCUTANEOUS APPROACH: ICD-10-PCS | Performed by: SURGERY

## 2020-01-01 PROCEDURE — 82607 VITAMIN B-12: CPT | Performed by: INTERNAL MEDICINE

## 2020-01-01 PROCEDURE — 25010000002 ALTEPLASE 2 MG RECONSTITUTED SOLUTION: Performed by: INTERNAL MEDICINE

## 2020-01-01 PROCEDURE — 25010000002 VANCOMYCIN 10 G RECONSTITUTED SOLUTION: Performed by: INTERNAL MEDICINE

## 2020-01-01 PROCEDURE — 93306 TTE W/DOPPLER COMPLETE: CPT

## 2020-01-01 PROCEDURE — 36600 WITHDRAWAL OF ARTERIAL BLOOD: CPT

## 2020-01-01 PROCEDURE — 63710000001 ONDANSETRON PER 8 MG: Performed by: SURGERY

## 2020-01-01 PROCEDURE — C1894 INTRO/SHEATH, NON-LASER: HCPCS | Performed by: INTERNAL MEDICINE

## 2020-01-01 PROCEDURE — 99204 OFFICE O/P NEW MOD 45 MIN: CPT | Performed by: INTERNAL MEDICINE

## 2020-01-01 PROCEDURE — 83935 ASSAY OF URINE OSMOLALITY: CPT | Performed by: INTERNAL MEDICINE

## 2020-01-01 PROCEDURE — 87088 URINE BACTERIA CULTURE: CPT | Performed by: INTERNAL MEDICINE

## 2020-01-01 PROCEDURE — 85025 COMPLETE CBC W/AUTO DIFF WBC: CPT | Performed by: HOSPITALIST

## 2020-01-01 PROCEDURE — 82140 ASSAY OF AMMONIA: CPT | Performed by: EMERGENCY MEDICINE

## 2020-01-01 PROCEDURE — 87086 URINE CULTURE/COLONY COUNT: CPT | Performed by: NURSE PRACTITIONER

## 2020-01-01 PROCEDURE — 97161 PT EVAL LOW COMPLEX 20 MIN: CPT

## 2020-01-01 PROCEDURE — 84466 ASSAY OF TRANSFERRIN: CPT | Performed by: NURSE PRACTITIONER

## 2020-01-01 PROCEDURE — 86923 COMPATIBILITY TEST ELECTRIC: CPT

## 2020-01-01 PROCEDURE — 83036 HEMOGLOBIN GLYCOSYLATED A1C: CPT | Performed by: NURSE PRACTITIONER

## 2020-01-01 PROCEDURE — 25010000002 PROPOFOL 10 MG/ML EMULSION: Performed by: REGISTERED NURSE

## 2020-01-01 PROCEDURE — 25010000002 ONDANSETRON PER 1 MG: Performed by: SURGERY

## 2020-01-01 PROCEDURE — 83036 HEMOGLOBIN GLYCOSYLATED A1C: CPT | Performed by: HOSPITALIST

## 2020-01-01 PROCEDURE — 82085 ASSAY OF ALDOLASE: CPT | Performed by: SURGERY

## 2020-01-01 PROCEDURE — 86706 HEP B SURFACE ANTIBODY: CPT | Performed by: HOSPITALIST

## 2020-01-01 PROCEDURE — 0202U NFCT DS 22 TRGT SARS-COV-2: CPT | Performed by: EMERGENCY MEDICINE

## 2020-01-01 PROCEDURE — 99231 SBSQ HOSP IP/OBS SF/LOW 25: CPT | Performed by: INTERNAL MEDICINE

## 2020-01-01 PROCEDURE — 87086 URINE CULTURE/COLONY COUNT: CPT | Performed by: INTERNAL MEDICINE

## 2020-01-01 PROCEDURE — 25010000003 CEFAZOLIN PER 500 MG: Performed by: SURGERY

## 2020-01-01 PROCEDURE — 93985 DUP-SCAN HEMO COMPL BI STD: CPT

## 2020-01-01 PROCEDURE — 80053 COMPREHEN METABOLIC PANEL: CPT | Performed by: PHYSICIAN ASSISTANT

## 2020-01-01 PROCEDURE — 85730 THROMBOPLASTIN TIME PARTIAL: CPT | Performed by: HOSPITALIST

## 2020-01-01 PROCEDURE — P9016 RBC LEUKOCYTES REDUCED: HCPCS

## 2020-01-01 PROCEDURE — 86900 BLOOD TYPING SEROLOGIC ABO: CPT | Performed by: NURSE PRACTITIONER

## 2020-01-01 PROCEDURE — 83735 ASSAY OF MAGNESIUM: CPT | Performed by: PHYSICIAN ASSISTANT

## 2020-01-01 DEVICE — IMPLANTABLE DEVICE: Type: IMPLANTABLE DEVICE | Site: ARM | Status: FUNCTIONAL

## 2020-01-01 RX ORDER — ALBUMIN (HUMAN) 12.5 G/50ML
12.5 SOLUTION INTRAVENOUS AS NEEDED
Status: ACTIVE | OUTPATIENT
Start: 2020-01-01 | End: 2020-01-01

## 2020-01-01 RX ORDER — LORAZEPAM 2 MG/ML
2 INJECTION INTRAMUSCULAR
Status: DISCONTINUED | OUTPATIENT
Start: 2020-01-01 | End: 2020-01-01 | Stop reason: HOSPADM

## 2020-01-01 RX ORDER — MIDODRINE HYDROCHLORIDE 5 MG/1
5 TABLET ORAL
Qty: 90 TABLET | Refills: 3 | Status: SHIPPED | OUTPATIENT
Start: 2020-01-01

## 2020-01-01 RX ORDER — DIPHENOXYLATE HYDROCHLORIDE AND ATROPINE SULFATE 2.5; .025 MG/1; MG/1
1 TABLET ORAL
Status: CANCELLED | OUTPATIENT
Start: 2020-01-01

## 2020-01-01 RX ORDER — SODIUM CHLORIDE 0.9 % (FLUSH) 0.9 %
10 SYRINGE (ML) INJECTION AS NEEDED
Status: DISCONTINUED | OUTPATIENT
Start: 2020-01-01 | End: 2020-01-01 | Stop reason: HOSPADM

## 2020-01-01 RX ORDER — FAMOTIDINE 10 MG/ML
20 INJECTION, SOLUTION INTRAVENOUS ONCE
Status: COMPLETED | OUTPATIENT
Start: 2020-01-01 | End: 2020-01-01

## 2020-01-01 RX ORDER — ONDANSETRON 4 MG/1
4 TABLET, FILM COATED ORAL EVERY 6 HOURS PRN
Status: DISCONTINUED | OUTPATIENT
Start: 2020-01-01 | End: 2020-01-01 | Stop reason: HOSPADM

## 2020-01-01 RX ORDER — DIPHENHYDRAMINE HCL 25 MG
25 CAPSULE ORAL
Status: DISCONTINUED | OUTPATIENT
Start: 2020-01-01 | End: 2020-01-01 | Stop reason: HOSPADM

## 2020-01-01 RX ORDER — MORPHINE SULFATE 20 MG/ML
5 SOLUTION ORAL
Status: DISCONTINUED | OUTPATIENT
Start: 2020-01-01 | End: 2020-01-01 | Stop reason: HOSPADM

## 2020-01-01 RX ORDER — EPHEDRINE SULFATE 50 MG/ML
5 INJECTION, SOLUTION INTRAVENOUS ONCE AS NEEDED
Status: DISCONTINUED | OUTPATIENT
Start: 2020-01-01 | End: 2020-01-01 | Stop reason: HOSPADM

## 2020-01-01 RX ORDER — DEXTROSE MONOHYDRATE 25 G/50ML
25 INJECTION, SOLUTION INTRAVENOUS
Status: DISCONTINUED | OUTPATIENT
Start: 2020-01-01 | End: 2020-01-01 | Stop reason: HOSPADM

## 2020-01-01 RX ORDER — ATORVASTATIN CALCIUM 20 MG/1
80 TABLET, FILM COATED ORAL DAILY
Status: DISCONTINUED | OUTPATIENT
Start: 2020-01-01 | End: 2020-01-01

## 2020-01-01 RX ORDER — LORAZEPAM 2 MG/ML
0.5 CONCENTRATE ORAL
Status: DISCONTINUED | OUTPATIENT
Start: 2020-01-01 | End: 2020-01-01 | Stop reason: HOSPADM

## 2020-01-01 RX ORDER — HYDROCODONE BITARTRATE AND ACETAMINOPHEN 5; 325 MG/1; MG/1
1 TABLET ORAL EVERY 4 HOURS PRN
Status: DISCONTINUED | OUTPATIENT
Start: 2020-01-01 | End: 2020-01-01 | Stop reason: HOSPADM

## 2020-01-01 RX ORDER — FENTANYL CITRATE 50 UG/ML
50 INJECTION, SOLUTION INTRAMUSCULAR; INTRAVENOUS
Status: DISCONTINUED | OUTPATIENT
Start: 2020-01-01 | End: 2020-01-01 | Stop reason: HOSPADM

## 2020-01-01 RX ORDER — SODIUM CHLORIDE 0.9 % (FLUSH) 0.9 %
10 SYRINGE (ML) INJECTION EVERY 12 HOURS SCHEDULED
Status: DISCONTINUED | OUTPATIENT
Start: 2020-01-01 | End: 2020-01-01 | Stop reason: HOSPADM

## 2020-01-01 RX ORDER — SUCRALFATE 1 G/1
1 TABLET ORAL 3 TIMES DAILY
Status: DISCONTINUED | OUTPATIENT
Start: 2020-01-01 | End: 2020-01-01 | Stop reason: HOSPADM

## 2020-01-01 RX ORDER — KETOROLAC TROMETHAMINE 30 MG/ML
15 INJECTION, SOLUTION INTRAMUSCULAR; INTRAVENOUS EVERY 6 HOURS PRN
Status: DISCONTINUED | OUTPATIENT
Start: 2020-01-01 | End: 2020-01-01 | Stop reason: HOSPADM

## 2020-01-01 RX ORDER — FUROSEMIDE 10 MG/ML
40 INJECTION INTRAMUSCULAR; INTRAVENOUS ONCE
Status: DISCONTINUED | OUTPATIENT
Start: 2020-01-01 | End: 2020-01-01

## 2020-01-01 RX ORDER — LORAZEPAM 2 MG/ML
0.5 INJECTION INTRAMUSCULAR
Status: CANCELLED | OUTPATIENT
Start: 2020-01-01 | End: 2020-12-25

## 2020-01-01 RX ORDER — LACTULOSE 10 G/15ML
10 SOLUTION ORAL DAILY PRN
Status: CANCELLED | OUTPATIENT
Start: 2020-01-01

## 2020-01-01 RX ORDER — PROPOFOL 10 MG/ML
VIAL (ML) INTRAVENOUS CONTINUOUS PRN
Status: DISCONTINUED | OUTPATIENT
Start: 2020-01-01 | End: 2020-01-01 | Stop reason: SURG

## 2020-01-01 RX ORDER — INSULIN LISPRO 100 [IU]/ML
0-7 INJECTION, SOLUTION INTRAVENOUS; SUBCUTANEOUS
Status: DISCONTINUED | OUTPATIENT
Start: 2020-01-01 | End: 2020-01-01

## 2020-01-01 RX ORDER — GABAPENTIN 300 MG/1
300 CAPSULE ORAL NIGHTLY
Status: DISCONTINUED | OUTPATIENT
Start: 2020-01-01 | End: 2020-01-01 | Stop reason: HOSPADM

## 2020-01-01 RX ORDER — METOCLOPRAMIDE 5 MG/1
TABLET ORAL
COMMUNITY
Start: 2019-12-19 | End: 2020-01-01

## 2020-01-01 RX ORDER — NALOXONE HCL 0.4 MG/ML
0.2 VIAL (ML) INJECTION AS NEEDED
Status: DISCONTINUED | OUTPATIENT
Start: 2020-01-01 | End: 2020-01-01 | Stop reason: HOSPADM

## 2020-01-01 RX ORDER — ACETAMINOPHEN 325 MG/1
650 TABLET ORAL EVERY 4 HOURS PRN
Status: DISCONTINUED | OUTPATIENT
Start: 2020-01-01 | End: 2020-01-01 | Stop reason: HOSPADM

## 2020-01-01 RX ORDER — POTASSIUM CHLORIDE, DEXTROSE MONOHYDRATE AND SODIUM CHLORIDE 300; 5; 900 MG/100ML; G/100ML; MG/100ML
150 INJECTION, SOLUTION INTRAVENOUS CONTINUOUS PRN
Status: DISCONTINUED | OUTPATIENT
Start: 2020-01-01 | End: 2020-01-01

## 2020-01-01 RX ORDER — SODIUM CHLORIDE 0.9 % (FLUSH) 0.9 %
3-10 SYRINGE (ML) INJECTION AS NEEDED
Status: DISCONTINUED | OUTPATIENT
Start: 2020-01-01 | End: 2020-01-01 | Stop reason: HOSPADM

## 2020-01-01 RX ORDER — DIPHENOXYLATE HYDROCHLORIDE AND ATROPINE SULFATE 2.5; .025 MG/1; MG/1
1 TABLET ORAL
Status: DISCONTINUED | OUTPATIENT
Start: 2020-01-01 | End: 2020-01-01 | Stop reason: HOSPADM

## 2020-01-01 RX ORDER — ACETAMINOPHEN 160 MG/5ML
650 SOLUTION ORAL EVERY 4 HOURS PRN
Status: CANCELLED | OUTPATIENT
Start: 2020-01-01

## 2020-01-01 RX ORDER — HYDROMORPHONE HYDROCHLORIDE 1 MG/ML
0.5 INJECTION, SOLUTION INTRAMUSCULAR; INTRAVENOUS; SUBCUTANEOUS
Status: DISCONTINUED | OUTPATIENT
Start: 2020-01-01 | End: 2020-01-01 | Stop reason: HOSPADM

## 2020-01-01 RX ORDER — PANTOPRAZOLE SODIUM 40 MG/1
40 TABLET, DELAYED RELEASE ORAL 2 TIMES DAILY
Qty: 180 TABLET | Refills: 3 | Status: SHIPPED | OUTPATIENT
Start: 2020-01-01

## 2020-01-01 RX ORDER — PROMETHAZINE HYDROCHLORIDE 25 MG/ML
6.25 INJECTION, SOLUTION INTRAMUSCULAR; INTRAVENOUS
Status: DISCONTINUED | OUTPATIENT
Start: 2020-01-01 | End: 2020-01-01 | Stop reason: HOSPADM

## 2020-01-01 RX ORDER — SUCRALFATE 1 G/1
1 TABLET ORAL DAILY
COMMUNITY
End: 2020-01-01 | Stop reason: SDUPTHER

## 2020-01-01 RX ORDER — HYDROCODONE BITARTRATE AND ACETAMINOPHEN 7.5; 325 MG/1; MG/1
1 TABLET ORAL ONCE AS NEEDED
Status: DISCONTINUED | OUTPATIENT
Start: 2020-01-01 | End: 2020-01-01 | Stop reason: HOSPADM

## 2020-01-01 RX ORDER — HEPARIN SODIUM 1000 [USP'U]/ML
3800 INJECTION, SOLUTION INTRAVENOUS; SUBCUTANEOUS AS NEEDED
Status: DISCONTINUED | OUTPATIENT
Start: 2020-01-01 | End: 2020-01-01 | Stop reason: HOSPADM

## 2020-01-01 RX ORDER — PROMETHAZINE HYDROCHLORIDE 25 MG/1
25 TABLET ORAL EVERY 6 HOURS PRN
Qty: 3 TABLET | Refills: 0 | Status: SHIPPED | OUTPATIENT
Start: 2020-01-01

## 2020-01-01 RX ORDER — LORAZEPAM 2 MG/ML
1 INJECTION INTRAMUSCULAR
Status: DISCONTINUED | OUTPATIENT
Start: 2020-01-01 | End: 2020-01-01 | Stop reason: HOSPADM

## 2020-01-01 RX ORDER — INSULIN GLARGINE 100 [IU]/ML
30 INJECTION, SOLUTION SUBCUTANEOUS NIGHTLY
Status: DISCONTINUED | OUTPATIENT
Start: 2020-01-01 | End: 2020-01-01 | Stop reason: HOSPADM

## 2020-01-01 RX ORDER — DULOXETIN HYDROCHLORIDE 60 MG/1
60 CAPSULE, DELAYED RELEASE ORAL DAILY
Status: DISCONTINUED | OUTPATIENT
Start: 2020-01-01 | End: 2020-01-01 | Stop reason: HOSPADM

## 2020-01-01 RX ORDER — ACETAMINOPHEN 325 MG/1
650 TABLET ORAL EVERY 4 HOURS PRN
Status: CANCELLED | OUTPATIENT
Start: 2020-01-01

## 2020-01-01 RX ORDER — ASPIRIN 81 MG/1
81 TABLET ORAL NIGHTLY
Status: DISCONTINUED | OUTPATIENT
Start: 2020-01-01 | End: 2020-01-01 | Stop reason: HOSPADM

## 2020-01-01 RX ORDER — NICOTINE POLACRILEX 4 MG
15 LOZENGE BUCCAL
Status: DISCONTINUED | OUTPATIENT
Start: 2020-01-01 | End: 2020-01-01 | Stop reason: HOSPADM

## 2020-01-01 RX ORDER — CEFTRIAXONE SODIUM 1 G/50ML
1 INJECTION, SOLUTION INTRAVENOUS EVERY 24 HOURS
Status: DISCONTINUED | OUTPATIENT
Start: 2020-01-01 | End: 2020-01-01

## 2020-01-01 RX ORDER — METOCLOPRAMIDE 5 MG/1
5 TABLET ORAL EVERY MORNING
Status: DISCONTINUED | OUTPATIENT
Start: 2020-01-01 | End: 2020-01-01 | Stop reason: HOSPADM

## 2020-01-01 RX ORDER — LIDOCAINE HYDROCHLORIDE 20 MG/ML
INJECTION, SOLUTION INFILTRATION; PERINEURAL AS NEEDED
Status: DISCONTINUED | OUTPATIENT
Start: 2020-01-01 | End: 2020-01-01 | Stop reason: SURG

## 2020-01-01 RX ORDER — ACETAMINOPHEN 160 MG/5ML
650 SOLUTION ORAL EVERY 4 HOURS PRN
Status: DISCONTINUED | OUTPATIENT
Start: 2020-01-01 | End: 2020-01-01 | Stop reason: HOSPADM

## 2020-01-01 RX ORDER — LORAZEPAM 2 MG/ML
2 INJECTION INTRAMUSCULAR
Status: CANCELLED | OUTPATIENT
Start: 2020-01-01 | End: 2020-12-25

## 2020-01-01 RX ORDER — PROMETHAZINE HYDROCHLORIDE 25 MG/1
25 TABLET ORAL ONCE AS NEEDED
Status: DISCONTINUED | OUTPATIENT
Start: 2020-01-01 | End: 2020-01-01 | Stop reason: HOSPADM

## 2020-01-01 RX ORDER — BISACODYL 5 MG/1
5 TABLET, DELAYED RELEASE ORAL DAILY PRN
Status: DISCONTINUED | OUTPATIENT
Start: 2020-01-01 | End: 2020-01-01 | Stop reason: HOSPADM

## 2020-01-01 RX ORDER — DIPHENHYDRAMINE HYDROCHLORIDE 50 MG/ML
12.5 INJECTION INTRAMUSCULAR; INTRAVENOUS
Status: DISCONTINUED | OUTPATIENT
Start: 2020-01-01 | End: 2020-01-01 | Stop reason: HOSPADM

## 2020-01-01 RX ORDER — DULOXETIN HYDROCHLORIDE 30 MG/1
30 CAPSULE, DELAYED RELEASE ORAL DAILY
COMMUNITY
End: 2020-01-01 | Stop reason: HOSPADM

## 2020-01-01 RX ORDER — GABAPENTIN 300 MG/1
300 CAPSULE ORAL NIGHTLY
Status: DISCONTINUED | OUTPATIENT
Start: 2020-01-01 | End: 2020-01-01

## 2020-01-01 RX ORDER — PROMETHAZINE HYDROCHLORIDE 12.5 MG/1
12.5 TABLET ORAL EVERY 6 HOURS PRN
Status: DISCONTINUED | OUTPATIENT
Start: 2020-01-01 | End: 2020-01-01 | Stop reason: HOSPADM

## 2020-01-01 RX ORDER — INSULIN GLARGINE 100 [IU]/ML
15 INJECTION, SOLUTION SUBCUTANEOUS NIGHTLY
Status: DISCONTINUED | OUTPATIENT
Start: 2020-01-01 | End: 2020-01-01 | Stop reason: HOSPADM

## 2020-01-01 RX ORDER — SUCRALFATE 1 G/1
1 TABLET ORAL DAILY
Status: DISCONTINUED | OUTPATIENT
Start: 2020-01-01 | End: 2020-01-01 | Stop reason: HOSPADM

## 2020-01-01 RX ORDER — LORAZEPAM 2 MG/ML
0.5 CONCENTRATE ORAL
Status: CANCELLED | OUTPATIENT
Start: 2020-01-01 | End: 2020-12-25

## 2020-01-01 RX ORDER — PROMETHAZINE HYDROCHLORIDE 25 MG/1
25 SUPPOSITORY RECTAL ONCE AS NEEDED
Status: DISCONTINUED | OUTPATIENT
Start: 2020-01-01 | End: 2020-01-01 | Stop reason: HOSPADM

## 2020-01-01 RX ORDER — FENTANYL CITRATE 50 UG/ML
INJECTION, SOLUTION INTRAMUSCULAR; INTRAVENOUS AS NEEDED
Status: DISCONTINUED | OUTPATIENT
Start: 2020-01-01 | End: 2020-01-01 | Stop reason: SURG

## 2020-01-01 RX ORDER — FOLIC ACID/VIT B COMPLEX AND C 0.8 MG
1 TABLET ORAL DAILY
COMMUNITY
Start: 2020-01-01

## 2020-01-01 RX ORDER — DULOXETIN HYDROCHLORIDE 30 MG/1
CAPSULE, DELAYED RELEASE ORAL
Qty: 30 CAPSULE | Refills: 4 | Status: SHIPPED | OUTPATIENT
Start: 2020-01-01 | End: 2020-01-01

## 2020-01-01 RX ORDER — PANTOPRAZOLE SODIUM 40 MG/1
TABLET, DELAYED RELEASE ORAL
Qty: 180 TABLET | Refills: 1 | Status: SHIPPED | OUTPATIENT
Start: 2020-01-01 | End: 2020-01-01

## 2020-01-01 RX ORDER — FOLIC ACID/VIT B COMPLEX AND C 0.8 MG
1 TABLET ORAL DAILY
Status: DISCONTINUED | OUTPATIENT
Start: 2020-01-01 | End: 2020-01-01

## 2020-01-01 RX ORDER — ACETAMINOPHEN 650 MG/1
650 SUPPOSITORY RECTAL EVERY 4 HOURS PRN
Status: DISCONTINUED | OUTPATIENT
Start: 2020-01-01 | End: 2020-01-01 | Stop reason: HOSPADM

## 2020-01-01 RX ORDER — DEXTROSE AND SODIUM CHLORIDE 5; .45 G/100ML; G/100ML
150 INJECTION, SOLUTION INTRAVENOUS CONTINUOUS PRN
Status: DISCONTINUED | OUTPATIENT
Start: 2020-01-01 | End: 2020-01-01

## 2020-01-01 RX ORDER — INSULIN GLARGINE 100 [IU]/ML
10 INJECTION, SOLUTION SUBCUTANEOUS EVERY MORNING
Status: DISCONTINUED | OUTPATIENT
Start: 2020-01-01 | End: 2020-01-01

## 2020-01-01 RX ORDER — DULOXETIN HYDROCHLORIDE 30 MG/1
30 CAPSULE, DELAYED RELEASE ORAL DAILY
Qty: 90 CAPSULE | Refills: 1 | Status: SHIPPED | OUTPATIENT
Start: 2020-01-01 | End: 2020-01-01

## 2020-01-01 RX ORDER — FENTANYL CITRATE 50 UG/ML
INJECTION, SOLUTION INTRAMUSCULAR; INTRAVENOUS AS NEEDED
Status: DISCONTINUED | OUTPATIENT
Start: 2020-01-01 | End: 2020-01-01 | Stop reason: HOSPADM

## 2020-01-01 RX ORDER — METOPROLOL SUCCINATE 25 MG/1
25 TABLET, EXTENDED RELEASE ORAL DAILY
Qty: 30 TABLET | Refills: 0 | Status: SHIPPED | OUTPATIENT
Start: 2020-01-01 | End: 2020-01-01 | Stop reason: HOSPADM

## 2020-01-01 RX ORDER — ACETAMINOPHEN 650 MG/1
650 SUPPOSITORY RECTAL EVERY 4 HOURS PRN
Status: DISCONTINUED | OUTPATIENT
Start: 2020-01-01 | End: 2020-01-01

## 2020-01-01 RX ORDER — ALBUMIN (HUMAN) 12.5 G/50ML
12.5 SOLUTION INTRAVENOUS AS NEEDED
Status: CANCELLED | OUTPATIENT
Start: 2020-01-01 | End: 2020-01-01

## 2020-01-01 RX ORDER — HYDROCODONE BITARTRATE AND ACETAMINOPHEN 5; 325 MG/1; MG/1
1 TABLET ORAL EVERY 6 HOURS PRN
Status: DISCONTINUED | OUTPATIENT
Start: 2020-01-01 | End: 2020-01-01 | Stop reason: HOSPADM

## 2020-01-01 RX ORDER — ONDANSETRON 2 MG/ML
4 INJECTION INTRAMUSCULAR; INTRAVENOUS EVERY 6 HOURS PRN
Status: DISCONTINUED | OUTPATIENT
Start: 2020-01-01 | End: 2020-01-01 | Stop reason: HOSPADM

## 2020-01-01 RX ORDER — CALCIUM CARBONATE 200(500)MG
2 TABLET,CHEWABLE ORAL 2 TIMES DAILY PRN
Status: DISCONTINUED | OUTPATIENT
Start: 2020-01-01 | End: 2020-01-01 | Stop reason: HOSPADM

## 2020-01-01 RX ORDER — SODIUM CHLORIDE 9 MG/ML
INJECTION, SOLUTION INTRAVENOUS CONTINUOUS PRN
Status: DISCONTINUED | OUTPATIENT
Start: 2020-01-01 | End: 2020-01-01 | Stop reason: SURG

## 2020-01-01 RX ORDER — PANTOPRAZOLE SODIUM 40 MG/1
40 TABLET, DELAYED RELEASE ORAL 2 TIMES DAILY
Status: DISCONTINUED | OUTPATIENT
Start: 2020-01-01 | End: 2020-01-01 | Stop reason: HOSPADM

## 2020-01-01 RX ORDER — SODIUM CHLORIDE 0.9 % (FLUSH) 0.9 %
10 SYRINGE (ML) INJECTION EVERY 12 HOURS SCHEDULED
Status: CANCELLED | OUTPATIENT
Start: 2020-01-01

## 2020-01-01 RX ORDER — PROPOFOL 10 MG/ML
VIAL (ML) INTRAVENOUS AS NEEDED
Status: DISCONTINUED | OUTPATIENT
Start: 2020-01-01 | End: 2020-01-01 | Stop reason: SURG

## 2020-01-01 RX ORDER — ATORVASTATIN CALCIUM 80 MG/1
TABLET, FILM COATED ORAL
Qty: 90 TABLET | Refills: 1 | Status: SHIPPED | OUTPATIENT
Start: 2020-01-01

## 2020-01-01 RX ORDER — ATORVASTATIN CALCIUM 80 MG/1
80 TABLET, FILM COATED ORAL NIGHTLY
Status: DISCONTINUED | OUTPATIENT
Start: 2020-01-01 | End: 2020-01-01 | Stop reason: HOSPADM

## 2020-01-01 RX ORDER — ASPIRIN 81 MG/1
81 TABLET ORAL DAILY
Status: DISCONTINUED | OUTPATIENT
Start: 2020-01-01 | End: 2020-01-01 | Stop reason: HOSPADM

## 2020-01-01 RX ORDER — LEVOFLOXACIN 500 MG/1
500 TABLET, FILM COATED ORAL EVERY OTHER DAY
Status: DISCONTINUED | OUTPATIENT
Start: 2020-01-01 | End: 2020-01-01 | Stop reason: HOSPADM

## 2020-01-01 RX ORDER — ASPIRIN 81 MG/1
81 TABLET ORAL NIGHTLY
Status: DISCONTINUED | OUTPATIENT
Start: 2020-01-01 | End: 2020-01-01

## 2020-01-01 RX ORDER — SODIUM CHLORIDE, SODIUM LACTATE, POTASSIUM CHLORIDE, CALCIUM CHLORIDE 600; 310; 30; 20 MG/100ML; MG/100ML; MG/100ML; MG/100ML
9 INJECTION, SOLUTION INTRAVENOUS CONTINUOUS
Status: DISCONTINUED | OUTPATIENT
Start: 2020-01-01 | End: 2020-01-01

## 2020-01-01 RX ORDER — ACETAMINOPHEN 325 MG/1
650 TABLET ORAL ONCE AS NEEDED
Status: DISCONTINUED | OUTPATIENT
Start: 2020-01-01 | End: 2020-01-01 | Stop reason: HOSPADM

## 2020-01-01 RX ORDER — CEFAZOLIN SODIUM 2 G/100ML
2 INJECTION, SOLUTION INTRAVENOUS ONCE
Status: COMPLETED | OUTPATIENT
Start: 2020-01-01 | End: 2020-01-01

## 2020-01-01 RX ORDER — LIDOCAINE AND PRILOCAINE 25; 25 MG/G; MG/G
CREAM TOPICAL AS NEEDED
Status: CANCELLED | OUTPATIENT
Start: 2020-01-01

## 2020-01-01 RX ORDER — SCOLOPAMINE TRANSDERMAL SYSTEM 1 MG/1
1 PATCH, EXTENDED RELEASE TRANSDERMAL
Status: CANCELLED | OUTPATIENT
Start: 2020-01-01

## 2020-01-01 RX ORDER — LORAZEPAM 2 MG/ML
0.5 INJECTION INTRAMUSCULAR
Status: DISCONTINUED | OUTPATIENT
Start: 2020-01-01 | End: 2020-01-01 | Stop reason: HOSPADM

## 2020-01-01 RX ORDER — LIDOCAINE HYDROCHLORIDE 10 MG/ML
0.5 INJECTION, SOLUTION EPIDURAL; INFILTRATION; INTRACAUDAL; PERINEURAL ONCE AS NEEDED
Status: DISCONTINUED | OUTPATIENT
Start: 2020-01-01 | End: 2020-01-01 | Stop reason: HOSPADM

## 2020-01-01 RX ORDER — LORAZEPAM 2 MG/ML
1 CONCENTRATE ORAL
Status: CANCELLED | OUTPATIENT
Start: 2020-01-01 | End: 2020-12-25

## 2020-01-01 RX ORDER — LORAZEPAM 2 MG/ML
2 CONCENTRATE ORAL
Status: DISCONTINUED | OUTPATIENT
Start: 2020-01-01 | End: 2020-01-01 | Stop reason: HOSPADM

## 2020-01-01 RX ORDER — SODIUM CHLORIDE 0.9 % (FLUSH) 0.9 %
1-10 SYRINGE (ML) INJECTION AS NEEDED
Status: DISCONTINUED | OUTPATIENT
Start: 2020-01-01 | End: 2020-01-01 | Stop reason: HOSPADM

## 2020-01-01 RX ORDER — ONDANSETRON 2 MG/ML
INJECTION INTRAMUSCULAR; INTRAVENOUS AS NEEDED
Status: DISCONTINUED | OUTPATIENT
Start: 2020-01-01 | End: 2020-01-01 | Stop reason: HOSPADM

## 2020-01-01 RX ORDER — INSULIN GLARGINE 100 [IU]/ML
10 INJECTION, SOLUTION SUBCUTANEOUS NIGHTLY
Status: DISCONTINUED | OUTPATIENT
Start: 2020-01-01 | End: 2020-01-01 | Stop reason: HOSPADM

## 2020-01-01 RX ORDER — DEXTROSE, SODIUM CHLORIDE, AND POTASSIUM CHLORIDE 5; .45; .3 G/100ML; G/100ML; G/100ML
150 INJECTION INTRAVENOUS CONTINUOUS PRN
Status: DISCONTINUED | OUTPATIENT
Start: 2020-01-01 | End: 2020-01-01

## 2020-01-01 RX ORDER — ATORVASTATIN CALCIUM 80 MG/1
80 TABLET, FILM COATED ORAL DAILY
Status: DISCONTINUED | OUTPATIENT
Start: 2020-01-01 | End: 2020-01-01 | Stop reason: HOSPADM

## 2020-01-01 RX ORDER — PROTAMINE SULFATE 10 MG/ML
INJECTION, SOLUTION INTRAVENOUS AS NEEDED
Status: DISCONTINUED | OUTPATIENT
Start: 2020-01-01 | End: 2020-01-01 | Stop reason: SURG

## 2020-01-01 RX ORDER — DOXYCYCLINE HYCLATE 100 MG/1
100 CAPSULE ORAL 2 TIMES DAILY
Qty: 14 CAPSULE | Refills: 0 | Status: SHIPPED | OUTPATIENT
Start: 2020-01-01 | End: 2020-01-01

## 2020-01-01 RX ORDER — POTASSIUM CHLORIDE 750 MG/1
20 CAPSULE, EXTENDED RELEASE ORAL ONCE
Status: COMPLETED | OUTPATIENT
Start: 2020-01-01 | End: 2020-01-01

## 2020-01-01 RX ORDER — AMLODIPINE BESYLATE 5 MG/1
5 TABLET ORAL NIGHTLY
Status: DISCONTINUED | OUTPATIENT
Start: 2020-01-01 | End: 2020-01-01 | Stop reason: HOSPADM

## 2020-01-01 RX ORDER — GABAPENTIN 300 MG/1
300 CAPSULE ORAL
Qty: 360 CAPSULE | Refills: 1 | Status: ON HOLD | OUTPATIENT
Start: 2020-01-01 | End: 2020-01-01 | Stop reason: SDUPTHER

## 2020-01-01 RX ORDER — METOPROLOL SUCCINATE 25 MG/1
25 TABLET, EXTENDED RELEASE ORAL DAILY
Status: DISCONTINUED | OUTPATIENT
Start: 2020-01-01 | End: 2020-01-01 | Stop reason: HOSPADM

## 2020-01-01 RX ORDER — ONDANSETRON 4 MG/1
4 TABLET, ORALLY DISINTEGRATING ORAL EVERY 8 HOURS PRN
Qty: 10 TABLET | Refills: 0 | Status: SHIPPED | OUTPATIENT
Start: 2020-01-01 | End: 2020-01-01

## 2020-01-01 RX ORDER — METOCLOPRAMIDE 5 MG/1
TABLET ORAL
Qty: 90 TABLET | Refills: 1 | Status: SHIPPED | OUTPATIENT
Start: 2020-01-01 | End: 2020-01-01

## 2020-01-01 RX ORDER — POTASSIUM CHLORIDE 750 MG/1
40 CAPSULE, EXTENDED RELEASE ORAL ONCE
Status: COMPLETED | OUTPATIENT
Start: 2020-01-01 | End: 2020-01-01

## 2020-01-01 RX ORDER — DOCUSATE SODIUM 100 MG/1
200 CAPSULE, LIQUID FILLED ORAL DAILY
Status: DISCONTINUED | OUTPATIENT
Start: 2020-01-01 | End: 2020-01-01 | Stop reason: HOSPADM

## 2020-01-01 RX ORDER — HEPARIN SODIUM 1000 [USP'U]/ML
INJECTION, SOLUTION INTRAVENOUS; SUBCUTANEOUS AS NEEDED
Status: DISCONTINUED | OUTPATIENT
Start: 2020-01-01 | End: 2020-01-01 | Stop reason: SURG

## 2020-01-01 RX ORDER — NITROGLYCERIN 0.4 MG/1
0.4 TABLET SUBLINGUAL
Status: DISCONTINUED | OUTPATIENT
Start: 2020-01-01 | End: 2020-01-01 | Stop reason: HOSPADM

## 2020-01-01 RX ORDER — SODIUM CHLORIDE, SODIUM LACTATE, POTASSIUM CHLORIDE, CALCIUM CHLORIDE 600; 310; 30; 20 MG/100ML; MG/100ML; MG/100ML; MG/100ML
9 INJECTION, SOLUTION INTRAVENOUS CONTINUOUS
Status: DISCONTINUED | OUTPATIENT
Start: 2020-01-01 | End: 2020-01-01 | Stop reason: HOSPADM

## 2020-01-01 RX ORDER — LORAZEPAM 2 MG/ML
1 CONCENTRATE ORAL
Status: DISCONTINUED | OUTPATIENT
Start: 2020-01-01 | End: 2020-01-01 | Stop reason: HOSPADM

## 2020-01-01 RX ORDER — MORPHINE SULFATE 2 MG/ML
2 INJECTION, SOLUTION INTRAMUSCULAR; INTRAVENOUS
Status: DISCONTINUED | OUTPATIENT
Start: 2020-01-01 | End: 2020-01-01 | Stop reason: HOSPADM

## 2020-01-01 RX ORDER — INSULIN DETEMIR 100 [IU]/ML
15 INJECTION, SOLUTION SUBCUTANEOUS NIGHTLY
Qty: 5 PEN | Refills: 3
Start: 2020-01-01 | End: 2020-01-01

## 2020-01-01 RX ORDER — ONDANSETRON 2 MG/ML
INJECTION INTRAMUSCULAR; INTRAVENOUS
Status: COMPLETED
Start: 2020-01-01 | End: 2020-01-01

## 2020-01-01 RX ORDER — LABETALOL HYDROCHLORIDE 5 MG/ML
5 INJECTION, SOLUTION INTRAVENOUS
Status: DISCONTINUED | OUTPATIENT
Start: 2020-01-01 | End: 2020-01-01 | Stop reason: HOSPADM

## 2020-01-01 RX ORDER — CIPROFLOXACIN 500 MG/1
500 TABLET, FILM COATED ORAL DAILY
COMMUNITY

## 2020-01-01 RX ORDER — ACETAMINOPHEN 650 MG/1
650 SUPPOSITORY RECTAL EVERY 4 HOURS PRN
Status: CANCELLED | OUTPATIENT
Start: 2020-01-01

## 2020-01-01 RX ORDER — LACTULOSE 10 G/15ML
10 SOLUTION ORAL DAILY PRN
Qty: 236 ML | Refills: 0 | Status: SHIPPED | OUTPATIENT
Start: 2020-01-01

## 2020-01-01 RX ORDER — INSULIN GLARGINE 100 [IU]/ML
15 INJECTION, SOLUTION SUBCUTANEOUS NIGHTLY
Status: DISCONTINUED | OUTPATIENT
Start: 2020-01-01 | End: 2020-01-01

## 2020-01-01 RX ORDER — LORAZEPAM 2 MG/ML
1 INJECTION INTRAMUSCULAR
Status: CANCELLED | OUTPATIENT
Start: 2020-01-01 | End: 2020-12-25

## 2020-01-01 RX ORDER — HYDROMORPHONE HYDROCHLORIDE 1 MG/ML
0.5 INJECTION, SOLUTION INTRAMUSCULAR; INTRAVENOUS; SUBCUTANEOUS
Status: CANCELLED | OUTPATIENT
Start: 2020-01-01 | End: 2020-12-23

## 2020-01-01 RX ORDER — HYDRALAZINE HYDROCHLORIDE 20 MG/ML
INJECTION INTRAMUSCULAR; INTRAVENOUS AS NEEDED
Status: DISCONTINUED | OUTPATIENT
Start: 2020-01-01 | End: 2020-01-01 | Stop reason: HOSPADM

## 2020-01-01 RX ORDER — DOCUSATE SODIUM 100 MG/1
100 CAPSULE, LIQUID FILLED ORAL NIGHTLY PRN
Status: DISCONTINUED | OUTPATIENT
Start: 2020-01-01 | End: 2020-01-01 | Stop reason: HOSPADM

## 2020-01-01 RX ORDER — DEXTROSE MONOHYDRATE 25 G/50ML
12.5 INJECTION, SOLUTION INTRAVENOUS AS NEEDED
Status: DISCONTINUED | OUTPATIENT
Start: 2020-01-01 | End: 2020-01-01

## 2020-01-01 RX ORDER — METRONIDAZOLE 500 MG/1
500 TABLET ORAL 3 TIMES DAILY
Qty: 21 TABLET | Refills: 0 | Status: SHIPPED | OUTPATIENT
Start: 2020-01-01 | End: 2020-01-01 | Stop reason: HOSPADM

## 2020-01-01 RX ORDER — MORPHINE SULFATE 4 MG/ML
4 INJECTION, SOLUTION INTRAMUSCULAR; INTRAVENOUS
Status: CANCELLED | OUTPATIENT
Start: 2020-01-01 | End: 2020-12-25

## 2020-01-01 RX ORDER — ACETAMINOPHEN 500 MG
500 TABLET ORAL EVERY 6 HOURS PRN
Status: DISCONTINUED | OUTPATIENT
Start: 2020-01-01 | End: 2020-01-01 | Stop reason: SDUPTHER

## 2020-01-01 RX ORDER — TRAMADOL HYDROCHLORIDE 50 MG/1
50 TABLET ORAL DAILY
Qty: 20 TABLET | Refills: 0 | Status: SHIPPED | OUTPATIENT
Start: 2020-01-01

## 2020-01-01 RX ORDER — HYDRALAZINE HYDROCHLORIDE 20 MG/ML
10 INJECTION INTRAMUSCULAR; INTRAVENOUS EVERY 6 HOURS PRN
Status: DISCONTINUED | OUTPATIENT
Start: 2020-01-01 | End: 2020-01-01 | Stop reason: HOSPADM

## 2020-01-01 RX ORDER — SODIUM CHLORIDE AND POTASSIUM CHLORIDE 150; 450 MG/100ML; MG/100ML
250 INJECTION, SOLUTION INTRAVENOUS CONTINUOUS PRN
Status: DISCONTINUED | OUTPATIENT
Start: 2020-01-01 | End: 2020-01-01

## 2020-01-01 RX ORDER — ONDANSETRON 2 MG/ML
4 INJECTION INTRAMUSCULAR; INTRAVENOUS EVERY 6 HOURS PRN
Status: CANCELLED | OUTPATIENT
Start: 2020-01-01

## 2020-01-01 RX ORDER — SODIUM CHLORIDE 0.9 % (FLUSH) 0.9 %
3 SYRINGE (ML) INJECTION EVERY 12 HOURS SCHEDULED
Status: DISCONTINUED | OUTPATIENT
Start: 2020-01-01 | End: 2020-01-01 | Stop reason: HOSPADM

## 2020-01-01 RX ORDER — PEN NEEDLE, DIABETIC 31 GX5/16"
NEEDLE, DISPOSABLE MISCELLANEOUS
Qty: 400 EACH | Refills: 2 | Status: SHIPPED | OUTPATIENT
Start: 2020-01-01

## 2020-01-01 RX ORDER — MORPHINE SULFATE 2 MG/ML
4 INJECTION, SOLUTION INTRAMUSCULAR; INTRAVENOUS
Status: DISCONTINUED | OUTPATIENT
Start: 2020-01-01 | End: 2020-01-01 | Stop reason: HOSPADM

## 2020-01-01 RX ORDER — ONDANSETRON 2 MG/ML
4 INJECTION INTRAMUSCULAR; INTRAVENOUS ONCE
Status: COMPLETED | OUTPATIENT
Start: 2020-01-01 | End: 2020-01-01

## 2020-01-01 RX ORDER — DEXTROSE MONOHYDRATE 25 G/50ML
25 INJECTION, SOLUTION INTRAVENOUS
Status: DISCONTINUED | OUTPATIENT
Start: 2020-01-01 | End: 2020-01-01

## 2020-01-01 RX ORDER — LACTULOSE 10 G/15ML
10 SOLUTION ORAL DAILY PRN
Status: DISCONTINUED | OUTPATIENT
Start: 2020-01-01 | End: 2020-01-01 | Stop reason: HOSPADM

## 2020-01-01 RX ORDER — FLUMAZENIL 0.1 MG/ML
0.2 INJECTION INTRAVENOUS AS NEEDED
Status: DISCONTINUED | OUTPATIENT
Start: 2020-01-01 | End: 2020-01-01 | Stop reason: HOSPADM

## 2020-01-01 RX ORDER — CHOLECALCIFEROL (VITAMIN D3) 125 MCG
5 CAPSULE ORAL NIGHTLY
Status: DISCONTINUED | OUTPATIENT
Start: 2020-01-01 | End: 2020-01-01 | Stop reason: HOSPADM

## 2020-01-01 RX ORDER — PROMETHAZINE HYDROCHLORIDE 25 MG/1
25 TABLET ORAL EVERY 6 HOURS PRN
Status: DISCONTINUED | OUTPATIENT
Start: 2020-01-01 | End: 2020-01-01 | Stop reason: HOSPADM

## 2020-01-01 RX ORDER — ATORVASTATIN CALCIUM 80 MG/1
80 TABLET, FILM COATED ORAL NIGHTLY
Status: DISCONTINUED | OUTPATIENT
Start: 2020-01-01 | End: 2020-01-01

## 2020-01-01 RX ORDER — DEXTROSE, SODIUM CHLORIDE, AND POTASSIUM CHLORIDE 5; .45; .15 G/100ML; G/100ML; G/100ML
150 INJECTION INTRAVENOUS CONTINUOUS PRN
Status: DISCONTINUED | OUTPATIENT
Start: 2020-01-01 | End: 2020-01-01

## 2020-01-01 RX ORDER — MORPHINE SULFATE 20 MG/ML
10 SOLUTION ORAL
Status: CANCELLED | OUTPATIENT
Start: 2020-01-01 | End: 2020-12-25

## 2020-01-01 RX ORDER — CEFAZOLIN SODIUM 2 G/100ML
2 INJECTION, SOLUTION INTRAVENOUS ONCE
Status: CANCELLED | OUTPATIENT
Start: 2020-01-01 | End: 2020-01-01

## 2020-01-01 RX ORDER — POTASSIUM CHLORIDE 20 MEQ/1
20 TABLET, EXTENDED RELEASE ORAL DAILY
Qty: 30 TABLET | Refills: 0 | Status: SHIPPED | OUTPATIENT
Start: 2020-01-01 | End: 2020-01-01 | Stop reason: HOSPADM

## 2020-01-01 RX ORDER — SODIUM CHLORIDE 9 MG/ML
75 INJECTION, SOLUTION INTRAVENOUS CONTINUOUS
Status: DISCONTINUED | OUTPATIENT
Start: 2020-01-01 | End: 2020-01-01

## 2020-01-01 RX ORDER — GABAPENTIN 300 MG/1
300 CAPSULE ORAL
Qty: 90 CAPSULE | Refills: 1 | Status: ON HOLD | OUTPATIENT
Start: 2020-01-01 | End: 2020-01-01 | Stop reason: SDUPTHER

## 2020-01-01 RX ORDER — POTASSIUM CHLORIDE 7.45 MG/ML
10 INJECTION INTRAVENOUS ONCE
Status: COMPLETED | OUTPATIENT
Start: 2020-01-01 | End: 2020-01-01

## 2020-01-01 RX ORDER — DULOXETIN HYDROCHLORIDE 30 MG/1
30 CAPSULE, DELAYED RELEASE ORAL DAILY
Status: DISCONTINUED | OUTPATIENT
Start: 2020-01-01 | End: 2020-01-01

## 2020-01-01 RX ORDER — CEPHALEXIN 500 MG/1
500 CAPSULE ORAL EVERY 12 HOURS SCHEDULED
Status: COMPLETED | OUTPATIENT
Start: 2020-01-01 | End: 2020-01-01

## 2020-01-01 RX ORDER — SODIUM CHLORIDE AND POTASSIUM CHLORIDE 300; 900 MG/100ML; MG/100ML
250 INJECTION, SOLUTION INTRAVENOUS CONTINUOUS PRN
Status: DISCONTINUED | OUTPATIENT
Start: 2020-01-01 | End: 2020-01-01

## 2020-01-01 RX ORDER — MECLIZINE HYDROCHLORIDE 25 MG/1
25 TABLET ORAL 3 TIMES DAILY PRN
Status: DISCONTINUED | OUTPATIENT
Start: 2020-01-01 | End: 2020-01-01 | Stop reason: HOSPADM

## 2020-01-01 RX ORDER — CEFTRIAXONE SODIUM 2 G/50ML
2 INJECTION, SOLUTION INTRAVENOUS EVERY 24 HOURS
Status: DISCONTINUED | OUTPATIENT
Start: 2020-01-01 | End: 2020-01-01

## 2020-01-01 RX ORDER — SUCRALFATE 1 G/1
1 TABLET ORAL DAILY
Status: DISCONTINUED | OUTPATIENT
Start: 2020-01-01 | End: 2020-01-01

## 2020-01-01 RX ORDER — SODIUM CHLORIDE 0.9 % (FLUSH) 0.9 %
10 SYRINGE (ML) INJECTION ONCE AS NEEDED
Status: DISCONTINUED | OUTPATIENT
Start: 2020-01-01 | End: 2020-01-01

## 2020-01-01 RX ORDER — DEXTROSE MONOHYDRATE 25 G/50ML
12.5 INJECTION, SOLUTION INTRAVENOUS ONCE
Status: COMPLETED | OUTPATIENT
Start: 2020-01-01 | End: 2020-01-01

## 2020-01-01 RX ORDER — HEPARIN SODIUM 1000 [USP'U]/ML
INJECTION, SOLUTION INTRAVENOUS; SUBCUTANEOUS AS NEEDED
Status: DISCONTINUED | OUTPATIENT
Start: 2020-01-01 | End: 2020-01-01 | Stop reason: HOSPADM

## 2020-01-01 RX ORDER — DOCUSATE SODIUM 100 MG/1
200 CAPSULE, LIQUID FILLED ORAL NIGHTLY
Status: DISCONTINUED | OUTPATIENT
Start: 2020-01-01 | End: 2020-01-01

## 2020-01-01 RX ORDER — NICOTINE POLACRILEX 4 MG
15 LOZENGE BUCCAL
Status: DISCONTINUED | OUTPATIENT
Start: 2020-01-01 | End: 2020-01-01

## 2020-01-01 RX ORDER — MORPHINE SULFATE 4 MG/ML
4 INJECTION, SOLUTION INTRAMUSCULAR; INTRAVENOUS
Status: DISCONTINUED | OUTPATIENT
Start: 2020-01-01 | End: 2020-01-01 | Stop reason: HOSPADM

## 2020-01-01 RX ORDER — SODIUM CHLORIDE 9 MG/ML
10 INJECTION, SOLUTION INTRAVENOUS CONTINUOUS PRN
Status: DISCONTINUED | OUTPATIENT
Start: 2020-01-01 | End: 2020-01-01

## 2020-01-01 RX ORDER — PROMETHAZINE HYDROCHLORIDE 25 MG/ML
12.5 INJECTION, SOLUTION INTRAMUSCULAR; INTRAVENOUS ONCE AS NEEDED
Status: DISCONTINUED | OUTPATIENT
Start: 2020-01-01 | End: 2020-01-01 | Stop reason: HOSPADM

## 2020-01-01 RX ORDER — CEPHALEXIN 500 MG/1
500 CAPSULE ORAL EVERY 8 HOURS SCHEDULED
Status: DISCONTINUED | OUTPATIENT
Start: 2020-01-01 | End: 2020-01-01

## 2020-01-01 RX ORDER — SODIUM CHLORIDE 9 MG/ML
50 INJECTION, SOLUTION INTRAVENOUS CONTINUOUS
Status: DISCONTINUED | OUTPATIENT
Start: 2020-01-01 | End: 2020-01-01

## 2020-01-01 RX ORDER — CHOLECALCIFEROL (VITAMIN D3) 125 MCG
10 CAPSULE ORAL NIGHTLY
Status: DISCONTINUED | OUTPATIENT
Start: 2020-01-01 | End: 2020-01-01

## 2020-01-01 RX ORDER — POTASSIUM CHLORIDE 1.5 G/1.77G
40 POWDER, FOR SOLUTION ORAL ONCE
Status: COMPLETED | OUTPATIENT
Start: 2020-01-01 | End: 2020-01-01

## 2020-01-01 RX ORDER — HYDRALAZINE HYDROCHLORIDE 20 MG/ML
5 INJECTION INTRAMUSCULAR; INTRAVENOUS
Status: DISCONTINUED | OUTPATIENT
Start: 2020-01-01 | End: 2020-01-01 | Stop reason: HOSPADM

## 2020-01-01 RX ORDER — PANTOPRAZOLE SODIUM 40 MG/1
40 TABLET, DELAYED RELEASE ORAL 2 TIMES DAILY
Status: DISCONTINUED | OUTPATIENT
Start: 2020-01-01 | End: 2020-01-01

## 2020-01-01 RX ORDER — DULOXETIN HYDROCHLORIDE 60 MG/1
60 CAPSULE, DELAYED RELEASE ORAL DAILY
Qty: 90 CAPSULE | Refills: 1 | Status: SHIPPED | OUTPATIENT
Start: 2020-01-01

## 2020-01-01 RX ORDER — LIDOCAINE HYDROCHLORIDE 20 MG/ML
INJECTION, SOLUTION INFILTRATION; PERINEURAL AS NEEDED
Status: DISCONTINUED | OUTPATIENT
Start: 2020-01-01 | End: 2020-01-01 | Stop reason: HOSPADM

## 2020-01-01 RX ORDER — INSULIN DETEMIR 100 [IU]/ML
45 INJECTION, SOLUTION SUBCUTANEOUS NIGHTLY
Refills: 0 | Status: ON HOLD
Start: 2020-01-01 | End: 2020-01-01 | Stop reason: SDUPTHER

## 2020-01-01 RX ORDER — LORAZEPAM 2 MG/ML
2 CONCENTRATE ORAL
Status: CANCELLED | OUTPATIENT
Start: 2020-01-01 | End: 2020-12-25

## 2020-01-01 RX ORDER — ROPIVACAINE HYDROCHLORIDE 5 MG/ML
INJECTION, SOLUTION EPIDURAL; INFILTRATION; PERINEURAL
Status: COMPLETED | OUTPATIENT
Start: 2020-01-01 | End: 2020-01-01

## 2020-01-01 RX ORDER — SCOLOPAMINE TRANSDERMAL SYSTEM 1 MG/1
1 PATCH, EXTENDED RELEASE TRANSDERMAL
Status: DISCONTINUED | OUTPATIENT
Start: 2020-01-01 | End: 2020-01-01 | Stop reason: HOSPADM

## 2020-01-01 RX ORDER — MORPHINE SULFATE 2 MG/ML
1 INJECTION, SOLUTION INTRAMUSCULAR; INTRAVENOUS EVERY 4 HOURS PRN
Status: DISCONTINUED | OUTPATIENT
Start: 2020-01-01 | End: 2020-01-01

## 2020-01-01 RX ORDER — TRAMADOL HYDROCHLORIDE 50 MG/1
50 TABLET ORAL DAILY
Status: ON HOLD | COMMUNITY
Start: 2020-01-01 | End: 2020-01-01 | Stop reason: SDUPTHER

## 2020-01-01 RX ORDER — GABAPENTIN 100 MG/1
100 CAPSULE ORAL NIGHTLY
Status: DISCONTINUED | OUTPATIENT
Start: 2020-01-01 | End: 2020-01-01 | Stop reason: HOSPADM

## 2020-01-01 RX ORDER — GABAPENTIN 300 MG/1
300 CAPSULE ORAL
Start: 2020-01-01 | End: 2020-01-01 | Stop reason: SDUPTHER

## 2020-01-01 RX ORDER — MORPHINE SULFATE 2 MG/ML
2 INJECTION, SOLUTION INTRAMUSCULAR; INTRAVENOUS
Status: CANCELLED | OUTPATIENT
Start: 2020-01-01 | End: 2020-12-23

## 2020-01-01 RX ORDER — METRONIDAZOLE 500 MG/1
TABLET ORAL
Qty: 21 TABLET | OUTPATIENT
Start: 2020-01-01

## 2020-01-01 RX ORDER — GABAPENTIN 300 MG/1
300 CAPSULE ORAL
Qty: 10 CAPSULE | Refills: 0 | Status: SHIPPED | OUTPATIENT
Start: 2020-01-01

## 2020-01-01 RX ORDER — INSULIN GLARGINE 100 [IU]/ML
55 INJECTION, SOLUTION SUBCUTANEOUS NIGHTLY
Status: DISCONTINUED | OUTPATIENT
Start: 2020-01-01 | End: 2020-01-01 | Stop reason: HOSPADM

## 2020-01-01 RX ORDER — DULOXETIN HYDROCHLORIDE 30 MG/1
30 CAPSULE, DELAYED RELEASE ORAL DAILY
Qty: 30 CAPSULE | Refills: 5 | Status: SHIPPED | OUTPATIENT
Start: 2020-01-01 | End: 2020-01-01

## 2020-01-01 RX ORDER — MIDODRINE HYDROCHLORIDE 5 MG/1
5 TABLET ORAL
Status: DISCONTINUED | OUTPATIENT
Start: 2020-01-01 | End: 2020-01-01

## 2020-01-01 RX ORDER — VANCOMYCIN HYDROCHLORIDE 1 G/200ML
1000 INJECTION, SOLUTION INTRAVENOUS
Status: COMPLETED | OUTPATIENT
Start: 2020-01-01 | End: 2020-01-01

## 2020-01-01 RX ORDER — DEXTROSE AND SODIUM CHLORIDE 5; .9 G/100ML; G/100ML
150 INJECTION, SOLUTION INTRAVENOUS CONTINUOUS PRN
Status: DISCONTINUED | OUTPATIENT
Start: 2020-01-01 | End: 2020-01-01

## 2020-01-01 RX ORDER — MORPHINE SULFATE 20 MG/ML
5 SOLUTION ORAL
Status: CANCELLED | OUTPATIENT
Start: 2020-01-01 | End: 2020-12-23

## 2020-01-01 RX ORDER — MIDODRINE HYDROCHLORIDE 5 MG/1
5 TABLET ORAL
Status: DISCONTINUED | OUTPATIENT
Start: 2020-01-01 | End: 2020-01-01 | Stop reason: HOSPADM

## 2020-01-01 RX ORDER — SUCRALFATE 1 G/1
1 TABLET ORAL 3 TIMES DAILY
Qty: 360 TABLET | Refills: 3 | Status: SHIPPED | OUTPATIENT
Start: 2020-01-01

## 2020-01-01 RX ORDER — INSULIN GLARGINE 100 [IU]/ML
30 INJECTION, SOLUTION SUBCUTANEOUS NIGHTLY
Status: COMPLETED | OUTPATIENT
Start: 2020-01-01 | End: 2020-01-01

## 2020-01-01 RX ORDER — ACETAMINOPHEN 160 MG/5ML
650 SOLUTION ORAL EVERY 4 HOURS PRN
Status: DISCONTINUED | OUTPATIENT
Start: 2020-01-01 | End: 2020-01-01

## 2020-01-01 RX ORDER — GABAPENTIN 300 MG/1
300 CAPSULE ORAL
Qty: 3 CAPSULE | Refills: 0 | Status: SHIPPED | OUTPATIENT
Start: 2020-01-01 | End: 2020-01-01

## 2020-01-01 RX ORDER — NALOXONE HCL 0.4 MG/ML
0.4 VIAL (ML) INJECTION
Status: DISCONTINUED | OUTPATIENT
Start: 2020-01-01 | End: 2020-01-01

## 2020-01-01 RX ORDER — SODIUM CHLORIDE 9 MG/ML
250 INJECTION, SOLUTION INTRAVENOUS CONTINUOUS PRN
Status: DISCONTINUED | OUTPATIENT
Start: 2020-01-01 | End: 2020-01-01

## 2020-01-01 RX ORDER — DOCUSATE SODIUM 100 MG/1
100 CAPSULE, LIQUID FILLED ORAL 2 TIMES DAILY
Status: DISCONTINUED | OUTPATIENT
Start: 2020-01-01 | End: 2020-01-01 | Stop reason: HOSPADM

## 2020-01-01 RX ORDER — ONDANSETRON 2 MG/ML
INJECTION INTRAMUSCULAR; INTRAVENOUS AS NEEDED
Status: DISCONTINUED | OUTPATIENT
Start: 2020-01-01 | End: 2020-01-01 | Stop reason: SURG

## 2020-01-01 RX ORDER — HYDROMORPHONE HYDROCHLORIDE 1 MG/ML
0.5 INJECTION, SOLUTION INTRAMUSCULAR; INTRAVENOUS; SUBCUTANEOUS
Status: DISCONTINUED | OUTPATIENT
Start: 2020-01-01 | End: 2020-01-01

## 2020-01-01 RX ORDER — INSULIN DETEMIR 100 [IU]/ML
15 INJECTION, SOLUTION SUBCUTANEOUS 2 TIMES DAILY
Qty: 45 ML | Refills: 1 | Status: SHIPPED | OUTPATIENT
Start: 2020-01-01

## 2020-01-01 RX ORDER — SODIUM CHLORIDE 9 MG/ML
125 INJECTION, SOLUTION INTRAVENOUS CONTINUOUS
Status: DISCONTINUED | OUTPATIENT
Start: 2020-01-01 | End: 2020-01-01

## 2020-01-01 RX ORDER — SODIUM CHLORIDE AND POTASSIUM CHLORIDE 150; 900 MG/100ML; MG/100ML
250 INJECTION, SOLUTION INTRAVENOUS CONTINUOUS PRN
Status: DISCONTINUED | OUTPATIENT
Start: 2020-01-01 | End: 2020-01-01

## 2020-01-01 RX ORDER — ALBUMIN (HUMAN) 12.5 G/50ML
12.5 SOLUTION INTRAVENOUS AS NEEDED
Status: DISCONTINUED | OUTPATIENT
Start: 2020-01-01 | End: 2020-01-01 | Stop reason: HOSPADM

## 2020-01-01 RX ORDER — FUROSEMIDE 10 MG/ML
120 INJECTION INTRAMUSCULAR; INTRAVENOUS ONCE
Status: COMPLETED | OUTPATIENT
Start: 2020-01-01 | End: 2020-01-01

## 2020-01-01 RX ORDER — SODIUM CHLORIDE 0.9 % (FLUSH) 0.9 %
3 SYRINGE (ML) INJECTION EVERY 12 HOURS SCHEDULED
Status: DISCONTINUED | OUTPATIENT
Start: 2020-01-01 | End: 2020-01-01

## 2020-01-01 RX ORDER — LACTULOSE 10 G/15ML
10 SOLUTION ORAL DAILY
Status: DISCONTINUED | OUTPATIENT
Start: 2020-01-01 | End: 2020-01-01 | Stop reason: HOSPADM

## 2020-01-01 RX ORDER — BLOOD-GLUCOSE METER
1 EACH MISCELLANEOUS DAILY
Qty: 1 KIT | Refills: 0 | Status: SHIPPED | OUTPATIENT
Start: 2020-01-01

## 2020-01-01 RX ORDER — SUCRALFATE 1 G/1
TABLET ORAL
Qty: 360 TABLET | Refills: 1 | Status: SHIPPED | OUTPATIENT
Start: 2020-01-01 | End: 2020-01-01

## 2020-01-01 RX ORDER — POTASSIUM CHLORIDE 1.5 G/1.77G
20 POWDER, FOR SOLUTION ORAL ONCE
Status: DISCONTINUED | OUTPATIENT
Start: 2020-01-01 | End: 2020-01-01

## 2020-01-01 RX ORDER — DEXTROSE, SODIUM CHLORIDE, AND POTASSIUM CHLORIDE 5; .9; .15 G/100ML; G/100ML; G/100ML
150 INJECTION INTRAVENOUS CONTINUOUS PRN
Status: DISCONTINUED | OUTPATIENT
Start: 2020-01-01 | End: 2020-01-01

## 2020-01-01 RX ORDER — SODIUM CHLORIDE 0.9 % (FLUSH) 0.9 %
10 SYRINGE (ML) INJECTION AS NEEDED
Status: CANCELLED | OUTPATIENT
Start: 2020-01-01

## 2020-01-01 RX ORDER — LIDOCAINE AND PRILOCAINE 25; 25 MG/G; MG/G
CREAM TOPICAL AS NEEDED
Status: DISCONTINUED | OUTPATIENT
Start: 2020-01-01 | End: 2020-01-01 | Stop reason: HOSPADM

## 2020-01-01 RX ORDER — SODIUM CHLORIDE 0.9 % (FLUSH) 0.9 %
1-10 SYRINGE (ML) INJECTION AS NEEDED
Status: CANCELLED | OUTPATIENT
Start: 2020-01-01

## 2020-01-01 RX ORDER — OXYCODONE AND ACETAMINOPHEN 7.5; 325 MG/1; MG/1
1 TABLET ORAL ONCE AS NEEDED
Status: DISCONTINUED | OUTPATIENT
Start: 2020-01-01 | End: 2020-01-01 | Stop reason: HOSPADM

## 2020-01-01 RX ORDER — MORPHINE SULFATE 20 MG/ML
10 SOLUTION ORAL
Status: DISCONTINUED | OUTPATIENT
Start: 2020-01-01 | End: 2020-01-01 | Stop reason: HOSPADM

## 2020-01-01 RX ORDER — SODIUM CHLORIDE 450 MG/100ML
250 INJECTION, SOLUTION INTRAVENOUS CONTINUOUS PRN
Status: DISCONTINUED | OUTPATIENT
Start: 2020-01-01 | End: 2020-01-01

## 2020-01-01 RX ORDER — ONDANSETRON 2 MG/ML
4 INJECTION INTRAMUSCULAR; INTRAVENOUS ONCE AS NEEDED
Status: DISCONTINUED | OUTPATIENT
Start: 2020-01-01 | End: 2020-01-01 | Stop reason: HOSPADM

## 2020-01-01 RX ORDER — SODIUM CHLORIDE 9 MG/ML
9 INJECTION, SOLUTION INTRAVENOUS CONTINUOUS
Status: DISCONTINUED | OUTPATIENT
Start: 2020-01-01 | End: 2020-01-01 | Stop reason: HOSPADM

## 2020-01-01 RX ORDER — ONDANSETRON 4 MG/1
4 TABLET, FILM COATED ORAL EVERY 6 HOURS PRN
Status: CANCELLED | OUTPATIENT
Start: 2020-01-01

## 2020-01-01 RX ORDER — ONDANSETRON 2 MG/ML
4 INJECTION INTRAMUSCULAR; INTRAVENOUS ONCE AS NEEDED
Status: COMPLETED | OUTPATIENT
Start: 2020-01-01 | End: 2020-01-01

## 2020-01-01 RX ORDER — LEVOFLOXACIN 500 MG/1
500 TABLET, FILM COATED ORAL EVERY OTHER DAY
Qty: 4 TABLET | Refills: 0 | Status: SHIPPED | OUTPATIENT
Start: 2020-01-01 | End: 2020-01-01

## 2020-01-01 RX ORDER — CALCIUM CARBONATE 200(500)MG
2 TABLET,CHEWABLE ORAL 2 TIMES DAILY PRN
Status: CANCELLED | OUTPATIENT
Start: 2020-01-01

## 2020-01-01 RX ORDER — EPHEDRINE SULFATE 50 MG/ML
INJECTION, SOLUTION INTRAVENOUS AS NEEDED
Status: DISCONTINUED | OUTPATIENT
Start: 2020-01-01 | End: 2020-01-01 | Stop reason: SURG

## 2020-01-01 RX ORDER — HYDROCODONE BITARTRATE AND ACETAMINOPHEN 5; 325 MG/1; MG/1
1 TABLET ORAL EVERY 6 HOURS PRN
Qty: 9 TABLET | Refills: 0 | Status: SHIPPED | OUTPATIENT
Start: 2020-01-01 | End: 2020-01-01

## 2020-01-01 RX ORDER — GABAPENTIN 300 MG/1
CAPSULE ORAL
Qty: 360 CAPSULE | OUTPATIENT
Start: 2020-01-01

## 2020-01-01 RX ORDER — TRAMADOL HYDROCHLORIDE 50 MG/1
50 TABLET ORAL DAILY
Status: DISCONTINUED | OUTPATIENT
Start: 2020-01-01 | End: 2020-01-01

## 2020-01-01 RX ORDER — SUCRALFATE 1 G/1
1 TABLET ORAL 4 TIMES DAILY
Status: DISCONTINUED | OUTPATIENT
Start: 2020-01-01 | End: 2020-01-01 | Stop reason: HOSPADM

## 2020-01-01 RX ORDER — HEPARIN SODIUM 1000 [USP'U]/ML
6500 INJECTION, SOLUTION INTRAVENOUS; SUBCUTANEOUS ONCE
Status: COMPLETED | OUTPATIENT
Start: 2020-01-01 | End: 2020-01-01

## 2020-01-01 RX ORDER — CEFAZOLIN SODIUM 2 G/100ML
INJECTION, SOLUTION INTRAVENOUS AS NEEDED
Status: DISCONTINUED | OUTPATIENT
Start: 2020-01-01 | End: 2020-01-01 | Stop reason: SURG

## 2020-01-01 RX ORDER — GABAPENTIN 300 MG/1
300 CAPSULE ORAL
COMMUNITY
End: 2020-01-01 | Stop reason: SDUPTHER

## 2020-01-01 RX ORDER — CEPHALEXIN 500 MG/1
500 CAPSULE ORAL 2 TIMES DAILY
Qty: 14 CAPSULE | Refills: 0 | Status: SHIPPED | OUTPATIENT
Start: 2020-01-01 | End: 2020-01-01

## 2020-01-01 RX ORDER — METRONIDAZOLE 500 MG/1
500 TABLET ORAL 3 TIMES DAILY
Qty: 21 TABLET | Refills: 0 | Status: SHIPPED | OUTPATIENT
Start: 2020-01-01 | End: 2020-01-01 | Stop reason: SDUPTHER

## 2020-01-01 RX ORDER — KETOROLAC TROMETHAMINE 30 MG/ML
15 INJECTION, SOLUTION INTRAMUSCULAR; INTRAVENOUS EVERY 6 HOURS PRN
Status: CANCELLED | OUTPATIENT
Start: 2020-01-01 | End: 2020-12-23

## 2020-01-01 RX ORDER — AMLODIPINE BESYLATE 5 MG/1
5 TABLET ORAL DAILY
Qty: 90 TABLET | Refills: 3 | Status: SHIPPED | OUTPATIENT
Start: 2020-01-01 | End: 2020-01-01

## 2020-01-01 RX ORDER — METOPROLOL SUCCINATE 25 MG/1
12.5 TABLET, EXTENDED RELEASE ORAL
Status: DISCONTINUED | OUTPATIENT
Start: 2020-01-01 | End: 2020-01-01

## 2020-01-01 RX ORDER — FUROSEMIDE 10 MG/ML
40 INJECTION INTRAMUSCULAR; INTRAVENOUS ONCE
Status: COMPLETED | OUTPATIENT
Start: 2020-01-01 | End: 2020-01-01

## 2020-01-01 RX ORDER — SODIUM CHLORIDE 0.9 % (FLUSH) 0.9 %
10 SYRINGE (ML) INJECTION AS NEEDED
Status: DISCONTINUED | OUTPATIENT
Start: 2020-01-01 | End: 2020-01-01

## 2020-01-01 RX ORDER — INSULIN DETEMIR 100 [IU]/ML
15 INJECTION, SOLUTION SUBCUTANEOUS NIGHTLY
Refills: 0
Start: 2020-01-01 | End: 2020-01-01 | Stop reason: SDUPTHER

## 2020-01-01 RX ORDER — AMLODIPINE BESYLATE 5 MG/1
1 TABLET ORAL DAILY
COMMUNITY
Start: 2020-01-01 | End: 2020-01-01 | Stop reason: SDUPTHER

## 2020-01-01 RX ORDER — DULOXETIN HYDROCHLORIDE 30 MG/1
30 CAPSULE, DELAYED RELEASE ORAL NIGHTLY
Status: DISCONTINUED | OUTPATIENT
Start: 2020-01-01 | End: 2020-01-01 | Stop reason: HOSPADM

## 2020-01-01 RX ORDER — MIDAZOLAM HYDROCHLORIDE 1 MG/ML
INJECTION INTRAMUSCULAR; INTRAVENOUS AS NEEDED
Status: DISCONTINUED | OUTPATIENT
Start: 2020-01-01 | End: 2020-01-01 | Stop reason: HOSPADM

## 2020-01-01 RX ORDER — PANTOPRAZOLE SODIUM 40 MG/1
40 TABLET, DELAYED RELEASE ORAL 2 TIMES DAILY
COMMUNITY
End: 2020-01-01 | Stop reason: SDUPTHER

## 2020-01-01 RX ADMIN — PANTOPRAZOLE SODIUM 40 MG: 40 TABLET, DELAYED RELEASE ORAL at 11:05

## 2020-01-01 RX ADMIN — INSULIN GLARGINE 15 UNITS: 100 INJECTION, SOLUTION SUBCUTANEOUS at 20:59

## 2020-01-01 RX ADMIN — SUCRALFATE 1 G: 1 TABLET ORAL at 08:48

## 2020-01-01 RX ADMIN — SODIUM CHLORIDE, PRESERVATIVE FREE 10 ML: 5 INJECTION INTRAVENOUS at 10:05

## 2020-01-01 RX ADMIN — SUCRALFATE 1 G: 1 TABLET ORAL at 08:05

## 2020-01-01 RX ADMIN — ATORVASTATIN CALCIUM 80 MG: 80 TABLET, FILM COATED ORAL at 20:33

## 2020-01-01 RX ADMIN — INSULIN LISPRO 15 UNITS: 100 INJECTION, SOLUTION INTRAVENOUS; SUBCUTANEOUS at 17:13

## 2020-01-01 RX ADMIN — ASPIRIN 81 MG: 81 TABLET, COATED ORAL at 20:51

## 2020-01-01 RX ADMIN — SODIUM CHLORIDE: 9 INJECTION, SOLUTION INTRAVENOUS at 08:50

## 2020-01-01 RX ADMIN — ASPIRIN 81 MG: 81 TABLET, COATED ORAL at 08:12

## 2020-01-01 RX ADMIN — DULOXETINE HYDROCHLORIDE 60 MG: 60 CAPSULE, DELAYED RELEASE ORAL at 14:24

## 2020-01-01 RX ADMIN — FENTANYL CITRATE 25 MCG: 50 INJECTION INTRAMUSCULAR; INTRAVENOUS at 09:04

## 2020-01-01 RX ADMIN — SODIUM CHLORIDE, PRESERVATIVE FREE 10 ML: 5 INJECTION INTRAVENOUS at 21:24

## 2020-01-01 RX ADMIN — TRAMADOL HYDROCHLORIDE 50 MG: 50 TABLET, FILM COATED ORAL at 09:38

## 2020-01-01 RX ADMIN — PROPOFOL 50 MCG/KG/MIN: 10 INJECTION, EMULSION INTRAVENOUS at 09:51

## 2020-01-01 RX ADMIN — SODIUM CHLORIDE, PRESERVATIVE FREE 10 ML: 5 INJECTION INTRAVENOUS at 08:48

## 2020-01-01 RX ADMIN — SODIUM CHLORIDE, PRESERVATIVE FREE 10 ML: 5 INJECTION INTRAVENOUS at 21:19

## 2020-01-01 RX ADMIN — INSULIN LISPRO 10 UNITS: 100 INJECTION, SOLUTION INTRAVENOUS; SUBCUTANEOUS at 08:05

## 2020-01-01 RX ADMIN — ROPIVACAINE HYDROCHLORIDE 30 ML: 5 INJECTION, SOLUTION EPIDURAL; INFILTRATION; PERINEURAL at 12:22

## 2020-01-01 RX ADMIN — PERFLUTREN 3 ML: 6.52 INJECTION, SUSPENSION INTRAVENOUS at 09:15

## 2020-01-01 RX ADMIN — METOCLOPRAMIDE 5 MG: 5 TABLET ORAL at 06:19

## 2020-01-01 RX ADMIN — INSULIN LISPRO 2 UNITS: 100 INJECTION, SOLUTION INTRAVENOUS; SUBCUTANEOUS at 08:33

## 2020-01-01 RX ADMIN — SUCRALFATE 1 G: 1 TABLET ORAL at 21:32

## 2020-01-01 RX ADMIN — ONDANSETRON 4 MG: 2 INJECTION INTRAMUSCULAR; INTRAVENOUS at 04:13

## 2020-01-01 RX ADMIN — MORPHINE SULFATE 4 MG: 2 INJECTION, SOLUTION INTRAMUSCULAR; INTRAVENOUS at 12:58

## 2020-01-01 RX ADMIN — LACTULOSE 10 G: 10 SOLUTION ORAL at 08:54

## 2020-01-01 RX ADMIN — SODIUM CHLORIDE, PRESERVATIVE FREE 10 ML: 5 INJECTION INTRAVENOUS at 08:06

## 2020-01-01 RX ADMIN — PANTOPRAZOLE SODIUM 40 MG: 40 TABLET, DELAYED RELEASE ORAL at 20:56

## 2020-01-01 RX ADMIN — ATORVASTATIN CALCIUM 80 MG: 80 TABLET, FILM COATED ORAL at 23:21

## 2020-01-01 RX ADMIN — MORPHINE SULFATE 4 MG: 2 INJECTION, SOLUTION INTRAMUSCULAR; INTRAVENOUS at 08:16

## 2020-01-01 RX ADMIN — LORAZEPAM 0.5 MG: 2 INJECTION INTRAMUSCULAR; INTRAVENOUS at 12:50

## 2020-01-01 RX ADMIN — SODIUM CHLORIDE, PRESERVATIVE FREE 10 ML: 5 INJECTION INTRAVENOUS at 20:53

## 2020-01-01 RX ADMIN — INSULIN GLARGINE 30 UNITS: 100 INJECTION, SOLUTION SUBCUTANEOUS at 22:17

## 2020-01-01 RX ADMIN — ATORVASTATIN CALCIUM 80 MG: 80 TABLET, FILM COATED ORAL at 08:05

## 2020-01-01 RX ADMIN — INSULIN LISPRO 15 UNITS: 100 INJECTION, SOLUTION INTRAVENOUS; SUBCUTANEOUS at 11:04

## 2020-01-01 RX ADMIN — SUCRALFATE 1 G: 1 TABLET ORAL at 14:24

## 2020-01-01 RX ADMIN — ASPIRIN 81 MG: 81 TABLET, COATED ORAL at 21:02

## 2020-01-01 RX ADMIN — ACETAMINOPHEN 650 MG: 325 TABLET, FILM COATED ORAL at 21:12

## 2020-01-01 RX ADMIN — CEPHALEXIN 500 MG: 500 CAPSULE ORAL at 21:19

## 2020-01-01 RX ADMIN — PROPOFOL 30 MG: 10 INJECTION, EMULSION INTRAVENOUS at 14:06

## 2020-01-01 RX ADMIN — ONDANSETRON 4 MG: 2 INJECTION INTRAMUSCULAR; INTRAVENOUS at 07:19

## 2020-01-01 RX ADMIN — SUCRALFATE 1 G: 1 TABLET ORAL at 17:41

## 2020-01-01 RX ADMIN — Medication 5 MG: at 21:07

## 2020-01-01 RX ADMIN — AMLODIPINE BESYLATE 5 MG: 5 TABLET ORAL at 00:41

## 2020-01-01 RX ADMIN — SUCRALFATE 1 G: 1 TABLET ORAL at 11:54

## 2020-01-01 RX ADMIN — Medication 5 MG: at 20:43

## 2020-01-01 RX ADMIN — INSULIN GLARGINE 55 UNITS: 100 INJECTION, SOLUTION SUBCUTANEOUS at 21:32

## 2020-01-01 RX ADMIN — SODIUM CHLORIDE, PRESERVATIVE FREE 10 ML: 5 INJECTION INTRAVENOUS at 22:04

## 2020-01-01 RX ADMIN — ONDANSETRON 4 MG: 2 INJECTION INTRAMUSCULAR; INTRAVENOUS at 20:42

## 2020-01-01 RX ADMIN — ASPIRIN 81 MG: 81 TABLET, COATED ORAL at 20:28

## 2020-01-01 RX ADMIN — INSULIN LISPRO 15 UNITS: 100 INJECTION, SOLUTION INTRAVENOUS; SUBCUTANEOUS at 17:41

## 2020-01-01 RX ADMIN — DULOXETINE HYDROCHLORIDE 60 MG: 60 CAPSULE, DELAYED RELEASE ORAL at 08:21

## 2020-01-01 RX ADMIN — ASPIRIN 81 MG: 81 TABLET, COATED ORAL at 21:19

## 2020-01-01 RX ADMIN — MIDODRINE HYDROCHLORIDE 5 MG: 5 TABLET ORAL at 13:06

## 2020-01-01 RX ADMIN — ONDANSETRON 4 MG: 2 INJECTION INTRAMUSCULAR; INTRAVENOUS at 10:30

## 2020-01-01 RX ADMIN — PANTOPRAZOLE SODIUM 40 MG: 40 TABLET, DELAYED RELEASE ORAL at 09:06

## 2020-01-01 RX ADMIN — FAMOTIDINE 20 MG: 10 INJECTION, SOLUTION INTRAVENOUS at 08:34

## 2020-01-01 RX ADMIN — INSULIN LISPRO 15 UNITS: 100 INJECTION, SOLUTION INTRAVENOUS; SUBCUTANEOUS at 09:21

## 2020-01-01 RX ADMIN — ASPIRIN 81 MG: 81 TABLET, COATED ORAL at 22:02

## 2020-01-01 RX ADMIN — LIDOCAINE HYDROCHLORIDE 60 MG: 20 INJECTION, SOLUTION INFILTRATION; PERINEURAL at 09:51

## 2020-01-01 RX ADMIN — METOPROLOL TARTRATE 12.5 MG: 25 TABLET ORAL at 21:32

## 2020-01-01 RX ADMIN — DULOXETINE HYDROCHLORIDE 60 MG: 60 CAPSULE, DELAYED RELEASE ORAL at 09:06

## 2020-01-01 RX ADMIN — VANCOMYCIN HYDROCHLORIDE 1000 MG: 1 INJECTION, SOLUTION INTRAVENOUS at 08:12

## 2020-01-01 RX ADMIN — SUCRALFATE 1 G: 1 TABLET ORAL at 08:24

## 2020-01-01 RX ADMIN — SUCRALFATE 1 G: 1 TABLET ORAL at 12:27

## 2020-01-01 RX ADMIN — SODIUM CHLORIDE, PRESERVATIVE FREE 10 ML: 5 INJECTION INTRAVENOUS at 20:28

## 2020-01-01 RX ADMIN — SODIUM CHLORIDE 500 ML: 9 INJECTION, SOLUTION INTRAVENOUS at 14:43

## 2020-01-01 RX ADMIN — SUCRALFATE 1 G: 1 TABLET ORAL at 17:11

## 2020-01-01 RX ADMIN — HEPARIN SODIUM 3800 UNITS: 1000 INJECTION INTRAVENOUS; SUBCUTANEOUS at 17:16

## 2020-01-01 RX ADMIN — DULOXETINE HYDROCHLORIDE 60 MG: 60 CAPSULE, DELAYED RELEASE ORAL at 13:56

## 2020-01-01 RX ADMIN — DOCUSATE SODIUM 200 MG: 100 CAPSULE ORAL at 13:55

## 2020-01-01 RX ADMIN — INSULIN LISPRO 4 UNITS: 100 INJECTION, SOLUTION INTRAVENOUS; SUBCUTANEOUS at 17:45

## 2020-01-01 RX ADMIN — PANTOPRAZOLE SODIUM 40 MG: 40 TABLET, DELAYED RELEASE ORAL at 20:32

## 2020-01-01 RX ADMIN — SODIUM CHLORIDE, PRESERVATIVE FREE 10 ML: 5 INJECTION INTRAVENOUS at 08:34

## 2020-01-01 RX ADMIN — METOPROLOL SUCCINATE 25 MG: 25 TABLET, EXTENDED RELEASE ORAL at 20:44

## 2020-01-01 RX ADMIN — PANTOPRAZOLE SODIUM 40 MG: 40 TABLET, DELAYED RELEASE ORAL at 22:16

## 2020-01-01 RX ADMIN — PANTOPRAZOLE SODIUM 40 MG: 40 TABLET, DELAYED RELEASE ORAL at 09:38

## 2020-01-01 RX ADMIN — MORPHINE SULFATE 4 MG: 4 INJECTION, SOLUTION INTRAMUSCULAR; INTRAVENOUS at 16:36

## 2020-01-01 RX ADMIN — ATORVASTATIN CALCIUM 80 MG: 80 TABLET, FILM COATED ORAL at 21:26

## 2020-01-01 RX ADMIN — INSULIN LISPRO 15 UNITS: 100 INJECTION, SOLUTION INTRAVENOUS; SUBCUTANEOUS at 11:53

## 2020-01-01 RX ADMIN — PANTOPRAZOLE SODIUM 40 MG: 40 TABLET, DELAYED RELEASE ORAL at 21:16

## 2020-01-01 RX ADMIN — METOPROLOL TARTRATE 12.5 MG: 25 TABLET ORAL at 11:05

## 2020-01-01 RX ADMIN — FAMOTIDINE 20 MG: 10 INJECTION, SOLUTION INTRAVENOUS at 08:23

## 2020-01-01 RX ADMIN — INSULIN LISPRO 2 UNITS: 100 INJECTION, SOLUTION INTRAVENOUS; SUBCUTANEOUS at 09:21

## 2020-01-01 RX ADMIN — DOCUSATE SODIUM 200 MG: 100 CAPSULE ORAL at 09:38

## 2020-01-01 RX ADMIN — PANTOPRAZOLE SODIUM 40 MG: 40 TABLET, DELAYED RELEASE ORAL at 09:26

## 2020-01-01 RX ADMIN — SUCRALFATE 1 G: 1 TABLET ORAL at 13:55

## 2020-01-01 RX ADMIN — ONDANSETRON 4 MG: 2 INJECTION INTRAMUSCULAR; INTRAVENOUS at 06:36

## 2020-01-01 RX ADMIN — INSULIN LISPRO 10 UNITS: 100 INJECTION, SOLUTION INTRAVENOUS; SUBCUTANEOUS at 11:54

## 2020-01-01 RX ADMIN — PANTOPRAZOLE SODIUM 40 MG: 40 TABLET, DELAYED RELEASE ORAL at 23:27

## 2020-01-01 RX ADMIN — DOCUSATE SODIUM 200 MG: 100 CAPSULE ORAL at 09:09

## 2020-01-01 RX ADMIN — ASPIRIN 81 MG: 81 TABLET, COATED ORAL at 08:54

## 2020-01-01 RX ADMIN — ACETAMINOPHEN 650 MG: 325 TABLET, FILM COATED ORAL at 14:01

## 2020-01-01 RX ADMIN — PANTOPRAZOLE SODIUM 40 MG: 40 TABLET, DELAYED RELEASE ORAL at 08:33

## 2020-01-01 RX ADMIN — ATORVASTATIN CALCIUM 80 MG: 20 TABLET, FILM COATED ORAL at 20:43

## 2020-01-01 RX ADMIN — Medication 5 MG: at 21:47

## 2020-01-01 RX ADMIN — GABAPENTIN 300 MG: 300 CAPSULE ORAL at 21:19

## 2020-01-01 RX ADMIN — LACTULOSE 10 G: 10 SOLUTION ORAL at 21:18

## 2020-01-01 RX ADMIN — SUCRALFATE 1 G: 1 TABLET ORAL at 20:28

## 2020-01-01 RX ADMIN — Medication 10 MG: at 00:19

## 2020-01-01 RX ADMIN — INSULIN GLARGINE 30 UNITS: 100 INJECTION, SOLUTION SUBCUTANEOUS at 21:30

## 2020-01-01 RX ADMIN — ATORVASTATIN CALCIUM 80 MG: 80 TABLET, FILM COATED ORAL at 20:35

## 2020-01-01 RX ADMIN — PANTOPRAZOLE SODIUM 40 MG: 40 TABLET, DELAYED RELEASE ORAL at 22:02

## 2020-01-01 RX ADMIN — LORAZEPAM 0.5 MG: 2 INJECTION INTRAMUSCULAR; INTRAVENOUS at 16:36

## 2020-01-01 RX ADMIN — PANTOPRAZOLE SODIUM 40 MG: 40 TABLET, DELAYED RELEASE ORAL at 17:11

## 2020-01-01 RX ADMIN — HYDROMORPHONE HYDROCHLORIDE 0.5 MG: 1 INJECTION, SOLUTION INTRAMUSCULAR; INTRAVENOUS; SUBCUTANEOUS at 17:24

## 2020-01-01 RX ADMIN — MORPHINE SULFATE 2 MG: 2 INJECTION, SOLUTION INTRAMUSCULAR; INTRAVENOUS at 23:50

## 2020-01-01 RX ADMIN — GABAPENTIN 300 MG: 300 CAPSULE ORAL at 20:33

## 2020-01-01 RX ADMIN — DOCUSATE SODIUM 200 MG: 100 CAPSULE ORAL at 08:54

## 2020-01-01 RX ADMIN — CEFTRIAXONE SODIUM 2 G: 2 INJECTION, SOLUTION INTRAVENOUS at 17:46

## 2020-01-01 RX ADMIN — MIDODRINE HYDROCHLORIDE 5 MG: 5 TABLET ORAL at 06:35

## 2020-01-01 RX ADMIN — SUCRALFATE 1 G: 1 TABLET ORAL at 14:19

## 2020-01-01 RX ADMIN — DOCUSATE SODIUM 100 MG: 100 CAPSULE, LIQUID FILLED ORAL at 20:44

## 2020-01-01 RX ADMIN — INSULIN GLARGINE 30 UNITS: 100 INJECTION, SOLUTION SUBCUTANEOUS at 20:34

## 2020-01-01 RX ADMIN — PROPOFOL 200 MCG/KG/MIN: 10 INJECTION, EMULSION INTRAVENOUS at 09:00

## 2020-01-01 RX ADMIN — PANTOPRAZOLE SODIUM 40 MG: 40 TABLET, DELAYED RELEASE ORAL at 08:13

## 2020-01-01 RX ADMIN — LORAZEPAM 1 MG: 2 INJECTION INTRAMUSCULAR; INTRAVENOUS at 04:38

## 2020-01-01 RX ADMIN — SUCRALFATE 1 G: 1 TABLET ORAL at 09:38

## 2020-01-01 RX ADMIN — PROPOFOL 50 MCG/KG/MIN: 10 INJECTION, EMULSION INTRAVENOUS at 14:06

## 2020-01-01 RX ADMIN — PROTAMINE SULFATE 20 MG: 10 INJECTION, SOLUTION INTRAVENOUS at 15:11

## 2020-01-01 RX ADMIN — EPHEDRINE SULFATE 15 MG: 50 INJECTION INTRAVENOUS at 14:47

## 2020-01-01 RX ADMIN — SODIUM CHLORIDE, PRESERVATIVE FREE 10 ML: 5 INJECTION INTRAVENOUS at 21:27

## 2020-01-01 RX ADMIN — CEFTRIAXONE SODIUM 1 G: 1 INJECTION, SOLUTION INTRAVENOUS at 09:37

## 2020-01-01 RX ADMIN — PANTOPRAZOLE SODIUM 40 MG: 40 TABLET, DELAYED RELEASE ORAL at 21:59

## 2020-01-01 RX ADMIN — DULOXETINE HYDROCHLORIDE 60 MG: 60 CAPSULE, DELAYED RELEASE ORAL at 09:26

## 2020-01-01 RX ADMIN — GABAPENTIN 300 MG: 300 CAPSULE ORAL at 00:19

## 2020-01-01 RX ADMIN — ONDANSETRON HYDROCHLORIDE 4 MG: 2 SOLUTION INTRAMUSCULAR; INTRAVENOUS at 20:42

## 2020-01-01 RX ADMIN — SUCRALFATE 1 G: 1 TABLET ORAL at 08:21

## 2020-01-01 RX ADMIN — MORPHINE SULFATE 4 MG: 2 INJECTION, SOLUTION INTRAMUSCULAR; INTRAVENOUS at 12:50

## 2020-01-01 RX ADMIN — ATORVASTATIN CALCIUM 80 MG: 80 TABLET, FILM COATED ORAL at 14:19

## 2020-01-01 RX ADMIN — HEPARIN SODIUM 6500 UNITS: 1000 INJECTION INTRAVENOUS; SUBCUTANEOUS at 14:45

## 2020-01-01 RX ADMIN — SODIUM CHLORIDE, PRESERVATIVE FREE 10 ML: 5 INJECTION INTRAVENOUS at 08:19

## 2020-01-01 RX ADMIN — PANTOPRAZOLE SODIUM 40 MG: 40 TABLET, DELAYED RELEASE ORAL at 08:18

## 2020-01-01 RX ADMIN — INSULIN LISPRO 4 UNITS: 100 INJECTION, SOLUTION INTRAVENOUS; SUBCUTANEOUS at 11:54

## 2020-01-01 RX ADMIN — INSULIN GLARGINE 55 UNITS: 100 INJECTION, SOLUTION SUBCUTANEOUS at 21:29

## 2020-01-01 RX ADMIN — ONDANSETRON HYDROCHLORIDE 4 MG: 4 TABLET, FILM COATED ORAL at 10:09

## 2020-01-01 RX ADMIN — SODIUM CHLORIDE 9 ML/HR: 9 INJECTION, SOLUTION INTRAVENOUS at 08:38

## 2020-01-01 RX ADMIN — MORPHINE SULFATE 4 MG: 4 INJECTION, SOLUTION INTRAMUSCULAR; INTRAVENOUS at 00:54

## 2020-01-01 RX ADMIN — GABAPENTIN 300 MG: 300 CAPSULE ORAL at 22:04

## 2020-01-01 RX ADMIN — PANTOPRAZOLE SODIUM 40 MG: 40 TABLET, DELAYED RELEASE ORAL at 20:44

## 2020-01-01 RX ADMIN — PANTOPRAZOLE SODIUM 40 MG: 40 TABLET, DELAYED RELEASE ORAL at 21:26

## 2020-01-01 RX ADMIN — ATORVASTATIN CALCIUM 80 MG: 80 TABLET, FILM COATED ORAL at 20:56

## 2020-01-01 RX ADMIN — Medication 5 MG: at 20:44

## 2020-01-01 RX ADMIN — SODIUM CHLORIDE, PRESERVATIVE FREE 10 ML: 5 INJECTION INTRAVENOUS at 09:44

## 2020-01-01 RX ADMIN — SUCRALFATE 1 G: 1 TABLET ORAL at 09:26

## 2020-01-01 RX ADMIN — PANTOPRAZOLE SODIUM 40 MG: 40 TABLET, DELAYED RELEASE ORAL at 08:54

## 2020-01-01 RX ADMIN — Medication 5 MG: at 22:04

## 2020-01-01 RX ADMIN — INSULIN LISPRO 2 UNITS: 100 INJECTION, SOLUTION INTRAVENOUS; SUBCUTANEOUS at 17:12

## 2020-01-01 RX ADMIN — DOCUSATE SODIUM 200 MG: 100 CAPSULE ORAL at 09:26

## 2020-01-01 RX ADMIN — INSULIN LISPRO 15 UNITS: 100 INJECTION, SOLUTION INTRAVENOUS; SUBCUTANEOUS at 17:12

## 2020-01-01 RX ADMIN — MIDODRINE HYDROCHLORIDE 5 MG: 5 TABLET ORAL at 12:25

## 2020-01-01 RX ADMIN — ATORVASTATIN CALCIUM 80 MG: 80 TABLET, FILM COATED ORAL at 11:05

## 2020-01-01 RX ADMIN — GLYCOPYRROLATE 0.2 MG: 0.2 INJECTION, SOLUTION INTRAMUSCULAR; INTRAVITREAL at 20:10

## 2020-01-01 RX ADMIN — SUCRALFATE 1 G: 1 TABLET ORAL at 20:33

## 2020-01-01 RX ADMIN — ATORVASTATIN CALCIUM 80 MG: 80 TABLET, FILM COATED ORAL at 00:19

## 2020-01-01 RX ADMIN — Medication 10 MG: at 20:34

## 2020-01-01 RX ADMIN — POTASSIUM CHLORIDE 20 MEQ: 10 CAPSULE, COATED, EXTENDED RELEASE ORAL at 14:53

## 2020-01-01 RX ADMIN — PANTOPRAZOLE SODIUM 40 MG: 40 TABLET, DELAYED RELEASE ORAL at 20:33

## 2020-01-01 RX ADMIN — ASPIRIN 81 MG: 81 TABLET, COATED ORAL at 22:17

## 2020-01-01 RX ADMIN — PANTOPRAZOLE SODIUM 40 MG: 40 TABLET, DELAYED RELEASE ORAL at 08:48

## 2020-01-01 RX ADMIN — POLYETHYLENE GLYCOL 3350 17 G: 17 POWDER, FOR SOLUTION ORAL at 08:04

## 2020-01-01 RX ADMIN — METOPROLOL TARTRATE 12.5 MG: 25 TABLET ORAL at 08:54

## 2020-01-01 RX ADMIN — PROMETHAZINE HYDROCHLORIDE 25 MG: 25 TABLET ORAL at 11:43

## 2020-01-01 RX ADMIN — Medication 5 MG: at 21:24

## 2020-01-01 RX ADMIN — MIDODRINE HYDROCHLORIDE 5 MG: 5 TABLET ORAL at 18:20

## 2020-01-01 RX ADMIN — SODIUM CHLORIDE 75 ML/HR: 9 INJECTION, SOLUTION INTRAVENOUS at 00:02

## 2020-01-01 RX ADMIN — MORPHINE SULFATE 2 MG: 2 INJECTION, SOLUTION INTRAMUSCULAR; INTRAVENOUS at 16:55

## 2020-01-01 RX ADMIN — INSULIN LISPRO 3 UNITS: 100 INJECTION, SOLUTION INTRAVENOUS; SUBCUTANEOUS at 11:52

## 2020-01-01 RX ADMIN — MIDODRINE HYDROCHLORIDE 5 MG: 5 TABLET ORAL at 17:02

## 2020-01-01 RX ADMIN — PANTOPRAZOLE SODIUM 40 MG: 40 TABLET, DELAYED RELEASE ORAL at 00:19

## 2020-01-01 RX ADMIN — Medication 5 MG: at 21:32

## 2020-01-01 RX ADMIN — CEFTRIAXONE SODIUM 1 G: 1 INJECTION, SOLUTION INTRAVENOUS at 21:47

## 2020-01-01 RX ADMIN — INSULIN LISPRO 10 UNITS: 100 INJECTION, SOLUTION INTRAVENOUS; SUBCUTANEOUS at 09:20

## 2020-01-01 RX ADMIN — ALTEPLASE: 2.2 INJECTION, POWDER, LYOPHILIZED, FOR SOLUTION INTRAVENOUS at 10:11

## 2020-01-01 RX ADMIN — SUCRALFATE 1 G: 1 TABLET ORAL at 21:47

## 2020-01-01 RX ADMIN — SODIUM CHLORIDE, PRESERVATIVE FREE 10 ML: 5 INJECTION INTRAVENOUS at 08:54

## 2020-01-01 RX ADMIN — CEFTRIAXONE SODIUM 1 G: 1 INJECTION, SOLUTION INTRAVENOUS at 09:38

## 2020-01-01 RX ADMIN — CEFAZOLIN SODIUM 2 G: 2 INJECTION, SOLUTION INTRAVENOUS at 09:45

## 2020-01-01 RX ADMIN — PANTOPRAZOLE SODIUM 40 MG: 40 TABLET, DELAYED RELEASE ORAL at 20:53

## 2020-01-01 RX ADMIN — INSULIN LISPRO 2 UNITS: 100 INJECTION, SOLUTION INTRAVENOUS; SUBCUTANEOUS at 21:23

## 2020-01-01 RX ADMIN — ASPIRIN 81 MG: 81 TABLET, COATED ORAL at 23:21

## 2020-01-01 RX ADMIN — ASPIRIN 81 MG: 81 TABLET, COATED ORAL at 08:13

## 2020-01-01 RX ADMIN — INSULIN LISPRO 15 UNITS: 100 INJECTION, SOLUTION INTRAVENOUS; SUBCUTANEOUS at 11:38

## 2020-01-01 RX ADMIN — MIDODRINE HYDROCHLORIDE 5 MG: 5 TABLET ORAL at 17:34

## 2020-01-01 RX ADMIN — DEXTROSE MONOHYDRATE 13 ML: 25 INJECTION, SOLUTION INTRAVENOUS at 11:10

## 2020-01-01 RX ADMIN — AMLODIPINE BESYLATE 5 MG: 5 TABLET ORAL at 20:33

## 2020-01-01 RX ADMIN — POTASSIUM CHLORIDE 40 MEQ: 10 CAPSULE, COATED, EXTENDED RELEASE ORAL at 18:59

## 2020-01-01 RX ADMIN — ONDANSETRON HYDROCHLORIDE 4 MG: 4 TABLET, FILM COATED ORAL at 06:59

## 2020-01-01 RX ADMIN — CEPHALEXIN 500 MG: 500 CAPSULE ORAL at 21:02

## 2020-01-01 RX ADMIN — PANTOPRAZOLE SODIUM 40 MG: 40 TABLET, DELAYED RELEASE ORAL at 17:41

## 2020-01-01 RX ADMIN — FUROSEMIDE 40 MG: 10 INJECTION, SOLUTION INTRAMUSCULAR; INTRAVENOUS at 11:05

## 2020-01-01 RX ADMIN — SUCRALFATE 1 G: 1 TABLET ORAL at 22:16

## 2020-01-01 RX ADMIN — ATORVASTATIN CALCIUM 80 MG: 80 TABLET, FILM COATED ORAL at 08:12

## 2020-01-01 RX ADMIN — MIDODRINE HYDROCHLORIDE 5 MG: 5 TABLET ORAL at 06:32

## 2020-01-01 RX ADMIN — Medication 5 MG: at 23:21

## 2020-01-01 RX ADMIN — PANTOPRAZOLE SODIUM 40 MG: 40 TABLET, DELAYED RELEASE ORAL at 21:28

## 2020-01-01 RX ADMIN — PANTOPRAZOLE SODIUM 40 MG: 40 TABLET, DELAYED RELEASE ORAL at 20:28

## 2020-01-01 RX ADMIN — INSULIN LISPRO 4 UNITS: 100 INJECTION, SOLUTION INTRAVENOUS; SUBCUTANEOUS at 21:47

## 2020-01-01 RX ADMIN — INSULIN GLARGINE 10 UNITS: 100 INJECTION, SOLUTION SUBCUTANEOUS at 20:33

## 2020-01-01 RX ADMIN — GABAPENTIN 300 MG: 300 CAPSULE ORAL at 23:21

## 2020-01-01 RX ADMIN — LORAZEPAM 0.5 MG: 2 INJECTION INTRAMUSCULAR; INTRAVENOUS at 23:50

## 2020-01-01 RX ADMIN — SUCRALFATE 1 G: 1 TABLET ORAL at 17:12

## 2020-01-01 RX ADMIN — MIDODRINE HYDROCHLORIDE 5 MG: 5 TABLET ORAL at 12:10

## 2020-01-01 RX ADMIN — ASPIRIN 81 MG: 81 TABLET, COATED ORAL at 21:33

## 2020-01-01 RX ADMIN — DULOXETINE HYDROCHLORIDE 60 MG: 60 CAPSULE, DELAYED RELEASE ORAL at 09:38

## 2020-01-01 RX ADMIN — Medication 5 MG: at 21:16

## 2020-01-01 RX ADMIN — INSULIN LISPRO 15 UNITS: 100 INJECTION, SOLUTION INTRAVENOUS; SUBCUTANEOUS at 11:34

## 2020-01-01 RX ADMIN — HEPARIN SODIUM 5000 UNITS: 1000 INJECTION, SOLUTION INTRAVENOUS; SUBCUTANEOUS at 14:37

## 2020-01-01 RX ADMIN — PANTOPRAZOLE SODIUM 40 MG: 40 TABLET, DELAYED RELEASE ORAL at 21:07

## 2020-01-01 RX ADMIN — SODIUM CHLORIDE, PRESERVATIVE FREE 10 ML: 5 INJECTION INTRAVENOUS at 09:56

## 2020-01-01 RX ADMIN — SODIUM CHLORIDE, PRESERVATIVE FREE 10 ML: 5 INJECTION INTRAVENOUS at 09:14

## 2020-01-01 RX ADMIN — ACETAMINOPHEN 650 MG: 325 TABLET, FILM COATED ORAL at 22:27

## 2020-01-01 RX ADMIN — FENTANYL CITRATE 25 MCG: 50 INJECTION INTRAMUSCULAR; INTRAVENOUS at 09:12

## 2020-01-01 RX ADMIN — PANTOPRAZOLE SODIUM 40 MG: 40 TABLET, DELAYED RELEASE ORAL at 16:50

## 2020-01-01 RX ADMIN — SUCRALFATE 1 G: 1 TABLET ORAL at 20:43

## 2020-01-01 RX ADMIN — SODIUM CHLORIDE 9 ML/HR: 9 INJECTION, SOLUTION INTRAVENOUS at 10:51

## 2020-01-01 RX ADMIN — CEFTRIAXONE SODIUM 2 G: 2 INJECTION, SOLUTION INTRAVENOUS at 17:57

## 2020-01-01 RX ADMIN — ONDANSETRON 4 MG: 2 INJECTION INTRAMUSCULAR; INTRAVENOUS at 16:17

## 2020-01-01 RX ADMIN — ASPIRIN 81 MG: 81 TABLET, COATED ORAL at 20:32

## 2020-01-01 RX ADMIN — SODIUM CHLORIDE, PRESERVATIVE FREE 10 ML: 5 INJECTION INTRAVENOUS at 00:12

## 2020-01-01 RX ADMIN — Medication 10 MG: at 20:53

## 2020-01-01 RX ADMIN — ATORVASTATIN CALCIUM 80 MG: 80 TABLET, FILM COATED ORAL at 21:19

## 2020-01-01 RX ADMIN — INSULIN GLARGINE 10 UNITS: 100 INJECTION, SOLUTION SUBCUTANEOUS at 21:16

## 2020-01-01 RX ADMIN — METOPROLOL SUCCINATE 25 MG: 25 TABLET, EXTENDED RELEASE ORAL at 20:28

## 2020-01-01 RX ADMIN — ASPIRIN 81 MG: 81 TABLET, COATED ORAL at 20:53

## 2020-01-01 RX ADMIN — ASPIRIN 81 MG: 81 TABLET, COATED ORAL at 21:30

## 2020-01-01 RX ADMIN — ATORVASTATIN CALCIUM 80 MG: 80 TABLET, FILM COATED ORAL at 08:33

## 2020-01-01 RX ADMIN — MIDODRINE HYDROCHLORIDE 5 MG: 5 TABLET ORAL at 09:09

## 2020-01-01 RX ADMIN — INSULIN GLARGINE 15 UNITS: 100 INJECTION, SOLUTION SUBCUTANEOUS at 20:34

## 2020-01-01 RX ADMIN — GABAPENTIN 300 MG: 300 CAPSULE ORAL at 20:53

## 2020-01-01 RX ADMIN — INSULIN GLARGINE 10 UNITS: 100 INJECTION, SOLUTION SUBCUTANEOUS at 21:33

## 2020-01-01 RX ADMIN — FENTANYL CITRATE 25 MCG: 50 INJECTION INTRAMUSCULAR; INTRAVENOUS at 09:48

## 2020-01-01 RX ADMIN — METOPROLOL TARTRATE 12.5 MG: 25 TABLET ORAL at 21:24

## 2020-01-01 RX ADMIN — INSULIN GLARGINE 10 UNITS: 100 INJECTION, SOLUTION SUBCUTANEOUS at 21:02

## 2020-01-01 RX ADMIN — INSULIN GLARGINE 10 UNITS: 100 INJECTION, SOLUTION SUBCUTANEOUS at 22:03

## 2020-01-01 RX ADMIN — POTASSIUM CHLORIDE 40 MEQ: 10 CAPSULE, COATED, EXTENDED RELEASE ORAL at 05:45

## 2020-01-01 RX ADMIN — DULOXETINE HYDROCHLORIDE 60 MG: 60 CAPSULE, DELAYED RELEASE ORAL at 08:47

## 2020-01-01 RX ADMIN — ASPIRIN 81 MG: 81 TABLET, COATED ORAL at 20:33

## 2020-01-01 RX ADMIN — LACTULOSE 10 G: 10 SOLUTION ORAL at 11:05

## 2020-01-01 RX ADMIN — SODIUM CHLORIDE, PRESERVATIVE FREE 10 ML: 5 INJECTION INTRAVENOUS at 09:05

## 2020-01-01 RX ADMIN — METOPROLOL TARTRATE 12.5 MG: 25 TABLET ORAL at 20:43

## 2020-01-01 RX ADMIN — CEFAZOLIN SODIUM 2 G: 2 INJECTION, SOLUTION INTRAVENOUS at 13:55

## 2020-01-01 RX ADMIN — INSULIN GLARGINE 10 UNITS: 100 INJECTION, SOLUTION SUBCUTANEOUS at 22:04

## 2020-01-01 RX ADMIN — GABAPENTIN 300 MG: 300 CAPSULE ORAL at 20:44

## 2020-01-01 RX ADMIN — ATORVASTATIN CALCIUM 80 MG: 80 TABLET, FILM COATED ORAL at 21:02

## 2020-01-01 RX ADMIN — DULOXETINE HYDROCHLORIDE 30 MG: 30 CAPSULE, DELAYED RELEASE ORAL at 01:31

## 2020-01-01 RX ADMIN — LORAZEPAM 0.5 MG: 2 INJECTION INTRAMUSCULAR; INTRAVENOUS at 20:10

## 2020-01-01 RX ADMIN — CEPHALEXIN 500 MG: 500 CAPSULE ORAL at 13:56

## 2020-01-01 RX ADMIN — INSULIN LISPRO 2 UNITS: 100 INJECTION, SOLUTION INTRAVENOUS; SUBCUTANEOUS at 08:18

## 2020-01-01 RX ADMIN — LORAZEPAM 0.5 MG: 2 INJECTION INTRAMUSCULAR; INTRAVENOUS at 00:55

## 2020-01-01 RX ADMIN — PROMETHAZINE HYDROCHLORIDE 25 MG: 25 TABLET ORAL at 08:19

## 2020-01-01 RX ADMIN — PANTOPRAZOLE SODIUM 40 MG: 40 TABLET, DELAYED RELEASE ORAL at 22:04

## 2020-01-01 RX ADMIN — CEPHALEXIN 500 MG: 500 CAPSULE ORAL at 08:54

## 2020-01-01 RX ADMIN — PANTOPRAZOLE SODIUM 40 MG: 40 TABLET, DELAYED RELEASE ORAL at 08:24

## 2020-01-01 RX ADMIN — DEXTROSE MONOHYDRATE 13 ML: 25 INJECTION, SOLUTION INTRAVENOUS at 11:59

## 2020-01-01 RX ADMIN — GABAPENTIN 300 MG: 300 CAPSULE ORAL at 21:06

## 2020-01-01 RX ADMIN — ERTAPENEM SODIUM 500 MG: 1 INJECTION, POWDER, LYOPHILIZED, FOR SOLUTION INTRAMUSCULAR; INTRAVENOUS at 01:20

## 2020-01-01 RX ADMIN — ALTEPLASE: 2.2 INJECTION, POWDER, LYOPHILIZED, FOR SOLUTION INTRAVENOUS at 10:10

## 2020-01-01 RX ADMIN — ATORVASTATIN CALCIUM 80 MG: 80 TABLET, FILM COATED ORAL at 21:01

## 2020-01-01 RX ADMIN — SODIUM CHLORIDE, PRESERVATIVE FREE 10 ML: 5 INJECTION INTRAVENOUS at 09:00

## 2020-01-01 RX ADMIN — METOPROLOL TARTRATE 12.5 MG: 25 TABLET ORAL at 08:12

## 2020-01-01 RX ADMIN — TAZOBACTAM SODIUM AND PIPERACILLIN SODIUM 3.38 G: 375; 3 INJECTION, SOLUTION INTRAVENOUS at 18:27

## 2020-01-01 RX ADMIN — SUCRALFATE 1 G: 1 TABLET ORAL at 09:20

## 2020-01-01 RX ADMIN — SODIUM CHLORIDE 50 ML/HR: 9 INJECTION, SOLUTION INTRAVENOUS at 05:50

## 2020-01-01 RX ADMIN — ATORVASTATIN CALCIUM 80 MG: 80 TABLET, FILM COATED ORAL at 08:55

## 2020-01-01 RX ADMIN — LORAZEPAM 1 MG: 2 INJECTION INTRAMUSCULAR; INTRAVENOUS at 20:22

## 2020-01-01 RX ADMIN — METOCLOPRAMIDE 5 MG: 5 TABLET ORAL at 06:13

## 2020-01-01 RX ADMIN — DOCUSATE SODIUM 200 MG: 100 CAPSULE ORAL at 14:24

## 2020-01-01 RX ADMIN — INSULIN LISPRO 4 UNITS: 100 INJECTION, SOLUTION INTRAVENOUS; SUBCUTANEOUS at 11:35

## 2020-01-01 RX ADMIN — GABAPENTIN 300 MG: 300 CAPSULE ORAL at 21:13

## 2020-01-01 RX ADMIN — LORAZEPAM 0.5 MG: 2 INJECTION INTRAMUSCULAR; INTRAVENOUS at 16:55

## 2020-01-01 RX ADMIN — SUCRALFATE 1 G: 1 TABLET ORAL at 16:50

## 2020-01-01 RX ADMIN — LORAZEPAM 0.5 MG: 2 INJECTION INTRAMUSCULAR; INTRAVENOUS at 08:16

## 2020-01-01 RX ADMIN — SUCRALFATE 1 G: 1 TABLET ORAL at 08:12

## 2020-01-01 RX ADMIN — SUCRALFATE 1 G: 1 TABLET ORAL at 20:44

## 2020-01-01 RX ADMIN — CEFTRIAXONE SODIUM 2 G: 2 INJECTION, SOLUTION INTRAVENOUS at 18:16

## 2020-01-01 RX ADMIN — DOCUSATE SODIUM 100 MG: 100 CAPSULE, LIQUID FILLED ORAL at 22:16

## 2020-01-01 RX ADMIN — MIDODRINE HYDROCHLORIDE 5 MG: 5 TABLET ORAL at 14:24

## 2020-01-01 RX ADMIN — CEPHALEXIN 500 MG: 500 CAPSULE ORAL at 20:56

## 2020-01-01 RX ADMIN — DOCUSATE SODIUM 200 MG: 100 CAPSULE ORAL at 09:06

## 2020-01-01 RX ADMIN — SODIUM CHLORIDE, PRESERVATIVE FREE 10 ML: 5 INJECTION INTRAVENOUS at 22:24

## 2020-01-01 RX ADMIN — HEPARIN SODIUM 3800 UNITS: 1000 INJECTION, SOLUTION INTRAVENOUS; SUBCUTANEOUS at 16:56

## 2020-01-01 RX ADMIN — INSULIN GLARGINE 10 UNITS: 100 INJECTION, SOLUTION SUBCUTANEOUS at 21:07

## 2020-01-01 RX ADMIN — MORPHINE SULFATE 4 MG: 2 INJECTION, SOLUTION INTRAMUSCULAR; INTRAVENOUS at 04:38

## 2020-01-01 RX ADMIN — SODIUM CHLORIDE, PRESERVATIVE FREE 10 ML: 5 INJECTION INTRAVENOUS at 00:21

## 2020-01-01 RX ADMIN — SUCRALFATE 1 G: 1 TABLET ORAL at 11:05

## 2020-01-01 RX ADMIN — BISACODYL 5 MG: 5 TABLET ORAL at 20:50

## 2020-01-01 RX ADMIN — Medication 5 MG: at 20:33

## 2020-01-01 RX ADMIN — METOPROLOL TARTRATE 12.5 MG: 25 TABLET ORAL at 21:07

## 2020-01-01 RX ADMIN — DOCUSATE SODIUM 200 MG: 100 CAPSULE ORAL at 08:21

## 2020-01-01 RX ADMIN — INSULIN GLARGINE 10 UNITS: 100 INJECTION, SOLUTION SUBCUTANEOUS at 21:29

## 2020-01-01 RX ADMIN — INSULIN LISPRO 2 UNITS: 100 INJECTION, SOLUTION INTRAVENOUS; SUBCUTANEOUS at 21:06

## 2020-01-01 RX ADMIN — HEPARIN SODIUM 3800 UNITS: 1000 INJECTION INTRAVENOUS; SUBCUTANEOUS at 11:53

## 2020-01-01 RX ADMIN — PANTOPRAZOLE SODIUM 40 MG: 40 TABLET, DELAYED RELEASE ORAL at 08:21

## 2020-01-01 RX ADMIN — SODIUM CHLORIDE: 9 INJECTION, SOLUTION INTRAVENOUS at 13:52

## 2020-01-01 RX ADMIN — ERTAPENEM SODIUM 500 MG: 1 INJECTION, POWDER, LYOPHILIZED, FOR SOLUTION INTRAMUSCULAR; INTRAVENOUS at 00:39

## 2020-01-01 RX ADMIN — GABAPENTIN 300 MG: 300 CAPSULE ORAL at 21:24

## 2020-01-01 RX ADMIN — INSULIN GLARGINE 15 UNITS: 100 INJECTION, SOLUTION SUBCUTANEOUS at 00:19

## 2020-01-01 RX ADMIN — SUCRALFATE 1 G: 1 TABLET ORAL at 11:37

## 2020-01-01 RX ADMIN — SODIUM CHLORIDE, PRESERVATIVE FREE 10 ML: 5 INJECTION INTRAVENOUS at 22:06

## 2020-01-01 RX ADMIN — GABAPENTIN 300 MG: 300 CAPSULE ORAL at 20:28

## 2020-01-01 RX ADMIN — ASPIRIN 81 MG: 81 TABLET, COATED ORAL at 14:19

## 2020-01-01 RX ADMIN — SUCRALFATE 1 G: 1 TABLET ORAL at 09:06

## 2020-01-01 RX ADMIN — SODIUM CHLORIDE, PRESERVATIVE FREE 10 ML: 5 INJECTION INTRAVENOUS at 09:10

## 2020-01-01 RX ADMIN — ONDANSETRON HYDROCHLORIDE 4 MG: 4 TABLET, FILM COATED ORAL at 00:36

## 2020-01-01 RX ADMIN — SUCRALFATE 1 G: 1 TABLET ORAL at 09:09

## 2020-01-01 RX ADMIN — MIDODRINE HYDROCHLORIDE 5 MG: 5 TABLET ORAL at 18:12

## 2020-01-01 RX ADMIN — INSULIN LISPRO 15 UNITS: 100 INJECTION, SOLUTION INTRAVENOUS; SUBCUTANEOUS at 17:37

## 2020-01-01 RX ADMIN — INSULIN GLARGINE 30 UNITS: 100 INJECTION, SOLUTION SUBCUTANEOUS at 21:15

## 2020-01-01 RX ADMIN — INSULIN GLARGINE 10 UNITS: 100 INJECTION, SOLUTION SUBCUTANEOUS at 21:48

## 2020-01-01 RX ADMIN — GABAPENTIN 300 MG: 300 CAPSULE ORAL at 21:32

## 2020-01-01 RX ADMIN — PANTOPRAZOLE SODIUM 40 MG: 40 TABLET, DELAYED RELEASE ORAL at 21:02

## 2020-01-01 RX ADMIN — MORPHINE SULFATE 4 MG: 4 INJECTION, SOLUTION INTRAMUSCULAR; INTRAVENOUS at 20:10

## 2020-01-01 RX ADMIN — SODIUM CHLORIDE, PRESERVATIVE FREE 10 ML: 5 INJECTION INTRAVENOUS at 08:26

## 2020-01-01 RX ADMIN — AMIODARONE HYDROCHLORIDE 1 MG/MIN: 1.8 INJECTION, SOLUTION INTRAVENOUS at 21:26

## 2020-01-01 RX ADMIN — POTASSIUM CHLORIDE 40 MEQ: 1.5 POWDER, FOR SOLUTION ORAL at 11:57

## 2020-01-01 RX ADMIN — LIDOCAINE HYDROCHLORIDE 1 ML: 10; .005 INJECTION, SOLUTION EPIDURAL; INFILTRATION; INTRACAUDAL; PERINEURAL at 11:13

## 2020-01-01 RX ADMIN — INSULIN GLARGINE 15 UNITS: 100 INJECTION, SOLUTION SUBCUTANEOUS at 20:35

## 2020-01-01 RX ADMIN — PANTOPRAZOLE SODIUM 40 MG: 40 TABLET, DELAYED RELEASE ORAL at 21:33

## 2020-01-01 RX ADMIN — SODIUM CHLORIDE, PRESERVATIVE FREE 10 ML: 5 INJECTION INTRAVENOUS at 14:25

## 2020-01-01 RX ADMIN — METOPROLOL SUCCINATE 12.5 MG: 25 TABLET, EXTENDED RELEASE ORAL at 01:10

## 2020-01-01 RX ADMIN — INSULIN LISPRO 4 UNITS: 100 INJECTION, SOLUTION INTRAVENOUS; SUBCUTANEOUS at 17:56

## 2020-01-01 RX ADMIN — INSULIN LISPRO 2 UNITS: 100 INJECTION, SOLUTION INTRAVENOUS; SUBCUTANEOUS at 12:34

## 2020-01-01 RX ADMIN — INSULIN LISPRO 4 UNITS: 100 INJECTION, SOLUTION INTRAVENOUS; SUBCUTANEOUS at 11:43

## 2020-01-01 RX ADMIN — PANTOPRAZOLE SODIUM 40 MG: 40 TABLET, DELAYED RELEASE ORAL at 07:17

## 2020-01-01 RX ADMIN — AMIODARONE HYDROCHLORIDE 150 MG: 1.5 INJECTION, SOLUTION INTRAVENOUS at 21:18

## 2020-01-01 RX ADMIN — SODIUM CHLORIDE, PRESERVATIVE FREE 10 ML: 5 INJECTION INTRAVENOUS at 20:22

## 2020-01-01 RX ADMIN — GABAPENTIN 300 MG: 300 CAPSULE ORAL at 21:16

## 2020-01-01 RX ADMIN — PANTOPRAZOLE SODIUM 40 MG: 40 TABLET, DELAYED RELEASE ORAL at 08:04

## 2020-01-01 RX ADMIN — LORAZEPAM 0.5 MG: 2 INJECTION INTRAMUSCULAR; INTRAVENOUS at 12:58

## 2020-01-01 RX ADMIN — LORAZEPAM 0.5 MG: 2 INJECTION INTRAMUSCULAR; INTRAVENOUS at 17:10

## 2020-01-01 RX ADMIN — SODIUM CHLORIDE, PRESERVATIVE FREE 10 ML: 5 INJECTION INTRAVENOUS at 00:55

## 2020-01-01 RX ADMIN — LIDOCAINE HYDROCHLORIDE 100 MG: 20 INJECTION, SOLUTION INFILTRATION; PERINEURAL at 14:06

## 2020-01-01 RX ADMIN — INSULIN LISPRO 15 UNITS: 100 INJECTION, SOLUTION INTRAVENOUS; SUBCUTANEOUS at 17:11

## 2020-01-01 RX ADMIN — SODIUM CHLORIDE, PRESERVATIVE FREE 10 ML: 5 INJECTION INTRAVENOUS at 21:34

## 2020-01-01 RX ADMIN — DOCUSATE SODIUM 100 MG: 100 CAPSULE, LIQUID FILLED ORAL at 08:33

## 2020-01-01 RX ADMIN — SUCRALFATE 1 G: 1 TABLET ORAL at 21:06

## 2020-01-01 RX ADMIN — GABAPENTIN 300 MG: 300 CAPSULE ORAL at 20:35

## 2020-01-01 RX ADMIN — DULOXETINE HYDROCHLORIDE 60 MG: 60 CAPSULE, DELAYED RELEASE ORAL at 08:54

## 2020-01-01 RX ADMIN — INSULIN LISPRO 15 UNITS: 100 INJECTION, SOLUTION INTRAVENOUS; SUBCUTANEOUS at 16:49

## 2020-01-01 RX ADMIN — MIDODRINE HYDROCHLORIDE 5 MG: 5 TABLET ORAL at 17:08

## 2020-01-01 RX ADMIN — SODIUM CHLORIDE, PRESERVATIVE FREE 10 ML: 5 INJECTION INTRAVENOUS at 22:45

## 2020-01-01 RX ADMIN — MIDODRINE HYDROCHLORIDE 5 MG: 5 TABLET ORAL at 11:57

## 2020-01-01 RX ADMIN — SUCRALFATE 1 G: 1 TABLET ORAL at 08:54

## 2020-01-01 RX ADMIN — ONDANSETRON HYDROCHLORIDE 4 MG: 2 SOLUTION INTRAMUSCULAR; INTRAVENOUS at 10:13

## 2020-01-01 RX ADMIN — ASPIRIN 81 MG: 81 TABLET, COATED ORAL at 21:13

## 2020-01-01 RX ADMIN — MORPHINE SULFATE 4 MG: 2 INJECTION, SOLUTION INTRAMUSCULAR; INTRAVENOUS at 20:22

## 2020-01-01 RX ADMIN — SODIUM CHLORIDE, PRESERVATIVE FREE 10 ML: 5 INJECTION INTRAVENOUS at 20:32

## 2020-01-01 RX ADMIN — GABAPENTIN 300 MG: 300 CAPSULE ORAL at 22:16

## 2020-01-01 RX ADMIN — FENTANYL CITRATE 25 MCG: 50 INJECTION INTRAMUSCULAR; INTRAVENOUS at 09:01

## 2020-01-01 RX ADMIN — FENTANYL CITRATE 25 MCG: 50 INJECTION INTRAMUSCULAR; INTRAVENOUS at 09:08

## 2020-01-01 RX ADMIN — MIDODRINE HYDROCHLORIDE 5 MG: 5 TABLET ORAL at 05:46

## 2020-01-01 RX ADMIN — ATORVASTATIN CALCIUM 80 MG: 80 TABLET, FILM COATED ORAL at 20:32

## 2020-01-01 RX ADMIN — SODIUM CHLORIDE, PRESERVATIVE FREE 10 ML: 5 INJECTION INTRAVENOUS at 21:50

## 2020-01-01 RX ADMIN — PANTOPRAZOLE SODIUM 40 MG: 40 TABLET, DELAYED RELEASE ORAL at 14:24

## 2020-01-01 RX ADMIN — ASPIRIN 81 MG: 81 TABLET, COATED ORAL at 20:56

## 2020-01-01 RX ADMIN — INSULIN LISPRO 4 UNITS: 100 INJECTION, SOLUTION INTRAVENOUS; SUBCUTANEOUS at 12:49

## 2020-01-01 RX ADMIN — HYDROCODONE BITARTRATE AND ACETAMINOPHEN 1 TABLET: 5; 325 TABLET ORAL at 19:38

## 2020-01-01 RX ADMIN — SODIUM CHLORIDE 125 ML/HR: 9 INJECTION, SOLUTION INTRAVENOUS at 13:37

## 2020-01-01 RX ADMIN — Medication 5 MG: at 20:28

## 2020-01-01 RX ADMIN — ACETAMINOPHEN 650 MG: 325 TABLET, FILM COATED ORAL at 02:04

## 2020-01-01 RX ADMIN — POTASSIUM CHLORIDE 10 MEQ: 7.46 INJECTION, SOLUTION INTRAVENOUS at 08:45

## 2020-01-01 RX ADMIN — SUCRALFATE 1 G: 1 TABLET ORAL at 21:24

## 2020-01-01 RX ADMIN — SODIUM CHLORIDE, PRESERVATIVE FREE 10 ML: 5 INJECTION INTRAVENOUS at 23:21

## 2020-01-01 RX ADMIN — FAMOTIDINE 20 MG: 10 INJECTION, SOLUTION INTRAVENOUS at 11:10

## 2020-01-01 RX ADMIN — PREDNISONE 10 MG: 10 TABLET ORAL at 11:54

## 2020-01-01 RX ADMIN — GABAPENTIN 300 MG: 300 CAPSULE ORAL at 20:43

## 2020-01-01 RX ADMIN — ASPIRIN 81 MG: 81 TABLET, COATED ORAL at 11:05

## 2020-01-01 RX ADMIN — ATORVASTATIN CALCIUM 80 MG: 80 TABLET, FILM COATED ORAL at 21:13

## 2020-01-01 RX ADMIN — DOCUSATE SODIUM 100 MG: 100 CAPSULE, LIQUID FILLED ORAL at 08:24

## 2020-01-01 RX ADMIN — PANTOPRAZOLE SODIUM 40 MG: 40 TABLET, DELAYED RELEASE ORAL at 06:30

## 2020-01-01 RX ADMIN — ASPIRIN 81 MG: 81 TABLET, COATED ORAL at 08:18

## 2020-01-01 RX ADMIN — INSULIN LISPRO 2 UNITS: 100 INJECTION, SOLUTION INTRAVENOUS; SUBCUTANEOUS at 16:50

## 2020-01-01 RX ADMIN — INSULIN GLARGINE 10 UNITS: 100 INJECTION, SOLUTION SUBCUTANEOUS at 20:56

## 2020-01-01 RX ADMIN — DOCUSATE SODIUM 200 MG: 100 CAPSULE, LIQUID FILLED ORAL at 09:20

## 2020-01-01 RX ADMIN — ASPIRIN 81 MG: 81 TABLET, COATED ORAL at 08:04

## 2020-01-01 RX ADMIN — FUROSEMIDE 120 MG: 10 INJECTION, SOLUTION INTRAMUSCULAR; INTRAVENOUS at 20:30

## 2020-01-01 RX ADMIN — PANTOPRAZOLE SODIUM 40 MG: 40 TABLET, DELAYED RELEASE ORAL at 09:20

## 2020-01-01 RX ADMIN — Medication 5 MG: at 21:06

## 2020-01-01 RX ADMIN — FENTANYL CITRATE 25 MCG: 50 INJECTION INTRAMUSCULAR; INTRAVENOUS at 10:10

## 2020-01-01 RX ADMIN — METOCLOPRAMIDE 5 MG: 5 TABLET ORAL at 05:56

## 2020-01-01 RX ADMIN — MORPHINE SULFATE 4 MG: 2 INJECTION, SOLUTION INTRAMUSCULAR; INTRAVENOUS at 17:10

## 2020-01-01 RX ADMIN — MIDODRINE HYDROCHLORIDE 5 MG: 5 TABLET ORAL at 13:03

## 2020-01-01 RX ADMIN — SUCRALFATE 1 G: 1 TABLET ORAL at 08:33

## 2020-01-01 RX ADMIN — SUCRALFATE 1 G: 1 TABLET ORAL at 08:18

## 2020-01-01 RX ADMIN — SODIUM CHLORIDE, PRESERVATIVE FREE 10 ML: 5 INJECTION INTRAVENOUS at 21:00

## 2020-01-01 RX ADMIN — EPHEDRINE SULFATE 10 MG: 50 INJECTION INTRAVENOUS at 14:41

## 2020-01-01 RX ADMIN — SUCRALFATE 1 G: 1 TABLET ORAL at 11:34

## 2020-01-01 RX ADMIN — INSULIN GLARGINE 55 UNITS: 100 INJECTION, SOLUTION SUBCUTANEOUS at 21:23

## 2020-01-01 RX ADMIN — GABAPENTIN 100 MG: 100 CAPSULE ORAL at 21:07

## 2020-01-01 RX ADMIN — ASPIRIN 81 MG: 81 TABLET, COATED ORAL at 08:33

## 2020-01-01 RX ADMIN — INSULIN LISPRO 2 UNITS: 100 INJECTION, SOLUTION INTRAVENOUS; SUBCUTANEOUS at 12:20

## 2020-01-01 RX ADMIN — PANTOPRAZOLE SODIUM 40 MG: 40 TABLET, DELAYED RELEASE ORAL at 09:36

## 2020-01-01 RX ADMIN — SUCRALFATE 1 G: 1 TABLET ORAL at 17:23

## 2020-01-01 RX ADMIN — Medication 5 MG: at 22:16

## 2020-01-01 RX ADMIN — PANTOPRAZOLE SODIUM 40 MG: 40 TABLET, DELAYED RELEASE ORAL at 20:35

## 2020-01-01 RX ADMIN — INSULIN LISPRO 2 UNITS: 100 INJECTION, SOLUTION INTRAVENOUS; SUBCUTANEOUS at 17:39

## 2020-01-01 RX ADMIN — SODIUM CHLORIDE, PRESERVATIVE FREE 10 ML: 5 INJECTION INTRAVENOUS at 13:56

## 2020-01-01 RX ADMIN — SODIUM CHLORIDE, PRESERVATIVE FREE 10 ML: 5 INJECTION INTRAVENOUS at 09:38

## 2020-01-01 RX ADMIN — ACETAMINOPHEN 650 MG: 325 TABLET, FILM COATED ORAL at 23:38

## 2020-01-01 RX ADMIN — PANTOPRAZOLE SODIUM 40 MG: 40 TABLET, DELAYED RELEASE ORAL at 21:13

## 2020-01-01 RX ADMIN — SODIUM CHLORIDE, PRESERVATIVE FREE 10 ML: 5 INJECTION INTRAVENOUS at 20:08

## 2020-01-01 RX ADMIN — INSULIN LISPRO 15 UNITS: 100 INJECTION, SOLUTION INTRAVENOUS; SUBCUTANEOUS at 08:24

## 2020-01-01 RX ADMIN — ASPIRIN 81 MG: 81 TABLET, COATED ORAL at 20:35

## 2020-01-01 RX ADMIN — SODIUM CHLORIDE, PRESERVATIVE FREE 10 ML: 5 INJECTION INTRAVENOUS at 20:44

## 2020-01-01 RX ADMIN — HEPARIN SODIUM 3800 UNITS: 1000 INJECTION INTRAVENOUS; SUBCUTANEOUS at 12:25

## 2020-01-01 RX ADMIN — ATORVASTATIN CALCIUM 80 MG: 80 TABLET, FILM COATED ORAL at 21:33

## 2020-01-01 RX ADMIN — ASPIRIN 81 MG: 81 TABLET, COATED ORAL at 00:19

## 2020-01-01 RX ADMIN — ATORVASTATIN CALCIUM 80 MG: 80 TABLET, FILM COATED ORAL at 21:59

## 2020-01-01 RX ADMIN — INSULIN LISPRO 4 UNITS: 100 INJECTION, SOLUTION INTRAVENOUS; SUBCUTANEOUS at 17:12

## 2020-01-01 RX ADMIN — ACETAMINOPHEN 650 MG: 325 TABLET, FILM COATED ORAL at 01:46

## 2020-01-01 RX ADMIN — INSULIN LISPRO 2 UNITS: 100 INJECTION, SOLUTION INTRAVENOUS; SUBCUTANEOUS at 11:37

## 2020-01-01 RX ADMIN — DULOXETINE HYDROCHLORIDE 30 MG: 30 CAPSULE, DELAYED RELEASE ORAL at 20:33

## 2020-01-01 RX ADMIN — SODIUM CHLORIDE, PRESERVATIVE FREE 10 ML: 5 INJECTION INTRAVENOUS at 12:20

## 2020-01-01 RX ADMIN — SODIUM CHLORIDE, PRESERVATIVE FREE 10 ML: 5 INJECTION INTRAVENOUS at 08:21

## 2020-01-01 RX ADMIN — INSULIN LISPRO 6 UNITS: 100 INJECTION, SOLUTION INTRAVENOUS; SUBCUTANEOUS at 08:04

## 2020-01-01 RX ADMIN — INSULIN GLARGINE 15 UNITS: 100 INJECTION, SOLUTION SUBCUTANEOUS at 23:19

## 2020-01-01 RX ADMIN — METOPROLOL SUCCINATE 25 MG: 25 TABLET, EXTENDED RELEASE ORAL at 22:16

## 2020-01-01 RX ADMIN — ASPIRIN 81 MG: 81 TABLET, COATED ORAL at 21:59

## 2020-01-01 RX ADMIN — PANTOPRAZOLE SODIUM 40 MG: 40 TABLET, DELAYED RELEASE ORAL at 09:09

## 2020-01-01 RX ADMIN — CEPHALEXIN 500 MG: 500 CAPSULE ORAL at 09:06

## 2020-01-01 RX ADMIN — ATORVASTATIN CALCIUM 80 MG: 80 TABLET, FILM COATED ORAL at 08:18

## 2020-01-01 RX ADMIN — SODIUM CHLORIDE, PRESERVATIVE FREE 10 ML: 5 INJECTION INTRAVENOUS at 10:49

## 2020-01-01 RX ADMIN — SODIUM CHLORIDE, PRESERVATIVE FREE 10 ML: 5 INJECTION INTRAVENOUS at 22:02

## 2020-01-01 RX ADMIN — ATORVASTATIN CALCIUM 80 MG: 80 TABLET, FILM COATED ORAL at 08:13

## 2020-01-01 RX ADMIN — DULOXETINE HYDROCHLORIDE 60 MG: 60 CAPSULE, DELAYED RELEASE ORAL at 09:09

## 2020-01-01 RX ADMIN — GLYCOPYRROLATE 0.4 MG: 0.2 INJECTION, SOLUTION INTRAMUSCULAR; INTRAVITREAL at 00:54

## 2020-01-01 RX ADMIN — INSULIN LISPRO 15 UNITS: 100 INJECTION, SOLUTION INTRAVENOUS; SUBCUTANEOUS at 12:27

## 2020-01-01 RX ADMIN — METOPROLOL TARTRATE 12.5 MG: 25 TABLET ORAL at 08:05

## 2020-01-01 RX ADMIN — SODIUM CHLORIDE, PRESERVATIVE FREE 10 ML: 5 INJECTION INTRAVENOUS at 09:21

## 2020-01-01 RX ADMIN — SUCRALFATE 1 G: 1 TABLET ORAL at 17:37

## 2020-01-01 RX ADMIN — VANCOMYCIN HYDROCHLORIDE 1500 MG: 10 INJECTION, POWDER, LYOPHILIZED, FOR SOLUTION INTRAVENOUS at 18:26

## 2020-01-01 NOTE — PROGRESS NOTES
"CC:   NSTEMI   Interval History:  No chest pain. Sitting in chair. No new complaints       Vital Signs  Temp:  [98.1 °F (36.7 °C)-98.9 °F (37.2 °C)] 98.1 °F (36.7 °C)  Heart Rate:  [60] 60  Resp:  [16-18] 16  BP: (127-133)/(56-59) 133/59    Intake/Output Summary (Last 24 hours) at 1/1/2020 1417  Last data filed at 1/1/2020 0808  Gross per 24 hour   Intake 240 ml   Output --   Net 240 ml     Flowsheet Rows      First Filed Value   Admission Height  152.4 cm (60\") Documented at 12/18/2019 1845   Admission Weight  93 kg (205 lb) Documented at 12/18/2019 1854          PHYSICAL EXAM:  General: No acute distress  Resp:NL Rate, unlabored, clear  CV:NL rate and rhythm, NL PMI, Nl S1 and S2, no Murmur, no gallop, no rub, No JVD. Normal pedal pulses  ABD:Nl sounds, no masses or tenderness, nondistended, no guarding or rebound  Neuro: alert,cooperative and oriented  Extr: No edema or cyanosis, moves all extremities      Results Review:    Results from last 7 days   Lab Units 01/01/20  0448   SODIUM mmol/L 140   POTASSIUM mmol/L 4.6   CHLORIDE mmol/L 103   CO2 mmol/L 25.2   BUN mg/dL 85*   CREATININE mg/dL 2.14*   GLUCOSE mg/dL 306*   CALCIUM mg/dL 9.2     Results from last 7 days   Lab Units 12/31/19  0525   TROPONIN T ng/mL 0.032*     Results from last 7 days   Lab Units 01/01/20  0448   WBC 10*3/mm3 18.03*   HEMOGLOBIN g/dL 9.7*   HEMATOCRIT % 29.9*   PLATELETS 10*3/mm3 325             Results from last 7 days   Lab Units 01/01/20  0448   MAGNESIUM mg/dL 1.8         I reviewed the patient's new clinical results.  I personally viewed and interpreted the patient's EKG/Telemetry data        Medication Review:   Meds reviewed      lactated ringers 30 mL/hr   sodium chloride 30 mL/hr       Assessment/Plan  1. Non- STEMI- troponin has trended down. ECG had no acute changes. If cath is unremarkable could turn out to be demand related due to hypovolemia, ABEBE, and CHF. Plan for heart cath Thursday if renal function Seems to be " tolerating ASA   2. Acute systolic CHF EF 36-40% continue carveilol. No ACE or ARB due to kidney issues until cleared by nephrology post catheterization   3.Acute on chronic kidney disease nephrology following diuretics continue to be held creatinine improving down to 2.13/2.14. Baseline 1.7  4. Aortic stenosis   5.PVD- DAPT was held but ASA was restarted 12/30/19  6. Pulmonary HTN RVSP 82.3  7. History of abdominal surgery and GI issues- she does not take Bentyl at home and would like that DC'd/ she is agreeable to continue miralax.  8. GIB s/p EGD with gastric biopsies/ chronic gastritis, reactive hyperplasia- hgb stable on PPI and miralax      Discussed with Dr. Bertha Velasquez. Will give 75 cc/hr saline to start at midnight and if Cr below or equal to 2.5 we will proceed with heart cath. NPO after midnight. Discussed with primary RN to notify us if any signs of volume overload. Patient agreeable with plan        EBEN Alexis  01/01/20  2:17 PM

## 2020-01-01 NOTE — PROGRESS NOTES
Chart Check: Cr is 2.14 today and yesterday 2.13. Baseline approx. 1.7. Would like to plan for catheterization tomorrow if nephrology will clear for it. I will round on her later today. Please call with questions or concerns.

## 2020-01-01 NOTE — PROGRESS NOTES
Name: Randi Martines ADMIT: 2019   : 1950  PCP: Jose Morrison DO    MRN: [id number] LOS: 14 days   AGE/SEX: 69 y.o. female  ROOM: [facility]     Subjective   Subjective   CC: dyspnea  No acute events.  Patient overall feels well. Taking PO. No BM yet. No CP/f/c/n/v.      Objective   Objective   Vital Signs  Temp:  [98.1 °F (36.7 °C)-99.4 °F (37.4 °C)] 99.4 °F (37.4 °C)  Heart Rate:  [60-63] 63  Resp:  [16-18] 16  BP: (127-143)/(55-59) 143/55  SpO2:  [96 %-97 %] 97 %  on   ;   Device (Oxygen Therapy): room air  Body mass index is 37.77 kg/m².  Physical Exam   Constitutional: She is oriented to person, place, and time. No distress.   HENT:   Head: Normocephalic and atraumatic.   Mouth/Throat: Oropharynx is clear and moist.   Eyes: Pupils are equal, round, and reactive to light. Conjunctivae and EOM are normal.   Neck: Normal range of motion. Neck supple.   Cardiovascular: Normal rate, regular rhythm and intact distal pulses.   Pulmonary/Chest: Effort normal and breath sounds normal. She has no rales.   Abdominal: Soft. Bowel sounds are normal.   Musculoskeletal: She exhibits no edema or tenderness.   Neurological: She is alert and oriented to person, place, and time.   Skin: Skin is warm and dry. Capillary refill takes less than 2 seconds. She is not diaphoretic.   Psychiatric: She has a normal mood and affect. Her behavior is normal.   Nursing note and vitals reviewed.    Results Review:       I reviewed the patient's new clinical results.  I personally reviewed and interpreted the patient's telemetry.  Results from last 7 days   Lab Units 20  0448 19  0525 19  0412 19  0516   WBC 10*3/mm3 18.03* 17.26* 17.12* 19.33*   HEMOGLOBIN g/dL 9.7* 10.0* 9.2* 9.6*   PLATELETS 10*3/mm3 325 336 313 362     Results from last 7 days   Lab Units 20  0448 19  0525 19  0412 19  0516   SODIUM mmol/L 140 140 135* 137   POTASSIUM mmol/L 4.6 4.2 3.9 4.2   CHLORIDE  mmol/L 103 100 95* 93*   CO2 mmol/L 25.2 24.7 25.9 27.4   BUN mg/dL 85* 88* 91* 89*   CREATININE mg/dL 2.14* 2.13* 2.51* 2.54*   GLUCOSE mg/dL 306* 180* 329* 216*   Estimated Creatinine Clearance: 25.1 mL/min (A) (by C-G formula based on SCr of 2.14 mg/dL (H)).  Results from last 7 days   Lab Units 01/01/20 0448 12/31/19 0525 12/30/19 0412 12/29/19  0516   ALBUMIN g/dL 3.10* 3.10* 3.40* 3.50     Results from last 7 days   Lab Units 01/01/20 0448 12/31/19 0525 12/30/19 0412 12/29/19  0516   CALCIUM mg/dL 9.2 9.3 9.1 9.4   ALBUMIN g/dL 3.10* 3.10* 3.40* 3.50   MAGNESIUM mg/dL 1.8 1.8  --   --    PHOSPHORUS mg/dL 2.7 2.5 2.7 3.8       Glucose   Date/Time Value Ref Range Status   01/01/2020 1614 187 (H) 70 - 130 mg/dL Final   01/01/2020 1140 245 (H) 70 - 130 mg/dL Final   01/01/2020 0703 271 (H) 70 - 130 mg/dL Final   12/31/2019 2126 311 (H) 70 - 130 mg/dL Final   12/31/2019 1620 246 (H) 70 - 130 mg/dL Final   12/31/2019 1104 212 (H) 70 - 130 mg/dL Final   12/31/2019 0631 175 (H) 70 - 130 mg/dL Final         aspirin 81 mg Oral Daily   atorvastatin 80 mg Oral Daily   gabapentin 300 mg Oral Nightly   insulin glargine 55 Units Subcutaneous Nightly   insulin lispro 0-9 Units Subcutaneous 4x Daily With Meals & Nightly   insulin lispro 15 Units Subcutaneous TID With Meals   melatonin 5 mg Oral Nightly   metoprolol tartrate 12.5 mg Oral Q12H   pantoprazole 40 mg Oral BID AC   predniSONE 10 mg Oral Daily With Lunch   sucralfate 1 g Oral 4x Daily       sodium chloride 30 mL/hr   [START ON 1/2/2020] sodium chloride 75 mL/hr   Diet Regular; Cardiac, Consistent Carbohydrate  NPO Diet       Assessment/Plan     Active Hospital Problems    Diagnosis  POA   • **NSTEMI (non-ST elevated myocardial infarction) (CMS/HCC) [I21.4]  Yes   • Left ventricular systolic dysfunction [I51.9]  Yes   • Gastrointestinal hemorrhage [K92.2]  Yes   • Blood loss anemia [D50.0]  Yes   • Melena [K92.1]  Yes   • Chronic kidney disease, stage 3  (moderate) (CMS/HCC) [N18.3]  Yes   • Anemia of chronic disease [D63.8]  Yes   • Hyperlipemia [E78.5]  Yes   • Coronary artery disease involving native coronary artery of native heart without angina pectoris [I25.10]  Yes   • DM (diabetes mellitus), type 2 with neurological complications (CMS/HCC) [E11.49]  Yes   • PVD (peripheral vascular disease) (CMS/HCC) [I73.9]  Yes      Resolved Hospital Problems   No resolved problems to display.   ABEBE on CKD 3  - baseline Cr about 1.7  - improving  - IVF overnight in anticipation of [facility] tomorrow  - appreciate nephrology recs    UGIB  - resolved  - EGD 12/29/19 showing chronic gastritis and nodule of the greater curvature-biopsy showed chronic inflammation and hyperplasia, no neoplasia or H. Pylori  - on protonix and carafate  - appreciate GI recs    NSTEMI  - no more CP, troponin trending down  - continue ASA as she is tolerating well-plavix held on admission due to GIB  - echocardiogram results noted-has reduced LV function  - appreciate cardiology recs, plans for [facility] tomorrow noted    PVD  - normally on DAPT, plavix held as above    Type 2 DM  - continue lantus 55 units nightly but will give 30 units tonight as she will be NPO  - continue scheduled prandial humalog at 10 units TIDAC  - ssi moderate dose     Constipation  - continue bowel regimen    SCDs for DVT prophylaxis.  Full code.  Discussed with patient and nursing staff.  Disposition to be determined.      Harpal Novoa MD  [facility]  01/01/20  6:11 PM

## 2020-01-01 NOTE — PLAN OF CARE
Problem: Patient Care Overview  Goal: Plan of Care Review  Flowsheets (Taken 1/1/2020 2195)  Outcome Summary: Improved activity tolerance, gait pattern and independence w/ mobility.  Able to ambulate 300' w/ contact assist, no assistive device.  Required 1 sitting rest break; minor LOB episodes, pt self corrected.  Discussed pt using rollator at home.  Pt reports plan to DC to son's home.  Recommend DC w/ home PT.

## 2020-01-01 NOTE — THERAPY TREATMENT NOTE
Patient Name: Randi Martines  : 1950    MRN: [id number]                              Today's Date: 2020       Admit Date: 2019    Visit Dx:     ICD-10-CM ICD-9-CM   1. Gastrointestinal hemorrhage, unspecified gastrointestinal hemorrhage type K92.2 578.9   2. Blood loss anemia D50.0 280.0   3. CKD (chronic kidney disease) stage 4, GFR 15-29 ml/min (CMS/[facility]) N18.4 585.4   4. Short of breath on exertion R06.02 786.05   5. ABEBE (acute kidney injury) (CMS/[facility]) N17.9 584.9   6. Melena K92.1 578.1     Patient Active Problem List   Diagnosis   • Abdominal pain   • Intractable abdominal pain   • DM (diabetes mellitus), type 2 with neurological complications (CMS/[facility])   • PVD (peripheral vascular disease) (CMS/[facility])   • Mesenteric ischemia (CMS/[facility])   • Acute gastric ulcer   • S/P CABG (coronary artery bypass graft)   • Coronary artery disease involving native coronary artery of native heart without angina pectoris   • Atherosclerotic PVD with ulceration (CMS/[facility])   • Dyslipidemia   • Type 2 diabetes mellitus without complication, with long-term current use of insulin (CMS/[facility])   • Biliary obstruction   • Hyperlipemia   • Malabsorption of iron   • Chronic kidney disease, stage 3 (moderate) (CMS/[facility])   • Anemia of chronic disease   • Gastrointestinal hemorrhage   • NSTEMI (non-ST elevated myocardial infarction) (CMS/[facility])   • Left ventricular systolic dysfunction   • Blood loss anemia   • Melena     Past Medical History:   Diagnosis Date   • CAD (coronary artery disease)    • Chronic kidney disease    • Chronic systolic congestive heart failure (CMS/[facility])    • Diabetes mellitus (CMS/[facility])    • Dyslipidemia    • Foot ulcer (CMS/[facility])    • History of diverticulitis    • History of stomach ulcers    • History of transfusion    • Hyperlipemia    • Hypertension    • Mesenteric ischemia (CMS/[facility])    • Peptic ulcer disease    • PONV (postoperative nausea and vomiting)    • PVD (peripheral vascular disease) (CMS/[facility])       Past Surgical History:   Procedure Laterality Date   • APPENDECTOMY  2015   • CATARACT EXTRACTION     • CHOLECYSTECTOMY     • COLONOSCOPY  2015   • CORONARY ARTERY BYPASS GRAFT      7 coronary bypass surgery   • ENDOSCOPY N/A 11/23/2016    Procedure: ESOPHAGOGASTRODUODENOSCOPY ;  Surgeon: Katherine Green MD;  Location: [facility] ENDOSCOPY;  Service:    • ENDOSCOPY N/A 12/29/2019    Procedure: ESOPHAGOGASTRODUODENOSCOPYwith bx;  Surgeon: Ziyad Manzo MD;  Location: [facility] ENDOSCOPY;  Service: Gastroenterology   • ERCP N/A 10/2/2017    Procedure: ENDOSCOPIC RETROGRADE CHOLANGIOPANCREATOGRAPHY with sphincterotomy, balloon sweep;  Surgeon: Christopher Graf MD;  Location: [facility] ENDOSCOPY;  Service:    • HERNIA REPAIR  2015   • HYSTERECTOMY     • OK VEIN IN SITU BYPASS GRAFT,FEM-POP Left 5/31/2017    Procedure: ULTRASOUND RIGHT AIF, ARTERIOGRAM W/ LEFT COMMON ILIAC ANGIOPLASTY STENT, LT FEMORAL POPLITEAL BYPASS WITH ARTEGRAFT, LEFT POPLITEAL ENDARTERECTOMY PATCH, LEFT COMMON ARTERIOGRAM, LEFT EXTERNAL ILIAC ANGIOPLASTY STENT PLACEMENT, AORTA,  EXTERNAL, DISTAL PRESSURES;  Surgeon: Jayda Barrientos MD;  Location: [facility] HYBRID OR 18/19;  Service: Vascular   • SKIN GRAFT SPLIT THICKNESS Left 6/7/2017    Procedure: DEBRIDEMENT AND SKIN GRAFT TO  LEFT DORSAL FOOT AND TOES;  Surgeon: Maurine Waterhouse, MD;  Location: [facility] MAIN OR;  Service:    • UPPER GASTROINTESTINAL ENDOSCOPY  2015    scar in gastric body, gastric ulcers     General Information     Row Name 01/01/20 1345          PT Evaluation Time/Intention    Document Type  therapy note (daily note)  -AR     Mode of Treatment  physical therapy;individual therapy  -AR     Row Name 01/01/20 1345          General Information    Patient Profile Reviewed?  yes  -AR     Existing Precautions/Restrictions  fall  -AR     Row Name 01/01/20 1345          Cognitive Assessment/Intervention- PT/OT    Orientation Status (Cognition)  oriented x 4  -AR     Row Name 01/01/20  1345          Safety Issues, Functional Mobility    Impairments Affecting Function (Mobility)  balance;endurance/activity tolerance;shortness of breath;strength  -AR       User Key  (r) = Recorded By, (t) = Taken By, (c) = Cosigned By    Initials Name Provider Type    AR Elisabeth Ren, PT Physical Therapist        Mobility     Row Name 01/01/20 1346          Bed Mobility Assessment/Treatment    Supine-Sit [place] (Bed Mobility)  not tested  -AR     Sit-Supine [place] (Bed Mobility)  not tested  -AR     Row Name 01/01/20 1346          Sit-Stand Transfer    Sit-Stand [place] (Transfers)  stand by assist  -AR     Assistive Device (Sit-Stand Transfers)  -- no AD  -AR     Row Name 01/01/20 1346          Gait/Stairs Assessment/Training    Gait/Stairs Assessment/Training  gait/ambulation independence  -AR     [place] Level (Gait)  contact guard;stand by assist  -AR     Distance in Feet (Gait)  300, with sitting rest break and few brief standing rest breaks.  Cues for posture and decreasing UE support on rail.    -AR     Pattern (Gait)  swing-through  -AR     Deviations/Abnormal Patterns (Gait)  kiki decreased;gait speed decreased  -AR     Comment (Gait/Stairs)  2 minor LOB episodes as fatigue increased, pt self corrected.  educated on using rollator at home - pt agreeable  -AR       User Key  (r) = Recorded By, (t) = Taken By, (c) = Cosigned By    Initials Name Provider Type    Elisabeth Alejandro, PT Physical Therapist        Obj/Interventions     Row Name 01/01/20 1348          Static Sitting Balance    Level of [place] (Unsupported Sitting, Static Balance)  independent  -AR     Sitting Position (Unsupported Sitting, Static Balance)  -- toilet, no  back support  -AR     Time Able to Maintain Position (Unsupported Sitting, Static Balance)  3 to 4 minutes  -AR     Row Name 01/01/20 1348          Dynamic Sitting Balance    Level of [place], Reaches Outside Midline (Sitting, Dynamic  Balance)  independent  -AR     Row Name 01/01/20 1348          Static Standing Balance    Level of [place] (Supported Standing, Static Balance)  supervision  -AR     Row Name 01/01/20 1348          Dynamic Standing Balance    Level of [place], Reaches Outside Midline (Standing, Dynamic Balance)  contact guard assist  -AR       User Key  (r) = Recorded By, (t) = Taken By, (c) = Cosigned By    Initials Name Provider Type    AR Elisabeth Ren, PT Physical Therapist        Goals/Plan    No documentation.       Clinical Impression     Row Name 01/01/20 1348          Pain Assessment    Additional Documentation  Pain Scale: Numbers Pre/Post-Treatment (Group)  -AR     Row Name 01/01/20 1348          Pain Scale: Numbers Pre/Post-Treatment    Pain Scale: Numbers, Pretreatment  0/10 - no pain  -AR     Pain Scale: Numbers, Post-Treatment  0/10 - no pain  -AR     Pain Intervention(s)  Repositioned  -AR     Row Name 01/01/20 1346          Plan of Care Review    Plan of Care Reviewed With  patient  -AR     Outcome Summary  Improved activity tolerance, gait pattern and independence w/ mobility.  Able to ambulate 300' w/ contact assist, no assistive device.  Required 1 sitting rest break; minor LOB episodes, pt self corrected.  Discussed pt using rollator at home.  Pt reports plan to DC to son's home.  Recommend DC w/ home PT.    -AR     Row Name 01/01/20 1341          Physical Therapy Clinical Impression    Criteria for Skilled Interventions Met (PT Clinical Impression)  yes  -AR     Rehab Potential (PT Clinical Summary)  good, to achieve stated therapy goals  -AR     Row Name 01/01/20 1348          Vital Signs    O2 Delivery Pre Treatment  room air  -AR     O2 Delivery Intra Treatment  room air  -AR     O2 Delivery Post Treatment  room air  -AR     Row Name 01/01/20 1344          Positioning and Restraints    Pre-Treatment Position  sitting in chair/recliner no alarm on arrival, pt reports up ad alton in room  -AR      Post Treatment Position  chair  -AR     In Chair  reclined;sitting;call light within reach;encouraged to call for assist  -AR       User Key  (r) = Recorded By, (t) = Taken By, (c) = Cosigned By    Initials Name Provider Type    Elisabeth Alejandro, PT Physical Therapist        Outcome Measures     Row Name 01/01/20 1350          How much help from another person do you currently need...    Turning from your back to your side while in flat bed without using bedrails?  4  -AR     Moving from lying on back to sitting on the side of a flat bed without bedrails?  4  -AR     Moving to and from a bed to a chair (including a wheelchair)?  4  -AR     Standing up from a chair using your arms (e.g., wheelchair, bedside chair)?  4  -AR     Climbing 3-5 steps with a railing?  3  -AR     To walk in hospital room?  3  -AR     AM-PAC 6 Clicks Score (PT)  22  -AR     Row Name 01/01/20 1350          Functional Assessment    Outcome Measure Options  AM-PAC 6 Clicks Basic Mobility (PT)  -AR       User Key  (r) = Recorded By, (t) = Taken By, (c) = Cosigned By    Initials Name Provider Type    Elisabeth Alejandro, PT Physical Therapist          PT Recommendation and Plan     Outcome Summary/Treatment Plan (PT)  Anticipated Discharge Disposition (PT): home with assist, home with home health  Plan of Care Reviewed With: patient  Outcome Summary: Improved activity tolerance, gait pattern and independence w/ mobility.  Able to ambulate 300' w/ contact assist, no assistive device.  Required 1 sitting rest break; minor LOB episodes, pt self corrected.  Discussed pt using rollator at home.  Pt reports plan to DC to son's home.  Recommend DC w/ home PT.       Time Calculation:   PT Charges     Row Name 01/01/20 6419             Time Calculation    Start Time  1319  -AR      Stop Time  1344  -AR      Time Calculation (min)  25 min  -AR      PT Received On  01/01/20  -AR      PT - Next Appointment  01/03/20  -AR        User Key  (r) = Recorded  By, (t) = Taken By, (c) = Cosigned By    Initials Name Provider Type    AR Elisabeth Ren, PT Physical Therapist        Therapy Charges for Today     Code Description Service Date Service Provider Modifiers Qty    35861176732 HC PT THER PROC EA 15 MIN 1/1/2020 Elisabeth Ren, PT GP 2          PT G-Codes  Outcome Measure Options: AM-PAC 6 Clicks Basic Mobility (PT)  AM-PAC 6 Clicks Score (PT): 22    Elisabeth Ren PT  1/1/2020

## 2020-01-01 NOTE — PLAN OF CARE
Problem: Patient Care Overview  Goal: Plan of Care Review  Outcome: Ongoing (interventions implemented as appropriate)  Flowsheets (Taken 1/1/2020 0506)  Plan of Care Reviewed With: patient  Outcome Summary: denies pain/needs. VSS. plan for cardiac cath when able.  Goal: Individualization and Mutuality  Outcome: Ongoing (interventions implemented as appropriate)  Goal: Discharge Needs Assessment  Outcome: Ongoing (interventions implemented as appropriate)  Goal: Interprofessional Rounds/Family Conf  Outcome: Ongoing (interventions implemented as appropriate)  Flowsheets (Taken 1/1/2020 0506)  Participants: nursing; patient     Problem: Fall Risk (Adult)  Goal: Absence of Fall  Outcome: Ongoing (interventions implemented as appropriate)  Flowsheets (Taken 1/1/2020 0506)  Absence of Fall: making progress toward outcome     Problem: Skin Injury Risk (Adult)  Goal: Skin Health and Integrity  Outcome: Ongoing (interventions implemented as appropriate)  Flowsheets (Taken 1/1/2020 0506)  Skin Health and Integrity: making progress toward outcome     Problem: Renal Failure/Kidney Injury, Acute (Adult)  Goal: Signs and Symptoms of Listed Potential Problems Will be Absent, Minimized or Managed (Renal Failure/Kidney Injury, Acute)  Outcome: Ongoing (interventions implemented as appropriate)  Flowsheets (Taken 12/30/2019 2026 by Jayda Severino RN)  Problems Assessed (Acute Renal Failure/Kidney Injury): drug/nephrotoxicity;electrolyte imbalance  Problems Present (ARF/Kidney Injury): electrolyte imbalance;drug/nephrotoxicity     Problem: Pain, Acute (Adult)  Goal: Acceptable Pain Control/Comfort Level  Outcome: Ongoing (interventions implemented as appropriate)  Flowsheets (Taken 1/1/2020 0506)  Acceptable Pain Control/Comfort Level: making progress toward outcome  Note:   Denies pain at this time       Problem: Cardiac: Heart Failure (Adult)  Goal: Signs and Symptoms of Listed Potential Problems Will be Absent, Minimized or  Managed (Cardiac: Heart Failure)  Outcome: Ongoing (interventions implemented as appropriate)  Flowsheets (Taken 12/30/2019 2026 by Jayda Severino RN)  Problems Assessed (Heart Failure): cardiac pump dysfunction  Problems Present (Heart Failure): fluid/electrolyte imbalance;cardiac pump dysfunction

## 2020-01-01 NOTE — PROGRESS NOTES
"   LOS: 14 days    Patient Care Team:  Jose Morrison DO as PCP - General (Family Medicine)  Samantha Uriostegui MD as Consulting Physician (Cardiology)    Chief Complaint:    Chief Complaint   Patient presents with   • Shortness of Breath   • Black or Bloody Stool     Follow UP ABEBE/CKD3  Subjective     Interval History: Doing ok.  Eating well.  Walking around the nurses station with physical therapy.  Denies soa or orthopnea.  No cp.  Objective     Vital Signs  Temp:  [98.1 °F (36.7 °C)-98.9 °F (37.2 °C)] 98.1 °F (36.7 °C)  Heart Rate:  [60-62] 60  Resp:  [16-18] 16  BP: (127-133)/(54-59) 133/59    Flowsheet Rows      First Filed Value   Admission Height  152.4 cm (60\") Documented at 12/18/2019 1845   Admission Weight  93 kg (205 lb) Documented at 12/18/2019 1854          I/O this shift:  In: 240 [P.O.:240]  Out: -   I/O last 3 completed shifts:  In: 380 [P.O.:380]  Out: -     Intake/Output Summary (Last 24 hours) at 1/1/2020 1204  Last data filed at 1/1/2020 0808  Gross per 24 hour   Intake 240 ml   Output --   Net 240 ml       Physical Exam:     General Appearance: alert, oriented x 3, no acute distress,   Skin: warm and dry  HEENT: pupils round and reactive to light, oral mucosa normal,   Neck: supple, no JVD, trachea midline  Lungs: CTA, unlabored breathing effort  Heart: RRR, normal S1 and S2, no S3, no rub  Abdomen: soft, non-tender,  present bowel sounds to auscultation  : no palpable bladder,  Extremities: no edema, cyanosis or clubbing  Neuro: normal speech and mental status      Results Review:    Results from last 7 days   Lab Units 01/01/20  0448 12/31/19  0525 12/30/19  0412   SODIUM mmol/L 140 140 135*   POTASSIUM mmol/L 4.6 4.2 3.9   CHLORIDE mmol/L 103 100 95*   CO2 mmol/L 25.2 24.7 25.9   BUN mg/dL 85* 88* 91*   CREATININE mg/dL 2.14* 2.13* 2.51*   CALCIUM mg/dL 9.2 9.3 9.1   GLUCOSE mg/dL 306* 180* 329*       Estimated Creatinine Clearance: 25.1 mL/min (A) (by C-G formula based on SCr of 2.14 " mg/dL (H)).    Results from last 7 days   Lab Units 01/01/20 0448 12/31/19  0525 12/30/19  0412   MAGNESIUM mg/dL 1.8 1.8  --    PHOSPHORUS mg/dL 2.7 2.5 2.7       Results from last 7 days   Lab Units 01/01/20  0448 12/31/19  0525 12/30/19  0412 12/29/19  0516 12/28/19  0324 12/27/19  0417 12/26/19  0401   URIC ACID mg/dL 11.9* 13.1* 14.2* 15.0* 15.3* 16.6* 15.7*       Results from last 7 days   Lab Units 01/01/20 0448 12/31/19  0525 12/30/19  0412 12/29/19  0516 12/28/19  0324   WBC 10*3/mm3 18.03* 17.26* 17.12* 19.33* 17.54*   HEMOGLOBIN g/dL 9.7* 10.0* 9.2* 9.6* 9.0*   PLATELETS 10*3/mm3 325 336 313 362 348               Imaging Results (Last 24 Hours)     ** No results found for the last 24 hours. **          aspirin 81 mg Oral Daily   atorvastatin 80 mg Oral Daily   gabapentin 300 mg Oral Nightly   insulin glargine 55 Units Subcutaneous Nightly   insulin lispro 0-9 Units Subcutaneous 4x Daily With Meals & Nightly   insulin lispro 10 Units Subcutaneous TID With Meals   melatonin 5 mg Oral Nightly   metoprolol tartrate 12.5 mg Oral Q12H   pantoprazole 40 mg Oral BID AC   predniSONE 10 mg Oral Daily With Lunch   sucralfate 1 g Oral 4x Daily       lactated ringers 30 mL/hr   sodium chloride 30 mL/hr       Medication Review:   Current Facility-Administered Medications   Medication Dose Route Frequency Provider Last Rate Last Dose   • acetaminophen (TYLENOL) tablet 650 mg  650 mg Oral Q4H PRN Ziyad Manzo MD   650 mg at 12/21/19 0801    Or   • acetaminophen (TYLENOL) 160 MG/5ML solution 650 mg  650 mg Oral Q4H PRN Ziyad Manzo MD        Or   • acetaminophen (TYLENOL) suppository 650 mg  650 mg Rectal Q4H PRN Ziyad Manzo MD       • aspirin EC tablet 81 mg  81 mg Oral Daily Craig Rolle MD   81 mg at 01/01/20 0804   • atorvastatin (LIPITOR) tablet 80 mg  80 mg Oral Daily Ziyad Manzo MD   80 mg at 01/01/20 0805   • bisacodyl (DULCOLAX) EC tablet 5 mg  5 mg Oral Daily PRN Ziyad Manzo,  MD   5 mg at 12/21/19 1740   • bisacodyl (DULCOLAX) suppository 10 mg  10 mg Rectal Daily PRN Ziyad Manzo MD   10 mg at 12/21/19 2103   • calcium carbonate (TUMS) chewable tablet 500 mg (200 mg elemental)  2 tablet Oral BID PRN Ziyad Manzo MD   2 tablet at 12/24/19 1446   • dextrose (D50W) 25 g/ 50mL Intravenous Solution 25 g  25 g Intravenous Q15 Min PRN Ziyad Manzo MD       • dextrose (D50W) 25 g/ 50mL Intravenous Solution 25 g  25 g Intravenous Q15 Min PRN Harpal Novoa MD       • dextrose (GLUTOSE) oral gel 15 g  15 g Oral Q15 Min PRN Ziyad Manzo MD       • dextrose (GLUTOSE) oral gel 15 g  15 g Oral Q15 Min PRN Harpal Novoa MD       • gabapentin (NEURONTIN) capsule 300 mg  300 mg Oral Nightly Ziyad Manzo MD   300 mg at 12/31/19 2142   • glucagon (human recombinant) (GLUCAGEN DIAGNOSTIC) injection 1 mg  1 mg Subcutaneous PRN Ziyad Manzo MD       • glucagon (human recombinant) (GLUCAGEN DIAGNOSTIC) injection 1 mg  1 mg Subcutaneous Q15 Min PRN Harpal Novoa MD       • insulin glargine (LANTUS) injection 55 Units  55 Units Subcutaneous Nightly Ziyad Manzo MD   55 Units at 12/31/19 2141   • insulin lispro (humaLOG) injection 0-9 Units  0-9 Units Subcutaneous 4x Daily With Meals & Nightly Harpal Novoa MD   4 Units at 01/01/20 1154   • insulin lispro (humaLOG) injection 10 Units  10 Units Subcutaneous TID With Meals Harpal Novoa MD   10 Units at 01/01/20 1154   • ipratropium-albuterol (DUO-NEB) nebulizer solution 3 mL  3 mL Nebulization Q4H PRN Ziyad Manzo MD   3 mL at 12/27/19 0830   • lactated ringers infusion  30 mL/hr Intravenous Continuous Ziyad Manzo MD       • melatonin tablet 5 mg  5 mg Oral Nightly Ziyad Manzo MD   5 mg at 12/31/19 2141   • metoprolol tartrate (LOPRESSOR) tablet 12.5 mg  12.5 mg Oral Q12H Ziyad Manzo MD   12.5 mg at 01/01/20 0805   • nitroglycerin (NITROSTAT) SL tablet 0.4 mg  0.4 mg Sublingual Q5  Min PRN Ziyad Manzo MD       • ondansetron (ZOFRAN) tablet 4 mg  4 mg Oral Q6H PRN Ziyad Manzo MD        Or   • ondansetron (ZOFRAN) injection 4 mg  4 mg Intravenous Q6H PRN Ziyad Manzo MD   4 mg at 12/29/19 0744   • pantoprazole (PROTONIX) EC tablet 40 mg  40 mg Oral BID AC Ziyad Manzo MD   40 mg at 01/01/20 0804   • polyethylene glycol (MIRALAX) powder 17 g  17 g Oral Daily PRN Suzette Haines MD   17 g at 01/01/20 0804   • potassium chloride (KLOR-CON) packet 40 mEq  40 mEq Oral PRN Ziyad Manzo MD       • potassium chloride (MICRO-K) CR capsule 40 mEq  40 mEq Oral PRN Ziyad Manzo MD   40 mEq at 12/23/19 2236   • predniSONE (DELTASONE) tablet 10 mg  10 mg Oral Daily With Lunch Ziyad Manzo MD   10 mg at 01/01/20 1154   • promethazine (PHENERGAN) tablet 12.5 mg  12.5 mg Oral Q6H PRN Ziyad Manzo MD   12.5 mg at 12/24/19 2251   • sodium chloride 0.9 % flush 10 mL  10 mL Intravenous PRN Ziyad Manzo MD   10 mL at 01/01/20 0806   • sodium chloride 0.9 % flush 10 mL  10 mL Intravenous PRN Ziyad Manzo MD       • sodium chloride 0.9 % infusion  30 mL/hr Intravenous Continuous PRN Ziyad Manzo MD       • sucralfate (CARAFATE) tablet 1 g  1 g Oral 4x Daily Ziyad Manzo MD   1 g at 01/01/20 1154       Assessment/Plan       NSTEMI (non-ST elevated myocardial infarction) (CMS/HCC)    DM (diabetes mellitus), type 2 with neurological complications (CMS/HCC)    PVD (peripheral vascular disease) (CMS/HCC)    Coronary artery disease involving native coronary artery of native heart without angina pectoris    Hyperlipemia    Chronic kidney disease, stage 3 (moderate) (CMS/HCC)    Anemia of chronic disease    Gastrointestinal hemorrhage    Left ventricular systolic dysfunction    Blood loss anemia    Melena    1. ABEBE on CKD3 (BL Cr 1.7), nonoliguric, Cr is stable at 2.14 as compared to 2.13 mg/dL yesterday.  PVR normal.  Doubt AIN from PPI.  No clear mechanism for  ATN.     2. GIB, anemia, heme positive stool.  History of bleeding gastric ulcers.  EGD on hold for now  3. NSTEMI, known CAD sp CABG 2011.  Echo : EF 35 - 40%.  Cardiac cath on hold for endoscopy  4. Vol overload - improved  5. Hypotension - resolved  6. DM2 with retinopathy, neuropathy  7. PAD  8. Acute gout flair - podagra present and uric acid up to 15, in part due to aggressive diuresis (& metolazone)   -- started prednisone taper; eventual allopurinol when flair resolves     Plan:  Cath tentatively planned for Thursday.    Will plan IV normal saline to run at 75cc/hr starting tonight at midnight.  Patient understands the risk of contrast nephropathy associated with cardiac catheterization can include needing dialysis.  She wants to proceed.    Brandyn Velasquez MD  01/01/20  12:04 PM

## 2020-01-01 NOTE — PROGRESS NOTES
[facility]  Inpatient Progress Note    Reason for Follow Up: Anemia    Subjective     Interval History:   Patient denies abdominal pain, nausea or chest pain.  No with visualized blood in the stool.    Current Facility-Administered Medications:   •  acetaminophen (TYLENOL) tablet 650 mg, 650 mg, Oral, Q4H PRN, 650 mg at 12/21/19 0801 **OR** acetaminophen (TYLENOL) 160 MG/5ML solution 650 mg, 650 mg, Oral, Q4H PRN **OR** acetaminophen (TYLENOL) suppository 650 mg, 650 mg, Rectal, Q4H PRN, Ziyad Manzo MD  •  aspirin EC tablet 81 mg, 81 mg, Oral, Daily, Craig Rolle MD, 81 mg at 01/01/20 0804  •  atorvastatin (LIPITOR) tablet 80 mg, 80 mg, Oral, Daily, Ziyad Manzo MD, 80 mg at 01/01/20 0805  •  bisacodyl (DULCOLAX) EC tablet 5 mg, 5 mg, Oral, Daily PRN, Ziyad Manzo MD, 5 mg at 12/21/19 1740  •  bisacodyl (DULCOLAX) suppository 10 mg, 10 mg, Rectal, Daily PRN, Ziyad Manzo MD, 10 mg at 12/21/19 2103  •  calcium carbonate (TUMS) chewable tablet 500 mg (200 mg elemental), 2 tablet, Oral, BID PRN, Ziyad Manzo MD, 2 tablet at 12/24/19 1446  •  dextrose (D50W) 25 g/ 50mL Intravenous Solution 25 g, 25 g, Intravenous, Q15 Min PRN, Ziyad Manzo MD  •  dextrose (D50W) 25 g/ 50mL Intravenous Solution 25 g, 25 g, Intravenous, Q15 Min PRN, Harpal Novoa MD  •  dextrose (GLUTOSE) oral gel 15 g, 15 g, Oral, Q15 Min PRN, Ziyad Manzo MD  •  dextrose (GLUTOSE) oral gel 15 g, 15 g, Oral, Q15 Min PRN, Harpal Novoa MD  •  gabapentin (NEURONTIN) capsule 300 mg, 300 mg, Oral, Nightly, Ziyad Manzo MD, 300 mg at 12/31/19 2142  •  glucagon (human recombinant) (GLUCAGEN DIAGNOSTIC) injection 1 mg, 1 mg, Subcutaneous, PRN, Ziyad Manzo MD  •  glucagon (human recombinant) (GLUCAGEN DIAGNOSTIC) injection 1 mg, 1 mg, Subcutaneous, Q15 Min PRN, Harpal Novoa MD  •  insulin glargine (LANTUS) injection 55 Units, 55 Units, Subcutaneous, Nightly, Anais  Ziyad SPARROW MD, 55 Units at 12/31/19 2141  •  insulin lispro (humaLOG) injection 0-9 Units, 0-9 Units, Subcutaneous, 4x Daily With Meals & Nightly, Harpal Novoa MD, 4 Units at 01/01/20 1154  •  insulin lispro (humaLOG) injection 15 Units, 15 Units, Subcutaneous, TID With Meals, Harpal Novoa MD  •  ipratropium-albuterol (DUO-NEB) nebulizer solution 3 mL, 3 mL, Nebulization, Q4H PRN, Ziyad Manzo MD, 3 mL at 12/27/19 0830  •  melatonin tablet 5 mg, 5 mg, Oral, Nightly, Ziyad Manzo MD, 5 mg at 12/31/19 2141  •  metoprolol tartrate (LOPRESSOR) tablet 12.5 mg, 12.5 mg, Oral, Q12H, Ziyad Manzo MD, 12.5 mg at 01/01/20 0805  •  nitroglycerin (NITROSTAT) SL tablet 0.4 mg, 0.4 mg, Sublingual, Q5 Min PRN, Ziyad Manzo MD  •  ondansetron (ZOFRAN) tablet 4 mg, 4 mg, Oral, Q6H PRN **OR** ondansetron (ZOFRAN) injection 4 mg, 4 mg, Intravenous, Q6H PRN, Ziyad Manzo MD, 4 mg at 12/29/19 0744  •  pantoprazole (PROTONIX) EC tablet 40 mg, 40 mg, Oral, BID AC, Ziyad Manzo MD, 40 mg at 01/01/20 0804  •  polyethylene glycol (MIRALAX) powder 17 g, 17 g, Oral, Daily PRN, Suzette Haines MD, 17 g at 01/01/20 0804  •  potassium chloride (KLOR-CON) packet 40 mEq, 40 mEq, Oral, PRN, Ziyad Manzo MD  •  potassium chloride (MICRO-K) CR capsule 40 mEq, 40 mEq, Oral, PRN, Ziyad Manzo MD, 40 mEq at 12/23/19 2236  •  [COMPLETED] predniSONE (DELTASONE) tablet 40 mg, 40 mg, Oral, Daily With Lunch, 40 mg at 12/27/19 1300 **FOLLOWED BY** [COMPLETED] predniSONE (DELTASONE) tablet 30 mg, 30 mg, Oral, Daily With Lunch, 30 mg at 12/29/19 1200 **FOLLOWED BY** [COMPLETED] predniSONE (DELTASONE) tablet 20 mg, 20 mg, Oral, Daily With Lunch, 20 mg at 12/31/19 1154 **FOLLOWED BY** predniSONE (DELTASONE) tablet 10 mg, 10 mg, Oral, Daily With Lunch, Ziayd Manzo MD, 10 mg at 01/01/20 1154  •  promethazine (PHENERGAN) tablet 12.5 mg, 12.5 mg, Oral, Q6H PRN, Ziyad Manzo MD, 12.5 mg at 12/24/19  2251  •  sodium chloride 0.9 % flush 10 mL, 10 mL, Intravenous, PRN, Ziyad Manzo MD, 10 mL at 01/01/20 0806  •  sodium chloride 0.9 % flush 10 mL, 10 mL, Intravenous, PRN, Ziyad Manzo MD  •  sodium chloride 0.9 % infusion, 30 mL/hr, Intravenous, Continuous PRN, Ziyad Manzo MD  •  [START ON 1/2/2020] sodium chloride 0.9 % infusion, 75 mL/hr, Intravenous, Continuous, Jo Ann Campbell APRN  •  sucralfate (CARAFATE) tablet 1 g, 1 g, Oral, 4x Daily, Ziyad Manzo MD, 1 g at 01/01/20 1154  Review of Systems:    The following systems were reviewed and negative;  cardiovascular and gastrointestinal    Objective     Vital Signs  Temp:  [98.1 °F (36.7 °C)-99.4 °F (37.4 °C)] 99.4 °F (37.4 °C)  Heart Rate:  [60-63] 63  Resp:  [16-18] 16  BP: (127-143)/(55-59) 143/55  Body mass index is 37.77 kg/m².    Intake/Output Summary (Last 24 hours) at 1/1/2020 1611  Last data filed at 1/1/2020 0808  Gross per 24 hour   Intake 240 ml   Output --   Net 240 ml     I/O this shift:  In: 240 [P.O.:240]  Out: -      Physical Exam:   General: patient awake, alert and cooperative   Eyes: Normal lids and lashes, no scleral icterus   Neck: supple, normal ROM   Skin: warm and dry, not jaundiced   Abdomen: soft, nontender, nondistended    Psychiatric: Normal mood and behavior; memory intact     Results Review:     I reviewed the patient's new clinical results.    Results from last 7 days   Lab Units 01/01/20 0448 12/31/19  0525 12/30/19  0412   WBC 10*3/mm3 18.03* 17.26* 17.12*   HEMOGLOBIN g/dL 9.7* 10.0* 9.2*   HEMATOCRIT % 29.9* 31.1* 28.0*   PLATELETS 10*3/mm3 325 336 313     Results from last 7 days   Lab Units 01/01/20  0448 12/31/19  0525 12/30/19  0412   SODIUM mmol/L 140 140 135*   POTASSIUM mmol/L 4.6 4.2 3.9   CHLORIDE mmol/L 103 100 95*   CO2 mmol/L 25.2 24.7 25.9   BUN mg/dL 85* 88* 91*   CREATININE mg/dL 2.14* 2.13* 2.51*   CALCIUM mg/dL 9.2 9.3 9.1   GLUCOSE mg/dL 306* 180* 329*         Lab Results   Lab Value  Date/Time    LIPASE 66 (H) 09/30/2017 1047    LIPASE 44 11/21/2016 2056    LIPASE 75 (H) 10/17/2015 1647    LIPASE 30 09/22/2015 0558       Radiology:  XR Chest 2 View   Final Result      US Liver   Final Result   No evidence for acute abnormality in patient with previous   cholecystectomy. There appears to be mild diffuse fat infiltration of   the liver.       This report was finalized on 12/24/2019 1:12 PM by Dr. Grayd Castelan M.D.          FL Upper GI With Air Contrast & KUB   Final Result      XR Chest PA & Lateral   Final Result   FINDINGS AND IMPRESSION:   Median sternotomy wires are present.       There is pulmonary vascular congestion and bibasilar pulmonary   opacification, left greater than right. No pneumothorax is seen. The   heart is mild to moderately enlarged. Small bilateral pleural effusions   are present, as before. There are Kerley B-lines Constellation of   findings are most suggestive of pulmonary edema in the appropriate   clinical context. Atypical pneumonia is felt to be less likely.   Correlation with patient history is recommended.       This report was finalized on 12/23/2019 10:23 AM by Dr. Aleksandr Mota M.D.          XR Chest 1 View   Final Result   Interval improvement of edema. Minimal likely atelectasis at   the left base. Borderline heart size. Follow-up as clinical indications   persist.       This report was finalized on 12/22/2019 10:23 AM by Dr. Aftab Esparza M.D.          XR Chest 1 View   Final Result   Volume overload or heart failure with mild interstitial   edema.       This report was finalized on 12/18/2019 8:00 PM by Dr. Carlton Vargas M.D.          CT Abdomen Pelvis Without Contrast   Final Result       Appearance of mild wall thickening from the splenic flexure to the   descending colon may in part be from lack of distention, but could be   evidence of mild nonspecific colitis, correlate clinically. Colonic   diverticulosis.       Minimal pleural  effusions with evidence of edema in the lower lungs.       Discussed by telephone with Dr. Prasad at 1950, 12/18/2019.       This report was finalized on 12/18/2019 7:53 PM by Dr. Aftab Esparza M.D.              Assessment/Plan     Patient Active Problem List   Diagnosis   • Abdominal pain   • Intractable abdominal pain   • DM (diabetes mellitus), type 2 with neurological complications (CMS/HCC)   • PVD (peripheral vascular disease) (CMS/HCC)   • Mesenteric ischemia (CMS/HCC)   • Acute gastric ulcer   • S/P CABG (coronary artery bypass graft)   • Coronary artery disease involving native coronary artery of native heart without angina pectoris   • Atherosclerotic PVD with ulceration (CMS/HCC)   • Dyslipidemia   • Type 2 diabetes mellitus without complication, with long-term current use of insulin (CMS/HCC)   • Biliary obstruction   • Hyperlipemia   • Malabsorption of iron   • Chronic kidney disease, stage 3 (moderate) (CMS/HCC)   • Anemia of chronic disease   • Gastrointestinal hemorrhage   • NSTEMI (non-ST elevated myocardial infarction) (CMS/HCC)   • Left ventricular systolic dysfunction   • Blood loss anemia   • Melena       Assessment:  1. Normocytic anemia: Acute on chronic  2. Heme positive stool  3. Abnormal upper GI suggesting gastric ulcer versus neoplasm-biopsies benign  4. History of gastric ulcer in 2016  5. NSTEMI- for cath tomorrow tentatively pending renal function  6. Chronic kidney disease stage IV  7. Severe pulmonary hypertension  8. Abnormal CT findings     Plan:  · Gastric biopsies with chronic active gastritis and reactive hyperplasia.  No malignancy seen.  Immunostain for H. pylori is negative. D/w pt  · Continue daily PPI  · continue MiraLAX as needed  · Continue to follow h/h - transfuse prn  · No further recs at this time - will sign off but are available as needed.  We will schedule office follow-up in 6 weeks    I discussed the patients findings and my recommendations with  patient.    Suzette Haines MD

## 2020-01-01 NOTE — PLAN OF CARE
Pt VSS. A&ox1. Pt to be npo at midnight for possible cardiac cath in am per cardiology aprn. Will continue to monitor pt.  Problem: Patient Care Overview  Goal: Plan of Care Review  Outcome: Ongoing (interventions implemented as appropriate)  Flowsheets  Taken 12/31/2019 0527 by Marisel Mckeon, RN  Progress: improving  Taken 1/1/2020 1424 by Emilio Lopez, RN  Plan of Care Reviewed With: patient  Taken 1/1/2020 1348 by Elisabeth Ren, JASPER  Outcome Summary: Improved activity tolerance, gait pattern and independence w/ mobility.  Able to ambulate 300' w/ contact assist, no assistive device.  Required 1 sitting rest break; minor LOB episodes, pt self corrected.  Discussed pt using rollator at home.  Pt reports plan to DC to son's home.  Recommend DC w/ home PT.    Goal: Individualization and Mutuality  Outcome: Ongoing (interventions implemented as appropriate)  Goal: Discharge Needs Assessment  Outcome: Ongoing (interventions implemented as appropriate)  Flowsheets  Taken 12/20/2019 1544 by Josefa Conde, RN  Discharge Facility/Level of Care Needs: home with home health  Equipment Currently Used at Home: none  Transportation Anticipated: family or friend will provide  Transportation Concerns: car, none  Concerns to be Addressed: discharge planning  Patient's Choice of Community Agency(s): prefers to discuss with her son and will provide choices, following to offer assist  Patient/Family Anticipates Transition to: home  Taken 12/19/2019 0125 by Marisel Mckeon, RN  Patient/Family Anticipated Services at Transition: none  Goal: Interprofessional Rounds/Family Conf  Outcome: Ongoing (interventions implemented as appropriate)  Flowsheets (Taken 1/1/2020 0506 by Karli Cotto, RN)  Participants: nursing;patient

## 2020-01-02 NOTE — PLAN OF CARE
Problem: Patient Care Overview  Goal: Plan of Care Review  Outcome: Ongoing (interventions implemented as appropriate)  Flowsheets (Taken 1/2/2020 0548)  Outcome Summary: VSS. no c/o pain or SOA. NPO after midnight for cardiac cath. NS @75. Awaiting AM lab results. resting well throughout shift. will continue to monitor.

## 2020-01-02 NOTE — PROGRESS NOTES
Name: Randi Martines ADMIT: 2019   : 1950  PCP: Jose Morrison DO    MRN: [id number] LOS: 15 days   AGE/SEX: 69 y.o. female  ROOM: [facility]     Subjective   Subjective   Sleepy and worn out from today. Had her cath done. No chest pain or dyspnea. No nausea or vomiting. No abdominal pain. No dyspnea. No fever or chills. Tolerating a diet. Reports thirst.      Objective   Objective   Vital Signs  Temp:  [97.4 °F (36.3 °C)-98.8 °F (37.1 °C)] 97.4 °F (36.3 °C)  Heart Rate:  [57-74] 72  Resp:  [16-18] 18  BP: (100-157)/(48-81) 100/70  SpO2:  [93 %-99 %] 95 %  on  Flow (L/min):  [2-4] 2;   Device (Oxygen Therapy): room air  Body mass index is 38.2 kg/m².     Physical Exam   Constitutional: She is oriented to person, place, and time. She appears well-developed and well-nourished.   HENT:   Head: Normocephalic and atraumatic.   Eyes: Conjunctivae are normal. Right eye exhibits no discharge. Left eye exhibits no discharge.   Neck: Normal range of motion. Neck supple.   Cardiovascular: Normal rate, regular rhythm, normal heart sounds and intact distal pulses.   Pulmonary/Chest: Effort normal. No respiratory distress. She has rales (LLL posteriorly).   Abdominal: Soft. Bowel sounds are normal. She exhibits no distension. There is no tenderness.   Musculoskeletal: Normal range of motion. She exhibits no edema or tenderness.   Neurological: She is alert and oriented to person, place, and time. She exhibits normal muscle tone. Coordination normal.   Skin: Skin is warm and dry. No erythema.   Left radial cath site clean/dry/intact   Psychiatric: She has a normal mood and affect. Her behavior is normal.   Nursing note and vitals reviewed.     Results Review:       I reviewed the patient's new clinical results.  Results from last 7 days   Lab Units 20  0429 20  0448 19  0525 19  0412   WBC 10*3/mm3 17.64* 18.03* 17.26* 17.12*   HEMOGLOBIN g/dL 9.5* 9.7* 10.0* 9.2*   PLATELETS 10*3/mm3  335 325 336 313     Results from last 7 days   Lab Units 01/02/20  0429 01/01/20 0448 12/31/19  0525 12/30/19  0412   SODIUM mmol/L 143 140 140 135*   POTASSIUM mmol/L 4.8 4.6 4.2 3.9   CHLORIDE mmol/L 105 103 100 95*   CO2 mmol/L 24.8 25.2 24.7 25.9   BUN mg/dL 74* 85* 88* 91*   CREATININE mg/dL 2.10* 2.14* 2.13* 2.51*   GLUCOSE mg/dL 284* 306* 180* 329*   Estimated Creatinine Clearance: 25.7 mL/min (A) (by C-G formula based on SCr of 2.1 mg/dL (H)).  Results from last 7 days   Lab Units 01/02/20 0429 01/01/20 0448 12/31/19  0525 12/30/19  0412   ALBUMIN g/dL 2.90* 3.10* 3.10* 3.40*     Results from last 7 days   Lab Units 01/02/20  0429 01/01/20 0448 12/31/19  0525 12/30/19  0412   CALCIUM mg/dL 9.0 9.2 9.3 9.1   ALBUMIN g/dL 2.90* 3.10* 3.10* 3.40*   MAGNESIUM mg/dL  --  1.8 1.8  --    PHOSPHORUS mg/dL 2.4* 2.7 2.5 2.7       Glucose   Date/Time Value Ref Range Status   01/02/2020 1636 186 (H) 70 - 130 mg/dL Final   01/02/2020 1142 92 70 - 130 mg/dL Final   01/02/2020 0635 194 (H) 70 - 130 mg/dL Final   01/01/2020 2047 321 (H) 70 - 130 mg/dL Final   01/01/2020 1614 187 (H) 70 - 130 mg/dL Final   01/01/2020 1140 245 (H) 70 - 130 mg/dL Final   01/01/2020 0703 271 (H) 70 - 130 mg/dL Final         aspirin 81 mg Oral Daily   atorvastatin 80 mg Oral Daily   gabapentin 300 mg Oral Nightly   insulin glargine 55 Units Subcutaneous Nightly   insulin lispro 0-9 Units Subcutaneous 4x Daily With Meals & Nightly   insulin lispro 15 Units Subcutaneous TID With Meals   [START ON 1/3/2020] lactulose 10 g Oral Daily   melatonin 5 mg Oral Nightly   metoprolol tartrate 12.5 mg Oral Q12H   pantoprazole 40 mg Oral BID AC   sucralfate 1 g Oral 4x Daily       sodium chloride 30 mL/hr   Diet Regular; Cardiac       Assessment/Plan     Active Hospital Problems    Diagnosis  POA   • **NSTEMI (non-ST elevated myocardial infarction) (CMS/HCC) [I21.4]  Yes   • Left ventricular systolic dysfunction [I51.9]  Yes   • Gastrointestinal  hemorrhage [K92.2]  Yes   • Blood loss anemia [D50.0]  Yes   • Melena [K92.1]  Yes   • Chronic kidney disease, stage 3 (moderate) (CMS/[facility]) [N18.3]  Yes   • Anemia of chronic disease [D63.8]  Yes   • Hyperlipemia [E78.5]  Yes   • Coronary artery disease involving native coronary artery of native heart without angina pectoris [I25.10]  Yes   • DM (diabetes mellitus), type 2 with neurological complications (CMS/[facility]) [E11.49]  Yes   • PVD (peripheral vascular disease) (CMS/[facility]) [I73.9]  Yes      Resolved Hospital Problems   No resolved problems to display.     ABEBE on CKD 3  - Baseline Cr about 1.7  - Improving. Creatinine 2.10. Labs ordered for AM after having cath today.  - IVF infusing. Nephrology managing.     UGIB  - Resolved  - EGD 12/29/19 showing chronic gastritis and nodule of the greater curvature-biopsy showed chronic inflammation and hyperplasia, no neoplasia or H. Pylori  - On protonix and carafate  - Appreciate GI recs. F/U in 6 weeks.     NSTEMI  - No more CP, troponin trending down  - Continue ASA as she is tolerating well-plavix held on admission due to GIB  - Echocardiogram results noted-has reduced LV function  - Appreciate cardiology recs, LHC done today showing severe native CAD disease 7/7 bypasses patent. Significant lesion to left subclavian. Plans for medical treatment but eventual re-intervention to subclavian stent.     PVD  - Normally on DAPT, plavix held as above     Type 2 DM  - Continue lantus 55 units nightly. NPO today so sugars fairly stable. Will reassess trends tomorrow.  - Continue scheduled prandial humalog at 10 units TIDAC  - SSI moderate dose      Constipation  - Lactulose ordered for AM per renal.     SCDs for DVT prophylaxis.  Full code.  Discussed with patient and nursing staff.  Disposition to be determined.      EBEN Georges  [facility]  01/02/20  4:55 PM

## 2020-01-02 NOTE — PLAN OF CARE
Monitoring pt strict i&os. Pt bp stable. Pt has lt radial site covered with 2x2 and Tegaderm, site clean dry intact. Lt limb elevated. Will continue to monitor pt.  Problem: Patient Care Overview  Goal: Plan of Care Review  Outcome: Ongoing (interventions implemented as appropriate)  Flowsheets  Taken 12/31/2019 0527 by Marisel Mckeon, RN  Progress: improving  Taken 1/2/2020 1450 by Emilio Lopez, RN  Plan of Care Reviewed With: patient  Taken 1/2/2020 1300 by Betsy Solares RN  Outcome Summary: VSS, NSR, let radial site WNL, denies pain, denies nausea, toleratin po fluids, ready for transfer  Goal: Individualization and Mutuality  Outcome: Ongoing (interventions implemented as appropriate)  Goal: Discharge Needs Assessment  Outcome: Ongoing (interventions implemented as appropriate)  Flowsheets  Taken 12/20/2019 1544 by Josefa Conde, RN  Discharge Facility/Level of Care Needs: home with home health  Equipment Currently Used at Home: none  Transportation Anticipated: family or friend will provide  Transportation Concerns: car, none  Concerns to be Addressed: discharge planning  Patient's Choice of Community Agency(s): prefers to discuss with her son and will provide choices, following to offer assist  Patient/Family Anticipates Transition to: home  Taken 12/19/2019 0125 by Marisel Mckeon, RN  Patient/Family Anticipated Services at Transition: none  Goal: Interprofessional Rounds/Family Conf  Outcome: Ongoing (interventions implemented as appropriate)  Flowsheets (Taken 1/1/2020 0506 by Karli Cotto, RN)  Participants: nursing;patient

## 2020-01-02 NOTE — PROGRESS NOTES
"   LOS: 15 days    Patient Care Team:  Jose Morrison DO as PCP - General (Family Medicine)  Samantha Uriostegui MD as Consulting Physician (Cardiology)    Chief Complaint:    Chief Complaint   Patient presents with   • Shortness of Breath   • Black or Bloody Stool     Follow UP Pam on CKD 3  Subjective     Interval History:   Cardiac cath this am.  Medical management.  Left subclavian stent occlusion.  Eating fair. Not soa. No bm.  Miralax not working. Urinating, not measured.  Usual weight 205.    Review of Systems:   See above    Objective     Vital Signs  Temp:  [97.4 °F (36.3 °C)-98.8 °F (37.1 °C)] 97.4 °F (36.3 °C)  Heart Rate:  [57-74] 72  Resp:  [16-18] 18  BP: (100-157)/(48-81) 100/70    Flowsheet Rows      First Filed Value   Admission Height  152.4 cm (60\") Documented at 12/18/2019 1845   Admission Weight  93 kg (205 lb) Documented at 12/18/2019 1854          I/O this shift:  In: 270 [P.O.:120; I.V.:150]  Out: -   I/O last 3 completed shifts:  In: 240 [P.O.:240]  Out: -     Intake/Output Summary (Last 24 hours) at 1/2/2020 1628  Last data filed at 1/2/2020 1337  Gross per 24 hour   Intake 270 ml   Output --   Net 270 ml       Physical Exam:  Obese , lying in bed.  No distress. Conversant  Oral mucosa moist  Neck no jvd  Heart RRR no s3 or rub  Lungs clear to auscultation  abd + bs, soft, nontender, slightly distended  Ext no edema. Left radial site a little tender.   Skin dry, no rash.   Neuro awake, alert, fluent speech.         Results Review:    Results from last 7 days   Lab Units 01/02/20  0429 01/01/20  0448 12/31/19  0525   SODIUM mmol/L 143 140 140   POTASSIUM mmol/L 4.8 4.6 4.2   CHLORIDE mmol/L 105 103 100   CO2 mmol/L 24.8 25.2 24.7   BUN mg/dL 74* 85* 88*   CREATININE mg/dL 2.10* 2.14* 2.13*   CALCIUM mg/dL 9.0 9.2 9.3   GLUCOSE mg/dL 284* 306* 180*       Estimated Creatinine Clearance: 25.7 mL/min (A) (by C-G formula based on SCr of 2.1 mg/dL (H)).    Results from last 7 days   Lab Units " 01/02/20  0429 01/01/20  0448 12/31/19  0525   MAGNESIUM mg/dL  --  1.8 1.8   PHOSPHORUS mg/dL 2.4* 2.7 2.5       Results from last 7 days   Lab Units 01/02/20  0429 01/01/20  0448 12/31/19  0525 12/30/19  0412 12/29/19  0516 12/28/19  0324 12/27/19  0417   URIC ACID mg/dL 10.7* 11.9* 13.1* 14.2* 15.0* 15.3* 16.6*       Results from last 7 days   Lab Units 01/02/20  0429 01/01/20  0448 12/31/19  0525 12/30/19  0412 12/29/19  0516   WBC 10*3/mm3 17.64* 18.03* 17.26* 17.12* 19.33*   HEMOGLOBIN g/dL 9.5* 9.7* 10.0* 9.2* 9.6*   PLATELETS 10*3/mm3 335 325 336 313 362               Imaging Results (Last 24 Hours)     ** No results found for the last 24 hours. **          aspirin 81 mg Oral Daily   atorvastatin 80 mg Oral Daily   gabapentin 300 mg Oral Nightly   insulin glargine 55 Units Subcutaneous Nightly   insulin lispro 0-9 Units Subcutaneous 4x Daily With Meals & Nightly   insulin lispro 15 Units Subcutaneous TID With Meals   melatonin 5 mg Oral Nightly   metoprolol tartrate 12.5 mg Oral Q12H   pantoprazole 40 mg Oral BID AC   sucralfate 1 g Oral 4x Daily       sodium chloride 30 mL/hr       Medication Review:   Current Facility-Administered Medications   Medication Dose Route Frequency Provider Last Rate Last Dose   • acetaminophen (TYLENOL) tablet 650 mg  650 mg Oral Q4H PRN Emilio Serna MD   650 mg at 12/21/19 0801    Or   • acetaminophen (TYLENOL) 160 MG/5ML solution 650 mg  650 mg Oral Q4H PRN Emilio Serna MD        Or   • acetaminophen (TYLENOL) suppository 650 mg  650 mg Rectal Q4H PRN Emilio Serna MD       • aspirin EC tablet 81 mg  81 mg Oral Daily Emilio Serna MD   81 mg at 01/01/20 0804   • atorvastatin (LIPITOR) tablet 80 mg  80 mg Oral Daily Emilio Serna MD   80 mg at 01/01/20 0805   • bisacodyl (DULCOLAX) EC tablet 5 mg  5 mg Oral Daily PRN Emilio Serna MD   5 mg at 01/01/20 2050   • bisacodyl (DULCOLAX) suppository 10 mg  10 mg Rectal Daily PRN Emilio Serna MD   10 mg at  12/21/19 2103   • calcium carbonate (TUMS) chewable tablet 500 mg (200 mg elemental)  2 tablet Oral BID PRN Emilio Serna MD   2 tablet at 12/24/19 1446   • dextrose (D50W) 25 g/ 50mL Intravenous Solution 25 g  25 g Intravenous Q15 Min PRN Emilio Serna MD       • dextrose (D50W) 25 g/ 50mL Intravenous Solution 25 g  25 g Intravenous Q15 Min PRN Emilio Serna MD       • dextrose (GLUTOSE) oral gel 15 g  15 g Oral Q15 Min PRN Emilio Serna MD       • dextrose (GLUTOSE) oral gel 15 g  15 g Oral Q15 Min PRN Emilio Serna MD       • gabapentin (NEURONTIN) capsule 300 mg  300 mg Oral Nightly Emilio Serna MD   300 mg at 01/01/20 2043   • glucagon (human recombinant) (GLUCAGEN DIAGNOSTIC) injection 1 mg  1 mg Subcutaneous PRN Emilio Serna MD       • glucagon (human recombinant) (GLUCAGEN DIAGNOSTIC) injection 1 mg  1 mg Subcutaneous Q15 Min PRN Emilio Serna MD       • HYDROcodone-acetaminophen (NORCO) 5-325 MG per tablet 1 tablet  1 tablet Oral Q4H PRN Emilio Serna MD       • insulin glargine (LANTUS) injection 55 Units  55 Units Subcutaneous Nightly Emilio Serna MD       • insulin lispro (humaLOG) injection 0-9 Units  0-9 Units Subcutaneous 4x Daily With Meals & Nightly Emilio Serna MD   2 Units at 01/01/20 1650   • insulin lispro (humaLOG) injection 15 Units  15 Units Subcutaneous TID With Meals Emilio Serna MD   15 Units at 01/01/20 1649   • ipratropium-albuterol (DUO-NEB) nebulizer solution 3 mL  3 mL Nebulization Q4H PRN Emilio Serna MD   3 mL at 12/27/19 0830   • melatonin tablet 5 mg  5 mg Oral Nightly Emilio Serna MD   5 mg at 01/01/20 2043   • metoprolol tartrate (LOPRESSOR) tablet 12.5 mg  12.5 mg Oral Q12H Emilio Serna MD   12.5 mg at 01/01/20 2043   • nitroglycerin (NITROSTAT) SL tablet 0.4 mg  0.4 mg Sublingual Q5 Min PRN Emilio Serna MD       • ondansetron (ZOFRAN) tablet 4 mg  4 mg Oral Q6H PRN Emilio Serna MD        Or   • ondansetron (ZOFRAN)  injection 4 mg  4 mg Intravenous Q6H PRN Emilio Serna MD   4 mg at 01/02/20 0636   • ondansetron (ZOFRAN) tablet 4 mg  4 mg Oral Q6H PRN Emilio Serna MD        Or   • ondansetron (ZOFRAN) injection 4 mg  4 mg Intravenous Q6H PRN Emilio Serna MD       • pantoprazole (PROTONIX) EC tablet 40 mg  40 mg Oral BID AC Emilio Serna MD   40 mg at 01/01/20 1650   • polyethylene glycol (MIRALAX) powder 17 g  17 g Oral Daily PRN Emilio Serna MD   17 g at 01/01/20 0804   • potassium chloride (KLOR-CON) packet 40 mEq  40 mEq Oral PRN Emilio Serna MD       • potassium chloride (MICRO-K) CR capsule 40 mEq  40 mEq Oral PRN Emilio Serna MD   40 mEq at 12/23/19 2236   • promethazine (PHENERGAN) tablet 12.5 mg  12.5 mg Oral Q6H PRN Emilio Serna MD   12.5 mg at 12/24/19 2251   • sodium chloride 0.9 % flush 10 mL  10 mL Intravenous PRN Emilio Serna MD   10 mL at 01/01/20 0806   • sodium chloride 0.9 % flush 10 mL  10 mL Intravenous PRN Emilio Serna MD       • sodium chloride 0.9 % infusion  30 mL/hr Intravenous Continuous PRN Emilio Serna MD       • sucralfate (CARAFATE) tablet 1 g  1 g Oral 4x Daily Emilio Serna MD   1 g at 01/01/20 2043       Assessment/Plan     1. Pam on CKD 3. Baseline. 1.7. Creatinine 2.1 past 48 hours.  IVF pre cath. Check am chemistries.   2. NSTEMI, CAD.  Ischemic cardiomyopathy. Pulm HTN. Cath today.  Medical management  3. Left subclavian stent occlusion. Will DW Dr. Serna.  Would try to avoid another contrast procedure if possible with PAM.   4. GIB EGD with chronic gastritis, chronic inflammatory nodule by biopsy.  5. DM2  6. Constipation.  Try lactulose in am.     Gladys Morris MD  01/02/20  4:28 PM

## 2020-01-02 NOTE — PROGRESS NOTES
Continued Stay Note  [facility]     Patient Name: Randi Martines  MRN: [id number]  Today's Date: 1/2/2020    Admit Date: 12/18/2019    Discharge Plan     Row Name 01/02/20 1813       Plan    Plan  Declines rehab    Plan Comments  Patient last ambulated 300 feet with PT.  Attempted to follow up and she is asleep and did not respond to verbal stimuli............................Josefa Conde RN        Discharge Codes    No documentation.             Josefa Conde RN

## 2020-01-02 NOTE — PROGRESS NOTES
[facility] Follow Up    Chief Complaint: Follow up CHF, NSTEMI    Interval History: No chest pain or dyspnea.  Some nausea and vomiting this morning.     Objective:     Objective:  Temp:  [98.1 °F (36.7 °C)-99.4 °F (37.4 °C)] 98.6 °F (37 °C)  Heart Rate:  [57-68] 57  Resp:  [16] 16  BP: (128-143)/(48-59) 128/48     Intake/Output Summary (Last 24 hours) at 1/2/2020 0720  Last data filed at 1/1/2020 0808  Gross per 24 hour   Intake 240 ml   Output --   Net 240 ml     Body mass index is 38.2 kg/m².      12/31/19  0639 01/01/20  0742 01/02/20  0647   Weight: 85.1 kg (187 lb 9.6 oz) 87.3 kg (192 lb 6.4 oz) 88.3 kg (194 lb 9.6 oz)     Weight change:       Physical Exam:   General : Alert, cooperative, in no acute distress.  Neuro: Alert,cooperative and oriented.  Lungs: CTAB. Normal respiratory effort and rate.  CV: Regular rate and rhythm, normal S1 and S2, no murmurs, gallops or rubs.  ABD: Soft, nontender, nondistended. Positive bowel sounds.  Extr: No edema or cyanosis, moves all extremities.    Lab Review:   Results from last 7 days   Lab Units 01/02/20 0429 01/01/20  0448   SODIUM mmol/L 143 140   POTASSIUM mmol/L 4.8 4.6   CHLORIDE mmol/L 105 103   CO2 mmol/L 24.8 25.2   BUN mg/dL 74* 85*   CREATININE mg/dL 2.10* 2.14*   GLUCOSE mg/dL 284* 306*   CALCIUM mg/dL 9.0 9.2     Results from last 7 days   Lab Units 12/31/19  0525   TROPONIN T ng/mL 0.032*     Results from last 7 days   Lab Units 01/02/20  0429 01/01/20  0448   WBC 10*3/mm3 17.64* 18.03*   HEMOGLOBIN g/dL 9.5* 9.7*   HEMATOCRIT % 29.1* 29.9*   PLATELETS 10*3/mm3 335 325         Results from last 7 days   Lab Units 01/01/20 0448 12/31/19  0525   MAGNESIUM mg/dL 1.8 1.8           Invalid input(s): LDLCALC          I reviewed the patient's new clinical results.  I personally viewed and interpreted the patient's EKG  Current Medications:   Scheduled Meds:  aspirin 81 mg Oral Daily   atorvastatin 80 mg Oral Daily   gabapentin 300 mg Oral  Nightly   insulin glargine 55 Units Subcutaneous Nightly   insulin lispro 0-9 Units Subcutaneous 4x Daily With Meals & Nightly   insulin lispro 15 Units Subcutaneous TID With Meals   melatonin 5 mg Oral Nightly   metoprolol tartrate 12.5 mg Oral Q12H   pantoprazole 40 mg Oral BID AC   predniSONE 10 mg Oral Daily With Lunch   sucralfate 1 g Oral 4x Daily     Continuous Infusions:  sodium chloride 30 mL/hr    sodium chloride 75 mL/hr Last Rate: 75 mL/hr (01/02/20 0002)       Allergies:  Allergies   Allergen Reactions   • Augmentin [Amoxicillin-Pot Clavulanate] GI Intolerance and Dizziness   • Codeine GI Intolerance and Dizziness       Assessment/Plan:     1. NSTEMI.  Unclear if demand ischemia from CHF and anemia versus acute coronary syndrome.  Concern for later with newly depressed LV function.    2. Acute systolic CHF  3. Acute hypoxic respiratory failure  4. Cardiomyopathy.  New onset this admission with an EF of 40% and wall motion abnormalities.   5. GI bleed.  EGD with gastritis but no active bleed.   6. ABEBE/CKD stage 4  7. Coronary artery disease.  Status post prior CABG x 7.  (LIMA to LAD, SVG to D1, SVG to OM1, SVG to OM2, sequential SVG to right marginal, PDA and RUSTY.)  8. Acute on chronic anemia.  Stable following initial transfusion following admission and discontinuation of aspirin and clopidogrel.   9. Peripheral vascular disease.  History of bilateral fem-pop bypass and left subclavian artery stent placement.  Balloon angioplasty of stent in 2011.    10. Hypertension  11. Dyslipidemia  12. Diabetes mellitus type 2       -  Aspirin resumed on 12/30.  Appears to be tolerating so far.   -  Renal function fairly stable.  Proceed with left heart cath/coronary angiograms today.      Samantha Uriostegui MD  01/02/20  7:20 AM

## 2020-01-02 NOTE — PERIOPERATIVE NURSING NOTE
Dr Serna at bedside to see patient and spoke to patient about procedure and plan of care after procedure.

## 2020-01-03 PROBLEM — J96.01 ACUTE RESPIRATORY FAILURE WITH HYPOXIA (HCC): Status: ACTIVE | Noted: 2020-01-01

## 2020-01-03 NOTE — PROGRESS NOTES
"   LOS: 16 days    Patient Care Team:  Jose Morrison DO as PCP - General (Family Medicine)  Samantha Uriostegui MD as Consulting Physician (Cardiology)    Chief Complaint:    Chief Complaint   Patient presents with   • Shortness of Breath   • Black or Bloody Stool     Follow UP ABEBE/CKD3  Subjective     Interval History:   Doing fine.  Walking around.  Eating well.  Objective     Vital Signs  Temp:  [98.3 °F (36.8 °C)-99.2 °F (37.3 °C)] 98.8 °F (37.1 °C)  Heart Rate:  [62-79] 62  Resp:  [18] 18  BP: (100-119)/(47-70) 103/47    Flowsheet Rows      First Filed Value   Admission Height  152.4 cm (60\") Documented at 12/18/2019 1845   Admission Weight  93 kg (205 lb) Documented at 12/18/2019 1854          I/O this shift:  In: 450 [P.O.:450]  Out: 700 [Urine:700]  I/O last 3 completed shifts:  In: 1739.2 [P.O.:1160; I.V.:579.2]  Out: 700 [Urine:700]    Intake/Output Summary (Last 24 hours) at 1/3/2020 1514  Last data filed at 1/3/2020 1300  Gross per 24 hour   Intake 1919.17 ml   Output 1400 ml   Net 519.17 ml             Physical Exam:   General Appearance: alert, oriented x 3, no acute distress,   Skin: warm and dry  HEENT: pupils round and reactive to light, oral mucosa normal,   Neck: supple, no JVD, trachea midline  Lungs: CTA, unlabored breathing effort  Heart: RRR, normal S1 and S2, no S3, no rub  Abdomen: soft, non-tender,  present bowel sounds to auscultation  : no palpable bladder,  Extremities: no edema, cyanosis or clubbing  Neuro: normal speech and mental status       Results Review:    Results from last 7 days   Lab Units 01/03/20  0414 01/02/20  0429 01/01/20  0448   SODIUM mmol/L 142 143 140   POTASSIUM mmol/L 4.7 4.8 4.6   CHLORIDE mmol/L 105 105 103   CO2 mmol/L 26.5 24.8 25.2   BUN mg/dL 67* 74* 85*   CREATININE mg/dL 2.12* 2.10* 2.14*   CALCIUM mg/dL 8.9 9.0 9.2   GLUCOSE mg/dL 84 284* 306*       Estimated Creatinine Clearance: 25.7 mL/min (A) (by C-G formula based on SCr of 2.12 mg/dL " (H)).    Results from last 7 days   Lab Units 01/03/20  0414 01/02/20  0429 01/01/20 0448 12/31/19  0525   MAGNESIUM mg/dL  --   --  1.8 1.8   PHOSPHORUS mg/dL 3.4 2.4* 2.7 2.5       Results from last 7 days   Lab Units 01/03/20  0414 01/02/20  0429 01/01/20  0448 12/31/19  0525 12/30/19  0412 12/29/19  0516 12/28/19  0324   URIC ACID mg/dL 10.3* 10.7* 11.9* 13.1* 14.2* 15.0* 15.3*       Results from last 7 days   Lab Units 01/03/20  0414 01/02/20  0429 01/01/20 0448 12/31/19  0525 12/30/19  0412   WBC 10*3/mm3 16.90* 17.64* 18.03* 17.26* 17.12*   HEMOGLOBIN g/dL 9.3* 9.5* 9.7* 10.0* 9.2*   PLATELETS 10*3/mm3 317 335 325 336 313               Imaging Results (Last 24 Hours)     ** No results found for the last 24 hours. **          aspirin 81 mg Oral Daily   atorvastatin 80 mg Oral Daily   gabapentin 300 mg Oral Nightly   insulin glargine 55 Units Subcutaneous Nightly   insulin lispro 0-9 Units Subcutaneous 4x Daily With Meals & Nightly   insulin lispro 15 Units Subcutaneous TID With Meals   lactulose 10 g Oral Daily   melatonin 5 mg Oral Nightly   metoprolol tartrate 12.5 mg Oral Q12H   pantoprazole 40 mg Oral BID AC   sucralfate 1 g Oral 4x Daily       sodium chloride 30 mL/hr       Medication Review:   Current Facility-Administered Medications   Medication Dose Route Frequency Provider Last Rate Last Dose   • acetaminophen (TYLENOL) tablet 650 mg  650 mg Oral Q4H PRN Emilio Serna MD   650 mg at 12/21/19 0801    Or   • acetaminophen (TYLENOL) 160 MG/5ML solution 650 mg  650 mg Oral Q4H PRN Emilio Serna MD        Or   • acetaminophen (TYLENOL) suppository 650 mg  650 mg Rectal Q4H PRN Emilio Serna MD       • aspirin EC tablet 81 mg  81 mg Oral Daily Emilio Serna MD   81 mg at 01/03/20 1105   • atorvastatin (LIPITOR) tablet 80 mg  80 mg Oral Daily Emilio Serna MD   80 mg at 01/03/20 1105   • bisacodyl (DULCOLAX) EC tablet 5 mg  5 mg Oral Daily PRN Emilio Serna MD   5 mg at 01/01/20 2050   •  bisacodyl (DULCOLAX) suppository 10 mg  10 mg Rectal Daily PRN Emilio Serna MD   10 mg at 12/21/19 2103   • calcium carbonate (TUMS) chewable tablet 500 mg (200 mg elemental)  2 tablet Oral BID PRN Emilio Serna MD   2 tablet at 12/24/19 1446   • dextrose (D50W) 25 g/ 50mL Intravenous Solution 25 g  25 g Intravenous Q15 Min PRN Emilio Serna MD       • dextrose (D50W) 25 g/ 50mL Intravenous Solution 25 g  25 g Intravenous Q15 Min PRN Emilio Serna MD       • dextrose (GLUTOSE) oral gel 15 g  15 g Oral Q15 Min PRN Emilio Serna MD       • dextrose (GLUTOSE) oral gel 15 g  15 g Oral Q15 Min PRN Emilio Serna MD       • gabapentin (NEURONTIN) capsule 300 mg  300 mg Oral Nightly Emilio Serna MD   300 mg at 01/02/20 2106   • glucagon (human recombinant) (GLUCAGEN DIAGNOSTIC) injection 1 mg  1 mg Subcutaneous PRN Emilio Serna MD       • glucagon (human recombinant) (GLUCAGEN DIAGNOSTIC) injection 1 mg  1 mg Subcutaneous Q15 Min PRN Emilio Serna MD       • HYDROcodone-acetaminophen (NORCO) 5-325 MG per tablet 1 tablet  1 tablet Oral Q4H PRN Emilio Serna MD       • insulin glargine (LANTUS) injection 55 Units  55 Units Subcutaneous Nightly Emilio Serna MD   55 Units at 01/02/20 2129   • insulin lispro (humaLOG) injection 0-9 Units  0-9 Units Subcutaneous 4x Daily With Meals & Nightly Emilio Serna MD   2 Units at 01/02/20 2106   • insulin lispro (humaLOG) injection 15 Units  15 Units Subcutaneous TID With Meals Emilio Serna MD   15 Units at 01/03/20 1711   • ipratropium-albuterol (DUO-NEB) nebulizer solution 3 mL  3 mL Nebulization Q4H PRN Emilio Serna MD   3 mL at 12/27/19 0830   • lactulose (CHRONULAC) 10 GM/15ML solution 10 g  10 g Oral Daily Gladys Morris MD   10 g at 01/03/20 1105   • melatonin tablet 5 mg  5 mg Oral Nightly Emilio Serna MD   5 mg at 01/02/20 2106   • metoprolol tartrate (LOPRESSOR) tablet 12.5 mg  12.5 mg Oral Q12H Emilio Serna MD   12.5  mg at 01/03/20 1105   • nitroglycerin (NITROSTAT) SL tablet 0.4 mg  0.4 mg Sublingual Q5 Min PRN Emilio Serna MD       • ondansetron (ZOFRAN) tablet 4 mg  4 mg Oral Q6H PRN Emilio Serna MD        Or   • ondansetron (ZOFRAN) injection 4 mg  4 mg Intravenous Q6H PRN Emilio Serna MD   4 mg at 01/02/20 0636   • ondansetron (ZOFRAN) tablet 4 mg  4 mg Oral Q6H PRN Emilio Serna MD        Or   • ondansetron (ZOFRAN) injection 4 mg  4 mg Intravenous Q6H PRN Emilio Serna MD       • pantoprazole (PROTONIX) EC tablet 40 mg  40 mg Oral BID AC Emilio Serna MD   40 mg at 01/03/20 1711   • polyethylene glycol (MIRALAX) powder 17 g  17 g Oral Daily PRN Emilio Serna MD   17 g at 01/01/20 0804   • potassium chloride (KLOR-CON) packet 40 mEq  40 mEq Oral PRN Emilio Serna MD       • potassium chloride (MICRO-K) CR capsule 40 mEq  40 mEq Oral PRN Emilio Serna MD   40 mEq at 12/23/19 2236   • promethazine (PHENERGAN) tablet 12.5 mg  12.5 mg Oral Q6H PRN Emilio Serna MD   12.5 mg at 12/24/19 2251   • sodium chloride 0.9 % flush 10 mL  10 mL Intravenous PRN Emilio Serna MD   10 mL at 01/01/20 0806   • sodium chloride 0.9 % flush 10 mL  10 mL Intravenous PRN Emilio Serna MD       • sodium chloride 0.9 % infusion  30 mL/hr Intravenous Continuous PRN Emilio Serna MD       • sucralfate (CARAFATE) tablet 1 g  1 g Oral 4x Daily Emilio Serna MD   1 g at 01/03/20 1711       Assessment/Plan         NSTEMI (non-ST elevated myocardial infarction) (CMS/HCC)    DM (diabetes mellitus), type 2 with neurological complications (CMS/[facility])    PVD (peripheral vascular disease) (CMS/[facility])    Coronary artery disease involving native coronary artery of native heart without angina pectoris    Hyperlipemia    Chronic kidney disease, stage 3 (moderate) (CMS/HCC)    Anemia of chronic disease    Gastrointestinal hemorrhage    Left ventricular systolic dysfunction    Blood loss anemia    Melena    Acute respiratory  failure with hypoxia (CMS/HCC)              Bertha Velasquez MD  01/03/20  6:11 PM    Results Review:    Results from last 7 days   Lab Units 01/03/20 0414 01/02/20 0429 01/01/20  0448   SODIUM mmol/L 142 143 140   POTASSIUM mmol/L 4.7 4.8 4.6   CHLORIDE mmol/L 105 105 103   CO2 mmol/L 26.5 24.8 25.2   BUN mg/dL 67* 74* 85*   CREATININE mg/dL 2.12* 2.10* 2.14*   CALCIUM mg/dL 8.9 9.0 9.2   GLUCOSE mg/dL 84 284* 306*       Estimated Creatinine Clearance: 25.7 mL/min (A) (by C-G formula based on SCr of 2.12 mg/dL (H)).    Results from last 7 days   Lab Units 01/03/20  0414 01/02/20 0429 01/01/20 0448 12/31/19  0525   MAGNESIUM mg/dL  --   --  1.8 1.8   PHOSPHORUS mg/dL 3.4 2.4* 2.7 2.5       Results from last 7 days   Lab Units 01/03/20  0414 01/02/20  0429 01/01/20  0448 12/31/19  0525 12/30/19  0412 12/29/19  0516 12/28/19  0324   URIC ACID mg/dL 10.3* 10.7* 11.9* 13.1* 14.2* 15.0* 15.3*       Results from last 7 days   Lab Units 01/03/20  0414 01/02/20  0429 01/01/20  0448 12/31/19  0525 12/30/19  0412   WBC 10*3/mm3 16.90* 17.64* 18.03* 17.26* 17.12*   HEMOGLOBIN g/dL 9.3* 9.5* 9.7* 10.0* 9.2*   PLATELETS 10*3/mm3 317 335 325 336 313               Imaging Results (Last 24 Hours)     ** No results found for the last 24 hours. **          aspirin 81 mg Oral Daily   atorvastatin 80 mg Oral Daily   gabapentin 300 mg Oral Nightly   insulin glargine 55 Units Subcutaneous Nightly   insulin lispro 0-9 Units Subcutaneous 4x Daily With Meals & Nightly   insulin lispro 15 Units Subcutaneous TID With Meals   lactulose 10 g Oral Daily   melatonin 5 mg Oral Nightly   metoprolol tartrate 12.5 mg Oral Q12H   pantoprazole 40 mg Oral BID AC   sucralfate 1 g Oral 4x Daily       sodium chloride 30 mL/hr       Medication Review:   Current Facility-Administered Medications   Medication Dose Route Frequency Provider Last Rate Last Dose   • acetaminophen (TYLENOL) tablet 650 mg  650 mg Oral Q4H PRN Emilio Serna MD   650 mg  at 12/21/19 0801    Or   • acetaminophen (TYLENOL) 160 MG/5ML solution 650 mg  650 mg Oral Q4H PRN Emilio Serna MD        Or   • acetaminophen (TYLENOL) suppository 650 mg  650 mg Rectal Q4H PRN Emilio Serna MD       • aspirin EC tablet 81 mg  81 mg Oral Daily Emilio Serna MD   81 mg at 01/03/20 1105   • atorvastatin (LIPITOR) tablet 80 mg  80 mg Oral Daily Emilio Serna MD   80 mg at 01/03/20 1105   • bisacodyl (DULCOLAX) EC tablet 5 mg  5 mg Oral Daily PRN Emilio Serna MD   5 mg at 01/01/20 2050   • bisacodyl (DULCOLAX) suppository 10 mg  10 mg Rectal Daily PRN Emilio Serna MD   10 mg at 12/21/19 2103   • calcium carbonate (TUMS) chewable tablet 500 mg (200 mg elemental)  2 tablet Oral BID PRN Emilio Serna MD   2 tablet at 12/24/19 1446   • dextrose (D50W) 25 g/ 50mL Intravenous Solution 25 g  25 g Intravenous Q15 Min PRN Emilio Serna MD       • dextrose (D50W) 25 g/ 50mL Intravenous Solution 25 g  25 g Intravenous Q15 Min PRN Emilio Serna MD       • dextrose (GLUTOSE) oral gel 15 g  15 g Oral Q15 Min PRN Emilio Serna MD       • dextrose (GLUTOSE) oral gel 15 g  15 g Oral Q15 Min PRN Emilio Serna MD       • gabapentin (NEURONTIN) capsule 300 mg  300 mg Oral Nightly Emilio Serna MD   300 mg at 01/02/20 2106   • glucagon (human recombinant) (GLUCAGEN DIAGNOSTIC) injection 1 mg  1 mg Subcutaneous PRN Emilio Serna MD       • glucagon (human recombinant) (GLUCAGEN DIAGNOSTIC) injection 1 mg  1 mg Subcutaneous Q15 Min PRN Emilio Serna MD       • HYDROcodone-acetaminophen (NORCO) 5-325 MG per tablet 1 tablet  1 tablet Oral Q4H PRN Emilio Serna MD       • insulin glargine (LANTUS) injection 55 Units  55 Units Subcutaneous Nightly Emilio Serna MD   55 Units at 01/02/20 2129   • insulin lispro (humaLOG) injection 0-9 Units  0-9 Units Subcutaneous 4x Daily With Meals & Nightly Emilio Serna MD   2 Units at 01/02/20 2106   • insulin lispro (humaLOG) injection  15 Units  15 Units Subcutaneous TID With Meals Emilio Serna MD   15 Units at 01/03/20 1104   • ipratropium-albuterol (DUO-NEB) nebulizer solution 3 mL  3 mL Nebulization Q4H PRN Emilio Serna MD   3 mL at 12/27/19 0830   • lactulose (CHRONULAC) 10 GM/15ML solution 10 g  10 g Oral Daily Gladys Morris MD   10 g at 01/03/20 1105   • melatonin tablet 5 mg  5 mg Oral Nightly Emilio Serna MD   5 mg at 01/02/20 2106   • metoprolol tartrate (LOPRESSOR) tablet 12.5 mg  12.5 mg Oral Q12H Emilio Serna MD   12.5 mg at 01/03/20 1105   • nitroglycerin (NITROSTAT) SL tablet 0.4 mg  0.4 mg Sublingual Q5 Min PRN Emilio Serna MD       • ondansetron (ZOFRAN) tablet 4 mg  4 mg Oral Q6H PRN Emilio Serna MD        Or   • ondansetron (ZOFRAN) injection 4 mg  4 mg Intravenous Q6H PRN Emilio Serna MD   4 mg at 01/02/20 0636   • ondansetron (ZOFRAN) tablet 4 mg  4 mg Oral Q6H PRN Emilio Serna MD        Or   • ondansetron (ZOFRAN) injection 4 mg  4 mg Intravenous Q6H PRN Emilio Serna MD       • pantoprazole (PROTONIX) EC tablet 40 mg  40 mg Oral BID AC Emilio Serna MD   40 mg at 01/03/20 1105   • polyethylene glycol (MIRALAX) powder 17 g  17 g Oral Daily PRN Emilio Serna MD   17 g at 01/01/20 0804   • potassium chloride (KLOR-CON) packet 40 mEq  40 mEq Oral PRN Emilio Serna MD       • potassium chloride (MICRO-K) CR capsule 40 mEq  40 mEq Oral PRN Emilio Serna MD   40 mEq at 12/23/19 2236   • promethazine (PHENERGAN) tablet 12.5 mg  12.5 mg Oral Q6H PRN Emilio Serna MD   12.5 mg at 12/24/19 2251   • sodium chloride 0.9 % flush 10 mL  10 mL Intravenous PRN Emilio Serna MD   10 mL at 01/01/20 0806   • sodium chloride 0.9 % flush 10 mL  10 mL Intravenous PRN Emilio Serna MD       • sodium chloride 0.9 % infusion  30 mL/hr Intravenous Continuous PRN Emilio Serna MD       • sucralfate (CARAFATE) tablet 1 g  1 g Oral 4x Daily Emilio Serna MD   1 g at 01/03/20 1104        Assessment/Plan       NSTEMI (non-ST elevated myocardial infarction) (CMS/HCC)    DM (diabetes mellitus), type 2 with neurological complications (CMS/HCC)    PVD (peripheral vascular disease) (CMS/[facility])    Coronary artery disease involving native coronary artery of native heart without angina pectoris    Hyperlipemia    Chronic kidney disease, stage 3 (moderate) (CMS/HCC)    Anemia of chronic disease    Gastrointestinal hemorrhage    Left ventricular systolic dysfunction    Blood loss anemia    Melena    1. ABEBE on CKD 3. Baseline. 1.7. Creatinine 2.1 stable post cath and has been at this level for 3 days.   2. NSTEMI, CAD.  Ischemic cardiomyopathy. Pulm HTN.  Cath showed severe native CAD with 7/7 patent bypass grafts.  3. Left subclavian stent occlusion.  Would try to avoid another contrast procedure if possible with ABEBE this admission. Dr Uriostegui plans to see her outpatient in one week.  4. GIB EGD with chronic gastritis, chronic inflammatory nodule by biopsy.  5. DM2  6. Constipation.  lactulose in am    Plan  Bmp in am  Renal clnic follow up in 3 weeks       Bertha Velasquez MD  01/03/20  3:14 PM

## 2020-01-03 NOTE — PROGRESS NOTES
Name: Randi Martines ADMIT: 2019   : 1950  PCP: Jose Morrison DO    MRN: [id number] LOS: 16 days   AGE/SEX: 69 y.o. female  ROOM: [facility]     Subjective   Subjective   CC: dyspnea  No acute events.  Patient overall feels well. Taking PO. +BM. No CP/f/c/n/v.      Objective   Objective   Vital Signs  Temp:  [98.3 °F (36.8 °C)-99.2 °F (37.3 °C)] 98.8 °F (37.1 °C)  Heart Rate:  [62-79] 62  Resp:  [18] 18  BP: (103-119)/(47-56) 103/47  SpO2:  [95 %-97 %] 95 %  on   ;   Device (Oxygen Therapy): room air  Body mass index is 38.72 kg/m².  Physical Exam   Constitutional: She is oriented to person, place, and time. No distress.   HENT:   Head: Normocephalic and atraumatic.   Mouth/Throat: Oropharynx is clear and moist.   Eyes: Pupils are equal, round, and reactive to light. Conjunctivae and EOM are normal.   Neck: Normal range of motion. Neck supple.   Cardiovascular: Normal rate, regular rhythm and intact distal pulses.   Pulmonary/Chest: Effort normal and breath sounds normal. She has no rales.   Abdominal: Soft. Bowel sounds are normal.   Musculoskeletal: She exhibits no edema or tenderness.   Neurological: She is alert and oriented to person, place, and time.   Skin: Skin is warm and dry. Capillary refill takes less than 2 seconds. She is not diaphoretic.   Psychiatric: She has a normal mood and affect. Her behavior is normal.   Nursing note and vitals reviewed.    Results Review:       I reviewed the patient's new clinical results.  I personally reviewed and interpreted the patient's telemetry.  Results from last 7 days   Lab Units 20  0414 20  0429 20  0448 19  0525   WBC 10*3/mm3 16.90* 17.64* 18.03* 17.26*   HEMOGLOBIN g/dL 9.3* 9.5* 9.7* 10.0*   PLATELETS 10*3/mm3 317 335 325 336     Results from last 7 days   Lab Units 20  041 20  0429 20  0448 19  0525   SODIUM mmol/L 142 143 140 140   POTASSIUM mmol/L 4.7 4.8 4.6 4.2   CHLORIDE mmol/L 105  105 103 100   CO2 mmol/L 26.5 24.8 25.2 24.7   BUN mg/dL 67* 74* 85* 88*   CREATININE mg/dL 2.12* 2.10* 2.14* 2.13*   GLUCOSE mg/dL 84 284* 306* 180*   Estimated Creatinine Clearance: 25.7 mL/min (A) (by C-G formula based on SCr of 2.12 mg/dL (H)).  Results from last 7 days   Lab Units 01/03/20 0414 01/02/20 0429 01/01/20 0448 12/31/19  0525   ALBUMIN g/dL 3.00* 2.90* 3.10* 3.10*     Results from last 7 days   Lab Units 01/03/20 0414 01/02/20 0429 01/01/20 0448 12/31/19  0525   CALCIUM mg/dL 8.9 9.0 9.2 9.3   ALBUMIN g/dL 3.00* 2.90* 3.10* 3.10*   MAGNESIUM mg/dL  --   --  1.8 1.8   PHOSPHORUS mg/dL 3.4 2.4* 2.7 2.5       Glucose   Date/Time Value Ref Range Status   01/03/2020 1557 146 (H) 70 - 130 mg/dL Final   01/03/2020 1056 146 (H) 70 - 130 mg/dL Final   01/03/2020 0629 86 70 - 130 mg/dL Final   01/02/2020 2029 181 (H) 70 - 130 mg/dL Final   01/02/2020 1636 186 (H) 70 - 130 mg/dL Final   01/02/2020 1142 92 70 - 130 mg/dL Final   01/02/2020 0635 194 (H) 70 - 130 mg/dL Final         aspirin 81 mg Oral Daily   atorvastatin 80 mg Oral Daily   gabapentin 300 mg Oral Nightly   insulin glargine 55 Units Subcutaneous Nightly   insulin lispro 0-9 Units Subcutaneous 4x Daily With Meals & Nightly   insulin lispro 15 Units Subcutaneous TID With Meals   lactulose 10 g Oral Daily   melatonin 5 mg Oral Nightly   metoprolol tartrate 12.5 mg Oral Q12H   pantoprazole 40 mg Oral BID AC   sucralfate 1 g Oral 4x Daily       sodium chloride 30 mL/hr   Diet Regular; Cardiac       Assessment/Plan     Active Hospital Problems    Diagnosis  POA   • **NSTEMI (non-ST elevated myocardial infarction) (CMS/HCC) [I21.4]  Yes   • Acute respiratory failure with hypoxia (CMS/HCC) [J96.01]  Yes   • Left ventricular systolic dysfunction [I51.9]  Yes   • Gastrointestinal hemorrhage [K92.2]  Yes   • Blood loss anemia [D50.0]  Yes   • Melena [K92.1]  Yes   • Chronic kidney disease, stage 3 (moderate) (CMS/HCC) [N18.3]  Yes   • Anemia of chronic  disease [D63.8]  Yes   • Hyperlipemia [E78.5]  Yes   • Coronary artery disease involving native coronary artery of native heart without angina pectoris [I25.10]  Yes   • DM (diabetes mellitus), type 2 with neurological complications (CMS/HCC) [E11.49]  Yes   • PVD (peripheral vascular disease) (CMS/HCC) [I73.9]  Yes      Resolved Hospital Problems   No resolved problems to display.   ABEBE on CKD 3  - baseline Cr about 1.7  - improving  - appreciate nephrology recs    UGIB  - resolved  - EGD 12/29/19 showing chronic gastritis and nodule of the greater curvature-biopsy showed chronic inflammation and hyperplasia, no neoplasia or H. Pylori  - on protonix and carafate  - appreciate GI recs    NSTEMI  - no more CP, troponin trending down  - continue ASA as she is tolerating well-plavix held on admission due to GIB  - echocardiogram results noted-has reduced LV function  - s/p LHC 1/2/20 showing severe CAD and patent bypass grafts-she does have significant left subclavian stenosis which will be treated medically for now and re-evaluated for subclavian stent as outpatient  - appreciate cardiology recs    PVD  - normally on DAPT, plavix held as above    Type 2 DM  - continue lantus 55 units nightly  - continue scheduled prandial humalog at 10 units TIDAC  - ssi moderate dose     Constipation  - resolved  - continue bowel regimen    SCDs for DVT prophylaxis.  Full code.  Discussed with patient and nursing staff.  Disposition: home with son tomorrow if okay with all.      Harpal Novoa MD  [facility]  01/03/20  4:50 PM

## 2020-01-03 NOTE — PROGRESS NOTES
"    Patient Name: Randi Martines  :1950  69 y.o.      Patient Care Team:  Jose Morrison DO as PCP - General (Family Medicine)  Samantha Uriostegui MD as Consulting Physician (Cardiology)    Chief Complaint: follow up, CHF, NSTEMI    Interval History:  VSS, remains on room air. No significant tele events. Renal fx abnormal but overall stable post cath. Denies chest pain, arm pain, SOB, orthopnea, dizziness. She is feeling better today. Reports yesterday she walked with PT around nurse's station without significant issues.       Objective   Vital Signs  Temp:  [97.4 °F (36.3 °C)-99.2 °F (37.3 °C)] 99.2 °F (37.3 °C)  Heart Rate:  [61-79] 70  Resp:  [16-18] 18  BP: (100-157)/(54-81) 113/56    Intake/Output Summary (Last 24 hours) at 1/3/2020 0657  Last data filed at 1/3/2020 0609  Gross per 24 hour   Intake 1739.17 ml   Output 700 ml   Net 1039.17 ml     Flowsheet Rows      First Filed Value   Admission Height  152.4 cm (60\") Documented at 2019 1845   Admission Weight  93 kg (205 lb) Documented at 2019 1854          Physical Exam:   General Appearance:    Alert, cooperative, in no acute distress, obese   Lungs:     Slightly diminished lung sounds posterior bases.  Normal respiratory effort and rate.  On room air    Heart:    Regular rhythm and normal rate, normal S1 and S2, no murmurs, gallops or rubs.     Chest Wall:    No abnormalities observed   Abdomen:     Soft, nontender, positive bowel sounds.     Extremities:   no cyanosis, minimal bilateral lower extremity edema.  Moves all extremities, abnormal right radial pulses (baseline), left radial cath site- c/d/i, palpable pulse and good sensory, motor function of hand with cap refill less than 3 seconds.        Results Review:    Results from last 7 days   Lab Units 20  0414   SODIUM mmol/L 142   POTASSIUM mmol/L 4.7   CHLORIDE mmol/L 105   CO2 mmol/L 26.5   BUN mg/dL 67*   CREATININE mg/dL 2.12*   GLUCOSE mg/dL 84   CALCIUM mg/dL 8.9 "     Results from last 7 days   Lab Units 12/31/19  0525   TROPONIN T ng/mL 0.032*     Results from last 7 days   Lab Units 01/03/20  0414   WBC 10*3/mm3 16.90*   HEMOGLOBIN g/dL 9.3*   HEMATOCRIT % 28.8*   PLATELETS 10*3/mm3 317         Results from last 7 days   Lab Units 01/01/20  0448   MAGNESIUM mg/dL 1.8                   Medication Review:     aspirin 81 mg Oral Daily   atorvastatin 80 mg Oral Daily   gabapentin 300 mg Oral Nightly   insulin glargine 55 Units Subcutaneous Nightly   insulin lispro 0-9 Units Subcutaneous 4x Daily With Meals & Nightly   insulin lispro 15 Units Subcutaneous TID With Meals   lactulose 10 g Oral Daily   melatonin 5 mg Oral Nightly   metoprolol tartrate 12.5 mg Oral Q12H   pantoprazole 40 mg Oral BID AC   sucralfate 1 g Oral 4x Daily          sodium chloride 30 mL/hr       Assessment/Plan     1. NSTEMI: Possibly due to demand ischemia from CHF and anemia versus acute coronary syndrome. Cath showed severe native CAD with 7/7 patent bypass grafts.  2. Acute systolic CHF: appreciate nephrology input  3. Acute hypoxic respiratory failure: O2 sats good on room air.   4. Cardiomyopathy:  New onset this admission with an EF of 40% and wall motion abnormalities.   5. GI bleed:  EGD with gastritis but no active bleed.   6. ABEBE/CKD stage 4: Nephrology following, appreciate their assistance.   7. Coronary artery disease.  Status post prior CABG x 7.  (LIMA to LAD, SVG to D1, SVG to OM1, SVG to OM2, sequential SVG to right marginal, PDA and RUSTY.)  8. Acute on chronic anemia:  Slight downtrend last few days but overall stable following initial transfusion following admission and discontinuation of aspirin and clopidogrel. Aspirin  hasbeen restarted. Plavix continuing to be held.   9. Peripheral vascular disease:  History of bilateral fem-pop bypass and left subclavian artery stent placement.  Balloon angioplasty of stent in 2011.    10. Hypertension: BP low normal. Not much room to increase or  adjust meds for cardiomyopathy.   11. Dyslipidemia: On statin  12. Diabetes mellitus type 2 : On insulin  13. Left subclavian in-stent stenosis: Plan will be to reassess as an outpatient and discuss possible intervention- if she gets another stent she will need to be back on Plavix for at least 1 month post intervention.   14. Leukocytosis: Had been on steroid taper.    -Patient expresses concern about discharge. She prefers to go home with home health vs. Rehab. She states she would probably need to go to her son's for a couple days before home but her son's family has a GI illness right now. I recommend CCP discuss discharge planning with her.       I have discussed the case with Dr. Uriostegui. When it is time for discharge she should follow up with our office in 1 week to reassess and determine plan for left subclavian stent stenosis intervention.  Thank you for allowing me to participate in this patient's care. Please call me with any questions/concerns.         EBEN Bishop  [facility]  01/03/20  6:57 AM

## 2020-01-03 NOTE — THERAPY DISCHARGE NOTE
Patient Name: Randi Martines  : 1950    MRN: [id number]                              Today's Date: 1/3/2020       Admit Date: 2019    Visit Dx:     ICD-10-CM ICD-9-CM   1. Gastrointestinal hemorrhage, unspecified gastrointestinal hemorrhage type K92.2 578.9   2. Blood loss anemia D50.0 280.0   3. CKD (chronic kidney disease) stage 4, GFR 15-29 ml/min (CMS/[facility]) N18.4 585.4   4. Short of breath on exertion R06.02 786.05   5. ABEBE (acute kidney injury) (CMS/[facility]) N17.9 584.9   6. Melena K92.1 578.1   7. NSTEMI (non-ST elevated myocardial infarction) (CMS/[facility]ly [facility]) I21.4 410.70     Patient Active Problem List   Diagnosis   • Abdominal pain   • Intractable abdominal pain   • DM (diabetes mellitus), type 2 with neurological complications (CMS/[facility])   • PVD (peripheral vascular disease) (CMS/[facility])   • Mesenteric ischemia (CMS/[facility])   • Acute gastric ulcer   • S/P CABG (coronary artery bypass graft)   • Coronary artery disease involving native coronary artery of native heart without angina pectoris   • Atherosclerotic PVD with ulceration (CMS/[facility])   • Dyslipidemia   • Type 2 diabetes mellitus without complication, with long-term current use of insulin (CMS/[facility])   • Biliary obstruction   • Hyperlipemia   • Malabsorption of iron   • Chronic kidney disease, stage 3 (moderate) (CMS/[facility])   • Anemia of chronic disease   • Gastrointestinal hemorrhage   • NSTEMI (non-ST elevated myocardial infarction) (CMS/[facility])   • Left ventricular systolic dysfunction   • Blood loss anemia   • Melena     Past Medical History:   Diagnosis Date   • CAD (coronary artery disease)    • Chronic kidney disease    • Chronic systolic congestive heart failure (CMS/[facility])    • Diabetes mellitus (CMS/[facility])    • Dyslipidemia    • Foot ulcer (CMS/[facility])    • History of diverticulitis    • History of stomach ulcers    • History of transfusion    • Hyperlipemia    • Hypertension    • Mesenteric ischemia (CMS/[facility])    • Peptic ulcer disease    • PONV  (postoperative nausea and vomiting)    • PVD (peripheral vascular disease) (CMS/HCC)      Past Surgical History:   Procedure Laterality Date   • APPENDECTOMY  2015   • CARDIAC CATHETERIZATION N/A 1/2/2020    Procedure: LEFT HEART CATH  NO LV;  Surgeon: Emilio Serna MD;  Location: [facility] CATH INVASIVE LOCATION;  Service: Cardiology   • CARDIAC CATHETERIZATION N/A 1/2/2020    Procedure: CORONARY ANGIOGRAPHY;  Surgeon: Emilio Serna MD;  Location: [facility] CATH INVASIVE LOCATION;  Service: Cardiology   • CARDIAC CATHETERIZATION N/A 1/2/2020    Procedure: SAPHENOUS VEIN GRAFT;  Surgeon: Emilio Serna MD;  Location: [facility] CATH INVASIVE LOCATION;  Service: Cardiology   • CATARACT EXTRACTION     • CHOLECYSTECTOMY     • COLONOSCOPY  2015   • CORONARY ARTERY BYPASS GRAFT      7 coronary bypass surgery   • ENDOSCOPY N/A 11/23/2016    Procedure: ESOPHAGOGASTRODUODENOSCOPY ;  Surgeon: Katherine Green MD;  Location: [facility] ENDOSCOPY;  Service:    • ENDOSCOPY N/A 12/29/2019    Procedure: ESOPHAGOGASTRODUODENOSCOPYwith bx;  Surgeon: Ziyad Manzo MD;  Location: [facility] ENDOSCOPY;  Service: Gastroenterology   • ERCP N/A 10/2/2017    Procedure: ENDOSCOPIC RETROGRADE CHOLANGIOPANCREATOGRAPHY with sphincterotomy, balloon sweep;  Surgeon: Christopher Graf MD;  Location: [facility] ENDOSCOPY;  Service:    • HERNIA REPAIR  2015   • HYSTERECTOMY     • AR VEIN IN SITU BYPASS GRAFT,FEM-POP Left 5/31/2017    Procedure: ULTRASOUND RIGHT AIF, ARTERIOGRAM W/ LEFT COMMON ILIAC ANGIOPLASTY STENT, LT FEMORAL POPLITEAL BYPASS WITH ARTEGRAFT, LEFT POPLITEAL ENDARTERECTOMY PATCH, LEFT COMMON ARTERIOGRAM, LEFT EXTERNAL ILIAC ANGIOPLASTY STENT PLACEMENT, AORTA,  EXTERNAL, DISTAL PRESSURES;  Surgeon: Jayda Barrientos MD;  Location: [facility] HYBRID OR 18/19;  Service: Vascular   • SKIN GRAFT SPLIT THICKNESS Left 6/7/2017    Procedure: DEBRIDEMENT AND SKIN GRAFT TO  LEFT DORSAL FOOT AND TOES;  Surgeon: Maurine Waterhouse, MD;  Location: [facility]  MAIN OR;  Service:    • UPPER GASTROINTESTINAL ENDOSCOPY  2015    scar in gastric body, gastric ulcers     General Information     Row Name 01/03/20 1053          PT Evaluation Time/Intention    Document Type  therapy note (daily note)  -MS     Mode of Treatment  physical therapy;individual therapy  -MS     Row Name 01/03/20 1053          General Information    Existing Precautions/Restrictions  fall no exit alarm upon PT arrival  -MS     Row Name 01/03/20 1053          Cognitive Assessment/Intervention- PT/OT    Orientation Status (Cognition)  oriented x 4  -MS     Row Name 01/03/20 1053          Safety Issues, Functional Mobility    Comment, Safety Issues/Impairments (Mobility)  No exit alarm upon PT arrival. Gait belt donned for safety.  -MS       User Key  (r) = Recorded By, (t) = Taken By, (c) = Cosigned By    Initials Name Provider Type    MS Cruz Zulay A, PT Physical Therapist        Mobility     Row Name 01/03/20 1055          Bed Mobility Assessment/Treatment    Bed Mobility Assessment/Treatment  supine-sit;sit-supine  -MS     Supine-Sit [place] (Bed Mobility)  conditional independence  -MS     Sit-Supine [place] (Bed Mobility)  conditional independence  -MS     Row Name 01/03/20 1055          Sit-Stand Transfer    Sit-Stand [place] (Transfers)  supervision;verbal cues;nonverbal cues (demo/gesture)  -MS     Row Name 01/03/20 1055          Gait/Stairs Assessment/Training    Gait/Stairs Assessment/Training  gait/ambulation independence  -MS     [place] Level (Gait)  supervision;stand by assist;verbal cues;nonverbal cues (demo/gesture)  -MS     Assistive Device (Gait)  walker, front-wheeled primarily without AD  -MS     Distance in Feet (Gait)  200  -MS     Pattern (Gait)  swing-through  -MS     Deviations/Abnormal Patterns (Gait)  kiki decreased;gait speed decreased  -MS     Bilateral Gait Deviations  forward flexed posture  -MS     Negotiation (Stairs)  stairs independence  -MS      [place] Level (Stairs)  stand by assist;contact guard  -MS     Handrail Location (Stairs)  left side (ascending)  -MS     Number of Steps (Stairs)  3  -MS     Ascending Technique (Stairs)  step-to-step  -MS     Comment (Gait/Stairs)  Stair training completed with no issues- use of HR for support though patient plans to have son assist her. Use of RW post stair training when ambulating back to room due to onset of sciatica.  -MS       User Key  (r) = Recorded By, (t) = Taken By, (c) = Cosigned By    Initials Name Provider Type    Zulay Alcala, PT Physical Therapist        Obj/Interventions     Row Name 01/03/20 1057          Static Sitting Balance    Level of [place] (Unsupported Sitting, Static Balance)  independent  -MS     Time Able to Maintain Position (Unsupported Sitting, Static Balance)  3 to 4 minutes  -MS     Row Name 01/03/20 1057          Static Standing Balance    Level of [place] (Supported Standing, Static Balance)  supervision  -MS     Assistive Device Utilized (Supported Standing, Static Balance)  walker, rolling with and without AD  -MS       User Key  (r) = Recorded By, (t) = Taken By, (c) = Cosigned By    Initials Name Provider Type    Zulay Alcala, PT Physical Therapist        Goals/Plan     Row Name 01/03/20 1100          Bed Mobility Goal 1 (PT)    Progress/Outcomes (Bed Mobility Goal 1, PT)  goal met  -MS     Row Name 01/03/20 1100          Transfer Goal 1 (PT)    Progress/Outcome (Transfer Goal 1, PT)  goal met  -MS     Row Name 01/03/20 1100          Gait Training Goal 1 (PT)    Progress/Outcome (Gait Training Goal 1, PT)  goal met  -MS       User Key  (r) = Recorded By, (t) = Taken By, (c) = Cosigned By    Initials Name Provider Type    Zulay Alcala, PT Physical Therapist        Clinical Impression     Row Name 01/03/20 1058          Pain Scale: Numbers Pre/Post-Treatment    Pain Scale: Numbers, Pretreatment  0/10 - no pain  -MS     Row Name  01/03/20 1058          Plan of Care Review    Plan of Care Reviewed With  patient  -MS     Progress  improving  -MS     Outcome Summary  Progressing towards goals with PT. Ambulated a total of 200' with and without AD with SV-SBA. Ascended/descended 3 steps with use of HR as patient plans to DC home to her son's home. No safety concerns demo'd but encouraged patient to ambulate with her rollator upon DC. Patient in agreement for  PT to follow up upon DC to ensure safety at son's home. Patient has met all goals with acute PT services and is encouraged to continue to ambulate with nursing. ARMIDA Seaman aware.  -MS     Row Name 01/03/20 1058          Vital Signs    Pre SpO2 (%)  92  -MS     O2 Delivery Pre Treatment  room air  -MS     Row Name 01/03/20 1058          Positioning and Restraints    Pre-Treatment Position  in bed  -MS     Post Treatment Position  bed  -MS     In Bed  fowlers;call light within reach;encouraged to call for assist  -MS       User Key  (r) = Recorded By, (t) = Taken By, (c) = Cosigned By    Initials Name Provider Type    Zulay Alcala, PT Physical Therapist        Outcome Measures     Row Name 01/03/20 1101          How much help from another person do you currently need...    Turning from your back to your side while in flat bed without using bedrails?  4  -MS     Moving from lying on back to sitting on the side of a flat bed without bedrails?  4  -MS     Moving to and from a bed to a chair (including a wheelchair)?  4  -MS     Standing up from a chair using your arms (e.g., wheelchair, bedside chair)?  4  -MS     Climbing 3-5 steps with a railing?  3  -MS     To walk in hospital room?  4  -MS     AM-PAC 6 Clicks Score (PT)  23  -MS       User Key  (r) = Recorded By, (t) = Taken By, (c) = Cosigned By    Initials Name Provider Type    Zulay Alcala, PT Physical Therapist          PT Recommendation and Plan  Planned Therapy Interventions (PT Eval): balance training, bed  mobility training, gait training, home exercise program, patient/family education, postural re-education, ROM (range of motion), stair training, strengthening, transfer training  Outcome Summary/Treatment Plan (PT)  Anticipated Discharge Disposition (PT): home with assist, home with home health  Plan of Care Reviewed With: patient  Progress: improving  Outcome Summary: Progressing towards goals with PT. Ambulated a total of 200' with and without AD with SV-SBA. Ascended/descended 3 steps with use of HR as patient plans to DC home to her son's home. No safety concerns demo'd but encouraged patient to ambulate with her rollator upon DC. Patient in agreement for  PT to follow up upon DC to ensure safety at son's home. Patient has met all goals with acute PT services and is encouraged to continue to ambulate with nursing. ARMIDA Seaman aware.     Time Calculation:   PT Charges     Row Name 01/03/20 1052             Time Calculation    Start Time  0950  -MS      Stop Time  1006  -MS      Time Calculation (min)  16 min  -MS      PT Received On  01/03/20  -MS        User Key  (r) = Recorded By, (t) = Taken By, (c) = Cosigned By    Initials Name Provider Type    Zulay Alcala, PT Physical Therapist        Therapy Charges for Today     Code Description Service Date Service Provider Modifiers Qty    59885735823 HC PT THER PROC EA 15 MIN 1/3/2020 Zulay Arroyo, PT GP 1          PT G-Codes  Outcome Measure Options: AM-PAC 6 Clicks Basic Mobility (PT)  AM-PAC 6 Clicks Score (PT): 23    PT Discharge Summary  Anticipated Discharge Disposition (PT): home with assist, home with home health    Zulay Arroyo PT  1/3/2020

## 2020-01-03 NOTE — PLAN OF CARE
Problem: Patient Care Overview  Goal: Plan of Care Review  Outcome: Ongoing (interventions implemented as appropriate)  Flowsheets (Taken 1/3/2020 0616)  Progress: improving  Plan of Care Reviewed With: patient  Outcome Summary: vss. no c/o of any pain, resting well. will continue to monitor     Problem: Patient Care Overview  Goal: Interprofessional Rounds/Family Conf  Outcome: Ongoing (interventions implemented as appropriate)  Flowsheets (Taken 1/3/2020 0616)  Participants: ;family;nursing;patient;physician     Problem: Fall Risk (Adult)  Goal: Absence of Fall  Outcome: Ongoing (interventions implemented as appropriate)  Flowsheets (Taken 1/3/2020 0616)  Absence of Fall: making progress toward outcome

## 2020-01-03 NOTE — PLAN OF CARE
Problem: Patient Care Overview  Goal: Plan of Care Review  Flowsheets (Taken 1/3/2020 9549)  Progress: improving  Plan of Care Reviewed With: patient  Outcome Summary: Progressing towards goals with PT. Ambulated a total of 200' with and without AD with SV-SBA. Ascended/descended 3 steps with use of HR as patient plans to DC home to her son's home. No safety concerns demo'd but encouraged patient to ambulate with her rollator upon DC. Patient in agreement for  PT to follow up upon DC to ensure safety at son's home. Patient has met all goals with acute PT services and is encouraged to continue to ambulate with nursing. ARMIDA Seaman aware.

## 2020-01-04 NOTE — PLAN OF CARE
Problem: Patient Care Overview  Goal: Plan of Care Review  Outcome: Ongoing (interventions implemented as appropriate)  Flowsheets (Taken 1/4/2020 0438)  Progress: improving  Plan of Care Reviewed With: patient  Outcome Summary: pt denies pain/discomfort. resting comfortably. radial catheter site CDI. monitoring kidney function. VSS. will continue to  monitor.  Goal: Individualization and Mutuality  Outcome: Ongoing (interventions implemented as appropriate)  Goal: Discharge Needs Assessment  Outcome: Ongoing (interventions implemented as appropriate)  Goal: Interprofessional Rounds/Family Conf  Outcome: Ongoing (interventions implemented as appropriate)  Flowsheets (Taken 1/4/2020 0438)  Participants: family; nursing; patient; physician     Problem: Fall Risk (Adult)  Goal: Absence of Fall  Outcome: Ongoing (interventions implemented as appropriate)  Flowsheets (Taken 1/4/2020 0438)  Absence of Fall: making progress toward outcome     Problem: Skin Injury Risk (Adult)  Goal: Skin Health and Integrity  Outcome: Ongoing (interventions implemented as appropriate)  Flowsheets (Taken 1/4/2020 0438)  Skin Health and Integrity: making progress toward outcome     Problem: Renal Failure/Kidney Injury, Acute (Adult)  Goal: Signs and Symptoms of Listed Potential Problems Will be Absent, Minimized or Managed (Renal Failure/Kidney Injury, Acute)  Outcome: Ongoing (interventions implemented as appropriate)  Flowsheets (Taken 1/4/2020 0438)  Problems Assessed (Acute Renal Failure/Kidney Injury): all  Problems Present (ARF/Kidney Injury): situational response     Problem: Pain, Acute (Adult)  Goal: Acceptable Pain Control/Comfort Level  Outcome: Ongoing (interventions implemented as appropriate)  Flowsheets (Taken 1/4/2020 0438)  Acceptable Pain Control/Comfort Level: making progress toward outcome     Problem: Cardiac: Heart Failure (Adult)  Goal: Signs and Symptoms of Listed Potential Problems Will be Absent, Minimized or  Managed (Cardiac: Heart Failure)  Outcome: Ongoing (interventions implemented as appropriate)  Flowsheets  Taken 1/3/2020 0616 by Lillie Garduno RN  Problems Assessed (Heart Failure): all  Taken 1/4/2020 0438 by Anais Oneal RN  Problems Present (Heart Failure): dysrhythmia/arrhythmia;situational response

## 2020-01-04 NOTE — PROGRESS NOTES
Name: Randi Martines ADMIT: 2019   : 1950  PCP: Jose Morrison DO    MRN: [id number] LOS: 17 days   AGE/SEX: 69 y.o. female  ROOM: [facility]     Subjective   Subjective   CC: dyspnea  No acute events.  Patient overall feels well. Taking PO. +BM. No CP/f/c/n/v.      Objective   Objective   Vital Signs  Temp:  [98.6 °F (37 °C)-99 °F (37.2 °C)] 99 °F (37.2 °C)  Heart Rate:  [66-76] 66  Resp:  [18] 18  BP: ()/(54-72) 99/59  SpO2:  [95 %] 95 %  on   ;   Device (Oxygen Therapy): room air  Body mass index is 39.01 kg/m².  Physical Exam   Constitutional: She is oriented to person, place, and time. No distress.   HENT:   Head: Normocephalic and atraumatic.   Mouth/Throat: Oropharynx is clear and moist.   Eyes: Pupils are equal, round, and reactive to light. Conjunctivae and EOM are normal.   Neck: Normal range of motion. Neck supple.   Cardiovascular: Normal rate, regular rhythm and intact distal pulses.   Pulmonary/Chest: Effort normal and breath sounds normal. She has no rales.   Abdominal: Soft. Bowel sounds are normal.   Musculoskeletal: She exhibits no edema or tenderness.   Neurological: She is alert and oriented to person, place, and time.   Skin: Skin is warm and dry. Capillary refill takes less than 2 seconds. She is not diaphoretic.   Psychiatric: She has a normal mood and affect. Her behavior is normal.   Nursing note and vitals reviewed.    Results Review:       I reviewed the patient's new clinical results.  I personally reviewed and interpreted the patient's telemetry.  Results from last 7 days   Lab Units 20  0430 20  0448   WBC 10*3/mm3 16.48* 16.90* 17.64* 18.03*   HEMOGLOBIN g/dL 9.6* 9.3* 9.5* 9.7*   PLATELETS 10*3/mm3 321 317 335 325     Results from last 7 days   Lab Units 20  0430 20  0414 20  0429 20  0448   SODIUM mmol/L 139 142 143 140   POTASSIUM mmol/L 4.3 4.7 4.8 4.6   CHLORIDE mmol/L 102 105 105 103    CO2 mmol/L 25.7 26.5 24.8 25.2   BUN mg/dL 58* 67* 74* 85*   CREATININE mg/dL 2.24* 2.12* 2.10* 2.14*   GLUCOSE mg/dL 66 84 284* 306*   Estimated Creatinine Clearance: 24.4 mL/min (A) (by C-G formula based on SCr of 2.24 mg/dL (H)).  Results from last 7 days   Lab Units 01/04/20 0430 01/03/20 0414 01/02/20 0429 01/01/20  0448   ALBUMIN g/dL 2.90* 3.00* 2.90* 3.10*     Results from last 7 days   Lab Units 01/04/20 0430 01/03/20 0414 01/02/20 0429 01/01/20 0448 12/31/19  0525   CALCIUM mg/dL 9.0 8.9 9.0 9.2 9.3   ALBUMIN g/dL 2.90* 3.00* 2.90* 3.10* 3.10*   MAGNESIUM mg/dL  --   --   --  1.8 1.8   PHOSPHORUS mg/dL 3.8 3.4 2.4* 2.7 2.5       Glucose   Date/Time Value Ref Range Status   01/04/2020 1642 119 70 - 130 mg/dL Final   01/04/2020 1109 245 (H) 70 - 130 mg/dL Final   01/04/2020 0649 83 70 - 130 mg/dL Final   01/03/2020 2026 139 (H) 70 - 130 mg/dL Final   01/03/2020 1557 146 (H) 70 - 130 mg/dL Final   01/03/2020 1056 146 (H) 70 - 130 mg/dL Final   01/03/2020 0629 86 70 - 130 mg/dL Final         aspirin 81 mg Oral Daily   atorvastatin 80 mg Oral Daily   gabapentin 300 mg Oral Nightly   insulin glargine 55 Units Subcutaneous Nightly   insulin lispro 0-9 Units Subcutaneous 4x Daily With Meals & Nightly   insulin lispro 15 Units Subcutaneous TID With Meals   lactulose 10 g Oral Daily   melatonin 5 mg Oral Nightly   metoprolol tartrate 12.5 mg Oral Q12H   pantoprazole 40 mg Oral BID AC   sucralfate 1 g Oral 4x Daily       sodium chloride 30 mL/hr   Diet Regular; Cardiac       Assessment/Plan     Active Hospital Problems    Diagnosis  POA   • **NSTEMI (non-ST elevated myocardial infarction) (CMS/[facility]) [I21.4]  Yes   • Acute respiratory failure with hypoxia (CMS/[facility]) [J96.01]  Yes   • Left ventricular systolic dysfunction [I51.9]  Yes   • Gastrointestinal hemorrhage [K92.2]  Yes   • Blood loss anemia [D50.0]  Yes   • Melena [K92.1]  Yes   • Chronic kidney disease, stage 3 (moderate) (CMS/[facility]) [N18.3]  Yes   •  Anemia of chronic disease [D63.8]  Yes   • Hyperlipemia [E78.5]  Yes   • Coronary artery disease involving native coronary artery of native heart without angina pectoris [I25.10]  Yes   • DM (diabetes mellitus), type 2 with neurological complications (CMS/HCC) [E11.49]  Yes   • PVD (peripheral vascular disease) (CMS/HCC) [I73.9]  Yes      Resolved Hospital Problems   No resolved problems to display.   ABEBE on CKD 3  - baseline Cr about 1.7  - improving  - appreciate nephrology recs    UGIB  - resolved  - EGD 12/29/19 showing chronic gastritis and nodule of the greater curvature-biopsy showed chronic inflammation and hyperplasia, no neoplasia or H. Pylori  - on protonix and carafate  - appreciate GI recs    NSTEMI  - no more CP, troponin trending down  - continue ASA as she is tolerating well-plavix held on admission due to GIB  - echocardiogram results noted-has reduced LV function  - s/p LHC 1/2/20 showing severe CAD and patent bypass grafts-she does have significant left subclavian stenosis which will be treated medically for now and re-evaluated for subclavian stent as outpatient  - appreciate cardiology recs    PVD  - normally on DAPT, plavix held as above    Type 2 DM  - continue lantus 55 units nightly  - continue scheduled prandial humalog at 10 units TIDAC  - ssi moderate dose     Constipation  - resolved  - continue bowel regimen    SCDs for DVT prophylaxis.  Full code.  Discussed with patient and nursing staff.  Disposition: home with son tomorrow if okay with nephrology.      Harpal Novoa MD  [facility]  01/04/20  5:37 PM

## 2020-01-04 NOTE — PROGRESS NOTES
[facility] Follow Up    Chief Complaint: Follow up CAD    Interval History:  No chest pain.  No dyspnea.  Reports sinus congestion and mild headache.     Objective:     Objective:  Temp:  [98.6 °F (37 °C)-98.8 °F (37.1 °C)] 98.6 °F (37 °C)  Heart Rate:  [62-76] 76  Resp:  [18] 18  BP: (103-131)/(47-72) 110/54     Intake/Output Summary (Last 24 hours) at 1/4/2020 1046  Last data filed at 1/4/2020 0810  Gross per 24 hour   Intake 770 ml   Output 1600 ml   Net -830 ml     Body mass index is 39.01 kg/m².      01/02/20  0647 01/03/20  0609 01/04/20  0546   Weight: 88.3 kg (194 lb 9.6 oz) 89.4 kg (197 lb 3.2 oz) 90.1 kg (198 lb 11.2 oz)     Weight change: 0.68 kg (1 lb 8 oz)      Physical Exam:   General : Alert, cooperative, in no acute distress.  Neuro: Alert,cooperative and oriented.  Lungs: CTAB. Normal respiratory effort and rate.  CV: Regular rate and rhythm, normal S1 and S2, no murmurs, gallops or rubs.  ABD: Soft, nontender, nondistended. Positive bowel sounds.  Extr: No edema or cyanosis, moves all extremities.    Lab Review:   Results from last 7 days   Lab Units 01/04/20  0430 01/03/20  0414   SODIUM mmol/L 139 142   POTASSIUM mmol/L 4.3 4.7   CHLORIDE mmol/L 102 105   CO2 mmol/L 25.7 26.5   BUN mg/dL 58* 67*   CREATININE mg/dL 2.24* 2.12*   GLUCOSE mg/dL 66 84   CALCIUM mg/dL 9.0 8.9     Results from last 7 days   Lab Units 12/31/19  0525   TROPONIN T ng/mL 0.032*     Results from last 7 days   Lab Units 01/04/20  0430 01/03/20  0414   WBC 10*3/mm3 16.48* 16.90*   HEMOGLOBIN g/dL 9.6* 9.3*   HEMATOCRIT % 29.2* 28.8*   PLATELETS 10*3/mm3 321 317         Results from last 7 days   Lab Units 01/01/20  0448 12/31/19  0525   MAGNESIUM mg/dL 1.8 1.8           Invalid input(s): LDLCALC          I reviewed the patient's new clinical results.  I personally viewed and interpreted the patient's EKG  Current Medications:   Scheduled Meds:  aspirin 81 mg Oral Daily   atorvastatin 80 mg Oral Daily    gabapentin 300 mg Oral Nightly   insulin glargine 55 Units Subcutaneous Nightly   insulin lispro 0-9 Units Subcutaneous 4x Daily With Meals & Nightly   insulin lispro 15 Units Subcutaneous TID With Meals   lactulose 10 g Oral Daily   melatonin 5 mg Oral Nightly   metoprolol tartrate 12.5 mg Oral Q12H   pantoprazole 40 mg Oral BID AC   sucralfate 1 g Oral 4x Daily     Continuous Infusions:  sodium chloride 30 mL/hr       Allergies:  Allergies   Allergen Reactions   • Augmentin [Amoxicillin-Pot Clavulanate] GI Intolerance and Dizziness   • Codeine GI Intolerance and Dizziness       Assessment/Plan:      1. NSTEMI.  Appears to be type 2 from demand ischemia from anemia, hypotension on admission, left subclavian stenosis and CHF.  No acute or unstable stenosis noted on cath from 1/2/20.      2. Acute systolic CHF. Improved. Currently off diuretics.   3. Acute hypoxic respiratory failure. Resolved.   4. Cardiomyopathy.  New onset this admission with an EF of 40% and wall motion abnormalities.  May be due to decreased perfusion through left subclavian stenosis and LIMA graft from hypotension and anemia.   5. GI bleed.  EGD with gastritis but no active bleed.   6. ABEBE/CKD stage 4.  Mild rise this morning.   7. Coronary artery disease.  Status post prior CABG x 7.  (LIMA to LAD, SVG to D1, SVG to OM1, SVG to OM2, sequential SVG to right marginal, PDA and RUSTY.)  Grafts all patent on cath from 1/2.  8. Acute on chronic anemia.  Stable following initial transfusion following admission and discontinuation of aspirin and clopidogrel.  Tolerating resumption of aspirin.  9. History of bilateral fem-pop bypass   10. Recurrent left subclavian artery in-stent restenosis.  Status post prior angioplasty.  Recurrent stenosis on recent cath.  This does not appear to be acute and can be addressed in the future once her current issues with bleeding and her renal insufficiency stabilize.  She will need to be back on clopidogrel for at  least 1 month if repeat stenting of stenosis is required.   11. Hypertension  12. Dyslipidemia. On atorvastatin  13. Diabetes mellitus type 2     -  Continue current medical management  -  Will determine timing of subclavian artery intervention as an outpatient depending on the stability of her renal and GI issues.  Will likely not require a lot of contrast administration for this procedure but she will need to be back on clopidogrel for at least a month if repeat stenting of the subclavian artery is required.   -  Ok for discharge from cardiac standpoint with plans for follow up in the office in 1 week.  -  Will see again Monday if she is not discharged prior to that    Samantha Uriostegui MD  01/04/20  10:46 AM

## 2020-01-05 NOTE — PLAN OF CARE
Problem: Patient Care Overview  Goal: Plan of Care Review  Outcome: Ongoing (interventions implemented as appropriate)  Flowsheets (Taken 1/4/2020 1948)  Progress: improving  Plan of Care Reviewed With: patient  Note:   Pt up in chair resting with denial of pain; kidney creat elevated slightly; poss D/C 1/5; vss  Goal: Individualization and Mutuality  Outcome: Ongoing (interventions implemented as appropriate)  Goal: Discharge Needs Assessment  Outcome: Ongoing (interventions implemented as appropriate)  Goal: Interprofessional Rounds/Family Conf  Outcome: Ongoing (interventions implemented as appropriate)     Problem: Fall Risk (Adult)  Goal: Absence of Fall  Outcome: Ongoing (interventions implemented as appropriate)     Problem: Skin Injury Risk (Adult)  Goal: Skin Health and Integrity  Outcome: Ongoing (interventions implemented as appropriate)     Problem: Renal Failure/Kidney Injury, Acute (Adult)  Goal: Signs and Symptoms of Listed Potential Problems Will be Absent, Minimized or Managed (Renal Failure/Kidney Injury, Acute)  Outcome: Ongoing (interventions implemented as appropriate)     Problem: Pain, Acute (Adult)  Goal: Acceptable Pain Control/Comfort Level  Outcome: Ongoing (interventions implemented as appropriate)     Problem: Cardiac: Heart Failure (Adult)  Goal: Signs and Symptoms of Listed Potential Problems Will be Absent, Minimized or Managed (Cardiac: Heart Failure)  Outcome: Ongoing (interventions implemented as appropriate)

## 2020-01-05 NOTE — PROGRESS NOTES
"   LOS: 17 days    Patient Care Team:  Jose Morrison DO as PCP - General (Family Medicine)  Samantha Uriostegui MD as Consulting Physician (Cardiology)    Chief Complaint:    Chief Complaint   Patient presents with   • Shortness of Breath   • Black or Bloody Stool     Follow UP ABEBE/CKD3  Subjective     Interval History:   Up in chair  Feels well  Objective     Vital Signs  Temp:  [98.6 °F (37 °C)-99 °F (37.2 °C)] 98.7 °F (37.1 °C)  Heart Rate:  [66-76] 66  Resp:  [18] 18  BP: ()/(54-72) 108/54    Flowsheet Rows      First Filed Value   Admission Height  152.4 cm (60\") Documented at 12/18/2019 1845   Admission Weight  93 kg (205 lb) Documented at 12/18/2019 1854          No intake/output data recorded.  I/O last 3 completed shifts:  In: 1220 [P.O.:1220]  Out: 2500 [Urine:2500]    Intake/Output Summary (Last 24 hours) at 1/4/2020 1951  Last data filed at 1/4/2020 1648  Gross per 24 hour   Intake 570 ml   Output 1800 ml   Net -1230 ml             Physical Exam:   General Appearance: alert, oriented x 3, no acute distress,   Skin: warm and dry  HEENT: pupils round and reactive to light, oral mucosa normal,   Neck: supple, no JVD, trachea midline  Lungs: CTA, unlabored breathing effort  Heart: RRR, normal S1 and S2, no S3, no rub  Abdomen: soft, non-tender,  present bowel sounds to auscultation  : no palpable bladder,  Extremities: no edema, cyanosis or clubbing  Neuro: normal speech and mental status       Results Review:    Results from last 7 days   Lab Units 01/04/20  0430 01/03/20  0414 01/02/20  0429   SODIUM mmol/L 139 142 143   POTASSIUM mmol/L 4.3 4.7 4.8   CHLORIDE mmol/L 102 105 105   CO2 mmol/L 25.7 26.5 24.8   BUN mg/dL 58* 67* 74*   CREATININE mg/dL 2.24* 2.12* 2.10*   CALCIUM mg/dL 9.0 8.9 9.0   GLUCOSE mg/dL 66 84 284*       Estimated Creatinine Clearance: 24.4 mL/min (A) (by C-G formula based on SCr of 2.24 mg/dL (H)).    Results from last 7 days   Lab Units 01/04/20  0430 01/03/20  0414 " 01/02/20  0429 01/01/20  0448 12/31/19  0525   MAGNESIUM mg/dL  --   --   --  1.8 1.8   PHOSPHORUS mg/dL 3.8 3.4 2.4* 2.7 2.5       Results from last 7 days   Lab Units 01/04/20  0430 01/03/20  0414 01/02/20  0429 01/01/20  0448 12/31/19  0525 12/30/19  0412 12/29/19  0516   URIC ACID mg/dL 10.1* 10.3* 10.7* 11.9* 13.1* 14.2* 15.0*       Results from last 7 days   Lab Units 01/04/20  0430 01/03/20 0414 01/02/20  0429 01/01/20  0448 12/31/19  0525   WBC 10*3/mm3 16.48* 16.90* 17.64* 18.03* 17.26*   HEMOGLOBIN g/dL 9.6* 9.3* 9.5* 9.7* 10.0*   PLATELETS 10*3/mm3 321 317 335 325 336               Imaging Results (Last 24 Hours)     ** No results found for the last 24 hours. **          aspirin 81 mg Oral Daily   atorvastatin 80 mg Oral Daily   gabapentin 300 mg Oral Nightly   insulin glargine 55 Units Subcutaneous Nightly   insulin lispro 0-9 Units Subcutaneous 4x Daily With Meals & Nightly   insulin lispro 15 Units Subcutaneous TID With Meals   lactulose 10 g Oral Daily   melatonin 5 mg Oral Nightly   metoprolol tartrate 12.5 mg Oral Q12H   pantoprazole 40 mg Oral BID AC   sucralfate 1 g Oral 4x Daily       sodium chloride 30 mL/hr       Medication Review:   Current Facility-Administered Medications   Medication Dose Route Frequency Provider Last Rate Last Dose   • acetaminophen (TYLENOL) tablet 650 mg  650 mg Oral Q4H PRN Emilio Serna MD   650 mg at 01/04/20 1401    Or   • acetaminophen (TYLENOL) 160 MG/5ML solution 650 mg  650 mg Oral Q4H PRN Emilio Serna MD        Or   • acetaminophen (TYLENOL) suppository 650 mg  650 mg Rectal Q4H PRN Emilio Serna MD       • aspirin EC tablet 81 mg  81 mg Oral Daily Emilio Serna MD   81 mg at 01/04/20 0812   • atorvastatin (LIPITOR) tablet 80 mg  80 mg Oral Daily Emilio Serna MD   80 mg at 01/04/20 0812   • bisacodyl (DULCOLAX) EC tablet 5 mg  5 mg Oral Daily PRN Emilio Serna MD   5 mg at 01/01/20 2050   • bisacodyl (DULCOLAX) suppository 10 mg  10 mg  Rectal Daily PRN Emilio Serna MD   10 mg at 12/21/19 2103   • calcium carbonate (TUMS) chewable tablet 500 mg (200 mg elemental)  2 tablet Oral BID PRN Emilio Serna MD   2 tablet at 12/24/19 1446   • dextrose (D50W) 25 g/ 50mL Intravenous Solution 25 g  25 g Intravenous Q15 Min PRN Emilio Serna MD       • dextrose (D50W) 25 g/ 50mL Intravenous Solution 25 g  25 g Intravenous Q15 Min PRN Emilio Serna MD       • dextrose (GLUTOSE) oral gel 15 g  15 g Oral Q15 Min PRN Emilio Serna MD       • dextrose (GLUTOSE) oral gel 15 g  15 g Oral Q15 Min PRN Emilio Serna MD       • gabapentin (NEURONTIN) capsule 300 mg  300 mg Oral Nightly Emilio Serna MD   300 mg at 01/03/20 2132   • glucagon (human recombinant) (GLUCAGEN DIAGNOSTIC) injection 1 mg  1 mg Subcutaneous PRN Emilio Serna MD       • glucagon (human recombinant) (GLUCAGEN DIAGNOSTIC) injection 1 mg  1 mg Subcutaneous Q15 Min PRN Emilio Serna MD       • HYDROcodone-acetaminophen (NORCO) 5-325 MG per tablet 1 tablet  1 tablet Oral Q4H PRN Emilio Serna MD       • insulin glargine (LANTUS) injection 55 Units  55 Units Subcutaneous Nightly Emilio Serna MD   55 Units at 01/03/20 2132   • insulin lispro (humaLOG) injection 0-9 Units  0-9 Units Subcutaneous 4x Daily With Meals & Nightly Emilio Serna MD   4 Units at 01/04/20 1135   • insulin lispro (humaLOG) injection 15 Units  15 Units Subcutaneous TID With Meals Emilio Serna MD   15 Units at 01/04/20 1741   • ipratropium-albuterol (DUO-NEB) nebulizer solution 3 mL  3 mL Nebulization Q4H PRN Emilio Serna MD   3 mL at 12/27/19 0830   • lactulose (CHRONULAC) 10 GM/15ML solution 10 g  10 g Oral Daily Gladys Morris MD   10 g at 01/03/20 1105   • melatonin tablet 5 mg  5 mg Oral Nightly Emilio Serna MD   5 mg at 01/03/20 2132   • metoprolol tartrate (LOPRESSOR) tablet 12.5 mg  12.5 mg Oral Q12H Emilio Serna MD   12.5 mg at 01/04/20 0812   • nitroglycerin  (NITROSTAT) SL tablet 0.4 mg  0.4 mg Sublingual Q5 Min PRN Emilio Serna MD       • ondansetron (ZOFRAN) tablet 4 mg  4 mg Oral Q6H PRN Emilio Serna MD        Or   • ondansetron (ZOFRAN) injection 4 mg  4 mg Intravenous Q6H PRN Emilio Serna MD   4 mg at 01/02/20 0636   • ondansetron (ZOFRAN) tablet 4 mg  4 mg Oral Q6H PRN Emilio Serna MD        Or   • ondansetron (ZOFRAN) injection 4 mg  4 mg Intravenous Q6H PRN Emilio Serna MD       • pantoprazole (PROTONIX) EC tablet 40 mg  40 mg Oral BID AC Emilio Serna MD   40 mg at 01/04/20 1741   • polyethylene glycol (MIRALAX) powder 17 g  17 g Oral Daily PRN Emilio Serna MD   17 g at 01/01/20 0804   • potassium chloride (KLOR-CON) packet 40 mEq  40 mEq Oral PRN Emilio Serna MD       • potassium chloride (MICRO-K) CR capsule 40 mEq  40 mEq Oral PRN Emliio Serna MD   40 mEq at 12/23/19 2236   • promethazine (PHENERGAN) tablet 12.5 mg  12.5 mg Oral Q6H PRN Emilio Serna MD   12.5 mg at 12/24/19 2251   • sodium chloride 0.9 % flush 10 mL  10 mL Intravenous PRN Emilio Serna MD   10 mL at 01/01/20 0806   • sodium chloride 0.9 % flush 10 mL  10 mL Intravenous PRN Emilio Serna MD       • sodium chloride 0.9 % infusion  30 mL/hr Intravenous Continuous PRN Emilio Serna MD       • sucralfate (CARAFATE) tablet 1 g  1 g Oral 4x Daily Emilio Serna MD   1 g at 01/04/20 1741       Assessment/Plan         NSTEMI (non-ST elevated myocardial infarction) (CMS/HCC)    DM (diabetes mellitus), type 2 with neurological complications (CMS/HCC)    PVD (peripheral vascular disease) (CMS/HCC)    Coronary artery disease involving native coronary artery of native heart without angina pectoris    Hyperlipemia    Chronic kidney disease, stage 3 (moderate) (CMS/HCC)    Anemia of chronic disease    Gastrointestinal hemorrhage    Left ventricular systolic dysfunction    Blood loss anemia    Melena    Acute respiratory failure with hypoxia  (CMS/[facility])              Arsenio Deluna MD  01/04/20  7:51 PM    Results Review:    Results from last 7 days   Lab Units 01/04/20 0430 01/03/20 0414 01/02/20  0429   SODIUM mmol/L 139 142 143   POTASSIUM mmol/L 4.3 4.7 4.8   CHLORIDE mmol/L 102 105 105   CO2 mmol/L 25.7 26.5 24.8   BUN mg/dL 58* 67* 74*   CREATININE mg/dL 2.24* 2.12* 2.10*   CALCIUM mg/dL 9.0 8.9 9.0   GLUCOSE mg/dL 66 84 284*       Estimated Creatinine Clearance: 24.4 mL/min (A) (by C-G formula based on SCr of 2.24 mg/dL (H)).    Results from last 7 days   Lab Units 01/04/20 0430 01/03/20 0414 01/02/20 0429 01/01/20 0448 12/31/19  0525   MAGNESIUM mg/dL  --   --   --  1.8 1.8   PHOSPHORUS mg/dL 3.8 3.4 2.4* 2.7 2.5       Results from last 7 days   Lab Units 01/04/20  0430 01/03/20 0414 01/02/20  0429 01/01/20 0448 12/31/19  0525 12/30/19  0412 12/29/19  0516   URIC ACID mg/dL 10.1* 10.3* 10.7* 11.9* 13.1* 14.2* 15.0*       Results from last 7 days   Lab Units 01/04/20  0430 01/03/20 0414 01/02/20  0429 01/01/20 0448 12/31/19  0525   WBC 10*3/mm3 16.48* 16.90* 17.64* 18.03* 17.26*   HEMOGLOBIN g/dL 9.6* 9.3* 9.5* 9.7* 10.0*   PLATELETS 10*3/mm3 321 317 335 325 336               Imaging Results (Last 24 Hours)     ** No results found for the last 24 hours. **          aspirin 81 mg Oral Daily   atorvastatin 80 mg Oral Daily   gabapentin 300 mg Oral Nightly   insulin glargine 55 Units Subcutaneous Nightly   insulin lispro 0-9 Units Subcutaneous 4x Daily With Meals & Nightly   insulin lispro 15 Units Subcutaneous TID With Meals   lactulose 10 g Oral Daily   melatonin 5 mg Oral Nightly   metoprolol tartrate 12.5 mg Oral Q12H   pantoprazole 40 mg Oral BID AC   sucralfate 1 g Oral 4x Daily       sodium chloride 30 mL/hr       Medication Review:   Current Facility-Administered Medications   Medication Dose Route Frequency Provider Last Rate Last Dose   • acetaminophen (TYLENOL) tablet 650 mg  650 mg Oral Q4H PRN Emilio Serna MD   650 mg  at 01/04/20 1401    Or   • acetaminophen (TYLENOL) 160 MG/5ML solution 650 mg  650 mg Oral Q4H PRN Emilio Serna MD        Or   • acetaminophen (TYLENOL) suppository 650 mg  650 mg Rectal Q4H PRN Emilio Serna MD       • aspirin EC tablet 81 mg  81 mg Oral Daily Emilio Serna MD   81 mg at 01/04/20 0812   • atorvastatin (LIPITOR) tablet 80 mg  80 mg Oral Daily Emilio Serna MD   80 mg at 01/04/20 0812   • bisacodyl (DULCOLAX) EC tablet 5 mg  5 mg Oral Daily PRN Emilio Serna MD   5 mg at 01/01/20 2050   • bisacodyl (DULCOLAX) suppository 10 mg  10 mg Rectal Daily PRN Emilio Serna MD   10 mg at 12/21/19 2103   • calcium carbonate (TUMS) chewable tablet 500 mg (200 mg elemental)  2 tablet Oral BID PRN Emilio Serna MD   2 tablet at 12/24/19 1446   • dextrose (D50W) 25 g/ 50mL Intravenous Solution 25 g  25 g Intravenous Q15 Min PRN Emilio Serna MD       • dextrose (D50W) 25 g/ 50mL Intravenous Solution 25 g  25 g Intravenous Q15 Min PRN Emilio Serna MD       • dextrose (GLUTOSE) oral gel 15 g  15 g Oral Q15 Min PRN Emilio Serna MD       • dextrose (GLUTOSE) oral gel 15 g  15 g Oral Q15 Min PRN Emilio Serna MD       • gabapentin (NEURONTIN) capsule 300 mg  300 mg Oral Nightly Emilio Serna MD   300 mg at 01/03/20 2132   • glucagon (human recombinant) (GLUCAGEN DIAGNOSTIC) injection 1 mg  1 mg Subcutaneous PRN Emilio Serna MD       • glucagon (human recombinant) (GLUCAGEN DIAGNOSTIC) injection 1 mg  1 mg Subcutaneous Q15 Min PRN Emilio Serna MD       • HYDROcodone-acetaminophen (NORCO) 5-325 MG per tablet 1 tablet  1 tablet Oral Q4H PRN Emilio Serna MD       • insulin glargine (LANTUS) injection 55 Units  55 Units Subcutaneous Nightly Emilio Serna MD   55 Units at 01/03/20 2132   • insulin lispro (humaLOG) injection 0-9 Units  0-9 Units Subcutaneous 4x Daily With Meals & Nightly Emilio Serna MD   4 Units at 01/04/20 1135   • insulin lispro (humaLOG) injection  15 Units  15 Units Subcutaneous TID With Meals Emilio Serna MD   15 Units at 01/04/20 1741   • ipratropium-albuterol (DUO-NEB) nebulizer solution 3 mL  3 mL Nebulization Q4H PRN Emilio Serna MD   3 mL at 12/27/19 0830   • lactulose (CHRONULAC) 10 GM/15ML solution 10 g  10 g Oral Daily Gladys Morris MD   10 g at 01/03/20 1105   • melatonin tablet 5 mg  5 mg Oral Nightly Emilio Serna MD   5 mg at 01/03/20 2132   • metoprolol tartrate (LOPRESSOR) tablet 12.5 mg  12.5 mg Oral Q12H Emilio Serna MD   12.5 mg at 01/04/20 0812   • nitroglycerin (NITROSTAT) SL tablet 0.4 mg  0.4 mg Sublingual Q5 Min PRN Emilio Serna MD       • ondansetron (ZOFRAN) tablet 4 mg  4 mg Oral Q6H PRN Emilio Serna MD        Or   • ondansetron (ZOFRAN) injection 4 mg  4 mg Intravenous Q6H PRN Emilio Serna MD   4 mg at 01/02/20 0636   • ondansetron (ZOFRAN) tablet 4 mg  4 mg Oral Q6H PRN Emilio Serna MD        Or   • ondansetron (ZOFRAN) injection 4 mg  4 mg Intravenous Q6H PRN Emilio Serna MD       • pantoprazole (PROTONIX) EC tablet 40 mg  40 mg Oral BID AC Emilio Serna MD   40 mg at 01/04/20 1741   • polyethylene glycol (MIRALAX) powder 17 g  17 g Oral Daily PRN Emilio Serna MD   17 g at 01/01/20 0804   • potassium chloride (KLOR-CON) packet 40 mEq  40 mEq Oral PRN Emilio Serna MD       • potassium chloride (MICRO-K) CR capsule 40 mEq  40 mEq Oral PRN Emilio Serna MD   40 mEq at 12/23/19 2236   • promethazine (PHENERGAN) tablet 12.5 mg  12.5 mg Oral Q6H PRN Emilio Serna MD   12.5 mg at 12/24/19 2251   • sodium chloride 0.9 % flush 10 mL  10 mL Intravenous PRN Emilio Serna MD   10 mL at 01/01/20 0806   • sodium chloride 0.9 % flush 10 mL  10 mL Intravenous PRN Emilio Serna MD       • sodium chloride 0.9 % infusion  30 mL/hr Intravenous Continuous PRN Emilio Serna MD       • sucralfate (CARAFATE) tablet 1 g  1 g Oral 4x Daily Emilio Serna MD   1 g at 01/04/20 1910        Assessment/Plan       NSTEMI (non-ST elevated myocardial infarction) (CMS/HCC)    DM (diabetes mellitus), type 2 with neurological complications (CMS/HCC)    PVD (peripheral vascular disease) (CMS/[facility])    Coronary artery disease involving native coronary artery of native heart without angina pectoris    Hyperlipemia    Chronic kidney disease, stage 3 (moderate) (CMS/HCC)    Anemia of chronic disease    Gastrointestinal hemorrhage    Left ventricular systolic dysfunction    Blood loss anemia    Melena    Acute respiratory failure with hypoxia (CMS/[facility])    1. ABEBE on CKD 3. Baseline. 1.7. Creatinine 2.2 stable post cath and has been at this level for 3 days.   2. NSTEMI, CAD.  Ischemic cardiomyopathy. Pulm HTN.  Cath showed severe native CAD with 7/7 patent bypass grafts.  3. Left subclavian stent occlusion.  Would try to avoid another contrast procedure if possible with ABEBE this admission. Dr Uriostegui plans to see her outpatient in one week.  4. GIB EGD with chronic gastritis, chronic inflammatory nodule by biopsy.  5. DM2  6. Constipation.  lactulose in am    Plan  Holding diuretics  Monitoring renal function which is stable  BP and volume stable       Arsenio Deluna MD  01/04/20  7:51 PM

## 2020-01-05 NOTE — SIGNIFICANT NOTE
"Pt asleep when BP was taken. /32 manually. Pt states \"this BP is normal for me when im resting\". Pt AxOx4. Asymptomatic with this BP. Will recheck in an hour.  "

## 2020-01-05 NOTE — PLAN OF CARE
Problem: Patient Care Overview  Goal: Plan of Care Review  Outcome: Ongoing (interventions implemented as appropriate)  Flowsheets  Taken 1/5/2020 1129 by Monserrat Matute, RN  Progress: improving  Outcome Summary: Pt eager to go home today. VSS. Denies pain or discomfort.  Taken 1/5/2020 0305 by Anais Oneal, RN  Plan of Care Reviewed With: patient

## 2020-01-05 NOTE — PLAN OF CARE
Problem: Patient Care Overview  Goal: Plan of Care Review  Outcome: Ongoing (interventions implemented as appropriate)  Flowsheets (Taken 1/5/2020 0305)  Progress: improving  Plan of Care Reviewed With: patient  Outcome Summary: monitoring kidney function--nephrology came to see pt last night. plan to d/c today. monitoring BP-- low DBP when sleeping. pt states this is normal for her. will continue to monitor.  Goal: Individualization and Mutuality  Outcome: Ongoing (interventions implemented as appropriate)  Goal: Discharge Needs Assessment  Outcome: Ongoing (interventions implemented as appropriate)  Goal: Interprofessional Rounds/Family Conf  Outcome: Ongoing (interventions implemented as appropriate)  Flowsheets (Taken 1/5/2020 0305)  Participants: ; family; nursing; patient; pharmacy; physician     Problem: Fall Risk (Adult)  Goal: Absence of Fall  Outcome: Ongoing (interventions implemented as appropriate)  Flowsheets (Taken 1/5/2020 0305)  Absence of Fall: making progress toward outcome     Problem: Skin Injury Risk (Adult)  Goal: Skin Health and Integrity  Outcome: Ongoing (interventions implemented as appropriate)  Flowsheets (Taken 1/5/2020 0305)  Skin Health and Integrity: making progress toward outcome     Problem: Renal Failure/Kidney Injury, Acute (Adult)  Goal: Signs and Symptoms of Listed Potential Problems Will be Absent, Minimized or Managed (Renal Failure/Kidney Injury, Acute)  Outcome: Ongoing (interventions implemented as appropriate)  Flowsheets (Taken 1/5/2020 0305)  Problems Assessed (Acute Renal Failure/Kidney Injury): all  Problems Present (ARF/Kidney Injury): situational response     Problem: Pain, Acute (Adult)  Goal: Acceptable Pain Control/Comfort Level  Outcome: Ongoing (interventions implemented as appropriate)  Flowsheets (Taken 1/5/2020 0305)  Acceptable Pain Control/Comfort Level: making progress toward outcome     Problem: Cardiac: Heart Failure (Adult)  Goal: Signs  and Symptoms of Listed Potential Problems Will be Absent, Minimized or Managed (Cardiac: Heart Failure)  Outcome: Ongoing (interventions implemented as appropriate)  Flowsheets (Taken 1/5/2020 0305)  Problems Assessed (Heart Failure): all  Problems Present (Heart Failure): cardiac pump dysfunction; dysrhythmia/arrhythmia; situational response

## 2020-01-06 NOTE — OUTREACH NOTE
Prep Survey      Responses   Facility patient discharged from?  [facility]   Is patient eligible?  Yes   Discharge diagnosis  NSTEMI   Does the patient have one of the following disease processes/diagnoses(primary or secondary)?  Acute MI (STEMI,NSTEMI)   Does the patient have Home health ordered?  No   Is there a DME ordered?  No   Prep survey completed?  Yes          Sia Lynn RN

## 2020-01-06 NOTE — DISCHARGE SUMMARY
Date of Admission: 12/18/2019  Date of Discharge:  1/5/2020  Primary Care Physician: Jose Morrison DO     Discharge Diagnosis:  Active Hospital Problems    Diagnosis  POA   • **NSTEMI (non-ST elevated myocardial infarction) (CMS/[facility]) [I21.4]  Yes   • Acute respiratory failure with hypoxia (CMS/HCC) [J96.01]  Yes   • Left ventricular systolic dysfunction [I51.9]  Yes   • Gastrointestinal hemorrhage [K92.2]  Yes   • Blood loss anemia [D50.0]  Yes   • Melena [K92.1]  Yes   • Chronic kidney disease, stage 3 (moderate) (CMS/[facility]) [N18.3]  Yes   • Anemia of chronic disease [D63.8]  Yes   • Hyperlipemia [E78.5]  Yes   • Coronary artery disease involving native coronary artery of native heart without angina pectoris [I25.10]  Yes   • DM (diabetes mellitus), type 2 with neurological complications (CMS/[facility]) [E11.49]  Yes   • PVD (peripheral vascular disease) (CMS/[facility]) [I73.9]  Yes      Resolved Hospital Problems   No resolved problems to display.       Presenting Problem/History of Present Illness:  Blood loss anemia [D50.0]  CKD (chronic kidney disease) stage 4, GFR 15-29 ml/min (CMS/[facility]) [N18.4]  ABEBE (acute kidney injury) (CMS/[facility]) [N17.9]  Short of breath on exertion [R06.02]  Gastrointestinal hemorrhage, unspecified gastrointestinal hemorrhage type [K92.2]     Hospital Course:  The patient is a 69 y.o. female with a history of chronic kidney disease stage IV, hypertension, diabetes mellitus type 2, coronary artery disease, and peripheral vascular disease that presented to [facility] complaining of shortness of breath. Please see admission H&P from 12/18/19 for further details.  On admission she was found to have a GI bleed with a symptomatic anemia at 7.2.  She was started on a protonix drip and transfused 2 units of PRBCs.  She was on aspirin for CAD and plavix for PVD and these were held.  GI was consulted and she underwent an EGD on 12/29/19 which showed chronic gastritis and a gastric nodule which  "was hyperplasia on pathology (no neoplasia).  H. Pylori was negative.  She had no further evidence of bleeding and her Hgb remained stable.  She should continue on protonix and carafate.    She did have an NSTEMI which was related to demand from these issues (type 2) in the setting of CAD.  She had systolic CHF as well which resolved with diuresis and volume status remained stable without diuresis following this. Cardiology followed the patient and she underwent left heart catheterization on 1/2/20 which showed severe coronary artery disease and medical treatment was recommended.  She did have some left subclavian stent stenosis and this will need to be addressed in the future after her other issues, especially her renal function, have had time to stabilize fully. She is to follow up with Dr. Uriostegui in 1 week.  She did have an ABEBE on CKD (baseline Cr 1.7) likely related to volume changes with symptomatic anemia and CHF.  Her creatinine stabilized in the 2.1-2.2 range and remained here for a few days following her left heart catheterization. She will need to keep outpatient follow up with nephrology.  Her insulin regimen was adjusted as below with a lower insulin requirement likely related to her changes in eating habits in the hospital and her worsened renal function.  She should keep PCP follow-up. She states she has plenty of insulin at home.    She will be discharged home and has plans to stay with her son for a few days. She denies needs for home health.     Exam Today:  Blood pressure 102/51, pulse 80, temperature 98.7 °F (37.1 °C), temperature source Oral, resp. rate 18, height 152 cm (59.84\"), weight 90.9 kg (200 lb 4.8 oz), SpO2 95 %.  Constitutional: She is oriented to person, place, and time. No distress.   Head: Normocephalic and atraumatic.   Mouth/Throat: Oropharynx is clear and moist.   Eyes: Pupils are equal, round, and reactive to light. Conjunctivae and EOM are normal.   Neck: Normal range of motion. " Neck supple.   Cardiovascular: Normal rate, regular rhythm and intact distal pulses.   Pulmonary/Chest: Effort normal and breath sounds normal. She has no rales.   Abdominal: Soft. Bowel sounds are normal.   Musculoskeletal: She exhibits no edema or tenderness.   Neurological: She is alert and oriented to person, place, and time.   Skin: Skin is warm and dry. Capillary refill takes less than 2 seconds. She is not diaphoretic.   Psychiatric: She has a normal mood and affect. Her behavior is normal.   Nursing note and vitals reviewed.    Procedures Performed:  Procedure(s):  LEFT HEART CATH  NO LV  CORONARY ANGIOGRAPHY  SAPHENOUS VEIN GRAFT      Consults:   Consults     Date and Time Order Name Status Description    12/18/2019 2249 Inpatient Gastroenterology Consult Completed     12/18/2019 2215 Inpatient Cardiology Consult Completed     12/18/2019 2037 Cardiology (on-call MD unless specified) Completed     12/18/2019 1959 LHA (on-call MD unless specified) Details Completed     12/18/2019 1929 Nephrology (on -call MD unless specified) Completed            Discharge Disposition:  Home or Self Care    Discharge Medications:     Discharge Medications      New Medications      Instructions Start Date   lactulose 10 GM/15ML solution  Commonly known as:  CHRONULAC   10 g, Oral, Daily PRN      metoprolol succinate XL 25 MG 24 hr tablet  Commonly known as:  TOPROL XL   25 mg, Oral, Daily         Changes to Medications      Instructions Start Date   fluticasone 50 MCG/ACT nasal spray  Commonly known as:  FLONASE  What changed:    · when to take this  · reasons to take this   2 sprays, Nasal, Daily      gabapentin 300 MG capsule  Commonly known as:  NEURONTIN  What changed:  how much to take   300 mg, Oral, Every Night at Bedtime      insulin aspart 100 UNIT/ML solution pen-injector sc pen  Commonly known as:  NOVOLOG FLEXPEN  What changed:  See the new instructions.   15 Units, Subcutaneous, 3 Times Daily With Meals       LEVEMIR FLEXTOUCH 100 UNIT/ML injection  Generic drug:  insulin detemir  What changed:  See the new instructions.   45 Units, Subcutaneous, Nightly         Continue These Medications      Instructions Start Date   acetaminophen 500 MG tablet  Commonly known as:  TYLENOL   500 mg, Oral, Every 6 Hours PRN      aspirin 81 MG EC tablet   81 mg, Oral, Daily, PT HOLDING FOR SURGERY      atorvastatin 80 MG tablet  Commonly known as:  LIPITOR   80 mg, Oral, Daily      clotrimazole 1 % vaginal cream  Commonly known as:  LOTRIMIN   1 application, Vaginal, As Needed      dicyclomine 20 MG tablet  Commonly known as:  BENTYL   20 mg, Oral, 3 Times Daily PRN      docusate sodium 100 MG capsule  Commonly known as:  COLACE   100 mg, Oral, Every Night at Bedtime      melatonin 5 MG tablet tablet   5 mg, Oral, Nightly      pantoprazole 40 MG EC tablet  Commonly known as:  PROTONIX   40 mg, Oral, 2 Times Daily      sucralfate 1 g tablet  Commonly known as:  CARAFATE   1 g, Oral, 4 Times Daily      Vortioxetine HBr 10 MG tablet  Commonly known as:  TRINTELLIX   10 mg, Oral, Daily         Stop These Medications    clopidogrel 75 MG tablet  Commonly known as:  PLAVIX     loperamide 2 MG capsule  Commonly known as:  IMODIUM     magnesium oxide 400 tablet tablet  Commonly known as:  MAG-OX     SITagliptin 50 MG tablet  Commonly known as:  JANUVIA     valsartan-hydrochlorothiazide 160-25 MG per tablet  Commonly known as:  DIOVAN-HCT     VICTOZA 18 MG/3ML solution pen-injector injection  Generic drug:  Liraglutide            Discharge Diet:   Diet Instructions     Diet: Cardiac, Consistent Carbohydrate; Thin      Discharge Diet:   Cardiac  Consistent Carbohydrate       Fluid Consistency:  Thin          Activity at Discharge:   Activity Instructions     Activity as Tolerated            Follow-up Appointments:  No future appointments.  Additional Instructions for the Follow-ups that You Need to Schedule     Discharge Follow-up with PCP   As  directed       Currently Documented PCP:    Jose Morrison DO    PCP Phone Number:    [phone]     Follow Up Details:  1-2 weeks         Discharge Follow-up with Specialty: Nephrology   As directed      Specialty:  Nephrology    Follow Up Details:  As scheduled         Discharge Follow-up with Specified Provider: Dr. Uriostegui (Cardiology); 1 Week   As directed      To:  Dr. Uriostegui (Cardiology)    Follow Up:  1 Week               Harpal Novoa MD  01/05/20  9:36 PM    Time Spent on Discharge Activities: Greater than 30 minutes.

## 2020-01-07 NOTE — PROGRESS NOTES
Subjective   Randi Martines is a 69 y.o. female. Presents today for   Chief Complaint   Patient presents with   • Follow-up     hospital follow up and has kidney function decline, consult about meds       Hospital Course:  The patient is a 69 y.o. female with dm2 poor control, ckd stage IV, hypertension, coronary artery disease, and peripheral vascular disease that presented to [facility] complaining of shortness of breath. She had acute GI bleed with a symptomatic anemia at 7.2.  She was started on a protonix gtts and given 2 units of PRBCs.  She was on asa + plavix, both held due to GI bleed.  GI was consulted and she underwent an EGD on 12/29/19 noting chronic gastritis and a gastric nodule (hyperplasia on pathology (no neoplasia)).  H. Pylori was negative.  She had no further evidence of bleeding and her Hgb remained stable.  She was continued on protonix and carafate.    She did have an NSTEMI which was felt to be also secondary to severe anemia in face of known cad.  She had systolic CHF as well which resolved with diuresis and volume status remained stable without diuresis following this. Cardiology performed left heart catheterization on 1/2/20 which showed severe coronary artery disease and medical treatment was recommended.  She did have some left subclavian stent stenosis and this will need to be addressed in the future after her other issues, especially her renal function, have had time to stabilize.   She did have an ABEBE on CKD (baseline Cr 1.7) likely related to volume changes with symptomatic anemia and CHF.  Her creatinine stabilized in the 2.1-2.2 range and remained here for a few days following her left heart catheterization. She is to follow up with nephrology.  Her insulin regimen was adjusted as below with a lower insulin requirement likely related to her changes in eating habits in the hospital and her worsened renal function.  She will be discharged home and has plans to  stay with her son for a few days. She denies needs for home health. Today we discussed with her and daughter in law as likely benefit.  Uric acid was also very high and needs rechecked.    Anemia   Presents for initial visit. Symptoms include bruises/bleeds easily, leg swelling and malaise/fatigue. There has been no abdominal pain, anorexia, fever, light-headedness, palpitations, paresthesias or weight loss. Signs of blood loss that are present include hematochezia (now resolved). Signs of blood loss that are not present include melena. Treatments tried: transfused 2 units prbcs. Past medical history includes chronic renal disease and heart failure. Procedure history includes EGD.   Coronary Artery Disease   Presents for initial visit. The disease course has been stable. Symptoms include leg swelling. Pertinent negatives include no chest pain, chest pressure, chest tightness, palpitations or shortness of breath. Risk factors include diabetes, hyperlipidemia and hypertension. Past treatments include aspirin and statins. The treatment provided moderate relief. Compliance with prior treatments has been variable. Her past medical history is significant for CHF and past myocardial infarction. Past surgical history includes CABG.   Diabetes   She presents for her follow-up diabetic visit. She has type 2 diabetes mellitus. Her disease course has been fluctuating. Associated symptoms include foot paresthesias. Pertinent negatives for diabetes include no blurred vision, no chest pain and no weight loss. Symptoms are improving. Diabetic complications include heart disease and nephropathy (stage iv). Risk factors for coronary artery disease include diabetes mellitus, dyslipidemia, hypertension, obesity and post-menopausal. Current diabetic treatment includes insulin injections. She is compliant with treatment most of the time. She is following a generally healthy diet.   Abdominal Pain   This is a recurrent problem. The  current episode started in the past 7 days. The onset quality is sudden. The problem occurs intermittently. The problem has been waxing and waning. The pain is located in the LLQ. The pain is mild. The quality of the pain is aching and cramping (Feels like recurrent diverticulitis). Associated symptoms include hematochezia (now resolved) and nausea. Pertinent negatives include no anorexia, constipation, diarrhea, fever, melena, vomiting or weight loss. The pain is relieved by nothing. The treatment provided no relief.       Review of Systems   Constitutional: Positive for malaise/fatigue. Negative for fever and weight loss.   Eyes: Negative for blurred vision.   Respiratory: Negative for chest tightness and shortness of breath.    Cardiovascular: Positive for leg swelling. Negative for chest pain and palpitations.   Gastrointestinal: Positive for hematochezia (now resolved) and nausea. Negative for abdominal pain, anorexia, constipation, diarrhea, melena and vomiting.   Neurological: Negative for light-headedness and paresthesias.   Hematological: Bruises/bleeds easily.       Patient Active Problem List   Diagnosis   • Abdominal pain   • Intractable abdominal pain   • DM (diabetes mellitus), type 2 with neurological complications (CMS/HCC)   • PVD (peripheral vascular disease) (CMS/HCC)   • Mesenteric ischemia (CMS/[facility])   • Acute gastric ulcer   • S/P CABG (coronary artery bypass graft)   • Coronary artery disease involving native coronary artery of native heart without angina pectoris   • Atherosclerotic PVD with ulceration (CMS/HCC)   • Dyslipidemia   • Type 2 diabetes mellitus without complication, with long-term current use of insulin (CMS/HCC)   • Biliary obstruction   • Hyperlipemia   • Malabsorption of iron   • Chronic kidney disease, stage 3 (moderate) (CMS/HCC)   • Anemia of chronic disease   • Gastrointestinal hemorrhage   • NSTEMI (non-ST elevated myocardial infarction) (CMS/HCC)   • Left ventricular  systolic dysfunction   • Blood loss anemia   • Melena   • Acute respiratory failure with hypoxia (CMS/HCC)       Social History     Socioeconomic History   • Marital status:      Spouse name: Not on file   • Number of children: Not on file   • Years of education: Not on file   • Highest education level: Not on file   Tobacco Use   • Smoking status: Former Smoker     Packs/day: 3.00     Years: 20.00     Pack years: 60.00   • Smokeless tobacco: Never Used   • Tobacco comment: caffeine use   Substance and Sexual Activity   • Alcohol use: No     Comment: caffeine use    • Drug use: No   • Sexual activity: Defer     Partners: Female     Birth control/protection: None       Allergies   Allergen Reactions   • Augmentin [Amoxicillin-Pot Clavulanate] GI Intolerance and Dizziness   • Codeine GI Intolerance and Dizziness       Current Outpatient Medications on File Prior to Visit   Medication Sig Dispense Refill   • acetaminophen (TYLENOL) 500 MG tablet Take 500 mg by mouth Every 6 (Six) Hours As Needed for Mild Pain .     • aspirin 81 MG EC tablet Take 81 mg by mouth Daily. PT HOLDING FOR SURGERY     • atorvastatin (LIPITOR) 80 MG tablet Take 80 mg by mouth Daily.     • clotrimazole (LOTRIMIN) 1 % vaginal cream Insert 1 application into the vagina As Needed.     • dicyclomine (BENTYL) 20 MG tablet Take 20 mg by mouth 3 (Three) Times a Day As Needed.     • docusate sodium (COLACE) 100 MG capsule Take 100 mg by mouth every night at bedtime.     • fluticasone (FLONASE) 50 MCG/ACT nasal spray 2 sprays into the nostril(s) as directed by provider Daily. (Patient taking differently: 2 sprays into the nostril(s) as directed by provider Daily As Needed.) 9.9 mL 0   • gabapentin (NEURONTIN) 300 MG capsule Take 1 capsule by mouth every night at bedtime.     • insulin aspart (NOVOLOG FLEXPEN) 100 UNIT/ML solution pen-injector sc pen Inject 15 Units under the skin into the appropriate area as directed 3 (Three) Times a Day With  Meals.     • lactulose (CHRONULAC) 10 GM/15ML solution Take 15 mL by mouth Daily As Needed (constipation). 236 mL 0   • LEVEMIR FLEXTOUCH 100 UNIT/ML injection Inject 45 Units under the skin into the appropriate area as directed Every Night.  0   • melatonin 5 MG tablet tablet Take 5 mg by mouth Every Night.     • metoprolol succinate XL (TOPROL XL) 25 MG 24 hr tablet Take 1 tablet by mouth Daily. 30 tablet 0   • sucralfate (CARAFATE) 1 g tablet Take 1 g by mouth 4 (Four) Times a Day.     • Vortioxetine HBr (TRINTELLIX) 10 MG tablet Take 10 mg by mouth Daily. 30 tablet 0     No current facility-administered medications on file prior to visit.        Objective   Vitals:    01/07/20 1544   BP: 98/60   Pulse: 69   Temp: 98.2 °F (36.8 °C)   SpO2: 99%   Weight: 93 kg (205 lb)       Physical Exam   Constitutional: She appears well-developed and well-nourished.   HENT:   Head: Normocephalic and atraumatic.   Neck: Neck supple. No JVD present. No thyromegaly present.   Cardiovascular: Normal rate, regular rhythm and normal heart sounds. Exam reveals no gallop and no friction rub.   No murmur heard.  Pulmonary/Chest: Effort normal and breath sounds normal. No respiratory distress. She has no wheezes. She has no rales.   Abdominal: Soft. Bowel sounds are normal. She exhibits no distension. There is tenderness in the left lower quadrant. There is no rigidity, no rebound, no guarding, no CVA tenderness, no tenderness at McBurney's point and negative Muñoz's sign.   Musculoskeletal: She exhibits no edema.        Right hip: She exhibits decreased strength.        Left hip: She exhibits decreased strength.   Neurological: She is alert. Gait abnormal.   Skin: Skin is warm and dry.   Psychiatric: She has a normal mood and affect. Her behavior is normal.   Nursing note and vitals reviewed.      Assessment/Plan   Randi was seen today for follow-up.    Diagnoses and all orders for this visit:    LLQ abdominal pain  -      Discontinue: metroNIDAZOLE (FLAGYL) 500 MG tablet; Take 1 tablet by mouth 3 (Three) Times a Day.  -     metroNIDAZOLE (FLAGYL) 500 MG tablet; Take 1 tablet by mouth 3 (Three) Times a Day.    NSTEMI (non-ST elevated myocardial infarction) (CMS/HCC)    Coronary artery disease involving native coronary artery of native heart without angina pectoris    Type 2 diabetes mellitus without complication, with long-term current use of insulin (CMS/HCC)    ABEBE (acute kidney injury) (CMS/[facility])    Hyperuricemia    Iron deficiency anemia due to chronic blood loss    -cad - tight lipid control;  Better dm2 control  -anemia - repeat check, will ask [facility] to draw labs and forward to nephrology as well  -dm2 - work on diet;  Stable at this time per patient;    -abebe on ckd - recheck bmp with lab draw by home health;  Also recheck uric acid as with labs as high;  Denies gout attack.  -patient weakn, abnormal gait and high fall risk;  She is also at risk for readmit to hospital and would benefit from [facility] to evalute/monitor cardiac status inc;micheal chf, PT to help with mobility related adls and to reduce risks for serious falls that would result in injury.  -if any recurrent bleeding go back to er;  If worsening pain or high fevers go back er;  Consider imaging if no improvement;           -Follow up: 2 to 3 months and prn

## 2020-01-07 NOTE — PROGRESS NOTES
Case Management Discharge Note      Final Note: home and declined rehab needs    Provided post acute provider list?: Yes  Post Acute Provider Lists: Nursing Home, Home Health  Post Acuite Provider List: Delivered  Delivered To: Patient  Method of Delivery: In person    Destination      No service has been selected for the patient.      Durable Medical Equipment      No service has been selected for the patient.      Dialysis/Infusion      No service has been selected for the patient.      Home Medical Care      No service has been selected for the patient.      Therapy      No service has been selected for the patient.      Community Resources      No service has been selected for the patient.        Transportation Services  Private: Car    Final Discharge Disposition Code: 01 - home or self-care

## 2020-01-08 NOTE — OUTREACH NOTE
AMI Week 1 Survey      Responses   Facility patient discharged from?  [facility]   Does the patient have one of the following disease processes/diagnoses(primary or secondary)?  Acute MI (STEMI,NSTEMI)   Is there a successful TCM telephone encounter documented?  No   Week 1 attempt successful?  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  Yes   List who call center can speak with  son or dtr in law   Meds reviewed with patient/caregiver?  Yes   Is the patient having any side effects they believe may be caused by any medication additions or changes?  No   Does the patient have all prescriptions related to this admission filled (includes statins,anticoagulants,HTN meds,anti-arrhythmia meds)  Yes   Is the patient taking all medications as directed (includes completed medication regime)?  Yes   Does the patient have a primary care provider?   Yes   Does the patient have an appointment with their PCP,cardiologist,or clinic within 7 days of discharge?  Yes   Has the patient kept scheduled appointments due by today?  Yes   What is the Home health agency?   PCP ordered [facility]  for PT to do cardiac rehab per pt.   Has home health visited the patient within 72 hours of discharge?  Yes   Psychosocial issues?  No   Comments  [facility] site- left wrist without any issue. Denies SOA, CP or other issues. Glucose levels been WNL, had episode of low gluc this morning, 94, she drank some milk and was fine.    Did the patient receive a copy of their discharge instructions?  Yes   Nursing interventions  Reviewed instructions with patient   What is the patient's perception of their health status since discharge?  Improving   Is the patient/caregiver able to teach back signs and symptoms of when to call for help immediately:  Sudden chest discomfort, Sudden discomfort in arms, back, neck or jaw, Nausea  or vomiting, Dizziness or lightheadedness   Is the pateint /caregiver able to teach back the importance of cardiac rehab?  Yes   Is the patient/caregiver able to teach back lifestyle changes to help prevent MIs  Heart healthy diet   Is the patient/caregiver able to teach back ways to prevent a second heart attack:  Follow up with MD, Participate in Cardiac Rehab   Is the patient/caregiver able to teach back the hierarchy of who to call/visit for symptoms/problems? PCP, Specialist, Home health nurse, Urgent Care, ED, 911  Yes   Week 1 call completed?  Yes          Sangeeta Prieto RN

## 2020-01-15 NOTE — PROGRESS NOTES
Date of Office Visit: 01/15/20  Encounter Provider: EBEN Bishop  Primary Cardiologist: Dr. Uriostegui  Place of Service: [facility] CARDIOLOGY  Patient Name: Randi Martines  :1950      Subjective:     Chief Complaint:  Hospital follow up anemia/demand ischemia, subclavian stenosis    History of Present Illness:  Randi Martines is a pleasant 69 y.o. female who I have seen once before during recent hospitalization.  Outside records have been obtained and reviewed by me.     This is a patient of Dr. Uriostegui with an extensive past medical history including coronary artery disease status post CABG x7, peripheral vascular disease status post bilateral femoropopliteal bypass, diabetes, hypertension, hyperlipidemia, carotid artery disease, chronic kidney disease, hypothyroidism, GI bleed, anemia, morbid obesity, aortic stenosis, pulmonary hypertension and suspected KVNG.     She presented to [facility] 2019 with dyspnea on exertion orthopnea, melena.  She was noted to be anemic with hemoglobin in the 7 range.  She was treated with transfusions.  Her BNP and troponin were elevated on admission.  She also had some anginal symptoms with jaw pain and right-sided neck pain which were similar to what she experienced prior to her CABG in .  She had some mild hypotension.  Prior to this hospital admission she was not seen in our office since 2018 at that time she was doing well.  Her aspirin and Plavix were held.  She eventually underwent EGD 2019 that showed a gastric nodule, chronic gastritis, hyperplasia without neoplasia, negative H. pylori no obvious source of bleeding.  GI continued her on Protonix and Carafate.  She had an echo that showed EF was depressed calculated was 45%, estimated 36 to 40% with apical anterior, basal anterior lateral and mid anterolateral hypokinesis, mild aortic stenosis with a mean pressure gradient of 7 max pressure  gradient of 12.5, aortic valve area 12.5, mild mitral regurgitation, mild to moderate tricuspid regurgitation, severe pulmonary hypertension with RVSP of 80 mmHg.  She also underwent cardiac catheterization 1/2/2020 which showed 7 out of 7 patent bypass grafts, she was incidentally found to have left subclavian stent/stenosis that will need to be addressed in the future though at the time she had been asymptomatic.  It was felt that her elevated troponin was secondary to demand ischemia from anemia.  She had acute kidney injury on chronic kidney disease and was followed by nephrology.  1/5/2020 she was discharged with resumption of aspirin 81 mg daily and her Plavix was held.  Her creatinine was 2.17 with a BUN of 55 at discharge.Her H&H down trended at discharge and was 8.6/26.2.  She had persistent leukocytosis and was on steroid taper.    She is presenting today for hospital follow-up.  She is present today with her son.  She feels like she is doing pretty well.  She does tire easily but feels this is getting better each day.  She has not had any further jaw or neck pain which have been her anginal symptoms in the past.  She has not had any chest pain either.  She does have chronic dyspnea on exertion over the last 1 to 2 years.  She is typically okay at rest and okay on flat ground unless she goes quickly or does any incline or stairs.  She saw her PCP last week and reports some left sided abdominal pain and was started on Flagyl.  In the past when she had issues with that Flagyl improved her symptoms.  Since she started the Flagyl her abdominal pain is resolved.  She is legally blind and cannot tell if she has dark stools though in the past has been able to smell when she has had a GI bleed.  She has not had any further spells that are suspicious to her of GI bleeding.  She denies palpitations.  She has some chronic lower extremity edema that is not new or worsening.  She is not having any significant dizziness  has not had syncope or near syncope.  She does not measure her blood pressure at home though is on the low side of normal today.  She had repeat CBC today by her PCP.  Her white blood cell count improved and is only slightly elevated 10.8 (10.5.  Platelet count within normal limits 368, H&H improved 9.9/30.6.  She was supposed to follow-up with GI at discharge in about 6 weeks and does not have an appointment.  I have placed a referral for her.  She sees her nephrologist Dr. López next week.  Her son believes she has sleep apnea based on the way she snores.  She is never had a formal evaluation.  A referral was placed to sleep medicine for an evaluation since she has cardiomyopathy, coronary disease and elevated right-sided pressures on echo.  They requested Dr. Parmar as the patient son knows him well and he is very close to their house.  She denies any arm pain numbness or tingling.  She does not weigh her self at home though does not feel she has gained any weight since hospital discharge.      Past Medical History:   Diagnosis Date   • CAD (coronary artery disease)    • Chronic kidney disease    • Chronic systolic congestive heart failure (CMS/HCC)    • Diabetes mellitus (CMS/HCC)    • Dyslipidemia    • Foot ulcer (CMS/HCC)    • History of diverticulitis    • History of stomach ulcers    • History of transfusion    • Hyperlipemia    • Hypertension    • Mesenteric ischemia (CMS/HCC)    • NSTEMI (non-ST elevated myocardial infarction) (CMS/HCC)    • Peptic ulcer disease    • PONV (postoperative nausea and vomiting)    • PVD (peripheral vascular disease) (CMS/HCC)      Past Surgical History:   Procedure Laterality Date   • APPENDECTOMY  2015   • CARDIAC CATHETERIZATION N/A 1/2/2020    Procedure: LEFT HEART CATH  NO LV;  Surgeon: Emilio Serna MD;  Location: [facility] CATH INVASIVE LOCATION;  Service: Cardiology   • CARDIAC CATHETERIZATION N/A 1/2/2020    Procedure: CORONARY ANGIOGRAPHY;  Surgeon: Emilio Serna  MD;  Location: [facility] CATH INVASIVE LOCATION;  Service: Cardiology   • CARDIAC CATHETERIZATION N/A 1/2/2020    Procedure: SAPHENOUS VEIN GRAFT;  Surgeon: Emilio Serna MD;  Location: [facility] CATH INVASIVE LOCATION;  Service: Cardiology   • CATARACT EXTRACTION     • CHOLECYSTECTOMY     • COLONOSCOPY  2015   • CORONARY ARTERY BYPASS GRAFT      7 coronary bypass surgery   • ENDOSCOPY N/A 11/23/2016    Procedure: ESOPHAGOGASTRODUODENOSCOPY ;  Surgeon: Katherine Green MD;  Location: [facility] ENDOSCOPY;  Service:    • ENDOSCOPY N/A 12/29/2019    Procedure: ESOPHAGOGASTRODUODENOSCOPYwith bx;  Surgeon: Ziyad Manzo MD;  Location: [facility] ENDOSCOPY;  Service: Gastroenterology   • ERCP N/A 10/2/2017    Procedure: ENDOSCOPIC RETROGRADE CHOLANGIOPANCREATOGRAPHY with sphincterotomy, balloon sweep;  Surgeon: Christopher Graf MD;  Location: [facility] ENDOSCOPY;  Service:    • HERNIA REPAIR  2015   • HYSTERECTOMY     • ID VEIN IN SITU BYPASS GRAFT,FEM-POP Left 5/31/2017    Procedure: ULTRASOUND RIGHT AIF, ARTERIOGRAM W/ LEFT COMMON ILIAC ANGIOPLASTY STENT, LT FEMORAL POPLITEAL BYPASS WITH ARTEGRAFT, LEFT POPLITEAL ENDARTERECTOMY PATCH, LEFT COMMON ARTERIOGRAM, LEFT EXTERNAL ILIAC ANGIOPLASTY STENT PLACEMENT, AORTA,  EXTERNAL, DISTAL PRESSURES;  Surgeon: Jayda Barrientos MD;  Location: [facility] HYBRID OR 18/19;  Service: Vascular   • SKIN GRAFT SPLIT THICKNESS Left 6/7/2017    Procedure: DEBRIDEMENT AND SKIN GRAFT TO  LEFT DORSAL FOOT AND TOES;  Surgeon: Maurine Waterhouse, MD;  Location: [facility] MAIN OR;  Service:    • UPPER GASTROINTESTINAL ENDOSCOPY  2015    scar in gastric body, gastric ulcers     Outpatient Medications Prior to Visit   Medication Sig Dispense Refill   • acetaminophen (TYLENOL) 500 MG tablet Take 500 mg by mouth Every 6 (Six) Hours As Needed for Mild Pain .     • aspirin 81 MG EC tablet Take 81 mg by mouth Daily. PT HOLDING FOR SURGERY     • atorvastatin (LIPITOR) 80 MG tablet Take 80 mg by mouth Daily.        • clotrimazole (LOTRIMIN) 1 % vaginal cream Insert 1 application into the vagina As Needed.     • dicyclomine (BENTYL) 20 MG tablet Take 20 mg by mouth 3 (Three) Times a Day As Needed.     • docusate sodium (COLACE) 100 MG capsule Take 100 mg by mouth every night at bedtime.     • fluticasone (FLONASE) 50 MCG/ACT nasal spray 2 sprays into the nostril(s) as directed by provider Daily. (Patient taking differently: 2 sprays into the nostril(s) as directed by provider Daily As Needed.) 9.9 mL 0   • gabapentin (NEURONTIN) 300 MG capsule Take 1 capsule by mouth every night at bedtime.     • insulin aspart (NOVOLOG FLEXPEN) 100 UNIT/ML solution pen-injector sc pen Inject 15 Units under the skin into the appropriate area as directed 3 (Three) Times a Day With Meals.     • lactulose (CHRONULAC) 10 GM/15ML solution Take 15 mL by mouth Daily As Needed (constipation). 236 mL 0   • LEVEMIR FLEXTOUCH 100 UNIT/ML injection Inject 45 Units under the skin into the appropriate area as directed Every Night.  0   • melatonin 5 MG tablet tablet Take 5 mg by mouth Every Night.     • metoclopramide (REGLAN) 5 MG tablet      • metoprolol succinate XL (TOPROL XL) 25 MG 24 hr tablet Take 1 tablet by mouth Daily. 30 tablet 0   • metroNIDAZOLE (FLAGYL) 500 MG tablet Take 1 tablet by mouth 3 (Three) Times a Day. 21 tablet 0   • sucralfate (CARAFATE) 1 g tablet Take 1 g by mouth 4 (Four) Times a Day.     • Vortioxetine HBr (TRINTELLIX) 10 MG tablet Take 10 mg by mouth Daily. 30 tablet 0   • metroNIDAZOLE (FLAGYL) 500 MG tablet Take 1 tablet by mouth 3 (Three) Times a Day. 21 tablet 0   • pantoprazole (PROTONIX) 40 MG EC tablet Take 40 mg by mouth 2 (Two) Times a Day.       No facility-administered medications prior to visit.        Allergies as of 01/15/2020 - Reviewed 01/15/2020   Allergen Reaction Noted   • Augmentin [amoxicillin-pot clavulanate] GI Intolerance and Dizziness 11/22/2016   • Codeine GI Intolerance and Dizziness 11/21/2016      Social History     Socioeconomic History   • Marital status:      Spouse name: Not on file   • Number of children: Not on file   • Years of education: Not on file   • Highest education level: Not on file   Tobacco Use   • Smoking status: Former Smoker     Packs/day: 3.00     Years: 20.00     Pack years: 60.00   • Smokeless tobacco: Never Used   • Tobacco comment: caffeine use   Substance and Sexual Activity   • Alcohol use: No     Comment: caffeine use    • Drug use: No   • Sexual activity: Defer     Partners: Female     Birth control/protection: None     Family History   Problem Relation Age of Onset   • Heart disease Mother         cardiac disorder   • Cancer Maternal Grandmother    • Stroke Maternal Grandfather    • Hypertension Maternal Grandfather    • Malig Hyperthermia Neg Hx      Review of Systems   Constitution: Positive for malaise/fatigue. Negative for chills and fever.   HENT: Negative for ear pain, hearing loss, nosebleeds and sore throat.    Eyes: Positive for blurred vision (legally blind). Negative for double vision and pain.   Cardiovascular: Positive for dyspnea on exertion. Negative for chest pain, irregular heartbeat, leg swelling, near-syncope, orthopnea, palpitations, paroxysmal nocturnal dyspnea and syncope.   Respiratory: Positive for shortness of breath and snoring. Negative for cough and wheezing.    Endocrine: Negative for cold intolerance and heat intolerance.   Hematologic/Lymphatic: Negative for bleeding problem.   Skin: Negative for color change, itching, rash and unusual hair distribution.   Musculoskeletal: Positive for joint pain, joint swelling and myalgias.   Gastrointestinal: Positive for abdominal pain (improved with flagyl). Negative for diarrhea, hematochezia, melena, nausea and vomiting.   Genitourinary: Positive for decreased libido. Negative for frequency, hematuria, hesitancy and incomplete emptying.   Neurological: Positive for excessive daytime sleepiness,  "dizziness, light-headedness and paresthesias. Negative for headaches, loss of balance, numbness and seizures.   Psychiatric/Behavioral: Positive for depression. The patient is nervous/anxious.           Objective:     Vitals:    01/15/20 1442   BP: 100/62   BP Location: Left arm   Patient Position: Sitting   Cuff Size: Adult   Pulse: 79   Weight: 91.2 kg (201 lb)   Height: 154.9 cm (61\")     Body mass index is 37.98 kg/m².    PHYSICAL EXAM:  Physical Exam   Constitutional: She is oriented to person, place, and time. She appears well-developed and well-nourished. No distress.   HENT:   Head: Normocephalic and atraumatic.   Eyes: Pupils are equal, round, and reactive to light. Conjunctivae and EOM are normal.   Neck: Carotid bruit is not present.   Difficult to assess JVD d/t body habitus   Cardiovascular: Normal rate, regular rhythm and intact distal pulses.   Murmur (1/6 systolic RUSB radiating to neck) heard.  Pulses:       Radial pulses are 1+ on the right side, and 1+ on the left side.        Posterior tibial pulses are 1+ on the right side, and 1+ on the left side.   1+ bilateral lower extremity edema   Pulmonary/Chest: Effort normal and breath sounds normal. No accessory muscle usage. No tachypnea. No respiratory distress. She has no decreased breath sounds. She has no wheezes. She has no rhonchi. She has no rales. She exhibits no tenderness.   Abdominal: Soft. Bowel sounds are normal. She exhibits no distension. There is no tenderness. There is no rebound and no guarding.   Obese   Musculoskeletal: Normal range of motion. She exhibits no edema.   Neurological: She is alert and oriented to person, place, and time.   Skin: Skin is warm, dry and intact. She is not diaphoretic. No erythema.   Psychiatric: She has a normal mood and affect. Her speech is normal and behavior is normal. Judgment and thought content normal. Cognition and memory are normal.   Nursing note and vitals reviewed.        ECG 12 " Lead  Date/Time: 1/15/2020 2:48 PM  Performed by: Monserrat Bunn APRN  Authorized by: Monserrat Bunn APRN   Comparison: compared with previous ECG from 12/31/2019  Similar to previous ECG  Rhythm: sinus rhythm  Rate: normal  BPM: 79  QRS axis: borderline right.  Other findings: non-specific ST-T wave changes and T wave abnormality    Clinical impression: abnormal EKG  Comments: Inferolateral t-wave abnormalities  Indication: CAD, hx of CABG            Assessment:       Diagnosis Plan   1. PVD (peripheral vascular disease) ([facility])  Ambulatory Referral to Gastroenterology   2. Coronary artery disease involving native coronary artery of native heart without angina pectoris  Ambulatory Referral to Gastroenterology   3. Left ventricular systolic dysfunction  Ambulatory Referral to Sleep Medicine    Ambulatory Referral to Gastroenterology   4. Mixed hyperlipidemia  Ambulatory Referral to Gastroenterology   5. DM (diabetes mellitus), type 2 with neurological complications ([facility])  Ambulatory Referral to Gastroenterology   6. Chronic kidney disease, stage 3 (moderate) (CMS/HCC)  Ambulatory Referral to Gastroenterology   7. S/P CABG (coronary artery bypass graft)  Ambulatory Referral to Gastroenterology   8. Blood loss anemia  Ambulatory Referral to Gastroenterology   9. Suspected sleep apnea  Ambulatory Referral to Sleep Medicine    Ambulatory Referral to Gastroenterology   10. Pulmonary hypertension ([facility])  Ambulatory Referral to Sleep Medicine    Ambulatory Referral to Gastroenterology   11. Morbid obesity with BMI of 40.0-44.9, adult ([facility])  Ambulatory Referral to Sleep Medicine    Ambulatory Referral to Gastroenterology       Plan:     1. NSTEMI:  Appeared to be type 2 from demand ischemia from anemia and hypotension on hospital admission, left subclavian stenosis and CHF.  No acute or unstable stenosis noted on cath from 1/2/2020.      2. Acute systolic CHF: Overall improved without signs of severe volume  overload today. Currently off diuretics.   3. Acute hypoxic respiratory failure: Resolved.   4. Cardiomyopathy:  New onset during recent admission with an EF of 40% and wall motion abnormalities.  May be due to decreased perfusion through left subclavian stenosis and LIMA graft from hypotension and anemia. Plan to repeat down the line. She was started on GDMT with Toprol XL.  She is not on ACE inhibitor/ARB/ARNI due to renal dysfunction.  5. GI bleed: Recent 12/2019  EGD with gastritis but no active bleed.  She does not have a follow-up appointment with GI though was recommended she follow-up in the office in 6-week post hospital discharge.  Will place referral to ensure she is following regularly with GI as after her left subclavian stenosis is addressed she will need to be on clopidogrel.  6. ABEBE/CKD stage 4: She has a follow-up with her nephrologist Dr. López next week  7. Coronary artery disease:   Status post prior CABG x 7.  (LIMA to LAD, SVG to D1, SVG to OM1, SVG to OM2, sequential SVG to right marginal, PDA and RUSTY.)  Grafts all patent on cath from 1/2/2020.  8. Acute on chronic anemia:  Stable following initial transfusion following admission and discontinuation of aspirin and clopidogrel.  She is currently tolerating resumption of aspirin and H&H improved today to 9.9/30.6.  9. History of bilateral fem-pop bypass   10. Recurrent left subclavian artery in-stent restenosis: Status post prior angioplasty.  Recurrent stenosis on recent cath 1/2/2020. She will need to be back on clopidogrel for at least 1 month if repeat stenting of stenosis is required.  Dr. Uriostegui has discussed this case with Dr. Diamond.  The plan will be to proceed with intervention when cleared per nephrology who she sees next week.  11. Hypertension: Blood pressure is actually borderline low today.  She is not having significant dizziness or lightheadedness.  I have encouraged them to monitor at home with addition of Toprol XL.  12.  Dyslipidemia:  On atorvastatin  13. Diabetes mellitus type 2 : Defer  14.  Aortic stenosis: Mild on December 2019 echocardiogram.  Continue to monitor in the future.  Max pressure gradient 12.5 mmHg, mean pressure gradient 7 mmHg, aortic valve area 1.3 cm²  15.  Pulmonary hypertension: RVSP 83 on 12/2019 echo  16.  Suspected KVNG: Due to her elevated RVSP on echo and symptoms suggestive of sleep apnea.  Refer her to sleep medicine.  I did discuss with her the importance of her heart health and treatment of sleep apnea.  They would like to see Dr. Parmar as her son has worked closely with him and his office is very close to their home.  17. Morbid obesity    -Plan to intervene on left subclavian in-stent restenosis when cleared by nephrology.  She has appointment with Dr. López's office next week.  We will touch base with him at that time to see if nephrology is agreeable to allow her to proceed with intervention.  Fortunately the case should require little dye load.  Further plan of care will be determined whether or not she is cleared per nephrology to proceed with intervention.    I spent over 30 minutes of 40-minute visit reviewing plan of care with the patient, answering questions.    Follow up with Dr. Uriostegui in 4 weeks, unless otherwise needed sooner.  I advised the patient to contact our office with any questions or concerns.      I have reviewed this case with Dr. Uriostegui It has been a pleasure to participate in this patient's care. Please feel free to contact me with any questions or concerns.     Monserrat Bunn, APRN  01/15/20             Your medication list           Accurate as of January 15, 2020  6:22 PM. If you have any questions, ask your nurse or doctor.               CHANGE how you take these medications      Instructions Last Dose Given Next Dose Due   fluticasone 50 MCG/ACT nasal spray  Commonly known as:  FLONASE  What changed:    · when to take this  · reasons to take this      2 sprays into the  nostril(s) as directed by provider Daily.          CONTINUE taking these medications      Instructions Last Dose Given Next Dose Due   acetaminophen 500 MG tablet  Commonly known as:  TYLENOL      Take 500 mg by mouth Every 6 (Six) Hours As Needed for Mild Pain .       aspirin 81 MG EC tablet      Take 81 mg by mouth Daily. PT HOLDING FOR SURGERY       atorvastatin 80 MG tablet  Commonly known as:  LIPITOR      Take 80 mg by mouth Daily.       clotrimazole 1 % vaginal cream  Commonly known as:  LOTRIMIN      Insert 1 application into the vagina As Needed.       dicyclomine 20 MG tablet  Commonly known as:  BENTYL      Take 20 mg by mouth 3 (Three) Times a Day As Needed.       docusate sodium 100 MG capsule  Commonly known as:  COLACE      Take 100 mg by mouth every night at bedtime.       gabapentin 300 MG capsule  Commonly known as:  NEURONTIN      Take 1 capsule by mouth every night at bedtime.       insulin aspart 100 UNIT/ML solution pen-injector sc pen  Commonly known as:  NOVOLOG FLEXPEN      Inject 15 Units under the skin into the appropriate area as directed 3 (Three) Times a Day With Meals.       lactulose 10 GM/15ML solution  Commonly known as:  CHRONULAC      Take 15 mL by mouth Daily As Needed (constipation).       LEVEMIR FLEXTOUCH 100 UNIT/ML injection  Generic drug:  insulin detemir      Inject 45 Units under the skin into the appropriate area as directed Every Night.       melatonin 5 MG tablet tablet      Take 5 mg by mouth Every Night.       metoclopramide 5 MG tablet  Commonly known as:  REGLAN           metoprolol succinate XL 25 MG 24 hr tablet  Commonly known as:  TOPROL XL      Take 1 tablet by mouth Daily.       metroNIDAZOLE 500 MG tablet  Commonly known as:  FLAGYL      Take 1 tablet by mouth 3 (Three) Times a Day.       sucralfate 1 g tablet  Commonly known as:  CARAFATE      Take 1 g by mouth 4 (Four) Times a Day.       Vortioxetine HBr 10 MG tablet  Commonly known as:  TRINTELLIX       Take 10 mg by mouth Daily.          STOP taking these medications    pantoprazole 40 MG EC tablet  Commonly known as:  PROTONIX  Stopped by:  EBEN Bishop               The above medication changes may not have been made by this provider.  Medication list was updated to reflect medications patient is currently taking including medication changes and discontinuations made by other healthcare providers.     Dictated utilizing Dragon Dictation System.

## 2020-01-17 NOTE — OUTREACH NOTE
AMI Week 2 Survey      Responses   Facility patient discharged from?  [facility]   Does the patient have one of the following disease processes/diagnoses(primary or secondary)?  Acute MI (STEMI,NSTEMI)   Week 2 attempt successful?  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  Yes   List who call center can speak with  Son   Meds reviewed with patient/caregiver?  Yes   Is the patient having any side effects they believe may be caused by any medication additions or changes?  No   Does the patient have all prescriptions related to this admission filled (includes statins,anticoagulants,HTN meds,anti-arrhythmia meds)  Yes   Is the patient taking all medications as directed (includes completed medication regime)?  Yes   Does the patient have a primary care provider?   Yes   Does the patient have an appointment with their PCP,cardiologist,or clinic within 7 days of discharge?  Yes   Has the patient kept scheduled appointments due by today?  Yes   What is the Home health agency?   [facility]   Has home health visited the patient within 72 hours of discharge?  Yes   Psychosocial issues?  No   Comments  No issues at cath site.   Did the patient receive a copy of their discharge instructions?  Yes   Nursing interventions  Reviewed instructions with patient   What is the patient's perception of their health status since discharge?  Improving   Nursing interventions  Nurse provided patient education   Is the patient/caregiver able to teach back signs and symptoms of when to call for help immediately:  Sudden chest discomfort, Sudden discomfort in arms, back, neck or jaw, Shortness of breath at any time, Sudden sweating or clammy skin, Nausea or vomiting, Irregular or rapid heart rate, Dizziness or lightheadedness   Nursing interventions  Nurse provided patient education   Is the pateint  /caregiver able to teach back the importance of cardiac rehab?  Yes   Nursing interventions  Provided education on importance of cardiac rehab   Is the patient/caregiver able to teach back lifestyle changes to help prevent MIs  Quit smoking, Regular exercise as approved by provider, Limiting alcohol intake, Reducing stress, Managing diabetes, Maintaining a healthy weight, Heart healthy diet   Is the patient/caregiver able to teach back ways to prevent a second heart attack:  Take medications, Follow up with MD, Participate in Cardiac Rehab, Manage risk factors, Get support (AHA website:supportnetwork.heart.org)   Is the patient/caregiver able to teach back the hierarchy of who to call/visit for symptoms/problems? PCP, Specialist, Home health nurse, Urgent Care, ED, 911  Yes   Additional teach back comments  She is doing better, he says she is grumbly which means she is better.  Will have another cath after kidney function checked.   Week 2 call completed?  Yes          Jackie Gonzales RN

## 2020-01-27 NOTE — OUTREACH NOTE
AMI Week 3 Survey      Responses   Facility patient discharged from?  [facility]   Does the patient have one of the following disease processes/diagnoses(primary or secondary)?  Acute MI (STEMI,NSTEMI)   Week 3 attempt successful?  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  Yes   Is the patient having any side effects they believe may be caused by any medication additions or changes?  No   Is the patient taking all medications as directed (includes completed medication regime)?  Yes   Does the patient have a primary care provider?   Yes   Has the patient kept scheduled appointments due by today?  Yes   What is the Home health agency?   [facility]   Has home health visited the patient within 72 hours of discharge?  Yes   Psychosocial issues?  No   Comments  Monitoring bp 120/? which is WNL for pt per pt.    What is the patient's perception of their health status since discharge?  Improving   Is the patient/caregiver able to teach back signs and symptoms of when to call for help immediately:  Sudden chest discomfort, Sudden discomfort in arms, back, neck or jaw, Shortness of breath at any time, Sudden sweating or clammy skin   Week 3 call completed?  Yes   Revoked  No further contact(revokes)-requires comment   Graduated/Revoked comments  doing well          Sangeeta Prieto RN

## 2020-02-10 NOTE — TELEPHONE ENCOUNTER
Blood counts down still, but improving;  Is patient feeling better?  Fax copy of cbc to nephrology;  Recheck again in 4 weeks.

## 2020-02-13 NOTE — PROGRESS NOTES
Subjective:     Encounter Date:02/13/2020      Patient ID: Randi Martines is a 69 y.o. female.    Chief Complaint:  History of Present Illness    The patient is a 67-year-old female with a history of coronary artery disease status post coronary artery bypass surgery x7, peripheral vascular disease, diabetes mellitus type 2, hypertension, dyslipidemia, chronic kidney disease, carotid artery disease, hypothyroidism, status post right femoral/popliteal bypass, recurrent left subclavian artery in-stent restenosis, recurrent GI bleed, chronic anemia, who presents for follow up.      Did not see the patient again until she was admitted to the hospital in 12/2019 with a GI bleed.  She presented to the emergency room with complaints of dyspnea on exertion and orthopnea.  She was noted to have melena and a hemoglobin of 7.2.  Following her admission she developed hypotension and worsening dyspnea and required transfusion of 2 units of packed red blood cells with improvement of her symptoms.  Following her admission she was noted to have an elevated BNP, troponin, and the patient reported right-sided neck and jaw pain which were similar to what she had prior to her CABG in the past.  After blood transfusion her symptoms did improve.  She had no associated EKG changes with her symptoms or her elevated troponins.  I felt that her presentation was likely due to type II non-STEMI from demand ischemia.  However recommended proceeding with an echocardiogram which did show mildly decreased left ventricular systolic function with an EF of 45%, apical anterior, and basal to mid anterolateral hypokinesis, mild aortic stenosis, mild mitral regurgitation, mild to moderate tricuspid regurgitation, and severe pulmonary hypertension with a right ventricular systolic pressure of 82 mmHg.  Initially she was not felt to be candidate for invasive work-up because of her GI bleed and need to hold aspirin and clopidogrel.  The plan was to  eventually consider cardiac catheterization once her acute issues improved.  She did develop some volume overload during that admission that eventually improved with diuresis.  In addition her renal insufficiency had worsened and nephrology was consulted.  Once her respiratory status improved she was able to proceed with an EGD which showed evidence of gastritis but no active bleed.  Aspirin was resumed.  And we opted to proceed with cardiac catheterization on 1/2/2020.  This showed 20% stenosis of the left main artery, 50 to 60% stenosis of the mid LAD, occlusion of the obtuse marginal branches, and 100% occlusion of the proximal right coronary artery.  She had a patent LIMA to LAD, saphenous vein graft to the first diagonal branch, saphenous vein graft to the OM1, saphenous vein graft to the OM 2, and a patent saphenous vein graft to the RV branch and PDA.  She incidentally was noted to have severe in-stent stenosis of the left subclavian artery stent.  It was felt that he likely had demand ischemia from his subclavian artery in-stent restenosis.  The plan was for her to recover from her acute issues with plans for eventual intervention of the subclavian artery stenosis.    Following her discharge she was seen by EBEN Bazan on 1/15/2020.  At that time she was doing well.  Recommended waiting until she was reevaluated by Dr. López and had her hemoglobin rechecked before recommending proceeding with peripheral intervention.  Today she presents for routine follow-up.  Since I saw her last she has been evaluated by Dr. López who  has cleared her to proceed with intervention.  Her most recent creatinine was 1.9.  Her hemoglobin is improved to 9.9.  She denies any chest pain or jaw pain.  She denies any palpitations, worsening lower extremity edema, near-syncope or syncope.  She reports intermittent episodes of shortness of breath but these seem to occur both at rest or with activity and just come on  randomly.  The patient believes that it is secondary to anxiety.  So far she has been tolerating aspirin.     Prior History:  The patient was previously seen by Dr. Hilliard with [facility].  Before I had seen her, she had been last seen by Dr. Hilliard during a hospitalization at [facility] in 2014.  At that time, she underwent an echocardiogram that showed moderate septal hypokinesis with an ejection fraction of 40% to 45%, mild left ventricular hypertrophy, grade 1 diastolic dysfunction, and mild mitral regurgitation.  She last underwent cardiac catheterization in 06/2011 by Dr. Serna.  She was found to have multivessel coronary artery disease with patent grafts and in-stent restenosis of a proximal left subclavian artery stent.  She underwent balloon angioplasty of this at the time.      I saw her initially in 3/2015.  Following her initial office visit she was admitted at least twice (once in 10/2015 for chest discomfort that was felt to be atypical and more epigastric and gastrointestinal related).  In  11/2016 she was diagnosed with gastric ulcers.  There was concern that the gastric ulcers were ischemic so she underwent a CTA of the abdomen that showed celiac artery and SMA disease that vascular surgery felt it was best to manage medically.  Since then she was followed closely by vascular surgery and by gastroenterology.  She was found to have a high-grade stenosis of her left common femoral artery and Dr. Barrientos recommend a left femoral/popliteal bypass that she eventually underwent in 6/2017.  I last saw her in 7/2018 at which time she was doing well and plan was to see her back again in a year.      Review of Systems   Constitution: Positive for malaise/fatigue.   HENT: Negative for hearing loss, hoarse voice, nosebleeds and sore throat.    Eyes: Negative for pain.   Cardiovascular: Positive for leg swelling. Negative for chest pain, claudication, cyanosis, dyspnea on exertion, irregular  heartbeat, near-syncope, orthopnea, palpitations, paroxysmal nocturnal dyspnea and syncope.   Respiratory: Positive for shortness of breath and snoring.    Endocrine: Positive for cold intolerance and heat intolerance. Negative for polydipsia, polyphagia and polyuria.   Skin: Negative for itching and rash.   Musculoskeletal: Positive for joint pain and myalgias. Negative for arthritis, falls, joint swelling, muscle cramps and muscle weakness.   Gastrointestinal: Negative for constipation, diarrhea, dysphagia, heartburn, hematemesis, hematochezia, melena, nausea and vomiting.   Genitourinary: Positive for decreased libido. Negative for frequency, hematuria and hesitancy.   Neurological: Positive for excessive daytime sleepiness, dizziness, light-headedness and numbness. Negative for headaches and weakness.   Psychiatric/Behavioral: Positive for depression. The patient is nervous/anxious.          Current Outpatient Medications:   •  acetaminophen (TYLENOL) 500 MG tablet, Take 500 mg by mouth Every 6 (Six) Hours As Needed for Mild Pain ., Disp: , Rfl:   •  aspirin 81 MG EC tablet, Take 81 mg by mouth Daily. PT HOLDING FOR SURGERY, Disp: , Rfl:   •  atorvastatin (LIPITOR) 80 MG tablet, Take 80 mg by mouth Daily., Disp: , Rfl:   •  clotrimazole (LOTRIMIN) 1 % vaginal cream, Insert 1 application into the vagina As Needed., Disp: , Rfl:   •  dicyclomine (BENTYL) 20 MG tablet, Take 20 mg by mouth 3 (Three) Times a Day As Needed., Disp: , Rfl:   •  docusate sodium (COLACE) 100 MG capsule, Take 100 mg by mouth every night at bedtime., Disp: , Rfl:   •  fluticasone (FLONASE) 50 MCG/ACT nasal spray, 2 sprays into the nostril(s) as directed by provider Daily. (Patient taking differently: 2 sprays into the nostril(s) as directed by provider Daily As Needed.), Disp: 9.9 mL, Rfl: 0  •  gabapentin (NEURONTIN) 300 MG capsule, Take 1 capsule by mouth every night at bedtime., Disp: 360 capsule, Rfl: 1  •  insulin aspart (NOVOLOG  FLEXPEN) 100 UNIT/ML solution pen-injector sc pen, Inject 15 Units under the skin into the appropriate area as directed 3 (Three) Times a Day With Meals., Disp: , Rfl:   •  lactulose (CHRONULAC) 10 GM/15ML solution, Take 15 mL by mouth Daily As Needed (constipation)., Disp: 236 mL, Rfl: 0  •  LEVEMIR FLEXTOUCH 100 UNIT/ML injection, Inject 45 Units under the skin into the appropriate area as directed Every Night., Disp: , Rfl: 0  •  melatonin 5 MG tablet tablet, Take 5 mg by mouth Every Night., Disp: , Rfl:   •  metoclopramide (REGLAN) 5 MG tablet, , Disp: , Rfl:   •  metoprolol succinate XL (TOPROL XL) 25 MG 24 hr tablet, Take 1 tablet by mouth Daily., Disp: 30 tablet, Rfl: 0  •  metroNIDAZOLE (FLAGYL) 500 MG tablet, Take 1 tablet by mouth 3 (Three) Times a Day., Disp: 21 tablet, Rfl: 0  •  sucralfate (CARAFATE) 1 g tablet, Take 1 g by mouth 4 (Four) Times a Day., Disp: , Rfl:   •  Vortioxetine HBr (TRINTELLIX) 10 MG tablet, Take 10 mg by mouth Daily., Disp: 30 tablet, Rfl: 0    Past Medical History:   Diagnosis Date   • CAD (coronary artery disease)    • Chronic kidney disease    • Chronic systolic congestive heart failure (CMS/HCC)    • Diabetes mellitus (CMS/HCC)    • Dyslipidemia    • Foot ulcer (CMS/HCC)    • History of diverticulitis    • History of stomach ulcers    • History of transfusion    • Hyperlipemia    • Hypertension    • Mesenteric ischemia (CMS/HCC)    • NSTEMI (non-ST elevated myocardial infarction) (CMS/HCC)    • Peptic ulcer disease    • PONV (postoperative nausea and vomiting)    • PVD (peripheral vascular disease) (CMS/[facility])        Past Surgical History:   Procedure Laterality Date   • APPENDECTOMY  2015   • CARDIAC CATHETERIZATION N/A 1/2/2020    Procedure: LEFT HEART CATH  NO LV;  Surgeon: Emilio Serna MD;  Location:  [facility] CATH INVASIVE LOCATION;  Service: Cardiology   • CARDIAC CATHETERIZATION N/A 1/2/2020    Procedure: CORONARY ANGIOGRAPHY;  Surgeon: Emilio Serna MD;  Location:   [facility] CATH INVASIVE LOCATION;  Service: Cardiology   • CARDIAC CATHETERIZATION N/A 1/2/2020    Procedure: SAPHENOUS VEIN GRAFT;  Surgeon: Emilio Serna MD;  Location: [facility] CATH INVASIVE LOCATION;  Service: Cardiology   • CATARACT EXTRACTION     • CHOLECYSTECTOMY     • COLONOSCOPY  2015   • CORONARY ARTERY BYPASS GRAFT      7 coronary bypass surgery   • ENDOSCOPY N/A 11/23/2016    Procedure: ESOPHAGOGASTRODUODENOSCOPY ;  Surgeon: Katherine Green MD;  Location: [facility] ENDOSCOPY;  Service:    • ENDOSCOPY N/A 12/29/2019    Procedure: ESOPHAGOGASTRODUODENOSCOPYwith bx;  Surgeon: Ziyad Manzo MD;  Location: [facility] ENDOSCOPY;  Service: Gastroenterology   • ERCP N/A 10/2/2017    Procedure: ENDOSCOPIC RETROGRADE CHOLANGIOPANCREATOGRAPHY with sphincterotomy, balloon sweep;  Surgeon: Christopher Graf MD;  Location: [facility] ENDOSCOPY;  Service:    • HERNIA REPAIR  2015   • HYSTERECTOMY     • VA VEIN IN SITU BYPASS GRAFT,FEM-POP Left 5/31/2017    Procedure: ULTRASOUND RIGHT AIF, ARTERIOGRAM W/ LEFT COMMON ILIAC ANGIOPLASTY STENT, LT FEMORAL POPLITEAL BYPASS WITH ARTEGRAFT, LEFT POPLITEAL ENDARTERECTOMY PATCH, LEFT COMMON ARTERIOGRAM, LEFT EXTERNAL ILIAC ANGIOPLASTY STENT PLACEMENT, AORTA,  EXTERNAL, DISTAL PRESSURES;  Surgeon: Jayda Barrientos MD;  Location: [facility] HYBRID OR 18/19;  Service: Vascular   • SKIN GRAFT SPLIT THICKNESS Left 6/7/2017    Procedure: DEBRIDEMENT AND SKIN GRAFT TO  LEFT DORSAL FOOT AND TOES;  Surgeon: Maurine Waterhouse, MD;  Location: [facility] MAIN OR;  Service:    • UPPER GASTROINTESTINAL ENDOSCOPY  2015    scar in gastric body, gastric ulcers       Family History   Problem Relation Age of Onset   • Heart disease Mother         cardiac disorder   • Cancer Maternal Grandmother    • Stroke Maternal Grandfather    • Hypertension Maternal Grandfather    • Malig Hyperthermia Neg Hx        Social History     Tobacco Use   • Smoking status: Former Smoker     Packs/day: 3.00     Years: 20.00     Pack  "years: 60.00   • Smokeless tobacco: Never Used   • Tobacco comment: caffeine use   Substance Use Topics   • Alcohol use: No     Comment: caffeine use    • Drug use: No         ECG 12 Lead  Date/Time: 2/13/2020 4:57 PM  Performed by: Samantha Uriostegui MD  Authorized by: Samantha Uriostegui MD   Comparison: compared with previous ECG   Similar to previous ECG  Rhythm: sinus rhythm  T inversion: II, III, aVF, V4, V5 and V6                 Objective:     Visit Vitals  /82 (BP Location: Left arm, Patient Position: Sitting, Cuff Size: Large Adult)   Pulse 86   Ht 154.9 cm (61\")   Wt 88.4 kg (194 lb 12.8 oz)   SpO2 98%   BMI 36.81 kg/m²         Physical Exam   Constitutional: She is oriented to person, place, and time. She appears well-developed and well-nourished.   HENT:   Head: Normocephalic and atraumatic.   Eyes: Pupils are equal, round, and reactive to light. Conjunctivae, EOM and lids are normal.   Neck: Normal range of motion and full passive range of motion without pain. Neck supple. No JVD present. Carotid bruit is not present.   Cardiovascular: Normal rate, regular rhythm, S1 normal and S2 normal. Exam reveals no gallop.   No murmur heard.  Pulses:       Radial pulses are 2+ on the right side, and 2+ on the left side.   No bilateral lower extremity edema   Pulmonary/Chest: Effort normal and breath sounds normal.   Abdominal: Soft. Normal appearance.   Lymphadenopathy:     She has no cervical adenopathy.   Neurological: She is alert and oriented to person, place, and time.   Skin: Skin is warm, dry and intact.   Psychiatric: She has a normal mood and affect.       Lab Review:       Assessment:          Diagnosis Plan   1. Coronary artery disease involving native coronary artery of native heart without angina pectoris     2. S/P CABG (coronary artery bypass graft)     3. PVD (peripheral vascular disease) (CMS/[facility])     4. Mixed hyperlipidemia     5. DM (diabetes mellitus), type 2 with neurological complications " (CMS/HCC)     6. Chronic kidney disease, stage 3 (moderate) (CMS/HCC)     7. Anemia of chronic disease            Plan:       1.  Left subclavian artery in-stent restenosis.  At this point and she appears to be stable enough that we can proceed with procedure as planned.  Discussed with Dr. Diamond and will have this scheduled at the patient's convenience.  The patient is aware that she will have to be placed back on clopidogrel for at least a month after intervention.  2.  Coronary artery disease.  Recent cardiac catheterization with patent grafts and in-stent restenosis of the left subclavian artery as above.  Continue current management aspirin and statin.  Plans to resume clopidogrel as above following intervention.  3.  Peripheral vascular disease.  History of bilateral femoral-popliteal bypass.  4.  Cardiomyopathy.  EF of 40%.  Suspect it is related to demand ischemia.  I suspect this will improve.  She is on medical therapy with metoprolol succinate.  She is not on an ACE inhibitor or an ARB due to renal dysfunction.  5.  Recent GI bleed.  No recurrent issues presently.  6.  Chronic kidney disease stage IV.  Followed by Dr. López.  From his standpoint she is okay to proceed with intervention.  We will have the patient come in early for IV fluids although she will not require a large amount of contrast during the study.  7.  Chronic anemia.  8.  Hypertension.  Elevated in the office today but she reports it is normally well controlled.  9.  Hyperlipidemia.  On atorvastatin.  10.  Diabetes mellitus type 2  11.  Aortic stenosis.  Noted to be mild on her last echocardiogram in 12/2019.  Will monitor with repeat echocardiogram in the next 1 to 2 years.  12.  Pulmonary hypertension.  Likely related to untreated sleep apnea.  13.  Suspected obstructive sleep apnea.  She is to be set up with a sleep study by her primary care physician.  14.  Anxiety    We will see the patient back after her peripheral  intervention.

## 2020-02-18 PROBLEM — I77.1 SUBCLAVIAN ARTERY STENOSIS, LEFT (HCC): Status: ACTIVE | Noted: 2020-01-01

## 2020-03-09 NOTE — PROGRESS NOTES
Subjective   Randi Martines is a 69 y.o. female. Presents today for   Chief Complaint   Patient presents with   • Hypertension   • Diabetes       Hypertension   This is a chronic problem. The current episode started more than 1 year ago. Associated symptoms include peripheral edema. Pertinent negatives include no chest pain, orthopnea, palpitations, PND or shortness of breath. There are no associated agents to hypertension. Past treatments include beta blockers. Current antihypertension treatment includes beta blockers. The current treatment provides moderate improvement. Hypertensive end-organ damage includes kidney disease, CAD/MI and PVD. Identifiable causes of hypertension include chronic renal disease.   Diabetes   She presents for her follow-up diabetic visit. She has type 2 diabetes mellitus. Her disease course has been stable. Associated symptoms include fatigue, foot paresthesias and visual change. Pertinent negatives for diabetes include no chest pain, no foot ulcerations, no polydipsia and no polyuria. Symptoms are stable. Diabetic complications include PVD. Risk factors for coronary artery disease include diabetes mellitus, dyslipidemia, hypertension, post-menopausal and obesity. Current diabetic treatment includes insulin injections. She is compliant with treatment most of the time. She is following a generally healthy diet. An ACE inhibitor/angiotensin II receptor blocker is not being taken.   Leg Pain    The incident occurred more than 1 week ago. The incident occurred at home. There was no injury mechanism. The pain is present in the left leg, left foot, right leg and right foot. The quality of the pain is described as burning and shooting. The pain is severe. The pain has been worsening since onset. Associated symptoms include numbness and tingling. Treatments tried: Had gabapentin reduced in dose due to worsening renal disease but pain much worse;  reports not on duloxetien though on med list.      Has soft tissue mass left side, painful to palpation;  Has been told lipoma o/w not changing.    Review of Systems   Constitutional: Positive for fatigue.   Respiratory: Negative for shortness of breath.    Cardiovascular: Negative for chest pain, palpitations, orthopnea and PND.   Endocrine: Negative for polydipsia and polyuria.   Neurological: Positive for tingling and numbness.       Patient Active Problem List   Diagnosis   • Abdominal pain   • Intractable abdominal pain   • DM (diabetes mellitus), type 2 with neurological complications (CMS/[facility])   • PVD (peripheral vascular disease) (CMS/[facility])   • Mesenteric ischemia (CMS/[facility])   • Acute gastric ulcer   • S/P CABG (coronary artery bypass graft)   • Coronary artery disease involving native coronary artery of native heart without angina pectoris   • Atherosclerotic PVD with ulceration (CMS/[facility])   • Dyslipidemia   • Type 2 diabetes mellitus without complication, with long-term current use of insulin (CMS/[facility])   • Biliary obstruction   • Hyperlipemia   • Malabsorption of iron   • Chronic kidney disease, stage 3 (moderate) (CMS/[facility])   • Anemia of chronic disease   • Gastrointestinal hemorrhage   • NSTEMI (non-ST elevated myocardial infarction) (CMS/[facility])   • Left ventricular systolic dysfunction   • Blood loss anemia   • Melena   • Acute respiratory failure with hypoxia (CMS/[facility])   • Subclavian artery stenosis, left (CMS/[facility])   • Hypertension       Social History     Socioeconomic History   • Marital status:      Spouse name: Not on file   • Number of children: Not on file   • Years of education: Not on file   • Highest education level: Not on file   Tobacco Use   • Smoking status: Former Smoker     Packs/day: 3.00     Years: 20.00     Pack years: 60.00     Types: Cigarettes     Start date: 1969     Last attempt to quit: 2000     Years since quittin.2   • Smokeless tobacco: Never Used   • Tobacco comment: caffeine use   Substance and Sexual  Activity   • Alcohol use: No     Comment: caffeine use    • Drug use: No   • Sexual activity: Defer     Partners: Female     Birth control/protection: None       Allergies   Allergen Reactions   • Augmentin [Amoxicillin-Pot Clavulanate] GI Intolerance and Dizziness   • Codeine GI Intolerance and Dizziness       Current Outpatient Medications on File Prior to Visit   Medication Sig Dispense Refill   • acetaminophen (TYLENOL) 500 MG tablet Take 500 mg by mouth Every 6 (Six) Hours As Needed for Mild Pain .     • aspirin 81 MG EC tablet Take 81 mg by mouth Daily. PT HOLDING FOR SURGERY     • atorvastatin (LIPITOR) 80 MG tablet Take 80 mg by mouth Daily.     • clotrimazole (LOTRIMIN) 1 % vaginal cream Insert 1 application into the vagina As Needed.     • dicyclomine (BENTYL) 20 MG tablet Take 20 mg by mouth 3 (Three) Times a Day As Needed.     • docusate sodium (COLACE) 100 MG capsule Take 100 mg by mouth every night at bedtime.     • fluticasone (FLONASE) 50 MCG/ACT nasal spray 2 sprays into the nostril(s) as directed by provider Daily. (Patient taking differently: 2 sprays into the nostril(s) as directed by provider Daily As Needed.) 9.9 mL 0   • gabapentin (NEURONTIN) 300 MG capsule Take 1 capsule by mouth every night at bedtime. 360 capsule 1   • insulin aspart (NOVOLOG FLEXPEN) 100 UNIT/ML solution pen-injector sc pen Inject 15 Units under the skin into the appropriate area as directed 3 (Three) Times a Day With Meals.     • lactulose (CHRONULAC) 10 GM/15ML solution Take 15 mL by mouth Daily As Needed (constipation). 236 mL 0   • LEVEMIR FLEXTOUCH 100 UNIT/ML injection Inject 45 Units under the skin into the appropriate area as directed Every Night.  0   • melatonin 5 MG tablet tablet Take 5 mg by mouth Every Night.     • metoclopramide (REGLAN) 5 MG tablet      • metoprolol succinate XL (TOPROL XL) 25 MG 24 hr tablet Take 1 tablet by mouth Daily. 30 tablet 0   • metroNIDAZOLE (FLAGYL) 500 MG tablet Take 1 tablet by  "mouth 3 (Three) Times a Day. 21 tablet 0   • sucralfate (CARAFATE) 1 g tablet Take 1 g by mouth 4 (Four) Times a Day.       No current facility-administered medications on file prior to visit.        Objective   Vitals:    03/09/20 1604   BP: 126/76   BP Location: Left arm   Patient Position: Sitting   Cuff Size: Adult   Pulse: 89   SpO2: 90%   Weight: 89.8 kg (198 lb)   Height: 154.9 cm (61\")       Physical Exam   Constitutional: She appears well-developed and well-nourished.   HENT:   Head: Normocephalic and atraumatic.   Neck: Neck supple. No JVD present. No thyromegaly present.   Cardiovascular: Normal rate, regular rhythm and normal heart sounds. Exam reveals no gallop and no friction rub.   No murmur heard.  Pulmonary/Chest: Effort normal and breath sounds normal. No respiratory distress. She has no wheezes. She has no rales.   Abdominal: Soft. Bowel sounds are normal. She exhibits no distension. There is no tenderness. There is no rebound and no guarding.   Musculoskeletal: She exhibits edema.   Neurological: She is alert.   Skin: Skin is warm and dry.   Soft, rubbery mass left side of trunk, feels c/w lipoma   Psychiatric: She has a normal mood and affect. Her behavior is normal.   Nursing note and vitals reviewed.      Assessment/Plan   Randi was seen today for hypertension and diabetes.    Diagnoses and all orders for this visit:    Diabetic peripheral neuropathy (CMS/HCC)  -     DULoxetine (CYMBALTA) 30 MG capsule; Take 1 capsule by mouth Daily.    Lipoma of torso  Comments:  left side, painful    Chronic kidney disease, stage 3 (moderate) (CMS/HCC)    Type 2 diabetes mellitus without complication, with long-term current use of insulin (CMS/HCC)    Essential hypertension    -dm2 - has been controlled;  DPN uncontrolled, will start cymbalta to see if helps with pain;  D/w gabapentin needs renally dosed and why cut back.    -lipoma - monitor for now;  Consider imagign if enlarges or any changes  -CKD - " tight blood pressure, blood sugar control and avoid nsaids.  -hypertension - controlled, continue medications           -Follow up: 3 months and prn

## 2020-03-15 PROBLEM — I10 HYPERTENSION: Status: ACTIVE | Noted: 2020-01-01

## 2020-03-18 NOTE — TELEPHONE ENCOUNTER
3/18 Pt is scheduled for Peripheral Angiography 3/27 DX: PVD, this falls under tier 3 which makes this an elective therfore, we can schedule pt out 30 days. Is this appropriate at this time of pandemic or do you want to proceed?     Please let me know     Thank you     Casi DALAL

## 2020-05-27 NOTE — PROGRESS NOTES
Discussed worsening renal function with Dr. Diamond.  We decided it would be best to cancel her procedure for this Friday.  I called to discuss this with her son (which is the listed contact number) and to recommend that she get in with her nephrologist.  Left a message asking for a call back.

## 2020-06-04 NOTE — OUTREACH NOTE
Care Coordination Assessment    Documented/Reviewed By:  Eloisa Cedeño RN Date/time:  6/4/2020 12:27 PM   Assessment completed with:  patient  Enrolled in care management program:  No  Living arrangement:  children  Support system:  family, friends  Type of residence:  private residence  Home care services:  No  Equipment used at home:  walker  Communication device:  No  Bed or wheelchair confined:  No  Inadequate nutrition:  No  Medication adherence problem:  No  Experiencing side effects from current medications:  Yes  Difficulty keeping appointments:  No  Family aware of the patient's advance care planning wishes:  No  Oriental orthodox or spiritual beliefs that impact treatment:  No  Chronic pain:  Yes  Location of chronic pain:  back pain  Chronic pain timing:  intermittent  Chronic pain severity:  10  Limitation of routine activities due to chronic pain:  moderate

## 2020-06-04 NOTE — OUTREACH NOTE
Care Plan Note      Responses   Lifestyle Goals  Increase physical activity, Medication management   Barriers  Disease education   Annual Wellness Visit:   Patient Has Completed   Health Literacy  Moderate        The main concerns and/or symptoms the patient would like to address are: weakness    Education/instruction provided by Care Coordinator: Very brief call with pt's son who states pt is doing better. Remains weak but is not having anymore dizziness or falls. They have made an apt with nephrology and PCP. Suggested they discuss home health visits for PT at PCP visit to assist with increasing pt strength and safety. He states he will discuss with PCP.  Pt given number for [place] care management [phone] and resources provided by her insurance company. 24/7 care managers, community health workers, health coaches, transportation. Pt verbalizes understanding of above information. No further questions or concerns.       Follow Up Outreach Due: as needed  Eloisa Cedeño RN  Ambulatory     6/4/2020, 12:29

## 2020-06-09 PROBLEM — N18.6 ESRD (END STAGE RENAL DISEASE) (HCC): Status: ACTIVE | Noted: 2020-01-01

## 2020-06-09 NOTE — CONSULTS
Referring Provider: Dr Gonzáles   Reason for Consultation: advanced CKD     Subjective     Chief complaint No chief complaint on file.      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  Sees Dr López in our office.  Renal fcn has worsened in past 6 mos, with SCr 2.2 in January, 3.8 on 5/26/20, 3.6 now, with eGFR approx 13 ml/min.  Severely hypokalemic with K 2.8.  HCO3 normal 28, BUN 48.  K was normal 4.1 in Jan but 3.1 on 5/26 and 2.8 on 5/30.  She's not on diuretics and reports nausea but rarely vomits.  No diarrhea.  Reports sig BLE weakness that has resulted in recent falls.  Started taking KCL supplement 2 days ago.  Denies dyspnea or swelling.  She has dysgeusia and has lost considerable weight.  Also recalls low Mg in past, does take a PPI.  She has not seen vascular surg for AVF evaluation.  She lives alone but is interested in home therapy.  BP elevated to 168/92.  CT head on 5/30 (following fall) showed right frontal scalp hematoma but no other abnormality, while CXR today is normal.  A1c notably good 6.2.    Past Medical History:   Diagnosis Date   • CAD (coronary artery disease)    • Chronic kidney disease    • Chronic systolic congestive heart failure (CMS/HCC)    • Diabetes mellitus (CMS/HCC)    • Dyslipidemia    • Foot ulcer (CMS/HCC)    • History of diverticulitis    • History of stomach ulcers    • History of transfusion    • Hyperlipemia    • Hypertension    • Mesenteric ischemia (CMS/HCC)    • NSTEMI (non-ST elevated myocardial infarction) (CMS/HCC)    • Peptic ulcer disease    • PONV (postoperative nausea and vomiting)    • PVD (peripheral vascular disease) (CMS/HCC)      Past Surgical History:   Procedure Laterality Date   • APPENDECTOMY  2015   • CARDIAC CATHETERIZATION N/A 1/2/2020    Procedure: LEFT HEART CATH  NO LV;  Surgeon:  Emilio Serna MD;  Location: [facility] CATH INVASIVE LOCATION;  Service: Cardiology   • CARDIAC CATHETERIZATION N/A 1/2/2020    Procedure: CORONARY ANGIOGRAPHY;  Surgeon: Emilio Serna MD;  Location: [facility] CATH INVASIVE LOCATION;  Service: Cardiology   • CARDIAC CATHETERIZATION N/A 1/2/2020    Procedure: SAPHENOUS VEIN GRAFT;  Surgeon: Emilio Serna MD;  Location: [facility] CATH INVASIVE LOCATION;  Service: Cardiology   • CATARACT EXTRACTION     • CHOLECYSTECTOMY     • COLONOSCOPY  2015   • CORONARY ARTERY BYPASS GRAFT      7 coronary bypass surgery   • ENDOSCOPY N/A 11/23/2016    Procedure: ESOPHAGOGASTRODUODENOSCOPY ;  Surgeon: Katherine Green MD;  Location: [facility] ENDOSCOPY;  Service:    • ENDOSCOPY N/A 12/29/2019    Procedure: ESOPHAGOGASTRODUODENOSCOPYwith bx;  Surgeon: Ziyad Manzo MD;  Location: [facility] ENDOSCOPY;  Service: Gastroenterology   • ERCP N/A 10/2/2017    Procedure: ENDOSCOPIC RETROGRADE CHOLANGIOPANCREATOGRAPHY with sphincterotomy, balloon sweep;  Surgeon: Christopher Graf MD;  Location: [facility] ENDOSCOPY;  Service:    • HERNIA REPAIR  2015   • HYSTERECTOMY     • OK VEIN IN SITU BYPASS GRAFT,FEM-POP Left 5/31/2017    Procedure: ULTRASOUND RIGHT AIF, ARTERIOGRAM W/ LEFT COMMON ILIAC ANGIOPLASTY STENT, LT FEMORAL POPLITEAL BYPASS WITH ARTEGRAFT, LEFT POPLITEAL ENDARTERECTOMY PATCH, LEFT COMMON ARTERIOGRAM, LEFT EXTERNAL ILIAC ANGIOPLASTY STENT PLACEMENT, AORTA,  EXTERNAL, DISTAL PRESSURES;  Surgeon: Jayda Barrientos MD;  Location: [facility] HYBRID OR 18/19;  Service: Vascular   • SKIN GRAFT SPLIT THICKNESS Left 6/7/2017    Procedure: DEBRIDEMENT AND SKIN GRAFT TO  LEFT DORSAL FOOT AND TOES;  Surgeon: Maurine Waterhouse, MD;  Location: [facility] OR;  Service:    • UPPER GASTROINTESTINAL ENDOSCOPY  2015    scar in gastric body, gastric ulcers     Family History   Problem Relation Age of Onset   • Heart disease Mother         cardiac disorder   • Cancer Maternal Grandmother    • Stroke  Maternal Grandfather    • Hypertension Maternal Grandfather    • Malig Hyperthermia Neg Hx        Social History     Tobacco Use   • Smoking status: Former Smoker     Packs/day: 3.00     Years: 20.00     Pack years: 60.00     Types: Cigarettes     Start date: 1969     Last attempt to quit: 2000     Years since quittin.4   • Smokeless tobacco: Never Used   • Tobacco comment: caffeine use   Substance Use Topics   • Alcohol use: No     Comment: caffeine use    • Drug use: No     Medications Prior to Admission   Medication Sig Dispense Refill Last Dose   • acetaminophen (TYLENOL) 500 MG tablet Take 500 mg by mouth Every 6 (Six) Hours As Needed for Mild Pain .   2020 at Unknown time   • aspirin 81 MG EC tablet Take 81 mg by mouth Daily. PT HOLDING FOR SURGERY   2020 at Unknown time   • atorvastatin (LIPITOR) 80 MG tablet Take 80 mg by mouth Daily.   2020 at Unknown time   • docusate sodium (COLACE) 100 MG capsule Take 100 mg by mouth every night at bedtime.   2020 at Unknown time   • gabapentin (NEURONTIN) 300 MG capsule Take 1 capsule by mouth every night at bedtime. 360 capsule 1 2020 at Unknown time   • insulin aspart (NOVOLOG FLEXPEN) 100 UNIT/ML solution pen-injector sc pen Inject 15 Units under the skin into the appropriate area as directed 3 (Three) Times a Day With Meals.   2020 at Unknown time   • lactulose (CHRONULAC) 10 GM/15ML solution Take 15 mL by mouth Daily As Needed (constipation). 236 mL 0 Past Week at Unknown time   • LEVEMIR FLEXTOUCH 100 UNIT/ML injection Inject 45 Units under the skin into the appropriate area as directed Every Night.  0 2020 at Unknown time   • melatonin 5 MG tablet tablet Take 5 mg by mouth Every Night.   2020 at Unknown time   • metoclopramide (REGLAN) 5 MG tablet TAKE 1 TABLET EVERY MORNING 90 tablet 1 2020 at Unknown time   • metoprolol succinate XL (TOPROL XL) 25 MG 24 hr tablet Take 1 tablet by mouth Daily. 30 tablet 0 2020  "at Unknown time   • pantoprazole (PROTONIX) 40 MG EC tablet TAKE 1 TABLET TWICE DAILY 180 tablet 1 6/9/2020 at Unknown time   • potassium chloride (K-DUR,KLOR-CON) 20 MEQ CR tablet Take 1 tablet by mouth Daily. 30 tablet 0 6/8/2020 at Unknown time   • sucralfate (CARAFATE) 1 g tablet TAKE 1 TABLET FOUR TIMES DAILY (Patient taking differently: Take 1 g by mouth Daily.) 360 tablet 1 Patient Taking Differently at Unknown time   • clotrimazole (LOTRIMIN) 1 % vaginal cream Insert 1 application into the vagina As Needed.   More than a month at Unknown time   • dicyclomine (BENTYL) 20 MG tablet Take 20 mg by mouth 3 (Three) Times a Day As Needed.   More than a month at Unknown time   • DROPLET PEN NEEDLES 31G X 8 MM misc USE AS DIRECTED 4 TIMES DAILY 400 each 2    • DULoxetine (CYMBALTA) 30 MG capsule Take 1 capsule by mouth Daily. 30 capsule 5    • fluticasone (FLONASE) 50 MCG/ACT nasal spray 2 sprays into the nostril(s) as directed by provider Daily. (Patient taking differently: 2 sprays into the nostril(s) as directed by provider Daily As Needed.) 9.9 mL 0 Taking   • metroNIDAZOLE (FLAGYL) 500 MG tablet Take 1 tablet by mouth 3 (Three) Times a Day. 21 tablet 0 Taking     Allergies:  Augmentin [amoxicillin-pot clavulanate] and Codeine    Review of Systems  Pertinent items are noted in HPI.    Objective     Vital Signs  Temp:  [99.9 °F (37.7 °C)] 99.9 °F (37.7 °C)  Heart Rate:  [89] 89  Resp:  [18] 18  BP: (168)/(92) 168/92    Flowsheet Rows      First Filed Value   Admission Height  152.4 cm (60\") Documented at 06/09/2020 1523   Admission Weight  74.3 kg (163 lb 11.2 oz) Documented at 06/09/2020 1523           I/O this shift:  In: -   Out: 100 [Urine:100]  No intake/output data recorded.    Intake/Output Summary (Last 24 hours) at 6/9/2020 1823  Last data filed at 6/9/2020 1653  Gross per 24 hour   Intake --   Output 100 ml   Net -100 ml       Physical Exam:  GEN pleasant WF in no distress, comfortable, alert  HEENT " NC/AT OP clear  Neck supple no JVD  Lungs CTA bilat no rales  CV RRR no M/G  abd soft NT/ND +BS  vasc no pedal/ankle edema, 2+ radial pulses  MS no joint warmth or erythema  Derm normal turgor no rash  Neuro oriented x3 speech intact     Results Review:  Results for orders placed or performed during the hospital encounter of 06/09/20   CBC Auto Differential   Result Value Ref Range    WBC 12.18 (H) 3.40 - 10.80 10*3/mm3    RBC 4.80 3.77 - 5.28 10*6/mm3    Hemoglobin 13.2 12.0 - 15.9 g/dL    Hematocrit 40.2 34.0 - 46.6 %    MCV 83.8 79.0 - 97.0 fL    MCH 27.5 26.6 - 33.0 pg    MCHC 32.8 31.5 - 35.7 g/dL    RDW 15.1 12.3 - 15.4 %    RDW-SD 45.2 37.0 - 54.0 fl    MPV 9.8 6.0 - 12.0 fL    Platelets 349 140 - 450 10*3/mm3    Neutrophil % 70.5 42.7 - 76.0 %    Lymphocyte % 19.5 (L) 19.6 - 45.3 %    Monocyte % 6.7 5.0 - 12.0 %    Eosinophil % 2.2 0.3 - 6.2 %    Basophil % 0.6 0.0 - 1.5 %    Immature Grans % 0.5 0.0 - 0.5 %    Neutrophils, Absolute 8.60 (H) 1.70 - 7.00 10*3/mm3    Lymphocytes, Absolute 2.37 0.70 - 3.10 10*3/mm3    Monocytes, Absolute 0.81 0.10 - 0.90 10*3/mm3    Eosinophils, Absolute 0.27 0.00 - 0.40 10*3/mm3    Basophils, Absolute 0.07 0.00 - 0.20 10*3/mm3    Immature Grans, Absolute 0.06 (H) 0.00 - 0.05 10*3/mm3    nRBC 0.0 0.0 - 0.2 /100 WBC   Comprehensive Metabolic Panel   Result Value Ref Range    Glucose 136 (H) 65 - 99 mg/dL    BUN 48 (H) 8 - 23 mg/dL    Creatinine 3.64 (H) 0.57 - 1.00 mg/dL    Sodium 136 136 - 145 mmol/L    Potassium 2.8 (L) 3.5 - 5.2 mmol/L    Chloride 94 (L) 98 - 107 mmol/L    CO2 28.6 22.0 - 29.0 mmol/L    Calcium 9.6 8.6 - 10.5 mg/dL    Total Protein 7.6 6.0 - 8.5 g/dL    Albumin 3.20 (L) 3.50 - 5.20 g/dL    ALT (SGPT) 7 1 - 33 U/L    AST (SGOT) 19 1 - 32 U/L    Alkaline Phosphatase 72 39 - 117 U/L    Total Bilirubin 0.3 0.2 - 1.2 mg/dL    eGFR Non African Amer 12 (L) >60 mL/min/1.73    eGFR  African Amer      Globulin 4.4 gm/dL    A/G Ratio 0.7 g/dL    BUN/Creatinine Ratio  13.2 7.0 - 25.0    Anion Gap 13.4 5.0 - 15.0 mmol/L   Protime-INR   Result Value Ref Range    Protime 12.7 11.7 - 14.2 Seconds    INR 0.98 0.90 - 1.10   aPTT   Result Value Ref Range    PTT 31.0 22.7 - 35.4 seconds   Hemoglobin A1c   Result Value Ref Range    Hemoglobin A1C 6.20 (H) 4.80 - 5.60 %   POC Glucose Once   Result Value Ref Range    Glucose 133 (H) 70 - 130 mg/dL     Imaging Results (Last 72 Hours)     Procedure Component Value Units Date/Time    XR Chest PA & Lateral [[id number]] Collected:  06/09/20 1755     Updated:  06/09/20 1755    Narrative:       TWO-VIEW CHEST     HISTORY: Renal failure. Hypertension.     FINDINGS: The lungs are well-expanded and clear. The heart size is  normal with sternal wires from previous cardiac surgery. Previous mild  cardiomegaly noted on 12/26/2019 is no longer present. There is no acute  disease.                 aspirin 81 mg Oral Daily   atorvastatin 80 mg Oral Daily   docusate sodium 100 mg Oral BID   gabapentin 300 mg Oral Nightly   insulin glargine 30 Units Subcutaneous Nightly   insulin lispro 0-7 Units Subcutaneous TID AC   insulin lispro 15 Units Subcutaneous TID With Meals   melatonin 5 mg Oral Nightly   [START ON 6/10/2020] metoclopramide 5 mg Oral QAM   metoprolol succinate XL 25 mg Oral Daily   pantoprazole 40 mg Oral BID   sodium chloride 10 mL Intravenous Q12H   sucralfate 1 g Oral 4x Daily          Assessment/Plan   ESRD - suspect progressive diabetic nephropathy given DM2 of 20y duration + retinopathy/neuropathy; Cr was 2.2 in January, 3.5 - 3.8 since 5/26/20, 3.6 today, with eGFR only 13 ml/min and uremic (nausea, fatigue, wt loss); appears euvolemic, no dyspnea/swelling.  Prior UA's with > 300mg/dL protein.  Discussed need to initiate HD via TDC placement, agreeable but also interested in home therapy so will aim for placement at transitional car unit.  Needs vein map for AVF/AVG, latter may be needed given h/o PVD.  Not a transplant candidate based on  age & comorbidities.    Hypokalemia - severe, K 2.8, despite worsening renal failure and mild nausea with minimal emesis, no diarrhea or diuretic -- peculiar; suspect severe BLE weakness is related; check Mg (on PPI)  HTN - initial BP high 168/92; does not appear vol overloaded; room to inc toprol XL (HR 80s)   DM2 + retinopathy/neuropathy - on gabapentin for latter; A1c good 6.2  Anemia of CKD - hgb is normal 13.2  H/o CAD s/p remote CABG  GERD, on PPI   Dyslipidemia, on statin     Plan  - KCL 40meq PO x1 now  - check CK, Mg, HBsAg  - NPO after MN, consult vascular surg for TDC placement  - initiate dialysis, 2h gentle treatment to start w/o UF, 4K bath  - vein mapping for perm access eval  - room to titrate toprol XL if needed, PRN hydralazine for now  - CCP for outpatient dialysis chair, [facility] transitional care unit    Thank you Dr Gonzáles for involving me in pt's care.          ESRD (end stage renal disease) (CMS/[facility])    DM (diabetes mellitus), type 2 with neurological complications (CMS/[facility])    PVD (peripheral vascular disease) (CMS/[facility])    S/P CABG (coronary artery bypass graft)    Dyslipidemia    Type 2 diabetes mellitus without complication, with long-term current use of insulin (CMS/[facility])    Hyperlipemia    Anemia of chronic disease        I discussed the patients findings and my recommendations with patient    Jurgen Tirado MD  06/09/20  18:23      Much of this encounter note is an electronic transcription/translation of spoken language to printed text. The electronic translation of spoken language may permit erroneous, or at times, nonsensical words or phrases to be inadvertently transcribed; Although I have reviewed the note for such errors, some may still exist

## 2020-06-09 NOTE — H&P
HISTORY AND PHYSICAL   [facility]        Patient Identification:  Name: Randi Martines  Age: 69 y.o.  Sex: female  :  1950  MRN: [id number]                     Primary Care Physician: Jose Morrison DO    Chief Complaint:  Weakness with nausea and weight loss, worsening renal failure    History of Present Illness:          The patient is a 69-year-old white female with history of coronary artery disease, chronic kidney disease, CHF, diabetes, hypertension, hyperlipidemia, peripheral vascular disease and peptic ulcer disease who is admitted with worsening renal failure that is approaching end-stage with symptomatic disease.  Her nephrologist referred the patient is in for direct admission to initiate dialysis.  Her creatinine was reportedly recently 3.5 and GFR of 12.  Patient had normal potassium but has been symptomatic with weight loss and nausea.  She is lost about 30 pounds and has felt pretty weak.  She has neuropathy and she has been falling down.  She denies any chest pain or shortness of air.  She has not had any fevers or chills.  She denies any cough cold or flu symptoms.  Patient was admitted for nephrology consultation and initiation of dialysis.    Past Medical History:  Past Medical History:   Diagnosis Date   • CAD (coronary artery disease)    • Chronic kidney disease    • Chronic systolic congestive heart failure (CMS/HCC)    • Diabetes mellitus (CMS/HCC)    • Dyslipidemia    • Foot ulcer (CMS/HCC)    • History of diverticulitis    • History of stomach ulcers    • History of transfusion    • Hyperlipemia    • Hypertension    • Mesenteric ischemia (CMS/HCC)    • NSTEMI (non-ST elevated myocardial infarction) (CMS/HCC)    • Peptic ulcer disease    • PONV (postoperative nausea and vomiting)    • PVD (peripheral vascular disease) (CMS/HCC)      Past Surgical History:  Past Surgical History:   Procedure Laterality Date   • APPENDECTOMY     • CARDIAC CATHETERIZATION N/A  1/2/2020    Procedure: LEFT HEART CATH  NO LV;  Surgeon: Emilio Serna MD;  Location: [facility] CATH INVASIVE LOCATION;  Service: Cardiology   • CARDIAC CATHETERIZATION N/A 1/2/2020    Procedure: CORONARY ANGIOGRAPHY;  Surgeon: Emilio Serna MD;  Location: [facility] CATH INVASIVE LOCATION;  Service: Cardiology   • CARDIAC CATHETERIZATION N/A 1/2/2020    Procedure: SAPHENOUS VEIN GRAFT;  Surgeon: Emilio Serna MD;  Location: [facility] CATH INVASIVE LOCATION;  Service: Cardiology   • CATARACT EXTRACTION     • CHOLECYSTECTOMY     • COLONOSCOPY  2015   • CORONARY ARTERY BYPASS GRAFT      7 coronary bypass surgery   • ENDOSCOPY N/A 11/23/2016    Procedure: ESOPHAGOGASTRODUODENOSCOPY ;  Surgeon: Katherine Green MD;  Location: [facility] ENDOSCOPY;  Service:    • ENDOSCOPY N/A 12/29/2019    Procedure: ESOPHAGOGASTRODUODENOSCOPYwith bx;  Surgeon: Ziyad Manzo MD;  Location: [facility] ENDOSCOPY;  Service: Gastroenterology   • ERCP N/A 10/2/2017    Procedure: ENDOSCOPIC RETROGRADE CHOLANGIOPANCREATOGRAPHY with sphincterotomy, balloon sweep;  Surgeon: Christopher Graf MD;  Location: [facility] ENDOSCOPY;  Service:    • HERNIA REPAIR  2015   • HYSTERECTOMY     • ND VEIN IN SITU BYPASS GRAFT,FEM-POP Left 5/31/2017    Procedure: ULTRASOUND RIGHT AIF, ARTERIOGRAM W/ LEFT COMMON ILIAC ANGIOPLASTY STENT, LT FEMORAL POPLITEAL BYPASS WITH ARTEGRAFT, LEFT POPLITEAL ENDARTERECTOMY PATCH, LEFT COMMON ARTERIOGRAM, LEFT EXTERNAL ILIAC ANGIOPLASTY STENT PLACEMENT, AORTA,  EXTERNAL, DISTAL PRESSURES;  Surgeon: Jayda Barrientos MD;  Location: [facility] HYBRID OR 18/19;  Service: Vascular   • SKIN GRAFT SPLIT THICKNESS Left 6/7/2017    Procedure: DEBRIDEMENT AND SKIN GRAFT TO  LEFT DORSAL FOOT AND TOES;  Surgeon: Maurine Waterhouse, MD;  Location: [facility] MAIN OR;  Service:    • UPPER GASTROINTESTINAL ENDOSCOPY  2015    scar in gastric body, gastric ulcers      Home Meds:  Medications Prior to Admission   Medication Sig Dispense Refill Last Dose    • acetaminophen (TYLENOL) 500 MG tablet Take 500 mg by mouth Every 6 (Six) Hours As Needed for Mild Pain .   6/9/2020 at Unknown time   • aspirin 81 MG EC tablet Take 81 mg by mouth Daily. PT HOLDING FOR SURGERY   6/9/2020 at Unknown time   • atorvastatin (LIPITOR) 80 MG tablet Take 80 mg by mouth Daily.   6/8/2020 at Unknown time   • docusate sodium (COLACE) 100 MG capsule Take 100 mg by mouth every night at bedtime.   6/8/2020 at Unknown time   • gabapentin (NEURONTIN) 300 MG capsule Take 1 capsule by mouth every night at bedtime. 360 capsule 1 6/8/2020 at Unknown time   • insulin aspart (NOVOLOG FLEXPEN) 100 UNIT/ML solution pen-injector sc pen Inject 15 Units under the skin into the appropriate area as directed 3 (Three) Times a Day With Meals.   6/9/2020 at Unknown time   • lactulose (CHRONULAC) 10 GM/15ML solution Take 15 mL by mouth Daily As Needed (constipation). 236 mL 0 Past Week at Unknown time   • LEVEMIR FLEXTOUCH 100 UNIT/ML injection Inject 45 Units under the skin into the appropriate area as directed Every Night.  0 6/8/2020 at Unknown time   • melatonin 5 MG tablet tablet Take 5 mg by mouth Every Night.   6/8/2020 at Unknown time   • metoclopramide (REGLAN) 5 MG tablet TAKE 1 TABLET EVERY MORNING 90 tablet 1 6/9/2020 at Unknown time   • metoprolol succinate XL (TOPROL XL) 25 MG 24 hr tablet Take 1 tablet by mouth Daily. 30 tablet 0 6/9/2020 at Unknown time   • pantoprazole (PROTONIX) 40 MG EC tablet TAKE 1 TABLET TWICE DAILY 180 tablet 1 6/9/2020 at Unknown time   • potassium chloride (K-DUR,KLOR-CON) 20 MEQ CR tablet Take 1 tablet by mouth Daily. 30 tablet 0 6/8/2020 at Unknown time   • sucralfate (CARAFATE) 1 g tablet TAKE 1 TABLET FOUR TIMES DAILY (Patient taking differently: Take 1 g by mouth Daily.) 360 tablet 1 Patient Taking Differently at Unknown time   • clotrimazole (LOTRIMIN) 1 % vaginal cream Insert 1 application into the vagina As Needed.   More than a month at Unknown time   •  dicyclomine (BENTYL) 20 MG tablet Take 20 mg by mouth 3 (Three) Times a Day As Needed.   More than a month at Unknown time   • DROPLET PEN NEEDLES 31G X 8 MM misc USE AS DIRECTED 4 TIMES DAILY 400 each 2    • DULoxetine (CYMBALTA) 30 MG capsule Take 1 capsule by mouth Daily. 30 capsule 5    • fluticasone (FLONASE) 50 MCG/ACT nasal spray 2 sprays into the nostril(s) as directed by provider Daily. (Patient taking differently: 2 sprays into the nostril(s) as directed by provider Daily As Needed.) 9.9 mL 0 Taking   • metroNIDAZOLE (FLAGYL) 500 MG tablet Take 1 tablet by mouth 3 (Three) Times a Day. 21 tablet 0 Taking     Current meds  No current facility-administered medications for this encounter.   Allergies:  Allergies   Allergen Reactions   • Augmentin [Amoxicillin-Pot Clavulanate] GI Intolerance and Dizziness   • Codeine GI Intolerance and Dizziness     Immunizations:  Immunization History   Administered Date(s) Administered   • Fluzone High Dose =>65 Years ([facility] ONLY) 10/16/2018, 2019   • Influenza Quad Vaccine (Inpatient) 10/03/2017     Social History:   Social History     Social History Narrative   • Not on file     Social History     Socioeconomic History   • Marital status:      Spouse name: Not on file   • Number of children: Not on file   • Years of education: Not on file   • Highest education level: Not on file   Tobacco Use   • Smoking status: Former Smoker     Packs/day: 3.00     Years: 20.00     Pack years: 60.00     Types: Cigarettes     Start date: 1969     Last attempt to quit: 2000     Years since quittin.4   • Smokeless tobacco: Never Used   • Tobacco comment: caffeine use   Substance and Sexual Activity   • Alcohol use: No     Comment: caffeine use    • Drug use: No   • Sexual activity: Defer     Partners: Female     Birth control/protection: None       Family History:  Family History   Problem Relation Age of Onset   • Heart disease Mother         cardiac disorder   •  "Cancer Maternal Grandmother    • Stroke Maternal Grandfather    • Hypertension Maternal Grandfather    • Malig Hyperthermia Neg Hx         Review of Systems  See history of present illness and past medical history.  Patient denies headache, dizziness, syncope, falls, trauma, change in vision, change in hearing, change in taste,  changes in appetite, focal weakness, numbness, or paresthesia.  Patient denies chest pain, palpitations, dyspnea, orthopnea, PND, cough, sinus pressure, rhinorrhea, epistaxis, hemoptysis,hematemesis, diarrhea, constipation or hematchezia.  Denies cold or heat intolerance, polydipsia, polyuria, polyphagia. Denies hematuria, pyuria, dysuria, hesitancy, frequency or urgency.   Denies fever, chills, sweats, night sweats.  Denies missing any routine medications. Remainder of ROS is negative.    Objective:  tMax 24 hrs: Temp (24hrs), Av.9 °F (37.7 °C), Min:99.9 °F (37.7 °C), Max:99.9 °F (37.7 °C)    Vitals Ranges:   Temp:  [99.9 °F (37.7 °C)] 99.9 °F (37.7 °C)  Heart Rate:  [89] 89  Resp:  [18] 18  BP: (168)/(92) 168/92      Exam:  /92 (BP Location: Left arm, Patient Position: Sitting)   Pulse 89   Temp 99.9 °F (37.7 °C) (Oral)   Resp 18   Ht 152.4 cm (60\")   Wt 74.3 kg (163 lb 11.2 oz)   SpO2 97%   BMI 31.97 kg/m²     General Appearance:    Alert, cooperative, no distress, appears stated age   Head:    Normocephalic, without obvious abnormality, atraumatic   Eyes:    PERRL, conjunctiva/corneas clear, EOM's intact, both eyes   Ears:    Normal external ear canals, both ears   Nose:   Nares normal, septum midline, mucosa normal, no drainage    or sinus tenderness   Throat:   Lips, mucosa, and tongue normal   Neck:   Supple, symmetrical, trachea midline, no adenopathy;     thyroid:  no enlargement/tenderness/nodules; no carotid    bruit or JVD   Back:     Symmetric, no curvature, ROM normal, no CVA tenderness   Lungs:     Clear to auscultation bilaterally, respirations unlabored "   Chest Wall:    No tenderness or deformity    Heart:    Regular rate and rhythm, S1 and S2 normal, no murmur, rub   or gallop   Abdomen:     Soft, non-tender, bowel sounds active all four quadrants,     no masses, no hepatomegaly, no splenomegaly   Extremities:   Extremities normal, atraumatic, no cyanosis or edema   Pulses:   2+ and symmetric all extremities   Skin:   Skin color, texture, turgor normal, no rashes or lesions   Lymph nodes:   Cervical, supraclavicular, and axillary nodes normal   Neurologic:   CNII-XII intact, normal strength, sensation intact throughout      .    Data Review:Cr 3.5 with GFR 12  Lab Results (last 72 hours)     ** No results found for the last 72 hours. **                   Imaging Results (All)     None        Past Medical History:   Diagnosis Date   • CAD (coronary artery disease)    • Chronic kidney disease    • Chronic systolic congestive heart failure (CMS/[facility])    • Diabetes mellitus (CMS/[facility])    • Dyslipidemia    • Foot ulcer (CMS/[facility])    • History of diverticulitis    • History of stomach ulcers    • History of transfusion    • Hyperlipemia    • Hypertension    • Mesenteric ischemia (CMS/[facility])    • NSTEMI (non-ST elevated myocardial infarction) (CMS/[facility])    • Peptic ulcer disease    • PONV (postoperative nausea and vomiting)    • PVD (peripheral vascular disease) (CMS/[facility])        Assessment:  Active Hospital Problems    Diagnosis  POA   • **ESRD (end stage renal disease) (CMS/[facility]) [N18.6]  Unknown   • Anemia of chronic disease [D63.8]  Yes   • Hyperlipemia [E78.5]  Yes   • Dyslipidemia [E78.5]  Yes   • Type 2 diabetes mellitus without complication, with long-term current use of insulin (CMS/[facility]) [E11.9, Z79.4]  Not Applicable   • S/P CABG (coronary artery bypass graft) [Z95.1]  Not Applicable   • DM (diabetes mellitus), type 2 with neurological complications (CMS/[facility]) [E11.49]  Yes   • PVD (peripheral vascular disease) (CMS/[facility]) [I73.9]  Yes      Resolved Hospital Problems   No  resolved problems to display.       Plan:  The patient admitted to the hospital we will get some baseline laboratory studies.  Will ask for nephrology consult and likely needs to start on dialysis soon and get access.    Jeff Gonzáles MD  6/9/2020  16:11

## 2020-06-10 PROBLEM — E87.6 HYPOKALEMIA: Status: ACTIVE | Noted: 2020-01-01

## 2020-06-10 PROBLEM — E66.9 OBESITY (BMI 30-39.9): Status: ACTIVE | Noted: 2020-01-01

## 2020-06-10 NOTE — CONSULTS
Name: Randi Martines ADMIT: 2020   : 1950  PCP: Jose Morrison DO    MRN: [id number] LOS: 1 days   AGE/SEX: 69 y.o. female  ROOM: [address]      Patient Care Team:  Jose Morrison DO as PCP - General (Family Medicine)  Samantha Uriostegui MD as Consulting Physician (Cardiology)  No chief complaint on file.    CC:Need hemodialysis access    Subjective     Inpatient Vascular Surgery Consult  Consult performed by: Stevan Graves MD  Consult ordered by: Jurgen Tirado MD  Reason for consult: Hemodialysis access creation tunneled catheter  Assessment/Recommendations: Plan tunneled catheter placement today.        69-year-old female with acute on chronic renal failure secondary to diabetes mellitus.  Patient with stable cardiopulmonary disease.  Patient also with peripheral vascular disease stable.  Patient has had worsening renal function and symptoms over past few months.  Patient needs to initiate hemodialysis.  Will place tunneled dialysis catheter today.  Risks of surgery discussed and agrees to proceed.  Will need arm access evaluate for fistula with mapping in future.    Review of Systems   Constitutional: Positive for fatigue.   Respiratory: Positive for shortness of breath.    All other systems reviewed and are negative.      Past Medical History:   Diagnosis Date   • CAD (coronary artery disease)    • Chronic kidney disease    • Chronic systolic congestive heart failure (CMS/HCC)    • Diabetes mellitus (CMS/HCC)    • Dyslipidemia    • Foot ulcer (CMS/HCC)    • History of diverticulitis    • History of stomach ulcers    • History of transfusion    • Hyperlipemia    • Hypertension    • Mesenteric ischemia (CMS/HCC)    • NSTEMI (non-ST elevated myocardial infarction) (CMS/HCC)    • Peptic ulcer disease    • PONV (postoperative nausea and vomiting)    • PVD (peripheral vascular disease) (CMS/HCC)      Past Surgical History:   Procedure Laterality Date   •  APPENDECTOMY  2015   • CARDIAC CATHETERIZATION N/A 1/2/2020    Procedure: LEFT HEART CATH  NO LV;  Surgeon: Emilio Serna MD;  Location: [facility] CATH INVASIVE LOCATION;  Service: Cardiology   • CARDIAC CATHETERIZATION N/A 1/2/2020    Procedure: CORONARY ANGIOGRAPHY;  Surgeon: Emilio Serna MD;  Location: [facility] CATH INVASIVE LOCATION;  Service: Cardiology   • CARDIAC CATHETERIZATION N/A 1/2/2020    Procedure: SAPHENOUS VEIN GRAFT;  Surgeon: Emilio Serna MD;  Location: [facility] CATH INVASIVE LOCATION;  Service: Cardiology   • CATARACT EXTRACTION     • CHOLECYSTECTOMY     • COLONOSCOPY  2015   • CORONARY ARTERY BYPASS GRAFT      7 coronary bypass surgery   • ENDOSCOPY N/A 11/23/2016    Procedure: ESOPHAGOGASTRODUODENOSCOPY ;  Surgeon: Katherine Green MD;  Location: [facility] ENDOSCOPY;  Service:    • ENDOSCOPY N/A 12/29/2019    Procedure: ESOPHAGOGASTRODUODENOSCOPYwith bx;  Surgeon: Ziyad Manzo MD;  Location: [facility] ENDOSCOPY;  Service: Gastroenterology   • ERCP N/A 10/2/2017    Procedure: ENDOSCOPIC RETROGRADE CHOLANGIOPANCREATOGRAPHY with sphincterotomy, balloon sweep;  Surgeon: Christopher Graf MD;  Location: [facility] ENDOSCOPY;  Service:    • HERNIA REPAIR  2015   • HYSTERECTOMY     • IN VEIN IN SITU BYPASS GRAFT,FEM-POP Left 5/31/2017    Procedure: ULTRASOUND RIGHT AIF, ARTERIOGRAM W/ LEFT COMMON ILIAC ANGIOPLASTY STENT, LT FEMORAL POPLITEAL BYPASS WITH ARTEGRAFT, LEFT POPLITEAL ENDARTERECTOMY PATCH, LEFT COMMON ARTERIOGRAM, LEFT EXTERNAL ILIAC ANGIOPLASTY STENT PLACEMENT, AORTA,  EXTERNAL, DISTAL PRESSURES;  Surgeon: Jayda Barrientos MD;  Location: [facility] HYBRID OR 18/19;  Service: Vascular   • SKIN GRAFT SPLIT THICKNESS Left 6/7/2017    Procedure: DEBRIDEMENT AND SKIN GRAFT TO  LEFT DORSAL FOOT AND TOES;  Surgeon: Maurine Waterhouse, MD;  Location: [facility] MAIN OR;  Service:    • UPPER GASTROINTESTINAL ENDOSCOPY  2015    scar in gastric body, gastric ulcers     Family History   Problem Relation Age  of Onset   • Heart disease Mother         cardiac disorder   • Cancer Maternal Grandmother    • Stroke Maternal Grandfather    • Hypertension Maternal Grandfather    • Malig Hyperthermia Neg Hx      Social History     Tobacco Use   • Smoking status: Former Smoker     Packs/day: 3.00     Years: 20.00     Pack years: 60.00     Types: Cigarettes     Start date: 1969     Last attempt to quit: 2000     Years since quittin.4   • Smokeless tobacco: Never Used   • Tobacco comment: caffeine use   Substance Use Topics   • Alcohol use: No     Comment: caffeine use    • Drug use: No     Medications Prior to Admission   Medication Sig Dispense Refill Last Dose   • acetaminophen (TYLENOL) 500 MG tablet Take 500 mg by mouth Every 6 (Six) Hours As Needed for Mild Pain .   2020 at Unknown time   • aspirin 81 MG EC tablet Take 81 mg by mouth Daily. PT HOLDING FOR SURGERY   2020 at Unknown time   • atorvastatin (LIPITOR) 80 MG tablet Take 80 mg by mouth Daily.   2020 at Unknown time   • docusate sodium (COLACE) 100 MG capsule Take 100 mg by mouth every night at bedtime.   2020 at Unknown time   • gabapentin (NEURONTIN) 300 MG capsule Take 1 capsule by mouth every night at bedtime. 360 capsule 1 2020 at Unknown time   • insulin aspart (NOVOLOG FLEXPEN) 100 UNIT/ML solution pen-injector sc pen Inject 15 Units under the skin into the appropriate area as directed 3 (Three) Times a Day With Meals.   2020 at Unknown time   • lactulose (CHRONULAC) 10 GM/15ML solution Take 15 mL by mouth Daily As Needed (constipation). 236 mL 0 Past Week at Unknown time   • LEVEMIR FLEXTOUCH 100 UNIT/ML injection Inject 45 Units under the skin into the appropriate area as directed Every Night.  0 2020 at Unknown time   • melatonin 5 MG tablet tablet Take 5 mg by mouth Every Night.   2020 at Unknown time   • metoclopramide (REGLAN) 5 MG tablet TAKE 1 TABLET EVERY MORNING 90 tablet 1 2020 at Unknown time   •  metoprolol succinate XL (TOPROL XL) 25 MG 24 hr tablet Take 1 tablet by mouth Daily. 30 tablet 0 6/9/2020 at Unknown time   • pantoprazole (PROTONIX) 40 MG EC tablet TAKE 1 TABLET TWICE DAILY 180 tablet 1 6/9/2020 at Unknown time   • potassium chloride (K-DUR,KLOR-CON) 20 MEQ CR tablet Take 1 tablet by mouth Daily. 30 tablet 0 6/8/2020 at Unknown time   • sucralfate (CARAFATE) 1 g tablet TAKE 1 TABLET FOUR TIMES DAILY (Patient taking differently: Take 1 g by mouth Daily.) 360 tablet 1 Patient Taking Differently at Unknown time   • clotrimazole (LOTRIMIN) 1 % vaginal cream Insert 1 application into the vagina As Needed.   More than a month at Unknown time   • dicyclomine (BENTYL) 20 MG tablet Take 20 mg by mouth 3 (Three) Times a Day As Needed.   More than a month at Unknown time   • DROPLET PEN NEEDLES 31G X 8 MM misc USE AS DIRECTED 4 TIMES DAILY 400 each 2    • DULoxetine (CYMBALTA) 30 MG capsule Take 1 capsule by mouth Daily. 30 capsule 5    • fluticasone (FLONASE) 50 MCG/ACT nasal spray 2 sprays into the nostril(s) as directed by provider Daily. (Patient taking differently: 2 sprays into the nostril(s) as directed by provider Daily As Needed.) 9.9 mL 0 Taking   • metroNIDAZOLE (FLAGYL) 500 MG tablet Take 1 tablet by mouth 3 (Three) Times a Day. 21 tablet 0 Taking       aspirin 81 mg Oral Daily   atorvastatin 80 mg Oral Daily   docusate sodium 100 mg Oral BID   gabapentin 300 mg Oral Nightly   insulin glargine 30 Units Subcutaneous Nightly   insulin lispro 0-7 Units Subcutaneous TID AC   insulin lispro 15 Units Subcutaneous TID With Meals   melatonin 5 mg Oral Nightly   metoclopramide 5 mg Oral QAM   metoprolol succinate XL 25 mg Oral Daily   pantoprazole 40 mg Oral BID   sodium chloride 10 mL Intravenous Q12H   sucralfate 1 g Oral 4x Daily   vancomycin 1,000 mg Intravenous 30 Min Pre-Op        •  acetaminophen **OR** acetaminophen **OR** acetaminophen  •  dextrose  •  dextrose  •  glucagon (human recombinant)  •  " hydrALAZINE  •  lactulose  •  ondansetron **OR** ondansetron  •  sodium chloride  Augmentin [amoxicillin-pot clavulanate] and Codeine    Objective     Physical Exam:  Physical Exam   Constitutional: She is oriented to person, place, and time. She appears well-developed and well-nourished.   HENT:   Head: Normocephalic and atraumatic.   Eyes: Pupils are equal, round, and reactive to light. EOM are normal.   Neck: Normal range of motion. Neck supple. No thyromegaly present.   Cardiovascular: Normal rate, regular rhythm and normal heart sounds.   Pulses:       Radial pulses are 1+ on the right side, and 2+ on the left side.        Femoral pulses are 2+ on the right side, and 2+ on the left side.  Doppler pedal pulses bilaterally with long right leg incision and palpable vein graft pulse and segmental incisions left leg from previous prosthetic bypass   Pulmonary/Chest: Effort normal. She has rales.   Abdominal: Soft. Bowel sounds are normal. She exhibits no distension. There is no tenderness.   Musculoskeletal: Normal range of motion. She exhibits no edema or tenderness.   Neurological: She is alert and oriented to person, place, and time. No cranial nerve deficit or sensory deficit.   Skin: Skin is warm and dry. Capillary refill takes less than 2 seconds. No erythema.   Psychiatric: She has a normal mood and affect. Her behavior is normal. Judgment and thought content normal.   Nursing note and vitals reviewed.      Vital Signs and Labs:  Vital Signs Patient Vitals for the past 24 hrs:   BP Temp Temp src Pulse Resp SpO2 Height Weight   06/10/20 0545 169/79 97.6 °F (36.4 °C) Oral 65 18 94 % -- 75 kg (165 lb 6.4 oz)   06/10/20 0131 159/83 97.9 °F (36.6 °C) Oral 68 18 98 % -- --   06/09/20 2154 180/91 98 °F (36.7 °C) Oral 81 18 95 % -- --   06/09/20 1903 166/77 97 °F (36.1 °C) Oral 85 18 98 % -- --   06/09/20 1523 168/92 99.9 °F (37.7 °C) Oral 89 18 97 % 152.4 cm (60\") 74.3 kg (163 lb 11.2 oz)     BMI:  Body mass index " is 32.3 kg/m².    CBC    Results from last 7 days   Lab Units 06/10/20  0444 06/09/20  1648   WBC 10*3/mm3 10.67 12.18*   HEMOGLOBIN g/dL 12.3 13.2   PLATELETS 10*3/mm3 331 349     BMP   Results from last 7 days   Lab Units 06/10/20  0444 06/09/20  1648   SODIUM mmol/L 141 136   POTASSIUM mmol/L 2.8* 2.8*   CHLORIDE mmol/L 98 94*   CO2 mmol/L 27.6 28.6   BUN mg/dL 52* 48*   CREATININE mg/dL 3.68* 3.64*   GLUCOSE mg/dL 156* 136*   MAGNESIUM mg/dL 1.6  --    PHOSPHORUS mg/dL 4.3  --      Radiology(recent) No radiology results for the last day    Active Hospital Problems    Diagnosis  POA   • **ESRD (end stage renal disease) (CMS/[facility]) [N18.6]  Unknown   • Anemia of chronic disease [D63.8]  Yes   • Hyperlipemia [E78.5]  Yes   • Dyslipidemia [E78.5]  Yes   • Type 2 diabetes mellitus without complication, with long-term current use of insulin (CMS/HCC) [E11.9, Z79.4]  Not Applicable   • S/P CABG (coronary artery bypass graft) [Z95.1]  Not Applicable   • DM (diabetes mellitus), type 2 with neurological complications (CMS/HCC) [E11.49]  Yes   • PVD (peripheral vascular disease) (CMS/HCC) [I73.9]  Yes      Resolved Hospital Problems   No resolved problems to display.     Problem Points:  4:  Patient has a new problem, with additional work-up planned  Total problem points:4 or more    Data Points:  1:  I have reviewed or order clinical lab test  1:  I have reviewed or order radiology test (except heart catheterization or echo)  1:  I have personally decided to obtain of records  2:  I have reviewed and summation of old records and/or discussed the patients care with another health care provider  Total data points:4 or more    Risk Points:  High:  Chronic illness with severe exacerbation or progression    MDM requires 2/3 (Problem points, Data points and Risk)  MDM Prob point Data point Risk   SF 1 1 Minimal   Low 2 2 Low   Mod 3 3 Moderate   High 4 4 High     Code requires 3/3 (MDM, History and Exam)  Code MDM History Exam  Time   14879 SF/Low Detailed Detailed 30   20749 Mod Comprehensive Comprehensive 50   29640 High Comprehensive Comprehensive 70     Detailed history:  4 elements HPI or status of 3 chronic problems; 2-9 system ROS  Comprehensive:  4 elements HPI or status of 3 chronic problems;  10 system ROS    Detailed Exam:  12 findings from any organ system  Comprehensive Exam:  2 findings from each of 9 systems.   65814    Assessment/Plan       ESRD (end stage renal disease) (CMS/[facility])    DM (diabetes mellitus), type 2 with neurological complications (CMS/[facility])    PVD (peripheral vascular disease) (CMS/[facility])    S/P CABG (coronary artery bypass graft)    Dyslipidemia    Type 2 diabetes mellitus without complication, with long-term current use of insulin (CMS/[facility])    Hyperlipemia    Anemia of chronic disease      69 y.o. female with acute on chronic renal failure in need of initiating hemodialysis.  Plan tunneled dialysis catheter placement today.  Will need arm vein mapping as well for future fistula creation.    I discussed the patients findings and my recommendations with patient.    Stevan Graves MD  06/10/20  06:54    Please call my office with any question: [phone]      Discussed with Dr Gladys Morris nephrology and will give intravenous potassium this morning perioperatively for K 2.8. She will proceed with hemodialysis ordering.    Patient with known upper extremity arterial occlusive disease with decreased arterial blood pressure compared to left according to patient and family.  Will check formal upper extremity arterial Doppler study as well as vein mapping to assess best option for fistula creation in future.

## 2020-06-10 NOTE — PLAN OF CARE
Problem: Patient Care Overview  Goal: Plan of Care Review  Outcome: Ongoing (interventions implemented as appropriate)  Flowsheets (Taken 6/10/2020 1661)  Progress: improving  Plan of Care Reviewed With: patient  Outcome Summary: vss, no c/o pain, on bed alarm for safety, keep npo for tunnel cath insertion by Dr. Stafford today then for HD for 2 hours, covid test negative, also ordered to do duplex initial vein mapping HD access, up with assist, urine sample collected and sent to lab, continue to monitor the pt.

## 2020-06-10 NOTE — ANESTHESIA POSTPROCEDURE EVALUATION
Patient: Randi Martines    Procedure Summary     Date:  06/10/20 Room / Location:  [facility] OR  / [facility] MAIN OR    Anesthesia Start:  0850 Anesthesia Stop:  0951    Procedure:  HEMODIALYSIS CATHETER INSERTION (Right ) Diagnosis:  ESRD on hemodialysis (CMS/[facility])    Surgeon:  Stevan Graves MD Provider:  Gilmar Morgan MD    Anesthesia Type:  MAC ASA Status:  4          Anesthesia Type: MAC    Vitals  Vitals Value Taken Time   /71 6/10/2020 10:15 AM   Temp 36.4 °C (97.6 °F) 6/10/2020  9:48 AM   Pulse 65 6/10/2020 10:17 AM   Resp 16 6/10/2020 10:00 AM   SpO2 100 % 6/10/2020 10:17 AM   Vitals shown include unvalidated device data.        Post Anesthesia Care and Evaluation    Patient location during evaluation: PACU  Patient participation: complete - patient participated  Level of consciousness: awake and alert  Pain management: adequate  Airway patency: patent  Anesthetic complications: No anesthetic complications    Cardiovascular status: acceptable  Respiratory status: acceptable  Hydration status: acceptable    Comments: --------------------            06/10/20               1000     --------------------   BP:       120/71     Pulse:      66       Resp:       16       Temp:                SpO2:      100%     --------------------

## 2020-06-10 NOTE — PROGRESS NOTES
Name: Randi Martines ADMIT: 2020   : 1950  PCP: Jose Morrison DO    MRN: [id number] LOS: 1 days   AGE/SEX: 69 y.o. female  ROOM: [facility]     Subjective   Subjective   Patient appears comfortable & in no apparent distress while undergoing US of RUE for vein mapping to assess best option for fistula creation in future. AVSS on 2L NC.  No new complaints.     Review of Systems   Constitutional: Negative for chills and fever.   Respiratory: Negative for cough and shortness of breath.    Cardiovascular: Negative for chest pain and leg swelling.   Gastrointestinal: Negative for abdominal pain, constipation, nausea and vomiting.   Genitourinary: Negative for difficulty urinating and dysuria.   Musculoskeletal: Negative for back pain.   Psychiatric/Behavioral: Negative for confusion and hallucinations.        Objective   Objective   Vital Signs  Temp:  [97 °F (36.1 °C)-98 °F (36.7 °C)] 97.2 °F (36.2 °C)  Heart Rate:  [65-85] 72  Resp:  [16-18] 16  BP: (102-180)/() 122/72  SpO2:  [94 %-100 %] 97 %  on  Flow (L/min):  [2-4] 2;   Device (Oxygen Therapy): nasal cannula  Body mass index is 32.3 kg/m².     Physical Exam   Constitutional: She is oriented to person, place, and time. No distress.   Appears generally weak & non-toxic   HENT:   Head: Normocephalic.   Right frontal hematoma   Eyes: Pupils are equal, round, and reactive to light. EOM are normal.   Neck: Normal range of motion. Neck supple.   Cardiovascular: Normal rate and normal heart sounds.   Pulmonary/Chest: Effort normal and breath sounds normal. No respiratory distress.   Right subclavian tunnel cath.   Abdominal: Soft. Bowel sounds are normal. She exhibits no distension. There is no tenderness.   Musculoskeletal: She exhibits no edema or deformity.   Neurological: She is alert and oriented to person, place, and time.   Skin: Skin is warm and dry. She is not diaphoretic.   Ecchymosis on right frontal scalp   Psychiatric: She has a  normal mood and affect. Her behavior is normal. Judgment and thought content normal.       Results Review:       I reviewed the patient's new clinical results.  Results from last 7 days   Lab Units 06/10/20  0444 06/09/20  1648   WBC 10*3/mm3 10.67 12.18*   HEMOGLOBIN g/dL 12.3 13.2   PLATELETS 10*3/mm3 331 349     Results from last 7 days   Lab Units 06/10/20  0444 06/09/20  1648   SODIUM mmol/L 141 136   POTASSIUM mmol/L 2.8* 2.8*   CHLORIDE mmol/L 98 94*   CO2 mmol/L 27.6 28.6   BUN mg/dL 52* 48*   CREATININE mg/dL 3.68* 3.64*   GLUCOSE mg/dL 156* 136*   Estimated Creatinine Clearance: 12.9 mL/min (A) (by C-G formula based on SCr of 3.68 mg/dL (H)).  Results from last 7 days   Lab Units 06/10/20  0444 06/09/20  1648   ALBUMIN g/dL 2.70* 3.20*   BILIRUBIN mg/dL  --  0.3   ALK PHOS U/L  --  72   AST (SGOT) U/L  --  19   ALT (SGPT) U/L  --  7     Results from last 7 days   Lab Units 06/10/20  0444 06/09/20  1648   CALCIUM mg/dL 8.9 9.6   ALBUMIN g/dL 2.70* 3.20*   MAGNESIUM mg/dL 1.6  --    PHOSPHORUS mg/dL 4.3  --        Hemoglobin A1C   Date/Time Value Ref Range Status   06/09/2020 1648 6.20 (H) 4.80 - 5.60 % Final     Glucose   Date/Time Value Ref Range Status   06/10/2020 1239 135 (H) 70 - 130 mg/dL Final   06/10/2020 0951 151 (H) 70 - 130 mg/dL Final   06/10/2020 0825 111 70 - 130 mg/dL Final   06/10/2020 0610 130 70 - 130 mg/dL Final   06/09/2020 2103 234 (H) 70 - 130 mg/dL Final   06/09/2020 1719 133 (H) 70 - 130 mg/dL Final       XR Chest Post CVA Port  PORTABLE CHEST     HISTORY: Central line placement.     COMPARISON: 06/09/2020.     A single view of the chest demonstrates a dual lumen central line is in  place with the tip in the right atrium. The heart is within normal  limits in size. There is no evidence of focal infiltrate, effusion or of  pneumothorax. There is mild cephalization of pulmonary vasculature,  exaggerated by respiratory effort.     This report was finalized on 6/10/2020 10:57 AM by   Lasha Rojas M.D.      [facility] Only  This procedure was auto-finalized with no dictation required.        aspirin 81 mg Oral Daily   docusate sodium 100 mg Oral BID   gabapentin 300 mg Oral Nightly   insulin glargine 30 Units Subcutaneous Nightly   insulin lispro 0-7 Units Subcutaneous TID AC   insulin lispro 15 Units Subcutaneous TID With Meals   melatonin 5 mg Oral Nightly   metoclopramide 5 mg Oral QAM   metoprolol succinate XL 25 mg Oral Daily   pantoprazole 40 mg Oral BID   sodium chloride 10 mL Intravenous Q12H   sucralfate 1 g Oral 4x Daily      Diet Regular; Cardiac, Consistent Carbohydrate, Renal       Assessment/Plan     Active Hospital Problems    Diagnosis  POA   • **ESRD (end stage renal disease) (CMS/[facility]) [N18.6]  Unknown   • Hypokalemia [E87.6]  Clinically Undetermined   • Anemia of chronic disease [D63.8]  Yes   • Hyperlipemia [E78.5]  Yes   • Dyslipidemia [E78.5]  Yes   • Type 2 diabetes mellitus without complication, with long-term current use of insulin (CMS/[facility]) [E11.9, Z79.4]  Not Applicable   • S/P CABG (coronary artery bypass graft) [Z95.1]  Not Applicable   • DM (diabetes mellitus), type 2 with neurological complications (CMS/[facility]) [E11.49]  Yes   • PVD (peripheral vascular disease) (CMS/[facility]) [I73.9]  Yes      Resolved Hospital Problems   No resolved problems to display.       69 y.o. female admitted with ESRD (end stage renal disease) (CMS/[facility]).      ESRD (end stage renal disease) (CMS/[facility]) / hypokalemia  -nephrology following.  Plan for HD today s/p tunnel cath of right subclavian. Defer to nephrology for electrolyte replete. BL creatinine 2.10 1/2/20 increased to 3.68.  Avoid nephrotoxins. DC statin per renal. Vascular consulted & plan for vein mapping for future fistula.      DM (diabetes mellitus), type 2 with neurological complications (CMS/[facility]) /   Type 2 diabetes mellitus without complication, with long-term current use of insulin (CMS/[facility])  -SSI & basal insulin.       -gabapentin      Anemia of chronic disease  -stable Hgb.  No obvious signs of acute bleed.  No current evidence of anemia. Monitor labs for changes.     · SCD for DVT prophylaxis.  · CPR  · Discussed with Dr. Flowers  · Anticipate discharge TBD       EBEN Uriarte  [facility]  06/10/20  16:15

## 2020-06-10 NOTE — PROGRESS NOTES
"   LOS: 1 day    Patient Care Team:  Jose Morrison DO as PCP - General (Family Medicine)  Samantha Uriostegui MD as Consulting Physician (Cardiology)    Chief Complaint:  No chief complaint on file.    Follow UP ESRD  Subjective     Interval History:     To OR today for TDC after K replaced.  K 2.8 this am. Was to have dialysis after TDC, but was taken to vascular for vein mapping. I just spoke to [facility] [employer] and they are coming to dialyze now.     Review of Systems:   Appetite better. Not itching, no dysgeusia.  Urinating. No pain.     Objective     Vital Signs  Temp:  [97 °F (36.1 °C)-99.9 °F (37.7 °C)] 97.8 °F (36.6 °C)  Heart Rate:  [65-89] 74  Resp:  [16-18] 16  BP: (159-180)/(77-92) 178/80    Flowsheet Rows      First Filed Value   Admission Height  152.4 cm (60\") Documented at 06/09/2020 1523   Admission Weight  74.3 kg (163 lb 11.2 oz) Documented at 06/09/2020 1523          No intake/output data recorded.  I/O last 3 completed shifts:  In: 600 [P.O.:600]  Out: 1100 [Urine:1100]    Intake/Output Summary (Last 24 hours) at 6/10/2020 0833  Last data filed at 6/10/2020 0545  Gross per 24 hour   Intake 600 ml   Output 1100 ml   Net -500 ml       Physical Exam:  Awake, alert. Conversant.   RIJ TDC site with some oozing on dressing.  Edentulous  Heart RRR No s3 or rub  Lungs clear to auscultation  Abd + bs soft, nontender  Ext no edema  Skin no rash.       Results Review:    Results from last 7 days   Lab Units 06/10/20  0444 06/09/20  1648   SODIUM mmol/L 141 136   POTASSIUM mmol/L 2.8* 2.8*   CHLORIDE mmol/L 98 94*   CO2 mmol/L 27.6 28.6   BUN mg/dL 52* 48*   CREATININE mg/dL 3.68* 3.64*   CALCIUM mg/dL 8.9 9.6   BILIRUBIN mg/dL  --  0.3   ALK PHOS U/L  --  72   ALT (SGPT) U/L  --  7   AST (SGOT) U/L  --  19   GLUCOSE mg/dL 156* 136*       Estimated Creatinine Clearance: 12.9 mL/min (A) (by C-G formula based on SCr of 3.68 mg/dL (H)).    Results from last 7 days   Lab Units 06/10/20  0444   MAGNESIUM mg/dL 1.6 "   PHOSPHORUS mg/dL 4.3             Results from last 7 days   Lab Units 06/10/20  0444 06/09/20  1648   WBC 10*3/mm3 10.67 12.18*   HEMOGLOBIN g/dL 12.3 13.2   PLATELETS 10*3/mm3 331 349       Results from last 7 days   Lab Units 06/09/20  1648   INR  0.98         Imaging Results (Last 24 Hours)     Procedure Component Value Units Date/Time    XR Chest PA & Lateral [[id number]] Collected:  06/09/20 1755     Updated:  06/09/20 1823    Narrative:       TWO-VIEW CHEST     HISTORY: Renal failure. Hypertension.     FINDINGS: The lungs are well-expanded and clear. The heart size is  normal with sternal wires from previous cardiac surgery. Previous mild  cardiomegaly noted on 12/26/2019 is no longer present. There is no acute  disease.     This report was finalized on 6/9/2020 6:20 PM by Dr. Miguel Malik M.D.             [MAR Hold] aspirin 81 mg Oral Daily   [MAR Hold] atorvastatin 80 mg Oral Daily   [MAR Hold] docusate sodium 100 mg Oral BID   gabapentin 300 mg Oral Nightly   [MAR Hold] insulin glargine 30 Units Subcutaneous Nightly   [MAR Hold] insulin lispro 0-7 Units Subcutaneous TID AC   [MAR Hold] insulin lispro 15 Units Subcutaneous TID With Meals   [MAR Hold] melatonin 5 mg Oral Nightly   [MAR Hold] metoclopramide 5 mg Oral QAM   metoprolol succinate XL 25 mg Oral Daily   [MAR Hold] pantoprazole 40 mg Oral BID   [MAR Hold] sodium chloride 10 mL Intravenous Q12H   sodium chloride 3 mL Intravenous Q12H   [MAR Hold] sucralfate 1 g Oral 4x Daily       lactated ringers 9 mL/hr       Medication Review:   Current Facility-Administered Medications   Medication Dose Route Frequency Provider Last Rate Last Dose   • [MAR Hold] acetaminophen (TYLENOL) tablet 650 mg  650 mg Oral Q4H PRN Jeff Gonzáles MD        Or   • [MAR Hold] acetaminophen (TYLENOL) 160 MG/5ML solution 650 mg  650 mg Oral Q4H PRN Jeff Gonzáles MD        Or   • [MAR Hold] acetaminophen (TYLENOL) suppository 650 mg  650 mg Rectal Q4H PRN Jeff Gonzáles MD        • [MAR Hold] aspirin EC tablet 81 mg  81 mg Oral Daily Jeff Gonzáles MD   81 mg at 06/09/20 2051   • [MAR Hold] atorvastatin (LIPITOR) tablet 80 mg  80 mg Oral Daily Jeff Gonzáles MD   80 mg at 06/09/20 2043   • [MAR Hold] dextrose (D50W) 25 g/ 50mL Intravenous Solution 25 g  25 g Intravenous Q15 Min PRN Jeff Gonzáles MD       • [MAR Hold] dextrose (GLUTOSE) oral gel 15 g  15 g Oral Q15 Min PRN Jeff Gonzáles MD       • [MAR Hold] docusate sodium (COLACE) capsule 100 mg  100 mg Oral BID Jeff Gonzáles MD   100 mg at 06/09/20 2044   • fentaNYL citrate (PF) (SUBLIMAZE) injection 50 mcg  50 mcg Intravenous Q10 Min PRN Gilmar Morgan MD       • gabapentin (NEURONTIN) capsule 300 mg  300 mg Oral Nightly Jeff Gonzáles MD   300 mg at 06/09/20 2044   • [MAR Hold] glucagon (human recombinant) (GLUCAGEN DIAGNOSTIC) injection 1 mg  1 mg Subcutaneous Q15 Min PRN Jeff Gonzáles MD       • hydrALAZINE (APRESOLINE) injection 10 mg  10 mg Intravenous Q6H PRN Jurgen Tirado MD       • [MAR Hold] insulin glargine (LANTUS) injection 30 Units  30 Units Subcutaneous Nightly Jeff Gonzáles MD   30 Units at 06/09/20 2130   • [MAR Hold] insulin lispro (humaLOG) injection 0-7 Units  0-7 Units Subcutaneous TID AC Jeff Gonzáles MD       • [MAR Hold] insulin lispro (humaLOG) injection 15 Units  15 Units Subcutaneous TID With Meals Jeff Gonzáles MD       • lactated ringers infusion  9 mL/hr Intravenous Continuous Gilmar Morgan MD       • [MAR Hold] lactulose (CHRONULAC) 10 GM/15ML solution 10 g  10 g Oral Daily PRN Jeff Gonzáles MD       • lidocaine PF 1% (XYLOCAINE) injection 0.5 mL  0.5 mL Injection Once PRN Gilmar Morgan MD       • [MAR Hold] melatonin tablet 5 mg  5 mg Oral Nightly Jeff Gonzáles MD   5 mg at 06/09/20 2044   • [MAR Hold] metoclopramide (REGLAN) tablet 5 mg  5 mg Oral [facility] Jeff Gonzáles MD   5 mg at 06/10/20 0556   • metoprolol succinate XL (TOPROL-XL) 24 hr tablet 25 mg  25 mg Oral Daily  Jeff Gonzáles MD   25 mg at 06/09/20 2044   • [MAR Hold] ondansetron (ZOFRAN) tablet 4 mg  4 mg Oral Q6H PRN Jeff Gonzáles MD        Or   • [MAR Hold] ondansetron (ZOFRAN) injection 4 mg  4 mg Intravenous Q6H PRN Jeff Gonzáles MD       • [MAR Hold] pantoprazole (PROTONIX) EC tablet 40 mg  40 mg Oral BID Jeff Gonzáles MD   40 mg at 06/09/20 2044   • [MAR Hold] sodium chloride 0.9 % flush 10 mL  10 mL Intravenous Q12H Jeff Gonzáles MD   10 mL at 06/09/20 2044   • [MAR Hold] sodium chloride 0.9 % flush 10 mL  10 mL Intravenous PRN Jeff Gonzáles MD       • sodium chloride 0.9 % flush 3 mL  3 mL Intravenous Q12H Gilmar Morgan MD       • sodium chloride 0.9 % flush 3-10 mL  3-10 mL Intravenous PRN Gilmar Morgan MD       • [MAR Hold] sucralfate (CARAFATE) tablet 1 g  1 g Oral 4x Daily Jeff Gonzáles MD   1 g at 06/09/20 2044       Assessment/Plan   1. ESRD.  Plan to initiate dialysis today via TDC.  K still low.  Mg 1.6.    I spoke to ARMIDA Lee in preop and Dr. Graves.  Rosa to notify Dr. Morgan regarding K. Ok for K runs as she still makes over a liter of urine daily. Plan to dialyze on 4 K bath with dialysis today.  Etiology of hypokalemia unclear, but unusual in face of Increasing creatinine.  ? K wasting.   2.  Weakness.  May be  due to hypokalemia.  CP normal.  Check aldolase.  DC statin for now .  3. DM2  4. PAD  5. HTN. Not well controlled.  Will see how it responds to dialysis .      Spoke to [facility].  Dialysis RN  En route.       Gladys Morris MD  06/10/20  08:33

## 2020-06-10 NOTE — ANESTHESIA PREPROCEDURE EVALUATION
Anesthesia Evaluation     Patient summary reviewed and Nursing notes reviewed   history of anesthetic complications: PONV  NPO Solid Status: > 8 hours  NPO Liquid Status: > 2 hours           Airway   Mallampati: II  TM distance: >3 FB  Neck ROM: full  no difficulty expected  Dental - normal exam   (+) edentulous    Pulmonary - normal exam   (+) a smoker Former,   (-) COPD, asthma, lung cancer  Cardiovascular - normal exam  Exercise tolerance: poor (<4 METS)    NYHA Classification: II  ECG reviewed  Patient on routine beta blocker and Beta blocker given within 24 hours of surgery  Rhythm: regular  Rate: normal    (+) hypertension well controlled 2 medications or greater, past MI  6-12 months, CAD, CABG 3-6 Months, CHF Systolic <55%, PVD, hyperlipidemia,     ROS comment: Cardiac Cath 01/2020:  SUMMARY: Severe native coronary artery disease 7 of 7 bypasses are patent.  She has a significant lesion left subclavian it is evidently been angioplastied one time before 9 years ago       Neuro/Psych- negative ROS  (-) seizures, TIA, CVA  GI/Hepatic/Renal/Endo    (+) obesity,  PUD, GI bleeding resolved, renal disease CRI, diabetes mellitus type 2 well controlled using insulin,   (-) no thyroid disorder    Musculoskeletal (-) negative ROS    Abdominal  - normal exam   Substance History - negative use     OB/GYN negative ob/gyn ROS         Other - negative ROS                       Anesthesia Plan    ASA 4     MAC     intravenous induction     Anesthetic plan, all risks, benefits, and alternatives have been provided, discussed and informed consent has been obtained with: patient.    Plan discussed with CRNA and attending.

## 2020-06-10 NOTE — OP NOTE
Randi Martines  Admission date: 6/9/2020  Date of operation: 6/10/2020    Location: [facility]    Pre-op Diagnosis:      * ESRD on hemodialysis (CMS/HCC) [N18.6, Z99.2]    Post-Op Diagnosis Codes:     * ESRD on hemodialysis (CMS/HCC) [N18.6, Z99.2]    Procedure performed: 23 cm tunneled palindrome dialysis catheter placement    Surgeon: Dr. Stevan Graves    Assistants:  None, Provided critical assistance in exposure, retraction, and suction that overall decrease blood loss and operative time.    Anesthesia: Monitored Anesthesia Care    Staff:   Circulator: Delmi Rosales RN  Radiology Technologist: Eve Manuel  Scrub Person: Ingrid Reis    Indications: Pleasant female with progressive renal failure.  Patient needs hemodialysis initiation.  Plan tunneled catheter placement.  We will plan elective fistula creation in future after vein mapping performed.       Procedure Details right neck and chest prepped and draped in usual fashion.  1% Xylocaine with Marcaine used for local anesthesia.  Ultrasound access jugular vein performed without problems.  Wire advanced without difficulty.  23 cm palindrome catheter tunneled from chest wall to the neck incision.  Dilator then peel-away sheath placed over wire centrally under fluoroscopic guidance.  Catheter placed to the superior vena cava without problems.  Peel-away sheath removed in its entirety.  Ports of the catheter flushed with heparin saline.  They were both flushed with high concentration heparin and capped.  Catheter secured to skin with nylon suture.  Neck incision closed with subcuticular Vicryl suture.Dermabond applied to neck incision.  Biopatch and dressing applied to the chest incision.  Patient tolerated procedure well without problems.      Radiographic interpretation: Normal contour catheter at neck level with tip in right atrium superior vena cava junction    Findings: See above    Estimated Blood Loss: minimal    Specimens: * No  orders in the log *      Drains: * No LDAs found *    Complications: None    Condition: stable    Disposition: To recovery room    Stevan Graves MD     Date: 6/10/2020  Time: 09:40    Active Hospital Problems    Diagnosis  POA   • **ESRD (end stage renal disease) (CMS/[facility]) [N18.6]  Unknown   • Anemia of chronic disease [D63.8]  Yes   • Hyperlipemia [E78.5]  Yes   • Dyslipidemia [E78.5]  Yes   • Type 2 diabetes mellitus without complication, with long-term current use of insulin (CMS/HCC) [E11.9, Z79.4]  Not Applicable   • S/P CABG (coronary artery bypass graft) [Z95.1]  Not Applicable   • DM (diabetes mellitus), type 2 with neurological complications (CMS/HCC) [E11.49]  Yes   • PVD (peripheral vascular disease) (CMS/HCC) [I73.9]  Yes      Resolved Hospital Problems   No resolved problems to display.

## 2020-06-11 NOTE — PROGRESS NOTES
"   LOS: 2 days    Patient Care Team:  Jose Morrison DO as PCP - General (Family Medicine)  Samantha Uriostegui MD as Consulting Physician (Cardiology)    Chief Complaint:  No chief complaint on file.    Follow UP ESRD  Subjective     Interval History:     Initiated dialysis last night. Tolerated well. No cramping.  TDC with some oozing, but has stopped.  Discussed dialysis including transitional unit at [facility].  Not interested at this point.  Will need transportation and wants to go to the closest unit which is [facility].   Walked with PT .    Review of Systems:   Appetite better. Not itching, no dysgeusia.  Urinating. No pain.     Objective     Vital Signs  Temp:  [97.2 °F (36.2 °C)-98.5 °F (36.9 °C)] 97.4 °F (36.3 °C)  Heart Rate:  [63-95] 68  Resp:  [16-18] 16  BP: (122-177)/(72-88) 163/81    Flowsheet Rows      First Filed Value   Admission Height  152.4 cm (60\") Documented at 06/09/2020 1523   Admission Weight  74.3 kg (163 lb 11.2 oz) Documented at 06/09/2020 1523          I/O this shift:  In: 120 [P.O.:120]  Out: 1050 [Urine:1050]  I/O last 3 completed shifts:  In: 895 [P.O.:630; I.V.:265]  Out: 2125 [Urine:2100; Blood:25]    Intake/Output Summary (Last 24 hours) at 6/11/2020 1229  Last data filed at 6/11/2020 1100  Gross per 24 hour   Intake 120 ml   Output 2150 ml   Net -2030 ml       Physical Exam:  Awake, alert. Conversant.   RIJ TDC site clean  Oral mucosa moist  Heart RRR No s3 or rub  Lungs clear to auscultation  Abd + bs soft, nontender  Ext no edema  Skin no rash.       Results Review:    Results from last 7 days   Lab Units 06/11/20  0459 06/10/20  1655 06/10/20  0444 06/09/20  1648   SODIUM mmol/L 135*  --  141 136   POTASSIUM mmol/L 3.2* 3.6 2.8* 2.8*   CHLORIDE mmol/L 100  --  98 94*   CO2 mmol/L 25.8  --  27.6 28.6   BUN mg/dL 31*  --  52* 48*   CREATININE mg/dL 2.66*  --  3.68* 3.64*   CALCIUM mg/dL 8.3*  --  8.9 9.6   BILIRUBIN mg/dL  --   --   --  0.3   ALK PHOS U/L  --   --   --  72   ALT " (SGPT) U/L  --   --   --  7   AST (SGOT) U/L  --   --   --  19   GLUCOSE mg/dL 109*  --  156* 136*       Estimated Creatinine Clearance: 17.9 mL/min (A) (by C-G formula based on SCr of 2.66 mg/dL (H)).    Results from last 7 days   Lab Units 06/11/20  0459 06/10/20  0444   MAGNESIUM mg/dL  --  1.6   PHOSPHORUS mg/dL 3.3 4.3             Results from last 7 days   Lab Units 06/11/20  0459 06/10/20  0444 06/09/20  1648   WBC 10*3/mm3 14.33* 10.67 12.18*   HEMOGLOBIN g/dL 11.4* 12.3 13.2   PLATELETS 10*3/mm3 251 331 349       Results from last 7 days   Lab Units 06/09/20  1648   INR  0.98         Imaging Results (Last 24 Hours)     ** No results found for the last 24 hours. **          aspirin 81 mg Oral Daily   docusate sodium 100 mg Oral BID   gabapentin 300 mg Oral Nightly   insulin glargine 30 Units Subcutaneous Nightly   insulin lispro 0-7 Units Subcutaneous TID AC   insulin lispro 15 Units Subcutaneous TID With Meals   melatonin 5 mg Oral Nightly   metoclopramide 5 mg Oral QAM   metoprolol succinate XL 25 mg Oral Daily   pantoprazole 40 mg Oral BID   sodium chloride 10 mL Intravenous Q12H   sucralfate 1 g Oral 4x Daily          Medication Review:   Current Facility-Administered Medications   Medication Dose Route Frequency Provider Last Rate Last Dose   • acetaminophen (TYLENOL) tablet 650 mg  650 mg Oral Q4H PRN Stevan Graves MD   650 mg at 06/10/20 2227   • aspirin EC tablet 81 mg  81 mg Oral Daily Stevan Graves MD   81 mg at 06/10/20 2217   • dextrose (D50W) 25 g/ 50mL Intravenous Solution 25 g  25 g Intravenous Q15 Min PRN Stevan Graves MD       • dextrose (GLUTOSE) oral gel 15 g  15 g Oral Q15 Min PRN Stevan Graves MD       • docusate sodium (COLACE) capsule 100 mg  100 mg Oral BID Stevan Graves MD   100 mg at 06/11/20 0833   • gabapentin (NEURONTIN) capsule 300 mg  300 mg Oral Nightly Stevan Graves MD   300 mg at 06/10/20 5465   • glucagon (human recombinant)  (GLUCAGEN DIAGNOSTIC) injection 1 mg  1 mg Subcutaneous Q15 Min PRN Stevan Graves MD       • hydrALAZINE (APRESOLINE) injection 10 mg  10 mg Intravenous Q6H PRN Stevan Graves MD       • HYDROcodone-acetaminophen (NORCO) 5-325 MG per tablet 1 tablet  1 tablet Oral Q6H PRN Stevan Graves MD       • insulin glargine (LANTUS) injection 30 Units  30 Units Subcutaneous Nightly Stevan Graves MD   30 Units at 06/10/20 2217   • insulin lispro (humaLOG) injection 0-7 Units  0-7 Units Subcutaneous TID AC Stevan Graves MD   3 Units at 06/11/20 1152   • insulin lispro (humaLOG) injection 15 Units  15 Units Subcutaneous TID With Meals Stevan Graves MD   15 Units at 06/11/20 1153   • lactulose (CHRONULAC) 10 GM/15ML solution 10 g  10 g Oral Daily PRN Stvean Graves MD       • melatonin tablet 5 mg  5 mg Oral Nightly Stevan Graves MD   5 mg at 06/10/20 2216   • metoclopramide (REGLAN) tablet 5 mg  5 mg Oral QAM Stevan Graves MD   5 mg at 06/11/20 0619   • metoprolol succinate XL (TOPROL-XL) 24 hr tablet 25 mg  25 mg Oral Daily Stevan Graves MD   25 mg at 06/10/20 2216   • ondansetron (ZOFRAN) tablet 4 mg  4 mg Oral Q6H PRN Stevan Graves MD   4 mg at 06/11/20 0659    Or   • ondansetron (ZOFRAN) injection 4 mg  4 mg Intravenous Q6H PRN Stevan Graves MD   4 mg at 06/10/20 1617   • pantoprazole (PROTONIX) EC tablet 40 mg  40 mg Oral BID Stevan Graves MD   40 mg at 06/11/20 0833   • sodium chloride 0.9 % flush 10 mL  10 mL Intravenous Q12H Stevan Graves MD   10 mL at 06/11/20 0834   • sodium chloride 0.9 % flush 10 mL  10 mL Intravenous PRN Stevan Graves MD       • sucralfate (CARAFATE) tablet 1 g  1 g Oral 4x Daily Stevan Graves MD   1 g at 06/10/20 7057       Assessment/Plan   1. ESRD. Initiated dialysis last night.   K still a little low.  Mg 1.6.   Plan dialysis tomorrow.  Will dw transitional care unit.   2.  Weakness.  May  be  due to hypokalemia.  Improved. CPK normal.  Check pending aldolase.  DC statin for now .  3. DM2  4. PAD  5. HTN. Not well controlled.  Will see how it responds to dialysis in am  .  6. Anemia.  Signif oozing from TDC yesterday.    7. Leukocytosis.  No fever . Likely reactive. Recheck in am .    If outpt HD and transportation arranged, could be dc tomorrow after dialysis .  Gladys Morris MD  06/11/20  12:29

## 2020-06-11 NOTE — PLAN OF CARE
VSS. Sent for hemodialysis catheter placement. Sent for vein mapping. Portable hemodialysis tonight. IV x2. Catheter in place with venous blood return. Dsg to anterior right neck changed due to drainage. Daily weight. Fall precautions maintained. Accuchecks ACHS with sliding scale

## 2020-06-11 NOTE — PLAN OF CARE
Problem: Patient Care Overview  Goal: Plan of Care Review  Outcome: Ongoing (interventions implemented as appropriate)  Flowsheets (Taken 6/11/2020 0350)  Progress: improving  Plan of Care Reviewed With: patient  Outcome Summary: BP running on higher side. zofran given for nausea, tylenol given for pain. first dialysis done today. slept fair, cont to monitor

## 2020-06-11 NOTE — PLAN OF CARE
Problem: Patient Care Overview  Goal: Plan of Care Review  Flowsheets (Taken 6/11/2020 1037)  Plan of Care Reviewed With: patient  Outcome Summary: Pt. is a 69 year old Female admitted to the hospital with ESRD and generalized weakness. Pt. reports that the past 2 weeks she has become increasingly weaker with unsteady ambulation.  Pt. reports that prior to these symptoms she was completely independent and no use of A.D. with gait.  Pt. currently presents with decreased strength, decreased balance, and decreased tolerance to functional activity.  Pt. will benefit from skilled inpt. P.T. to address her functional deficits and to assist pt. in regaining her maximum level of independence with functional mobility.   Patient was wearing a face mask during this therapy encounter. Therapist used appropriate personal protective equipment including mask and gloves.  Mask used was standard procedure mask. Appropriate PPE was worn during the entire therapy session. Hand hygiene was completed before and after therapy session. Patient is not in enhanced droplet precautions.

## 2020-06-11 NOTE — PROGRESS NOTES
Name: Randi Martines ADMIT: 2020   : 1950  PCP: Jose Morrison DO    MRN: [id number] LOS: 2 days   AGE/SEX: 69 y.o. female  ROOM: [facility]     Subjective   Subjective   Tolerated dialysis well last night.  Became nauseated thereafter.  No vomiting.    Review of Systems   Respiratory: Negative for shortness of breath.    Cardiovascular: Negative for chest pain.   Gastrointestinal: Positive for nausea. Negative for abdominal distention, abdominal pain and vomiting.   Genitourinary: Negative for difficulty urinating.        Objective   Objective   Vital Signs  Temp:  [97.2 °F (36.2 °C)-98.5 °F (36.9 °C)] 98.5 °F (36.9 °C)  Heart Rate:  [63-95] 72  Resp:  [16-18] 16  BP: (102-178)/() 164/72  SpO2:  [94 %-100 %] 96 %  on  Flow (L/min):  [2-4] 2;   Device (Oxygen Therapy): nasal cannula  Body mass index is 32.68 kg/m².     Physical Exam   Constitutional: She is oriented to person, place, and time. No distress.   Appears generally weak & non-toxic   HENT:   Head: Normocephalic.   Right frontal hematoma   Eyes: Pupils are equal, round, and reactive to light. EOM are normal.   Neck: Normal range of motion. Neck supple.   Cardiovascular: Normal rate and normal heart sounds.   Pulmonary/Chest: Effort normal and breath sounds normal. No respiratory distress.   Right subclavian tunnel cath.   Abdominal: Soft. Bowel sounds are normal. She exhibits no distension. There is no tenderness.   Musculoskeletal: She exhibits no edema or deformity.   Neurological: She is alert and oriented to person, place, and time.   Skin: Skin is warm and dry. She is not diaphoretic.   Ecchymosis on right frontal scalp   Psychiatric: She has a normal mood and affect. Her behavior is normal. Judgment and thought content normal.       Results Review:       I reviewed the patient's new clinical results.  Results from last 7 days   Lab Units 20  0459 06/10/20  0444 20  1648   WBC 10*3/mm3 14.33* 10.67 12.18*      HEMOGLOBIN g/dL 11.4* 12.3 13.2   PLATELETS 10*3/mm3 251 331 349     Results from last 7 days   Lab Units 06/11/20  0459 06/10/20  1655 06/10/20  0444 06/09/20  1648   SODIUM mmol/L 135*  --  141 136   POTASSIUM mmol/L 3.2* 3.6 2.8* 2.8*   CHLORIDE mmol/L 100  --  98 94*   CO2 mmol/L 25.8  --  27.6 28.6   BUN mg/dL 31*  --  52* 48*   CREATININE mg/dL 2.66*  --  3.68* 3.64*   GLUCOSE mg/dL 109*  --  156* 136*   Estimated Creatinine Clearance: 17.9 mL/min (A) (by C-G formula based on SCr of 2.66 mg/dL (H)).  Results from last 7 days   Lab Units 06/11/20  0459 06/10/20  0444 06/09/20  1648   ALBUMIN g/dL 2.70* 2.70* 3.20*   BILIRUBIN mg/dL  --   --  0.3   ALK PHOS U/L  --   --  72   AST (SGOT) U/L  --   --  19   ALT (SGPT) U/L  --   --  7     Results from last 7 days   Lab Units 06/11/20  0459 06/10/20  0444 06/09/20  1648   CALCIUM mg/dL 8.3* 8.9 9.6   ALBUMIN g/dL 2.70* 2.70* 3.20*   MAGNESIUM mg/dL  --  1.6  --    PHOSPHORUS mg/dL 3.3 4.3  --        Hemoglobin A1C   Date/Time Value Ref Range Status   06/09/2020 1648 6.20 (H) 4.80 - 5.60 % Final     Glucose   Date/Time Value Ref Range Status   06/11/2020 0607 99 70 - 130 mg/dL Final   06/10/2020 2119 98 70 - 130 mg/dL Final   06/10/2020 1633 272 (H) 70 - 130 mg/dL Final   06/10/2020 1239 135 (H) 70 - 130 mg/dL Final   06/10/2020 0951 151 (H) 70 - 130 mg/dL Final   06/10/2020 0825 111 70 - 130 mg/dL Final   06/10/2020 0610 130 70 - 130 mg/dL Final       Duplex Initial Vein Mapping Hemodialysis Access Bilateral CAR  · The right upper arm cephalic vein is marginal for use in its midportion.   The basilic vein is inadequate.  · The left cephalic and basilic are inadequate.     Doppler Arterial Multi Level Upper Extremity - Bilateral CAR  · Normal arterial pressures on the left. Normal digital pressures noted on   the left.  · Moderate arterial ischemia on the right. Moderate digital ischemia noted   on the right. Right digital ischemia is located in the global area of  the   digit. Occlusive disease is located in the right SCA/axillary area.     XR Chest Post CVA Port  PORTABLE CHEST     HISTORY: Central line placement.     COMPARISON: 06/09/2020.     A single view of the chest demonstrates a dual lumen central line is in  place with the tip in the right atrium. The heart is within normal  limits in size. There is no evidence of focal infiltrate, effusion or of  pneumothorax. There is mild cephalization of pulmonary vasculature,  exaggerated by respiratory effort.     This report was finalized on 6/10/2020 10:57 AM by Dr. Lasha Rojas M.D.      PIC W Fluoro Surgery Only  This procedure was auto-finalized with no dictation required.        aspirin 81 mg Oral Daily   docusate sodium 100 mg Oral BID   gabapentin 300 mg Oral Nightly   insulin glargine 30 Units Subcutaneous Nightly   insulin lispro 0-7 Units Subcutaneous TID AC   insulin lispro 15 Units Subcutaneous TID With Meals   melatonin 5 mg Oral Nightly   metoclopramide 5 mg Oral QAM   metoprolol succinate XL 25 mg Oral Daily   pantoprazole 40 mg Oral BID   sodium chloride 10 mL Intravenous Q12H   sucralfate 1 g Oral 4x Daily      Diet Regular; Cardiac, Consistent Carbohydrate, Renal       Assessment/Plan     Active Hospital Problems    Diagnosis  POA   • **ESRD (end stage renal disease) (CMS/[facility]) [N18.6]  Unknown   • Hypokalemia [E87.6]  Clinically Undetermined   • Obesity (BMI 30-39.9) [E66.9]  Unknown   • Anemia of chronic disease [D63.8]  Yes   • Hyperlipemia [E78.5]  Yes   • Type 2 diabetes mellitus without complication, with long-term current use of insulin (CMS/[facility]) [E11.9, Z79.4]  Not Applicable   • S/P CABG (coronary artery bypass graft) [Z95.1]  Not Applicable   • DM (diabetes mellitus), type 2 with neurological complications (CMS/[facility]) [E11.49]  Yes   • PVD (peripheral vascular disease) (CMS/[facility]) [I73.9]  Yes      Resolved Hospital Problems   No resolved problems to display.       69 y.o. female admitted with  ESRD (end stage renal disease) (CMS/[facility]).    · Tolerate HD last evening.   · Repeat labs in a.m.  · Defer potassium replacement to nephrology.  · WBC elevated of uncertain significance.  Monitor.  · Blood sugar stable.  Continue current regimen.  · Plans per nephrology.  · Outpatient dialysis arrangements being made.  Patient concerned about how she will get to and from her dialysis.      Mor Flowers MD  [facility]  06/11/20  07:31

## 2020-06-11 NOTE — THERAPY EVALUATION
Patient Name: Randi Martines  : 1950    MRN: [id number]                              Today's Date: 2020       Admit Date: 2020    Visit Dx: No diagnosis found.  Patient Active Problem List   Diagnosis   • Abdominal pain   • Intractable abdominal pain   • DM (diabetes mellitus), type 2 with neurological complications (CMS/HCC)   • PVD (peripheral vascular disease) (CMS/HCC)   • Mesenteric ischemia (CMS/HCC)   • Acute gastric ulcer   • S/P CABG (coronary artery bypass graft)   • Coronary artery disease involving native coronary artery of native heart without angina pectoris   • Atherosclerotic PVD with ulceration (CMS/HCC)   • Type 2 diabetes mellitus without complication, with long-term current use of insulin (CMS/HCC)   • Biliary obstruction   • Hyperlipemia   • Malabsorption of iron   • Chronic kidney disease, stage 3 (moderate) (CMS/HCC)   • Anemia of chronic disease   • Gastrointestinal hemorrhage   • NSTEMI (non-ST elevated myocardial infarction) (CMS/HCC)   • Left ventricular systolic dysfunction   • Blood loss anemia   • Melena   • Acute respiratory failure with hypoxia (CMS/HCC)   • Subclavian artery stenosis, left (CMS/HCC)   • Hypertension   • ESRD (end stage renal disease) (CMS/HCC)   • Hypokalemia   • Obesity (BMI 30-39.9)     Past Medical History:   Diagnosis Date   • CAD (coronary artery disease)    • Chronic kidney disease    • Chronic systolic congestive heart failure (CMS/HCC)    • Diabetes mellitus (CMS/HCC)    • Dyslipidemia    • Foot ulcer (CMS/HCC)    • History of diverticulitis    • History of stomach ulcers    • History of transfusion    • Hyperlipemia    • Hypertension    • Mesenteric ischemia (CMS/HCC)    • NSTEMI (non-ST elevated myocardial infarction) (CMS/HCC)    • Peptic ulcer disease    • PONV (postoperative nausea and vomiting)    • PVD (peripheral vascular disease) (CMS/HCC)      Past Surgical History:   Procedure Laterality Date   • APPENDECTOMY     • CARDIAC  CATHETERIZATION N/A 1/2/2020    Procedure: LEFT HEART CATH  NO LV;  Surgeon: Emilio Serna MD;  Location: [facility] CATH INVASIVE LOCATION;  Service: Cardiology   • CARDIAC CATHETERIZATION N/A 1/2/2020    Procedure: CORONARY ANGIOGRAPHY;  Surgeon: Emilio Serna MD;  Location: [facility] CATH INVASIVE LOCATION;  Service: Cardiology   • CARDIAC CATHETERIZATION N/A 1/2/2020    Procedure: SAPHENOUS VEIN GRAFT;  Surgeon: Emilio Serna MD;  Location: [facility] CATH INVASIVE LOCATION;  Service: Cardiology   • CATARACT EXTRACTION     • CHOLECYSTECTOMY     • COLONOSCOPY  2015   • CORONARY ARTERY BYPASS GRAFT      7 coronary bypass surgery   • ENDOSCOPY N/A 11/23/2016    Procedure: ESOPHAGOGASTRODUODENOSCOPY ;  Surgeon: Katherine Green MD;  Location: [facility] ENDOSCOPY;  Service:    • ENDOSCOPY N/A 12/29/2019    Procedure: ESOPHAGOGASTRODUODENOSCOPYwith bx;  Surgeon: Ziyad Manzo MD;  Location: [facility] ENDOSCOPY;  Service: Gastroenterology   • ERCP N/A 10/2/2017    Procedure: ENDOSCOPIC RETROGRADE CHOLANGIOPANCREATOGRAPHY with sphincterotomy, balloon sweep;  Surgeon: Christopher Graf MD;  Location: [facility] ENDOSCOPY;  Service:    • HERNIA REPAIR  2015   • HYSTERECTOMY     • SD VEIN IN SITU BYPASS GRAFT,FEM-POP Left 5/31/2017    Procedure: ULTRASOUND RIGHT AIF, ARTERIOGRAM W/ LEFT COMMON ILIAC ANGIOPLASTY STENT, LT FEMORAL POPLITEAL BYPASS WITH ARTEGRAFT, LEFT POPLITEAL ENDARTERECTOMY PATCH, LEFT COMMON ARTERIOGRAM, LEFT EXTERNAL ILIAC ANGIOPLASTY STENT PLACEMENT, AORTA,  EXTERNAL, DISTAL PRESSURES;  Surgeon: Jayda Barrientos MD;  Location: [facility] HYBRID OR 18/19;  Service: Vascular   • SKIN GRAFT SPLIT THICKNESS Left 6/7/2017    Procedure: DEBRIDEMENT AND SKIN GRAFT TO  LEFT DORSAL FOOT AND TOES;  Surgeon: Maurine Waterhouse, MD;  Location: [facility] OR;  Service:    • UPPER GASTROINTESTINAL ENDOSCOPY  2015    scar in gastric body, gastric ulcers     General Information     Row Name 06/11/20 1032          PT  "Evaluation Time/Intention    Document Type  evaluation Pt. admitted with generalized weakness and ESRD;  s/p Hemodialysis Catheter Insertion (6/10/2020)  -MS     Mode of Treatment  physical therapy;individual therapy  -MS     Row Name 06/11/20 1032          General Information    Patient Profile Reviewed?  yes  -MS     Prior Level of Function  independent:  -MS     Existing Precautions/Restrictions  (S) fall Exit alarm   -MS     Barriers to Rehab  none identified  -MS     Row Name 06/11/20 1032          Cognitive Assessment/Intervention- PT/OT    Orientation Status (Cognition)  oriented x 3  -MS     Row Name 06/11/20 1032          Safety Issues, Functional Mobility    Comment, Safety Issues/Impairments (Mobility)  Gait belt used for safety.   -MS       User Key  (r) = Recorded By, (t) = Taken By, (c) = Cosigned By    Initials Name Provider Type    Harpal Weinberg, PT Physical Therapist        Mobility     Row Name 06/11/20 1033          Bed Mobility Assessment/Treatment    Bed Mobility Assessment/Treatment  supine-sit;sit-supine  -MS     Supine-Sit [place] (Bed Mobility)  supervision  -MS     Sit-Supine [place] (Bed Mobility)  supervision  -MS     Row Name 06/11/20 1033          Sit-Stand Transfer    Sit-Stand [place] (Transfers)  contact guard  -MS     Row Name 06/11/20 1033          Gait/Stairs Assessment/Training    [place] Level (Gait)  contact guard  -MS     Distance in Feet (Gait)  350 feet  -MS     Pattern (Gait)  step-through  -MS     Deviations/Abnormal Patterns (Gait)  kiki decreased  -MS     Comment (Gait/Stairs)  x 2 losses of balance and x 1 standing rest break due to fatigue.  Pt. reports for the past 2 weeks her gait has been shuffled but today has been the \"best walking\" she has done since then.   -MS       User Key  (r) = Recorded By, (t) = Taken By, (c) = Cosigned By    Initials Name Provider Type    Harpal Weinberg, PT Physical Therapist        Obj/Interventions  "    Row Name 06/11/20 1034          General ROM    GENERAL ROM COMMENTS  BUE/LE (WFL's)  -MS     Row Name 06/11/20 1034          MMT (Manual Muscle Testing)    General MMT Comments  BUE/LE (3+/5)   -MS     Row Name 06/11/20 1034          Therapeutic Exercise    Comment (Therapeutic Exercise)  Instructed pt. on BLE ther. ex. program x 10 reps (Ankle Pumps, Hip Flexion, LAQ's)  -MS       User Key  (r) = Recorded By, (t) = Taken By, (c) = Cosigned By    Initials Name Provider Type    Harpal Weinberg STEPH, PT Physical Therapist        Goals/Plan     [facility] Name 06/11/20 1036          Transfer Goal 1 (PT)    Activity/Assistive Device (Transfer Goal 1, PT)  transfers, all  -MS     [place] Level/Cues Needed (Transfer Goal 1, PT)  independent  -MS     Time Frame (Transfer Goal 1, PT)  long term goal (LTG);5 days  -MS     [facility] Name 06/11/20 1036          Gait Training Goal 1 (PT)    Activity/Assistive Device (Gait Training Goal 1, PT)  gait (walking locomotion)  -MS     [place] Level (Gait Training Goal 1, PT)  independent  -MS     Distance (Gait Goal 1, PT)  400 feet  -MS     Time Frame (Gait Training Goal 1, PT)  long term goal (LTG);5 days  -MS       User Key  (r) = Recorded By, (t) = Taken By, (c) = Cosigned By    Initials Name Provider Type    Harpal Weinberg STEPH, PT Physical Therapist        Clinical Impression     Row Name 06/11/20 1035          Pain Assessment    Additional Documentation  Pain Scale: Numbers Pre/Post-Treatment (Group)  -MS     Row Name 06/11/20 1035          Pain Scale: Numbers Pre/Post-Treatment    Pain Scale: Numbers, Pretreatment  0/10 - no pain  -MS     Pain Scale: Numbers, Post-Treatment  0/10 - no pain  -MS     [facility] Name 06/11/20 1035          Plan of Care Review    Plan of Care Reviewed With  patient  -MS     Row Name 06/11/20 1035          Physical Therapy Clinical Impression    Criteria for Skilled Interventions Met (PT Clinical Impression)  treatment indicated  -MS     Rehab Potential  (PT Clinical Summary)  good, to achieve stated therapy goals  -MS     Row Name 06/11/20 1035          Positioning and Restraints    Pre-Treatment Position  in bed  -MS     Post Treatment Position  bed  -MS     In Bed  notified nsg;supine;call light within reach;encouraged to call for assist;exit alarm on All lines intact.   -MS       User Key  (r) = Recorded By, (t) = Taken By, (c) = Cosigned By    Initials Name Provider Type    Harpal Weinberg PT Physical Therapist        Outcome Measures     Row Name 06/11/20 1036          How much help from another person do you currently need...    Turning from your back to your side while in flat bed without using bedrails?  4  -MS     Moving from lying on back to sitting on the side of a flat bed without bedrails?  3  -MS     Moving to and from a bed to a chair (including a wheelchair)?  3  -MS     Standing up from a chair using your arms (e.g., wheelchair, bedside chair)?  3  -MS     Climbing 3-5 steps with a railing?  3  -MS     To walk in hospital room?  3  -MS     AM-PAC 6 Clicks Score (PT)  19  -MS     Row Name 06/11/20 1036          Functional Assessment    Outcome Measure Options  AM-PAC 6 Clicks Basic Mobility (PT)  -MS       User Key  (r) = Recorded By, (t) = Taken By, (c) = Cosigned By    Initials Name Provider Type    Harpal Weinberg, PT Physical Therapist        Physical Therapy Education                 Title: PT OT SLP Therapies (Done)     Topic: Physical Therapy (Done)     Point: Mobility training (Done)     Description:   Instruct learner(s) on safety and technique for assisting patient out of bed, chair or wheelchair.  Instruct in the proper use of assistive devices, such as walker, crutches, cane or brace.              Patient Friendly Description:   It's important to get you on your feet again, but we need to do so in a way that is safe for you. Falling has serious consequences, and your personal safety is the most important thing of all.         When it's time to get out of bed, one of us or a family member will sit next to you on the bed to give you support.     If your doctor or nurse tells you to use a walker, crutches, a cane, or a brace, be sure you use it every time you get out of bed, even if you think you don't need it.    Learning Progress Summary           Patient Acceptance, E,D, VU,NR by MS at 6/11/2020 1037                   Point: Home exercise program (Done)     Description:   Instruct learner(s) on appropriate technique for monitoring, assisting and/or progressing patient with therapeutic exercises and activities.              Learning Progress Summary           Patient Acceptance, E,D, VU,NR by MS at 6/11/2020 1037                   Point: Body mechanics (Done)     Description:   Instruct learner(s) on proper positioning and spine alignment for patient and/or caregiver during mobility tasks and/or exercises.              Learning Progress Summary           Patient Acceptance, E,D, VU,NR by MS at 6/11/2020 1037                   Point: Precautions (Done)     Description:   Instruct learner(s) on prescribed precautions during mobility and gait tasks              Learning Progress Summary           Patient Acceptance, E,D, VU,NR by MS at 6/11/2020 1037                               User Key     Initials Effective Dates Name Provider Type Discipline    MS 04/03/18 -  Harpal López, PT Physical Therapist PT              PT Recommendation and Plan  Planned Therapy Interventions (PT Eval): balance training, bed mobility training, gait training, home exercise program, postural re-education, strengthening, patient/family education, transfer training  Outcome Summary/Treatment Plan (PT)  Anticipated Discharge Disposition (PT): home with assist, home with home health  Plan of Care Reviewed With: patient  Outcome Summary: Pt. is a 69 year old Female admitted to the hospital with ESRD and generalized weakness. Pt. reports that the past 2 weeks she  has become increasingly weaker with unsteady ambulation.  Pt. reports that prior to these symptoms she was completely independent and no use of A.D. with gait.  Pt. currently presents with decreased strength, decreased balance, and decreased tolerance to functional activity.  Pt. will benefit from skilled inpt. P.T. to address her functional deficits and to assist pt. in regaining her maximum level of independence with functional mobility.     Time Calculation:   PT Charges     Row Name 06/11/20 1039             Time Calculation    Start Time  0945  -MS      Stop Time  1008  -MS      Time Calculation (min)  23 min  -MS      PT Received On  06/11/20  -MS      PT - Next Appointment  06/12/20  -MS      PT Goal Re-Cert Due Date  06/16/20  -MS         Time Calculation- PT    Total Timed Code Minutes- PT  21 minute(s)  -MS        User Key  (r) = Recorded By, (t) = Taken By, (c) = Cosigned By    Initials Name Provider Type    Harpal Weinberg, PT Physical Therapist        Therapy Charges for Today     Code Description Service Date Service Provider Modifiers Qty    13296315471 HC PT EVAL LOW COMPLEXITY 1 6/11/2020 Harpal López, PT GP 1    14664405346 HC PT THER PROC EA 15 MIN 6/11/2020 Harpal López, PT GP 1          PT G-Codes  Outcome Measure Options: AM-PAC 6 Clicks Basic Mobility (PT)  AM-PAC 6 Clicks Score (PT): 19    Harpal López PT  6/11/2020

## 2020-06-11 NOTE — PROGRESS NOTES
Discharge Planning Assessment  [facility]     Patient Name: Randi Martines  MRN: [id number]  Today's Date: 6/11/2020    Admit Date: 6/9/2020    Discharge Needs Assessment     Row Name 06/11/20 1203       Living Environment    Lives With  alone    Current Living Arrangements  home/apartment/condo    Primary Care Provided by  self    Family Caregiver if Needed  child(magan), adult    Family Caregiver Names  Son Stevan Martines [phone]    Quality of Family Relationships  unable to assess    Able to Return to Prior Arrangements  yes       Resource/Environmental Concerns    Transportation Concerns  rides, unreliable from others       Transition Planning    Patient/Family Anticipates Transition to  home    Patient/Family Anticipated Services at Transition  other (see comments) Hemodialysis    Transportation Anticipated  health plan transportation       Discharge Needs Assessment    Concerns to be Addressed  adjustment to diagnosis/illness    Equipment Currently Used at Home  walker, rolling    Equipment Needed After Discharge  none    Provided Post Acute Provider List?  N/A    N/A Provider List Comment  Plans to return home denies any discharge needs         Discharge Plan     Row Name 06/11/20 1220       Plan    Plan  Home: Started process for outpatient Hemodialysis treatments with [facility]     Plan Comments  Spoke with patient at bedside. Patient lives alone and stated she is independent with ADL's. Patient stated she does not drive. Discussed arranging outpatient Hemodialysis she would like the [facility]  [address] location Monday, Wednesday, Friday midday.  Faxed intake [facility] admission sheet with all required documents to [phone]. Patient stated she will not have transportation to and from hemodialysis. Estephanie Mora RN        Destination      Coordination has not been started for this encounter.      Durable Medical Equipment      Coordination has not been started for this encounter.       Dialysis/Infusion      Service Provider Request Status Selected Services Address Phone Number Fax Number    [facility] Pending - Request Sent N/A [id number] [id number] [phone]      Home Medical Care      Coordination has not been started for this encounter.      Therapy      Coordination has not been started for this encounter.      Community Resources      Coordination has not been started for this encounter.          Demographic Summary     Row Name 06/11/20 1202       General Information    Arrived From  home    Referral Source  admission list    Reason for Consult  discharge planning    Preferred Language  English     Used During This Interaction  no    Row Name 06/11/20 1159       General Information    Admission Type  inpatient        Functional Status     Row Name 06/11/20 1203       Functional Status    Usual Activity Tolerance  good    Current Activity Tolerance  moderate       Functional Status, IADL    Medications  independent    Meal Preparation  independent    Housekeeping  independent    Laundry  independent    Shopping  independent        Psychosocial    No documentation.       Abuse/Neglect    No documentation.       Legal    No documentation.       Substance Abuse    No documentation.       Patient Forms    No documentation.           Estephanie Mora RN

## 2020-06-11 NOTE — DISCHARGE PLACEMENT REQUEST
"Marce Martines (69 y.o. Female)     Date of Birth Social Security Number Address Home Phone MRN    1950  [address] [place] [address] [phone] [id number]    Worship Marital Status          None        Admission Date Admission Type Admitting Provider Attending Provider Department, Room/Bed    6/9/20 Urgent Jeff Gonzáles MD Ray, Jonathan, MD [address], P685/1    Discharge Date Discharge Disposition Discharge Destination                       Attending Provider:  Mor Flowers MD    Allergies:  Augmentin [Amoxicillin-pot Clavulanate], Codeine    Isolation:  None   Infection:  None   Code Status:  CPR    Ht:  152.4 cm (60\")   Wt:  75.9 kg (167 lb 5.2 oz)    Admission Cmt:  None   Principal Problem:  ESRD (end stage renal disease) (CMS/[facility]) [N18.6]                 Active Insurance as of 6/9/2020     Primary Coverage     Payor Plan Insurance Group Employer/Plan Group    HUMANA MEDICARE REPLACEMENT HUMANA MEDICARE REPLACEMENT [id number]     Payor Plan Address Payor Plan Phone Number Payor Plan Fax Number Effective Dates    [address] [address] [phone]  1/1/2018 - None Entered    [facility] [id number]       Subscriber Name Subscriber Birth Date Member ID       MARCE MARTINES 1950 [id number]                 Emergency Contacts      (Rel.) Home Phone Work Phone Mobile Phone    Stevan Martines (Son) -- -- [phone]    Bobbi (DAUGHTEROFLAW)Courtney (Relative) -- -- [phone]              "

## 2020-06-11 NOTE — PROGRESS NOTES
Continued Stay Note  [facility]     Patient Name: Randi Martines  MRN: [id number]  Today's Date: 6/11/2020    Admit Date: 6/9/2020    Discharge Plan     Row Name 06/11/20 1434       Plan    Plan  Pending outpatient HD setup     Plan Comments  CCP started Tarc3 application for patient and left medical form on chart for MD to sign. Patient states if her dialysis appointments are around 4:00 P.M she would have transportation to her appointments from her daughter in law or son. CCP will update [employer] on requested chair time. Gisell PATELW     Row Name 06/11/20 1220       Plan    Plan  Home: Started process for outpatient Hemodialysis treatments with [facility]     Plan Comments  Spoke with patient at bedside. Patient lives alone and stated she is independent with ADL's. Patient stated she does not drive. Discussed arranging outpatient Hemodialysis she would like the [address] location Monday, Wednesday, Friday midday.  Faxed intake [facility] admission sheet with all required documents to [phone]. Patient stated she will not have transportation to and from hemodialysis. Estephanie Mora RN        Discharge Codes    No documentation.             TOSHIA Taylor

## 2020-06-11 NOTE — PROGRESS NOTES
Stevan Graves MD    Location: [facility]     LOS: 2 days   Patient Care Team:  Jose Morrison DO as PCP - General (Family Medicine)  Samantha Uriostegui MD as Consulting Physician (Cardiology)        Subjective     69 y.o. female doing well after tunneled dialysis catheter placement yesterday.  Patient complains of nausea this morning.    Review of Systems  Review of Systems - Negative except stable cardiopulmonary renal symptoms      Objective     Vital Signs  Temp:  [97.2 °F (36.2 °C)-98.5 °F (36.9 °C)] 98.5 °F (36.9 °C)  Heart Rate:  [63-95] 72  Resp:  [16-18] 16  BP: (102-178)/() 164/72    Physical Exam  General: No acute distress. Alert and oriented x 4  HEENT: No jugular venous distension, trachea is midline, right tunneled catheter site clean  CV: RRR, S1S2  Resp: Clear unlabored breathing   Abd: Abdomen is soft, nontender, nondistended  Extremities: No leg swelling    Results Review:       Recent Results (from the past 12 hour(s))   POC Glucose Once    Collection Time: 06/10/20  9:19 PM   Result Value Ref Range    Glucose 98 70 - 130 mg/dL   Renal Function Panel    Collection Time: 06/11/20  4:59 AM   Result Value Ref Range    Glucose 109 (H) 65 - 99 mg/dL    BUN 31 (H) 8 - 23 mg/dL    Creatinine 2.66 (H) 0.57 - 1.00 mg/dL    Sodium 135 (L) 136 - 145 mmol/L    Potassium 3.2 (L) 3.5 - 5.2 mmol/L    Chloride 100 98 - 107 mmol/L    CO2 25.8 22.0 - 29.0 mmol/L    Calcium 8.3 (L) 8.6 - 10.5 mg/dL    Albumin 2.70 (L) 3.50 - 5.20 g/dL    Phosphorus 3.3 2.5 - 4.5 mg/dL    Anion Gap 9.2 5.0 - 15.0 mmol/L    BUN/Creatinine Ratio 11.7 7.0 - 25.0    eGFR Non African Amer 18 (L) >60 mL/min/1.73   CBC (No Diff)    Collection Time: 06/11/20  4:59 AM   Result Value Ref Range    WBC 14.33 (H) 3.40 - 10.80 10*3/mm3    RBC 4.12 3.77 - 5.28 10*6/mm3    Hemoglobin 11.4 (L) 12.0 - 15.9 g/dL    Hematocrit 35.5 34.0 - 46.6 %    MCV 86.2 79.0 - 97.0 fL    MCH 27.7 26.6 - 33.0 pg    MCHC 32.1 31.5 - 35.7 g/dL    RDW  15.2 12.3 - 15.4 %    RDW-SD 48.0 37.0 - 54.0 fl    MPV 10.5 6.0 - 12.0 fL    Platelets 251 140 - 450 10*3/mm3   POC Glucose Once    Collection Time: 06/11/20  6:07 AM   Result Value Ref Range    Glucose 99 70 - 130 mg/dL   ]      Assessment/Plan             ESRD (end stage renal disease) (CMS/[facility])    DM (diabetes mellitus), type 2 with neurological complications (CMS/[facility])    PVD (peripheral vascular disease) (CMS/[facility])    S/P CABG (coronary artery bypass graft)    Type 2 diabetes mellitus without complication, with long-term current use of insulin (CMS/[facility])    Hyperlipemia    Anemia of chronic disease    Hypokalemia    Obesity (BMI 30-39.9)      Assessment & Plan  69 y.o. female with functioning tunneled dialysis catheter for hemodialysis.      Vein mapping with no adequate vein for use for fistula creation.  Patient with right subclavian stenosis and decreased right arm blood pressure left arm.  If long-term hemodialysis access determined needed will need to have left arm arteriovenous graft placed.  Could do electively as outpatient.  We will see as needed while in hospital.      Stevan Graves MD  06/11/20  07:23

## 2020-06-12 NOTE — PROGRESS NOTES
Discharge Planning Assessment  [facility]     Patient Name: Randi Martines  MRN: [id number]  Today's Date: 6/12/2020    Admit Date: 6/9/2020    Discharge Needs Assessment    No documentation.       Discharge Plan     Row Name 06/12/20 1416       Plan    Plan  Home with outpatient Hemodialysis arranged at [place] [id number]    Plan Comments  Outpatient Hemodialysis has been arranged at [place] [id number]. Treatments days will be Tuesday, Thursday and Saturday's at 1145. First dialysis treatment is scheduled for Tuesday June 16th at 1145 patient has been given a letter of confirmation with written instructions. Patient stated her family will be able to provide transportation until Trac 3 has been approved. Trac 3 application was faxed today to the [facility] application office [phone]. Patient was given Trac 3 information to call and check on approval. Patient plans to return home denies any further assistance from CLAUDIO Gongora RN           Destination      Coordination has not been started for this encounter.      Durable Medical Equipment      Coordination has not been started for this encounter.      Dialysis/Infusion      Service Provider Request Status Selected Services Address Phone Number Fax Number    [facility] Pending - Request Sent N/A [address] [place] [id number] [phone]      Home Medical Care      Coordination has not been started for this encounter.      Therapy      Coordination has not been started for this encounter.      Community Resources      Coordination has not been started for this encounter.        Expected Discharge Date and Time     Expected Discharge Date Expected Discharge Time    Jun 12, 2020         Demographic Summary    No documentation.       Functional Status    No documentation.       Psychosocial    No documentation.       Abuse/Neglect    No  documentation.       Legal    No documentation.       Substance Abuse    No documentation.       Patient Forms    No documentation.           Estephanie Mora RN

## 2020-06-12 NOTE — PROGRESS NOTES
"   LOS: 3 days    Patient Care Team:  Jose Morrison DO as PCP - General (Family Medicine)  Samantha Uriostegui MD as Consulting Physician (Cardiology)    Chief Complaint:  No chief complaint on file.    Follow UP ESRD  Subjective     Interval History:   No complaints, BLE weakness better; no dyspnea     Objective     Vital Signs  Temp:  [97.4 °F (36.3 °C)-98.2 °F (36.8 °C)] 97.4 °F (36.3 °C)  Heart Rate:  [59-68] 59  Resp:  [16-18] 16  BP: (132-171)/(65-81) 171/80    Flowsheet Rows      First Filed Value   Admission Height  152.4 cm (60\") Documented at 06/09/2020 1523   Admission Weight  74.3 kg (163 lb 11.2 oz) Documented at 06/09/2020 1523          No intake/output data recorded.  I/O last 3 completed shifts:  In: 720 [P.O.:720]  Out: 2150 [Urine:2150]    Intake/Output Summary (Last 24 hours) at 6/12/2020 0803  Last data filed at 6/11/2020 2356  Gross per 24 hour   Intake 720 ml   Output 1750 ml   Net -1030 ml       Physical Exam:  GEN nad, alert  NECK RIJ TDC< no JVD  Lungs CTA bilat no rales  CV RRR no m/g  abd soft NT/ND  vasc no pedal/ankle edema, 2+ radial pulses      Results Review:    Results from last 7 days   Lab Units 06/12/20  0443 06/11/20  0459 06/10/20  1655 06/10/20  0444 06/09/20  1648   SODIUM mmol/L 138 135*  --  141 136   POTASSIUM mmol/L 3.0* 3.2* 3.6 2.8* 2.8*   CHLORIDE mmol/L 100 100  --  98 94*   CO2 mmol/L 26.5 25.8  --  27.6 28.6   BUN mg/dL 42* 31*  --  52* 48*   CREATININE mg/dL 3.78* 2.66*  --  3.68* 3.64*   CALCIUM mg/dL 8.3* 8.3*  --  8.9 9.6   BILIRUBIN mg/dL  --   --   --   --  0.3   ALK PHOS U/L  --   --   --   --  72   ALT (SGPT) U/L  --   --   --   --  7   AST (SGOT) U/L  --   --   --   --  19   GLUCOSE mg/dL 86 109*  --  156* 136*       Estimated Creatinine Clearance: 12.6 mL/min (A) (by C-G formula based on SCr of 3.78 mg/dL (H)).    Results from last 7 days   Lab Units 06/12/20  0443 06/11/20  0459 06/10/20  0444   MAGNESIUM mg/dL  --   --  1.6   PHOSPHORUS mg/dL 4.6* 3.3 " 4.3             Results from last 7 days   Lab Units 06/12/20  0443 06/11/20  0459 06/10/20  0444 06/09/20  1648   WBC 10*3/mm3 12.04* 14.33* 10.67 12.18*   HEMOGLOBIN g/dL 11.0* 11.4* 12.3 13.2   PLATELETS 10*3/mm3 239 251 331 349       Results from last 7 days   Lab Units 06/09/20  1648   INR  0.98         Imaging Results (Last 24 Hours)     ** No results found for the last 24 hours. **          aspirin 81 mg Oral Daily   docusate sodium 100 mg Oral BID   gabapentin 300 mg Oral Nightly   insulin glargine 30 Units Subcutaneous Nightly   insulin lispro 0-7 Units Subcutaneous TID AC   insulin lispro 15 Units Subcutaneous TID With Meals   melatonin 5 mg Oral Nightly   metoclopramide 5 mg Oral QAM   metoprolol succinate XL 25 mg Oral Daily   pantoprazole 40 mg Oral BID   sodium chloride 10 mL Intravenous Q12H   sucralfate 1 g Oral 4x Daily          Medication Review:   Current Facility-Administered Medications   Medication Dose Route Frequency Provider Last Rate Last Dose   • acetaminophen (TYLENOL) tablet 650 mg  650 mg Oral Q4H PRN Stevan Graves MD   650 mg at 06/10/20 2227   • aspirin EC tablet 81 mg  81 mg Oral Daily Stevan Graves MD   81 mg at 06/11/20 2028   • dextrose (D50W) 25 g/ 50mL Intravenous Solution 25 g  25 g Intravenous Q15 Min PRN Stevan Graves MD       • dextrose (GLUTOSE) oral gel 15 g  15 g Oral Q15 Min PRN Stevan Graves MD       • docusate sodium (COLACE) capsule 100 mg  100 mg Oral BID Stevan Graves MD   100 mg at 06/11/20 0833   • gabapentin (NEURONTIN) capsule 300 mg  300 mg Oral Nightly Stevan Graves MD   300 mg at 06/11/20 2028   • glucagon (human recombinant) (GLUCAGEN DIAGNOSTIC) injection 1 mg  1 mg Subcutaneous Q15 Min PRN Stevan Graves MD       • hydrALAZINE (APRESOLINE) injection 10 mg  10 mg Intravenous Q6H PRN Stevan Graves MD       • HYDROcodone-acetaminophen (NORCO) 5-325 MG per tablet 1 tablet  1 tablet Oral Q6H PRN Bergamini,  Stevan TIPTON MD       • insulin glargine (LANTUS) injection 30 Units  30 Units Subcutaneous Nightly Stevan Graves MD   30 Units at 06/11/20 2034   • insulin lispro (humaLOG) injection 0-7 Units  0-7 Units Subcutaneous TID AC Stevan Graves MD   3 Units at 06/11/20 1152   • insulin lispro (humaLOG) injection 15 Units  15 Units Subcutaneous TID With Meals Stevan Graves MD   15 Units at 06/11/20 1737   • lactulose (CHRONULAC) 10 GM/15ML solution 10 g  10 g Oral Daily PRN Stevan Graves MD   10 g at 06/11/20 2118   • melatonin tablet 5 mg  5 mg Oral Nightly Stevan Graves MD   5 mg at 06/11/20 2028   • metoclopramide (REGLAN) tablet 5 mg  5 mg Oral QAM Stevan Graves MD   5 mg at 06/12/20 0613   • metoprolol succinate XL (TOPROL-XL) 24 hr tablet 25 mg  25 mg Oral Daily Stevan Graves MD   25 mg at 06/11/20 2028   • ondansetron (ZOFRAN) tablet 4 mg  4 mg Oral Q6H PRN Stevan Graves MD   4 mg at 06/11/20 0659    Or   • ondansetron (ZOFRAN) injection 4 mg  4 mg Intravenous Q6H PRN Stevan Graves MD   4 mg at 06/10/20 1617   • pantoprazole (PROTONIX) EC tablet 40 mg  40 mg Oral BID Stevan Graves MD   40 mg at 06/11/20 2028   • sodium chloride 0.9 % flush 10 mL  10 mL Intravenous Q12H Stevan Graves MD   10 mL at 06/11/20 2028   • sodium chloride 0.9 % flush 10 mL  10 mL Intravenous PRN Stevan Graves MD       • sucralfate (CARAFATE) tablet 1 g  1 g Oral 4x Daily Stevan Graves MD   1 g at 06/11/20 2028       Assessment/Plan   1. ESRD.  Diabetic nephropathy; Initiated dialysis 6/10 via [facility] TDC palcement.  Plan outpatient perm access.  Euvolemic.    2. Hypokalemia + BLE weakness - etiol of very low K in setting of worsening renal failure and absent GI sx is unclear, ? Renal wasting; K 3.0 today (initially 2.8); CK WNL; weakness improving some.  3. DM2  4. 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 HTN.  BP improved  6. Anemia.  Hb stable (11)    7. Leukocytosis.  No  fever . Likely reactive. WBC down 12K    Plan  - HD#2 today, 3h duration, 4K bath  - ok with discharge afterward if able to finalize dialysis chair at [facility] and I will call in orders there       ESRD (end stage renal disease) (CMS/[facility])    DM (diabetes mellitus), type 2 with neurological complications (CMS/[facility])    PVD (peripheral vascular disease) (CMS/[facility])    S/P CABG (coronary artery bypass graft)    Type 2 diabetes mellitus without complication, with long-term current use of insulin (CMS/[facility])    Hyperlipemia    Anemia of chronic disease    Hypokalemia    Obesity (BMI 30-39.9)              Jurgen Tirado MD  06/12/20  08:03

## 2020-06-12 NOTE — DISCHARGE SUMMARY
Patient Name: Randi Martines  : 1950  MRN: [id number]    Date of Admission: 2020  Date of Discharge:  2020  Primary Care Physician: Jose Morrison DO      Chief Complaint:   No chief complaint on file.      Discharge Diagnoses     Active Hospital Problems    Diagnosis  POA   • **ESRD (end stage renal disease) (CMS/[facility]) [N18.6]  Unknown   • Hypokalemia [E87.6]  Clinically Undetermined   • Obesity (BMI 30-39.9) [E66.9]  Unknown   • Anemia of chronic disease [D63.8]  Yes   • Hyperlipemia [E78.5]  Yes   • Type 2 diabetes mellitus without complication, with long-term current use of insulin (CMS/[facility]) [E11.9, Z79.4]  Not Applicable   • S/P CABG (coronary artery bypass graft) [Z95.1]  Not Applicable   • DM (diabetes mellitus), type 2 with neurological complications (CMS/[facility]) [E11.49]  Yes   • PVD (peripheral vascular disease) (CMS/[facility]) [I73.9]  Yes      Resolved Hospital Problems   No resolved problems to display.        Hospital Course     Ms. Martines is a 69 y.o. female with a history of NSTEMI, HTN, HLD, CAD, chronic systolic congestive heart failure, diabetes mellitus, PVD who presented to [facility] initially complaining of weakness, nausea, weight loss, & worsening renal failure.  Please see the admitting history and physical for further details.  She was found to have ESRD and was admitted to the hospital for further evaluation and treatment.      Nephrology & vascular consulted & patient received [facility] for initiation of hemodialysis -- second HD treatment noted on 20 for 3 hours duration with 4K bath.      Patient approved for outpatient hemodialysis at [facility] on Monday, Wednesday, Friday @ 11:45 with first HD scheduled on 6/15/20.  Nephrology aware & plan is to discharge today status post HD treatment.     Day of Discharge     Patient appears medically / clinically improved & appropriate for discharge.  Tolerating meals.  Voices no new concerns /  complaints.     Physical Exam:  Temp:  [97.4 °F (36.3 °C)-98.9 °F (37.2 °C)] 98.9 °F (37.2 °C)  Heart Rate:  [59-73] 73  Resp:  [16] 16  BP: (132-171)/(65-80) 145/72  Body mass index is 32.46 kg/m².     Physical Exam   Constitutional: She is oriented to person, place, and time. No distress.   Generally weak & in no apparent distress   HENT:   Head: Normocephalic and atraumatic.   Eyes: Pupils are equal, round, and reactive to light. EOM are normal.   Neck: Normal range of motion. Neck supple.   Cardiovascular: Normal rate and normal heart sounds.   Pulmonary/Chest: Effort normal and breath sounds normal. No respiratory distress.   Abdominal: Soft. Bowel sounds are normal.   Musculoskeletal: She exhibits no edema or deformity.   Neurological: She is alert and oriented to person, place, and time.   Skin: Skin is warm and dry. She is not diaphoretic.   Ecchymosis on right frontal aspect of scalp   Psychiatric: She has a normal mood and affect. Her behavior is normal. Judgment and thought content normal.       Consultants     Consult Orders (all) (From admission, onward)     Start     Ordered    06/11/20 1452  Inpatient Nutrition Consult  Once     Comments:  diabetic   Provider:  (Not yet assigned)    06/11/20 1452    06/09/20 1854  Inpatient Case Management  Consult  Once     Provider:  (Not yet assigned)    06/09/20 1853    06/09/20 1853  Inpatient Vascular Surgery Consult  Once     Specialty:  Vascular Surgery  Provider:  Darrin Calles MD    06/09/20 1852    06/09/20 1615  Inpatient Nephrology Consult  Once     Specialty:  Nephrology  Provider:  Carltno López MD    06/09/20 1616              Procedures     Imaging Results (All)     Procedure Component Value Units Date/Time    XR Chest Post CVA Port [[id number]] Collected:  06/10/20 1056     Updated:  06/10/20 1100    Narrative:       PORTABLE CHEST     HISTORY: Central line placement.     COMPARISON: 06/09/2020.     A single view of  the chest demonstrates a dual lumen central line is in  place with the tip in the right atrium. The heart is within normal  limits in size. There is no evidence of focal infiltrate, effusion or of  pneumothorax. There is mild cephalization of pulmonary vasculature,  exaggerated by respiratory effort.     This report was finalized on 6/10/2020 10:57 AM by Dr. Lasha Rojas M.D.       IR PICC W Fluoro Surgery Only [[id number]] Resulted:  06/10/20 0940     Updated:  06/10/20 0940    Narrative:       This procedure was auto-finalized with no dictation required.    XR Chest PA & Lateral [[id number]] Collected:  06/09/20 1755     Updated:  06/09/20 1823    Narrative:       TWO-VIEW CHEST     HISTORY: Renal failure. Hypertension.     FINDINGS: The lungs are well-expanded and clear. The heart size is  normal with sternal wires from previous cardiac surgery. Previous mild  cardiomegaly noted on 12/26/2019 is no longer present. There is no acute  disease.     This report was finalized on 6/9/2020 6:20 PM by Dr. Miguel Malik M.D.             Pertinent Labs     Results from last 7 days   Lab Units 06/12/20  0443 06/11/20  0459 06/10/20  0444 06/09/20  1648   WBC 10*3/mm3 12.04* 14.33* 10.67 12.18*   HEMOGLOBIN g/dL 11.0* 11.4* 12.3 13.2   PLATELETS 10*3/mm3 239 251 331 349     Results from last 7 days   Lab Units 06/12/20  0443 06/11/20  0459 06/10/20  1655 06/10/20  0444 06/09/20  1648   SODIUM mmol/L 138 135*  --  141 136   POTASSIUM mmol/L 3.0* 3.2* 3.6 2.8* 2.8*   CHLORIDE mmol/L 100 100  --  98 94*   CO2 mmol/L 26.5 25.8  --  27.6 28.6   BUN mg/dL 42* 31*  --  52* 48*   CREATININE mg/dL 3.78* 2.66*  --  3.68* 3.64*   GLUCOSE mg/dL 86 109*  --  156* 136*   Estimated Creatinine Clearance: 12.6 mL/min (A) (by C-G formula based on SCr of 3.78 mg/dL (H)).  Results from last 7 days   Lab Units 06/12/20  0443 06/11/20  0459 06/10/20  0444 06/09/20  1648   ALBUMIN g/dL 2.30* 2.70* 2.70* 3.20*   BILIRUBIN mg/dL  --   --   --  0.3    ALK PHOS U/L  --   --   --  72   AST (SGOT) U/L  --   --   --  19   ALT (SGPT) U/L  --   --   --  7     Results from last 7 days   Lab Units 06/12/20  0443 06/11/20  0459 06/10/20  0444 06/09/20  1648   CALCIUM mg/dL 8.3* 8.3* 8.9 9.6   ALBUMIN g/dL 2.30* 2.70* 2.70* 3.20*   MAGNESIUM mg/dL  --   --  1.6  --    PHOSPHORUS mg/dL 4.6* 3.3 4.3  --        Results from last 7 days   Lab Units 06/10/20  0444   CK TOTAL U/L 69     Results from last 7 days   Lab Units 06/11/20  1058   OSMOLALITY UR mOsm/kg 366         Invalid input(s): LDLCALC        Test Results Pending at Discharge       Discharge Details        Discharge Medications      New Medications      Instructions Start Date   HYDROcodone-acetaminophen 5-325 MG per tablet  Commonly known as:  NORCO   1 tablet, Oral, Every 6 Hours PRN         Changes to Medications      Instructions Start Date   fluticasone 50 MCG/ACT nasal spray  Commonly known as:  Flonase  What changed:    · when to take this  · reasons to take this   2 sprays, Nasal, Daily      sucralfate 1 g tablet  Commonly known as:  CARAFATE  What changed:  when to take this   TAKE 1 TABLET FOUR TIMES DAILY         Continue These Medications      Instructions Start Date   acetaminophen 500 MG tablet  Commonly known as:  TYLENOL   500 mg, Oral, Every 6 Hours PRN      aspirin 81 MG EC tablet   81 mg, Oral, Daily, PT HOLDING FOR SURGERY      atorvastatin 80 MG tablet  Commonly known as:  LIPITOR   80 mg, Oral, Daily      clotrimazole 1 % vaginal cream  Commonly known as:  LOTRIMIN   1 application, Vaginal, As Needed      dicyclomine 20 MG tablet  Commonly known as:  BENTYL   20 mg, Oral, 3 Times Daily PRN      docusate sodium 100 MG capsule  Commonly known as:  COLACE   100 mg, Oral, Every Night at Bedtime      Droplet Pen Needles 31G X 8 MM misc  Generic drug:  Insulin Pen Needle   USE AS DIRECTED 4 TIMES DAILY      DULoxetine 30 MG capsule  Commonly known as:  CYMBALTA   30 mg, Oral, Daily      gabapentin  300 MG capsule  Commonly known as:  NEURONTIN   300 mg, Oral, Every Night at Bedtime      insulin aspart 100 UNIT/ML solution pen-injector sc pen  Commonly known as:  NovoLOG FlexPen   15 Units, Subcutaneous, 3 Times Daily With Meals      lactulose 10 GM/15ML solution  Commonly known as:  CHRONULAC   10 g, Oral, Daily PRN      Levemir FlexTouch 100 UNIT/ML injection  Generic drug:  insulin detemir   45 Units, Subcutaneous, Nightly      melatonin 5 MG tablet tablet   5 mg, Oral, Nightly      metoclopramide 5 MG tablet  Commonly known as:  REGLAN   TAKE 1 TABLET EVERY MORNING      pantoprazole 40 MG EC tablet  Commonly known as:  PROTONIX   TAKE 1 TABLET TWICE DAILY         Stop These Medications    metoprolol succinate XL 25 MG 24 hr tablet  Commonly known as:  Toprol XL     metroNIDAZOLE 500 MG tablet  Commonly known as:  Flagyl     potassium chloride 20 MEQ CR tablet  Commonly known as:  K-DUR,KLOR-CON            Allergies   Allergen Reactions   • Augmentin [Amoxicillin-Pot Clavulanate] GI Intolerance and Dizziness   • Codeine GI Intolerance and Dizziness         Discharge Disposition:  Home or Self Care    Discharge Diet:  Diet Order   Procedures   • Diet Regular; Cardiac, Consistent Carbohydrate, Renal       Discharge Activity:       CODE STATUS:    Code Status and Medical Interventions:   Ordered at: 06/09/20 1616     Level Of Support Discussed With:    Patient     Code Status:    CPR     Medical Interventions (Level of Support Prior to Arrest):    Full       Future Appointments   Date Time Provider Department Center   6/15/2020  4:00 PM Jose Morrison DO [facility] None     Additional Instructions for the Follow-ups that You Need to Schedule     Discharge Follow-up with PCP   As directed       Currently Documented PCP:    Jose Morrison DO    PCP Phone Number:    [phone]     Follow Up Details:  Please call to schedule a one week follow-up appointment with PCP; LADARIUS           Follow-up Information      Jose Morrison DO .    Specialties:  Family Medicine, Emergency Medicine  Why:  Please call to schedule a one week follow-up appointment with PCP; BMP  Contact information:  [address]  [facility] [address]  [place] [address]  [phone]             Jurgen Tirado MD. Call.    Specialty:  Nephrology  Why:  Schedule a follow-up appointment as needed or previously discussed.  Contact information:  [address] [address]  [place] [address]  [phone]             Gladys Morris MD .    Specialty:  Nephrology  Contact information:  [address]  [facility] [address]  [place] [address]  [phone]                   Additional Instructions for the Follow-ups that You Need to Schedule     Discharge Follow-up with PCP   As directed       Currently Documented PCP:    Jose Morrison DO    PCP Phone Number:    [phone]     Follow Up Details:  Please call to schedule a one week follow-up appointment with PCP; BMP           Time Spent on Discharge:  Greater than 30 minutes      EBEN Uriarte  [facility]  06/12/20  3:57 PM

## 2020-06-12 NOTE — PROGRESS NOTES
Discharge Planning Assessment  [facility]     Patient Name: Randi Martines  MRN: [id number]  Today's Date: 6/12/2020    Admit Date: 6/9/2020    Discharge Needs Assessment    No documentation.       Discharge Plan     Row Name 06/12/20 1531       Plan    Plan  Home with outpatient Hemodialysis arranged at [address]     Plan Comments  Call recieved from [facility] and spoke with [phone] patient's dialysis days have been changed to Monday,Wednesday, Friday at 1145 am. First treatment will be Monday June 15th patient will need to arrive at 1130. CCP will update the patient. Rolan HUFFMAN    Row Name 06/12/20 1416       Plan    Plan  Home with outpatient Hemodialysis arranged at [address] [address]    Plan Comments  Outpatient Hemodialysis has been arranged at [address] [address]. Treatments days will be Tuesday, Thursday and Saturday's at 1145. First dialysis treatment is scheduled for Tuesday June 16th at 1145 patient has been given a letter of confirmation with written instructions. Patient stated her family will be able to provide transportation until Trac 3 has been approved. Trac 3 application was faxed today to the [employer] application office [phone]. Patient was given Trac 3 information to call and check on approval. Patient plans to return home denies any further assistance from CCP. Rolan HUFFMAN           Destination      Coordination has not been started for this encounter.      Durable Medical Equipment      Coordination has not been started for this encounter.      Dialysis/Infusion      Service Provider Request Status Selected Services Address Phone Number Fax Number    [facility] Pending - Request Sent N/A [address] [place] [id number] [phone]      Home Medical Care      Coordination has not been  started for this encounter.      Therapy      Coordination has not been started for this encounter.      Community Resources      Coordination has not been started for this encounter.        Expected Discharge Date and Time     Expected Discharge Date Expected Discharge Time    Jun 12, 2020         Demographic Summary    No documentation.       Functional Status    No documentation.       Psychosocial    No documentation.       Abuse/Neglect    No documentation.       Legal    No documentation.       Substance Abuse    No documentation.       Patient Forms    No documentation.           Estephanie Mora RN

## 2020-06-12 NOTE — CONSULTS
"Adult Nutrition  Assessment/PES    Patient Name:  Randi Martines  YOB: 1950  MRN: [id number]  Admit Date:  6/9/2020    Assessment Date:  6/12/2020    Comments: Nutrition Consult: Diet education on diabetic diet.    69 y.o female with ESRD and DM2.  Dialysis today.     Discussed with patient at length over the phone. Discussed healthy eating with diabetes, serving sizes, label reading, consistent meals and carbohydrate servings, etc.  Patient was receptive and asked appropriate questions.  States she has trouble with lots of changes at one time.  Encouraged to make small goals and small changes to help meet the overall goal.     To provide printed handouts as well. Improved appetite, % at meals.      RD to continue to follow.      Due to the COVID pandemic, nutrition assessment completed based on review of electronic medical record and discussion with patient over the phone. This RD currently working remotely and can be reached via secure chat or email.    Reason for Assessment     Row Name 06/12/20 1108          Reason for Assessment    Reason For Assessment  nurse/nurse practitioner consult     Diagnosis  other (see comments) ESRD, DM, CABG, High cholesterol, anemia, low K, obesity     Identified At Risk by Screening Criteria  need for education         Nutrition/Diet History     Row Name 06/12/20 1109          Nutrition/Diet History    Typical Food/Fluid Intake  Eating 100% at last 2 meals. Patient reports appetite has improved.  Tolerating Cardiac, Consistent CHO, Renal diet.           Anthropometrics     Row Name 06/12/20 1109 06/12/20 0501       Anthropometrics    Height  152.4 cm (60\")  --    Weight  --  75.4 kg (166 lb 3.6 oz)       Admit Weight    Admit Weight  75.4 kg (166 lb 3.6 oz)  --       Ideal Body Weight (IBW)    Ideal Body Weight (IBW) (kg)  45.86  --       Body Mass Index (BMI)    BMI Assessment  BMI 30-34.9: obesity grade I  --        Labs/Tests/Procedures/Meds     Row " "Name 06/12/20 1110          Labs/Procedures/Meds    Lab Results Reviewed  reviewed, pertinent     Lab Results Comments  A1C: 6.2, GLuc:  , BUN, Cr, Phos,         Diagnostic Tests/Procedures    Diagnostic Test/Procedure Reviewed  reviewed, pertinent        Medications    Pertinent Medications Reviewed  reviewed, pertinent     Pertinent Medications Comments  Colace, Lantus, Humalog, Reglan, Protonix, NaCl, Lactulose         Physical Findings     Row Name 06/12/20 1111          Physical Findings    Overall Physical Appearance  obese     Skin  other (see comments) B:20         Estimated/Assessed Needs     Row Name 06/12/20 1109          Calculation Measurements    Height  152.4 cm (60\")         Nutrition Prescription Ordered     Row Name 06/12/20 1113          Nutrition Prescription PO    Current PO Diet  Regular     Fluid Consistency  Thin     Common Modifiers  Cardiac;Consistent Carbohydrate;Other (comment) Renal         Evaluation of Received Nutrient/Fluid Intake     Row Name 06/12/20 1113          PO Evaluation    Number of Meals  3     % PO Intake           Problem/Interventions:  Problem 1     Row Name 06/12/20 1114          Nutrition Diagnoses Problem 1    Problem 1  Knowledge Deficit     Etiology (related to)  Medical Diagnosis     Endocrine  DM2     Signs/Symptoms (evidenced by)  Reported  Information Deficit               Intervention Goal     Row Name 06/12/20 1114          Intervention Goal    General  Maintain nutrition;Improved nutrition related lab(s);Provide information regarding MNT for treatment/condition     PO  Maintain intake     Weight  Appropriate weight loss         Nutrition Intervention     Row Name 06/12/20 1115          Nutrition Intervention    RD/Tech Action  Follow Tx progress;Care plan reviewd           Education/Evaluation     Row Name 06/12/20 1115          Education    Education  Provided education regarding     Provided education regarding  Healthy eating for diabetes     "    Monitor/Evaluation    Monitor  Per protocol;I&O;PO intake;Pertinent labs;Weight;Symptoms     Education Follow-up  Reinforce PRN           Electronically signed by:  Kate Lynch RD  06/12/20 11:16

## 2020-06-12 NOTE — PLAN OF CARE
Problem: Patient Care Overview  Goal: Plan of Care Review  Outcome: Ongoing (interventions implemented as appropriate)  Flowsheets (Taken 6/12/2020 0324)  Progress: improving  Plan of Care Reviewed With: patient  Outcome Summary: VSS. No c/o pain or nausea. Pt requested lactulose instead of colace at bedtime. S/L. Dialysis 6/12, possible D/C afterwards. Slept well, cont to monitor

## 2020-06-12 NOTE — PLAN OF CARE
Problem: Patient Care Overview  Goal: Plan of Care Review  Outcome: Ongoing (interventions implemented as appropriate)  Flowsheets (Taken 6/12/2020 1500)  Progress: improving  Plan of Care Reviewed With: patient  Outcome Summary: Dialysis ordered for today 6/12. NCS, renal diet. Accu-checks WNL, giving 15 units Insulin with meals. Outpatient dialysis scheduled, possible discharge home after dialysis today. Bed alarm in use, assist x 1 to bathroom.

## 2020-06-13 NOTE — OUTREACH NOTE
Prep Survey      Responses   [facility] patient discharged from?  [place]   Is LACE score < 7 ?  No   Eligibility  [facility]   Date of Admission  06/09/20   Date of Discharge  06/12/20   Discharge Disposition  Home or Self Care   Discharge diagnosis  ESRD Right IJ dialysis cath placed.  Hemodialysis started    COVID-19 Test Status  Negative   Does the patient have one of the following disease processes/diagnoses(primary or secondary)?  General Surgery   Does the patient have Home health ordered?  No   Is there a DME ordered?  No   General alerts for this patient  HD MWF   Prep survey completed?  Yes          Ernestine Lemus RN

## 2020-06-15 NOTE — PROGRESS NOTES
Case Management Discharge Note      Final Note: Discharged home with outpatient [facility] [address] Nadeen Caballero RN    Provided Post Acute Provider List?: N/A  N/A Provider List Comment: Plans to return home denies any discharge needs          Transportation Services  Private: Car    Final Discharge Disposition Code: 01 - home or self-care

## 2020-06-15 NOTE — OUTREACH NOTE
"Call Center TCM Note      Responses   [facility] patient discharged from?  [place]   Does the patient have one of the following disease processes/diagnoses(primary or secondary)?  General Surgery   TCM attempt successful?  Yes   Call start time  0953   Call end time  1006   General alerts for this patient  Pt is a retired RN   Discharge diagnosis  ESRD Right IJ dialysis cath placed. Hemodialysis started    Meds reviewed with patient/caregiver?  Yes   Is the patient having any side effects they believe may be caused by any medication additions or changes?  No   Does the patient have all medications related to this admission filled (includes all antibiotics, pain medications, etc.)  Yes   Is the patient taking all medications as directed (includes completed medication regime)?  Yes   Does the patient have a follow up appointment scheduled with their surgeon?  No   What is preventing the patient from scheduling follow up appointments?  -- [No f/u is needed for port placement.]   Nursing Interventions  -- [None needed]   Has the patient kept scheduled appointments due by today?  N/A   Comments  PCP appt 6/15/20,  pt will start dialysis on 6/15/20   Psychosocial issues?  Yes   Psychosocial comments  Pt is legally blind. She gave up driving a while ago. She is worried about dialysis. She's unable to read r/t blindness and it makes her nervous.    Did the patient receive a copy of their discharge instructions?  Yes   Nursing interventions  Reviewed instructions with patient   What is the patient's perception of their health status since discharge?  Improving [Pt reports, \"I don't feel bad however going to dialysis it quite foreboding.\"]   Nursing interventions  Nurse provided patient education   Is the patient /caregiver able to teach back basic post-op care?  Take showers only when approved by MD-sponge bathe until then, No tub bath, swimming, or hot tub until instructed by MD, Keep incision areas clean,dry and " protected, Drive as instructed by MD in discharge instructions [Pt doesn't drive ]   Is the patient/caregiver able to teach back signs and symptoms of incisional infection?  Increased redness, swelling or pain at the incisonal site, Increased drainage or bleeding, Fever   Is the patient/caregiver able to teach back steps to recovery at home?  Set small, achievable goals for return to baseline health, Rest and rebuild strength, gradually increase activity, Eat a well-balance diet   If the patient is a current smoker, are they able to teach back resources for cessation?  -- [Nonsmoker]   Is the patient/caregiver able to teach back the hierarchy of who to call/visit for symptoms/problems? PCP, Specialist, Home health nurse, Urgent Care, ED, 911  Yes   TCM call completed?  Yes          Ashanti Can RN    6/15/2020, 10:06

## 2020-06-19 NOTE — OUTREACH NOTE
General Surgery Week 2 Survey      Responses   [facility], [place] patient discharged from?  [place]   Does the patient have one of the following disease processes/diagnoses(primary or secondary)?  General Surgery   Week 2 attempt successful?  Yes   Call start time  1655   Call end time  1718   General alerts for this patient  Pt is a retired RN   Discharge diagnosis  ESRD Right IJ dialysis cath placed. Hemodialysis started    Meds reviewed with patient/caregiver?  Yes   Is the patient having any side effects they believe may be caused by any medication additions or changes?  No   Does the patient have all medications related to this admission filled (includes all antibiotics, pain medications, etc.)  Yes   Is the patient taking all medications as directed (includes completed medication regime)?  Yes   Does the patient have a follow up appointment scheduled with their surgeon?  Yes   Has the patient kept scheduled appointments due by today?  Yes   Psychosocial issues?  Yes   Psychosocial comments  Pt is legally blind. She gave up driving a while ago. She is worried about dialysis. She's unable to read r/t blindness and it makes her nervous.    Comments  Blood pressure is now going down and staying down.     Did the patient receive a copy of their discharge instructions?  Yes   Nursing interventions  Reviewed instructions with patient   What is the patient's perception of their health status since discharge?  Improving   Nursing interventions  Nurse provided patient education   Is the patient /caregiver able to teach back basic post-op care?  Continue use of incentive spirometry at least 1 week post discharge, Practice 'cough and deep breath', Drive as instructed by MD in discharge instructions, Lifting as instructed by MD in discharge instructions, Keep incision areas clean,dry and protected   Is the patient/caregiver able to teach back signs and symptoms of incisional infection?  Increased redness, swelling or pain  at the incisonal site, Increased drainage or bleeding, Incisional warmth, Pus or odor from incision, Fever   Is the patient/caregiver able to teach back steps to recovery at home?  Set small, achievable goals for return to baseline health, Eat a well-balance diet, Make a list of questions for surgeon's appointment   Is the patient/caregiver able to teach back the hierarchy of who to call/visit for symptoms/problems? PCP, Specialist, Home health nurse, Urgent Care, ED, 911  Yes   Additional teach back comments  She needs a little help.  Encouraged to call [employer], and SW at [facility] to discuss [employer] and a new dialysis center.   Week 2 call completed?  Yes          Jackie Gonzales RN

## 2020-06-20 NOTE — TELEPHONE ENCOUNTER
"    Reason for Disposition  • General information question, no triage required and triager able to answer question    Additional Information  • Negative: [1] Caller is not with the adult (patient) AND [2] reporting urgent symptoms  • Negative: Lab result questions  • Negative: Medication questions  • Negative: Caller can't be reached by phone  • Negative: Caller has already spoken to PCP or another triager  • Negative: RN needs further essential information from caller in order to complete triage  • Negative: Requesting regular office appointment  • Negative: [1] Caller requesting NON-URGENT health information AND [2] PCP's office is the best resource  • Negative: Health Information question, no triage required and triager able to answer question    Answer Assessment - Initial Assessment Questions  1. REASON FOR CALL or QUESTION: \"What is your reason for calling today?\" or \"How can I best help you?\" or \"What question do you have that I can help answer?\"      Caller states she is a new dialysis patient.  States no one gave her any information about diet, except she is to be on a renal diet.  She was asking about taking a bath with her shunt, which she is not supposed to get wet.  Also had questions regarding her medications.  She is also diabetic, has heart disease and is almost blind.  Advised to call the dietary department at [facility] for advice on the renal diet.  Gave her some general instructions.  Also she states she cannot take her blood pressure, so advised she get one of the wrist blood pressure monitors at [facility].  Answered her questions regarding her medications. Gave her names and telephone numbers in [place] for transportation services for seniors and handicapped.  Suggested she contact  at [facility] for possible help paying for transportation services.  She will be going to dialysis three times a week.  Has one son and daughter-in-law and they both work.  " Anyway a long call and she was happier when we got off the telephone.  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    Protocols used: INFORMATION ONLY CALL-ADULT-AH

## 2020-06-25 NOTE — OUTREACH NOTE
General Surgery Week 3 Survey      Responses   [facility], [place], operated by [facility] patient discharged from?  [place]   Does the patient have one of the following disease processes/diagnoses(primary or secondary)?  General Surgery   Week 3 attempt successful?  No   Unsuccessful attempts  Attempt 1          Ese Suarez RN
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
## 2020-06-27 PROBLEM — E11.9 DIABETES MELLITUS (HCC): Status: ACTIVE | Noted: 2020-01-01

## 2020-06-27 PROBLEM — I95.1 ORTHOSTATIC HYPOTENSION: Status: ACTIVE | Noted: 2020-01-01

## 2020-06-27 PROBLEM — N39.0 UTI (URINARY TRACT INFECTION): Status: ACTIVE | Noted: 2020-01-01

## 2020-06-27 PROBLEM — E16.2 HYPOGLYCEMIA: Status: ACTIVE | Noted: 2020-01-01

## 2020-06-27 PROBLEM — Z79.01 CHRONIC ANTICOAGULATION: Status: ACTIVE | Noted: 2020-01-01

## 2020-06-28 PROBLEM — N39.0 SEPSIS DUE TO URINARY TRACT INFECTION (HCC): Status: ACTIVE | Noted: 2020-01-01

## 2020-06-28 PROBLEM — A41.9 SEPSIS DUE TO URINARY TRACT INFECTION (HCC): Status: ACTIVE | Noted: 2020-01-01

## 2020-06-30 PROBLEM — B96.20 BACTEREMIA DUE TO ESCHERICHIA COLI: Status: ACTIVE | Noted: 2020-01-01

## 2020-06-30 PROBLEM — R78.81 BACTEREMIA DUE TO ESCHERICHIA COLI: Status: ACTIVE | Noted: 2020-01-01

## 2020-07-01 PROBLEM — R73.9 HYPERGLYCEMIA: Status: ACTIVE | Noted: 2020-01-01

## 2020-07-01 NOTE — PROGRESS NOTES
Case Management Discharge Note      Final Note: Pt discharged home.   SEGUN Alfaro RN    Provided Post Acute Provider List?: Refused  Provided Post Acute Provider Quality & Resource List?: Refused    Destination      No service has been selected for the patient.      Durable Medical Equipment      No service has been selected for the patient.      Dialysis/Infusion      No service has been selected for the patient.      Home Medical Care      No service has been selected for the patient.      Therapy      No service has been selected for the patient.      Community Resources      No service has been selected for the patient.        Transportation Services  Private: Car    Final Discharge Disposition Code: 01 - home or self-care

## 2020-07-01 NOTE — PLAN OF CARE
Problem: Patient Care Overview  Goal: Plan of Care Review  Outcome: Ongoing (interventions implemented as appropriate)  Flowsheets (Taken 7/1/2020 0353)  Progress: improving  Plan of Care Reviewed With: patient  Outcome Summary: Patient is alert and oriented. VSS. Denies any pain. No more complaints of nausea. Up with assistance. Strict i/o's. Dialysis planned for today. No acute distress. Will continue to monitor.     Problem: Fall Risk (Adult)  Goal: Absence of Fall  Outcome: Ongoing (interventions implemented as appropriate)  Flowsheets (Taken 7/1/2020 0353)  Absence of Fall: making progress toward outcome     Problem: Skin Injury Risk (Adult)  Goal: Skin Health and Integrity  Outcome: Ongoing (interventions implemented as appropriate)  Flowsheets (Taken 7/1/2020 0353)  Skin Health and Integrity: making progress toward outcome

## 2020-07-01 NOTE — PROGRESS NOTES
ID note for E. coli septicemia  Subjective: Seen on dialysis with Dr. thao and discussed case with her.  Patient is feeling well.  No constitutional symptoms of infection such as fever, chills, night sweats.  No tenderness over her dialysis catheter.  She is not having any urinary symptoms  Objective: Afebrile, no acute distress, right tunneled dialysis catheter appears okay, abdomen soft nontender nondistended, pulmonary effort normal, appropriate mood and affect    6-27 blood culture 1/2 E. coli pan susceptible  6-27 urine culture greater than 100,000 E. coli    White count 10.9  Creatinine 3.56  Glucose 202 through 267  A/p  1.  E. coli septicemia  2.  End-stage renal disease on hemodialysis via right tunneled dialysis catheter  3.  Diabetes mellitus type 2, continue glycemic control efforts to prevent/control infectious complications    Doing okay.  CT abdomen pelvis negative for any deep nidus of infection.  Will discontinue IV ceftriaxone and start levofloxacin 500 mg p.o. every 48 hours x4 doses.  No objection to discharge when okay with others.  We will see PRN.

## 2020-07-01 NOTE — PROGRESS NOTES
Continued Stay Note  [facility]     Patient Name: Randi Martines  MRN: [id number]  Today's Date: 7/1/2020    Admit Date: 6/27/2020    Discharge Plan     Row Name 07/01/20 1308       Plan    Plan  Plan home with son.   SEGUN Alfaro RN    Patient/Family in Agreement with Plan  yes    Plan Comments  Per Rosa  ( [phone]) [facility] cannot follow pt at this time.  Pt was offered a referral to another HH agency but declined.   Pt states she is going to her son's house and should be ok without HH.   Plan home with son.   SEGUN Alfaro RN        Discharge Codes    No documentation.       Expected Discharge Date and Time     Expected Discharge Date Expected Discharge Time    Jul 1, 2020             Almita Alfaro, RN

## 2020-07-01 NOTE — OUTREACH NOTE
Prep Survey      Responses   [facility] patient discharged from?  [place]   Is LACE score < 7 ?  No   Eligibility  [facility]   Date of Admission  06/27/20   Date of Discharge  07/01/20   Discharge Disposition  Home or Self Care   Discharge diagnosis  bacteremia due to E. coli    COVID-19 Test Status  Not tested   Does the patient have one of the following disease processes/diagnoses(primary or secondary)?  Sepsis   Does the patient have Home health ordered?  No   Is there a DME ordered?  No   Prep survey completed?  Yes          Holly Crook RN

## 2020-07-01 NOTE — DISCHARGE SUMMARY
Patient Name: Randi Martines  : 1950  MRN: [id number]    Date of Admission: 2020  Date of Discharge:  2020  Primary Care Physician: Jose Morrison DO      Chief Complaint:   Fall; Weakness - Generalized; and Head Injury      Discharge Diagnoses     Active Hospital Problems    Diagnosis  POA   • **Bacteremia due to Escherichia coli [R78.81, B96.20]  Yes   • Sepsis due to urinary tract infection (CMS/[facility]) [A41.9, N39.0]  Yes   • Orthostatic hypotension [I95.1]  Yes   • Diabetes mellitus (CMS/[facility]) [E11.9]  Yes   • Hyperglycemia [R73.9]  Clinically Undetermined   • Fall [W19.XXXA]  Yes   • UTI (urinary tract infection) [N39.0]  Yes   • ESRD (end stage renal disease) (CMS/[facility]) [N18.6]  Yes   • Hypertension [I10]  Yes   • S/P CABG (coronary artery bypass graft) [Z95.1]  Not Applicable   • PVD (peripheral vascular disease) (CMS/[facility]) [I73.9]  Yes      Resolved Hospital Problems   No resolved problems to display.        Hospital Course     Ms. Martines is a 69 y.o. female with a history of hypertension, hyperlipidemia, ESRD on HD MWF, diabetes mellitus on long-term insulin therapy, chronic systolic congestive heart failure, CAD, peptic ulcer disease, PVD who presented to [facility] initially complaining of fall, generalized weakness, and head injury.  Please see the admitting history and physical for further details.  She was found to have bacteremia due to E. coli and was admitted to the hospital for further evaluation and treatment.      Nephrology consulted to manage ESRD / HD, monitor renal function, and provide expert recommendations regarding current home medications in the presence of renal failure.  Nephrology recommends complete cessation of Cymbalta and continue home regimen of gabapentin.    Infectious disease consulted for bacteremia due to E. coli and patient received ceftriaxone during hospital admission and discharged with 4-day supply of Levaquin 500 mg every 48  hours.  Patient underwent CT abdomen pelvis to evaluate for any deep nidus of infection per ID recommendation and noted CT abdomen pelvis exam negative at this juncture.  All consults recommend discharge on 7/1/2020 as patient clinically improved.    Noted HgbA1c 5.9 during hospital admission; therefore, reducing Lantus 15 units SC at night & insulin aspart 5 units SC TID with meals based on glucose readings during hospitalization averaging 200 with Lantus 10 units SC at night.      Patient should follow-up with PCP in approximately 1 week to review BP, BMP, CBC.  Consider beta-blocker for systolic dysfunction; however, noted BP labile during hospital admission; therefore, defer to PCP for BP monitoring for further changes or additions in medications.     Day of Discharge     Patient appears comfortable & in no apparent distress.  Tolerating PO.  Agrees with plan to discharge home today.    Physical Exam:  Temp:  [97.3 °F (36.3 °C)-98.1 °F (36.7 °C)] 98.1 °F (36.7 °C)  Heart Rate:  [75-91] 91  Resp:  [18] 18  BP: ()/(54-89) 191/89  Body mass index is 30.25 kg/m².     Physical Exam   Constitutional: She is oriented to person, place, and time. No distress.   Generally weak & non-toxic appearance   HENT:   Head: Normocephalic and atraumatic.   Eyes: Pupils are equal, round, and reactive to light. EOM are normal.   Neck: Normal range of motion. Neck supple.   Cardiovascular: Normal rate and normal heart sounds.   Pulmonary/Chest: Effort normal and breath sounds normal. No respiratory distress.   Abdominal: Soft. Bowel sounds are normal. She exhibits no distension. There is no tenderness.   Musculoskeletal: She exhibits no edema or deformity.   Neurological: She is alert and oriented to person, place, and time. No cranial nerve deficit or sensory deficit. She exhibits normal muscle tone. Coordination normal.   Skin: Skin is warm and dry. She is not diaphoretic. There is pallor.   Psychiatric: She has a normal mood  and affect. Her behavior is normal. Judgment and thought content normal.       Consultants     Consult Orders (all) (From admission, onward)     Start     Ordered    06/30/20 1314  Inpatient Nutrition Consult  Once     Provider:  (Not yet assigned)    06/30/20 1313    06/30/20 1104  Inpatient Infectious Diseases Consult  Once     Specialty:  Infectious Diseases  Provider:  Lucien Appiah MD    06/30/20 1104    06/28/20 1252  Inpatient Nutrition Consult  Once     Provider:  (Not yet assigned)    06/28/20 1252    06/27/20 1811  Inpatient Nephrology Consult  Once     Specialty:  Nephrology  Provider:  Jurgen Tirado MD    06/27/20 1812    06/27/20 1542  [facility] (on-call MD unless specified) Details  Once     Specialty:  Hospitalist  Provider:  (Not yet assigned)    06/27/20 1541              Procedures     Imaging Results (All)     Procedure Component Value Units Date/Time    CT Abdomen Pelvis Without Contrast [[id number]] Collected:  06/30/20 1634     Updated:  06/30/20 1651    Narrative:       CT ABDOMEN AND PELVIS WITHOUT IV CONTRAST     HISTORY: Bacteremia.     TECHNIQUE: Radiation dose reduction techniques were utilized, including  automated exposure control and exposure modulation based on body size.   3 mm images were obtained through the abdomen and pelvis without IV  contrast.      COMPARISON: Abdominal pain, bacteremia.     FINDINGS:  Presumed distal aspect of the central venous catheter is visualized in  the right atrium, as seen on recent chest radiographs. There are median  sternotomy wires.     There are no findings of small bowel obstruction. The appendix and  gallbladder are surgically absent.     Subcentimeter hypodense hepatic lesion is too small to characterize.     The pancreas, spleen and adrenal glands have an unremarkable noncontrast  CT appearance. Asymmetric atrophy of the right kidney is present.  Kidneys have lobulated contours without a definite discrete lesion  visualized;  however, evaluation is suboptimal without intravenous  contrast. There is no hydronephrosis.     Postsurgical changes from ventral hernia repair with mesh are present.  There is no abdominopelvic adenopathy by size criteria. Extensive  atherosclerotic calcification of the abdominal aorta and its major  branches is present, as before. There are stents within the bilateral  common iliac arteries. These findings are not well characterized without  intravenous contrast.     Colonic diverticulosis is present. There is no free intraperitoneal air  or fluid. No suspicious lytic or blastic osseous lesions are present.  The bladder is unremarkable for its degree of distention. The uterus is  surgically absent.       Impression:       1.  Extensive atherosclerotic calcification of the abdominopelvic  vasculature with bilateral common iliac stents which are not well  evaluated without intravenous contrast  2.  Other incidental and chronic findings as above.     This report was finalized on 6/30/2020 4:48 PM by Dr. Aleksandr Mota M.D.       CT Head Without Contrast [[id number]] Collected:  06/27/20 1647     Updated:  06/27/20 1851    Narrative:       EMERGENCY NONCONTRAST HEAD CT 06/27/2020     CLINICAL HISTORY:  Patient fell, headache.     TECHNIQUE: Spiral CT images were obtained from the base of the skull to  the vertex without intravenous contrast. Images were reformatted and  submitted in 3 mm thick axial CT sections with brain algorithm and 2 mm  thick axial CT sections with high-resolution bone algorithm and 2 mm  thick sagittal and coronal reconstructions were performed and submitted  in brain algorithm.     This is correlated to a noncontrast head CT from [facility] 05/30/2020.     FINDINGS: There are patchy areas of low-density in the periventricular  and subcortical white matter of the cerebral hemispheres consistent with  mild-to-moderate small vessel disease. The remainder of the brain  parenchyma  is normal in attenuation. The ventricles are normal in size.   I see no mass effect and no midline shift.  No extra-axial fluid  collections are identified and there is no evidence of acute  intracranial hemorrhage. No acute skull fracture is seen.  The paranasal  sinuses, mastoid air cells and middle ear cavities are clear. There are  extensive calcified plaques in the cavernous internal carotids.       Impression:       1. Mild-to-moderate small vessel disease in the cerebral white matter  and extensive calcified plaques in the cavernous carotids, otherwise  negative head CT.      Radiation dose reduction techniques were utilized, including automated  exposure control and exposure modulation based on body size.     This report was finalized on 6/27/2020 6:48 PM by Dr. Jose Beebe M.D.       XR Chest 1 View [[id number]] Collected:  06/27/20 1544     Updated:  06/27/20 1710    Narrative:       ONE VIEW PORTABLE CHEST     HISTORY: Leukocytosis. Receiving dialysis.     The lungs are well-expanded and clear. There is mild cardiomegaly with  sternal wires from previous cardiac surgery. There is no evidence of CHF  or focal infiltrate or change from 06/10/2020. A right-sided vascular  catheter ends in the SVC without change.     This report was finalized on 6/27/2020 5:07 PM by Dr. Miguel Malik M.D.             Pertinent Labs     Results from last 7 days   Lab Units 07/01/20  0537 06/30/20  0602 06/29/20  0543 06/28/20  0644   WBC 10*3/mm3 10.91* 9.53 10.04 14.02*   HEMOGLOBIN g/dL 10.1* 10.5* 10.1* 9.6*   PLATELETS 10*3/mm3 212 194 191 186     Results from last 7 days   Lab Units 07/01/20  0537 06/30/20  0602 06/29/20  0543 06/28/20  0644   SODIUM mmol/L 135* 138 128* 134*   POTASSIUM mmol/L 3.2* 3.3* 2.9* 3.5   CHLORIDE mmol/L 99 99 96* 100   CO2 mmol/L 26.3 27.6 21.6* 21.7*   BUN mg/dL 32* 21 34* 24*   CREATININE mg/dL 3.56* 3.11* 3.52* 3.18*   GLUCOSE mg/dL 231* 173* 198* 95   Estimated Creatinine Clearance: 13.4  mL/min (A) (by C-G formula based on SCr of 3.56 mg/dL (H)).  Results from last 7 days   Lab Units 07/01/20  0537 06/29/20  0543 06/28/20  0644   ALBUMIN g/dL 2.60* 2.20* 2.50*   BILIRUBIN mg/dL  --  <0.2* <0.2*   ALK PHOS U/L  --  60 56   AST (SGOT) U/L  --  15 16   ALT (SGPT) U/L  --  8 8     Results from last 7 days   Lab Units 07/01/20  0537 06/30/20  0602 06/29/20  0543 06/28/20  0644 06/27/20  1350   CALCIUM mg/dL 8.1* 8.6 8.6 8.6 9.5   ALBUMIN g/dL 2.60*  --  2.20* 2.50*  --    MAGNESIUM mg/dL  --   --   --   --  1.9   PHOSPHORUS mg/dL 4.7*  --   --   --   --                Invalid input(s): LDLCALC  Results from last 7 days   Lab Units 06/27/20  1750 06/27/20  1620   BLOODCX  Escherichia coli*  No growth at 3 days  --    URINECX   --  >100,000 CFU/mL Escherichia coli*   BCIDPCR  Escherichia coli. Identification by BCID PCR.*  --        Test Results Pending at Discharge      Order Current Status    Blood Culture - Blood, Hand, Left Preliminary result    Urine Culture - Urine, Urine, Catheter Preliminary result          Discharge Details        Discharge Medications      New Medications      Instructions Start Date   levoFLOXacin 500 MG tablet  Commonly known as:  LEVAQUIN   500 mg, Oral, Every Other Day      promethazine 25 MG tablet  Commonly known as:  PHENERGAN   25 mg, Oral, Every 6 Hours PRN         Changes to Medications      Instructions Start Date   insulin aspart 100 UNIT/ML solution pen-injector sc pen  Commonly known as:  NovoLOG FlexPen  What changed:  how much to take   5 Units, Subcutaneous, 3 Times Daily With Meals      Levemir FlexTouch 100 UNIT/ML injection  Generic drug:  insulin detemir  What changed:  how much to take   15 Units, Subcutaneous, Nightly         Continue These Medications      Instructions Start Date   acetaminophen 500 MG tablet  Commonly known as:  TYLENOL   500 mg, Oral, Every 6 Hours PRN      aspirin 81 MG EC tablet   81 mg, Oral, Daily      atorvastatin 80 MG  tablet  Commonly known as:  LIPITOR   80 mg, Oral, Daily      docusate sodium 100 MG capsule  Commonly known as:  COLACE   200 mg, Oral, Every Night at Bedtime      Droplet Pen Needles 31G X 8 MM misc  Generic drug:  Insulin Pen Needle   USE AS DIRECTED 4 TIMES DAILY      gabapentin 300 MG capsule  Commonly known as:  NEURONTIN   300 mg, Oral, Every Night at Bedtime      lactulose 10 GM/15ML solution  Commonly known as:  CHRONULAC   10 g, Oral, Daily PRN      melatonin 5 MG tablet tablet   5 mg, Oral, Nightly      pantoprazole 40 MG EC tablet  Commonly known as:  PROTONIX   40 mg, Oral, 2 times daily      sucralfate 1 g tablet  Commonly known as:  CARAFATE   1 g, Oral, Daily         Stop These Medications    Cymbalta 30 MG capsule  Generic drug:  DULoxetine            Allergies   Allergen Reactions   • Augmentin [Amoxicillin-Pot Clavulanate] GI Intolerance and Dizziness   • Codeine GI Intolerance and Dizziness         Discharge Disposition:  Home-Health Care [facility]    Discharge Diet:  Diet Order   Procedures   • Diet Regular; Consistent Carbohydrate       Discharge Activity:       CODE STATUS:    Code Status and Medical Interventions:   Ordered at: 06/27/20 1812     Code Status:    CPR     Medical Interventions (Level of Support Prior to Arrest):    Full       No future appointments.  Additional Instructions for the Follow-ups that You Need to Schedule     Discharge Follow-up with PCP   As directed       Currently Documented PCP:    Jose Morrison DO    PCP Phone Number:    [phone]     Follow Up Details:  Please call to schedule a 1 week follow-up appointment with PCP; BMP & CBC           Follow-up Information     Jose Morrison DO .    Specialties:  Family Medicine, Emergency Medicine  Why:  Please call to schedule a 1 week follow-up appointment with PCP; BMP & CBC  Contact information:  [address]  [place] [address]  [phone]                   Additional Instructions for the Follow-ups  that You Need to Schedule     Discharge Follow-up with PCP   As directed       Currently Documented PCP:    Jose Morrison DO    PCP Phone Number:    [phone]     Follow Up Details:  Please call to schedule a 1 week follow-up appointment with PCP; BMP & CBC           Time Spent on Discharge:  Greater than 30 minutes      EBEN Uriarte  [facility]  07/01/20  1:03 PM

## 2020-07-01 NOTE — PROGRESS NOTES
"   LOS: 4 days    Patient Care Team:  Jose Morrison DO as PCP - General (Family Medicine)  Samantha Uriostegui MD as Consulting Physician (Cardiology)    Chief Complaint:    Chief Complaint   Patient presents with   • Fall   • Weakness - Generalized   • Head Injury     Follow UP ESRD  Subjective     Interval History:   On dialysis.  Did not sleep.  No pain. No fever or chills.  CT reviewed, no  Renal abnormality.   Review of Systems:   See above    Objective     Vital Signs  Temp:  [97.3 °F (36.3 °C)-97.7 °F (36.5 °C)] 97.3 °F (36.3 °C)  Heart Rate:  [75-87] 78  Resp:  [18] 18  BP: ()/(54-78) 185/71    Flowsheet Rows      First Filed Value   Admission Height  152.4 cm (60\") Documented at 06/27/2020 1625   Admission Weight  75.3 kg (166 lb) Documented at 06/27/2020 1625          I/O this shift:  In: 120 [P.O.:120]  Out: -   I/O last 3 completed shifts:  In: 1320 [P.O.:1320]  Out: 2050 [Urine:2050]    Intake/Output Summary (Last 24 hours) at 7/1/2020 1154  Last data filed at 7/1/2020 0924  Gross per 24 hour   Intake 1200 ml   Output 2050 ml   Net -850 ml       Physical Exam:  Awake and alert. Sitting up in bed.  On dialysis.  Qb 350.  systolic .  Wearing mask .  No scleral icterus  Neck RIJ TDC nontender  Heart RRR no s3 or rub  Lungs clear to auscultation, no wheezing  Abd + bs, well healed lower abd scar.  Nontender. No body wall edema  Ext no edema.   Skin dry.       Results Review:    Results from last 7 days   Lab Units 07/01/20  0537 06/30/20  0602 06/29/20  0543 06/28/20  0644   SODIUM mmol/L 135* 138 128* 134*   POTASSIUM mmol/L 3.2* 3.3* 2.9* 3.5   CHLORIDE mmol/L 99 99 96* 100   CO2 mmol/L 26.3 27.6 21.6* 21.7*   BUN mg/dL 32* 21 34* 24*   CREATININE mg/dL 3.56* 3.11* 3.52* 3.18*   CALCIUM mg/dL 8.1* 8.6 8.6 8.6   BILIRUBIN mg/dL  --   --  <0.2* <0.2*   ALK PHOS U/L  --   --  60 56   ALT (SGPT) U/L  --   --  8 8   AST (SGOT) U/L  --   --  15 16   GLUCOSE mg/dL 231* 173* 198* 95       Estimated " Creatinine Clearance: 13.4 mL/min (A) (by C-G formula based on SCr of 3.56 mg/dL (H)).    Results from last 7 days   Lab Units 07/01/20  0537 06/27/20  1350   MAGNESIUM mg/dL  --  1.9   PHOSPHORUS mg/dL 4.7*  --              Results from last 7 days   Lab Units 07/01/20  0537 06/30/20  0602 06/29/20  0543 06/28/20  0644 06/27/20  1350   WBC 10*3/mm3 10.91* 9.53 10.04 14.02* 23.23*   HEMOGLOBIN g/dL 10.1* 10.5* 10.1* 9.6* 12.2   PLATELETS 10*3/mm3 212 194 191 186 291               Imaging Results (Last 24 Hours)     Procedure Component Value Units Date/Time    CT Abdomen Pelvis Without Contrast [[id number]] Collected:  06/30/20 1634     Updated:  06/30/20 1651    Narrative:       CT ABDOMEN AND PELVIS WITHOUT IV CONTRAST     HISTORY: Bacteremia.     TECHNIQUE: Radiation dose reduction techniques were utilized, including  automated exposure control and exposure modulation based on body size.   3 mm images were obtained through the abdomen and pelvis without IV  contrast.      COMPARISON: Abdominal pain, bacteremia.     FINDINGS:  Presumed distal aspect of the central venous catheter is visualized in  the right atrium, as seen on recent chest radiographs. There are median  sternotomy wires.     There are no findings of small bowel obstruction. The appendix and  gallbladder are surgically absent.     Subcentimeter hypodense hepatic lesion is too small to characterize.     The pancreas, spleen and adrenal glands have an unremarkable noncontrast  CT appearance. Asymmetric atrophy of the right kidney is present.  Kidneys have lobulated contours without a definite discrete lesion  visualized; however, evaluation is suboptimal without intravenous  contrast. There is no hydronephrosis.     Postsurgical changes from ventral hernia repair with mesh are present.  There is no abdominopelvic adenopathy by size criteria. Extensive  atherosclerotic calcification of the abdominal aorta and its major  branches is present, as before.  There are stents within the bilateral  common iliac arteries. These findings are not well characterized without  intravenous contrast.     Colonic diverticulosis is present. There is no free intraperitoneal air  or fluid. No suspicious lytic or blastic osseous lesions are present.  The bladder is unremarkable for its degree of distention. The uterus is  surgically absent.       Impression:       1.  Extensive atherosclerotic calcification of the abdominopelvic  vasculature with bilateral common iliac stents which are not well  evaluated without intravenous contrast  2.  Other incidental and chronic findings as above.     This report was finalized on 6/30/2020 4:48 PM by Dr. Aleksandr Mota M.D.             aspirin 81 mg Oral Daily   atorvastatin 80 mg Oral Daily   gabapentin 100 mg Oral Nightly   insulin glargine 10 Units Subcutaneous Nightly   insulin lispro 0-9 Units Subcutaneous TID AC   levoFLOXacin 500 mg Oral Every Other Day   melatonin 5 mg Oral Nightly   pantoprazole 40 mg Oral BID   sodium chloride 10 mL Intravenous Q12H   sucralfate 1 g Oral Daily          Medication Review:   Current Facility-Administered Medications   Medication Dose Route Frequency Provider Last Rate Last Dose   • acetaminophen (TYLENOL) tablet 650 mg  650 mg Oral Q4H PRN Kadeem Cruz MD   650 mg at 06/28/20 0204    Or   • acetaminophen (TYLENOL) 160 MG/5ML solution 650 mg  650 mg Oral Q4H PRN Kadeem Cruz MD        Or   • acetaminophen (TYLENOL) suppository 650 mg  650 mg Rectal Q4H PRN Kadeem Cruz MD       • aspirin EC tablet 81 mg  81 mg Oral Daily Kadeem Cruz MD   81 mg at 06/30/20 0818   • atorvastatin (LIPITOR) tablet 80 mg  80 mg Oral Daily Kadeem Cruz MD   80 mg at 06/30/20 0818   • dextrose (D50W) 25 g/ 50mL Intravenous Solution 25 g  25 g Intravenous Q15 Min PRN Kadeem Cruz MD       • dextrose (GLUTOSE) oral gel 15 g  15 g Oral Q15 Min PRN Kadeem Cruz MD       • docusate sodium (COLACE) capsule 100 mg  100 mg Oral  Nightly PRN Kadeem Cruz MD       • gabapentin (NEURONTIN) capsule 100 mg  100 mg Oral Nightly Dayron Weiss MD   100 mg at 06/30/20 2107   • glucagon (human recombinant) (GLUCAGEN DIAGNOSTIC) injection 1 mg  1 mg Subcutaneous Q15 Min PRN Kadeem Cruz MD       • heparin (porcine) injection 3,800 Units  3,800 Units Intracatheter PRN Betsy Mora MD   3,800 Units at 07/01/20 1153   • insulin glargine (LANTUS) injection 10 Units  10 Units Subcutaneous Nightly Kadeem Cruz MD   10 Units at 06/30/20 2107   • insulin lispro (humaLOG) injection 0-9 Units  0-9 Units Subcutaneous TID AC Kadeem Cruz MD   4 Units at 06/30/20 1745   • lactulose (CHRONULAC) 10 GM/15ML solution 10 g  10 g Oral Daily PRN Kadeem Cruz MD       • levoFLOXacin (LEVAQUIN) tablet 500 mg  500 mg Oral Every Other Day Lucien Appiah MD       • meclizine (ANTIVERT) tablet 25 mg  25 mg Oral TID PRN Dayron Weiss MD       • melatonin tablet 5 mg  5 mg Oral Nightly Kadeem Cruz MD   5 mg at 06/30/20 2107   • nitroglycerin (NITROSTAT) SL tablet 0.4 mg  0.4 mg Sublingual Q5 Min PRN Kadeem Cruz MD       • ondansetron (ZOFRAN) injection 4 mg  4 mg Intravenous Q6H PRN Kadeem Cruz MD   4 mg at 06/30/20 0719   • pantoprazole (PROTONIX) EC tablet 40 mg  40 mg Oral BID Kadeem Cruz MD   40 mg at 06/30/20 2107   • promethazine (PHENERGAN) tablet 25 mg  25 mg Oral Q6H PRN Dayron Weiss MD   25 mg at 07/01/20 0819   • sodium chloride 0.9 % flush 10 mL  10 mL Intravenous Q12H Kadeem Cruz MD   10 mL at 06/30/20 0819   • sodium chloride 0.9 % flush 10 mL  10 mL Intravenous PRN Kadeem Cruz MD       • sucralfate (CARAFATE) tablet 1 g  1 g Oral Daily Kadeem Cruz MD   1 g at 06/30/20 0818       Assessment/Plan   1. ESRD.  Dialysis today.  Her dry weight has been adjusted recently to 76 from 76.5.    2. UTI with e. Coli bacteremia. On rocephin.  She does have Tunneled dialysis catheter that was placed 6/12.  DW.   Abhishek.  KE[facility] [place].  Outpt oral levaquin  qod on dialysis days after dialysis for 4 more doses total.    3. SP CABG  4. DM2  5. HTN very labile. Not on antihypertensives.  Says at home her BP pre-dialysis is 103 systolic, but dialysis RN reports 170-190 going on and coming off dialysis.  Advised to take her cuff to dialysis for verification .   6. Diabetic neuropathy. On Neurontin.  PCP added Cymbalta 3/9/20.  Not reordered here.      Ok with renal for dc.  DW Dr. Weiss.  Keep off the cymbalta and keep neurontin dose the same .     Gladys Morris MD  07/01/20  11:54

## 2020-07-02 NOTE — OUTREACH NOTE
Call Center TCM Note      Responses   [facility]   COVID-19 Test Status  Not tested   Does the patient have one of the following disease processes/diagnoses(primary or secondary)?  Sepsis   TCM attempt successful?  Yes   Call start time  1118   Call end time  1121   General alerts for this patient  Pt is a retired RN   Discharge diagnosis  bacteremia due to E. coli    Person spoke with today (if not patient) and relationship  Roger-son   Meds reviewed with patient/caregiver?  Yes   Is the patient having any side effects they believe may be caused by any medication additions or changes?  No   Does the patient have all medications related to this admission filled (includes all antibiotics, inhalers, nebulizers,steroids,etc.)  Yes   Is the patient taking all medications as directed (includes completed medication regime)?  Yes   Does the patient have a primary care provider?   Yes   Comments regarding PCP  7/14/20 at 3:45   Does the patient have an appointment with their PCP within 7 days of discharge?  Greater than 7 days   What is preventing the patient from scheduling follow up appointments within 7 days of discharge?  Earlier appointment not available   Nursing Interventions  Verified appointment date/time/provider   Has the patient kept scheduled appointments due by today?  N/A   Pulse Ox monitoring  None   Psychosocial issues?  No   Did the patient receive a copy of their discharge instructions?  Yes   Nursing interventions  Reviewed instructions with patient   What is the patient's perception of their health status since discharge?  Improving [Pt is very weak and staying with her son and daughter in law.]   Nursing interventions  Nurse provided patient education   Is the patient/caregiver able to teach back Sepsis?  S - Shivering,fever or very cold, S - Sleepy, difficult to arouse,confused, S - Short of breath   Nursing interventions  Nurse provided patient education   Is  patient/caregiver able to teach back steps to recovery at home?  Set small, achievable goals for return to baseline health, Rest and regain strength, Eat a balanced diet   Is the patient/caregiver able to teach back signs and symptoms of worsening condition:  Fever, Hyperthermia, Shortness of breath/rapid respiratory rate, Altered mental status(confusion/coma)   Is the patient/caregiver able to teach back the hierarchy of who to call/visit for symptoms/problems? PCP, Specialist, Home health nurse, Urgent Care, ED, 911  Yes   TCM call completed?  Yes          Ashanti Can RN    7/2/2020, 11:21

## 2020-07-06 NOTE — TELEPHONE ENCOUNTER
Pt has uti and wants to know if you will send in antibiotic. Pt still has frequent and painful urination. I put order in for urine culture.

## 2020-07-08 NOTE — OUTREACH NOTE
Sepsis Week 2 Survey      Responses   [facility] patient discharged from?  [place]   COVID-19 Test Status  Not tested   Does the patient have one of the following disease processes/diagnoses(primary or secondary)?  Sepsis   Week 2 attempt successful?  No   Unsuccessful attempts  Attempt 1          Jackie Gonzales RN

## 2020-07-10 NOTE — OUTREACH NOTE
Sepsis Week 2 Survey      Responses   [facility] [place]   COVID-19 Test Status  Not tested   Does the patient have one of the following disease processes/diagnoses(primary or secondary)?  Sepsis   Week 2 attempt successful?  Yes   Call start time  1354   Call end time  1401   Discharge diagnosis  bacteremia due to E. coli    Is patient permission given to speak with other caregiver?  Yes   Person spoke with today (if not patient) and relationship  Roger-son   Meds reviewed with patient/caregiver?  Yes   Is the patient having any side effects they believe may be caused by any medication additions or changes?  No   Does the patient have all medications related to this admission filled (includes all antibiotics, inhalers, nebulizers,steroids,etc.)  Yes   Is the patient taking all medications as directed (includes completed medication regime)?  Yes   Does the patient have a primary care provider?   Yes   Has the patient kept scheduled appointments due by today?  Yes   Has home health visited the patient within 72 hours of discharge?  N/A   Pulse Ox monitoring  None   Psychosocial issues?  No   Psychosocial comments  Referred to [facility]   Comments  He says she looks better than she has in a long time.   Did the patient receive a copy of their discharge instructions?  Yes   Nursing interventions  Reviewed instructions with patient   What is the patient's perception of their health status since discharge?  Improving   Nursing interventions  Nurse provided patient education   Is the patient/caregiver able to teach back Sepsis?  S - Shivering,fever or very cold, S - Short of breath   Nursing interventions  Nurse provided reassurance to patient   Is patient/caregiver able to teach back steps to recovery at home?  Set small, achievable goals for return to baseline health, Make a list of questions for PCP appoinment, Exercise as tolerated, Eat a balanced diet   Is the patient/caregiver able to  teach back signs and symptoms of worsening condition:  Fever, Shortness of breath/rapid respiratory rate, Edema   Is the patient/caregiver able to teach back the hierarchy of who to call/visit for symptoms/problems? PCP, Specialist, Home health nurse, Urgent Care, ED, 911  Yes   Additional teach back comments  She is doing better he says.  No fever, no swelling.   Week 2 call completed?  Yes   Wrap up additional comments  Improving.          Jackie Gonzales RN

## 2020-07-14 NOTE — PROGRESS NOTES
Subjective   Randi Martines is a 69 y.o. female. Presents today for   Chief Complaint   Patient presents with   • Diabetes   • Hyperlipidemia   • Chronic Kidney Disease       Diabetes   She presents for her follow-up diabetic visit. She has type 2 diabetes mellitus. Her disease course has been fluctuating. Associated symptoms include fatigue and foot paresthesias. Pertinent negatives for diabetes include no chest pain and no foot ulcerations. Symptoms are improving. Diabetic complications include heart disease, nephropathy, peripheral neuropathy and PVD. Risk factors for coronary artery disease include diabetes mellitus, dyslipidemia, hypertension, post-menopausal and obesity. She is following a generally healthy diet.   Chronic Kidney Disease   This is a chronic problem. The current episode started more than 1 year ago. The problem occurs constantly. The problem has been gradually worsening. Associated symptoms include fatigue and numbness. Pertinent negatives include no abdominal pain, chest pain, fever, nausea or vomiting.   Hyperlipidemia   This is a chronic problem. The problem is controlled. Recent lipid tests were reviewed and are normal. Exacerbating diseases include diabetes and obesity. Associated symptoms include leg pain. Pertinent negatives include no chest pain or shortness of breath. Current antihyperlipidemic treatment includes statins. The current treatment provides moderate improvement of lipids. Risk factors for coronary artery disease include dyslipidemia, diabetes mellitus, hypertension, post-menopausal and obesity.   Leg Pain    The incident occurred more than 1 week ago. The incident occurred at home. The pain is present in the left leg and right leg. The pain is severe. The pain has been fluctuating since onset. Associated symptoms include a loss of sensation, muscle weakness, numbness and tingling. Treatments tried: working on dm2 control; off balance and falling;  has been in and out of  [facility].       Review of Systems   Constitutional: Positive for fatigue. Negative for fever.   Respiratory: Negative for shortness of breath.    Cardiovascular: Negative for chest pain.   Gastrointestinal: Negative for abdominal pain, nausea and vomiting.   Neurological: Positive for tingling and numbness.       Patient Active Problem List   Diagnosis   • Abdominal pain   • Intractable abdominal pain   • DM (diabetes mellitus), type 2 with neurological complications (CMS/[facility])   • PVD (peripheral vascular disease) (CMS/[facility])   • Mesenteric ischemia (CMS/[facility])   • Acute gastric ulcer   • S/P CABG (coronary artery bypass graft)   • Coronary artery disease involving native coronary artery of native heart without angina pectoris   • Atherosclerotic PVD with ulceration (CMS/[facility])   • Type 2 diabetes mellitus without complication, with long-term current use of insulin (CMS/[facility])   • Biliary obstruction   • Hyperlipemia   • Malabsorption of iron   • Chronic kidney disease, stage 3 (moderate) (CMS/[facility])   • Anemia of chronic disease   • Gastrointestinal hemorrhage   • NSTEMI (non-ST elevated myocardial infarction) (CMS/[facility])   • Left ventricular systolic dysfunction   • Blood loss anemia   • Melena   • Acute respiratory failure with hypoxia (CMS/[facility])   • Subclavian artery stenosis, left (CMS/[facility])   • Hypertension   • ESRD (end stage renal disease) (CMS/[facility])   • Hypokalemia   • Obesity (BMI 30-39.9)   • Orthostatic hypotension   • Diabetes mellitus (CMS/[facility])   • Hyperglycemia   • Fall   • UTI (urinary tract infection)   • Sepsis due to urinary tract infection (CMS/[facility])   • Bacteremia due to Escherichia coli       Social History     Socioeconomic History   • Marital status:      Spouse name: Not on file   • Number of children: Not on file   • Years of education: Not on file   • Highest education level: Not on file   Tobacco Use   • Smoking status: Former Smoker     Packs/day: 3.00     Years: 20.00     Pack years: 60.00      Types: Cigarettes     Start date: 1969     Last attempt to quit: 2000     Years since quittin.5   • Smokeless tobacco: Never Used   • Tobacco comment: caffeine use   Substance and Sexual Activity   • Alcohol use: No     Comment: caffeine use    • Drug use: No   • Sexual activity: Defer     Partners: Female     Birth control/protection: None       Allergies   Allergen Reactions   • Augmentin [Amoxicillin-Pot Clavulanate] GI Intolerance and Dizziness   • Codeine GI Intolerance and Dizziness       Current Outpatient Medications on File Prior to Visit   Medication Sig Dispense Refill   • acetaminophen (TYLENOL) 500 MG tablet Take 500 mg by mouth Every 6 (Six) Hours As Needed for Mild Pain .     • amLODIPine (NORVASC) 5 MG tablet Take 1 tablet by mouth Daily.     • aspirin 81 MG EC tablet Take 81 mg by mouth Daily.     • atorvastatin (LIPITOR) 80 MG tablet Take 80 mg by mouth Daily.     • docusate sodium (COLACE) 100 MG capsule Take 200 mg by mouth every night at bedtime.     • DROPLET PEN NEEDLES 31G X 8 MM misc USE AS DIRECTED 4 TIMES DAILY 400 each 2   • gabapentin (NEURONTIN) 300 MG capsule Take 300 mg by mouth every night at bedtime.     • insulin aspart (NovoLOG FlexPen) 100 UNIT/ML solution pen-injector sc pen Inject 5 Units under the skin into the appropriate area as directed 3 (Three) Times a Day With Meals.     • lactulose (CHRONULAC) 10 GM/15ML solution Take 15 mL by mouth Daily As Needed (constipation). 236 mL 0   • LEVEMIR FLEXTOUCH 100 UNIT/ML injection Inject 15 Units under the skin into the appropriate area as directed Every Night.  0   • Liraglutide (VICTOZA) 18 MG/3ML solution pen-injector injection Inject 1.8 mg under the skin into the appropriate area as directed Daily.     • melatonin 5 MG tablet tablet Take 5 mg by mouth Every Night.     • pantoprazole (PROTONIX) 40 MG EC tablet Take 40 mg by mouth 2 (two) times a day.     • promethazine (PHENERGAN) 25 MG tablet Take 1 tablet by mouth  "Every 6 (Six) Hours As Needed for Nausea or Vomiting. 3 tablet 0   • sucralfate (CARAFATE) 1 g tablet Take 1 g by mouth Daily.     • [DISCONTINUED] doxycycline (VIBRAMYCIN) 100 MG capsule Take 1 capsule by mouth 2 (Two) Times a Day. 14 capsule 0     No current facility-administered medications on file prior to visit.        Objective   Vitals:    07/14/20 1539   BP: 110/70   BP Location: Left arm   Patient Position: Sitting   Cuff Size: Adult   Pulse: 81   SpO2: 99%   Weight: 71.2 kg (157 lb)   Height: 152.4 cm (60\")     Body mass index is 30.66 kg/m².    Physical Exam   Constitutional: She appears well-developed and well-nourished.   HENT:   Head: Normocephalic and atraumatic.   Neck: Neck supple. No JVD present. No thyromegaly present.   Cardiovascular: Normal rate, regular rhythm and normal heart sounds. Exam reveals no gallop and no friction rub.   No murmur heard.  Pulmonary/Chest: Effort normal and breath sounds normal. No respiratory distress. She has no wheezes. She has no rales.   Abdominal: Soft. Bowel sounds are normal. She exhibits no distension. There is no tenderness. There is no rebound and no guarding.   Musculoskeletal: She exhibits no edema.        Right hip: She exhibits decreased strength.        Left hip: She exhibits decreased strength.   Neurological: She is alert. Gait abnormal.   Skin: Skin is warm and dry.   Psychiatric: She has a normal mood and affect. Her behavior is normal.   Nursing note and vitals reviewed.      Assessment/Plan   Randi was seen today for diabetes, hyperlipidemia and chronic kidney disease.    Diagnoses and all orders for this visit:    Type 2 diabetes mellitus with chronic kidney disease on chronic dialysis, with long-term current use of insulin (CMS/[facility])  -     LEVEMIR FLEXTOUCH 100 UNIT/ML injection; Inject 15 Units under the skin into the appropriate area as directed Every Night.    ESRD (end stage renal disease) (CMS/[facility])    Abnormal gait    At high risk for " falls    Diabetic peripheral neuropathy (CMS/[facility])  -     gabapentin (NEURONTIN) 300 MG capsule; Take 1 capsule by mouth every night at bedtime.    Other orders  -     DULoxetine (CYMBALTA) 30 MG capsule; Take 1 capsule by mouth Daily.  -     pantoprazole (PROTONIX) 40 MG EC tablet; Take 1 tablet by mouth 2 (two) times a day.  -     sucralfate (CARAFATE) 1 g tablet; Take 1 tablet by mouth 3 (Three) Times a Day.  -     amLODIPine (NORVASC) 5 MG tablet; Take 1 tablet by mouth Daily.  -     Liraglutide (VICTOZA) 18 MG/3ML solution pen-injector injection; Inject 1.8 mg under the skin into the appropriate area as directed Daily.  -     insulin aspart (NovoLOG FlexPen) 100 UNIT/ML solution pen-injector sc pen; Inject 15 Units under the skin into the appropriate area as directed 3 (Three) Times a Day With Meals.  -     glucose blood test strip; Check 3 x a day as insulin needed 3 times a day    patient now esrd, working on dm2 control;  counsled on diet;  reivewed meds and refilled  -will titrate no gabapentin but caution as esrd and cannot take large amounts  -patient high fall risk and increased risk for injuries due to falls;  She would benefit from HHC with pt/ot to help improve strenght, ambulation, balance and mobility related ADLS to prevent injuries and complications.           -Follow up: 3 months and prn

## 2020-07-23 NOTE — ANESTHESIA PREPROCEDURE EVALUATION
" Anesthesia Evaluation     Patient summary reviewed and Nursing notes reviewed   history of anesthetic complications: PONV  NPO Solid Status: > 8 hours  NPO Liquid Status: > 2 hours           Airway   Mallampati: II  TM distance: >3 FB  Neck ROM: full  no difficulty expected  Dental - normal exam     Pulmonary - normal exam   (+) a smoker Former,   (-) COPD, asthma, lung cancer  Cardiovascular - normal exam  Exercise tolerance: good (4-7 METS)    ECG reviewed  Rhythm: regular  Rate: normal    (+) hypertension well controlled 2 medications or greater, valvular problems/murmurs AS, TI and MR, past MI  6-12 months, CAD, CABG >6 Months, cardiac stents more than 12 months ago dysrhythmias Tachycardia, CHF Systolic <55%, PVD, hyperlipidemia,     ROS comment: Known CAD s/p 7v CABG ~ 2000, several stents placed since that time.  NSTEMI 01/2020, heart cath by Wm. Kareem CHRISTINA noted, \"Severe native coronary artery disease 7 of 7 bypasses are patent.\"    TTE 12/2019:  ·Estimated EF was in disagreement with the calculated EF. Estimated EF appears to be in the range of 36 - 40%.  ·Calculated EF = 45.0%.  ·Left ventricular systolic function is mildly decreased.  ·The following left ventricular wall segments are hypokinetic: apical anterior, basal anterolateral and mid anterolateral.  ·Left atrial cavity size is mildly dilated.  ·There is calcification of the aortic valve.  ·Mild aortic valve stenosis is present.  ·Aortic valve area is 1.3 cm2.  ·Aortic valve mean pressure gradient is 7.0 mmHg.  ·Aortic valve maximum pressure gradient is 12.5 mmHg.  ·Mild mitral valve regurgitation is present  ·Mild to moderate tricuspid valve regurgitation is present.  ·Calculated right ventricular systolic pressure from tricuspid regurgitation is 82.3 mmHg.    Neuro/Psych- negative ROS  (-) seizures, TIA, CVA  GI/Hepatic/Renal/Endo    (+) obesity,  GERD well controlled, PUD, GI bleeding upper resolved, renal disease ESRD and dialysis, diabetes " mellitus type 2 poorly controlled using insulin,   (-) hepatitis, liver disease, no thyroid disorder    ROS Comment: ESRD on HD for the past 2mo, M/W/F last HD was yesterday.    Musculoskeletal (-) negative ROS    Abdominal  - normal exam   Substance History - negative use     OB/GYN negative ob/gyn ROS         Other - negative ROS                     Anesthesia Plan    ASA 4     regional   (Block w/ sedation)  intravenous induction     Anesthetic plan, all risks, benefits, and alternatives have been provided, discussed and informed consent has been obtained with: patient.    Plan discussed with CRNA and attending.

## 2020-07-23 NOTE — ANESTHESIA PROCEDURE NOTES
Peripheral Block    Pre-sedation assessment completed: 7/23/2020 12:18 PM    Patient reassessed immediately prior to procedure    Patient location during procedure: holding area  Start time: 7/23/2020 12:18 PM  Stop time: 7/23/2020 12:22 PM  Reason for block: at surgeon's request and primary anesthetic  Performed by  Anesthesiologist: Gilmar Morgan MD  Preanesthetic Checklist  Completed: patient identified, site marked, surgical consent, pre-op evaluation, timeout performed, IV checked, risks and benefits discussed and monitors and equipment checked  Prep:  Pt Position: supine  Sterile barriers:cap, gloves and mask  Prep: ChloraPrep  Patient monitoring: blood pressure monitoring, continuous pulse oximetry and EKG  Procedure  Sedation:yes  Performed under: local infiltration  Guidance:ultrasound guided  ULTRASOUND INTERPRETATION.  Using ultrasound guidance a 22 G (22G needle for placement of 3cc 2%lido to site prior to start of procedure.) gauge needle was placed in close proximity to the brachial plexus nerve, at which point, under ultrasound guidance anesthetic was injected in the area of the nerve and spread of the anesthesia was seen on ultrasound in close proximity thereto.  There were no abnormalities seen on ultrasound; a digital image was taken; and the patient tolerated the procedure with no complications. Images:still images obtained, printed/placed on chart    Laterality:left  Block Type:interscalene  Injection Technique:single-shot  Needle Type:echogenic  Needle Gauge:22 G  Resistance on Injection: none    Medications Used: ropivacaine (NAROPIN) 0.5 % injection, 30 mL  Med admintered at 7/23/2020 12:22 PM      Post Assessment  Injection Assessment: negative aspiration for heme, no paresthesia on injection and incremental injection  Patient Tolerance:comfortable throughout block  Complications:no

## 2020-07-23 NOTE — ANESTHESIA POSTPROCEDURE EVALUATION
"Patient: Randi Martines    Procedure Summary     Date:  07/23/20 Room / Location:  [facility] OR [address] [place] [address] MAIN OR    Anesthesia Start:  1352 Anesthesia Stop:  1545    Procedure:  LEFT ARM BRACHIAL ARTERY AXILLARY VEIN GRAFT (Left Head) Diagnosis:      Surgeon:  Loida Ugalde Jr., MD Provider:  Wally Rios MD    Anesthesia Type:  regional ASA Status:  4          Anesthesia Type: regional    Vitals  Vitals Value Taken Time   BP 88/76 7/23/2020  3:50 PM   Temp 36.4 °C (97.5 °F) 7/23/2020  3:40 PM   Pulse 78 7/23/2020  3:50 PM   Resp 16 7/23/2020  3:50 PM   SpO2 97 % 7/23/2020  3:50 PM           Post Anesthesia Care and Evaluation    Patient location during evaluation: bedside  Patient participation: complete - patient participated  Level of consciousness: sleepy but conscious  Pain score: 0  Pain management: adequate  Airway patency: patent  Anesthetic complications: No anesthetic complications    Cardiovascular status: acceptable  Respiratory status: acceptable  Hydration status: acceptable    Comments: BP (!) 88/76   Pulse 78   Temp 36.4 °C (97.5 °F) (Oral)   Resp 16   Ht 154.9 cm (61\")   Wt 75.2 kg (165 lb 12.8 oz)   SpO2 97%   BMI 31.33 kg/m²         "

## 2020-07-23 NOTE — DISCHARGE INSTRUCTIONS
[facility]  [address], Elsa Fletcher Rachel, [place]her[place] Thomas  [address], [address]  [phone]    Post-Operative Instructions for AV Fistula / Graft   Diet: Regular Diet    Medications: Take your regularly scheduled medications on the day of your surgery, unless your doctor has directed you otherwise. You may be sent home with a prescription for pain medication, follow the directions as prescribed.    Activity Restrictions / Driving: Avoid lifting more than 15 pounds or other activities that stress or compress the access area. No driving for the remainder of the day after surgery. You may drive when you no longer are taking narcotic pain medications. If a nerve block was done to numb your arm for surgery, you will be placed in an arm sling.  This numbness and inability to move the arm can last for as little as 6 hours but as many as 18.  The sling should be used during this time but can be removed when sensation and movement of your arm is normal and does not need to be used after that. Use of the arm is encouraged after the surgery.    Incision Care: Some bruising is normal. If you have drainage from the incision please notify the office. Dressing should be removed in 48 hours. After dressing is removed, it is OK to shower. Do not submerge incision until cleared by your surgeon (bath or swimming).    Bathing and Showering: You may shower after you remove your dressing.    Follow-up Appointments: You will need to return to the office for a follow-up visit within 1-3 weeks after your surgery. Please make sure you have your appointment scheduled, call [phone].    The patient (you) should:  1. Avoid wearing tight constrictive clothing over that arm.  2. Avoid wearing jewelry that is tight, such as a watch on the access arm.  3. Avoid carrying heavy objects.  4. Avoid purse straps over the fistula.  5. Avoid sleeping on the arm or keeping it bent for extended periods of time.  6.  "Each day, using your opposite hand, feel over the fistula for the \"thrill\" or vibration that is normally present.    Fistula Information / Care:  ·  It is normal to have swelling in the surgical area. To help control this swelling, you should elevate your arm on a pillow.  ·  Wiggle your fingers and clinch your fist 10 times every hour, while awake, for the first 5-7 days. Also, bend and straighten at the elbow to regain normal range of motion. These exercises are designed to promote circulation in the fingers and aid in draining away the excess fluid accumulation in the immediate area.  · No blood pressures or needle sticks in the arm with your access.    Call the office for the followin. Fever greater than 101.0  2. Uncontrolled pain. This is on a scale of 1-10 (10 being the worst pain imaginable) your pain is a level 7 or above.  3. It is important that you notify our office if you are having numbness and significant pain in the extremity in which you have just had surgery!  4. Decreased or absent thrill.  5. Nausea, diarrhea, and/or vomiting that continue for 12-24 hours.  6. Signs of an infection: redness, increased swelling, drainage, fever and/or chills.  7. Chest pain or difficulty breathing.    The fistula or graft CAN NOT be used until the MD has given written approval. Generally, a graft will be ready to use in 2 weeks, and a fistula will be ready to use in 6-8 weeks.     If you have further questions after reading this handout, the office is open from 8:30am to 5:00pm Monday through Friday. Call [phone].  "

## 2020-07-23 NOTE — ADDENDUM NOTE
Addendum  created 07/23/20 1812 by Wally Rios MD    Attestation recorded in Intraprocedure, Intraprocedure Attestations filed

## 2020-07-23 NOTE — OP NOTE
Operative Note  Date of Admission:  7/23/2020  OR Date: 7/23/2020    Pre-op Diagnosis:  End stage renal disease in need of long term access    Post-op Diagnosis:   same    Procedure:   1) left brachioaxillary AV graft with tapered 4 mm - 7 mm PTFE    Surgeon: Loida Ugalde Jr, MD    Assistant: Randi CASTANEDA and they provided critical assistance during the case including suctioning, exposure, retraction, and reduction of blood loss.    Anesthesia: Monitored Anesthesia Care with Regional    Staff:   Circulator: Hue Martinez RN; Nadeen Harris RN  Scrub Person: Micah Miller  Assistant: Randi Pozo CSA    Estimated Blood Loss: 25-50 cc    Specimens: none  Order Name Source Comment Collection Info Order Time   BASIC METABOLIC PANEL Arm, Right  Collected By: Adele Gant RN 7/23/2020 10:53 AM       Complications: none apparent    Findings: palpable thrill in AVG and Doppler signals in radial and ulnar arteries    Indications:  As in preop diagnosis           Procedure: In the preoperative holding area a scalene block was performed on the left.  The patient was then taken back to the operating room and placed on the operating table in the supine position.  Intravenous sedation was then given.  The entire left upper extremity axilla and anterior chest were prepped using ChloraPrep and draped in the usual sterile fashion.  A longitudinal incision was made in the bicipital groove just above the elbow.  Electrocautery was used to dissect through the subcutaneous tissue and the fascia.  The brachial artery and vein complex was identified and brachial artery isolated.  Vessel loops were passed around the structure.  In the lateral axilla a transverse incision was made and then local anesthetic was infiltrated in the skin and subcutaneous tissue.  Electrocautery was used to dissect through the subcutaneous tissue and fascia.  The axillary artery and vein complex identified in the axillary vein  along with multiple feeding branches were isolated.  A subcutaneous tunnel was then fashioned between these 2 incisions.  5000 units of heparin was then given intravenously.  A tapered 4 mm to 7 mm PTFE graft was then passed through the subcutaneous tunnel with a 4 mm and toward the brachial artery.  5000 units of heparin was then given intravenously.  After 5 minutes circulation time vascular control was obtained of the brachial artery and a lateral arteriotomy was made.  The 4 mm end of the PTFE was cut to a slightly beveled shape and an endograft to side artery anastomosis was performed using a running 6-0 Prolene suture beginning at each end and tying on the side.  After completion of this the graft was flushed and the lumen irrigated with heparinized saline solution and the graft clamped just after the anastomosis.  Flow was reestablished into the native arterial system.  Vascular control was then obtained of the axillary vein and its branches.  A lateral venotomy was made.  The graft was then cut to the appropriate beveled shape at the 7 mm and and an end graft to side vein anastomosis was performed using a running 6-0 Prolene suture beginning at each end and tying on the side.  Prior to completion of this the vein branches were each flushed in turn followed by the flushing of the graft.  The lumen was irrigated heparinized saline solution.  Anastomosis was completed.  Following this there was a palpable thrill noted in the graft and using the hand-held Doppler there was a strong bruit noted in the axillary vein and its branches.  There were strong Doppler signals noted in the radial artery and a slightly diminished in the ulnar artery.  The hand remained warm and pink with good capillary refill.  Hemostasis was checked and noted to be satisfactory.  20 mg protamine sulfate was then given intravenously.  The subcutaneous tissue was then closed in each incision with a running 3-0 Vicryl suture in 2 layers.  The  skin was closed with running 4-0 Vicryl in a subcuticular fashion.  Dermabond was placed over each incision.  Sponge, needle, and instrument counts were all reported as correct.  The patient was then transported to recovery room in satisfactory condition.      Radiographic Findings:  1) none performed      There are no hospital problems to display for this patient.     Loida Ugalde Jr., MD     Date: 7/23/2020  Time: 15:24

## 2020-07-27 NOTE — PATIENT INSTRUCTIONS

## 2020-09-01 NOTE — PROGRESS NOTES
Subjective   Randi Martines is a 69 y.o. female.  Referred by Dr. Vazquez for evaluation of leukocytosis.    History of Present Illness   Ms. Martines is a 69-year-old postmenopausal lady with multiple medical comorbidities including diabetes, end-stage renal disease, hypertension, hyperlipidemia, coronary artery disease status post CABG, recurrent UTIs presents for evaluation of leukocytosis.CBC since January 2020 reviewed and noted to have leukocytosis on multiple lab checks however white blood cell count has ranged from normal to as high as 23,000 with a predominant neutrophilia.  Hemoglobin has ranged between 8.6-13.8 and platelet count has always remained normal.  Ms. Martines reports that she has recurrent urinary tract infections and she had a recent admission in July 2020 for altered mental status and fall which was thought secondary to E. coli sepsis.  She was treated with antibiotics which she completed only about 2 weeks ago.  Today's CBC shows a normalWBC count of 10.5, hemoglobin is normal at 13.8 and platelet count is 262,000.  She reports 40 lb weight loss since feb 2020  Multiple hospitalizations since November for recurrent UTIs.  Her son who accompanies her reports that over the past 6 to 8 months she has had recurrent UTIs and declining health and as a result of hospitalizations has had poor oral intake which she thinks has contributed to the weight loss.  She has not had a screening colonoscopy or a recent screening mammogram.  She has not had a screening CT chest either.  She had a CT chest abdomen pelvis on 6/30/2020 which does not show any concern for malignancy.  She has had abdominal surgery years ago for complications from her hernia and is unsure if that is the reason why she has not been able to undergo colonoscopy.  She also reports frequent vaginal candidiasis and inframammary candidiasis.  She denies any recent fevers, chills, night sweats or new lymphadenopathy.    The following  portions of the patient's history were reviewed and updated as appropriate: allergies, current medications, past family history, past medical history, past social history, past surgical history and problem list.    Past Medical History:   Diagnosis Date   • Blind    • CAD (coronary artery disease)    • Chronic kidney disease    • Chronic systolic congestive heart failure (CMS/[facility])    • Diabetes mellitus (CMS/[facility])    • Dialysis patient (CMS/[facility])     MON, WED, FRI   • Dyslipidemia    • Foot ulcer (CMS/[facility])    • GERD (gastroesophageal reflux disease)    • History of diverticulitis    • History of stomach ulcers    • History of transfusion    • Hyperlipemia    • Hypertension    • Mesenteric ischemia (CMS/[facility])    • NSTEMI (non-ST elevated myocardial infarction) (CMS/[facility])    • Peptic ulcer disease    • PONV (postoperative nausea and vomiting)    • PVD (peripheral vascular disease) (CMS/[facility])    • UTI (urinary tract infection)         Past Surgical History:   Procedure Laterality Date   • APPENDECTOMY  2015   • ARTERIOVENOUS FISTULA/SHUNT SURGERY Left 7/23/2020    Procedure: LEFT ARM BRACHIAL ARTERY AXILLARY VEIN GRAFT;  Surgeon: Loida Ugalde Jr., MD;  Location: [facility] MAIN OR;  Service: Vascular;  Laterality: Left;   • CARDIAC CATHETERIZATION N/A 1/2/2020    Procedure: LEFT HEART CATH  NO LV;  Surgeon: Emilio Serna MD;  Location: [facility] CATH INVASIVE LOCATION;  Service: Cardiology   • CARDIAC CATHETERIZATION N/A 1/2/2020    Procedure: CORONARY ANGIOGRAPHY;  Surgeon: Emilio Serna MD;  Location: [facility] CATH INVASIVE LOCATION;  Service: Cardiology   • CARDIAC CATHETERIZATION N/A 1/2/2020    Procedure: SAPHENOUS VEIN GRAFT;  Surgeon: Emilio Serna MD;  Location: [facility] CATH INVASIVE LOCATION;  Service: Cardiology   • CATARACT EXTRACTION     • CHOLECYSTECTOMY     • COLONOSCOPY  2015   • CORONARY ARTERY BYPASS GRAFT      7 coronary bypass surgery   • ENDOSCOPY N/A 11/23/2016    Procedure:  ESOPHAGOGASTRODUODENOSCOPY ;  Surgeon: Katherine Green MD;  Location: [facility] ENDOSCOPY;  Service:    • ENDOSCOPY N/A 12/29/2019    Procedure: ESOPHAGOGASTRODUODENOSCOPYwith bx;  Surgeon: Ziyad Manzo MD;  Location: [facility] ENDOSCOPY;  Service: Gastroenterology   • ERCP N/A 10/2/2017    Procedure: ENDOSCOPIC RETROGRADE CHOLANGIOPANCREATOGRAPHY with sphincterotomy, balloon sweep;  Surgeon: Christopher Graf MD;  Location: [facility] ENDOSCOPY;  Service:    • HERNIA REPAIR  2015   • HYSTERECTOMY     • INSERTION HEMODIALYSIS CATHETER Right 6/10/2020    Procedure: HEMODIALYSIS CATHETER INSERTION;  Surgeon: Stevan Graves MD;  Location: [facility] MAIN OR;  Service: Vascular;  Laterality: Right;   • CA VEIN IN SITU BYPASS GRAFT,FEM-POP Left 5/31/2017    Procedure: ULTRASOUND RIGHT AIF, ARTERIOGRAM W/ LEFT COMMON ILIAC ANGIOPLASTY STENT, LT FEMORAL POPLITEAL BYPASS WITH ARTEGRAFT, LEFT POPLITEAL ENDARTERECTOMY PATCH, LEFT COMMON ARTERIOGRAM, LEFT EXTERNAL ILIAC ANGIOPLASTY STENT PLACEMENT, AORTA,  EXTERNAL, DISTAL PRESSURES;  Surgeon: Jayda Barrientos MD;  Location: [facility] HYBRID OR 18/19;  Service: Vascular   • SKIN GRAFT SPLIT THICKNESS Left 6/7/2017    Procedure: DEBRIDEMENT AND SKIN GRAFT TO  LEFT DORSAL FOOT AND TOES;  Surgeon: Maurine Waterhouse, MD;  Location: [facility] OR;  Service:    • UPPER GASTROINTESTINAL ENDOSCOPY  2015    scar in gastric body, gastric ulcers        Family History   Problem Relation Age of Onset   • Heart disease Mother         cardiac disorder   • Cancer Maternal Grandmother    • Stroke Maternal Grandfather    • Hypertension Maternal Grandfather    • Malig Hyperthermia Neg Hx         Social History     Socioeconomic History   • Marital status:      Spouse name: Not on file   • Number of children: Not on file   • Years of education: Not on file   • Highest education level: Not on file   Tobacco Use   • Smoking status: Former Smoker     Packs/day: 3.00     Years: 20.00     Pack  years: 60.00     Types: Cigarettes     Start date: 1969     Last attempt to quit: 2000     Years since quittin.6   • Smokeless tobacco: Never Used   • Tobacco comment: caffeine use   Substance and Sexual Activity   • Alcohol use: No     Comment: caffeine use    • Drug use: No   • Sexual activity: Defer     Partners: Female     Birth control/protection: None        OB History    None          Allergies   Allergen Reactions   • Augmentin [Amoxicillin-Pot Clavulanate] GI Intolerance and Dizziness   • Codeine GI Intolerance and Dizziness            Review of Systems   Constitutional: Positive for activity change, fatigue and unexpected weight loss. Negative for appetite change, chills, diaphoresis, fever and unexpected weight gain.   HENT: Negative for congestion, dental problem, drooling, ear discharge, ear pain, facial swelling, hearing loss, mouth sores, nosebleeds, postnasal drip, rhinorrhea, sinus pressure, sneezing, sore throat, swollen glands, tinnitus, trouble swallowing and voice change.    Eyes: Negative for blurred vision, double vision, photophobia, pain, discharge, redness, itching and visual disturbance.   Respiratory: Negative for apnea, cough, choking, chest tightness, shortness of breath, wheezing and stridor.    Cardiovascular: Negative for chest pain, palpitations and leg swelling.   Gastrointestinal: Negative for abdominal distention, abdominal pain, anal bleeding, blood in stool, constipation, diarrhea, nausea, rectal pain, vomiting, GERD and indigestion.   Endocrine: Negative for cold intolerance, heat intolerance, polydipsia, polyphagia and polyuria.   Genitourinary: Positive for amenorrhea. Negative for breast discharge, breast lump, breast pain, decreased libido, difficulty urinating, dyspareunia, dysuria, flank pain, frequency, hematuria, menstrual problem, pelvic pain, pelvic pressure, urgency, urinary incontinence and vaginal bleeding.   Musculoskeletal: Negative for arthralgias,  "back pain, gait problem, joint swelling, myalgias, neck pain, neck stiffness and bursitis.   Skin: Negative for color change, dry skin, pallor, rash, skin lesions and bruise.   Allergic/Immunologic: Negative for environmental allergies, food allergies and immunocompromised state.   Neurological: Negative for dizziness, tremors, seizures, syncope, facial asymmetry, speech difficulty, weakness, light-headedness, numbness, headache, memory problem and confusion.   Hematological: Negative for adenopathy. Does not bruise/bleed easily.   Psychiatric/Behavioral: Negative for agitation, behavioral problems, decreased concentration, dysphoric mood, hallucinations, self-injury, sleep disturbance, suicidal ideas, negative for hyperactivity, depressed mood and stress. The patient is not nervous/anxious.          Objective   Blood pressure 92/64, pulse 83, temperature 97.1 °F (36.2 °C), temperature source Temporal, resp. rate 16, height 157.5 cm (62\"), weight 73.3 kg (161 lb 11.2 oz), SpO2 98 %.   Physical Exam   Constitutional: She is oriented to person, place, and time. She appears well-developed and well-nourished. No distress.   HENT:   Head: Normocephalic and atraumatic.   Right Ear: External ear normal.   Left Ear: External ear normal.   Nose: Nose normal.   Mouth/Throat: Oropharynx is clear and moist. No oropharyngeal exudate.   Eyes: Pupils are equal, round, and reactive to light. Conjunctivae and EOM are normal. Right eye exhibits no discharge. Left eye exhibits no discharge. No scleral icterus.   Neck: Normal range of motion. Neck supple. No JVD present. No tracheal deviation present. No thyromegaly present.   Cardiovascular: Normal rate, regular rhythm, normal heart sounds and intact distal pulses. Exam reveals no gallop and no friction rub.   No murmur heard.  Pulmonary/Chest: Effort normal and breath sounds normal. No stridor. No respiratory distress. She has no wheezes. She has no rales. She exhibits no tenderness. "   Abdominal: Soft. Bowel sounds are normal. She exhibits no distension and no mass. There is no tenderness. There is no rebound and no guarding.   Musculoskeletal: Normal range of motion. She exhibits no edema or deformity.   Lymphadenopathy:     She has no cervical adenopathy.   Neurological: She is alert and oriented to person, place, and time. She displays normal reflexes. No cranial nerve deficit. She exhibits normal muscle tone. Coordination normal.   Skin: Skin is warm and dry. No rash noted. She is not diaphoretic. No erythema. No pallor.   Psychiatric: She has a normal mood and affect. Her behavior is normal. Judgment and thought content normal.   Vitals reviewed.        Lab on 09/01/2020   Component Date Value Ref Range Status   • WBC 09/01/2020 10.50  3.40 - 10.80 10*3/mm3 Final   • RBC 09/01/2020 4.74  3.77 - 5.28 10*6/mm3 Final   • Hemoglobin 09/01/2020 13.8  12.0 - 15.9 g/dL Final   • Hematocrit 09/01/2020 45.7  34.0 - 46.6 % Final   • MCV 09/01/2020 96.4  79.0 - 97.0 fL Final   • MCH 09/01/2020 29.1  26.6 - 33.0 pg Final   • MCHC 09/01/2020 30.2* 31.5 - 35.7 g/dL Final   • RDW 09/01/2020 15.3  12.3 - 15.4 % Final   • RDW-SD 09/01/2020 54.4* 37.0 - 54.0 fl Final   • MPV 09/01/2020 9.4  6.0 - 12.0 fL Final   • Platelets 09/01/2020 262  140 - 450 10*3/mm3 Final   • Neutrophil % 09/01/2020 68.3  42.7 - 76.0 % Final   • Lymphocyte % 09/01/2020 17.9* 19.6 - 45.3 % Final   • Monocyte % 09/01/2020 7.4  5.0 - 12.0 % Final   • Eosinophil % 09/01/2020 4.8  0.3 - 6.2 % Final   • Basophil % 09/01/2020 1.3  0.0 - 1.5 % Final   • Immature Grans % 09/01/2020 0.3  0.0 - 0.5 % Final   • Neutrophils, Absolute 09/01/2020 7.17* 1.70 - 7.00 10*3/mm3 Final   • Lymphocytes, Absolute 09/01/2020 1.88  0.70 - 3.10 10*3/mm3 Final   • Monocytes, Absolute 09/01/2020 0.78  0.10 - 0.90 10*3/mm3 Final   • Eosinophils, Absolute 09/01/2020 0.50* 0.00 - 0.40 10*3/mm3 Final   • Basophils, Absolute 09/01/2020 0.14  0.00 - 0.20 10*3/mm3  Final   • Immature Grans, Absolute 09/01/2020 0.03  0.00 - 0.05 10*3/mm3 Final   • nRBC 09/01/2020 0.0  0.0 - 0.2 /100 WBC Final        No radiology results for the last 30 days.     CBC 9/1/2020 reviewed by me and noted to have a normal WBC count of 10.50, hemoglobin 13.8, platelet count 262,000  CBCs over the past 8 months reviewed and pertinent values summarized in HPI.  Extensive review of the medical records performed including the recent hospitalization as well as imaging.    CT abdomen pelvis-6/30/2020-images independently reviewed by me-Calcification of the abdominal pelvic vasculature noted.  Colonic diverticulosis.  Postsurgical changes from ventral hernia repair with mesh placement.  No findings suspicious for malignancy.    Peripheral smear reviewed by me and noted to have a normocytic RBC, WBC morphology and count relatively normal with a mild increase in neutrophil count.  Platelet morphology and count normal.    Assessment/Plan      Ms. Martines is a 69-year-old postmenopausal  lady with multiple medical comorbidities including hypertension, hyperlipidemia, diabetes, end-stage renal disease, frequent UTIs referred by Dr. Vazquez for evaluation of leukocytosis.    1.  Leukocytosis  On reviewing the labs over the past 8 months it appears that she has had intermittent leukocytosis with white blood cell count going as high as 23,000.  This has always been predominant neutrophilia.  This is most likely secondary to urinary tract infection.  She had recent hospitalization in July 2020 for urosepsis and was treated with antibiotics.  WBC count is normal today.  The fact that WBC count has improved and returned to normal following appropriate treatment of UTI as indicated of the fact that this is likely reactive to underlying infection or inflammation.  She does have a 40 pound weight loss over the past 8 to 9 months which is concerning for underlying malignancy however patient and her son report that  she has very poor eating habits and she lives by herself and has not been cooking and eating appropriately.  Weight has remained stable over the past month.  Since WBC count has returned to normal, I I do not think that there is any underlying hematological issue which is contributing to the elevated white blood cell count.  No further work-up necessary at this time.    2.weight loss  Has not had a screening mammogram or colonoscopy  She plans to discuss this with her primary care physician.  She had a CT abdomen pelvis on 6/30/2020 which does not show any findings concerning for malignancy  I ordered a CT of the chest for screening for lung cancer however this has been declined as she does not meet criteria for screening lung CT also is that she has a 60-pack-year smoking history.    3.end-stage renal disease  Continue dialysis.    4.anemia  She receives IV iron at the dialysis center.  Suspect she is also maintained on Procrit  Hemoglobin is normal today but has been low between nines and tens in the past.  Ferritin noted to be elevated on most recent check.  Continue to monitor CBC closely.    5.hypertension-continue amlodipine    6.diabetes-continue current medications.  Defer management to primary care physician    7.follow-up-in 2 months with repeat CBC.  If CBC remains stable and normal then she could be graduated back to follow-up with her primary care physician.

## 2020-09-10 NOTE — TELEPHONE ENCOUNTER
She also needs IV iron, iron very low.  I put order to have done.  RRJ   High Dose Vitamin A Counseling: Side effects reviewed, pt to contact office should one occur.

## 2020-09-22 PROBLEM — R09.89 PULMONARY VASCULAR CONGESTION: Status: ACTIVE | Noted: 2020-01-01

## 2020-09-23 PROBLEM — T82.838A BLEEDING FROM DIALYSIS SHUNT (HCC): Status: ACTIVE | Noted: 2020-01-01

## 2020-09-23 NOTE — ANESTHESIA PREPROCEDURE EVALUATION
" Anesthesia Evaluation     Patient summary reviewed and Nursing notes reviewed   history of anesthetic complications: PONV  NPO Solid Status: > 8 hours  NPO Liquid Status: > 2 hours           Airway   Mallampati: II  TM distance: >3 FB  Neck ROM: full  no difficulty expected  Dental - normal exam   (+) edentulous    Pulmonary - normal exam   (+) a smoker Former,   (-) COPD, asthma, lung cancer  Cardiovascular - normal exam  Exercise tolerance: good (4-7 METS)    ECG reviewed  Rhythm: regular  Rate: normal    (+) hypertension well controlled 2 medications or greater, valvular problems/murmurs AS, TI and MR, past MI  6-12 months, CAD, CABG >6 Months, cardiac stents more than 12 months ago dysrhythmias Tachycardia, CHF Systolic <55%, PVD, hyperlipidemia,     ROS comment: Known CAD s/p 7v CABG ~ 2000, several stents placed since that time.  NSTEMI 01/2020, heart cath by Wm. Kareem CHRISTINA noted, \"Severe native coronary artery disease 7 of 7 bypasses are patent.\"    TTE 12/2019:  ·Estimated EF was in disagreement with the calculated EF. Estimated EF appears to be in the range of 36 - 40%.  ·Calculated EF = 45.0%.  ·Left ventricular systolic function is mildly decreased.  ·The following left ventricular wall segments are hypokinetic: apical anterior, basal anterolateral and mid anterolateral.  ·Left atrial cavity size is mildly dilated.  ·There is calcification of the aortic valve.  ·Mild aortic valve stenosis is present.  ·Aortic valve area is 1.3 cm2.  ·Aortic valve mean pressure gradient is 7.0 mmHg.  ·Aortic valve maximum pressure gradient is 12.5 mmHg.  ·Mild mitral valve regurgitation is present  ·Mild to moderate tricuspid valve regurgitation is present.  ·Calculated right ventricular systolic pressure from tricuspid regurgitation is 82.3 mmHg.    Neuro/Psych- negative ROS  (-) seizures, TIA, CVA  GI/Hepatic/Renal/Endo    (+) obesity,  GERD well controlled, PUD, GI bleeding upper resolved, renal disease ESRD and " dialysis, diabetes mellitus type 2 poorly controlled using insulin,   (-) hepatitis, liver disease, no thyroid disorder    ROS Comment: ESRD on HD for the past 2mo, M/W/F last HD was yesterday.    Musculoskeletal (-) negative ROS    Abdominal  - normal exam   Substance History - negative use     OB/GYN negative ob/gyn ROS         Other - negative ROS                         Anesthesia Plan    ASA 4     MAC   (Patient was supposed to have HD yesterday, fistula would not work. K+ is 3.1)  intravenous induction     Anesthetic plan, all risks, benefits, and alternatives have been provided, discussed and informed consent has been obtained with: patient.    Plan discussed with CRNA and attending.

## 2020-09-23 NOTE — ANESTHESIA POSTPROCEDURE EVALUATION
Patient: Randi Martines    Procedure Summary     Date: 09/23/20 Room / Location: [facility] OR  / [facility] MAIN OR    Anesthesia Start: 0937 Anesthesia Stop: 1029    Procedure: HEMODIALYSIS CATHETER INSERTION (N/A ) Diagnosis:     Surgeon: Zay Gilliam MD Provider: George Aranda MD    Anesthesia Type: MAC ASA Status: 4          Anesthesia Type: MAC    Vitals  Vitals Value Taken Time   /47 09/23/20 1045   Temp 36.9 °C (98.4 °F) 09/23/20 1025   Pulse 86 09/23/20 1100   Resp 14 09/23/20 1045   SpO2 95 % 09/23/20 1059   Vitals shown include unvalidated device data.        Post Anesthesia Care and Evaluation    Patient location during evaluation: PACU  Patient participation: complete - patient participated  Level of consciousness: awake  Pain score: 1  Pain management: adequate  Airway patency: patent  Anesthetic complications: No anesthetic complications    Cardiovascular status: acceptable  Respiratory status: acceptable  Hydration status: acceptable

## 2020-09-24 NOTE — OUTREACH NOTE
Prep Survey      Responses   [facility] facility patient discharged from?  [place]   Is LACE score < 7 ?  No   Eligibility  [facility]   Date of Admission  09/22/20   Date of Discharge  09/24/20   Discharge Disposition  Home or Self Care   Discharge diagnosis  Pulmonary vascular congestion    COVID-19 Test Status  Negative   Does the patient have one of the following disease processes/diagnoses(primary or secondary)?  Other   Does the patient have Home health ordered?  No   Is there a DME ordered?  No   Comments regarding appointments   HD MWF at [facility]   Prep survey completed?  Yes          Celeste Hernandez RN

## 2020-09-25 NOTE — OUTREACH NOTE
Call Center TCM Note      Responses   [facility] patient discharged from?  [place]   COVID-19 Test Status  Negative   Does the patient have one of the following disease processes/diagnoses(primary or secondary)?  Other   TCM attempt successful?  No   Unsuccessful attempts  Attempt 2          Jimi Brumfield RN    9/25/2020, 15:38 EDT

## 2020-09-25 NOTE — OUTREACH NOTE
Call Center TCM Note      Responses   [facility] patient discharged from?  [place]   COVID-19 Test Status  Negative   Does the patient have one of the following disease processes/diagnoses(primary or secondary)?  Other   TCM attempt successful?  No   Unsuccessful attempts  Attempt 1          Jimi Brumfield RN    9/25/2020, 15:19 EDT

## 2020-10-01 PROBLEM — E46 UNSPECIFIED PROTEIN-CALORIE MALNUTRITION (HCC): Status: ACTIVE | Noted: 2020-01-01

## 2020-10-01 PROBLEM — N25.81 SECONDARY HYPERPARATHYROIDISM OF RENAL ORIGIN (HCC): Status: ACTIVE | Noted: 2020-01-01

## 2020-10-01 PROBLEM — L97.509 TYPE 2 DIABETES MELLITUS WITH FOOT ULCER (HCC): Status: ACTIVE | Noted: 2020-01-01

## 2020-10-01 PROBLEM — R19.7 DIARRHEA, UNSPECIFIED: Status: ACTIVE | Noted: 2020-01-01

## 2020-10-01 PROBLEM — E11.621 TYPE 2 DIABETES MELLITUS WITH FOOT ULCER (HCC): Status: ACTIVE | Noted: 2020-01-01

## 2020-10-01 PROBLEM — Z79.4 LONG TERM (CURRENT) USE OF INSULIN (HCC): Status: ACTIVE | Noted: 2020-01-01

## 2020-10-01 PROBLEM — Z87.11 HISTORY OF PEPTIC ULCER DISEASE: Status: ACTIVE | Noted: 2020-01-01

## 2020-10-01 PROBLEM — E16.2 HYPOGLYCEMIA, UNSPECIFIED: Status: ACTIVE | Noted: 2020-01-01

## 2020-10-01 PROBLEM — Z95.1 PRESENCE OF AORTOCORONARY BYPASS GRAFT: Status: ACTIVE | Noted: 2020-01-01

## 2020-10-01 NOTE — PROGRESS NOTES
"Subjective   Randi Martines is a 69 y.o. female who presents for a follow up on diabetes. Started dialysis in June. Has been admitted to hospital twice recently for pleural effusion, renal disease. Blood sugar has been extremely low in the mornings, yesterday was 26.     History of Present Illness   Started dialysis in June and diabetes less well controlled since then. Last 3 weeks or so, am fasting is 30 without symptoms. Previously had hunger symptoms. Currently checking 2 x daily fasting and 930pm. To bed with about 177. Last  Night 154. Currently living with son and on his schedule. Took no insulin last night and 101. Typically takes levemir at night 50, did not take last night. Typically takes about 10 units novolog with meals. Meals are irregular. Kids are overweight. Last night had 1 1/2 enchiladas and rice. 5pm--took 15 units with meal  Low blood sugars do not seem to occur either on dialysis or non dialysis days. Does eat breakfast before dialysis at 11. Cottage cheese and pineapple. Blood sugar not typically low during dialysis, but hungry on way home. 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  The following portions of the patient's history were reviewed and updated as appropriate: allergies, current medications, past family history, past medical history, past social history, past surgical history and problem list.    Review of Systems   Constitutional: Negative for activity change, appetite change, fatigue, unexpected weight gain and unexpected weight loss.   Respiratory: Negative.  Negative for shortness of breath.    Cardiovascular: Negative.  Negative for chest pain, palpitations and leg swelling.   Endocrine: Negative for cold intolerance, heat intolerance, polydipsia, polyphagia and polyuria.   Psychiatric/Behavioral: Negative.      /62   Pulse 91   Temp 97.5 °F (36.4 °C) (Infrared)   Ht 154.9 cm (61\")   Wt 72.6 kg (160 lb)   SpO2 99%   BMI 30.23 kg/m²     Objective   Physical " Exam  Constitutional:       Appearance: She is well-developed. She is not diaphoretic.   HENT:      Head: Normocephalic and atraumatic.   Neck:      Musculoskeletal: Normal range of motion and neck supple.      Thyroid: No thyromegaly.   Cardiovascular:      Rate and Rhythm: Normal rate and regular rhythm.      Pulses:           Carotid pulses are 2+ on the right side and 2+ on the left side.     Heart sounds: Normal heart sounds.   Pulmonary:      Effort: Pulmonary effort is normal.      Breath sounds: Normal breath sounds.   Lymphadenopathy:      Cervical: No cervical adenopathy.   Psychiatric:         Behavior: Behavior normal.         Thought Content: Thought content normal.         Judgment: Judgment normal.         Assessment/Plan   Problems Addressed this Visit        Endocrine    Type 2 diabetes mellitus with foot ulcer (CMS/HCC)       Genitourinary    ESRD (end stage renal disease) (CMS/HCC)    Relevant Orders    POC Urinalysis Dipstick, Automated       Other    Long term (current) use of insulin (CMS/HCC)      Other Visit Diagnoses     UTI symptoms    -  Primary    Relevant Orders    POC Urinalysis Dipstick, Automated      Diagnoses       Codes Comments    UTI symptoms    -  Primary ICD-10-CM: R39.9  ICD-9-CM: 788.99     ESRD (end stage renal disease) (CMS/HCC)     ICD-10-CM: N18.6  ICD-9-CM: 585.6     Long term (current) use of insulin (CMS/HCC)     ICD-10-CM: Z79.4  ICD-9-CM: V58.67     Type 2 diabetes mellitus with foot ulcer, with long-term current use of insulin (CMS/HCC)     ICD-10-CM: E11.621, L97.509, Z79.4  ICD-9-CM: 250.80, 707.15, V58.67           DM with severe asymptomatic hypoglycemia--Will decrease levemir to 25 units nightly and adjust to fasting of 120-130. She is worried this goal is too high. Discussed inherent dangers of asymptomatic hypoglycemia--feel correct and aggressive goal to avoid. Last A1c well controlled. Recommend repeat labs 1 month. Adjust long acting insulin 2 x weekly,  more frequent if continuing severe lows  UTI--send for culture  ESRD--continue with nephrology. Avoid NSAIDS discussed diet for renal disease and handout with AVS  60 min face to face in troubleshooting hypoglycemia  She is friends with Anita

## 2020-10-01 NOTE — PATIENT INSTRUCTIONS
Food Basics for Chronic Kidney Disease  When your kidneys are not working well, they cannot remove waste and excess substances from your blood as effectively as they did before. This can lead to a buildup and imbalance of these substances, which can worsen kidney damage and affect how your body functions. Certain foods lead to a buildup of these substances in the body. By changing your diet as recommended by your diet and nutrition specialist (dietitian) or health care provider, you could help prevent further kidney damage and delay or prevent the need for dialysis.  What are tips for following this plan?  General instructions    · Work with your health care provider and dietitian to develop a meal plan that is right for you. Foods you can eat, limit, or avoid will be different for each person depending on the stage of kidney disease and any other existing health conditions.  · Talk with your health care provider about whether you should take a vitamin and mineral supplement.  · Use standard measuring cups and spoons to measure servings of foods. Use a kitchen scale to measure portions of protein foods.  · If directed by your health care provider, avoid drinking too much fluid. Measure and count all liquids, including water, ice, soups, flavored gelatin, and frozen desserts such as popsicles or ice cream.  Reading food labels  · Check the amount of sodium in foods. Choose foods that have less than 300 milligrams (mg) per serving.  · Check the ingredient list for phosphorus or potassium-based additives or preservatives.  · Check the amount of saturated and trans fat. Limit or avoid these fats as told by your dietitian.  Shopping  · Avoid buying foods that are:  ? Processed, frozen, or prepackaged.  ? Calcium-enriched or fortified.  · Do not buy foods that have salt or sodium listed among the first five ingredients.  · Do not buy canned vegetables.  Cooking  · Replace animal proteins, such as meat, fish, eggs, or  dairy, with plant proteins from beans, nuts, and soy.  ? Use soy milk instead of cow's milk.  ? Add beans or tofu to soups, casseroles, or pasta dishes instead of meat.  · Soak vegetables, such as potatoes, before cooking to reduce potassium. To do this:  ? Peel and cut into small pieces.  ? Soak in warm water for at least 2 hours. For every 1 cup of vegetables, use 10 cups of water.  ? Drain and rinse with warm water.  ? Boil for at least 5 minutes.  Meal planning  · Limit the amount of protein from plant and animal sources you eat each day.  · Do not add salt to food when cooking or before eating.  · Eat meals and snacks at around the same time each day.  If you have diabetes:  · If you have diabetes (diabetes mellitus) and chronic kidney disease, it is important to keep your blood glucose in the target range recommended by your health care provider. Follow your diabetes management plan. This may include:  ? Checking your blood glucose regularly.  ? Taking oral medicines, insulin, or both.  ? Exercising for at least 30 minutes on 5 or more days each week, or as told by your health care provider.  ? Tracking how many servings of carbohydrates you eat at each meal.  · You may be given specific guidelines on how much of certain foods and nutrients you may eat, depending on your stage of kidney disease and whether you have high blood pressure (hypertension). Follow your meal plan as told by your dietitian.  What nutrients should be limited?  The items listed are not a complete list. Talk with your dietitian about what dietary choices are best for you.  Potassium  Potassium affects how steadily your heart beats. If too much potassium builds up in your blood, it can cause an irregular heartbeat or even a heart attack.  You may need to eat less potassium, depending on your blood potassium levels and the stage of kidney disease. Talk to your dietitian about how much potassium you may have each day.  You may need to limit  or avoid foods that are high in potassium, such as:  · Milk and soy milk.  · Fruits, such as bananas, papaya, apricots, nectarines, melon, prunes, raisins, kiwi, and oranges.  · Vegetables, such as potatoes, sweet potatoes, yams, tomatoes, leafy greens, beets, okra, avocado, pumpkin, and winter squash.  · White and lima beans.  Phosphorus  Phosphorus is a mineral found in your bones. A balance between calcium and phosphorous is needed to build and maintain healthy bones. Too much phosphorus pulls calcium from your bones. This can make your bones weak and more likely to break. Too much phosphorus can also make your skin itch.  You may need to eat less phosphorus depending on your blood phosphorus levels and the stage of kidney disease. Talk to your dietitian about how much potassium you may have each day. You may need to take medicine to lower your blood phosphorus levels if diet changes do not help.  You may need to limit or avoid foods that are high in phosphorus, such as:  · Milk and dairy products.  · Dried beans and peas.  · Tofu, soy milk, and other soy-based meat replacements.  · Fabiana.  · Nuts and peanut butter.  · Meat, poultry, and fish.  · Bran cereals and oatmeals.  Protein  Protein helps you to make and keep muscle. It also helps in the repair of your body’s cells and tissues. One of the natural breakdown products of protein is a waste product called urea. When your kidneys are not working properly, they cannot remove wastes, such as urea, like they did before you developed chronic kidney disease. Reducing how much protein you eat can help prevent a buildup of urea in your blood.  Depending on your stage of kidney disease, you may need to limit foods that are high in protein. Sources of animal protein include:  · Meat (all types).  · Fish and seafood.  · Poultry.  · Eggs.  · Dairy.  Other protein foods include:  · Beans and legumes.  · Nuts and nut butter.  · Soy and tofu.  Sodium  Sodium, which is found  in salt, helps maintain a healthy balance of fluids in your body. Too much sodium can increase your blood pressure and have a negative effect on the function of your heart and lungs. Too much sodium can also cause your body to retain too much fluid, making your kidneys work harder.  Most people should have less than 2,300 milligrams (mg) of sodium each day. If you have hypertension, you may need to limit your sodium to 1,500 mg each day. Talk to your dietitian about how much sodium you may have each day.  You may need to limit or avoid foods that are high in sodium, such as:  · Salt seasonings.  · Soy sauce.  · Cured and processed meats.  · Salted crackers and snack foods.  · Fast food.  · Canned soups and most canned foods.  · Pickled foods.  · Vegetable juice.  · Boxed mixes or ready-to-eat boxed meals and side dishes.  · Bottled dressings, sauces, and marinades.  Summary  · Chronic kidney disease can lead to a buildup and imbalance of waste and excess substances in the body. Certain foods lead to a buildup of these substances. By adjusting your intake of these foods, you could help prevent more kidney damage and delay or prevent the need for dialysis.  · Food adjustments are different for each person with chronic kidney disease. Work with a dietitian to set up nutrient goals and a meal plan that is right for you.  · If you have diabetes and chronic kidney disease, it is important to keep your blood glucose in the target range recommended by your health care provider.  This information is not intended to replace advice given to you by your health care provider. Make sure you discuss any questions you have with your health care provider.  Document Released: 03/09/2004 Document Revised: 04/09/2020 Document Reviewed: 12/13/2017  Elsevier Patient Education © 2020 [employer]

## 2020-10-02 NOTE — OUTREACH NOTE
Medical Week 2 Survey      Responses   [facility] [place] patient discharged from?  [place]   Does the patient have one of the following disease processes/diagnoses(primary or secondary)?  Other   Week 2 attempt successful?  Yes   Call start time  0819   Call end time  0821   Meds reviewed with patient/caregiver?  Yes   Week 2 Call Completed?  Yes   Wrap up additional comments  Call was very brief, she was leaving for dialysis. She continues to use temporary catheter at this time. She is doing much better.          Kimberly Malone RN

## 2020-10-12 NOTE — OUTREACH NOTE
Medical Week 3 Survey      Responses   [facility] patient discharged from?  [place]   Does the patient have one of the following disease processes/diagnoses(primary or secondary)?  Other   Week 3 attempt successful?  No   Unsuccessful attempts  Attempt 1          Margaret Calhoun RN

## 2020-11-13 NOTE — TELEPHONE ENCOUNTER
----- Message from Jeanne Chacon sent at 11/9/2020  1:09 PM EST -----  Regarding: CANCELED  Eunice,         This patient canceled her appointment with Dr. Sheppard on 11/10/2020 via [employer].    Jeanne

## 2020-11-19 NOTE — PROGRESS NOTES
Subjective   Randi Martines is a 70 y.o. female. Diabetes FU telephone visit.     History of Present Illness   Diabetic FU--At last decreased LA insulin by 50% as having asymptomatic severe hypoglycemia as low as 30 since starting dialysis. Reports no more severe lows. Still doing 25 units LA. Fasting is running 100s, 99 this morning.  Depressed, doesn't want to eat or sleep. Holidays and 2nd son's anniversary of death and other son has COVID. Has a friend that is supportive. Is very isolated secondary to age and fear of COVID. Her friend helps her get groceries in    Still 15 units novolog with meals.     Bedtime sugar is 177, 133--yesterday.    Wants to know if coronovirus can be transmitted through semen? Worried about feeling scattered and forgetful. More SOA for no apparent reason. Resolves itself with relaxing. Was a heavy smoker for years.    The following portions of the patient's history were reviewed and updated as appropriate: allergies, current medications, past family history, past medical history, past social history, past surgical history and problem list.    Review of Systems   Constitutional: Positive for appetite change. Negative for activity change, chills, fever, unexpected weight gain and unexpected weight loss.   HENT: Negative.    Eyes: Negative.    Respiratory: Positive for shortness of breath. Negative for cough and wheezing.    Cardiovascular: Negative for chest pain, palpitations and leg swelling.   Gastrointestinal: Negative.    Musculoskeletal: Positive for arthralgias.   Skin: Negative.    Neurological: Negative for dizziness and facial asymmetry.   Hematological: Bruises/bleeds easily.   Psychiatric/Behavioral: Positive for behavioral problems, decreased concentration, dysphoric mood, depressed mood and stress. Negative for self-injury, sleep disturbance and suicidal ideas.       Objective   Physical Exam  Constitutional:       General: She is not in acute distress.     Appearance:  She is well-developed.   Pulmonary:      Effort: Pulmonary effort is normal.   Neurological:      Mental Status: She is alert and oriented to person, place, and time.   Psychiatric:         Attention and Perception: Attention normal.         Mood and Affect: Mood is depressed. Affect is tearful.         Speech: Speech is tangential. Speech is not rapid and pressured.         Behavior: Behavior normal.         Thought Content: Thought content normal.         Judgment: Judgment normal.       Assessment/Plan   Problems Addressed this Visit        Cardiovascular and Mediastinum    Coronary artery disease involving native coronary artery of native heart without angina pectoris    Pulmonary vascular congestion       Digestive    Unspecified protein-calorie malnutrition (CMS/[facility])       Endocrine    DM (diabetes mellitus), type 2 with neurological complications (CMS/[facility]) - Primary    Type 2 diabetes mellitus without complication, with long-term current use of insulin (CMS/[facility])    Hypoglycemia, unspecified       Genitourinary    ESRD (end stage renal disease) (CMS/[facility])       Other    Recurrent major depressive disorder, in partial remission (CMS/[facility])    Relevant Medications    DULoxetine (CYMBALTA) 60 MG capsule      Other Visit Diagnoses     Dyspnea, unspecified type          Diagnoses       Codes Comments    DM (diabetes mellitus), type 2 with neurological complications (CMS/[facility])    -  Primary ICD-10-CM: E11.49  ICD-9-CM: 250.60     Recurrent major depressive disorder, in partial remission (CMS/[facility])     ICD-10-CM: F33.41  ICD-9-CM: 296.35     Coronary artery disease involving native coronary artery of native heart without angina pectoris     ICD-10-CM: I25.10  ICD-9-CM: 414.01     Type 2 diabetes mellitus without complication, with long-term current use of insulin (CMS/[facility])     ICD-10-CM: E11.9, Z79.4  ICD-9-CM: 250.00, V58.67     Hypoglycemia, unspecified     ICD-10-CM: E16.2  ICD-9-CM: 251.2     ESRD (end stage renal  disease) (CMS/HCC)     ICD-10-CM: N18.6  ICD-9-CM: 585.6     Pulmonary vascular congestion     ICD-10-CM: R09.89  ICD-9-CM: 514     Mild protein-calorie malnutrition (CMS/HCC)     ICD-10-CM: E44.1  ICD-9-CM: 263.1     Dyspnea, unspecified type     ICD-10-CM: R06.00  ICD-9-CM: 786.09         CAD--LDL goal<70, ASA and tight BP control recommended. Consider FU with cardiology secondary to intermittent SOA  DM2--hypoglycemia resolved. Extended discussion of the dangers of hypoglycemia--including death. Was 5.8 in August--plan recheck through dialysis center. Continue reduced insulin for now. Dialysis has made LA more unpredictable in effect. Rely more on SA coverage. May need sliding scale  MDD--increase cymbalta and consider counseling to address.   ESRD--continue with nephrology and dialysis  Dyspnea and Pulmonary vascular congestion history--should be improving with dialysis--consider cardiology eval. ER is increased or not resolving.  PCM--continue to increase protein in diet. Discussed different sources as has to avoid certain foods secondary to aversion and DM and dialysis. This compounds the problem. Consider dietician referral.   45 min spent on the phone with patient for medication adjustment and disease state education.  You have chosen to receive care through a telephone visit. Do you consent to use a telephone visit for your medical care today? Yes

## 2020-11-20 PROBLEM — E87.70 HYPERVOLEMIA: Status: ACTIVE | Noted: 2020-01-01

## 2020-11-25 PROBLEM — F33.41 RECURRENT MAJOR DEPRESSIVE DISORDER, IN PARTIAL REMISSION (HCC): Status: ACTIVE | Noted: 2020-01-01

## 2020-11-25 PROBLEM — J96.01 ACUTE RESPIRATORY FAILURE WITH HYPOXIA (HCC): Status: RESOLVED | Noted: 2020-01-01 | Resolved: 2020-01-01

## 2020-11-30 PROBLEM — E87.70 HYPERVOLEMIA: Status: RESOLVED | Noted: 2020-01-01 | Resolved: 2020-01-01

## 2020-12-09 NOTE — TELEPHONE ENCOUNTER
Caller: Bobbi (DAUGHTEROFLAJENIFER)Kvnga    Relationship to patient: Emergency Contact    Best call back number: [phone]    Concerns or Questions if Applicable: Courtney is calling to state patient is in [facility] rehabilitation.  Courtney states she feels like the patient is getting worse while there.  She states patient is confused, losing weight.  She states the patient told her that she has a bed sore on her bottom.  She states she can talk to patient but is unable to see patient.      Courtney states patient is not receiving snacks that she drops off.  Courtney states she is very concerned about the patient's mental health.  She states patient was very sharp and that is not true any longer.    Courtney is very concerned and is asking for Dr. Morrison's assistance.    Please advise.    Courtney can be reached at [phone]

## 2020-12-10 PROBLEM — Z99.2 ESRD ON HEMODIALYSIS (HCC): Status: ACTIVE | Noted: 2020-01-01

## 2020-12-10 PROBLEM — I25.5 ISCHEMIC CARDIOMYOPATHY: Status: ACTIVE | Noted: 2020-01-01

## 2020-12-10 PROBLEM — I38 VALVULAR HEART DISEASE: Status: ACTIVE | Noted: 2020-01-01

## 2020-12-10 PROBLEM — N18.6 ESRD ON HEMODIALYSIS (HCC): Status: ACTIVE | Noted: 2020-01-01

## 2020-12-10 PROBLEM — I42.8 NON-ISCHEMIC CARDIOMYOPATHY (HCC): Status: RESOLVED | Noted: 2020-01-01 | Resolved: 2020-01-01

## 2020-12-10 PROBLEM — I50.43 ACUTE ON CHRONIC COMBINED SYSTOLIC AND DIASTOLIC CHF (CONGESTIVE HEART FAILURE) (HCC): Status: ACTIVE | Noted: 2020-01-01

## 2020-12-10 PROBLEM — I42.8 NON-ISCHEMIC CARDIOMYOPATHY (HCC): Status: ACTIVE | Noted: 2020-01-01

## 2020-12-10 NOTE — TELEPHONE ENCOUNTER
The isolation with covid rules may be getting to her.  It is rough being isolated.  Have they given discharge date yet?   If they feel comfortable caring for her at home, could sign her out??

## 2020-12-10 NOTE — TELEPHONE ENCOUNTER
Patient was scheduled to see EBEN Bazan today at 11:30. I tried to call both phone numbers listed and they went straight to .   I called the patient's emergency contact listed, Kvng (daughter in law) and she stated the patient is currently in rehab and is not doing well so she will not be coming to the appointment.  I offered to reschedule for her and she stated that since the patient is not doing very well and she is not sure when she will be better she is not comfortable rescheduling at this time.   Courtney stated they will call at a later time to reschedule.

## 2020-12-10 NOTE — TELEPHONE ENCOUNTER
Pt doesn't have discharge date yet. Pt is extremely confused and has been talking to her son Mannie ( 18 months ago). This is not like pt. Pt's hgb is a 7. [facility] is going give pt blood transfusion and dialysis on Saturday. Pt's daughter-in-law said if pt doesn't get better they are going to sign pt out of [facility] and take her back to [facility]. They don't know what else to do.

## 2020-12-11 NOTE — TELEPHONE ENCOUNTER
Sounds like has acute delirium.  Unfortuantely this can be caused by lot's of things from illness to meds to other issues (even anemia).  The bad thing is can take 3 to 4 weeks to clear.  If she doesn't improve, need to ask them if they have checked causes of delirium or ask them to have assessed for altered mental status/memory.  Could also take back to hospital as well.  CLAIREJ

## 2020-12-14 NOTE — PROGRESS NOTES
Pt arrives via [employer] wheelchair service with personal wheelchair & 02 tank. Pt has existing 18 gauge IV site to RAC dated 12/13/20.  IV site looks clean & flushes well.  Type & cross match lab drawn from IV site without difficulty.

## 2020-12-14 NOTE — PROGRESS NOTES
Report called to ARMIDA Nam @ [facility].  Pt has bouts of nausea today. Would not eat breakfast.  Ordered a lunch tray. Pt only ate one bite of grilled cheese and vomited 3 times - green bile with some undigested food.  Reported to ARMIDA Nam @ [facility]. Pt did drink a little apple juice & ginger ale. [facility] called for transport back to [facility].  18 gauge IV site to RAC left in place as patient arrived from [facility] with it in place for IV antibiotics for a UTI.

## 2020-12-14 NOTE — PATIENT INSTRUCTIONS
Blood Transfusion, Adult, Care After  This sheet gives you information about how to care for yourself after your procedure. Your doctor may also give you more specific instructions. If you have problems or questions, contact your doctor.  What can I expect after the procedure?  After the procedure, it is common to have:  · Bruising and soreness at the IV site.  · A fever or chills on the day of the procedure. This may be your body's response to the new blood cells received.  · A headache.  Follow these instructions at home:  Insertion site care         · Follow instructions from your doctor about how to take care of your insertion site. This is where an IV tube was put into your vein. Make sure you:  ? Wash your hands with soap and water before and after you change your bandage (dressing). If you cannot use soap and water, use hand .  ? Change your bandage as told by your doctor.  · Check your insertion site every day for signs of infection. Check for:  ? Redness, swelling, or pain.  ? Bleeding from the site.  ? Warmth.  ? Pus or a bad smell.  General instructions  · Take over-the-counter and prescription medicines only as told by your doctor.  · Rest as told by your doctor.  · Go back to your normal activities as told by your doctor.  · Keep all follow-up visits as told by your doctor. This is important.  Contact a doctor if:  · You have itching or red, swollen areas of skin (hives).  · You feel worried or nervous (anxious).  · You feel weak after doing your normal activities.  · You have redness, swelling, warmth, or pain around the insertion site.  · You have blood coming from the insertion site, and the blood does not stop with pressure.  · You have pus or a bad smell coming from the insertion site.  Get help right away if:  · You have signs of a serious reaction. This may be coming from an allergy or the body's defense system (immune system). Signs include:  ? Trouble breathing or shortness of  breath.  ? Swelling of the face or feeling warm (flushed).  ? Fever or chills.  ? Head, chest, or back pain.  ? Dark pee (urine) or blood in the pee.  ? Widespread rash.  ? Fast heartbeat.  ? Feeling dizzy or light-headed.  You may receive your blood transfusion in an outpatient setting. If so, you will be told whom to contact to report any reactions.  These symptoms may be an emergency. Do not wait to see if the symptoms will go away. Get medical help right away. Call your local emergency services (911 in the [place] Do not drive yourself to the hospital.  Summary  · Bruising and soreness at the IV site are common.  · Check your insertion site every day for signs of infection.  · Rest as told by your doctor. Go back to your normal activities as told by your doctor.  · Get help right away if you have signs of a serious reaction.  This information is not intended to replace advice given to you by your health care provider. Make sure you discuss any questions you have with your health care provider.  Document Revised: 06/11/2020 Document Reviewed: 06/11/2020  [employer] Patient Education © 2020 [employer]    Blood Transfusion, Adult  A blood transfusion is a procedure in which you receive blood through an IV tube. You may need this procedure because of:  · A bleeding disorder.  · An illness.  · An injury.  · A surgery.  The blood may come from someone else (a donor). You may also be able to donate blood for yourself. The blood given in a transfusion is made up of different types of cells. You may get:  · Red blood cells. These carry oxygen to the cells in the body.  · White blood cells. These help you fight infections.  · Platelets. These help your blood to clot.  · Plasma. This is the liquid part of your blood. It carries proteins and other substances through the body.  If you have a clotting disorder, you may also get other types of blood products.  Tell your doctor about:  · Any blood disorders you have.  · Any  reactions you have had during a blood transfusion in the past.  · Any allergies you have.  · All medicines you are taking, including vitamins, herbs, eye drops, creams, and over-the-counter medicines.  · Any surgeries you have had.  · Any medical conditions you have. This includes any recent fever or cold symptoms.  · Whether you are pregnant or may be pregnant.  What are the risks?  Generally, this is a safe procedure. However, problems may occur.  · The most common problems include:  ? A mild allergic reaction. This includes red, swollen areas of skin (hives) and itching.  ? Fever or chills. This may be the body's response to new blood cells received. This may happen during or up to 4 hours after the transfusion.  · More serious problems may include:  ? Too much fluid in the lungs. This may cause breathing problems.  ? A serious allergic reaction. This includes breathing trouble or swelling around the face and lips.  ? Lung injury. This causes breathing trouble and low oxygen in the blood. This can happen within hours of the transfusion or days later.  ? Too much iron. This can happen after getting many blood transfusions over a period of time.  ? An infection or virus passed through the blood. This is rare. Donated blood is carefully tested before it is given.  ? Your body's defense system (immune system) trying to attack the new blood cells. This is rare. Symptoms may include fever, chills, nausea, low blood pressure, and low back or chest pain.  ? Donated cells attacking healthy tissues. This is rare.  What happens before the procedure?  Medicines  Ask your doctor about:  · Changing or stopping your normal medicines. This is important.  · Taking aspirin and ibuprofen. Do not take these medicines unless your doctor tells you to take them.  · Taking over-the-counter medicines, vitamins, herbs, and supplements.  General instructions  · Follow instructions from your doctor about what you cannot eat or drink.  · You  will have a blood test to find out your blood type. The test also finds out what type of blood your body will accept and matches it to the donor type.  · If you are going to have a planned surgery, you may be able to donate your own blood. This may be done in case you need a transfusion.  · You will have your temperature, blood pressure, and pulse checked.  · You may receive medicine to help prevent an allergic reaction. This may be done if you have had a reaction to a transfusion before. This medicine may be given to you by mouth or through an IV tube.  · This procedure lasts about 1-4 hours. Plan for the time you need.  What happens during the procedure?    · An IV tube will be put into one of your veins.  · The bag of donated blood will be attached to your IV tube. Then, the blood will enter through your vein.  · Your temperature, blood pressure, and pulse will be checked often. This is done to find early signs of a transfusion reaction.  · Tell your nurse right away if you have any of these symptoms:  ? Shortness of breath or trouble breathing.  ? Chest or back pain.  ? Fever or chills.  ? Red, swollen areas of skin or itching.  · If you have any signs or symptoms of a reaction, your transfusion will be stopped. You may also be given medicine.  · When the transfusion is finished, your IV tube will be taken out.  · Pressure may be put on the IV site for a few minutes.  · A bandage (dressing) will be put on the IV site.  The procedure may vary among doctors and hospitals.  What happens after the procedure?  · You will be monitored until you leave the hospital or clinic. This includes checking your temperature, blood pressure, pulse, breathing rate, and blood oxygen level.  · Your blood may be tested to see how you are responding to the transfusion.  · You may be warmed with fluids or blankets. This is done to keep the temperature of your body normal.  · If you have your procedure in an outpatient setting, you will  be told whom to contact to report any reactions.  Where to find more information  To learn more, visit the [employer]: [facility]  Summary  · A blood transfusion is a procedure in which you are given blood through an IV tube.  · The blood may come from someone else (a donor). You may also be able to donate blood for yourself.  · The blood you are given is made up of different blood cells. You may receive red blood cells, platelets, plasma, or white blood cells.  · Your temperature, blood pressure, and pulse will be checked often.  · After the procedure, your blood may be tested to see how you are responding.  This information is not intended to replace advice given to you by your health care provider. Make sure you discuss any questions you have with your health care provider.  Document Revised: 06/11/2020 Document Reviewed: 06/11/2020  [employer] Patient Education © 2020 [employer]

## 2020-12-16 PROBLEM — I50.42 CHRONIC COMBINED SYSTOLIC (CONGESTIVE) AND DIASTOLIC (CONGESTIVE) HEART FAILURE (HCC): Status: ACTIVE | Noted: 2020-01-01

## 2020-12-16 PROBLEM — G93.41 METABOLIC ENCEPHALOPATHY: Status: ACTIVE | Noted: 2020-01-01

## 2020-12-16 NOTE — ED PROVIDER NOTES
EMERGENCY DEPARTMENT ENCOUNTER    Room Number:  E448/1  Date of encounter:  12/17/2020  PCP: Jose Morrison DO  Historian: patient , EMS   Full history not obtainable due to: AMS    HPI:  Chief Complaint: AMS, SOA    Context: Randi Martines is a 70 y.o. female who presents to the ED c/o AMS x 3 days.  Patient reports she is short of breath.  She states this is been ongoing for a few days.  She denies cough.    She does state she has increased weakness.  Denies any cough or fever.  States she does dialysis Monday Wednesday Friday and she is due for dialysis today.  History is limited. She cannot provide me with much detail regarding her recent history.  She is drowsy on exam and appears to fall asleep intermittently during HPI.      MEDICAL RECORD REVIEW: Reviewed discharge summaries by Dr. Godwin dated 11/30/2020.  Patient presented to the emergency room for shortness of breath.  Patient is dialysis patient with a history of combined systolic and diastolic heart failure and hypervolemia.  Her symptoms improved after dialysis treatment.      PAST MEDICAL HISTORY    Active Ambulatory Problems     Diagnosis Date Noted   • Abdominal pain 11/22/2016   • Intractable abdominal pain 11/22/2016   • DM (diabetes mellitus), type 2 with neurological complications (CMS/[facility]) 11/23/2016   • PVD (peripheral vascular disease) (CMS/[facility]) 11/23/2016   • Mesenteric ischemia (CMS/[facility]) 11/23/2016   • Acute gastric ulcer 11/24/2016   • S/P CABG (coronary artery bypass graft) 05/30/2017   • Coronary artery disease involving native coronary artery of native heart without angina pectoris 05/30/2017   • Atherosclerotic PVD with ulceration (CMS/[facility]) 05/31/2017   • Type 2 diabetes mellitus without complication, with long-term current use of insulin (CMS/[facility]) 08/30/2017   • Biliary obstruction 09/30/2017   • Hyperlipemia 09/24/2018   • Malabsorption of iron 08/05/2019   • Stage 3 chronic kidney disease 08/05/2019   • Anemia of chronic  disease 08/05/2019   • Gastrointestinal hemorrhage 12/18/2019   • NSTEMI (non-ST elevated myocardial infarction) (CMS/HCC) 12/19/2019   • Left ventricular systolic dysfunction 12/19/2019   • Blood loss anemia 12/18/2019   • Melena 12/18/2019   • Subclavian artery stenosis, left (CMS/HCC) 02/18/2020   • Hypertension 03/15/2020   • ESRD (end stage renal disease) (CMS/HCC) 06/09/2020   • Hypokalemia 06/10/2020   • Obesity (BMI 30-39.9) 06/10/2020   • Orthostatic hypotension 06/27/2020   • Diabetes mellitus (CMS/HCC) 06/27/2020   • Hyperglycemia 06/27/2020   • Fall 06/27/2020   • UTI (urinary tract infection) 06/27/2020   • Sepsis due to urinary tract infection (CMS/HCC) 06/28/2020   • Bacteremia due to Escherichia coli 06/30/2020   • Pulmonary vascular congestion 09/22/2020   • Bleeding from dialysis shunt (CMS/HCC) 09/23/2020   • Long term (current) use of insulin (CMS/HCC) 09/24/2020   • Diarrhea, unspecified 06/11/2020   • History of peptic ulcer disease 06/11/2020   • Presence of aortocoronary bypass graft 06/11/2020   • Secondary hyperparathyroidism of renal origin (CMS/HCC) 06/11/2020   • Unspecified protein-calorie malnutrition (CMS/HCC) 06/11/2020   • Hypoglycemia, unspecified 09/14/2020   • Type 2 diabetes mellitus with foot ulcer (CMS/HCC) 06/11/2020   • Recurrent major depressive disorder, in partial remission (CMS/HCC) 11/25/2020   • Ischemic cardiomyopathy 12/10/2020   • Valvular heart disease 12/10/2020   • Acute on chronic combined systolic and diastolic CHF (congestive heart failure) (CMS/HCC) 12/10/2020   • ESRD on hemodialysis (CMS/HCC) 12/10/2020     Resolved Ambulatory Problems     Diagnosis Date Noted   • Chronic systolic heart failure (CMS/HCC) 08/30/2017   • Foot ulcer (CMS/HCC) 02/01/2019   • Acute respiratory failure with hypoxia (CMS/HCC) 01/03/2020   • Hypervolemia 11/20/2020   • Non-ischemic cardiomyopathy (CMS/HCC) 12/10/2020     Past Medical History:   Diagnosis Date   • Blind    • CAD  (coronary artery disease)    • Chronic kidney disease    • Chronic systolic congestive heart failure (CMS/HCC)    • Dialysis patient (CMS/[facility])    • Dyslipidemia    • GERD (gastroesophageal reflux disease)    • History of diverticulitis    • History of stomach ulcers    • History of transfusion    • Peptic ulcer disease    • PONV (postoperative nausea and vomiting)          PAST SURGICAL HISTORY  Past Surgical History:   Procedure Laterality Date   • APPENDECTOMY  2015   • ARTERIOVENOUS FISTULA/SHUNT SURGERY Left 7/23/2020    Procedure: LEFT ARM BRACHIAL ARTERY AXILLARY VEIN GRAFT;  Surgeon: Loida Ugalde Jr., MD;  Location: [facility] MAIN OR;  Service: Vascular;  Laterality: Left;   • CARDIAC CATHETERIZATION N/A 1/2/2020    Procedure: LEFT HEART CATH  NO LV;  Surgeon: Emilio Serna MD;  Location: [facility] CATH INVASIVE LOCATION;  Service: Cardiology   • CARDIAC CATHETERIZATION N/A 1/2/2020    Procedure: CORONARY ANGIOGRAPHY;  Surgeon: Emilio Serna MD;  Location: [facility] CATH INVASIVE LOCATION;  Service: Cardiology   • CARDIAC CATHETERIZATION N/A 1/2/2020    Procedure: SAPHENOUS VEIN GRAFT;  Surgeon: Emilio Serna MD;  Location: [facility] CATH INVASIVE LOCATION;  Service: Cardiology   • CATARACT EXTRACTION     • CHOLECYSTECTOMY     • COLONOSCOPY  2015   • CORONARY ARTERY BYPASS GRAFT      7 coronary bypass surgery   • ENDOSCOPY N/A 11/23/2016    Procedure: ESOPHAGOGASTRODUODENOSCOPY ;  Surgeon: Katherine Green MD;  Location: [facility] ENDOSCOPY;  Service:    • ENDOSCOPY N/A 12/29/2019    Procedure: ESOPHAGOGASTRODUODENOSCOPYwith bx;  Surgeon: Ziyad Manzo MD;  Location: [facility] ENDOSCOPY;  Service: Gastroenterology   • ERCP N/A 10/2/2017    Procedure: ENDOSCOPIC RETROGRADE CHOLANGIOPANCREATOGRAPHY with sphincterotomy, balloon sweep;  Surgeon: Christopher Graf MD;  Location: [facility] ENDOSCOPY;  Service:    • HERNIA REPAIR  2015   • HYSTERECTOMY     • INSERTION HEMODIALYSIS CATHETER Right 6/10/2020     Procedure: HEMODIALYSIS CATHETER INSERTION;  Surgeon: Stevan Graves MD;  Location: [facility] OR;  Service: Vascular;  Laterality: Right;   • INSERTION HEMODIALYSIS CATHETER N/A 2020    Procedure: HEMODIALYSIS CATHETER INSERTION;  Surgeon: Zay Gilliam MD;  Location: [facility] OR;  Service: Vascular;  Laterality: N/A;   • FL VEIN IN SITU BYPASS GRAFT,FEM-POP Left 2017    Procedure: ULTRASOUND RIGHT AIF, ARTERIOGRAM W/ LEFT COMMON ILIAC ANGIOPLASTY STENT, LT FEMORAL POPLITEAL BYPASS WITH ARTEGRAFT, LEFT POPLITEAL ENDARTERECTOMY PATCH, LEFT COMMON ARTERIOGRAM, LEFT EXTERNAL ILIAC ANGIOPLASTY STENT PLACEMENT, AORTA,  EXTERNAL, DISTAL PRESSURES;  Surgeon: Jayda Barrientos MD;  Location: [facility] OR ;  Service: Vascular   • SKIN GRAFT SPLIT THICKNESS Left 2017    Procedure: DEBRIDEMENT AND SKIN GRAFT TO  LEFT DORSAL FOOT AND TOES;  Surgeon: Maurine Waterhouse, MD;  Location: [facility];  Service:    • UPPER GASTROINTESTINAL ENDOSCOPY      scar in gastric body, gastric ulcers         FAMILY HISTORY  Family History   Problem Relation Age of Onset   • Heart disease Mother         cardiac disorder   • Cancer Maternal Grandmother    • Stroke Maternal Grandfather    • Hypertension Maternal Grandfather    • Malig Hyperthermia Neg Hx          SOCIAL HISTORY  Social History     Socioeconomic History   • Marital status:      Spouse name: Not on file   • Number of children: Not on file   • Years of education: Not on file   • Highest education level: Not on file   Tobacco Use   • Smoking status: Former Smoker     Packs/day: 3.00     Years: 20.00     Pack years: 60.00     Types: Cigarettes     Start date: 1969     Quit date: 2000     Years since quittin.9   • Smokeless tobacco: Never Used   • Tobacco comment: caffeine use   Substance and Sexual Activity   • Alcohol use: No     Comment: caffeine use    • Drug use: No   • Sexual activity: Defer     Partners:  Female     Birth control/protection: None         ALLERGIES  Augmentin [amoxicillin-pot clavulanate] and Codeine        REVIEW OF SYSTEMS  Review of Systems   All systems reviewed and marked as negative except as listed in HPI       PHYSICAL EXAM    I have reviewed the triage vital signs and nursing notes.    ED Triage Vitals   Temp Heart Rate Resp BP SpO2   12/16/20 1034 12/16/20 1038 12/16/20 1038 12/16/20 1038 12/16/20 1038   96.4 °F (35.8 °C) 80 16 (!) 89/63 98 %      Temp src Heart Rate Source Patient Position BP Location FiO2 (%)   -- -- -- -- --              GENERAL: Drowsy , well developed, well nourished in no distress  HENT: NCAT, neck supple, trachea midline  EYES: no scleral icterus, PERRL, normal conjunctivae  CV: regular rhythm, regular rate, no murmur  RESPIRATORY: unlabored effort, CTAB  ABDOMEN: soft, nontender, nondistended, bowel sounds present  MUSCULOSKELETAL: no gross deformity.   NEURO: Drowsy, moves all extremities, generalized weakness noted, no focal weakness.  Speech is clear.  SKIN: warm, dry, no rash  PSYCH:  Appropriate mood and affect    Vital signs and nursing notes reviewed.          LAB RESULTS  Recent Results (from the past 24 hour(s))   Comprehensive Metabolic Panel    Collection Time: 12/16/20 11:11 AM    Specimen: Blood   Result Value Ref Range    Glucose 102 (H) 65 - 99 mg/dL    BUN 17 8 - 23 mg/dL    Creatinine 3.65 (H) 0.57 - 1.00 mg/dL    Sodium 136 136 - 145 mmol/L    Potassium 3.9 3.5 - 5.2 mmol/L    Chloride 103 98 - 107 mmol/L    CO2 23.2 22.0 - 29.0 mmol/L    Calcium 9.0 8.6 - 10.5 mg/dL    Total Protein 6.4 6.0 - 8.5 g/dL    Albumin 3.00 (L) 3.50 - 5.20 g/dL    ALT (SGPT) 82 (H) 1 - 33 U/L    AST (SGOT) 111 (H) 1 - 32 U/L    Alkaline Phosphatase 90 39 - 117 U/L    Total Bilirubin 0.5 0.0 - 1.2 mg/dL    eGFR Non African Amer 12 (L) >60 mL/min/1.73    eGFR  African Amer      Globulin 3.4 gm/dL    A/G Ratio 0.9 g/dL    BUN/Creatinine Ratio 4.7 (L) 7.0 - 25.0    Anion Gap  9.8 5.0 - 15.0 mmol/L   Lactic Acid, Plasma    Collection Time: 12/16/20 11:11 AM    Specimen: Blood   Result Value Ref Range    Lactate 0.9 0.5 - 2.0 mmol/L   Procalcitonin    Collection Time: 12/16/20 11:11 AM    Specimen: Blood   Result Value Ref Range    Procalcitonin 0.13 0.00 - 0.25 ng/mL   CBC Auto Differential    Collection Time: 12/16/20 11:11 AM    Specimen: Blood   Result Value Ref Range    WBC 9.13 3.40 - 10.80 10*3/mm3    RBC 3.34 (L) 3.77 - 5.28 10*6/mm3    Hemoglobin 9.8 (L) 12.0 - 15.9 g/dL    Hematocrit 30.8 (L) 34.0 - 46.6 %    MCV 92.2 79.0 - 97.0 fL    MCH 29.3 26.6 - 33.0 pg    MCHC 31.8 31.5 - 35.7 g/dL    RDW 16.0 (H) 12.3 - 15.4 %    RDW-SD 53.0 37.0 - 54.0 fl    MPV 9.2 6.0 - 12.0 fL    Platelets 246 140 - 450 10*3/mm3    Neutrophil % 74.3 42.7 - 76.0 %    Lymphocyte % 11.9 (L) 19.6 - 45.3 %    Monocyte % 8.9 5.0 - 12.0 %    Eosinophil % 3.5 0.3 - 6.2 %    Basophil % 1.1 0.0 - 1.5 %    Immature Grans % 0.3 0.0 - 0.5 %    Neutrophils, Absolute 6.78 1.70 - 7.00 10*3/mm3    Lymphocytes, Absolute 1.09 0.70 - 3.10 10*3/mm3    Monocytes, Absolute 0.81 0.10 - 0.90 10*3/mm3    Eosinophils, Absolute 0.32 0.00 - 0.40 10*3/mm3    Basophils, Absolute 0.10 0.00 - 0.20 10*3/mm3    Immature Grans, Absolute 0.03 0.00 - 0.05 10*3/mm3    nRBC 0.0 0.0 - 0.2 /100 WBC   Light Blue Top    Collection Time: 12/16/20 11:11 AM   Result Value Ref Range    Extra Tube hold for add-on    Green Top (Gel)    Collection Time: 12/16/20 11:11 AM   Result Value Ref Range    Extra Tube Hold for add-ons.    Lavender Top    Collection Time: 12/16/20 11:11 AM   Result Value Ref Range    Extra Tube hold for add-on    Gold Top - SST    Collection Time: 12/16/20 11:11 AM   Result Value Ref Range    Extra Tube Hold for add-ons.    BNP    Collection Time: 12/16/20 11:11 AM    Specimen: Blood   Result Value Ref Range    proBNP >70,000.0 (H) 0.0 - 900.0 pg/mL   Troponin    Collection Time: 12/16/20 11:11 AM    Specimen: Blood   Result  Value Ref Range    Troponin T 0.105 (C) 0.000 - 0.030 ng/mL   ECG 12 Lead    Collection Time: 12/16/20 11:12 AM   Result Value Ref Range    QT Interval 390 ms   Respiratory Panel PCR w/COVID-19(SARS-CoV-2) TG/NERISSA/TAVO/PAD/COR/MAD/ICARAN In-House, NP Swab in UTM/VTM, 3-4 HR TAT - Swab, Nasopharynx    Collection Time: 12/16/20 11:13 AM    Specimen: Nasopharynx; Swab   Result Value Ref Range    ADENOVIRUS, PCR Not Detected Not Detected    Coronavirus 229E Not Detected Not Detected    Coronavirus HKU1 Not Detected Not Detected    Coronavirus NL63 Not Detected Not Detected    Coronavirus OC43 Not Detected Not Detected    COVID19 Not Detected Not Detected - Ref. Range    Human Metapneumovirus Not Detected Not Detected    Human Rhinovirus/Enterovirus Not Detected Not Detected    Influenza A PCR Not Detected Not Detected    Influenza B PCR Not Detected Not Detected    Parainfluenza Virus 1 Not Detected Not Detected    Parainfluenza Virus 2 Not Detected Not Detected    Parainfluenza Virus 3 Not Detected Not Detected    Parainfluenza Virus 4 Not Detected Not Detected    RSV, PCR Not Detected Not Detected    Bordetella pertussis pcr Not Detected Not Detected    Bordetella parapertussis PCR Not Detected Not Detected    Chlamydophila pneumoniae PCR Not Detected Not Detected    Mycoplasma pneumo by PCR Not Detected Not Detected   Blood Gas, Arterial -    Collection Time: 12/16/20 11:57 AM    Specimen: Arterial Blood   Result Value Ref Range    Site Arterial: right radial     Nomi's Test N/A     pH, Arterial 7.253 (C) 7.350 - 7.450 pH units    pCO2, Arterial 53.8 (H) 35.0 - 45.0 mm Hg    pO2, Arterial 161.0 (H) 80.0 - 100.0 mm Hg    HCO3, Arterial 23.7 22.0 - 28.0 mmol/L    Base Excess, Arterial -3.7 (L) 0.0 - 2.0 mmol/L    O2 Saturation Calculated 99.1 (H) 92.0 - 99.0 %    Barometric Pressure for Blood Gas 752.2 mmHg    Modality Cannula     Flow Rate 2 lpm    Rate 12 Breaths/minute   Ammonia    Collection Time: 12/16/20  1:21 PM     Specimen: Blood   Result Value Ref Range    Ammonia 16 11 - 51 umol/L   Urinalysis With Microscopic If Indicated (No Culture) - Urine, Catheter    Collection Time: 12/16/20  1:22 PM    Specimen: Urine, Catheter   Result Value Ref Range    Color, UA Dark Yellow (A) Yellow, Straw    Appearance, UA Turbid (A) Clear    pH, UA 6.0 5.0 - 8.0    Specific Gravity, UA 1.025 1.005 - 1.030    Glucose, UA Negative Negative    Ketones, UA Trace (A) Negative    Bilirubin, UA Negative Negative    Blood, UA Moderate (2+) (A) Negative    Protein, UA >=300 mg/dL (3+) (A) Negative    Leuk Esterase, UA Moderate (2+) (A) Negative    Nitrite, UA Positive (A) Negative    Urobilinogen, UA 0.2 E.U./dL 0.2 - 1.0 E.U./dL   Urinalysis, Microscopic Only - Urine, Catheter    Collection Time: 12/16/20  1:22 PM    Specimen: Urine, Catheter   Result Value Ref Range    RBC, UA Too Numerous to Count (A) None Seen, 0-2 /HPF    WBC, UA Too Numerous to Count (A) None Seen, 0-2 /HPF    Bacteria, UA 2+ (A) None Seen /HPF    Squamous Epithelial Cells, UA None Seen None Seen, 0-2 /HPF    Hyaline Casts, UA None Seen None Seen /LPF    Methodology Manual Light Microscopy    Troponin    Collection Time: 12/16/20  8:52 PM    Specimen: Hand, Right; Blood   Result Value Ref Range    Troponin T 0.108 (C) 0.000 - 0.030 ng/mL   POC Glucose Once    Collection Time: 12/16/20  9:07 PM    Specimen: Blood   Result Value Ref Range    Glucose 115 70 - 130 mg/dL   Basic Metabolic Panel    Collection Time: 12/17/20  4:53 AM    Specimen: Blood   Result Value Ref Range    Glucose 69 65 - 99 mg/dL    BUN 10 8 - 23 mg/dL    Creatinine 2.19 (H) 0.57 - 1.00 mg/dL    Sodium 136 136 - 145 mmol/L    Potassium 4.0 3.5 - 5.2 mmol/L    Chloride 99 98 - 107 mmol/L    CO2 25.4 22.0 - 29.0 mmol/L    Calcium 9.1 8.6 - 10.5 mg/dL    eGFR Non African Amer 22 (L) >60 mL/min/1.73    BUN/Creatinine Ratio 4.6 (L) 7.0 - 25.0    Anion Gap 11.6 5.0 - 15.0 mmol/L   Troponin    Collection Time:  12/17/20  4:53 AM    Specimen: Blood   Result Value Ref Range    Troponin T 0.101 (C) 0.000 - 0.030 ng/mL   TSH    Collection Time: 12/17/20  4:53 AM    Specimen: Blood   Result Value Ref Range    TSH 3.590 0.270 - 4.200 uIU/mL   CBC Auto Differential    Collection Time: 12/17/20  4:53 AM    Specimen: Blood   Result Value Ref Range    WBC 8.99 3.40 - 10.80 10*3/mm3    RBC 3.53 (L) 3.77 - 5.28 10*6/mm3    Hemoglobin 10.5 (L) 12.0 - 15.9 g/dL    Hematocrit 32.7 (L) 34.0 - 46.6 %    MCV 92.6 79.0 - 97.0 fL    MCH 29.7 26.6 - 33.0 pg    MCHC 32.1 31.5 - 35.7 g/dL    RDW 15.2 12.3 - 15.4 %    RDW-SD 51.1 37.0 - 54.0 fl    MPV 9.3 6.0 - 12.0 fL    Platelets 228 140 - 450 10*3/mm3    Neutrophil % 71.5 42.7 - 76.0 %    Lymphocyte % 14.3 (L) 19.6 - 45.3 %    Monocyte % 10.0 5.0 - 12.0 %    Eosinophil % 2.8 0.3 - 6.2 %    Basophil % 1.1 0.0 - 1.5 %    Immature Grans % 0.3 0.0 - 0.5 %    Neutrophils, Absolute 6.42 1.70 - 7.00 10*3/mm3    Lymphocytes, Absolute 1.29 0.70 - 3.10 10*3/mm3    Monocytes, Absolute 0.90 0.10 - 0.90 10*3/mm3    Eosinophils, Absolute 0.25 0.00 - 0.40 10*3/mm3    Basophils, Absolute 0.10 0.00 - 0.20 10*3/mm3    Immature Grans, Absolute 0.03 0.00 - 0.05 10*3/mm3    nRBC 0.0 0.0 - 0.2 /100 WBC   POC Glucose Once    Collection Time: 12/17/20  4:54 AM    Specimen: Blood   Result Value Ref Range    Glucose 73 70 - 130 mg/dL   POC Glucose Once    Collection Time: 12/17/20  6:24 AM    Specimen: Blood   Result Value Ref Range    Glucose 80 70 - 130 mg/dL       Ordered the above labs and independently reviewed the results.        RADIOLOGY  Ct Head Without Contrast    Result Date: 12/16/2020  CT HEAD WITHOUT CONTRAST  HISTORY: Contusion to forehead on the left, headache.  COMPARISON: CT head 06/27/2020.  FINDINGS: The brain ventricles are symmetrical. There is no evidence of hemorrhage, hydrocephalus or of abnormal extra-axial fluid. No focal area of decreased attenuation to suggest acute infarction is  identified. Moderate vascular calcification involving the carotid siphons are noted bilaterally.      No evidence of fracture, intracranial hemorrhage or of acute infarction. Further evaluation could be performed with an MRI examination of the brain as indicated.    Radiation dose reduction techniques were utilized, including automated exposure control and exposure modulation based on body size.       Xr Chest 1 View    Result Date: 12/16/2020  XR CHEST 1 VW-  HISTORY:  Weakness. Strokelike symptoms.  COMPARISON:  Chest radiograph 11/20/2020.  FINDINGS:  A single portable view of the chest was obtained. There is a right IJ central line, unchanged. There is a vascular stent projecting over the left upper mediastinum. The cardiac silhouette is enlarged, similar to the prior study. There are post surgical changes from CABG. There is calcific aortic atherosclerosis. There is central pulmonary venous congestion. There are prominent interstitial markings bilaterally, suggestive of interstitial pulmonary edema. There are small bilateral pleural effusions with adjacent bibasilar airspace opacity, suggestive of atelectasis and/or layering pleural fluid with superimposed pneumonia not entirely excluded. There are fractured median sternotomy wires, unchanged. There is multilevel degenerative disc disease.  This report was finalized on 12/16/2020 11:40 AM by Dr. Regina Pradhan M.D.        I ordered the above noted radiological studies. Independently reviewed by me and discussed with radiologist.  See dictation above for official radiology interpretation.      PROCEDURES    Procedures        MEDICATIONS GIVEN IN ER    Medications   sodium chloride 0.9 % flush 10 mL (has no administration in time range)   albumin human 25 % IV SOLN 12.5 g (has no administration in time range)   aspirin EC tablet 81 mg (81 mg Oral Not Given 12/16/20 7194)   atorvastatin (LIPITOR) tablet 80 mg (80 mg Oral Not Given 12/16/20 9545)   b complex-vitamin  c-folic acid (NEPHRO-LARA) tablet 1 tablet (1 tablet Oral Not Given 12/16/20 2336)   docusate sodium (COLACE) capsule 200 mg (200 mg Oral Not Given 12/16/20 2335)   lactulose (CHRONULAC) 10 GM/15ML solution 10 g (has no administration in time range)   insulin glargine (LANTUS) injection 10 Units (has no administration in time range)   pantoprazole (PROTONIX) EC tablet 40 mg (40 mg Oral Given 12/17/20 0936)   sucralfate (CARAFATE) tablet 1 g (1 g Oral Not Given 12/16/20 2336)   dextrose (GLUTOSE) oral gel 15 g (has no administration in time range)   dextrose (D50W) 25 g/ 50mL Intravenous Solution 25 g (has no administration in time range)   glucagon (human recombinant) (GLUCAGEN DIAGNOSTIC) injection 1 mg (has no administration in time range)   acetaminophen (TYLENOL) tablet 650 mg (has no administration in time range)     Or   acetaminophen (TYLENOL) 160 MG/5ML solution 650 mg (has no administration in time range)     Or   acetaminophen (TYLENOL) suppository 650 mg (has no administration in time range)   morphine injection 1 mg (has no administration in time range)     And   naloxone (NARCAN) injection 0.4 mg (has no administration in time range)   ondansetron (ZOFRAN) tablet 4 mg (has no administration in time range)     Or   ondansetron (ZOFRAN) injection 4 mg (has no administration in time range)   calcium carbonate (TUMS) chewable tablet 500 mg (200 mg elemental) (has no administration in time range)   sodium chloride 0.9 % flush 10 mL (10 mL Intravenous Given 12/17/20 0914)   sodium chloride 0.9 % flush 1-10 mL (has no administration in time range)   insulin lispro (ADMELOG) injection 0-7 Units (0 Units Subcutaneous Not Given 12/17/20 0630)   ertapenem (INVanz) 500 mg in sodium chloride 0.9 % 50 mL IVPB (0 mg Intravenous Stopped 12/17/20 0153)   lidocaine-EPINEPHrine (XYLOCAINE W/EPI) 1 %-1:298845 injection 1 mL (has no administration in time range)         PROGRESS, DATA ANALYSIS, CONSULTS, AND MEDICAL  DECISION MAKING    All labs have been independently reviewed by me.  All radiology studies have been reviewed by me.   EKG's independently reviewed by me.  Discussion below represents my analysis of pertinent findings related to patient's condition, differential diagnosis, treatment plan and final disposition.    DIFFERENTIAL DIAGNOSIS INCLUDE BUT NOT LIMITED TO: Abscess, UTI, CVA, TIA, metabolic encephalopathy, respiratory failure, volume overloa    ED Course    Wed Dec 16, 2020   1100 Initial hypotensive blood pressure was rechecked and has not been redemonstrated.  Concern could be machine error.    [JS]   1229 proBNP(!): >70,000.0 [JS]   1330 I discussed patient with Dr. Bridges, nephrology.  He recommends no diuretics at this time and he will set the patient up for dialysis.  He agrees to consult.    [JS]   1400 Patient's blood pressure has been consistently in normal range.  No redemonstration of hypotension and could have been machine error.  Triage noted a history of right-sided weakness for 3 days however the patient is generally weak and no focal weakness is identified on exam.  CT of her head is normal.  I do not believe she has had a stroke. She appears volume overloaded and requires dialysis.  Supplemental NC oxygen is maintaining oxygen saturations. Plan for admission    [JS]   1407 I discussed patient with Dr. Margaret Hernandez, [facility] physician, who agrees admit the patient to the hospital.    [JS]      ED Course User Index  [JS] Bertha Lemus, EBEN       AS OF 10:00 EST VITALS:        BP - 93/60  HR - 80  TEMP - 98 °F (36.7 °C) (Oral)  02 SATS - 93%          DIAGNOSIS  Final diagnoses:   Metabolic encephalopathy   Hypervolemia, unspecified hypervolemia type   Dialysis patient (CMS/[facility])         DISPOSITION  Admission     Pt masked in first look. I wore a surgical mask and protective eye wear throughout my encounters with the pt. I performed hand hygiene on entry into the pt room and upon exit.      Dictated utilizing Dragon dictation:  Much of this encounter note is an electronic transcription/translation of spoken language to printed text. The electronic translation of spoken language may permit erroneous, or at times, nonsensical words or phrases to be inadvertently transcribed; Although I have reviewed the note for such errors, some may still exist.       Bertha Lemus, APRN  12/17/20 1002

## 2020-12-16 NOTE — ED PROVIDER NOTES
Brief history of present illness: 70-year-old female with significant chronic disease including diabetes, CAD, ESRD requiring dialysis, and new supplemental oxygen requirement returns to emergency department for altered mental status over the last 3 days.  Patient is a very poor historian at this time.    Physical exam:     ED Triage Vitals   Temp Heart Rate Resp BP SpO2   12/16/20 1034 12/16/20 1038 12/16/20 1038 12/16/20 1038 12/16/20 1038   96.4 °F (35.8 °C) 80 16 (!) 89/63 98 %      Temp src Heart Rate Source Patient Position BP Location FiO2 (%)   -- 12/16/20 1146 -- -- --    Monitor        Acute on chronically ill-appearing though not overtly toxic.  No respiratory distress appreciated.  Patient is able to identify that she is in emergency department but not which nor the date.  Opens eyes only with significant tactile stimuli.  Mild scleral icterus with no pallor.  Neck is supple.  No respiratory distress or tachypnea.  Pink warm well perfused.  Abdomen is large without focal tenderness.  No lower extremity edema.    MDM:  Ct Head Without Contrast    Result Date: 12/16/2020  Narrative: CT HEAD WITHOUT CONTRAST  HISTORY: Contusion to forehead on the left, headache.  COMPARISON: CT head 06/27/2020.  FINDINGS: The brain ventricles are symmetrical. There is no evidence of hemorrhage, hydrocephalus or of abnormal extra-axial fluid. No focal area of decreased attenuation to suggest acute infarction is identified. Moderate vascular calcification involving the carotid siphons are noted bilaterally.      Impression: No evidence of fracture, intracranial hemorrhage or of acute infarction. Further evaluation could be performed with an MRI examination of the brain as indicated.    Radiation dose reduction techniques were utilized, including automated exposure control and exposure modulation based on body size.       Xr Chest 1 View    Result Date: 12/16/2020  Narrative: XR CHEST 1 VW-  HISTORY:  Weakness. Strokelike  symptoms.  COMPARISON:  Chest radiograph 11/20/2020.  FINDINGS:  A single portable view of the chest was obtained. There is a right IJ central line, unchanged. There is a vascular stent projecting over the left upper mediastinum. The cardiac silhouette is enlarged, similar to the prior study. There are post surgical changes from CABG. There is calcific aortic atherosclerosis. There is central pulmonary venous congestion. There are prominent interstitial markings bilaterally, suggestive of interstitial pulmonary edema. There are small bilateral pleural effusions with adjacent bibasilar airspace opacity, suggestive of atelectasis and/or layering pleural fluid with superimposed pneumonia not entirely excluded. There are fractured median sternotomy wires, unchanged. There is multilevel degenerative disc disease.  This report was finalized on 12/16/2020 11:40 AM by Dr. Regina Pradhan M.D.      Xr Chest 1 View    Result Date: 11/20/2020  Narrative: XR CHEST 1 VW-  HISTORY: Female who is 70 years-old,  short of breath  TECHNIQUE: Frontal view of the chest  COMPARISON: 09/23/2020  FINDINGS: The heart is enlarged. Right-sided dual-lumen central venous catheter extends to the proximal right atrium. Sternotomy wires noted. Aorta is calcified. Mild prominence of vascular and interstitial markings. There appear to be minimal to mild left more than right pleural effusions, small likely atelectasis or infiltrate at the left lung base. No pneumothorax. No acute osseous process.      Impression: Small left basilar atelectasis or infiltrate, minimal to mild pleural effusions, follow-up suggested. Cardiomegaly with pulmonary vascular congestion.    This report was finalized on 11/20/2020 5:28 PM by Dr. Aftab Esparza M.D.      Results for orders placed or performed during the hospital encounter of 12/16/20   Respiratory Panel PCR w/COVID-19(SARS-CoV-2) TG/NERISSA/TAVO/PAD/COR/[facility]AD/[facility] In-House, NP Swab in UTM/VTM, 3-4 HR TAT - Swab,  Nasopharynx    Specimen: Nasopharynx; Swab   Result Value Ref Range    ADENOVIRUS, PCR Not Detected Not Detected    Coronavirus 229E Not Detected Not Detected    Coronavirus HKU1 Not Detected Not Detected    Coronavirus NL63 Not Detected Not Detected    Coronavirus OC43 Not Detected Not Detected    COVID19 Not Detected Not Detected - Ref. Range    Human Metapneumovirus Not Detected Not Detected    Human Rhinovirus/Enterovirus Not Detected Not Detected    Influenza A PCR Not Detected Not Detected    Influenza B PCR Not Detected Not Detected    Parainfluenza Virus 1 Not Detected Not Detected    Parainfluenza Virus 2 Not Detected Not Detected    Parainfluenza Virus 3 Not Detected Not Detected    Parainfluenza Virus 4 Not Detected Not Detected    RSV, PCR Not Detected Not Detected    Bordetella pertussis pcr Not Detected Not Detected    Bordetella parapertussis PCR Not Detected Not Detected    Chlamydophila pneumoniae PCR Not Detected Not Detected    Mycoplasma pneumo by PCR Not Detected Not Detected   Comprehensive Metabolic Panel    Specimen: Blood   Result Value Ref Range    Glucose 102 (H) 65 - 99 mg/dL    BUN 17 8 - 23 mg/dL    Creatinine 3.65 (H) 0.57 - 1.00 mg/dL    Sodium 136 136 - 145 mmol/L    Potassium 3.9 3.5 - 5.2 mmol/L    Chloride 103 98 - 107 mmol/L    CO2 23.2 22.0 - 29.0 mmol/L    Calcium 9.0 8.6 - 10.5 mg/dL    Total Protein 6.4 6.0 - 8.5 g/dL    Albumin 3.00 (L) 3.50 - 5.20 g/dL    ALT (SGPT) 82 (H) 1 - 33 U/L    AST (SGOT) 111 (H) 1 - 32 U/L    Alkaline Phosphatase 90 39 - 117 U/L    Total Bilirubin 0.5 0.0 - 1.2 mg/dL    eGFR Non African Amer 12 (L) >60 mL/min/1.73    eGFR  African Amer      Globulin 3.4 gm/dL    A/G Ratio 0.9 g/dL    BUN/Creatinine Ratio 4.7 (L) 7.0 - 25.0    Anion Gap 9.8 5.0 - 15.0 mmol/L   Lactic Acid, Plasma    Specimen: Blood   Result Value Ref Range    Lactate 0.9 0.5 - 2.0 mmol/L   Procalcitonin    Specimen: Blood   Result Value Ref Range    Procalcitonin 0.13 0.00 - 0.25  ng/mL   CBC Auto Differential    Specimen: Blood   Result Value Ref Range    WBC 9.13 3.40 - 10.80 10*3/mm3    RBC 3.34 (L) 3.77 - 5.28 10*6/mm3    Hemoglobin 9.8 (L) 12.0 - 15.9 g/dL    Hematocrit 30.8 (L) 34.0 - 46.6 %    MCV 92.2 79.0 - 97.0 fL    MCH 29.3 26.6 - 33.0 pg    MCHC 31.8 31.5 - 35.7 g/dL    RDW 16.0 (H) 12.3 - 15.4 %    RDW-SD 53.0 37.0 - 54.0 fl    MPV 9.2 6.0 - 12.0 fL    Platelets 246 140 - 450 10*3/mm3    Neutrophil % 74.3 42.7 - 76.0 %    Lymphocyte % 11.9 (L) 19.6 - 45.3 %    Monocyte % 8.9 5.0 - 12.0 %    Eosinophil % 3.5 0.3 - 6.2 %    Basophil % 1.1 0.0 - 1.5 %    Immature Grans % 0.3 0.0 - 0.5 %    Neutrophils, Absolute 6.78 1.70 - 7.00 10*3/mm3    Lymphocytes, Absolute 1.09 0.70 - 3.10 10*3/mm3    Monocytes, Absolute 0.81 0.10 - 0.90 10*3/mm3    Eosinophils, Absolute 0.32 0.00 - 0.40 10*3/mm3    Basophils, Absolute 0.10 0.00 - 0.20 10*3/mm3    Immature Grans, Absolute 0.03 0.00 - 0.05 10*3/mm3    nRBC 0.0 0.0 - 0.2 /100 WBC   BNP    Specimen: Blood   Result Value Ref Range    proBNP >70,000.0 (H) 0.0 - 900.0 pg/mL   Troponin    Specimen: Blood   Result Value Ref Range    Troponin T 0.105 (C) 0.000 - 0.030 ng/mL   Blood Gas, Arterial -    Specimen: Arterial Blood   Result Value Ref Range    Site Arterial: right radial     Nomi's Test N/A     pH, Arterial 7.253 (C) 7.350 - 7.450 pH units    pCO2, Arterial 53.8 (H) 35.0 - 45.0 mm Hg    pO2, Arterial 161.0 (H) 80.0 - 100.0 mm Hg    HCO3, Arterial 23.7 22.0 - 28.0 mmol/L    Base Excess, Arterial -3.7 (L) 0.0 - 2.0 mmol/L    O2 Saturation Calculated 99.1 (H) 92.0 - 99.0 %    Barometric Pressure for Blood Gas 752.2 mmHg    Modality Cannula     Flow Rate 2 lpm    Rate 12 Breaths/minute   ECG 12 Lead   Result Value Ref Range    QT Interval 390 ms   Light Blue Top   Result Value Ref Range    Extra Tube hold for add-on    Green Top (Gel)   Result Value Ref Range    Extra Tube Hold for add-ons.    Lavender Top   Result Value Ref Range    Extra Tube  hold for add-on    Gold Top - SST   Result Value Ref Range    Extra Tube Hold for add-ons.      Plan admission for this acute on chronically ill patient with acute metabolic encephalopathy with associated acute metabolic acidosis.    I have seen and personally evaluated this patient, discussed the case with the treating advanced practice provider, and reviewed their note. I was involved in the medical decision making during the evaluation, testing and disposition planning for this patient.     Sameer Lemus MD  12/16/20 8311

## 2020-12-16 NOTE — CONSULTS
Referring Provider: EBEN Rodney  Reason for Consultation: ESRD    Subjective     Chief complaint   Chief Complaint   Patient presents with   • Weakness - Generalized       History of present illness:  71 yo WF with ESRD on HD MWF at [facility], admitted today for further eval of lethargy and confusion.  Found to have respiratory acidosis with pH 7.25, PCO2 54, and PO2 161; negative head CT, but chest x-ray revealed pulmonary vascular congestion.  Full PMH outlined below; pertinent is recent hospitalization just last month for volume overload.  ROS not possible as patient is minimally verbal, confused, and oriented only to person.  She is receiving HD presently.  Covid-19 testing is negative.    Past Medical History:   Diagnosis Date   • Blind    • CAD (coronary artery disease)    • Chronic kidney disease    • Chronic systolic congestive heart failure (CMS/HCC)    • Diabetes mellitus (CMS/HCC)    • Dialysis patient (CMS/HCC)     MON, WED, FRI   • Dyslipidemia    • Foot ulcer (CMS/HCC)    • GERD (gastroesophageal reflux disease)    • History of diverticulitis    • History of stomach ulcers    • History of transfusion    • Hyperlipemia    • Hypertension    • Mesenteric ischemia (CMS/HCC)    • NSTEMI (non-ST elevated myocardial infarction) (CMS/HCC)    • Peptic ulcer disease    • PONV (postoperative nausea and vomiting)    • PVD (peripheral vascular disease) (CMS/HCC)    • UTI (urinary tract infection)      Past Surgical History:   Procedure Laterality Date   • APPENDECTOMY  2015   • ARTERIOVENOUS FISTULA/SHUNT SURGERY Left 7/23/2020    Procedure: LEFT ARM BRACHIAL ARTERY AXILLARY VEIN GRAFT;  Surgeon: Loida Ugalde Jr., MD;  Location: [facility] OR;  Service: Vascular;  Laterality: Left;   • CARDIAC CATHETERIZATION N/A 1/2/2020    Procedure: LEFT HEART CATH  NO LV;  Surgeon: Emilio Serna MD;  Location: [facility] CATH INVASIVE LOCATION;  Service: Cardiology   • CARDIAC CATHETERIZATION N/A 1/2/2020     Procedure: CORONARY ANGIOGRAPHY;  Surgeon: Emilio Serna MD;  Location: [facility] CATH INVASIVE LOCATION;  Service: Cardiology   • CARDIAC CATHETERIZATION N/A 1/2/2020    Procedure: SAPHENOUS VEIN GRAFT;  Surgeon: Emilio Serna MD;  Location: [facility] CATH INVASIVE LOCATION;  Service: Cardiology   • CATARACT EXTRACTION     • CHOLECYSTECTOMY     • COLONOSCOPY  2015   • CORONARY ARTERY BYPASS GRAFT      7 coronary bypass surgery   • ENDOSCOPY N/A 11/23/2016    Procedure: ESOPHAGOGASTRODUODENOSCOPY ;  Surgeon: Katherine Green MD;  Location: [facility] ENDOSCOPY;  Service:    • ENDOSCOPY N/A 12/29/2019    Procedure: ESOPHAGOGASTRODUODENOSCOPYwith bx;  Surgeon: Ziyad Manzo MD;  Location: [facility] ENDOSCOPY;  Service: Gastroenterology   • ERCP N/A 10/2/2017    Procedure: ENDOSCOPIC RETROGRADE CHOLANGIOPANCREATOGRAPHY with sphincterotomy, balloon sweep;  Surgeon: Christopher Graf MD;  Location: [facility] ENDOSCOPY;  Service:    • HERNIA REPAIR  2015   • HYSTERECTOMY     • INSERTION HEMODIALYSIS CATHETER Right 6/10/2020    Procedure: HEMODIALYSIS CATHETER INSERTION;  Surgeon: Stevan Graves MD;  Location: [facility] MAIN OR;  Service: Vascular;  Laterality: Right;   • INSERTION HEMODIALYSIS CATHETER N/A 9/23/2020    Procedure: HEMODIALYSIS CATHETER INSERTION;  Surgeon: Zay Gilliam MD;  Location: [facility] MAIN OR;  Service: Vascular;  Laterality: N/A;   • TX VEIN IN SITU BYPASS GRAFT,FEM-POP Left 5/31/2017    Procedure: ULTRASOUND RIGHT AIF, ARTERIOGRAM W/ LEFT COMMON ILIAC ANGIOPLASTY STENT, LT FEMORAL POPLITEAL BYPASS WITH ARTEGRAFT, LEFT POPLITEAL ENDARTERECTOMY PATCH, LEFT COMMON ARTERIOGRAM, LEFT EXTERNAL ILIAC ANGIOPLASTY STENT PLACEMENT, AORTA,  EXTERNAL, DISTAL PRESSURES;  Surgeon: Jayda Barrientos MD;  Location: [facility] HYBRID OR 18/19;  Service: Vascular   • SKIN GRAFT SPLIT THICKNESS Left 6/7/2017    Procedure: DEBRIDEMENT AND SKIN GRAFT TO  LEFT DORSAL FOOT AND TOES;  Surgeon: Maurine Waterhouse,  "MD;  Location: [facility] OR;  Service:    • UPPER GASTROINTESTINAL ENDOSCOPY      scar in gastric body, gastric ulcers     Family History   Problem Relation Age of Onset   • Heart disease Mother         cardiac disorder   • Cancer Maternal Grandmother    • Stroke Maternal Grandfather    • Hypertension Maternal Grandfather    • Malig Hyperthermia Neg Hx      Social History     Tobacco Use   • Smoking status: Former Smoker     Packs/day: 3.00     Years: 20.00     Pack years: 60.00     Types: Cigarettes     Start date: 1969     Quit date: 2000     Years since quittin.9   • Smokeless tobacco: Never Used   • Tobacco comment: caffeine use   Substance Use Topics   • Alcohol use: No     Comment: caffeine use    • Drug use: No     (Not in a hospital admission)    Allergies:  Augmentin [amoxicillin-pot clavulanate] and Codeine    Review of Systems  Not reliably obtainable    Objective     Vital Signs  Temp:  [96.4 °F (35.8 °C)-97.8 °F (36.6 °C)] 97.8 °F (36.6 °C)  Heart Rate:  [72-95] 95  Resp:  [12-19] 18  BP: ()/(63-81) 140/81    Flowsheet Rows      First Filed Value   Admission Height  152.4 cm (60\") Documented at 2020 1053   Admission Weight  66.7 kg (147 lb) Documented at 2020 1053           No intake/output data recorded.  No intake/output data recorded.  No intake or output data in the 24 hours ending 20 1704    Physical Exam:  NAD but lethargic and not oriented; pleasant; looks older than stated age  Dry MM; temporal wasting; no eye discharge; no scleral icterus  No JVD; bilateral carotid bruits  Coarse breath sounds bilat; crackles in flanks; not labored  RR, tachycardic, + ectopy, no rub  Left arm AV graft patent  Right chest TDC  Soft, no grimacing when abdomen palpated, ND, BS+  Trace-+1 edema  No clubbing  Cannot assess for asterixis  Moves all extremities     Results Review:  Results from last 7 days   Lab Units 20  1111   SODIUM mmol/L 136   POTASSIUM mmol/L 3.9 "   CHLORIDE mmol/L 103   CO2 mmol/L 23.2   BUN mg/dL 17   CREATININE mg/dL 3.65*   CALCIUM mg/dL 9.0   BILIRUBIN mg/dL 0.5   ALK PHOS U/L 90   ALT (SGPT) U/L 82*   AST (SGOT) U/L 111*   GLUCOSE mg/dL 102*       Estimated Creatinine Clearance: 12.1 mL/min (A) (by C-G formula based on SCr of 3.65 mg/dL (H)).          Results from last 7 days   Lab Units 12/16/20  1111   WBC 10*3/mm3 9.13   HEMOGLOBIN g/dL 9.8*   PLATELETS 10*3/mm3 246             Active Medications          Assessment/Plan   Assessment  1.  ESRD: Central and peripheral volume excess.  Normal potassium and anion gap, though respiratory acidosis by ABG.  Left arm AV graft functioning well; has a right chest TDC that is no longer needed  2.  Metabolic encephalopathy  3.  Anemia of ESRD  4.  Chronic CHF  5.  Hepatitis      Metabolic encephalopathy      Plan  1.  HD underway now, with attempt at 2-3 L removal; will continue MWF schedule  2.  Might require BiPAP if CO2 retention worsens  3.  Will ask Vascular Surgery to remove her right chest tunneled dialysis catheter in the next day or so.  Her left arm AV graft is working well, and so the TDC is no longer needed  4.  F/u blood cultures    I discussed the patient's findings and my recommendations with the patient    Mj Bridges MD  12/16/20  17:04 EST

## 2020-12-16 NOTE — H&P
PCP: Jose Morrison DO    Chief complaint   Chief Complaint   Patient presents with   • Weakness - Generalized       HPI  Patient is a 70 y.o. female presents with a history of combined CHF, ESRD on dialysis, diabetes who presents to the ER from nursing home due to altered mental status for 3 days and patient reported shortness of breath for a few days.  History is obtained from the ER staff due to the patient's altered mental status and poor historian.    Patient was admitted a couple weeks ago (-) for volume overload/ SOA due to acute combined systolic and diastolic congestive heart failure and ischemic cardiomyopathy/ ESRD.  Patient has LVH with repol and Cardiology was consulted and the patient cannot tolerate ACE inhibitors or beta-blockers because of low blood pressure. Midodrine started. Patient also had brief Afib and received amio once AND NSVT but again couldn't tolerate BB. She had some mental status changes and visual hallucinations so psych saw the patient and felt like it was a toxic encephalopathy.  Son reported to psychiatrist that the patient did not have any previous psych problems except for some depression after her son  2 years ago.  patient is on gabapentin at night, Cymbalta, Phenergan, melatonin, tramadol.  Patient was placed on 2 L oxygen.     Report that I obtained was that the patient has had increasing sleepiness over the past 3 days, generalized weakness.  And that she does dialysis  and is due for dialysis today.  They sent her here due to the continued sleepiness.  Patient reported to staff that she had been short of breath and for a few days but no cough or fever.  During interview patient would awake to conversation but quickly dozes back off.  She was confused and would repeat the same place that I asked even though it was incorrect.    Patient has had urinary tract infections off and on over the last few years.  Normally not nitrite  positive, patient has had E. coli multiple times sometimes being an ESBL.  She did have bacteremia due to E. coli approximately 6 months ago.  Per the MAR patient was on Cipro but I cannot tell when.  And then she had Invanz x3 days starting 4 days ago.  Patient's urinalysis this time showed nitrite positive.  ABG showed an increased CO2 as well as oxygenation-but when oxygen was removed patient became hypoxic into the eighties.    PAST MEDICAL HISTORY  Past Medical History:   Diagnosis Date   • Blind    • CAD (coronary artery disease)    • Chronic kidney disease    • Chronic systolic congestive heart failure (CMS/HCC)    • Diabetes mellitus (CMS/HCC)    • Dialysis patient (CMS/[facility])     MON, WED, FRI   • Dyslipidemia    • Foot ulcer (CMS/HCC)    • GERD (gastroesophageal reflux disease)    • History of diverticulitis    • History of stomach ulcers    • History of transfusion    • Hyperlipemia    • Hypertension    • Mesenteric ischemia (CMS/[facility])    • NSTEMI (non-ST elevated myocardial infarction) (CMS/[facility])    • Peptic ulcer disease    • PONV (postoperative nausea and vomiting)    • PVD (peripheral vascular disease) (CMS/[facility])    • UTI (urinary tract infection)        PAST SURGICAL HISTORY  Past Surgical History:   Procedure Laterality Date   • APPENDECTOMY  2015   • ARTERIOVENOUS FISTULA/SHUNT SURGERY Left 7/23/2020    Procedure: LEFT ARM BRACHIAL ARTERY AXILLARY VEIN GRAFT;  Surgeon: Loida Uglade Jr., MD;  Location: [facility];  Service: Vascular;  Laterality: Left;   • CARDIAC CATHETERIZATION N/A 1/2/2020    Procedure: LEFT HEART CATH  NO LV;  Surgeon: Emilio Serna MD;  Location: [facility] CATH INVASIVE LOCATION;  Service: Cardiology   • CARDIAC CATHETERIZATION N/A 1/2/2020    Procedure: CORONARY ANGIOGRAPHY;  Surgeon: Emilio Serna MD;  Location: [facility] CATH INVASIVE LOCATION;  Service: Cardiology   • CARDIAC CATHETERIZATION N/A 1/2/2020    Procedure: SAPHENOUS VEIN GRAFT;  Surgeon: Emilio Serna  MD;  Location: [facility] CATH INVASIVE LOCATION;  Service: Cardiology   • CATARACT EXTRACTION     • CHOLECYSTECTOMY     • COLONOSCOPY  2015   • CORONARY ARTERY BYPASS GRAFT      7 coronary bypass surgery   • ENDOSCOPY N/A 11/23/2016    Procedure: ESOPHAGOGASTRODUODENOSCOPY ;  Surgeon: Katherine Green MD;  Location: [facility] ENDOSCOPY;  Service:    • ENDOSCOPY N/A 12/29/2019    Procedure: ESOPHAGOGASTRODUODENOSCOPYwith bx;  Surgeon: Ziyad Manzo MD;  Location: [facility] ENDOSCOPY;  Service: Gastroenterology   • ERCP N/A 10/2/2017    Procedure: ENDOSCOPIC RETROGRADE CHOLANGIOPANCREATOGRAPHY with sphincterotomy, balloon sweep;  Surgeon: Christopher Graf MD;  Location: [facility] ENDOSCOPY;  Service:    • HERNIA REPAIR  2015   • HYSTERECTOMY     • INSERTION HEMODIALYSIS CATHETER Right 6/10/2020    Procedure: HEMODIALYSIS CATHETER INSERTION;  Surgeon: Stevan Graves MD;  Location: [facility] OR;  Service: Vascular;  Laterality: Right;   • INSERTION HEMODIALYSIS CATHETER N/A 9/23/2020    Procedure: HEMODIALYSIS CATHETER INSERTION;  Surgeon: Zay Gilliam MD;  Location: [facility] OR;  Service: Vascular;  Laterality: N/A;   • MO VEIN IN SITU BYPASS GRAFT,FEM-POP Left 5/31/2017    Procedure: ULTRASOUND RIGHT AIF, ARTERIOGRAM W/ LEFT COMMON ILIAC ANGIOPLASTY STENT, LT FEMORAL POPLITEAL BYPASS WITH ARTEGRAFT, LEFT POPLITEAL ENDARTERECTOMY PATCH, LEFT COMMON ARTERIOGRAM, LEFT EXTERNAL ILIAC ANGIOPLASTY STENT PLACEMENT, AORTA,  EXTERNAL, DISTAL PRESSURES;  Surgeon: Jayda Barrientos MD;  Location: [facility] OR 18/19;  Service: Vascular   • SKIN GRAFT SPLIT THICKNESS Left 6/7/2017    Procedure: DEBRIDEMENT AND SKIN GRAFT TO  LEFT DORSAL FOOT AND TOES;  Surgeon: Maurine Waterhouse, MD;  Location: [facility] OR;  Service:    • UPPER GASTROINTESTINAL ENDOSCOPY  2015    scar in gastric body, gastric ulcers       FAMILY HISTORY  Family History   Problem Relation Age of Onset   • Heart disease Mother          "cardiac disorder   • Cancer Maternal Grandmother    • Stroke Maternal Grandfather    • Hypertension Maternal Grandfather    • Malig Hyperthermia Neg Hx        SOCIAL HISTORY  Social History     Tobacco Use   • Smoking status: Former Smoker     Packs/day: 3.00     Years: 20.00     Pack years: 60.00     Types: Cigarettes     Start date: 1969     Quit date: 2000     Years since quittin.9   • Smokeless tobacco: Never Used   • Tobacco comment: caffeine use   Substance Use Topics   • Alcohol use: No     Comment: caffeine use    • Drug use: No       MEDICATIONS:  (Not in a hospital admission)      Allergies:  Augmentin [amoxicillin-pot clavulanate] and Codeine    Review of Systems:  Unable to obtain due to mental status changes.        Vital Signs  Temp:  [96.4 °F (35.8 °C)] 96.4 °F (35.8 °C)  Heart Rate:  [72-85] 85  Resp:  [12-19] 12  BP: ()/(63-80) 114/80  Flowsheet Rows      First Filed Value   Admission Height  152.4 cm (60\") Documented at 2020 1053   Admission Weight  66.7 kg (147 lb) Documented at 2020 1053         Body mass index is 28.71 kg/m².    Physical Exam:  General Appearance:    Blind, awakens, somewhat cooperative, in no acute distress, lethargic   Head:    Normocephalic, without obvious abnormality, atraumatic   Eyes:         conjunctivae and sclerae normal, no icterus, PERRLA,    ENT:    Ears grossly intact, oral mucosa moist, edentulous,   Neck:   No adenopathy, supple, trachea midline,    Back:     Normal to inspection, range of motion normal   Lungs:     Clear to auscultation but diminished,respirations regular, even and                   unlabored    Heart:    Regular rhythm and normal rate,  no murmur, normal S1 and S2, CABG scar   Abdomen:     Normal bowel sounds, no masses,  soft non-tender, non-distended,    Extremities:   Moves all extremities well, no cyanosis, no edema,             Pulses:   Pulses palpable and equal bilaterally   Skin:   No bleeding, rash, + " tattoos, + bruising left upper extremity, blood blisters over right forearm, CABG scar, surgical scar medial left thigh, right lower extremity surgical scar from vein removal   Neurologic:      Psych:   Cranial nerves 2 - 12 grossly intact, sensation grossly intact,     Moves all extremities well, equal bilateral strength 2/5-patient went wiggle her toes and raise her arms up    Awakens and Oriented x 1, flat affect, confused, poor insight     I used full protective equipment while examining this patient.  This includes face mask /protective eyewear and protective gown when appropriate.  I washed my hands before entering the room and immediately upon leaving the room.    LABS:  Admission on 12/16/2020   Component Date Value Ref Range Status   • Glucose 12/16/2020 102* 65 - 99 mg/dL Final   • BUN 12/16/2020 17  8 - 23 mg/dL Final   • Creatinine 12/16/2020 3.65* 0.57 - 1.00 mg/dL Final   • Sodium 12/16/2020 136  136 - 145 mmol/L Final   • Potassium 12/16/2020 3.9  3.5 - 5.2 mmol/L Final   • Chloride 12/16/2020 103  98 - 107 mmol/L Final   • CO2 12/16/2020 23.2  22.0 - 29.0 mmol/L Final   • Calcium 12/16/2020 9.0  8.6 - 10.5 mg/dL Final   • Total Protein 12/16/2020 6.4  6.0 - 8.5 g/dL Final   • Albumin 12/16/2020 3.00* 3.50 - 5.20 g/dL Final   • ALT (SGPT) 12/16/2020 82* 1 - 33 U/L Final   • AST (SGOT) 12/16/2020 111* 1 - 32 U/L Final   • Alkaline Phosphatase 12/16/2020 90  39 - 117 U/L Final   • Total Bilirubin 12/16/2020 0.5  0.0 - 1.2 mg/dL Final   • eGFR Non African Amer 12/16/2020 12* >60 mL/min/1.73 Final    <15 Indicative of kidney failure.   • eGFR   Amer 12/16/2020    Final    <15 Indicative of kidney failure.   • Globulin 12/16/2020 3.4  gm/dL Final   • A/G Ratio 12/16/2020 0.9  g/dL Final   • BUN/Creatinine Ratio 12/16/2020 4.7* 7.0 - 25.0 Final   • Anion Gap 12/16/2020 9.8  5.0 - 15.0 mmol/L Final   • Lactate 12/16/2020 0.9  0.5 - 2.0 mmol/L Final   • Procalcitonin 12/16/2020 0.13  0.00 - 0.25 ng/mL  Final   • QT Interval 12/16/2020 390  ms Final   • WBC 12/16/2020 9.13  3.40 - 10.80 10*3/mm3 Final   • RBC 12/16/2020 3.34* 3.77 - 5.28 10*6/mm3 Final   • Hemoglobin 12/16/2020 9.8* 12.0 - 15.9 g/dL Final   • Hematocrit 12/16/2020 30.8* 34.0 - 46.6 % Final   • MCV 12/16/2020 92.2  79.0 - 97.0 fL Final   • MCH 12/16/2020 29.3  26.6 - 33.0 pg Final   • MCHC 12/16/2020 31.8  31.5 - 35.7 g/dL Final   • RDW 12/16/2020 16.0* 12.3 - 15.4 % Final   • RDW-SD 12/16/2020 53.0  37.0 - 54.0 fl Final   • MPV 12/16/2020 9.2  6.0 - 12.0 fL Final   • Platelets 12/16/2020 246  140 - 450 10*3/mm3 Final   • Neutrophil % 12/16/2020 74.3  42.7 - 76.0 % Final   • Lymphocyte % 12/16/2020 11.9* 19.6 - 45.3 % Final   • Monocyte % 12/16/2020 8.9  5.0 - 12.0 % Final   • Eosinophil % 12/16/2020 3.5  0.3 - 6.2 % Final   • Basophil % 12/16/2020 1.1  0.0 - 1.5 % Final   • Immature Grans % 12/16/2020 0.3  0.0 - 0.5 % Final   • Neutrophils, Absolute 12/16/2020 6.78  1.70 - 7.00 10*3/mm3 Final   • Lymphocytes, Absolute 12/16/2020 1.09  0.70 - 3.10 10*3/mm3 Final   • Monocytes, Absolute 12/16/2020 0.81  0.10 - 0.90 10*3/mm3 Final   • Eosinophils, Absolute 12/16/2020 0.32  0.00 - 0.40 10*3/mm3 Final   • Basophils, Absolute 12/16/2020 0.10  0.00 - 0.20 10*3/mm3 Final   • Immature Grans, Absolute 12/16/2020 0.03  0.00 - 0.05 10*3/mm3 Final   • nRBC 12/16/2020 0.0  0.0 - 0.2 /100 WBC Final   • Extra Tube 12/16/2020 hold for add-on   Final    Auto resulted   • Extra Tube 12/16/2020 Hold for add-ons.   Final    Auto resulted.   • Extra Tube 12/16/2020 hold for add-on   Final    Auto resulted   • Extra Tube 12/16/2020 Hold for add-ons.   Final    Auto resulted.   • ADENOVIRUS, PCR 12/16/2020 Not Detected  Not Detected Final   • Coronavirus 229E 12/16/2020 Not Detected  Not Detected Final   • Coronavirus HKU1 12/16/2020 Not Detected  Not Detected Final   • Coronavirus NL63 12/16/2020 Not Detected  Not Detected Final   • Coronavirus OC43 12/16/2020 Not  Detected  Not Detected Final   • COVID19 12/16/2020 Not Detected  Not Detected - Ref. Range Final   • Human Metapneumovirus 12/16/2020 Not Detected  Not Detected Final   • Human Rhinovirus/Enterovirus 12/16/2020 Not Detected  Not Detected Final   • Influenza A PCR 12/16/2020 Not Detected  Not Detected Final   • Influenza B PCR 12/16/2020 Not Detected  Not Detected Final   • Parainfluenza Virus 1 12/16/2020 Not Detected  Not Detected Final   • Parainfluenza Virus 2 12/16/2020 Not Detected  Not Detected Final   • Parainfluenza Virus 3 12/16/2020 Not Detected  Not Detected Final   • Parainfluenza Virus 4 12/16/2020 Not Detected  Not Detected Final   • RSV, PCR 12/16/2020 Not Detected  Not Detected Final   • Bordetella pertussis pcr 12/16/2020 Not Detected  Not Detected Final   • Bordetella parapertussis PCR 12/16/2020 Not Detected  Not Detected Final   • Chlamydophila pneumoniae PCR 12/16/2020 Not Detected  Not Detected Final   • Mycoplasma pneumo by PCR 12/16/2020 Not Detected  Not Detected Final   • proBNP 12/16/2020 >70,000.0* 0.0 - 900.0 pg/mL Final   • Troponin T 12/16/2020 0.105* 0.000 - 0.030 ng/mL Final   • Site 12/16/2020 Arterial: right radial   Final   • Nomi's Test 12/16/2020 N/A   Final   • pH, Arterial 12/16/2020 7.253* 7.350 - 7.450 pH units Final    Critical:Notify ARMIDA PATTERSON (16-Dec-20 12:04:33)Read back ok   • pCO2, Arterial 12/16/2020 53.8* 35.0 - 45.0 mm Hg Final   • pO2, Arterial 12/16/2020 161.0* 80.0 - 100.0 mm Hg Final   • HCO3, Arterial 12/16/2020 23.7  22.0 - 28.0 mmol/L Final   • Base Excess, Arterial 12/16/2020 -3.7* 0.0 - 2.0 mmol/L Final   • O2 Saturation Calculated 12/16/2020 99.1* 92.0 - 99.0 % Final   • Barometric Pressure for Blood Gas 12/16/2020 752.2  mmHg Final   • Modality 12/16/2020 Cannula   Final   • Flow Rate 12/16/2020 2  lpm Final   • Rate 12/16/2020 12  Breaths/minute Final   • Ammonia 12/16/2020 16  11 - 51 umol/L Final   • Color, UA 12/16/2020 Dark Yellow* Yellow,  Straw Final   • Appearance, UA 12/16/2020 Turbid* Clear Final   • pH, UA 12/16/2020 6.0  5.0 - 8.0 Final   • Specific Gravity, UA 12/16/2020 1.025  1.005 - 1.030 Final   • Glucose, UA 12/16/2020 Negative  Negative Final   • Ketones, UA 12/16/2020 Trace* Negative Final   • Bilirubin, UA 12/16/2020 Negative  Negative Final   • Blood, UA 12/16/2020 Moderate (2+)* Negative Final   • Protein, UA 12/16/2020 >=300 mg/dL (3+)* Negative Final   • Leuk Esterase, UA 12/16/2020 Moderate (2+)* Negative Final   • Nitrite, UA 12/16/2020 Positive* Negative Final   • Urobilinogen, UA 12/16/2020 0.2 E.U./dL  0.2 - 1.0 E.U./dL Final   • RBC, UA 12/16/2020 Too Numerous to Count* None Seen, 0-2 /HPF Final   • WBC, UA 12/16/2020 Too Numerous to Count* None Seen, 0-2 /HPF Final   • Bacteria, UA 12/16/2020 2+* None Seen /HPF Final   • Squamous Epithelial Cells, UA 12/16/2020 None Seen  None Seen, 0-2 /HPF Final   • Hyaline Casts, UA 12/16/2020 None Seen  None Seen /LPF Final   • Methodology 12/16/2020 Manual Light Microscopy   Final       DIAGNOSTICS:  Ct Head Without Contrast    Result Date: 12/16/2020  No evidence of fracture, intracranial hemorrhage or of acute infarction. Further evaluation could be performed with an MRI examination of the brain as indicated.    Radiation dose reduction techniques were utilized, including automated exposure control and exposure modulation based on body size.              Results Review:   I reviewed the patient's new clinical results.  Discussed with ER physician  I personally viewed and interpreted the patient's EKG/Telemetry data normal sinus rhythm with LVH and repol  Old records reviewed  See above    ASSESSMENT AND PLAN        ·  AMS/lethargy-likely multifactorial  -Patient has nitrite positive urine and has had a history of E. coli both ESBL and regular and as well as bacteremia and supposedly been on recent Invanz therefore will continue, also CO2 on ABG is elevated and no history of ABGs in the  past do not know what her baseline is.  We will need to hold any sedating medications, and patient is currently undergoing dialysis for volume removal.  Patient had low blood sugars in the past but currently okay.  Her dialysis notes patient was to get tunnel catheter removal and that can be initiated.  There is likely a component of medications recirculating due to being a dialysis patient.  Therefore will hold her gabapentin (which may need to have a reduced dose), Cymbalta (which may not be tolerated in dialysis patient and will have pharmacy to evaluate on whether it needs to be titrated off), holding Phenergan, melatonin, and tramadol which she has scheduled daily.-Elevated troponins could be due to ESRD but given history will recheck and consult cardiology.  -We will get PT and OT to evaluate and treat    ·  UTI (urinary tract infection)  - Patient has nitrite positive urine and has had a history of E. coli both ESBL and regular and as well as bacteremia   - been on recent Invanz therefore will continue,  -Blood cultures pending and will order a blood culture to be drawn through the tunnel cath.  -Consult ID    ·  elev trop/  Acute on chronic combined systolic and diastolic CHF /  Ischemic cardiomyopathy  --Cardiology was consulted on last visit and the patient cannot tolerate ACE inhibitors or beta-blockers because of low blood pressure. Midodrine started   -Monitor troponins continue medical management  - h/o sinus rhythm with LVH and repol    ·   ESRD on hemodialysis   -Patient is on dialysis Monday Wednesday Friday and renal has already been consulted.  Patient is currently being dialyzed    ·    Type 2 diabetes mellitus without complication, with long-term current use of insulin   --Accu-Cheks  -hypoglycemia protocol  -sliding scale insulin to cover outlier blood sugars  -Victoza is not on formulary therefore it will end up being held.  -On discharge summary 2 weeks ago patient was on Levemir 15 units  twice daily however it seems like the patient has been having low blood sugars at the nursing home and is only on it daily now.    ·  Orthostatic hypotension-patient's predialysis blood pressures can be 124/69 and will go down to 103/60 or 125/82 and go down to 92/53.    · Tunneled cath to be removed and  left subclavian stenosis / PVD (peripheral vascular disease)   -Vascular is consulted   -    · Holding home medicines of gabapentin, Cymbalta, Phenergan, melatonin, tramadol      · Anemia of chronic disease.  Will need to monitor  -Patient's baseline hemoglobin in the nines.  Gets IV iron last couple months at dialysis      +DVT proph:  scd's for now til further intervention ruled out  + Full code as far as a now but this may need to be reevaluated-goals of care discussion consult placed due to multiple admissions    I discussed the patients findings and my recommendations with the patient and/or family.  Please reference all orders placed.    Margaret Hernandez MD  12/16/20  14:49 EST

## 2020-12-16 NOTE — ED TRIAGE NOTES
"Pt comes to ER from [place] for call of \"stroke like symptoms, right sided weakness\", pt last known well was \"3 days ago\". Pt is currently getting IV antibiotics for UTI. Pt arouses to voice in triage and will answer questions but is lethargic. Initial pressure with EMS was 88 systolic. Pt and RN wearing mask upon triage.     "

## 2020-12-17 NOTE — PROGRESS NOTES
Discharge Planning Assessment  [facility]     Patient Name: Randi Martines  MRN: [id number]  Today's Date: 12/17/2020    Admit Date: 12/16/2020    Discharge Needs Assessment     Row Name 12/17/20 1234       Living Environment    Lives With  other (see comments) Normally lives alone. Received pt form [facility] where she was receiving skilled rehab.    Unique Family Situation  Normally lives alone. Received pt form [facility] where she was receiving skilled rehab.    Current Living Arrangements  home/apartment/condo    Duration at Residence  Normally lives alone. Received pt form [facility] where she was receiving skilled rehab.    Primary Care Provided by  self Normally lives alone. Received pt form [facility] where she was receiving skilled rehab.    Provides Primary Care For  no one, unable/limited ability to care for self    Family Caregiver if Needed  grandchild(magan), adult    Family Caregiver Names  Stevan (son) and Courtney Martines, daughter in law, [phone]    Quality of Family Relationships  unable to assess    Able to Return to Prior Arrangements  yes       Resource/Environmental Concerns    Resource/Environmental Concerns  none       Transition Planning    Patient/Family Anticipates Transition to  inpatient rehabilitation facility    Patient/Family Anticipated Services at Transition  skilled nursing;home health care    Transportation Anticipated  other (see comments)       Discharge Needs Assessment    Readmission Within the Last 30 Days  previous discharge plan unsuccessful    Current Outpatient/Agency/Support Group  skilled nursing facility    Equipment Currently Used at Home  walker, rolling;glucometer    Concerns to be Addressed  care coordination/care conferences;decision-making;discharge planning    Concerns Comments  May need palliative consult    Anticipated Changes Related to Illness  inability to care for self    Equipment Needed After Discharge  none    Discharge Facility/Level of Care Needs  nursing  facility, skilled;home with home health    Provided Post Acute Provider List?  Refused    Refused Provider List Comment  Stated will return to [facility] if SNF needed at D/C and uses [facility]. Family unsure of D/C due to pt's decline in health.    Current Discharge Risk  chronically ill;cognitively impaired;lives alone        Discharge Plan     Row Name 12/17/20 1241       Plan    Plan  [facility] SNF vs poss palliative/Hospice.    Patient/Family in Agreement with Plan  yes    Plan Comments  Pt is confused and unable to give information. Called and spoke to Courtney Martines, daughter in law, [phone]. Explained roll of . Face sheet and pharmacy verified. Pt lives normally lives alone and receives her HD at [facility] on MWF. Received pt. form [facility] where she was receiving skilled rehab and HD there. Courtney states that pt.’s health has been declining recently. Dr. Morris had long discussion with daughter in law today regarding health issues and plan of care. Palliative care consulted to address goals of care. Pt has been to [facility] for Rehab and used [facility].  Pt’s PCP is Dr. TRUPTI Morrison. Explained that CCP would follow to assess for discharge needs.  Omar Alarcon RN-BC        Continued Care and Services - Admitted Since 12/16/2020     Destination     Service Provider Request Status Selected Services Address Phone Fax Patient Preferred    [facility]  Pending - Request Sent N/A [address] [facility] [address] [phone] --          Home Medical Care     Service Provider Request Status Selected Services Address Phone Fax Patient Preferred    [facility]  Pending - Request Sent N/A [address] [id number] [phone] --            Selected Continued Care - Prior Encounters Includes selections from prior encounters from 9/17/2020 to 12/17/2020    Discharged on 11/30/2020 Admission date: 11/20/2020 - Discharge  disposition: Skilled Nursing Facility (DC - External)    Destination     Service Provider Selected Services Address Phone Fax Patient Preferred    [facility] [address] [place] [id number] [phone] --                      Demographic Summary     Row Name 12/17/20 1228       General Information    Admission Type  inpatient    Arrived From  emergency department    Required Notices Provided  Important Message from Medicare    Reason for Consult  discharge planning;care coordination/care conference;decision-making    Preferred Language  English     Used During This Interaction  no       Contact Information    Permission Granted to Share Info With  family/designee Courtney Martines, daughter in law, [phone]        Functional Status     Row Name 12/17/20 1229       Functional Status    Usual Activity Tolerance  moderate    Current Activity Tolerance  poor       Functional Status, IADL    Medications  completely dependent    Meal Preparation  completely dependent    Housekeeping  completely dependent    Laundry  completely dependent    Shopping  completely dependent       Mental Status    General Appearance WDL  ex       Mental Status Summary    Recent Changes in Mental Status/Cognitive Functioning  mental status    Mental Status Comments  Lethargy and confusion on admit                Omar Alarcon RN

## 2020-12-17 NOTE — PROGRESS NOTES
Received pharmacy consult regarding safety of cymbalta in an ESRD.  Use in CrCl < 30 ml/min is not recommended.  Peak concentrations and total exposure (AUC) of major metabolites are approximately 7-9 fold higher in patients with ESRD on iHD.  Would recommend to hold duloxetine.  Would not suggest taper or down-titration given relatively long half-life (12-17 hours and anticipated to be much longer in iHD patient).    Thank you,  Monserrat Kaiser, PharmD, MPH, BCPS

## 2020-12-17 NOTE — THERAPY EVALUATION
Patient Name: Randi Martines  : 1950    MRN: [id number]                              Today's Date: 2020       Admit Date: 2020    Visit Dx:     ICD-10-CM ICD-9-CM   1. Metabolic encephalopathy  G93.41 348.31   2. Hypervolemia, unspecified hypervolemia type  E87.70 276.69   3. Dialysis patient (CMS/[facility])  Z99.2 V45.11     Patient Active Problem List   Diagnosis   • Abdominal pain   • Intractable abdominal pain   • DM (diabetes mellitus), type 2 with neurological complications (CMS/[facility])   • PVD (peripheral vascular disease) (CMS/[facility])   • Mesenteric ischemia (CMS/[facility])   • Acute gastric ulcer   • S/P CABG (coronary artery bypass graft)   • Coronary artery disease involving native coronary artery of native heart without angina pectoris   • Atherosclerotic PVD with ulceration (CMS/[facility])   • Type 2 diabetes mellitus without complication, with long-term current use of insulin (CMS/[facility])   • Biliary obstruction   • Hyperlipemia   • Malabsorption of iron   • Stage 3 chronic kidney disease   • Anemia of chronic disease   • Gastrointestinal hemorrhage   • NSTEMI (non-ST elevated myocardial infarction) (CMS/[facility])   • Left ventricular systolic dysfunction   • Blood loss anemia   • Melena   • Subclavian artery stenosis, left (CMS/[facility])   • Hypertension   • ESRD (end stage renal disease) (CMS/[facility])   • Hypokalemia   • Obesity (BMI 30-39.9)   • Orthostatic hypotension   • Diabetes mellitus (CMS/[facility])   • Hyperglycemia   • Fall   • UTI (urinary tract infection)   • Sepsis due to urinary tract infection (CMS/[facility])   • Bacteremia due to Escherichia coli   • Pulmonary vascular congestion   • Bleeding from dialysis shunt (CMS/[facility])   • Long term (current) use of insulin (CMS/[facility])   • Diarrhea, unspecified   • History of peptic ulcer disease   • Presence of aortocoronary bypass graft   • Secondary hyperparathyroidism of renal origin (CMS/[facility])   • Unspecified protein-calorie malnutrition (CMS/[facility])   • Hypoglycemia,  unspecified   • Type 2 diabetes mellitus with foot ulcer (CMS/[facility])   • Recurrent major depressive disorder, in partial remission (CMS/[facility])   • Ischemic cardiomyopathy   • Valvular heart disease   • Acute on chronic combined systolic and diastolic CHF (congestive heart failure) (CMS/[facility])   • ESRD on hemodialysis (CMS/[facility])   • Metabolic encephalopathy     Past Medical History:   Diagnosis Date   • Blind    • CAD (coronary artery disease)    • Chronic kidney disease    • Chronic systolic congestive heart failure (CMS/[facility])    • Diabetes mellitus (CMS/[facility])    • Dialysis patient (CMS/[facility])     MON, WED, FRI   • Dyslipidemia    • Foot ulcer (CMS/[facility])    • GERD (gastroesophageal reflux disease)    • History of diverticulitis    • History of stomach ulcers    • History of transfusion    • Hyperlipemia    • Hypertension    • Mesenteric ischemia (CMS/[facility])    • NSTEMI (non-ST elevated myocardial infarction) (CMS/[facility])    • Peptic ulcer disease    • PONV (postoperative nausea and vomiting)    • PVD (peripheral vascular disease) (CMS/[facility])    • UTI (urinary tract infection)      Past Surgical History:   Procedure Laterality Date   • APPENDECTOMY  2015   • ARTERIOVENOUS FISTULA/SHUNT SURGERY Left 7/23/2020    Procedure: LEFT ARM BRACHIAL ARTERY AXILLARY VEIN GRAFT;  Surgeon: Loida Ugalde Jr., MD;  Location: [facility] MAIN OR;  Service: Vascular;  Laterality: Left;   • CARDIAC CATHETERIZATION N/A 1/2/2020    Procedure: LEFT HEART CATH  NO LV;  Surgeon: Emilio Serna MD;  Location: [facility] CATH INVASIVE LOCATION;  Service: Cardiology   • CARDIAC CATHETERIZATION N/A 1/2/2020    Procedure: CORONARY ANGIOGRAPHY;  Surgeon: Emilio Serna MD;  Location: [facility] CATH INVASIVE LOCATION;  Service: Cardiology   • CARDIAC CATHETERIZATION N/A 1/2/2020    Procedure: SAPHENOUS VEIN GRAFT;  Surgeon: Eimlio Serna MD;  Location: [facility] CATH INVASIVE LOCATION;  Service: Cardiology   • CATARACT EXTRACTION     • CHOLECYSTECTOMY     •  COLONOSCOPY  2015   • CORONARY ARTERY BYPASS GRAFT      7 coronary bypass surgery   • ENDOSCOPY N/A 11/23/2016    Procedure: ESOPHAGOGASTRODUODENOSCOPY ;  Surgeon: Katherine Green MD;  Location: [facility] ENDOSCOPY;  Service:    • ENDOSCOPY N/A 12/29/2019    Procedure: ESOPHAGOGASTRODUODENOSCOPYwith bx;  Surgeon: Ziyad Manzo MD;  Location: [facility] ENDOSCOPY;  Service: Gastroenterology   • ERCP N/A 10/2/2017    Procedure: ENDOSCOPIC RETROGRADE CHOLANGIOPANCREATOGRAPHY with sphincterotomy, balloon sweep;  Surgeon: Christopher Graf MD;  Location: [facility] ENDOSCOPY;  Service:    • HERNIA REPAIR  2015   • HYSTERECTOMY     • INSERTION HEMODIALYSIS CATHETER Right 6/10/2020    Procedure: HEMODIALYSIS CATHETER INSERTION;  Surgeon: Stevan Graves MD;  Location: [facility] OR;  Service: Vascular;  Laterality: Right;   • INSERTION HEMODIALYSIS CATHETER N/A 9/23/2020    Procedure: HEMODIALYSIS CATHETER INSERTION;  Surgeon: Zay Gilliam MD;  Location: [facility] OR;  Service: Vascular;  Laterality: N/A;   • VT VEIN IN SITU BYPASS GRAFT,FEM-POP Left 5/31/2017    Procedure: ULTRASOUND RIGHT AIF, ARTERIOGRAM W/ LEFT COMMON ILIAC ANGIOPLASTY STENT, LT FEMORAL POPLITEAL BYPASS WITH ARTEGRAFT, LEFT POPLITEAL ENDARTERECTOMY PATCH, LEFT COMMON ARTERIOGRAM, LEFT EXTERNAL ILIAC ANGIOPLASTY STENT PLACEMENT, AORTA,  EXTERNAL, DISTAL PRESSURES;  Surgeon: Jayda Barrientos MD;  Location: [facility] HYBRID OR 18/19;  Service: Vascular   • SKIN GRAFT SPLIT THICKNESS Left 6/7/2017    Procedure: DEBRIDEMENT AND SKIN GRAFT TO  LEFT DORSAL FOOT AND TOES;  Surgeon: Maurine Waterhouse, MD;  Location: [facility] MAIN OR;  Service:    • UPPER GASTROINTESTINAL ENDOSCOPY  2015    scar in gastric body, gastric ulcers     General Information     Row Name 12/17/20 0907          OT Time and Intention    Document Type  evaluation  -SM     Mode of Treatment  occupational therapy;individual therapy  -SM     Row Name 12/17/20 0907          General  "Information    Patient Profile Reviewed  yes  -     Prior Level of Function  -- Pt reports she was recieving some assist with ADL prior but level of assist is unknown, pt does report she was walking/transfering prior.  -     Existing Precautions/Restrictions  fall  -     Barriers to Rehab  previous functional deficit;medically complex  -     Row Name 12/17/20 0907          Occupational Profile    Reason for Services/Referral (Occupational Profile)  Impaired ADL performance  -     Row Name 12/17/20 0907          Living Environment    Lives With  facility resident [facility]  -     Row Name 12/17/20 0907          Cognition    Orientation Status (Cognition)  oriented to;person;place  -     Row Name 12/17/20 0907          Safety Issues, Functional Mobility    Safety Issues Affecting Function (Mobility)  ability to follow commands needs increased processing time, vc's.  -     Impairments Affecting Function (Mobility)  endurance/activity tolerance;postural/trunk control;cognition;balance;strength  -     Comment, Safety Issues/Impairments (Mobility)  Pt very lathargic today.  -       User Key  (r) = Recorded By, (t) = Taken By, (c) = Cosigned By    Initials Name Provider Type     Katherine Lee OT Occupational Therapist          Mobility/ADL's     Row Name 12/17/20 0910          Bed Mobility    Bed Mobility  supine-sit;sit-supine  -     Supine-Sit [place] (Bed Mobility)  moderate assist (50% patient effort);verbal cues  -     Sit-Supine [place] (Bed Mobility)  verbal cues;maximum assist (25% patient effort)  -     Assistive Device (Bed Mobility)  head of bed elevated;draw sheet  -     Comment (Bed Mobility)  Pt loses balance backwards and says \"when it goes it goes\" lies back in bed with head hanging over EOB and needs Max A to return supine in bed.  -     Row Name 12/17/20 0910          Transfers    Comment (Transfers)  Not attempted today as pt is very lathargic, more alert " with EOB sitting but with poor sitting balance not safe this encounter.  -     Row Name 12/17/20 0910          Activities of Daily Living    BADL Assessment/Intervention  lower body dressing;feeding  -[facility] Name 12/17/20 0910          Lower Body Dressing Assessment/Training    [place] Level (Lower Body Dressing)  lower body dressing skills;socks;maximum assist (25% patient effort)  -     Position (Lower Body Dressing)  edge of bed sitting  -     Comment (Lower Body Dressing)  Pt reports she is able to don socks prior.  -     Row Name 12/17/20 0910          Self-Feeding Assessment/Training    [place] Level (Feeding)  liquids to mouth;contact guard assist  -     Position (Self-Feeding)  edge of bed sitting  -     Comment (Feeding)  Hand over hand assist.  -       User Key  (r) = Recorded By, (t) = Taken By, (c) = Cosigned By    Initials Name Provider Type     Katherine Lee, OT Occupational Therapist        Obj/Interventions     Row Name 12/17/20 0913          Sensory Assessment (Somatosensory)    Sensory Assessment (Somatosensory)  not tested  -[facility] Name 12/17/20 0913          Vision Assessment/Intervention    Visual Impairment/Limitations  WFL  -[facility] Name 12/17/20 0913          Range of Motion Comprehensive    Comment, General Range of Motion  Pt with less than 1/4 AROM BUE with ROM assessment, could be d/t weakness vs. lathargic today? has PROM WFL with mild deficit in L shoulder ROM. Dialysis port on this side.  -     Row Name 12/17/20 0913          Strength Comprehensive (MMT)    Comment, General Manual Muscle Testing (MMT) Assessment  Poor UE strength, grossly 2/5  -[facility] Name 12/17/20 0913          Balance    Balance Assessment  sitting static balance;sitting dynamic balance  -     Static Sitting Balance  mild impairment;sitting, edge of bed  -     Dynamic Sitting Balance  sitting, edge of bed;mild impairment  -     Balance Interventions   sitting;occupation based/functional task;moderate challenge  -SM       User Key  (r) = Recorded By, (t) = Taken By, (c) = Cosigned By    Initials Name Provider Type    Katherine Bradford OT Occupational Therapist        Goals/Plan     Row Name 12/17/20 0920          Bed Mobility Goal 1 (OT)    Activity/Assistive Device (Bed Mobility Goal 1, OT)  bed mobility activities, all  -SM     [place] Level/Cues Needed (Bed Mobility Goal 1, OT)  minimum assist (75% or more patient effort)  -SM     Time Frame (Bed Mobility Goal 1, OT)  short term goal (STG);2 weeks  -SM     Strategies/Barriers (Bed Mobility Goal 1, OT)  in prep for ADL and functional transfers.  -SM     Progress/Outcomes (Bed Mobility Goal 1, OT)  goal ongoing  -     Row Name 12/17/20 0920          Transfer Goal 1 (OT)    Activity/Assistive Device (Transfer Goal 1, OT)  commode, bedside without drop arms  -SM     [place] Level/Cues Needed (Transfer Goal 1, OT)  minimum assist (75% or more patient effort)  -SM     Time Frame (Transfer Goal 1, OT)  short term goal (STG);2 weeks  -SM     Progress/Outcome (Transfer Goal 1, OT)  goal ongoing  -     Row Name 12/17/20 0920          Bathing Goal 1 (OT)    Activity/Device (Bathing Goal 1, OT)  bathing skills, all  -SM     [place] Level/Cues Needed (Bathing Goal 1, OT)  minimum assist (75% or more patient effort)  -SM     Time Frame (Bathing Goal 1, OT)  short term goal (STG);2 weeks  -SM     Progress/Outcomes (Bathing Goal 1, OT)  goal ongoing  -     Row Name 12/17/20 0920          Dressing Goal 1 (OT)    Activity/Device (Dressing Goal 1, OT)  dressing skills, all  -SM     [place]/Cues Needed (Dressing Goal 1, OT)  minimum assist (75% or more patient effort)  -SM     Time Frame (Dressing Goal 1, OT)  short term goal (STG);2 weeks  -SM     Progress/Outcome (Dressing Goal 1, OT)  goal ongoing  -     Row Name 12/17/20 0920          Toileting Goal 1 (OT)    Activity/Device (Toileting Goal 1,  OT)  toileting skills, all  -SM     [place] Level/Cues Needed (Toileting Goal 1, OT)  minimum assist (75% or more patient effort)  -SM     Time Frame (Toileting Goal 1, OT)  short term goal (STG);2 weeks  -SM     Progress/Outcome (Toileting Goal 1, OT)  goal ongoing  -[facility] Name 12/17/20 0920          Self-Feeding Goal 1 (OT)    Activity/Device (Self-Feeding Goal 1, OT)  self-feeding skills, all  -SM     [place] Level/Cues Needed (Self-Feeding Goal 1, OT)  standby assist  -SM     Time Frame (Self-Feeding Goal 1, OT)  short term goal (STG);2 weeks  -SM     Progress/Outcomes (Self-Feeding Goal 1, OT)  goal ongoing  -[facility] Name 12/17/20 0920          Strength Goal 1 (OT)    Strength Goal 1 (OT)  Improve BUE strength to 3/5 for increased independence with ADL.  -SM     Time Frame (Strength Goal 1, OT)  short term goal (STG);2 weeks  -SM     Progress/Outcome (Strength Goal 1, OT)  goal ongoing  -[facility] Name 12/17/20 0920          Therapy Assessment/Plan (OT)    Planned Therapy Interventions (OT)  activity tolerance training;adaptive equipment training;BADL retraining;cognitive/visual perception retraining;functional balance retraining;neuromuscular control/coordination retraining;occupation/activity based interventions;patient/caregiver education/training;ROM/therapeutic exercise;strengthening exercise;transfer/mobility retraining  -       User Key  (r) = Recorded By, (t) = Taken By, (c) = Cosigned By    Initials Name Provider Type    Katherine Bradford OT Occupational Therapist        Clinical Impression     Row Name 12/17/20 0915          Pain Assessment    Additional Documentation  Pain Scale: Numbers Pre/Post-Treatment (Group)  -     Row Name 12/17/20 0915          Pain Scale: Numbers Pre/Post-Treatment    Pretreatment Pain Rating  0/10 - no pain  -     Posttreatment Pain Rating  0/10 - no pain  -     Row Name 12/17/20 0915          Plan of Care Review    Plan of Care Reviewed With   patient  -     Outcome Summary  Pt is a 70 y.o female admitted from NH with AMS and metabolic encephalopathy. Pt with PMH including CHF, ESRD on dialysis, diabetes. Pt does report having some assist with ADL from NH staff prior but level of assist is unclear, pt does report she was walking prior. Pt is not a great historian, oriented X2 today with vc's. Pt requires increased processing time during session but able to sit EOB with mod A, CGA for sitting balance while engaging in self care tasks, feeding self with [place] assist for steading and Max A for LBD. Pt is lathargic today some increased alertness with EOB sitting, pt with generalized weakness and impaired activity tolerance impacting ADL. Would benefit from continued OT to increase ADL performance as pt appears to be below baseline. Anticipated d/c back to SNF with continued OT services.  -     Row Name 12/17/20 0915          Therapy Assessment/Plan (OT)    Rehab Potential (OT)  good, to achieve stated therapy goals  -     Criteria for Skilled Therapeutic Interventions Met (OT)  yes;skilled treatment is necessary  -     Therapy Frequency (OT)  3 times/wk  -     Row Name 12/17/20 0915          Therapy Plan Review/Discharge Plan (OT)    Anticipated Discharge Disposition (OT)  skilled nursing facility  -     Row Name 12/17/20 0915          Positioning and Restraints    Pre-Treatment Position  in bed  -     Post Treatment Position  bed  -SM     In Bed  call light within reach;encouraged to call for assist;exit alarm on;[place] [facility]  -       User Key  (r) = Recorded By, (t) = Taken By, (c) = Cosigned By    Initials Name Provider Type     Katherine Lee, OT Occupational Therapist        Outcome Measures     Row Name 12/17/20 0922          How much help from another is currently needed...    Putting on and taking off regular lower body clothing?  1  -SM     Bathing (including washing, rinsing, and drying)  2  -SM     Toileting (which includes using  toilet bed pan or urinal)  1  -SM     Putting on and taking off regular upper body clothing  2  -SM     Taking care of personal grooming (such as brushing teeth)  2  -SM     Eating meals  3  -SM     AM-PAC 6 Clicks Score (OT)  11  -     Row Name 12/17/20 0922          Functional Assessment    Outcome Measure Options  AM-PAC 6 Clicks Daily Activity (OT)  -       User Key  (r) = Recorded By, (t) = Taken By, (c) = Cosigned By    Initials Name Provider Type     Katherine Lee OT Occupational Therapist        Occupational Therapy Education                 Title: PT OT SLP Therapies (In Progress)     Topic: Occupational Therapy (In Progress)     Point: ADL training (Done)     Description:   Instruct learner(s) on proper safety adaptation and remediation techniques during self care or transfers.   Instruct in proper use of assistive devices.              Learning Progress Summary           Patient Acceptance, E, VU by  at 12/17/2020 0922    Comment: Pt educated on improved technique for bed mobility for ADL independence.                   Point: Home exercise program (Not Started)     Description:   Instruct learner(s) on appropriate technique for monitoring, assisting and/or progressing therapeutic exercises/activities.              Learner Progress:  Not documented in this visit.          Point: Precautions (Not Started)     Description:   Instruct learner(s) on prescribed precautions during self-care and functional transfers.              Learner Progress:  Not documented in this visit.          Point: Body mechanics (Not Started)     Description:   Instruct learner(s) on proper positioning and spine alignment during self-care, functional mobility activities and/or exercises.              Learner Progress:  Not documented in this visit.                      User Key     Initials Effective Dates Name Provider Type [facility] 04/02/20 -  Katherine Lee OT Occupational Therapist OT              OT  Recommendation and Plan  Planned Therapy Interventions (OT): activity tolerance training, adaptive equipment training, BADL retraining, cognitive/visual perception retraining, functional balance retraining, neuromuscular control/coordination retraining, occupation/activity based interventions, patient/caregiver education/training, ROM/therapeutic exercise, strengthening exercise, transfer/mobility retraining  Therapy Frequency (OT): 3 times/wk  Plan of Care Review  Plan of Care Reviewed With: patient  Outcome Summary: Pt is a 70 y.o female admitted from NH with AMS and metabolic encephalopathy. Pt with PMH including CHF, ESRD on dialysis, diabetes. Pt does report having some assist with ADL from NH staff prior but level of assist is unclear, pt does report she was walking prior. Pt is not a great historian, oriented X2 today with vc's. Pt requires increased processing time during session but able to sit EOB with mod A, CGA for sitting balance while engaging in self care tasks, feeding self with [place] assist for steading and Max A for LBD. Pt is lathargic today some increased alertness with EOB sitting, pt with generalized weakness and impaired activity tolerance impacting ADL. Would benefit from continued OT to increase ADL performance as pt appears to be below baseline. Anticipated d/c back to SNF with continued OT services.     Time Calculation:   Time Calculation- OT     Row Name 12/17/20 0924             Time Calculation- OT    OT Start Time  0811  -      OT Stop Time  0828  -      OT Time Calculation (min)  17 min  -      Total Timed Code Minutes- OT  12 minute(s)  -      OT Received On  12/17/20  -      OT - Next Appointment  12/21/20  -      OT Goal Re-Cert Due Date  12/31/20  -        User Key  (r) = Recorded By, (t) = Taken By, (c) = Cosigned By    Initials Name Provider Type    Katherine Bradford OT Occupational Therapist        Therapy Charges for Today     Code Description Service Date  Service Provider Modifiers Qty    [id number]  OT EVAL MOD COMPLEXITY 2 12/17/2020 Katherine Lee, OT GO 1    [id number]  OT THERAPEUTIC ACT EA 15 MIN 12/17/2020 Katherine Lee OT GO 1               Katherine Lee OT  12/17/2020

## 2020-12-17 NOTE — CONSULTS
Patient Name: Randi Martines Account #: [id number]    MRN: [id number] Admission Date: 12/16/2020      Consulting Service: Vascular Surgery Date of Evaluation: December 17, 2020    Requesting Provider: Margaret Hernandez MD    CHIEF COMPLAINT: Disorientation with possible sepsis.  Retained tunneled dialysis catheter.  HPI: Randi Martines is a 70 y.o. female is being seen for a consultation and evaluation/management of retained tunneled dialysis catheter.  The patient reports that this has not been working for some time and then have successfully been using her left brachial axillary AV shunt on her arm for over a month.  Was placed originally in July of this year by Dr. Ugalde.  The patient had her current tunnel catheter placed in September after a large infiltration prevented us from using her arm.  She was set up for a 1 month follow-up in our office for duplex and she did not come in.  This lack of follow-up has left her with the catheter that is now been not used for approximately a month.  We will remove the catheter and culture the tip.  We will set her up for follow-up scans in the office on her fistula and to establish long-term serial studies to make sure she does not get into bigger trouble again.    PAST MEDICAL HISTORY:   Past Medical History:   Diagnosis Date   • Blind    • CAD (coronary artery disease)    • Chronic kidney disease    • Chronic systolic congestive heart failure (CMS/HCC)    • Diabetes mellitus (CMS/HCC)    • Dialysis patient (CMS/HCC)     MON, WED, FRI   • Dyslipidemia    • Foot ulcer (CMS/HCC)    • GERD (gastroesophageal reflux disease)    • History of diverticulitis    • History of stomach ulcers    • History of transfusion    • Hyperlipemia    • Hypertension    • Mesenteric ischemia (CMS/HCC)    • NSTEMI (non-ST elevated myocardial infarction) (CMS/HCC)    • Peptic ulcer disease    • PONV (postoperative nausea and vomiting)    • PVD (peripheral vascular disease) (CMS/HCC)    • UTI  (urinary tract infection)       PAST SURGICAL HISTORY:   Past Surgical History:   Procedure Laterality Date   • APPENDECTOMY  2015   • ARTERIOVENOUS FISTULA/SHUNT SURGERY Left 7/23/2020    Procedure: LEFT ARM BRACHIAL ARTERY AXILLARY VEIN GRAFT;  Surgeon: Loida Ugalde Jr., MD;  Location: [facility] MAIN OR;  Service: Vascular;  Laterality: Left;   • CARDIAC CATHETERIZATION N/A 1/2/2020    Procedure: LEFT HEART CATH  NO LV;  Surgeon: Emilio Serna MD;  Location: [facility] CATH INVASIVE LOCATION;  Service: Cardiology   • CARDIAC CATHETERIZATION N/A 1/2/2020    Procedure: CORONARY ANGIOGRAPHY;  Surgeon: Emilio Serna MD;  Location: [facility] CATH INVASIVE LOCATION;  Service: Cardiology   • CARDIAC CATHETERIZATION N/A 1/2/2020    Procedure: SAPHENOUS VEIN GRAFT;  Surgeon: Emilio Serna MD;  Location: [facility] CATH INVASIVE LOCATION;  Service: Cardiology   • CATARACT EXTRACTION     • CHOLECYSTECTOMY     • COLONOSCOPY  2015   • CORONARY ARTERY BYPASS GRAFT      7 coronary bypass surgery   • ENDOSCOPY N/A 11/23/2016    Procedure: ESOPHAGOGASTRODUODENOSCOPY ;  Surgeon: Katherine Green MD;  Location: [facility] ENDOSCOPY;  Service:    • ENDOSCOPY N/A 12/29/2019    Procedure: ESOPHAGOGASTRODUODENOSCOPYwith bx;  Surgeon: Ziyad Manzo MD;  Location: [facility] ENDOSCOPY;  Service: Gastroenterology   • ERCP N/A 10/2/2017    Procedure: ENDOSCOPIC RETROGRADE CHOLANGIOPANCREATOGRAPHY with sphincterotomy, balloon sweep;  Surgeon: Christopher Graf MD;  Location: [facility] ENDOSCOPY;  Service:    • HERNIA REPAIR  2015   • HYSTERECTOMY     • INSERTION HEMODIALYSIS CATHETER Right 6/10/2020    Procedure: HEMODIALYSIS CATHETER INSERTION;  Surgeon: Stevan Graves MD;  Location: [facility] MAIN OR;  Service: Vascular;  Laterality: Right;   • INSERTION HEMODIALYSIS CATHETER N/A 9/23/2020    Procedure: HEMODIALYSIS CATHETER INSERTION;  Surgeon: Zay Gilliam MD;  Location: [facility] MAIN OR;  Service: Vascular;  Laterality: N/A;    • AK VEIN IN SITU BYPASS GRAFT,FEM-POP Left 2017    Procedure: ULTRASOUND RIGHT AIF, ARTERIOGRAM W/ LEFT COMMON ILIAC ANGIOPLASTY STENT, LT FEMORAL POPLITEAL BYPASS WITH ARTEGRAFT, LEFT POPLITEAL ENDARTERECTOMY PATCH, LEFT COMMON ARTERIOGRAM, LEFT EXTERNAL ILIAC ANGIOPLASTY STENT PLACEMENT, AORTA,  EXTERNAL, DISTAL PRESSURES;  Surgeon: Jayda Barrientos MD;  Location: [facility] OR ;  Service: Vascular   • SKIN GRAFT SPLIT THICKNESS Left 2017    Procedure: DEBRIDEMENT AND SKIN GRAFT TO  LEFT DORSAL FOOT AND TOES;  Surgeon: Maurine Waterhouse, MD;  Location: [facility] OR;  Service:    • UPPER GASTROINTESTINAL ENDOSCOPY      scar in gastric body, gastric ulcers      FAMILY HISTORY:   Family History   Problem Relation Age of Onset   • Heart disease Mother         cardiac disorder   • Cancer Maternal Grandmother    • Stroke Maternal Grandfather    • Hypertension Maternal Grandfather    • Malig Hyperthermia Neg Hx       SOCIAL HISTORY:   Social History     Tobacco Use   • Smoking status: Former Smoker     Packs/day: 3.00     Years: 20.00     Pack years: 60.00     Types: Cigarettes     Start date: 1969     Quit date: 2000     Years since quittin.9   • Smokeless tobacco: Never Used   • Tobacco comment: caffeine use   Substance Use Topics   • Alcohol use: No     Comment: caffeine use    • Drug use: No      MEDICATIONS:   No current facility-administered medications on file prior to encounter.      Current Outpatient Medications on File Prior to Encounter   Medication Sig Dispense Refill   • acetaminophen (TYLENOL) 500 MG tablet Take 500 mg by mouth Every 6 (Six) Hours As Needed for Mild Pain .     • aspirin 81 MG EC tablet Take 81 mg by mouth Every Night.     • atorvastatin (LIPITOR) 80 MG tablet TAKE 1 TABLET BY MOUTH EVERY NIGHT. 90 tablet 1   • B Complex-C-Folic Acid (Juju-Charlie) tablet Take 1 tablet by mouth Daily.     • Blood Glucose Monitoring Suppl (ACCU-CHEK SEAN PLUS)  w/Device kit 1 Device Daily. Dx. E11.9 1 kit 0   • ciprofloxacin (CIPRO) 500 MG tablet Take 500 mg by mouth Daily.     • docusate sodium (COLACE) 100 MG capsule Take 200 mg by mouth every night at bedtime.     • DROPLET PEN NEEDLES 31G X 8 MM misc USE AS DIRECTED 4 TIMES DAILY (Patient taking differently: Inject 1 each under the skin into the appropriate area as directed 2 (two) times a day.) 400 each 2   • DULoxetine (CYMBALTA) 60 MG capsule Take 1 capsule by mouth Daily. 90 capsule 1   • ertapenem (INVanz) 500 mg/50 mL 0.9% NS Infuse 500 mg into a venous catheter Daily. For 3 days started 12/13/20     • gabapentin (NEURONTIN) 300 MG capsule Take 1 capsule by mouth every night at bedtime. 10 capsule 0   • glucose blood (Accu-Chek Cecile Plus) test strip 1 each by Other route 2 (Two) Times a Day. Use as instructed. Dx. E11.9 400 each 0   • insulin aspart (NovoLOG FlexPen) 100 UNIT/ML solution pen-injector sc pen Inject 15 Units under the skin into the appropriate area as directed 3 (Three) Times a Day With Meals. 14 pen 3   • lactulose (CHRONULAC) 10 GM/15ML solution Take 15 mL by mouth Daily As Needed (constipation). 236 mL 0   • Levemir FlexTouch 100 UNIT/ML injection Inject 15 Units under the skin into the appropriate area as directed 2 (Two) Times a Day. (Patient taking differently: Inject 15 Units under the skin into the appropriate area as directed Daily.) 45 mL 1   • Liraglutide (VICTOZA) 18 MG/3ML solution pen-injector injection Inject 1.8 mg under the skin into the appropriate area as directed Daily. (Patient taking differently: Inject 1.8 mg under the skin into the appropriate area as directed Every Night.) 9 pen 3   • melatonin 5 MG tablet tablet Take 10 mg by mouth Every Night.     • midodrine (PROAMATINE) 5 MG tablet Take 1 tablet by mouth 3 (Three) Times a Day Before Meals. 90 tablet 3   • pantoprazole (PROTONIX) 40 MG EC tablet Take 1 tablet by mouth 2 (two) times a day. 180 tablet 3   • promethazine  "(PHENERGAN) 25 MG tablet Take 1 tablet by mouth Every 6 (Six) Hours As Needed for Nausea or Vomiting. 3 tablet 0   • sucralfate (CARAFATE) 1 g tablet Take 1 tablet by mouth 3 (Three) Times a Day. (Patient taking differently: Take 1 g by mouth Daily.) 360 tablet 3   • traMADol (ULTRAM) 50 MG tablet Take 1 tablet by mouth Daily. 20 tablet 0             ALLERGIES: Augmentin [amoxicillin-pot clavulanate] and Codeine   COMPLETE REVIEW OF SYSTEMS:     ENT ROS: negative  Cardiovascular ROS: no chest pain or dyspnea on exertion  Respiratory ROS: no cough, shortness of breath, or wheezing  Gastrointestinal ROS: no abdominal pain, change in bowel habits, or black or bloody stools  Neurological ROS: positive for -somnolence  Genito-Urinary ROS: no dysuria, trouble voiding, or hematuria  Musculoskeletal ROS: negative  Dermatological ROS: negative  Psychological ROS: negative      PHYSICAL EXAM:   Patient Vitals for the past 24 hrs:   BP Temp Temp src Pulse Resp SpO2 Height Weight   12/17/20 0521 -- -- -- -- -- -- -- 64.5 kg (142 lb 3.2 oz)   12/16/20 2305 106/72 98.5 °F (36.9 °C) Oral 79 18 95 % -- --   12/16/20 2021 115/65 97.8 °F (36.6 °C) Oral 83 16 94 % -- 64.5 kg (142 lb 3.2 oz)   12/16/20 1928 130/72 97.7 °F (36.5 °C) Temporal 79 18 -- -- --   12/16/20 1510 140/81 97.8 °F (36.6 °C) Temporal 95 18 -- -- --   12/16/20 1155 -- -- -- 85 12 100 % -- --   12/16/20 1146 -- -- -- 72 19 98 % -- --   12/16/20 1140 114/80 -- -- 85 18 100 % -- --   12/16/20 1100 129/77 -- -- 83 -- 100 % -- --   12/16/20 1053 -- -- -- -- -- -- 152.4 cm (60\") 66.7 kg (147 lb)   12/16/20 1038 (!) 89/63 -- -- 80 16 98 % -- --   12/16/20 1034 -- 96.4 °F (35.8 °C) -- -- -- -- -- --        General appearance: in mild to moderate distress, ill-appearing, chronically ill appearing and oriented but fairly somnolent.  Neurological exam reveals fairly somnolent but wakes up and answers questions appropriately.  She appears ill.  Nonfocal neurologic exam..  ENT " exam reveals - ENT exam normal, no neck nodes or sinus tenderness.  Tunnel catheter site clean with no redness or pain or heat or tenderness  CVS exam: normal rate, regular rhythm, normal S1, S2, no murmurs, rubs, clicks or gallops.  Chest: clear to auscultation, no wheezes, rales or rhonchi, symmetric air entry.  Abdominal exam: soft, nontender, nondistended, no masses or organomegaly.  Examination of the feet reveals warm, good capillary refill.  Right foot with healed toe amputation site second toe.  Left foot with second toe hammertoe without skin breakdown but redness noted.  Pulses: Carotids +2, radials +1, femorals +2, nonpalpable popliteal pulses.  Right DP +1, left pedal pulses nonpalpable.      LABS:      Results Review:       I reviewed the patient's new clinical results.  Results from last 7 days   Lab Units 12/17/20  0453 12/16/20  1111   WBC 10*3/mm3 8.99 9.13   HEMOGLOBIN g/dL 10.5* 9.8*   PLATELETS 10*3/mm3 228 246     Results from last 7 days   Lab Units 12/17/20  0453 12/16/20  1111   SODIUM mmol/L 136 136   POTASSIUM mmol/L 4.0 3.9   CHLORIDE mmol/L 99 103   CO2 mmol/L 25.4 23.2   BUN mg/dL 10 17   CREATININE mg/dL 2.19* 3.65*   GLUCOSE mg/dL 69 102*   Estimated Creatinine Clearance: 19.8 mL/min (A) (by C-G formula based on SCr of 2.19 mg/dL (H)).  Results from last 7 days   Lab Units 12/17/20  0453 12/16/20  1111   CALCIUM mg/dL 9.1 9.0   ALBUMIN g/dL  --  3.00*           The following radiologic or non-invasive studies have been reviewed by me: Chest x-ray.  It appears to have a segment of stent versus possible residual hero graft in the left axilla.    Active Hospital Problems    Diagnosis  POA   • Metabolic encephalopathy [G93.41]  Yes   • Ischemic cardiomyopathy [I25.5]  Yes   • ESRD on hemodialysis (CMS/[facility]) [N18.6, Z99.2]  Not Applicable   • Acute on chronic combined systolic and diastolic CHF (congestive heart failure) (CMS/[facility]) [I50.43]  Yes   • UTI (urinary tract infection) [N39.0]  Yes    • Orthostatic hypotension [I95.1]  Yes   • Type 2 diabetes mellitus without complication, with long-term current use of insulin (CMS/[facility]) [E11.9, Z79.4]  Not Applicable   • PVD (peripheral vascular disease) (CMS/[facility]) [I73.9]  Yes      Resolved Hospital Problems   No resolved problems to display.         ASSESSMENT/PLAN: 70 y.o. female with retained tunneled catheter that is nonfunctional and not being used.  Concerns for it being a septic source have been raised.  We will remove today if possible..  We will try to send the tip for culture.  She will need follow-up in the office for her AV shunt and for her known peripheral vascular disease which has been revascularized in 2017 by Dr. Barrientos.  Orders have been placed.    I discussed the plan with the patient and she is agreeable to the plan of care at this point. Thank you for this consult.     Harpla Fletcher MD   12/17/20

## 2020-12-17 NOTE — CONSULTS
Patient Name: Randi Martines  :1950  70 y.o.    Date of Admission: 2020  Date of Consultation:  20  Encounter Provider: Leena Baez RN  Place of Service: [facility] CARDIOLOGY  Referring Provider: Margaret Hernandez, *  Patient Care Team:  Jose Morrison DO as PCP - General (Family Medicine)  Samantha Uriostegui MD as Consulting Physician (Cardiology)  Emilio Vazquez MD as Referring Physician (Nephrology)      Chief complaint: CHF    Reason for Consult:  CHF/Elevated troponin    History of Present Illness:  Ms Martines is a 70 year old patient of Dr Uriostegui with a history of coronary artery disease (s/p CABG), diabetes, CKD (on HD), hypertension, hyperlipidemia, PVD (s/p right femoral/popliteal bypass and recurrent left subclavian stent restenosis (s/p PTCA ), carotid diease, paroxysmal atrial fibrillation, hypothyroidism, chronic anemia, and GI bleed.      She was admitted in 2019 with GI bleed. She was hypotensive and required transfusion. She did have some right sided neck and jaw pain but improved with transfusion. She had no EKG changes.  He had EGD which shoed gastritis but no active bleeding.  Cardiac catheterization was done 2020 which showed  20% stenosis of the left main artery, 50 to 60% stenosis of the mid LAD, occlusion of the obtuse marginal branches, and 100% occlusion of the proximal right coronary artery.  She had a patent LIMA to LAD, saphenous vein graft to the first diagonal branch, saphenous vein graft to the OM1, saphenous vein graft to the OM 2, and a patent saphenous vein graft to the RV branch and PDA.  She incidentally was noted to have severe in-stent stenosis of the left subclavian artery stent.  It was felt that he likely had demand ischemia from his subclavian artery in-stent restenosis.  The plan was for her to recover from her acute issues with plans for eventual intervention of the subclavian  artery stenosis.  Intervention to the left subclavian artery was postponed due to hemodialysis initiation.  She then presented late November 2020 with shortness of breath despite compliance with dialysis.  She also had congestive heart failure and troponin was elevated in the setting of end-stage renal disease.  She had a repeat echo which showed an ejection fraction of 32% with moderate tricuspid regurgitation and RVSP 35-45 mmHg.  She developed some tachycardia and had atrial fibrillation.  She converted with loading dose amiodarone.  Low-dose Toprol-XL was started but she was ultimately started on midodrine for low blood pressure.  She now presents with readmission for congestive heart failure and confusion.  There is concern for sepsis due to urinary tract infection and possibly tunneled dialysis catheter so the tunnel catheter was removed and sent to cytology.  Troponin was elevated and proBNP is greater than 70,000.  Upon direct examination, patient is arousable to voice and alert and oriented to self and year.  She denies pain.  She denies being short of breath.    Previous Cardiac Testing  ECHO 11/22/2020  · Estimated right ventricular systolic pressure from tricuspid regurgitation is mildly elevated (35-45 mmHg).  · The left ventricular cavity is mildly dilated.  · Calculated left ventricular EF = 32.5% Estimated left ventricular EF was in agreement with the calculated left ventricular EF.  · Left ventricular diastolic function is consistent with (grade III w/high LAP) reversible restrictive pattern.  · Left atrial volume is moderately increased.  · Moderate tricuspid valve regurgitation is present.  · The following left ventricular wall segments are akinetic: apical anterior, apical lateral, apical inferior, apical septal, mid inferoseptal and apex.  · Calculated left ventricular EF = 32.5%  · There is no evidence of pericardial effusion. .    Cardiac Catheterization 1/2/2020  FINDINGS:     LEFT  VENTRICULOGRAPHY: The LV pressure was 170/10 . There was no gradient across the aortic valve on pullback. No LV due to renal insuff     CORONARY ANGIOGRAPHY:  Left main: 20% distal luminal irregularities  Left anterior descending: Diffuse 30% proximal disease then 50 to 60% mid disease distally the vessels normal with competitive flow from the LIMA bypass  Ramus intermedius:Not present  Circumflex: Diffuse 40% proximal disease OM1 and OM 2 seem to be occluded  RCA: Is a dominant vessel.  100% proximal occlusion     LIMA to LAD: widely patent and normal.  It was widely patent and looks normal the LAD in the mid LAD to distal portion is normal      SVG to first diagonal is widely patent and looks normal the diagonal is smallish this is the lower most graft     SVG to OM1 is widely patent and looks normal is the upper most graft     SVG to ramus intermedius is widely patent and looks normal     Saphenous vein graft to the RV branch and to the PDA is widely patent and looks normal this is a very big vessel for her     The aortic pressure was 198/58 and when we pulled back across the subclavian stent the distal subclavian pressure was 142/64 peak aortic gradient difference of 56 mmHg.  We subsequently then did a left subclavian angiogram and this showed about a 70% lesion in the very proximal portion of the subclavian stent     SUMMARY: Severe native coronary artery disease 7 of 7 bypasses are patent.  She has a significant lesion left subclavian it is evidently been angioplastied one time before 9 years ago        RECOMMENDATIONS: We will treat her medically but also will likely need re-intervention on the subclavian stent            Past Medical History:   Diagnosis Date   • Blind    • CAD (coronary artery disease)    • Chronic kidney disease    • Chronic systolic congestive heart failure (CMS/HCC)    • Diabetes mellitus (CMS/HCC)    • Dialysis patient (CMS/HCC)     MON, WED, FRI   • Dyslipidemia    • Foot ulcer  (CMS/[facility])    • GERD (gastroesophageal reflux disease)    • History of diverticulitis    • History of stomach ulcers    • History of transfusion    • Hyperlipemia    • Hypertension    • Mesenteric ischemia (CMS/[facility])    • NSTEMI (non-ST elevated myocardial infarction) (CMS/[facility])    • Peptic ulcer disease    • PONV (postoperative nausea and vomiting)    • PVD (peripheral vascular disease) (CMS/[facility])    • UTI (urinary tract infection)        Past Surgical History:   Procedure Laterality Date   • APPENDECTOMY  2015   • ARTERIOVENOUS FISTULA/SHUNT SURGERY Left 7/23/2020    Procedure: LEFT ARM BRACHIAL ARTERY AXILLARY VEIN GRAFT;  Surgeon: Loida Ugalde Jr., MD;  Location: [facility] MAIN OR;  Service: Vascular;  Laterality: Left;   • CARDIAC CATHETERIZATION N/A 1/2/2020    Procedure: LEFT HEART CATH  NO LV;  Surgeon: Emilio Serna MD;  Location: [facility] CATH INVASIVE LOCATION;  Service: Cardiology   • CARDIAC CATHETERIZATION N/A 1/2/2020    Procedure: CORONARY ANGIOGRAPHY;  Surgeon: Emilio Serna MD;  Location: [facility] CATH INVASIVE LOCATION;  Service: Cardiology   • CARDIAC CATHETERIZATION N/A 1/2/2020    Procedure: SAPHENOUS VEIN GRAFT;  Surgeon: Emilio Serna MD;  Location: [facility] CATH INVASIVE LOCATION;  Service: Cardiology   • CATARACT EXTRACTION     • CHOLECYSTECTOMY     • COLONOSCOPY  2015   • CORONARY ARTERY BYPASS GRAFT      7 coronary bypass surgery   • ENDOSCOPY N/A 11/23/2016    Procedure: ESOPHAGOGASTRODUODENOSCOPY ;  Surgeon: Katherine Green MD;  Location: [facility] ENDOSCOPY;  Service:    • ENDOSCOPY N/A 12/29/2019    Procedure: ESOPHAGOGASTRODUODENOSCOPYwith bx;  Surgeon: Ziyad Manzo MD;  Location: [facility] ENDOSCOPY;  Service: Gastroenterology   • ERCP N/A 10/2/2017    Procedure: ENDOSCOPIC RETROGRADE CHOLANGIOPANCREATOGRAPHY with sphincterotomy, balloon sweep;  Surgeon: Christopher Graf MD;  Location: [facility] ENDOSCOPY;  Service:    • HERNIA REPAIR  2015   • HYSTERECTOMY     • INSERTION  HEMODIALYSIS CATHETER Right 6/10/2020    Procedure: HEMODIALYSIS CATHETER INSERTION;  Surgeon: Stevan Graves MD;  Location: [facility] OR;  Service: Vascular;  Laterality: Right;   • INSERTION HEMODIALYSIS CATHETER N/A 9/23/2020    Procedure: HEMODIALYSIS CATHETER INSERTION;  Surgeon: Zay Gilliam MD;  Location: [facility] MAIN OR;  Service: Vascular;  Laterality: N/A;   • AZ VEIN IN SITU BYPASS GRAFT,FEM-POP Left 5/31/2017    Procedure: ULTRASOUND RIGHT AIF, ARTERIOGRAM W/ LEFT COMMON ILIAC ANGIOPLASTY STENT, LT FEMORAL POPLITEAL BYPASS WITH ARTEGRAFT, LEFT POPLITEAL ENDARTERECTOMY PATCH, LEFT COMMON ARTERIOGRAM, LEFT EXTERNAL ILIAC ANGIOPLASTY STENT PLACEMENT, AORTA,  EXTERNAL, DISTAL PRESSURES;  Surgeon: Jayda Barrientos MD;  Location: [facility] OR 18/19;  Service: Vascular   • SKIN GRAFT SPLIT THICKNESS Left 6/7/2017    Procedure: DEBRIDEMENT AND SKIN GRAFT TO  LEFT DORSAL FOOT AND TOES;  Surgeon: Maurine Waterhouse, MD;  Location: [facility] OR;  Service:    • UPPER GASTROINTESTINAL ENDOSCOPY  2015    scar in gastric body, gastric ulcers         Prior to Admission medications    Medication Sig Start Date End Date Taking? Authorizing Provider   acetaminophen (TYLENOL) 500 MG tablet Take 500 mg by mouth Every 6 (Six) Hours As Needed for Mild Pain .    Marck Carrillo MD   aspirin 81 MG EC tablet Take 81 mg by mouth Every Night.    Marck Carrillo MD   atorvastatin (LIPITOR) 80 MG tablet TAKE 1 TABLET BY MOUTH EVERY NIGHT. 10/22/20   Jose Morrison DO   B Complex-C-Folic Acid (Juju-Charlie) tablet Take 1 tablet by mouth Daily. 8/24/20   Marck Carrillo MD   Blood Glucose Monitoring Suppl (ACCU-CHEK SEAN PLUS) w/Device kit 1 Device Daily. Dx. E11.9 8/6/20   Jose Morrison DO   ciprofloxacin (CIPRO) 500 MG tablet Take 500 mg by mouth Daily.    Marck Carrillo MD   docusate sodium (COLACE) 100 MG capsule Take 200 mg by mouth every night at bedtime.    Lorena  MD Marck   DROPLET PEN NEEDLES 31G X 8 MM misc USE AS DIRECTED 4 TIMES DAILY  Patient taking differently: Inject 1 each under the skin into the appropriate area as directed 2 (two) times a day. 4/1/20   Jose Morrison DO   DULoxetine (CYMBALTA) 60 MG capsule Take 1 capsule by mouth Daily. 11/19/20   Jayda Perdomo APRN   ertapenem (INVanz) 500 mg/50 mL 0.9% NS Infuse 500 mg into a venous catheter Daily. For 3 days started 12/13/20    Marck Carrillo MD   gabapentin (NEURONTIN) 300 MG capsule Take 1 capsule by mouth every night at bedtime. 11/30/20   Petrona Max MD   glucose blood (Accu-Chek Cecile Plus) test strip 1 each by Other route 2 (Two) Times a Day. Use as instructed. Dx. E11.9 8/6/20   Jose Morrison DO   insulin aspart (NovoLOG FlexPen) 100 UNIT/ML solution pen-injector sc pen Inject 15 Units under the skin into the appropriate area as directed 3 (Three) Times a Day With Meals. 7/14/20   Jose Morrison DO   lactulose (CHRONULAC) 10 GM/15ML solution Take 15 mL by mouth Daily As Needed (constipation). 1/5/20   Harpal Novoa MD   Levemir FlexTouch 100 UNIT/ML injection Inject 15 Units under the skin into the appropriate area as directed 2 (Two) Times a Day.  Patient taking differently: Inject 15 Units under the skin into the appropriate area as directed Daily. 10/22/20   Jose Morrison DO   Liraglutide (VICTOZA) 18 MG/3ML solution pen-injector injection Inject 1.8 mg under the skin into the appropriate area as directed Daily.  Patient taking differently: Inject 1.8 mg under the skin into the appropriate area as directed Every Night. 7/14/20   Jose Morrison DO   melatonin 5 MG tablet tablet Take 10 mg by mouth Every Night.    Marck Carrillo MD   midodrine (PROAMATINE) 5 MG tablet Take 1 tablet by mouth 3 (Three) Times a Day Before Meals. 11/30/20   Petrona Max MD   pantoprazole (PROTONIX) 40 MG EC tablet Take 1 tablet by mouth 2 (two) times a day.  20   Jose Morrison DO   promethazine (PHENERGAN) 25 MG tablet Take 1 tablet by mouth Every 6 (Six) Hours As Needed for Nausea or Vomiting. 20   Celestine Myles APRN   sucralfate (CARAFATE) 1 g tablet Take 1 tablet by mouth 3 (Three) Times a Day.  Patient taking differently: Take 1 g by mouth Daily. 20   Jose Morrison DO   traMADol (ULTRAM) 50 MG tablet Take 1 tablet by mouth Daily. 20   Petrona Max MD       Allergies   Allergen Reactions   • Augmentin [Amoxicillin-Pot Clavulanate] GI Intolerance and Dizziness   • Codeine GI Intolerance and Dizziness       Social History     Socioeconomic History   • Marital status:      Spouse name: Not on file   • Number of children: Not on file   • Years of education: Not on file   • Highest education level: Not on file   Tobacco Use   • Smoking status: Former Smoker     Packs/day: 3.00     Years: 20.00     Pack years: 60.00     Types: Cigarettes     Start date: 1969     Quit date: 2000     Years since quittin.9   • Smokeless tobacco: Never Used   • Tobacco comment: caffeine use   Substance and Sexual Activity   • Alcohol use: No     Comment: caffeine use    • Drug use: No   • Sexual activity: Defer     Partners: Female     Birth control/protection: None       Family History   Problem Relation Age of Onset   • Heart disease Mother         cardiac disorder   • Cancer Maternal Grandmother    • Stroke Maternal Grandfather    • Hypertension Maternal Grandfather    • Malig Hyperthermia Neg Hx        REVIEW OF SYSTEMS:   All systems reviewed.  Pertinent positives identified in HPI.  All other systems are negative.      Objective:     Vitals:    20 2021 20 2305 20 0521 20 0730   BP: 115/65 106/72  93/60   BP Location: Right arm Right arm  Right arm   Patient Position: Lying Lying  Lying   Pulse: 83 79  80   Resp: 16 18  18   Temp: 97.8 °F (36.6 °C) 98.5 °F (36.9 °C)  98 °F (36.7 °C)   TempSrc: Oral Oral   Oral   SpO2: 94% 95%  93%   Weight: 64.5 kg (142 lb 3.2 oz)  64.5 kg (142 lb 3.2 oz)    Height:         Body mass index is 27.77 kg/m².    General Appearance:    Alert, cooperative, in no acute distress   Head:    Normocephalic, without obvious abnormality, atraumatic   Eyes:            Lids and lashes normal, conjunctivae and sclerae normal, no icterus, no pallor, corneas clear, PERRLA   Ears:    Ears appear intact with no abnormalities noted   Throat:   No oral lesions, no thrush, oral mucosa moist   Neck:   No adenopathy, supple, trachea midline, no thyromegaly, no carotid bruit, no JVD   Back:     No kyphosis present, no scoliosis present, no skin lesions, erythema or scars, no tenderness to percussion or palpation, range of motion normal   Lungs:     Clear to auscultation, respirations regular, even and unlabored    Heart:    Regular rhythm and normal rate, normal S1 and S2, no murmur, no gallop, no rub, no click   Chest Wall:    No abnormalities observed   Abdomen:     Normal bowel sounds, no masses, no organomegaly, soft, nontender, nondistended, no guarding, no rebound  tenderness   Extremities:   Moves all extremities well, no edema, no cyanosis, no redness   Pulses:   Pulses palpable and equal bilaterally. Normal radial, carotid, femoral, dorsalis pedis and posterior tibial pulses bilaterally. Normal abdominal aorta   Skin:  Psychiatric:   No bleeding, bruising or rash    Alert and oriented x 3, normal mood and affect   Lab Review:     Results from last 7 days   Lab Units 12/17/20  0453 12/16/20  1111   SODIUM mmol/L 136 136   POTASSIUM mmol/L 4.0 3.9   CHLORIDE mmol/L 99 103   CO2 mmol/L 25.4 23.2   BUN mg/dL 10 17   CREATININE mg/dL 2.19* 3.65*   CALCIUM mg/dL 9.1 9.0   BILIRUBIN mg/dL  --  0.5   ALK PHOS U/L  --  90   ALT (SGPT) U/L  --  82*   AST (SGOT) U/L  --  111*   GLUCOSE mg/dL 69 102*     Results from last 7 days   Lab Units 12/17/20  0453 12/16/20  2052 12/16/20  1111   TROPONIN T ng/mL 0.101*  0.108* 0.105*     Results from last 7 days   Lab Units 12/17/20  0453   WBC 10*3/mm3 8.99   HEMOGLOBIN g/dL 10.5*   HEMATOCRIT % 32.7*   PLATELETS 10*3/mm3 228                         Telemetry      \EKG this admission        I personally viewed and interpreted the patient's EKG/Telemetry data.- NSR        Assessment and Plan:    1.  70-year-old female with acute on chronic systolic congestive heart failure.  Ejection fraction of 32% with grade 3 diastolic dysfunction last month.  Her fluid is managed with dialysis per renal.  proBNP over 70,000 and was just over 40,002 months ago.  -Readmission within 30 days is a poor prognostic sign  2.  End-stage renal disease on hemodialysis via left brachial axillary AV shunt  3.  Coronary artery disease status post coronary artery bypass grafting cardiac cath January 2020 showed patent grafts.  She has no angina  4.  Chronically elevated troponin in the setting of end-stage renal disease but this is lower than it was 3 weeks ago she has no symptoms with follow clinically  5.  Confusion/metabolic encephalopathy  6.  Paroxysmal atrial fibrillation-this was new several weeks ago when she converted with loading dose amiodarone.  She is in normal sinus rhythm at this time  7.  Chronic hypotension agree with continuing midodrine  8.  Sepsis-she is being evaluated for UTI and temporary dialysis catheter has been sent for cytology  9.  Peripheral vascular disease status post right fem-popliteal bypass and left subclavian artery stent which reoccluded    Agree with discussion regarding CODE STATUS and goals of care with family.  We will follow and recommend conservative therapy.  Thank you for consult.      EBEN Alexis  12/17/20  11:12 EST

## 2020-12-17 NOTE — CONSULTS
CONSULT NOTE    Infectious Diseases - Kadeem Jean MD  [facility]       Patient Identification:  Name: Randi Martines  Age: 70 y.o.  Sex: female  :  1950  MRN: [id number]             Date of Consultation: 2020      Primary Care Physician: Jose Morrison DO                               Requesting Physician: Dr. Margaret Hernandez  Reason for Consultation:cipro on MAR, then recently had Invanz for 3 days. urine abnormal and AMS    Caveat: Source of information review of records as patient is unable to give detailed account of her symptoms.    Impression: Patient is a 70-year-old female with complicated past medical history of congestive heart failure, end-stage renal disease on hemodialysis, diabetes presented from nursing home due to altered mental status manifested as increased somnolence and associated with shortness of breath for 3 days.  Patient has history of recurrent urinary tract infection and over the course of last year she has multiple E. coli urinary tract infection with bacteremia and at times ESBL positive E. coli.  Patient has received Cipro in the past and was started on Invanz 3 days prior to coming to the hospital for abnormal urinalysis.  Work-up in the emergency room including ABG which showed evidence of increased PCO2 concerning for hypercapnic somnolence.  Because of persistent abnormal urinalysis and prior history of complicated urinary tract infections infectious service is consulted for advice.  Patient herself is unable to give much account of her symptoms.  Patient is admitted for further care with continued order for Invanz.  Upon review of her records over the course of last 24 hours it appears patient has been afebrile but still confused and unable to give detailed account of her symptoms.  She was found to be in acute hypercapnic respiratory failure with respiratory acidosis and appears to have much improved.  Patient herself denies any fever  and chills.  All of her culture data is so far negative.  This presentation of increased somnolence associated with shortness of breath and altered mental status in the above context is concerning for:  1-acute hypercapnic respiratory failure with respiratory acidosis improved  2-possible superimposed toxic metabolic encephalopathy due to urinary tract infection with recent history of ESBL positive E. coli currently on Invanz while urine culture is pending and blood cultures are pending  3-end-stage renal disease on hemodialysis  4-diabetes mellitus  5-coronary artery disease  6-chronic systolic congestive heart failure  7-history of recurrent urinary tract infection  8-retained right IJ tunnel catheter which is not functioning and patient is currently using left brachial axillary AV shunt for her dialysis for the last month.      Recommendations/Discussions:  At this juncture I agree with the care plan consisting of Invanz while awaiting the urine culture results.  Continue supportive care for end-stage renal disease with dialysis.  If her urine culture remain negative and patient continued to have been afebrile with stable hemodynamics then would recommend discontinuation of antibiotics.  Monitor closely for complications of antibiotic treatment such as yeast infection, evolving C. difficile infection and drug fever.  Thank you Dr. Hernandez for letting me be the part of your patient care please see above impression and recommendations      History of Present Illness:   Patient is a 70-year-old female with complicated past medical history of congestive heart failure, end-stage renal disease on hemodialysis, diabetes presented from nursing home due to altered mental status manifested as increased somnolence and associated with shortness of breath for 3 days.  Patient has history of recurrent urinary tract infection and over the course of last year she has multiple E. coli urinary tract infection with bacteremia and at  times ESBL positive E. coli.  Patient has received Cipro in the past and was started on Invanz 3 days prior to coming to the hospital for abnormal urinalysis.  Work-up in the emergency room including ABG which showed evidence of increased PCO2 concerning for hypercapnic somnolence.  Because of persistent abnormal urinalysis and prior history of complicated urinary tract infections infectious service is consulted for advice.  Patient herself is unable to give much account of her symptoms.  Patient is admitted for further care with continued order for Invanz.  Upon review of her records over the course of last 24 hours it appears patient has been afebrile but still confused and unable to give detailed account of her symptoms.  She was found to be in acute hypercapnic respiratory failure with respiratory acidosis and appears to have much improved.  Patient herself denies any fever and chills.  All of her culture data is so far negative.      Past Medical History:  Past Medical History:   Diagnosis Date   • Blind    • CAD (coronary artery disease)    • Chronic kidney disease    • Chronic systolic congestive heart failure (CMS/HCC)    • Diabetes mellitus (CMS/[facility])    • Dialysis patient (CMS/[facility])     MON, WED, FRI   • Dyslipidemia    • Foot ulcer (CMS/HCC)    • GERD (gastroesophageal reflux disease)    • History of diverticulitis    • History of stomach ulcers    • History of transfusion    • Hyperlipemia    • Hypertension    • Mesenteric ischemia (CMS/HCC)    • NSTEMI (non-ST elevated myocardial infarction) (CMS/HCC)    • Peptic ulcer disease    • PONV (postoperative nausea and vomiting)    • PVD (peripheral vascular disease) (CMS/HCC)    • UTI (urinary tract infection)      Past Surgical History:  Past Surgical History:   Procedure Laterality Date   • APPENDECTOMY  2015   • ARTERIOVENOUS FISTULA/SHUNT SURGERY Left 7/23/2020    Procedure: LEFT ARM BRACHIAL ARTERY AXILLARY VEIN GRAFT;  Surgeon: Loida Ugalde Jr.,  MD;  Location: [facility] MAIN OR;  Service: Vascular;  Laterality: Left;   • CARDIAC CATHETERIZATION N/A 1/2/2020    Procedure: LEFT HEART CATH  NO LV;  Surgeon: Emilio Serna MD;  Location: [facility] CATH INVASIVE LOCATION;  Service: Cardiology   • CARDIAC CATHETERIZATION N/A 1/2/2020    Procedure: CORONARY ANGIOGRAPHY;  Surgeon: Emilio Serna MD;  Location: [facility] CATH INVASIVE LOCATION;  Service: Cardiology   • CARDIAC CATHETERIZATION N/A 1/2/2020    Procedure: SAPHENOUS VEIN GRAFT;  Surgeon: Emilio Serna MD;  Location: [facility] CATH INVASIVE LOCATION;  Service: Cardiology   • CATARACT EXTRACTION     • CHOLECYSTECTOMY     • COLONOSCOPY  2015   • CORONARY ARTERY BYPASS GRAFT      7 coronary bypass surgery   • ENDOSCOPY N/A 11/23/2016    Procedure: ESOPHAGOGASTRODUODENOSCOPY ;  Surgeon: Katherine Green MD;  Location: [facility] ENDOSCOPY;  Service:    • ENDOSCOPY N/A 12/29/2019    Procedure: ESOPHAGOGASTRODUODENOSCOPYwith bx;  Surgeon: Ziyad Manzo MD;  Location: [facility] ENDOSCOPY;  Service: Gastroenterology   • ERCP N/A 10/2/2017    Procedure: ENDOSCOPIC RETROGRADE CHOLANGIOPANCREATOGRAPHY with sphincterotomy, balloon sweep;  Surgeon: Christopher Graf MD;  Location: [facility] ENDOSCOPY;  Service:    • HERNIA REPAIR  2015   • HYSTERECTOMY     • INSERTION HEMODIALYSIS CATHETER Right 6/10/2020    Procedure: HEMODIALYSIS CATHETER INSERTION;  Surgeon: Stevan Graves MD;  Location: [facility] MAIN OR;  Service: Vascular;  Laterality: Right;   • INSERTION HEMODIALYSIS CATHETER N/A 9/23/2020    Procedure: HEMODIALYSIS CATHETER INSERTION;  Surgeon: Zay Gilliam MD;  Location: [facility] MAIN OR;  Service: Vascular;  Laterality: N/A;   • UT VEIN IN SITU BYPASS GRAFT,FEM-POP Left 5/31/2017    Procedure: ULTRASOUND RIGHT AIF, ARTERIOGRAM W/ LEFT COMMON ILIAC ANGIOPLASTY STENT, LT FEMORAL POPLITEAL BYPASS WITH ARTEGRAFT, LEFT POPLITEAL ENDARTERECTOMY PATCH, LEFT COMMON ARTERIOGRAM, LEFT EXTERNAL ILIAC ANGIOPLASTY  STENT PLACEMENT, AORTA,  EXTERNAL, DISTAL PRESSURES;  Surgeon: Jayda Barrientos MD;  Location: [facility] OR 18/19;  Service: Vascular   • SKIN GRAFT SPLIT THICKNESS Left 6/7/2017    Procedure: DEBRIDEMENT AND SKIN GRAFT TO  LEFT DORSAL FOOT AND TOES;  Surgeon: Maurine Waterhouse, MD;  Location: [facility] MAIN OR;  Service:    • UPPER GASTROINTESTINAL ENDOSCOPY  2015    scar in gastric body, gastric ulcers      Home Meds:  Medications Prior to Admission   Medication Sig Dispense Refill Last Dose   • acetaminophen (TYLENOL) 500 MG tablet Take 500 mg by mouth Every 6 (Six) Hours As Needed for Mild Pain .      • aspirin 81 MG EC tablet Take 81 mg by mouth Every Night.      • atorvastatin (LIPITOR) 80 MG tablet TAKE 1 TABLET BY MOUTH EVERY NIGHT. 90 tablet 1    • B Complex-C-Folic Acid (Juju-Charlie) tablet Take 1 tablet by mouth Daily.      • Blood Glucose Monitoring Suppl (ACCU-CHEK CECILE PLUS) w/Device kit 1 Device Daily. Dx. E11.9 1 kit 0    • ciprofloxacin (CIPRO) 500 MG tablet Take 500 mg by mouth Daily.      • docusate sodium (COLACE) 100 MG capsule Take 200 mg by mouth every night at bedtime.      • DROPLET PEN NEEDLES 31G X 8 MM misc USE AS DIRECTED 4 TIMES DAILY (Patient taking differently: Inject 1 each under the skin into the appropriate area as directed 2 (two) times a day.) 400 each 2    • DULoxetine (CYMBALTA) 60 MG capsule Take 1 capsule by mouth Daily. 90 capsule 1    • ertapenem (INVanz) 500 mg/50 mL 0.9% NS Infuse 500 mg into a venous catheter Daily. For 3 days started 12/13/20      • gabapentin (NEURONTIN) 300 MG capsule Take 1 capsule by mouth every night at bedtime. 10 capsule 0    • glucose blood (Accu-Chek Cecile Plus) test strip 1 each by Other route 2 (Two) Times a Day. Use as instructed. Dx. E11.9 400 each 0    • insulin aspart (NovoLOG FlexPen) 100 UNIT/ML solution pen-injector sc pen Inject 15 Units under the skin into the appropriate area as directed 3 (Three) Times a Day With Meals. 14 pen  3    • lactulose (CHRONULAC) 10 GM/15ML solution Take 15 mL by mouth Daily As Needed (constipation). 236 mL 0    • Levemir FlexTouch 100 UNIT/ML injection Inject 15 Units under the skin into the appropriate area as directed 2 (Two) Times a Day. (Patient taking differently: Inject 15 Units under the skin into the appropriate area as directed Daily.) 45 mL 1    • Liraglutide (VICTOZA) 18 MG/3ML solution pen-injector injection Inject 1.8 mg under the skin into the appropriate area as directed Daily. (Patient taking differently: Inject 1.8 mg under the skin into the appropriate area as directed Every Night.) 9 pen 3    • melatonin 5 MG tablet tablet Take 10 mg by mouth Every Night.      • midodrine (PROAMATINE) 5 MG tablet Take 1 tablet by mouth 3 (Three) Times a Day Before Meals. 90 tablet 3    • pantoprazole (PROTONIX) 40 MG EC tablet Take 1 tablet by mouth 2 (two) times a day. 180 tablet 3    • promethazine (PHENERGAN) 25 MG tablet Take 1 tablet by mouth Every 6 (Six) Hours As Needed for Nausea or Vomiting. 3 tablet 0    • sucralfate (CARAFATE) 1 g tablet Take 1 tablet by mouth 3 (Three) Times a Day. (Patient taking differently: Take 1 g by mouth Daily.) 360 tablet 3    • traMADol (ULTRAM) 50 MG tablet Take 1 tablet by mouth Daily. 20 tablet 0      Current Meds:     Current Facility-Administered Medications:   •  acetaminophen (TYLENOL) tablet 650 mg, 650 mg, Oral, Q4H PRN **OR** acetaminophen (TYLENOL) 160 MG/5ML solution 650 mg, 650 mg, Oral, Q4H PRN **OR** acetaminophen (TYLENOL) suppository 650 mg, 650 mg, Rectal, Q4H PRN, Margaret Hernandez MD  •  albumin human 25 % IV SOLN 12.5 g, 12.5 g, Intravenous, PRN, Mj Bridges MD  •  aspirin EC tablet 81 mg, 81 mg, Oral, Nightly, Margaret Hernandez MD  •  b complex-vitamin c-folic acid (NEPHRO-LARA) tablet 1 tablet, 1 tablet, Oral, Daily, Margaret Hernandez MD  •  calcium carbonate (TUMS) chewable tablet 500 mg (200 mg elemental), 2 tablet,  Oral, BID PRN, Margaret Hernandez MD  •  dextrose (D50W) 25 g/ 50mL Intravenous Solution 25 g, 25 g, Intravenous, Q15 Min PRN, Margaret Hernandez MD  •  dextrose (GLUTOSE) oral gel 15 g, 15 g, Oral, Q15 Min PRN, Margaret Hernandez MD  •  docusate sodium (COLACE) capsule 200 mg, 200 mg, Oral, Nightly, Margaret Hernandez MD  •  ertapenem (INVanz) 500 mg in sodium chloride 0.9 % 50 mL IVPB, 500 mg, Intravenous, Q24H, Margaret Hernandez MD, Stopped at 12/17/20 0153  •  glucagon (human recombinant) (GLUCAGEN DIAGNOSTIC) injection 1 mg, 1 mg, Subcutaneous, Q15 Min PRN, Margaret Hernandez MD  •  insulin glargine (LANTUS) injection 10 Units, 10 Units, Subcutaneous, QAM, Margaret Hernandez MD  •  insulin lispro (ADMELOG) injection 0-7 Units, 0-7 Units, Subcutaneous, TID AC, Margaret Hernandez MD  •  lactulose (CHRONULAC) 10 GM/15ML solution 10 g, 10 g, Oral, Daily PRN, Margaret Hernandez MD  •  midodrine (PROAMATINE) tablet 5 mg, 5 mg, Oral, TID ACAlexandra Karen Marie, MD  •  morphine injection 1 mg, 1 mg, Intravenous, Q4H PRN **AND** naloxone (NARCAN) injection 0.4 mg, 0.4 mg, Intravenous, Q5 Min PRN, Margaret Hernandez MD  •  ondansetron (ZOFRAN) tablet 4 mg, 4 mg, Oral, Q6H PRN **OR** ondansetron (ZOFRAN) injection 4 mg, 4 mg, Intravenous, Q6H PRN, Margaret Hernandez MD  •  pantoprazole (PROTONIX) EC tablet 40 mg, 40 mg, Oral, BID, Margaret Hernandez MD, 40 mg at 12/17/20 0936  •  sodium chloride 0.9 % flush 1-10 mL, 1-10 mL, Intravenous, PRN, Margaret Hernandez MD  •  [COMPLETED] Insert peripheral IV, , , Once **AND** sodium chloride 0.9 % flush 10 mL, 10 mL, Intravenous, PRN, Margaret Hernandez MD  •  sodium chloride 0.9 % flush 10 mL, 10 mL, Intravenous, Q12H, Margaret Hernandez MD, 10 mL at 12/17/20 0914  •  sucralfate (CARAFATE) tablet 1 g, 1 g, Oral, Daily, Margaret Hernandez MD  Allergies:  Allergies   Allergen Reactions   • Augmentin  "[Amoxicillin-Pot Clavulanate] GI Intolerance and Dizziness   • Codeine GI Intolerance and Dizziness     Social History:   Social History     Tobacco Use   • Smoking status: Former Smoker     Packs/day: 3.00     Years: 20.00     Pack years: 60.00     Types: Cigarettes     Start date: 1969     Quit date: 2000     Years since quittin.9   • Smokeless tobacco: Never Used   • Tobacco comment: caffeine use   Substance Use Topics   • Alcohol use: No     Comment: caffeine use       Family History:  Family History   Problem Relation Age of Onset   • Heart disease Mother         cardiac disorder   • Cancer Maternal Grandmother    • Stroke Maternal Grandfather    • Hypertension Maternal Grandfather    • Malig Hyperthermia Neg Hx           Review of Systems  See history of present illness and past medical history.  Limited by confusion and disorientation.  Remainder of ROS is negative.      Vitals:   /66 (BP Location: Right arm, Patient Position: Lying)   Pulse 87   Temp 97.6 °F (36.4 °C) (Temporal)   Resp 16   Ht 152.4 cm (60\")   Wt 64.5 kg (142 lb 3.2 oz)   SpO2 94%   BMI 27.77 kg/m²   I/O:     Intake/Output Summary (Last 24 hours) at 2020 1755  Last data filed at 2020 0300  Gross per 24 hour   Intake 0 ml   Output 3000 ml   Net -3000 ml     Exam:  Patient is examined using the personal protective equipment as per guidelines from infection control for this particular patient as enacted.  Hand washing was performed before and after patient interaction.  General Appearance:   Awake pleasant elderly female who appears older than stated age.   Head:    Normocephalic, without obvious abnormality, atraumatic   Eyes:    PERRL, conjunctivae/corneas clear, EOM's intact, both eyes   Ears:    Normal external ear canals, both ears   Nose:   Nares normal, septum midline, mucosa normal, no drainage    or sinus tenderness   Throat:   Lips, tongue, gums normal; oral mucosa pink and moist   Neck:   Supple, " symmetrical, trachea midline, no adenopathy;     thyroid:  no enlargement/tenderness/nodules; no carotid    bruit or JVD   Back:     Symmetric, no curvature, ROM normal, no CVA tenderness   Lungs:     Clear to auscultation bilaterally, respirations unlabored   Chest Wall:    No tenderness or deformity    Heart:   S1-S2 regular no murmur heard.   Abdomen:    Obese soft nontender   Extremities:  Bilateral lower extremity chronic skin changes with significant hypersensitivity and tenderness with no evidence of cellulitis.  Left brachial hemodialysis AV fistula with thrill.   Pulses:   Pulses palpable in all extremities; symmetric all extremities   Skin:  Dry skin and lower extremities.   Neurologic:  Confused with grossly nonfocal examination.       Data Review:    I reviewed the patient's new clinical results.  Results from last 7 days   Lab Units 12/17/20  0453 12/16/20  1111   WBC 10*3/mm3 8.99 9.13   HEMOGLOBIN g/dL 10.5* 9.8*   PLATELETS 10*3/mm3 228 246     Results from last 7 days   Lab Units 12/17/20  0453 12/16/20  1111   SODIUM mmol/L 136  136 136   POTASSIUM mmol/L 3.9  4.0 3.9   CHLORIDE mmol/L 99  99 103   CO2 mmol/L 22.0  25.4 23.2   BUN mg/dL 9  10 17   CREATININE mg/dL 2.16*  2.19* 3.65*   CALCIUM mg/dL 9.2  9.1 9.0   GLUCOSE mg/dL 66  69 102*     Microbiology Results (last 10 days)     Procedure Component Value - Date/Time    Blood Culture - Blood, Arm, Right [[id number]] Collected: 12/16/20 1139    Lab Status: Preliminary result Specimen: Blood from Arm, Right Updated: 12/17/20 1200     Blood Culture No growth at 24 hours    Blood Culture - Blood, Arm, Right [[id number]] Collected: 12/16/20 1130    Lab Status: Preliminary result Specimen: Blood from Arm, Right Updated: 12/17/20 1200     Blood Culture No growth at 24 hours    COVID PRE-OP / PRE-PROCEDURE SCREENING ORDER (NO ISOLATION) - Swab, Nasopharynx [[id number]]  (Normal) Collected: 12/16/20 1113    Lab Status: Final result Specimen: Swab  from Nasopharynx Updated: 12/16/20 1247    Narrative:      The following orders were created for panel order COVID PRE-OP / PRE-PROCEDURE SCREENING ORDER (NO ISOLATION) - Swab, Nasopharynx.  Procedure                               Abnormality         Status                     ---------                               -----------         ------                     Respiratory Panel PCR w/...[[id number]]  Normal              Final result                 Please view results for these tests on the individual orders.    Respiratory Panel PCR w/COVID-19(SARS-CoV-2) TG/NERISSA/TAVO/PAD/COR/MAD/CIARAN In-House, NP Swab in UTM/VTM, 3-4 HR TAT - Swab, Nasopharynx [[id number]]  (Normal) Collected: 12/16/20 1113    Lab Status: Final result Specimen: Swab from Nasopharynx Updated: 12/16/20 1247     ADENOVIRUS, PCR Not Detected     Coronavirus 229E Not Detected     Coronavirus HKU1 Not Detected     Coronavirus NL63 Not Detected     Coronavirus OC43 Not Detected     COVID19 Not Detected     Human Metapneumovirus Not Detected     Human Rhinovirus/Enterovirus Not Detected     Influenza A PCR Not Detected     Influenza B PCR Not Detected     Parainfluenza Virus 1 Not Detected     Parainfluenza Virus 2 Not Detected     Parainfluenza Virus 3 Not Detected     Parainfluenza Virus 4 Not Detected     RSV, PCR Not Detected     Bordetella pertussis pcr Not Detected     Bordetella parapertussis PCR Not Detected     Chlamydophila pneumoniae PCR Not Detected     Mycoplasma pneumo by PCR Not Detected    Narrative:      Fact sheet for providers: https://docs.[employer]/wp-content/uploads/OGY3174-1151-RZ8.1-EUA-Provider-Fact-Sheet-3.pdf    Fact sheet for patients: https://docs.[employer]/wp-content/uploads/REF4511-2033-BK4.1-EUA-Patient-Fact-Sheet-1.pdf    Test performed by PCR.            Assessment:  Active Hospital Problems    Diagnosis  POA   • Metabolic encephalopathy [G93.41]  Yes   • Ischemic cardiomyopathy [I25.5]  Yes   • ESRD on hemodialysis  (CMS/[facility]) [N18.6, Z99.2]  Not Applicable   • Acute on chronic combined systolic and diastolic CHF (congestive heart failure) (CMS/[facility]) [I50.43]  Yes   • UTI (urinary tract infection) [N39.0]  Yes   • Orthostatic hypotension [I95.1]  Yes   • Type 2 diabetes mellitus without complication, with long-term current use of insulin (CMS/[facility]) [E11.9, Z79.4]  Not Applicable   • PVD (peripheral vascular disease) (CMS/[facility]) [I73.9]  Yes      Resolved Hospital Problems   No resolved problems to display.         Plan:  See above.  Kadeem Cruz MD   12/17/2020  17:55 EST    Much of this encounter note is an electronic transcription/translation of spoken language to printed text. The electronic translation of spoken language may permit erroneous, or at times, nonsensical words or phrases to be inadvertently transcribed; Although I have reviewed the note for such errors, some may still exist

## 2020-12-17 NOTE — PROGRESS NOTES
"   LOS: 0 days    Patient Care Team:  Jose Morrison DO as PCP - General (Family Medicine)  Samantha Uriostegui MD as Consulting Physician (Cardiology)  Emilio Vazquez MD as Referring Physician (Nephrology)    Chief Complaint:    Chief Complaint   Patient presents with   • Weakness - Generalized     Follow UP ESRD  Subjective     Interval History:   Did not eat.  RN reports that she did take her protonix po without difficulty.  Opens her eyes very briefly.  Does answer my questions yes and no.   Knows [employer] and year. Does not remember coming in hospital. 3 kg off with dialysis yesterday .    Review of Systems:   See above.     Objective     Vital Signs  Temp:  [97.7 °F (36.5 °C)-98.5 °F (36.9 °C)] 98 °F (36.7 °C)  Heart Rate:  [72-95] 80  Resp:  [12-19] 18  BP: ()/(60-81) 93/60    Flowsheet Rows      First Filed Value   Admission Height  152.4 cm (60\") Documented at 12/16/2020 1053   Admission Weight  66.7 kg (147 lb) Documented at 12/16/2020 1053          No intake/output data recorded.  I/O last 3 completed shifts:  In: 0   Out: 3000 [Other:3000]    Intake/Output Summary (Last 24 hours) at 12/17/2020 1052  Last data filed at 12/17/2020 0300  Gross per 24 hour   Intake 0 ml   Output 3000 ml   Net -3000 ml       Physical Exam:  Keeps eyes closed after opening briefly.  Mask applied. Oral mucosa dry. No scleral icterus. Left temple bruise .  TDC site dressed. Line r to be removed this am  Heart RRR no s3 or rub  Lungs bibasilar rales, no wheezing  Abd + bs, extensive bruise left breast and flank. Soft, nontender  Ext LUE AVG patent.  No muscle mass.    Trace lower ext edema.    Results Review:    Results from last 7 days   Lab Units 12/17/20  0453 12/16/20  1111   SODIUM mmol/L 136 136   POTASSIUM mmol/L 4.0 3.9   CHLORIDE mmol/L 99 103   CO2 mmol/L 25.4 23.2   BUN mg/dL 10 17   CREATININE mg/dL 2.19* 3.65*   CALCIUM mg/dL 9.1 9.0   BILIRUBIN mg/dL  --  0.5   ALK PHOS U/L  --  90   ALT (SGPT) U/L  " --  82*   AST (SGOT) U/L  --  111*   GLUCOSE mg/dL 69 102*       Estimated Creatinine Clearance: 19.8 mL/min (A) (by C-G formula based on SCr of 2.19 mg/dL (H)).                Results from last 7 days   Lab Units 12/17/20  0453 12/16/20  1111   WBC 10*3/mm3 8.99 9.13   HEMOGLOBIN g/dL 10.5* 9.8*   PLATELETS 10*3/mm3 228 246               Imaging Results (Last 24 Hours)     Procedure Component Value Units Date/Time    CT Head Without Contrast [[id number]] Collected: 12/16/20 1308     Updated: 12/16/20 1308    Narrative:      CT HEAD WITHOUT CONTRAST     HISTORY: Contusion to forehead on the left, headache.     COMPARISON: CT head 06/27/2020.     FINDINGS: The brain ventricles are symmetrical. There is no evidence of  hemorrhage, hydrocephalus or of abnormal extra-axial fluid. No focal  area of decreased attenuation to suggest acute infarction is identified.  Moderate vascular calcification involving the carotid siphons are noted  bilaterally.       Impression:      No evidence of fracture, intracranial hemorrhage or of acute  infarction. Further evaluation could be performed with an MRI  examination of the brain as indicated.           Radiation dose reduction techniques were utilized, including automated  exposure control and exposure modulation based on body size.          XR Chest 1 View [[id number]] Collected: 12/16/20 1138     Updated: 12/16/20 1143    Narrative:      XR CHEST 1 VW-     HISTORY:  Weakness. Strokelike symptoms.     COMPARISON:  Chest radiograph 11/20/2020.     FINDINGS:    A single portable view of the chest was obtained. There is a right IJ  central line, unchanged. There is a vascular stent projecting over the  left upper mediastinum. The cardiac silhouette is enlarged, similar to  the prior study. There are post surgical changes from CABG. There is  calcific aortic atherosclerosis. There is central pulmonary venous  congestion. There are prominent interstitial markings bilaterally,  suggestive  of interstitial pulmonary edema. There are small bilateral  pleural effusions with adjacent bibasilar airspace opacity, suggestive  of atelectasis and/or layering pleural fluid with superimposed pneumonia  not entirely excluded. There are fractured median sternotomy wires,  unchanged. There is multilevel degenerative disc disease.     This report was finalized on 12/16/2020 11:40 AM by Dr. Regina Pradhan M.D.           aspirin, 81 mg, Oral, Nightly  b complex-vitamin c-folic acid, 1 tablet, Oral, Daily  docusate sodium, 200 mg, Oral, Nightly  ertapenem, 500 mg, Intravenous, Q24H  insulin glargine, 10 Units, Subcutaneous, QAM  insulin lispro, 0-7 Units, Subcutaneous, TID AC  lidocaine-EPINEPHrine, 1 mL, Infiltration, Once  midodrine, 5 mg, Oral, TID AC  pantoprazole, 40 mg, Oral, BID  sodium chloride, 10 mL, Intravenous, Q12H  sucralfate, 1 g, Oral, Daily           Medication Review:   Current Facility-Administered Medications   Medication Dose Route Frequency Provider Last Rate Last Admin   • acetaminophen (TYLENOL) tablet 650 mg  650 mg Oral Q4H PRN Margaret Hernandez MD        Or   • acetaminophen (TYLENOL) 160 MG/5ML solution 650 mg  650 mg Oral Q4H PRN Margaret Hernandez MD        Or   • acetaminophen (TYLENOL) suppository 650 mg  650 mg Rectal Q4H PRN Margaret Hernandez MD       • albumin human 25 % IV SOLN 12.5 g  12.5 g Intravenous PRN Mj Bridges MD       • aspirin EC tablet 81 mg  81 mg Oral Nightly Margaret Hernandez MD       • b complex-vitamin c-folic acid (NEPHRO-LARA) tablet 1 tablet  1 tablet Oral Daily Margaret Hernandez MD       • calcium carbonate (TUMS) chewable tablet 500 mg (200 mg elemental)  2 tablet Oral BID PRN Margaret Hernandez MD       • dextrose (D50W) 25 g/ 50mL Intravenous Solution 25 g  25 g Intravenous Q15 Min PRN Margaret Hernandez MD       • dextrose (GLUTOSE) oral gel 15 g  15 g Oral Q15 Min PRN Margaret Hernandez MD       •  docusate sodium (COLACE) capsule 200 mg  200 mg Oral Nightly Margaret Hernandez MD       • ertapenem (INVanz) 500 mg in sodium chloride 0.9 % 50 mL IVPB  500 mg Intravenous Q24H Margaret Hernandez MD   Stopped at 12/17/20 0153   • glucagon (human recombinant) (GLUCAGEN DIAGNOSTIC) injection 1 mg  1 mg Subcutaneous Q15 Min PRN Margaret Hernandez MD       • insulin glargine (LANTUS) injection 10 Units  10 Units Subcutaneous QAM Margaret Hernandez MD       • insulin lispro (ADMELOG) injection 0-7 Units  0-7 Units Subcutaneous TID AC Margaret Hernandez MD       • lactulose (CHRONULAC) 10 GM/15ML solution 10 g  10 g Oral Daily PRN Margaret Hernandez MD       • lidocaine-EPINEPHrine (XYLOCAINE W/EPI) 1 %-1:896856 injection 1 mL  1 mL Infiltration Once Harpal Fletcher MD       • midodrine (PROAMATINE) tablet 5 mg  5 mg Oral TID AC Gladys Morris MD       • morphine injection 1 mg  1 mg Intravenous Q4H PRN Margaret Hernandez MD        And   • naloxone (NARCAN) injection 0.4 mg  0.4 mg Intravenous Q5 Min PRN Margaret Hernandez MD       • ondansetron (ZOFRAN) tablet 4 mg  4 mg Oral Q6H PRN Margaret Hernandez MD        Or   • ondansetron (ZOFRAN) injection 4 mg  4 mg Intravenous Q6H PRN Margaret Hernandez MD       • pantoprazole (PROTONIX) EC tablet 40 mg  40 mg Oral BID Margaret Hernandez MD   40 mg at 12/17/20 0936   • sodium chloride 0.9 % flush 1-10 mL  1-10 mL Intravenous PRN Margaret Hernandez MD       • sodium chloride 0.9 % flush 10 mL  10 mL Intravenous PRN Margaret Hernandez MD       • sodium chloride 0.9 % flush 10 mL  10 mL Intravenous Q12H Margaret Hernandez MD   10 mL at 12/17/20 0914   • sucralfate (CARAFATE) tablet 1 g  1 g Oral Daily Margaret Hernandez MD           Assessment/Plan   1. ESRD. Dialysis again today for volume removal as bp allows.  Usually on TTS schedule. TDC removed this am.   2. Altered mental status. Likely  "multifactorial including pyuria with possible UTI ( cath specimen), medications such as ultram, neurontin, hypercapnea, vascular calcification on CT brain. I called [facility] home and left message for RN on her unit to call me.    3. Pyuria, hematuria.  On empiric Ertapenem.  Last admission 11/22 had >100K mixed cristina on UA.   4. DM2  5. 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 Chronic hypotension. Resume midodrine.   6. SP fall with facial and flank bruising.   7. Hypercapnea with respiratory acidemia and metabolic component from ESRD.     Long discussion with daughter in law.  All questions answered. Explained possible reasons for mental status changes . She expressed that patient has not done well past few months. Not eating, has had loss of son in past 18 months.  Planned move to [place] with son and his family in May, but patient has expressed that she \" is not going to make it to May\".  I brought up goals of care including code status.  She says that she and her  have talked about this and patient would not want to be on vent. [facility]  I told her they should have this conversation especially in view of re-admission.  May be able to visit if  says ok.        Gladys Morris MD  12/17/20  10:52 EST    "

## 2020-12-17 NOTE — PROGRESS NOTES
"Adult Nutrition  Assessment/PES    Patient Name:  Randi Martines  YOB: 1950  MRN: [id number]  Admit Date:  12/16/2020    Assessment Date:  12/17/2020    Comments:  Nutrition assessment complete due to Pressure injury to coccyx.  Confusion noted. Currently off unit for HD.  0% po at lunch.  Palliative care consult is pending. Will follow clinical course, offer nutrition supplements if needed.    Reason for Assessment     Row Name 12/17/20 1531          Reason for Assessment    Reason For Assessment  identified at risk by screening criteria     Diagnosis  renal disease;infection/sepsis;cardiac disease;neurologic conditions sepsis due to infected diaylsis cath, AMS, ESRD- HD, DM, ischemic cardiomyopathy, CHF     Identified At Risk by Screening Criteria  large or nonhealing wound, burn or pressure injury         Nutrition/Diet History     Row Name 12/17/20 1533          Nutrition/Diet History    Typical Food/Fluid Intake  poor prognosis, palliative to see     Factors Affecting Nutritional Intake  impaired cognitive status/motor control         Anthropometrics     Row Name 12/17/20 1530          Anthropometrics    Height  152.4 cm (60\")        Admit Weight    Admit Weight  64.5 kg (142 lb 3.2 oz)        Ideal Body Weight (IBW)    Ideal Body Weight (IBW) (kg)  45.86     % of Ideal Body Weight Assessment  -- 145% IBW        Usual Body Weight (UBW)    Weight Loss  unintentional     Weight Loss Time Frame  10lb in 1 month        Body Mass Index (BMI)    BMI Assessment  BMI 25-29.9: overweight BMI 28         Labs/Tests/Procedures/Meds     Row Name 12/17/20 1534          Labs/Procedures/Meds    Lab Results Reviewed  reviewed, pertinent     Lab Results Comments  Cr, + Occult stool, H/H        Diagnostic Tests/Procedures    Diagnostic Test/Procedure Reviewed  reviewed, pertinent     Diagnostic Test/Procedures Comments  CT Head, HD today (now)        Medications    Pertinent Medications Reviewed  reviewed, " "pertinent     Pertinent Medications Comments  asa, nephrovite, colace,. lacltus, protonix, carafate         Physical Findings     Row Name 12/17/20 1535          Physical Findings    Oral/Mouth Cavity  other (see comments) dentures     Skin  pressure injury PI to coccyx         Estimated/Assessed Needs     Row Name 12/17/20 1536 12/17/20 1533       Calculation Measurements    Weight Used For Calculations  64.5 kg (142 lb 3.2 oz)  --    Height  --  152.4 cm (60\")       Estimated/Assessed Needs    Additional Documentation  KCAL/KG (Group);Protein Requirements (Group);Fluid Requirements (Group)  --       KCAL/KG    KCAL/KG  25 Kcal/Kg (kcal)  --    25 Kcal/Kg (kcal)  1612.5  --       Protein Requirements    Weight Used For Protein Calculations  64.5 kg (142 lb 3.2 oz)  --    Est Protein Requirement Amount (gms/kg)  1.2 gm protein  --    Estimated Protein Requirements (gms/day)  77.4  --       Fluid Requirements    Fluid Requirements (mL/day)  1600  --    RDA Method (mL)  1600  --        Nutrition Prescription Ordered     Row Name 12/17/20 1537          Nutrition Prescription PO    Current PO Diet  Regular         Evaluation of Received Nutrient/Fluid Intake     Row Name 12/17/20 1537          PO Evaluation    % PO Intake  0% lunch         Problem/Interventions:  Problem 1     Row Name 12/17/20 1537          Nutrition Diagnoses Problem 1    Problem 1  Predicted Suboptimal Intake     Etiology (related to)  Medical Diagnosis     Neurological  AMS     Signs/Symptoms (evidenced by)  Unintended Weight Change;PO Intake     Percent (%) intake recorded  0 %     Over number of meals  1     Unintended Weight Change  Loss     Number of Pounds Lost  10lb     Weight loss time period  1 month         Intervention Goal     Row Name 12/17/20 1534          Intervention Goal    General  Maintain nutrition;Disease management/therapy;Reduce/improve symptoms;Improved nutrition related lab(s);Meet nutritional needs for age/condition " palliative care consul pending     PO  Tolerate PO     Weight  Maintain weight         Nutrition Intervention     Row Name 12/17/20 1539          Nutrition Intervention    RD/Tech Action  Follow Tx progress;Await begin PO;Care plan reviewd           Education/Evaluation     Row Name 12/17/20 1539          Education    Education  Will Instruct as appropriate        Monitor/Evaluation    Monitor  Per protocol;Skin status;Symptoms;I&O;Pertinent labs;PO intake;Weight           Electronically signed by:  Celine Cornelius RD  12/17/20 15:40 EST

## 2020-12-17 NOTE — PLAN OF CARE
Goal Outcome Evaluation:  Plan of Care Reviewed With: patient  Progress: no change  Outcome Summary: Pt drowsy, but arousable this morning. Able to say name, place, and time. Refusing to eat. Tunnel cath removed at bedside by Dr. ELGIN Ugalde. Currently off unit in dialysis. Spoke with daughter a few times, they are discussing code status. Patients son is on his way home, per risk management he has the okay for a one time visit, waiting on return call regarding if that will be tonight or tomorrow.

## 2020-12-17 NOTE — OP NOTE
Operative note    Preoperative diagnosis: Sepsis secondary to infected right internal jugular tunneled dialysis catheter    Postoperative diagnosis:   Same    Procedure: Movable of right internal jugular tunneled dialysis catheter with culture of the catheter tip    Surgeon: Loida Ugalde MD    Anesthesia: Local    Estimated blood loss: Less than 5 cc    Complication: None    Specimen: Catheter tip for routine culture and sensitivity    Description procedure the patient was placed supine in her bed.  The right anterior chest was prepped using ChloraPrep and draped in usual sterile fashion.  Local anesthetic was infiltrated into the skin and subcutaneous tissue around the catheter at the exit site.  The catheter was dissected away from the surrounding subcutaneous tissue including the cuff.  The entire catheter was then removed in total.  The tip of the catheter was placed into a sterile culture cup.  Pressure was held above and below the clavicle for 5 minutes to achieve hemostasis.  The subcutaneous tissue was then closed with a 3-0 Vicryl suture.  Sterile dressings were applied over this.  The patient tolerated procedure satisfactorily.    The catheter tip was sent for routine culture and sensitivity.

## 2020-12-17 NOTE — PROGRESS NOTES
Continued Stay Note  [facility]     Patient Name: Randi Martines  MRN: [id number]  Today's Date: 12/17/2020    Admit Date: 12/16/2020    Discharge Plan     Row Name 12/17/20 1241       Plan    Plan  [facility] vs poss palliative/Hospice.    Patient/Family in Agreement with Plan  yes    Plan Comments  Pt is confused and unable to give information. Called and spoke to Courtney Martines, daughter in law, [phone]. Explained roll of . Face sheet and pharmacy verified. Pt lives normally lives alone and receives her HD at [facility] on [address]. Received pt. form [facility] where she was receiving skilled rehab and HD there. Courtney states that pt.’s health has been declining recently. Dr. Morris had long discussion with daughter in law today regarding health issues and plan of care. Palliative care consulted to address goals of care. Pt has been to [facility] for Rehab and used [facility].  Pt’s PCP is Dr. TRUPTI Morrison. Explained that CCP would follow to assess for discharge needs.  Omar Alarcon RN-BC        Discharge Codes    No documentation.             Omar Alarcon RN

## 2020-12-17 NOTE — PLAN OF CARE
"Goal Outcome Evaluation:  Plan of Care Reviewed With: patient  Progress: no change  Outcome Summary: Pt admitted to [facility] for metabolic encephalopathy; pt arrived to unit from dialysis, 3L of fluid removed. Pt lethargic on admission, alert to self only. As the shift progressed pt became more alert, still oriented to self and fixated on \"going to dialysis\", redirected easily, tolerating diet. No BM this shift to collect sample. Consults placed this am as ordered. Q 2 turn and SCDs on. No distress noted, continue to monitor.  "

## 2020-12-17 NOTE — PROGRESS NOTES
Name: Randi Martines ADMIT: 2020   : 1950  PCP: Jose Morrison DO    MRN: [id number] LOS: 0 days   AGE/SEX: 70 y.o. female  ROOM: [facility]/     Subjective   Subjective    Patient is known to me from prior admissions, looks much worse than I remember her from a few weeks ago when I admitted her.  She was then at that time with fluid overload due to CHF and renal disease.  While here she did have some issues with altered mental status and apparently psychiatry saw her.  From talking with Dr. Morris it sounded like some of her meds have been stopped while she was here but on the discharge summary had been restarted such as gabapentin and tramadol.  She was brought back in due to altered mental status.  Apparently she has been on some antibiotics at the nursing facility with Invanz already.  She has grown E. coli on numerous prior urine specimens here, some of the time ESBL.  She woke up and talk to me a little bit was very drowsy    Review of Systems   Unable to perform ROS: Mental status change        Objective   Objective   Vital Signs  Temp:  [97.7 °F (36.5 °C)-98.5 °F (36.9 °C)] 98.3 °F (36.8 °C)  Heart Rate:  [79-95] 86  Resp:  [16-18] 18  BP: ()/(60-81) 107/69  SpO2:  [93 %-95 %] 94 %  on  Flow (L/min):  [2] 2;   Device (Oxygen Therapy): room air  Body mass index is 27.77 kg/m².  Physical Exam  Vitals signs and nursing note reviewed.   Constitutional:       General: She is not in acute distress.     Appearance: She is ill-appearing. She is not diaphoretic.      Comments: Very drowsy, opens eyes but fell asleep during our conversation.   HENT:      Mouth/Throat:      Mouth: Mucous membranes are dry.   Neck:      Musculoskeletal: Neck supple.   Cardiovascular:      Rate and Rhythm: Normal rate and regular rhythm.      Heart sounds: No murmur.   Pulmonary:      Breath sounds: No wheezing.      Comments: Decreased breath sounds  Abdominal:      General: Bowel sounds are normal.       Palpations: Abdomen is soft.      Tenderness: There is no abdominal tenderness.   Musculoskeletal:         General: Swelling present.   Skin:     General: Skin is warm and dry.   Psychiatric:         Mood and Affect: Mood normal.         Results Review     I reviewed the patient's new clinical results.  Results from last 7 days   Lab Units 12/17/20  0453 12/16/20  1111   WBC 10*3/mm3 8.99 9.13   HEMOGLOBIN g/dL 10.5* 9.8*   PLATELETS 10*3/mm3 228 246     Results from last 7 days   Lab Units 12/17/20  0453 12/16/20  1111   SODIUM mmol/L 136  136 136   POTASSIUM mmol/L 3.9  4.0 3.9   CHLORIDE mmol/L 99  99 103   CO2 mmol/L 22.0  25.4 23.2   BUN mg/dL 9  10 17   CREATININE mg/dL 2.16*  2.19* 3.65*   GLUCOSE mg/dL 66  69 102*   Estimated Creatinine Clearance: 19.8 mL/min (A) (by C-G formula based on SCr of 2.19 mg/dL (H)).  Results from last 7 days   Lab Units 12/17/20  0453 12/16/20  1111   ALBUMIN g/dL 3.10* 3.00*   BILIRUBIN mg/dL 0.5 0.5   ALK PHOS U/L 87 90   AST (SGOT) U/L 115* 111*   ALT (SGPT) U/L 92* 82*     Results from last 7 days   Lab Units 12/17/20  0453 12/16/20  1111   CALCIUM mg/dL 9.2  9.1 9.0   ALBUMIN g/dL 3.10* 3.00*     Results from last 7 days   Lab Units 12/16/20  1111   PROCALCITONIN ng/mL 0.13   LACTATE mmol/L 0.9     COVID19   Date Value Ref Range Status   12/16/2020 Not Detected Not Detected - Ref. Range Final   11/30/2020 Not Detected Not Detected - Ref. Range Final     Glucose   Date/Time Value Ref Range Status   12/17/2020 1131 98 70 - 130 mg/dL Final   12/17/2020 0624 80 70 - 130 mg/dL Final   12/17/2020 0454 73 70 - 130 mg/dL Final   12/16/2020 2107 115 70 - 130 mg/dL Final       CT Head Without Contrast  Narrative: CT HEAD WITHOUT CONTRAST     HISTORY: Contusion to forehead on the left, headache.     COMPARISON: CT head 06/27/2020.     FINDINGS: The brain ventricles are symmetrical. There is no evidence of  hemorrhage, hydrocephalus or of abnormal extra-axial fluid. No  focal  area of decreased attenuation to suggest acute infarction is identified.  Moderate vascular calcification involving the carotid siphons are noted  bilaterally.     Impression: No evidence of fracture, intracranial hemorrhage or of acute  infarction. Further evaluation could be performed with an MRI  examination of the brain as indicated.           Radiation dose reduction techniques were utilized, including automated  exposure control and exposure modulation based on body size.        XR Chest 1 View  XR CHEST 1 VW-     HISTORY:  Weakness. Strokelike symptoms.     COMPARISON:  Chest radiograph 11/20/2020.     FINDINGS:    A single portable view of the chest was obtained. There is a right IJ  central line, unchanged. There is a vascular stent projecting over the  left upper mediastinum. The cardiac silhouette is enlarged, similar to  the prior study. There are post surgical changes from CABG. There is  calcific aortic atherosclerosis. There is central pulmonary venous  congestion. There are prominent interstitial markings bilaterally,  suggestive of interstitial pulmonary edema. There are small bilateral  pleural effusions with adjacent bibasilar airspace opacity, suggestive  of atelectasis and/or layering pleural fluid with superimposed pneumonia  not entirely excluded. There are fractured median sternotomy wires,  unchanged. There is multilevel degenerative disc disease.     This report was finalized on 12/16/2020 11:40 AM by Dr. Regina Pradhan M.D.       Scheduled Medications  aspirin, 81 mg, Oral, Nightly  b complex-vitamin c-folic acid, 1 tablet, Oral, Daily  docusate sodium, 200 mg, Oral, Nightly  ertapenem, 500 mg, Intravenous, Q24H  insulin glargine, 10 Units, Subcutaneous, QAM  insulin lispro, 0-7 Units, Subcutaneous, TID AC  midodrine, 5 mg, Oral, TID AC  pantoprazole, 40 mg, Oral, BID  sodium chloride, 10 mL, Intravenous, Q12H  sucralfate, 1 g, Oral, Daily    Infusions   Diet  Diet Regular        Assessment/Plan     Active Hospital Problems    Diagnosis  POA   • Metabolic encephalopathy [G93.41]  Yes   • Ischemic cardiomyopathy [I25.5]  Yes   • ESRD on hemodialysis (CMS/[facility]) [N18.6, Z99.2]  Not Applicable   • Acute on chronic combined systolic and diastolic CHF (congestive heart failure) (CMS/[facility]) [I50.43]  Yes   • UTI (urinary tract infection) [N39.0]  Yes   • Orthostatic hypotension [I95.1]  Yes   • Type 2 diabetes mellitus without complication, with long-term current use of insulin (CMS/HCC) [E11.9, Z79.4]  Not Applicable   • PVD (peripheral vascular disease) (CMS/[facility]) [I73.9]  Yes      Resolved Hospital Problems   No resolved problems to display.       · Altered mental status-presumed combination of toxic and metabolic encephalopathy.  Could have had some degree of CO2 narcosis on admission and may be ongoing.  Also could be related to her medications including gabapentin which can be sedating and may be lingering in her system given her dialysis needs.  Infection also could be playing a role  · Possible UTI-patient is essentially anuric so not sure what to make of the urine culture but on empiric antibiotics and infectious disease consult is pending  · ESRD: Dialysis per Dr. Morris.  I discussed with her at length and her help was appreciated  · Diabetes-well controlled at present  · Anemia, stable  · Disposition-conversation between family and nephrology noted.Palliative care consult has been entered and I would agree with that.  She has really taken a downturn over the last several months and I did see her back in September as well as on admission last time.  Need to clarify CODE STATUS      Harpal Ramirez MD  [facility]  12/17/20  13:53 EST    Patient was wearing facemask when I entered the room and throughout our encounter.  I wore protective equipment throughout this patient encounter including a face mask, gloves and protective eyewear.  Hand hygiene was  performed before donning protective equipment and after removal when leaving the room.

## 2020-12-17 NOTE — NURSING NOTE
CWOCN consult- patient is in dialysis. She was admitted with a stage 2 pressure injury to her coccyx. Periwound skin appears moist/macerated so could also be related to moisture/incontinence. Staff is applying barrier ointment- she is incontinent of stool. I would recommend to continue to follow the protocol and apply barrier ointment. Difficult to keep a dressing on related to fecal incontinence. Ongoing turning & accumax pump. Float heels.

## 2020-12-17 NOTE — DISCHARGE PLACEMENT REQUEST
"Marce Hoffman (70 y.o. Female)     Date of Birth Social Security Number Address Home Phone MRN    1950  [id number] [phone] [id number]    Islam Marital Status          None        Admission Date Admission Type Admitting Provider Attending Provider Department, Room/Bed    12/16/20 Emergency Margaret Hernandez MD [place], Matthew Brett, MD [address], [address]/1    Discharge Date Discharge Disposition Discharge Destination                       Attending Provider: Harpal Ramirez MD    Allergies: Augmentin [Amoxicillin-pot Clavulanate], Codeine    Isolation: None   Infection: None   Code Status: CPR    Ht: 152.4 cm (60\")   Wt: 64.5 kg (142 lb 3.2 oz)    Admission Cmt: None   Principal Problem: None                Active Insurance as of 12/16/2020     Primary Coverage     Payor Plan Insurance Group Employer/Plan Group    HUMANA MEDICARE REPLACEMENT HU[employer]NA MEDICARE REPLACEMENT [id number]     Payor Plan Address Payor Plan Phone Number Payor Plan Fax Number Effective Dates    [address] [address] [phone]  1/1/2018 - None Entered    [facility] [address]       Subscriber Name Subscriber Birth Date Member ID       MARCE HOFFMAN 1950 [id number]                 Emergency Contacts      (Rel.) Home Phone Work Phone Mobile Phone    Bobbi (DAUGHTEROFLAW)Courtney (Relative) [phone] -- [phone]    Stevan Hoffman -- -- [phone]              "

## 2020-12-17 NOTE — PLAN OF CARE
Goal Outcome Evaluation:  Plan of Care Reviewed With: patient  Progress: no change  Outcome Summary: Pt is a 70 y.o female admitted from NH with AMS and metabolic encephalopathy. Pt with PMH including CHF, ESRD on dialysis, diabetes. Pt does report having some assist with ADL from NH staff prior but level of assist is unclear, pt does report she was walking prior. Pt is not a great historian, oriented X2 today with vc's. Pt requires increased processing time during session but able to sit EOB with mod A, CGA for sitting balance while engaging in self care tasks, feeding self with [place] assist for steading and Max A for LBD. Pt is lathargic today some increased alertness with EOB sitting, pt with generalized weakness and impaired activity tolerance impacting ADL. Would benefit from continued OT to increase ADL performance as pt appears to be below baseline. Anticipated d/c back to SNF with continued OT services.    Appropriate PPE worn during encounter including gloves, mask, and eye protection. Hand hygiene completed. Pt was not in a mask during encounter.

## 2020-12-18 NOTE — PLAN OF CARE
Goal Outcome Evaluation:  Plan of Care Reviewed With: patient, son  Progress: declining  Outcome Summary: Pt son in today to visit with patient. Decision to make patient palliative and comort measures. Pt more awake today while son was here, up in chair this afternoon. Ate some of her meals, son thinks this is only because he is here. Will be transfering over to palliative after shift change.

## 2020-12-18 NOTE — THERAPY EVALUATION
Patient Name: Randi Martines  : 1950    MRN: [id number]                              Today's Date: 2020       Admit Date: 2020    Visit Dx:     ICD-10-CM ICD-9-CM   1. Metabolic encephalopathy  G93.41 348.31   2. Hypervolemia, unspecified hypervolemia type  E87.70 276.69   3. Dialysis patient (CMS/Formerly [facility])  Z99.2 V45.11     Patient Active Problem List   Diagnosis   • Abdominal pain   • Intractable abdominal pain   • DM (diabetes mellitus), type 2 with neurological complications (CMS/[facility]ly [facility])   • PVD (peripheral vascular disease) (CMS/[facility]ly [facility])   • Mesenteric ischemia (CMS/[facility]ly [facility])   • Acute gastric ulcer   • S/P CABG (coronary artery bypass graft)   • Coronary artery disease involving native coronary artery of native heart without angina pectoris   • Atherosclerotic PVD with ulceration (CMS/[facility])   • Type 2 diabetes mellitus without complication, with long-term current use of insulin (CMS/[facility]ly [facility])   • Biliary obstruction   • Hyperlipemia   • Malabsorption of iron   • Stage 3 chronic kidney disease   • Anemia of chronic disease   • Gastrointestinal hemorrhage   • NSTEMI (non-ST elevated myocardial infarction) (CMS/[facility]ly [facility])   • Left ventricular systolic dysfunction   • Blood loss anemia   • Melena   • Subclavian artery stenosis, left (CMS/[facility])   • Hypertension   • ESRD (end stage renal disease) (CMS/[facility]ly [facility])   • Hypokalemia   • Obesity (BMI 30-39.9)   • Orthostatic hypotension   • Diabetes mellitus (CMS/[facility])   • Hyperglycemia   • Fall   • UTI (urinary tract infection)   • Sepsis due to urinary tract infection (CMS/[facility])   • Bacteremia due to Escherichia coli   • Pulmonary vascular congestion   • Bleeding from dialysis shunt (CMS/[facility])   • Long term (current) use of insulin (CMS/[facility])   • Diarrhea, unspecified   • History of peptic ulcer disease   • Presence of aortocoronary bypass graft   • Secondary hyperparathyroidism of renal origin (CMS/[facility])   • Unspecified protein-calorie malnutrition (CMS/[facility])   • Hypoglycemia,  unspecified   • Type 2 diabetes mellitus with foot ulcer (CMS/[facility])   • Recurrent major depressive disorder, in partial remission (CMS/[facility])   • Ischemic cardiomyopathy   • Valvular heart disease   • Acute on chronic combined systolic and diastolic CHF (congestive heart failure) (CMS/[facility])   • ESRD on hemodialysis (CMS/[facility])   • Metabolic encephalopathy     Past Medical History:   Diagnosis Date   • Blind    • CAD (coronary artery disease)    • Chronic kidney disease    • Chronic systolic congestive heart failure (CMS/[facility])    • Diabetes mellitus (CMS/[facility])    • Dialysis patient (CMS/[facility])     MON, WED, FRI   • Dyslipidemia    • Foot ulcer (CMS/[facility])    • GERD (gastroesophageal reflux disease)    • History of diverticulitis    • History of stomach ulcers    • History of transfusion    • Hyperlipemia    • Hypertension    • Mesenteric ischemia (CMS/[facility])    • NSTEMI (non-ST elevated myocardial infarction) (CMS/[facility])    • Peptic ulcer disease    • PONV (postoperative nausea and vomiting)    • PVD (peripheral vascular disease) (CMS/[facility])    • UTI (urinary tract infection)      Past Surgical History:   Procedure Laterality Date   • APPENDECTOMY  2015   • ARTERIOVENOUS FISTULA/SHUNT SURGERY Left 7/23/2020    Procedure: LEFT ARM BRACHIAL ARTERY AXILLARY VEIN GRAFT;  Surgeon: Loida Ugalde Jr., MD;  Location: [facility] MAIN OR;  Service: Vascular;  Laterality: Left;   • CARDIAC CATHETERIZATION N/A 1/2/2020    Procedure: LEFT HEART CATH  NO LV;  Surgeon: Emilio Serna MD;  Location: [facility] CATH INVASIVE LOCATION;  Service: Cardiology   • CARDIAC CATHETERIZATION N/A 1/2/2020    Procedure: CORONARY ANGIOGRAPHY;  Surgeon: Emilio Serna MD;  Location: [facility] CATH INVASIVE LOCATION;  Service: Cardiology   • CARDIAC CATHETERIZATION N/A 1/2/2020    Procedure: SAPHENOUS VEIN GRAFT;  Surgeon: Emilio Serna MD;  Location: [facility] CATH INVASIVE LOCATION;  Service: Cardiology   • CATARACT EXTRACTION     • CHOLECYSTECTOMY     •  COLONOSCOPY  2015   • CORONARY ARTERY BYPASS GRAFT      7 coronary bypass surgery   • ENDOSCOPY N/A 11/23/2016    Procedure: ESOPHAGOGASTRODUODENOSCOPY ;  Surgeon: Katherine Green MD;  Location: [facility] ENDOSCOPY;  Service:    • ENDOSCOPY N/A 12/29/2019    Procedure: ESOPHAGOGASTRODUODENOSCOPYwith bx;  Surgeon: Ziyad Mnazo MD;  Location: [facility] ENDOSCOPY;  Service: Gastroenterology   • ERCP N/A 10/2/2017    Procedure: ENDOSCOPIC RETROGRADE CHOLANGIOPANCREATOGRAPHY with sphincterotomy, balloon sweep;  Surgeon: Christopher Graf MD;  Location: [facility] ENDOSCOPY;  Service:    • HERNIA REPAIR  2015   • HYSTERECTOMY     • INSERTION HEMODIALYSIS CATHETER Right 6/10/2020    Procedure: HEMODIALYSIS CATHETER INSERTION;  Surgeon: Stevan Graves MD;  Location: [facility] OR;  Service: Vascular;  Laterality: Right;   • INSERTION HEMODIALYSIS CATHETER N/A 9/23/2020    Procedure: HEMODIALYSIS CATHETER INSERTION;  Surgeon: Zay Gilliam MD;  Location: [facility] OR;  Service: Vascular;  Laterality: N/A;   • ID VEIN IN SITU BYPASS GRAFT,FEM-POP Left 5/31/2017    Procedure: ULTRASOUND RIGHT AIF, ARTERIOGRAM W/ LEFT COMMON ILIAC ANGIOPLASTY STENT, LT FEMORAL POPLITEAL BYPASS WITH ARTEGRAFT, LEFT POPLITEAL ENDARTERECTOMY PATCH, LEFT COMMON ARTERIOGRAM, LEFT EXTERNAL ILIAC ANGIOPLASTY STENT PLACEMENT, AORTA,  EXTERNAL, DISTAL PRESSURES;  Surgeon: Jayda Barrientos MD;  Location: [facility] HYBRID OR 18/19;  Service: Vascular   • SKIN GRAFT SPLIT THICKNESS Left 6/7/2017    Procedure: DEBRIDEMENT AND SKIN GRAFT TO  LEFT DORSAL FOOT AND TOES;  Surgeon: Maurine Waterhouse, MD;  Location: [facility] OR;  Service:    • UPPER GASTROINTESTINAL ENDOSCOPY  2015    scar in gastric body, gastric ulcers     General Information     Row Name 12/18/20 2810          Physical Therapy Time and Intention    Document Type  evaluation  -CH     Mode of Treatment  individual therapy;physical therapy  -CH     Row Name 12/18/20 0207           General Information    Prior Level of Function  gait;transfer;bed mobility;min assist: pt state she walks with walker some times  -     Existing Precautions/Restrictions  fall  -     Barriers to Rehab  medically complex;previous functional deficit  -     Row Name 12/18/20 1312          Living Environment    Lives With  -- Pt admitted from SNF  -     Row Name 12/18/20 1312          Cognition    Orientation Status (Cognition)  oriented to;person;place  -     Row Name 12/18/20 1312          Safety Issues, Functional Mobility    Impairments Affecting Function (Mobility)  endurance/activity tolerance;postural/trunk control;cognition;balance;strength  -       User Key  (r) = Recorded By, (t) = Taken By, (c) = Cosigned By    Initials Name Provider Type     Yesenia Martines, PT Physical Therapist        Mobility     Row Name 12/18/20 1314          Bed Mobility    Bed Mobility  supine-sit;sit-supine  -     Supine-Sit Johnston (Bed Mobility)  verbal cues;nonverbal cues (demo/gesture);minimum assist (75% patient effort)  -     Sit-Supine Johnston (Bed Mobility)  not tested sitting in chair  -     Assistive Device (Bed Mobility)  head of bed elevated;draw sheet  -     Row Name 12/18/20 1314          Transfers    Comment (Transfers)  pt performed sit to stand x3  -     Row Name 12/18/20 1314          Sit-Stand Transfer    Sit-Stand Johnston (Transfers)  verbal cues;nonverbal cues (demo/gesture);minimum assist (75% patient effort)  -     Assistive Device (Sit-Stand Transfers)  walker, front-wheeled  -     Row Name 12/18/20 1314          Gait/Stairs (Locomotion)    Johnston Level (Gait)  nonverbal cues (demo/gesture);minimum assist (75% patient effort)  -     Assistive Device (Gait)  walker, front-wheeled  -     Distance in Feet (Gait)  3 ft bed to chair  -     Deviations/Abnormal Patterns (Gait)  kiki decreased;gait speed decreased;stride length decreased  [facility]      Bilateral Gait Deviations  forward flexed posture  -       User Key  (r) = Recorded By, (t) = Taken By, (c) = Cosigned By    Initials Name Provider Type    Yesenia Kraft, PT Physical Therapist        Obj/Interventions     Row Name 12/18/20 1315          Range of Motion Comprehensive    General Range of Motion  no range of motion deficits identified  -     Row Name 12/18/20 1315          Strength Comprehensive (MMT)    Comment, General Manual Muscle Testing (MMT) Assessment  generalized weakness noted with functional mobility. B LE grossly 3+/5  -       User Key  (r) = Recorded By, (t) = Taken By, (c) = Cosigned By    Initials Name Provider Type    Yesenia Kraft, PT Physical Therapist        Goals/Plan     Row Name 12/18/20 1316          Bed Mobility Goal 1 (PT)    Activity/Assistive Device (Bed Mobility Goal 1, PT)  bed mobility activities, all  -CH     Tichnor Level/Cues Needed (Bed Mobility Goal 1, PT)  supervision required  -CH     Time Frame (Bed Mobility Goal 1, PT)  1 week  -     Row Name 12/18/20 1316          Transfer Goal 1 (PT)    Activity/Assistive Device (Transfer Goal 1, PT)  transfers, all;walker, rolling  -CH     T[place]nor Level/Cues Needed (Transfer Goal 1, PT)  supervision required  -CH     Time Frame (Transfer Goal 1, PT)  1 week  -     Row Name 12/18/20 1316          Gait Training Goal 1 (PT)    Activity/Assistive Device (Gait Training Goal 1, PT)  gait (walking locomotion);walker, rolling  -CH     Tichnor Level (Gait Training Goal 1, PT)  supervision required  -CH     Distance (Gait Training Goal 1, PT)  100  -CH     Time Frame (Gait Training Goal 1, PT)  1 week  -       User Key  (r) = Recorded By, (t) = Taken By, (c) = Cosigned By    Initials Name Provider Type    Yesenia Kraft, PT Physical Therapist        Clinical Impression     Row Name 12/18/20 1316          Pain Scale: Numbers Pre/Post-Treatment    Pretreatment Pain Rating  0/10 - no pain   -     Posttreatment Pain Rating  0/10 - no pain  -     Row Name 12/18/20 1316          Plan of Care Review    Plan of Care Reviewed With  patient  -     Outcome Summary  Pt luz maria 71 yo F who was admitted from SNF with AMS, SOA, UTI, and metabolic encephalopathy. Pt presents to PT with impaired functional mobility and gait secondary to generalized weakness, impaired balance, and decreased activity tolerance. Pt may benefit from skilled PT to address strength, mobility, and gait.  -     Row Name 12/18/20 1316          Therapy Assessment/Plan (PT)    Patient/Family Therapy Goals Statement (PT)  to return to PLOF  -     Rehab Potential (PT)  good, to achieve stated therapy goals  -     Criteria for Skilled Interventions Met (PT)  skilled treatment is necessary  -     Row Name 12/18/20 1316          Positioning and Restraints    Pre-Treatment Position  in bed  -     Post Treatment Position  chair  -     In Chair  reclined;call light within reach;encouraged to call for assist;exit alarm on  -       User Key  (r) = Recorded By, (t) = Taken By, (c) = Cosigned By    Initials Name Provider Type     Yesenia Martines, PT Physical Therapist        Outcome Measures     Row Name 12/18/20 1319          How much help from another person do you currently need...    Turning from your back to your side while in flat bed without using bedrails?  3  -CH     Moving from lying on back to sitting on the side of a flat bed without bedrails?  3  -CH     Moving to and from a bed to a chair (including a wheelchair)?  3  -CH     Standing up from a chair using your arms (e.g., wheelchair, bedside chair)?  3  -CH     Climbing 3-5 steps with a railing?  1  -CH     To walk in hospital room?  2  -CH     AM-PAC 6 Clicks Score (PT)  15  -     Row Name 12/18/20 1319          Functional Assessment    Outcome Measure Options  AM-PAC 6 Clicks Basic Mobility (PT)  -       User Key  (r) = Recorded By, (t) = Taken By, (c) = Cosigned  By    Initials Name Provider Type     Yesenia Martines PT Physical Therapist        Physical Therapy Education                 Title: PT OT SLP Therapies (In Progress)     Topic: Physical Therapy (Done)     Point: Mobility training (Done)     Learning Progress Summary           Patient Acceptance, E,TB,D, VU,NR by  at 12/18/2020 1321                   Point: Home exercise program (Done)     Learning Progress Summary           Patient Acceptance, E,TB,D, VU,NR by  at 12/18/2020 1321                   Point: Body mechanics (Done)     Learning Progress Summary           Patient Acceptance, E,TB,D, VU,NR by  at 12/18/2020 1321                   Point: Precautions (Done)     Learning Progress Summary           Patient Acceptance, E,TB,D, VU,NR by  at 12/18/2020 1321                               User Key     Initials Effective Dates Name Provider Type Discipline     04/03/18 -  Yesenia Martines PT Physical Therapist PT              PT Recommendation and Plan  Planned Therapy Interventions (PT): balance training, bed mobility training, gait training, home exercise program, patient/family education, strengthening, transfer training  Plan of Care Reviewed With: patient  Outcome Summary: Pt luz maria 71 yo F who was admitted from SNF with AMS, SOA, UTI, and metabolic encephalopathy. Pt presents to PT with impaired functional mobility and gait secondary to generalized weakness, impaired balance, and decreased activity tolerance. Pt may benefit from skilled PT to address strength, mobility, and gait.     Time Calculation:   PT Charges     Row Name 12/18/20 1143             Time Calculation    Start Time  0902  -      Stop Time  0924  -      Time Calculation (min)  22 min  -      PT Received On  12/18/20  -      PT - Next Appointment  12/19/20  -      PT Goal Re-Cert Due Date  12/25/20  -         Time Calculation- PT    Total Timed Code Minutes- PT  15 minute(s)  -        User Key  (r) = Recorded By,  (t) = Taken By, (c) = Cosigned By    Initials Name Provider Type     Yesenia Martines, PT Physical Therapist        Therapy Charges for Today     Code Description Service Date Service Provider Modifiers Qty    49915863610 HC PT EVAL MOD COMPLEXITY 2 12/18/2020 Yesenia Martines, PT GP 1    [id number]  PT THERAPEUTIC ACT EA 15 MIN 12/18/2020 Yesenia Martines, PT GP 1          PT G-Codes  Outcome Measure Options: AM-PAC 6 Clicks Basic Mobility (PT)  AM-PAC 6 Clicks Score (PT): 15  AM-PAC 6 Clicks Score (OT): 11    Yesenia Martines, PT  12/18/2020

## 2020-12-18 NOTE — PLAN OF CARE
Goal Outcome Evaluation:  Plan of Care Reviewed With: patient  Progress: no change  Outcome Summary: VSS, room air mostly, O2 @ 2L occassionally to keep sats >92%, no c/o SOA. Pt very drowsy start of shift but more alert and interactive with staff early this am. C/o headache and back pain, prn Tylenol given and effective. Pt drinking more water but not eating. Turned q2. No distress noted, continue to monitor.

## 2020-12-18 NOTE — PROGRESS NOTES
Name: Randi Martines ADMIT: 2020   : 1950  PCP: Jose Morrison DO    MRN: [id number] LOS: 0 days   AGE/SEX: 70 y.o. female  ROOM: [facility]     Subjective   Subjective    Looks much better. Trying to eat some breakfast when I saw her this AM. Answering questions, still says she doesn't feel well.    Review of Systems   Respiratory: Negative for shortness of breath.    Cardiovascular: Negative for chest pain.        Objective   Objective   Vital Signs  Temp:  [97.7 °F (36.5 °C)-98.1 °F (36.7 °C)] 98.1 °F (36.7 °C)  Heart Rate:  [80-96] 80  Resp:  [16-18] 18  BP: ()/(52-82) 99/61  SpO2:  [96 %-99 %] 98 %  on  Flow (L/min):  [2] 2;   Device (Oxygen Therapy): room air  Body mass index is 26.31 kg/m².  Physical Exam  Vitals signs and nursing note reviewed.   Constitutional:       General: She is not in acute distress.     Appearance: She is ill-appearing. She is not diaphoretic.   HENT:      Mouth/Throat:      Mouth: Mucous membranes are dry.   Eyes:      General: No scleral icterus.  Neck:      Musculoskeletal: Neck supple.   Cardiovascular:      Rate and Rhythm: Normal rate and regular rhythm.      Heart sounds: No murmur.   Pulmonary:      Effort: Pulmonary effort is normal.      Breath sounds: No wheezing.      Comments: Decreased breath sounds  Abdominal:      General: Bowel sounds are normal.      Palpations: Abdomen is soft.      Tenderness: There is no abdominal tenderness.   Musculoskeletal:         General: Swelling present.   Skin:     General: Skin is warm and dry.   Neurological:      Mental Status: She is alert.   Psychiatric:         Mood and Affect: Mood normal.         Results Review     I reviewed the patient's new clinical results.  Results from last 7 days   Lab Units 20  0334 20  0453 20  1111   WBC 10*3/mm3 8.68 8.99 9.13   HEMOGLOBIN g/dL 11.0* 10.5* 9.8*   PLATELETS 10*3/mm3 184 228 246     Results from last 7 days   Lab Units 20  0334  12/17/20  0453 12/16/20  1111   SODIUM mmol/L 137 136  136 136   POTASSIUM mmol/L 3.6 3.9  4.0 3.9   CHLORIDE mmol/L 98 99  99 103   CO2 mmol/L 24.4 22.0  25.4 23.2   BUN mg/dL 8 9  10 17   CREATININE mg/dL 1.63* 2.16*  2.19* 3.65*   GLUCOSE mg/dL 80 66  69 102*   Estimated Creatinine Clearance: 25.8 mL/min (A) (by C-G formula based on SCr of 1.63 mg/dL (H)).  Results from last 7 days   Lab Units 12/18/20  0334 12/17/20  0453 12/16/20  1111   ALBUMIN g/dL 3.20* 3.10* 3.00*   BILIRUBIN mg/dL  --  0.5 0.5   ALK PHOS U/L  --  87 90   AST (SGOT) U/L  --  115* 111*   ALT (SGPT) U/L  --  92* 82*     Results from last 7 days   Lab Units 12/18/20  0334 12/17/20  0453 12/16/20  1111   CALCIUM mg/dL 9.2 9.2  9.1 9.0   ALBUMIN g/dL 3.20* 3.10* 3.00*   PHOSPHORUS mg/dL 1.6*  --   --      Results from last 7 days   Lab Units 12/16/20  1111   PROCALCITONIN ng/mL 0.13   LACTATE mmol/L 0.9     COVID19   Date Value Ref Range Status   12/16/2020 Not Detected Not Detected - Ref. Range Final   11/30/2020 Not Detected Not Detected - Ref. Range Final     Glucose   Date/Time Value Ref Range Status   12/18/2020 1630 110 70 - 130 mg/dL Final   12/18/2020 1138 135 (H) 70 - 130 mg/dL Final   12/18/2020 0625 98 70 - 130 mg/dL Final   12/17/2020 2013 82 70 - 130 mg/dL Final   12/17/2020 1131 98 70 - 130 mg/dL Final   12/17/2020 0624 80 70 - 130 mg/dL Final   12/17/2020 0454 73 70 - 130 mg/dL Final       CT Head Without Contrast  Narrative: CT HEAD WITHOUT CONTRAST     HISTORY: Contusion to forehead on the left, headache.     COMPARISON: CT head 06/27/2020.     FINDINGS: The brain ventricles are symmetrical. There is no evidence of  hemorrhage, hydrocephalus or of abnormal extra-axial fluid. No focal  area of decreased attenuation to suggest acute infarction is identified.  Moderate vascular calcification involving the carotid siphons are noted  bilaterally.     Impression: No evidence of fracture, intracranial hemorrhage or of  "acute  infarction. Further evaluation could be performed with an MRI  examination of the brain as indicated.           Radiation dose reduction techniques were utilized, including automated  exposure control and exposure modulation based on body size.     This report was finalized on 12/17/2020 4:00 PM by Dr. Lasha Rojas M.D.       Scheduled Medications  aspirin, 81 mg, Oral, Nightly  docusate sodium, 200 mg, Oral, Nightly  insulin lispro, 0-7 Units, Subcutaneous, TID AC  midodrine, 5 mg, Oral, TID AC  pantoprazole, 40 mg, Oral, BID  sodium chloride, 10 mL, Intravenous, Q12H  sucralfate, 1 g, Oral, Daily    Infusions   Diet  Diet Soft Texture; Whole [employer]       Assessment/Plan     Active Hospital Problems    Diagnosis  POA   • Metabolic encephalopathy [G93.41]  Yes   • Ischemic cardiomyopathy [I25.5]  Yes   • ESRD on hemodialysis (CMS/[facility]) [N18.6, Z99.2]  Not Applicable   • Acute on chronic combined systolic and diastolic CHF (congestive heart failure) (CMS/[facility]) [I50.43]  Yes   • UTI (urinary tract infection) [N39.0]  Yes   • Orthostatic hypotension [I95.1]  Yes   • Type 2 diabetes mellitus without complication, with long-term current use of insulin (CMS/[facility]) [E11.9, Z79.4]  Not Applicable   • PVD (peripheral vascular disease) (CMS/[facility]) [I73.9]  Yes      Resolved Hospital Problems   No resolved problems to display.       · Altered mental status-presumed combination of toxic and metabolic encephalopathy, much better.    · ?UTI-minimal growth. Since she is anuric, may not truly be pathogenic.  · ESRD: on HD  · Diabetes-well controlled at present  · Anemia, stable  · Disposition-long talk with son at bedside. Updated him on her care. He was considering palliative care and recognizes his mother's decline over the past few months. He thinks she has \"given up.\" Palliative consult noted from later in the day. Decision to make comfort only, will move to 4P with full palliative order set. D/w Dr Bridges as " well.      Harpal Ramirez MD  [facility]  12/18/20  18:49 EST    Patient was wearing facemask when I entered the room and throughout our encounter.  I wore protective equipment throughout this patient encounter including a face mask, gloves and protective eyewear.  Hand hygiene was performed before donning protective equipment and after removal when leaving the room.

## 2020-12-18 NOTE — PROGRESS NOTES
Continued Stay Note  [facility]     Patient Name: Randi Martines  MRN: [id number]  Today's Date: 12/18/2020    Admit Date: 12/16/2020    Discharge Plan     Row Name 12/18/20 1719       Plan    Plan  Transfer to Palliative care for comfort measures    Patient/Family in Agreement with Plan  yes    Plan Comments  Family has decided to make pt palliative. Order noted for transfer to Palliative Care Unit. Omar Alarcon RN-BC        Discharge Codes    No documentation.             Omar Alarcon RN

## 2020-12-18 NOTE — CONSULTS
"Purpose of the visit was to evaluate for: goals of care/advanced care planning, support for patient/family, hospice referral/discussion and pain/symptom management. Spoke with RN as well as patient and family and discussed palliative care, goals of care, resuscitation status and [facility].      Assessment:  Patient is palliative care appropriate given ESRD on HD, CHF, metabolic encephalopathy, ischemic cardiomyopathy, UTI. C/o of pain from wound on coccyx, alert, tired, in and out of sleep, engaged minimally in discussion. Ate some because son continued to prompt patient to eat. No appetite. PPS: 40%. Psychosocial Acuity: 2-moderate complexity. Spiritual Acuity: 1-normal complexity.     Recommendations/Plan: Son leaning towards comfort care. He is at bedside today and wants to speak to Nephrology prior to making decision on goals of care. Will follow while family making decisions on GOC.     Other Comments:   Palliative Care spoke to patient and son regarding GOC. Son voices understanding of patient's current condition and overall prognosis. Son states that he feels patient's quality of life has been poor for the past year and that patient is \"giving up.\" Son reports that patient has been seeing and talking to her  son, not wanting to eat, and sleeping more. Son reports that today is the most oriented she has been. Son explained that patient was discussing with him today the need for a last will and testament. Son concerned because he has notified her continual decline and aware that continuing current plan of care will likely lead to frequent admissions, nursing facilities and not likely returning home. Son interested in comfort care.     Discussed inpatient pall care unit, explained comfort care, symptom management, hospice consult, and expectations as patient neared end of life. Reviewed visitation policy on [facility] for palliative patients. Provided psychosocial support.   "

## 2020-12-18 NOTE — PROGRESS NOTES
Enter Query Response Below      Query Response:     There does not appear to be sepsis present on this admission.          If applicable, please update the problem list.         Patient: Randi Martines        : 1950  Account: [id number]           Admit Date:  20        Options to Respond to Query:    1. Access the Encounter     a. From the To-Do Side bar, click Respond With Note.     b. Click New Note     c. Answer query within the yellow box.                d. Update the Problem List if applicable.     Dr. Ramirez,  Patient admitted with Metabolic encephalopathy, UTI and acute/chronic systolic/diastolic CHF. Vascular surgery noted: Sepsis secondary to infected right internal jugular tunneled dialysis catheter and removed the catheter. Blood and urine cultures pending. Patient is being treated with Invanz. On admit procalcitonin, lactic and WBC within normal limits. BP 89/63 and temp 96.4.     Please clarify if the following is being treated and monitored during this admission.    -Sepsis secondary to infected right internal jugular tunneled dialysis catheter, present on admit  -Sepsis secondary to UTI, present on admit  -No sepsis present during this admission  -other_______  -unable to determine         By submitting this query, we are merely seeking further clarification of documentation to accurately reflect all conditions that you are monitoring, evaluating, treating or that extend the hospitalization or utilize additional resources of care. Please utilize your independent clinical judgment when addressing the question(s) above.     This query and your response, once completed, will be entered into the legal medical record.    Sincerely,  Nadege Oakhurst  Clinical Documentation Integrity Program

## 2020-12-18 NOTE — PROGRESS NOTES
"   LOS: 0 days    Patient Care Team:  Jose Morrison DO as PCP - General (Family Medicine)  Samantha Uriostegui MD as Consulting Physician (Cardiology)  Emilio Vazquez MD as Referring Physician (Nephrology)    Chief Complaint:    Chief Complaint   Patient presents with   • Weakness - Generalized     Follow UP ESRD  Subjective     Interval History:   Still has little appetite; ate a little bit after son encouraged her; breathing is comfortable as she lies flat.  Last HD was yesterday.  She favors transition to comfort-based care; son is in agreement    Review of Systems:   See above.     Objective     Vital Signs  Temp:  [97.7 °F (36.5 °C)-98.1 °F (36.7 °C)] 98.1 °F (36.7 °C)  Heart Rate:  [80-96] 80  Resp:  [16-18] 18  BP: ()/(52-82) 99/61    Flowsheet Rows      First Filed Value   Admission Height  152.4 cm (60\") Documented at 12/16/2020 1053   Admission Weight  66.7 kg (147 lb) Documented at 12/16/2020 1053          No intake/output data recorded.  I/O last 3 completed shifts:  In: 330 [P.O.:330]  Out: 1400 [Other:1400]    Intake/Output Summary (Last 24 hours) at 12/18/2020 1620  Last data filed at 12/18/2020 0515  Gross per 24 hour   Intake 330 ml   Output 1400 ml   Net -1070 ml       Physical Exam:  Awake, partially oriented, appropriate.   Oral mucosa dry. No scleral icterus. Left temple bruise .  Heart RRR no s3 or rub  Lungs bibasilar rales, no wheezing; not labored on room air lying flat  Abd + bs, extensive bruise left breast and flank. Soft, nontender  Ext LUE AVG patent.  No muscle mass.    Trace lower ext edema.      Results Review:    Results from last 7 days   Lab Units 12/18/20  0334 12/17/20  0453 12/16/20  1111   SODIUM mmol/L 137 136  136 136   POTASSIUM mmol/L 3.6 3.9  4.0 3.9   CHLORIDE mmol/L 98 99  99 103   CO2 mmol/L 24.4 22.0  25.4 23.2   BUN mg/dL 8 9  10 17   CREATININE mg/dL 1.63* 2.16*  2.19* 3.65*   CALCIUM mg/dL 9.2 9.2  9.1 9.0   BILIRUBIN mg/dL  --  0.5 0.5   ALK " PHOS U/L  --  87 90   ALT (SGPT) U/L  --  92* 82*   AST (SGOT) U/L  --  115* 111*   GLUCOSE mg/dL 80 66  69 102*       Estimated Creatinine Clearance: 25.8 mL/min (A) (by C-G formula based on SCr of 1.63 mg/dL (H)).    Results from last 7 days   Lab Units 12/18/20  0334   PHOSPHORUS mg/dL 1.6*             Results from last 7 days   Lab Units 12/18/20  0334 12/17/20  0453 12/16/20  1111   WBC 10*3/mm3 8.68 8.99 9.13   HEMOGLOBIN g/dL 11.0* 10.5* 9.8*   PLATELETS 10*3/mm3 184 228 246               Imaging Results (Last 24 Hours)     ** No results found for the last 24 hours. **        aspirin, 81 mg, Oral, Nightly  b complex-vitamin c-folic acid, 1 tablet, Oral, Daily  docusate sodium, 200 mg, Oral, Nightly  insulin glargine, 10 Units, Subcutaneous, QAM  insulin lispro, 0-7 Units, Subcutaneous, TID AC  midodrine, 5 mg, Oral, TID AC  pantoprazole, 40 mg, Oral, BID  sodium chloride, 10 mL, Intravenous, Q12H  sucralfate, 1 g, Oral, Daily           Medication Review:   Current Facility-Administered Medications   Medication Dose Route Frequency Provider Last Rate Last Admin   • acetaminophen (TYLENOL) tablet 650 mg  650 mg Oral Q4H PRN Margaret Hernandez MD   650 mg at 12/18/20 0146    Or   • acetaminophen (TYLENOL) 160 MG/5ML solution 650 mg  650 mg Oral Q4H PRN Margaret Hernandez MD        Or   • acetaminophen (TYLENOL) suppository 650 mg  650 mg Rectal Q4H PRN Margaret Hernandez MD       • aspirin EC tablet 81 mg  81 mg Oral Nightly Margaret Hernandez MD       • b complex-vitamin c-folic acid (NEPHRO-LARA) tablet 1 tablet  1 tablet Oral Daily Margaret Hernandez MD       • calcium carbonate (TUMS) chewable tablet 500 mg (200 mg elemental)  2 tablet Oral BID PRN Margaret Hernandez MD       • dextrose (D50W) 25 g/ 50mL Intravenous Solution 25 g  25 g Intravenous Q15 Min PRN Margaret Hernandez MD       • dextrose (GLUTOSE) oral gel 15 g  15 g Oral Q15 Min PRN Margaret Hernandez MD        • docusate sodium (COLACE) capsule 200 mg  200 mg Oral Nightly Margaret Hernandez MD       • glucagon (human recombinant) (GLUCAGEN DIAGNOSTIC) injection 1 mg  1 mg Subcutaneous Q15 Min PRN Margaret Hernandez MD       • insulin glargine (LANTUS) injection 10 Units  10 Units Subcutaneous QAM Margaret Hernandez MD       • insulin lispro (ADMELOG) injection 0-7 Units  0-7 Units Subcutaneous TID AC Margaret Hernandez MD       • lactulose (CHRONULAC) 10 GM/15ML solution 10 g  10 g Oral Daily PRN Margaret Hernandez MD       • midodrine (PROAMATINE) tablet 5 mg  5 mg Oral TID AC Gladys Morris MD   5 mg at 12/18/20 1303   • morphine injection 1 mg  1 mg Intravenous Q4H PRN Margaret Hernandez MD        And   • naloxone (NARCAN) injection 0.4 mg  0.4 mg Intravenous Q5 Min PRN Margaret Hernandez MD       • ondansetron (ZOFRAN) tablet 4 mg  4 mg Oral Q6H PRN Margaret Hernandez MD        Or   • ondansetron (ZOFRAN) injection 4 mg  4 mg Intravenous Q6H PRN Margaret Hernandez MD       • pantoprazole (PROTONIX) EC tablet 40 mg  40 mg Oral BID Margaret Hernandez MD   40 mg at 12/17/20 0936   • sodium chloride 0.9 % flush 1-10 mL  1-10 mL Intravenous PRN Margaret Hernandez MD       • sodium chloride 0.9 % flush 10 mL  10 mL Intravenous PRN Margaret Hernandez MD       • sodium chloride 0.9 % flush 10 mL  10 mL Intravenous Q12H Margaret Hernandez MD   10 mL at 12/18/20 0905   • sucralfate (CARAFATE) tablet 1 g  1 g Oral Daily Margaret Hernandez MD           Assessment/Plan   1. ESRD.  Volume status fine.  2. Altered mental status. Likely multifactorial including pyuria with possible UTI ( cath specimen), medications such as ultram, neurontin, hypercapnea, vascular calcification on CT brain.  Waxing and waning  3. Pyuria, hematuria.  On empiric Ertapenem.  Last admission 11/22 had >100K mixed cristina on UA.   4. DM2  5. Ischemic combined systolic EF 33% and  diastolic cardiomyopathy. Chronic hypotension.   6. SP fall with facial and flank bruising.   7. Hypercapnea with respiratory acidemia and metabolic component from ESRD.     Plan:  1.  Long discussion with patient and son at bedside.  They have jointly decided to pursue comfort-based care  2.  I told patient and her son that the decision to stop dialysis can be changed at any time:  she can resume dialysis at a later point  3.  We discussed the typical manner and time-course of death due to untreated ESRD  4.  Message sent to Dr. Ramirez regarding above      Mj Bridges MD  12/18/20  16:20 EST

## 2020-12-18 NOTE — PLAN OF CARE
Problem: Adult Inpatient Plan of Care  Goal: Plan of Care Review  Recent Flowsheet Documentation  Taken 12/18/2020 1316 by Yesenia Martines, PT  Plan of Care Reviewed With: patient  Outcome Summary: Pt luz maria 71 yo F who was admitted from SNF with AMS, SOA, UTI, and metabolic encephalopathy. Pt presents to PT with impaired functional mobility and gait secondary to generalized weakness, impaired balance, and decreased activity tolerance. Pt may benefit from skilled PT to address strength, mobility, and gait.Patient was intermittently wearing a face mask during this therapy encounter. Therapist used appropriate personal protective equipment including eye protection, mask, and gloves.  Mask used was standard procedure mask. Appropriate PPE was worn during the entire therapy session. Hand hygiene was completed before and after therapy session. Patient is not in enhanced droplet precautions.

## 2020-12-18 NOTE — PROGRESS NOTES
"  Infectious Diseases Progress Note    Kadeem Cruz MD     [facility]  [place]: 0 days  Patient Identification:  Name: Randi Martines  Age: 70 y.o.  Sex: female  :  1950  MRN: [id number]         Primary Care Physician: Jose Morrison,             Subjective: Comfortable denies any specific complaints.  Feels slightly better than how she was when she come into the hospital.  Her son is at the bedside.  Interval History: See consultation note.    Objective:    Scheduled Meds:aspirin, 81 mg, Oral, Nightly  b complex-vitamin c-folic acid, 1 tablet, Oral, Daily  docusate sodium, 200 mg, Oral, Nightly  ertapenem, 500 mg, Intravenous, Q24H  insulin glargine, 10 Units, Subcutaneous, QAM  insulin lispro, 0-7 Units, Subcutaneous, TID AC  midodrine, 5 mg, Oral, TID AC  pantoprazole, 40 mg, Oral, BID  sodium chloride, 10 mL, Intravenous, Q12H  sucralfate, 1 g, Oral, Daily      Continuous Infusions:     Vital signs in last 24 hours:  Temp:  [97.6 °F (36.4 °C)-97.8 °F (36.6 °C)] 97.8 °F (36.6 °C)  Heart Rate:  [81-96] 81  Resp:  [16-18] 18  BP: (106-117)/(52-82) 106/59    Intake/Output:    Intake/Output Summary (Last 24 hours) at 2020 1327  Last data filed at 2020 0515  Gross per 24 hour   Intake 330 ml   Output 1400 ml   Net -1070 ml       Exam:  /59 (BP Location: Right arm, Patient Position: Lying)   Pulse 81   Temp 97.8 °F (36.6 °C) (Oral)   Resp 18   Ht 152.4 cm (60\")   Wt 61.1 kg (134 lb 11.2 oz)   SpO2 97%   BMI 26.31 kg/m²   Patient is examined using the personal protective equipment as per guidelines from infection control for this particular patient as enacted.  Hand washing was performed before and after patient interaction.  General Appearance:  Comfortable female who does not appear to be in any acute distress and does not appear to be toxic or septic.                          Head:    Normocephalic, without obvious abnormality, atraumatic                           " Eyes:    PERRL, conjunctivae/corneas clear, EOM's intact, both eyes                         Throat:   Lips, tongue, gums normal; oral mucosa pink and moist                           Neck:   Supple, symmetrical, trachea midline, no JVD                         Lungs:    Clear to auscultation bilaterally, respirations unlabored                 Chest Wall:    No tenderness or deformity right upper chest tunneled catheter is removed                          Heart:    Regular rate and rhythm, S1 and S2 normal, no murmur, no rub                                         or gallop                  Abdomen:   Soft nontender, no CVA tenderness noted                 extremities: Left arm AV fistula intact                        Pulses:   Pulses palpable in all extremities                            Skin:   Skin is warm and dry,  no rashes or palpable lesions                  Neurologic: Grossly nonfocal       Data Review:    I reviewed the patient's new clinical results.  Results from last 7 days   Lab Units 12/18/20  0334 12/17/20  0453 12/16/20  1111   WBC 10*3/mm3 8.68 8.99 9.13   HEMOGLOBIN g/dL 11.0* 10.5* 9.8*   PLATELETS 10*3/mm3 184 228 246     Results from last 7 days   Lab Units 12/18/20  0334 12/17/20  0453 12/16/20  1111   SODIUM mmol/L 137 136  136 136   POTASSIUM mmol/L 3.6 3.9  4.0 3.9   CHLORIDE mmol/L 98 99  99 103   CO2 mmol/L 24.4 22.0  25.4 23.2   BUN mg/dL 8 9  10 17   CREATININE mg/dL 1.63* 2.16*  2.19* 3.65*   CALCIUM mg/dL 9.2 9.2  9.1 9.0   GLUCOSE mg/dL 80 66  69 102*     Microbiology Results (last 10 days)     Procedure Component Value - Date/Time    Catheter Culture - Cath Tip, Neck [[id number]] Collected: 12/17/20 1112    Lab Status: Preliminary result Specimen: Cath Tip from Neck Updated: 12/18/20 1127     CATHETER CULTURE No growth    Urine Culture - Urine, Urine, Catheter [[id number]]  (Abnormal) Collected: 12/16/20 1322    Lab Status: Final result Specimen: Urine, Catheter Updated: 12/18/20  1019     Urine Culture <10,000 CFU/mL Gram Negative Bacilli    Narrative:      Call if further workup needed.      Blood Culture - Blood, Arm, Right [[id number]] Collected: 12/16/20 1139    Lab Status: Preliminary result Specimen: Blood from Arm, Right Updated: 12/18/20 1200     Blood Culture No growth at 2 days    Blood Culture - Blood, Arm, Right [[id number]] Collected: 12/16/20 1130    Lab Status: Preliminary result Specimen: Blood from Arm, Right Updated: 12/18/20 1200     Blood Culture No growth at 2 days    COVID PRE-OP / PRE-PROCEDURE SCREENING ORDER (NO ISOLATION) - Swab, Nasopharynx [[id number]]  (Normal) Collected: 12/16/20 1113    Lab Status: Final result Specimen: Swab from Nasopharynx Updated: 12/16/20 1247    Narrative:      The following orders were created for panel order COVID PRE-OP / PRE-PROCEDURE SCREENING ORDER (NO ISOLATION) - Swab, Nasopharynx.  Procedure                               Abnormality         Status                     ---------                               -----------         ------                     Respiratory Panel PCR w/...[[id number]]  Normal              Final result                 Please view results for these tests on the individual orders.    Respiratory Panel PCR w/COVID-19(SARS-CoV-2) TG/NERISSA/TAVO/PAD/COR/MAD/[facility]AR[facility] In-House, NP Swab in UTM/VTM, 3-4 HR TAT - Swab, Nasopharynx [[id number]]  (Normal) Collected: 12/16/20 1113    Lab Status: Final result Specimen: Swab from Nasopharynx Updated: 12/16/20 1247     ADENOVIRUS, PCR Not Detected     Coronavirus 229E Not Detected     Coronavirus HKU1 Not Detected     Coronavirus NL63 Not Detected     Coronavirus OC43 Not Detected     COVID19 Not Detected     Human Metapneumovirus Not Detected     Human Rhinovirus/Enterovirus Not Detected     Influenza A PCR Not Detected     Influenza B PCR Not Detected     Parainfluenza Virus 1 Not Detected     Parainfluenza Virus 2 Not Detected     Parainfluenza Virus 3 Not Detected     Parainfluenza  Virus 4 Not Detected     RSV, PCR Not Detected     Bordetella pertussis pcr Not Detected     Bordetella parapertussis PCR Not Detected     Chlamydophila pneumoniae PCR Not Detected     Mycoplasma pneumo by PCR Not Detected    Narrative:      Fact sheet for providers: https://docs.[employer]/wp-content/uploads/YLJ5846-8283-TN8.1-EUA-Provider-Fact-Sheet-3.pdf    Fact sheet for patients: https://docs.[employer]/wp-content/uploads/QSP7811-5804-YW0.1-EUA-Patient-Fact-Sheet-1.pdf    Test performed by PCR.              Assessment:    PVD (peripheral vascular disease) (CMS/[facility])    Type 2 diabetes mellitus without complication, with long-term current use of insulin (CMS/HCC)    Orthostatic hypotension    UTI (urinary tract infection)    Ischemic cardiomyopathy    Acute on chronic combined systolic and diastolic CHF (congestive heart failure) (CMS/[facility])    ESRD on hemodialysis (CMS/HCC)    Metabolic encephalopathy  1-acute hypercapnic respiratory failure with respiratory acidosis improved  2-possible superimposed toxic metabolic encephalopathy due to urinary tract infection with recent history of ESBL positive E. coli currently on Invanz while urine culture is pending and blood cultures are pending  3-end-stage renal disease on hemodialysis  4-diabetes mellitus  5-coronary artery disease  6-chronic systolic congestive heart failure  7-history of recurrent urinary tract infection  8-retained right IJ tunnel catheter which is not functioning and patient is currently using left brachial axillary AV shunt for her dialysis for the last month.        Recommendations/Discussions:  She seems to have improved significantly with correction of metabolic abnormalities that cause her to be in this confusional state.  Her urine culture is growing insignificant amount of gram-negative rods and does not require any more treatment.  We recommend DC ertapenem and monitor her clinical course.  Discussed with her son.    Kadeem Cruz,  MD  12/18/2020  13:27 EST    Much of this encounter note is an electronic transcription/translation of spoken language to printed text. The electronic translation of spoken language may permit erroneous, or at times, nonsensical words or phrases to be inadvertently transcribed; Although I have reviewed the note for such errors, some may still exist

## 2020-12-19 NOTE — PROGRESS NOTES
"  Infectious Diseases Progress Note    Kadeem Cruz MD     [facility]  Los: 1 day  Patient Identification:  Name: Randi Martines  Age: 70 y.o.  Sex: female  :  1950  MRN: [id number]         Primary Care Physician: Jose Morrison,             Subjective: Lethargic and sleepy.  Interval History: See consultation note.  Transition to comfort and palliation noted.  Objective:    Scheduled Meds:aspirin, 81 mg, Oral, Nightly  docusate sodium, 200 mg, Oral, Nightly  insulin lispro, 0-7 Units, Subcutaneous, TID AC  midodrine, 5 mg, Oral, TID AC  pantoprazole, 40 mg, Oral, BID  sodium chloride, 10 mL, Intravenous, Q12H  sucralfate, 1 g, Oral, Daily      Continuous Infusions:     Vital signs in last 24 hours:  Temp:  [97.9 °F (36.6 °C)-98.9 °F (37.2 °C)] 98.9 °F (37.2 °C)  Heart Rate:  [75-82] 75  Resp:  [16-18] 16  BP: ()/(61-72) 147/69    Intake/Output:    Intake/Output Summary (Last 24 hours) at 2020 1056  Last data filed at 2020 0901  Gross per 24 hour   Intake 0 ml   Output --   Net 0 ml       Exam:  /69 (BP Location: Left arm, Patient Position: Lying)   Pulse 75   Temp 98.9 °F (37.2 °C) (Oral)   Resp 16   Ht 152.4 cm (60\")   Wt 61.1 kg (134 lb 11.2 oz)   SpO2 92%   BMI 26.31 kg/m²   Patient is examined using the personal protective equipment as per guidelines from infection control for this particular patient as enacted.  Hand washing was performed before and after patient interaction.  General Appearance:  Comfortable female who does not appear to be in any acute distress and does not appear to be toxic or septic.                          Head:    Normocephalic, without obvious abnormality, atraumatic                           Eyes:    PERRL, conjunctivae/corneas clear, EOM's intact, both eyes                         Throat:   Lips, tongue, gums normal; oral mucosa pink and moist                           Neck:   Supple, symmetrical, trachea midline, no " JVD                         Lungs:    Clear to auscultation bilaterally, respirations unlabored                 Chest Wall:    No tenderness or deformity right upper chest tunneled catheter is removed                          Heart:    Regular rate and rhythm, S1 and S2 normal, no murmur, no rub                                         or gallop                  Abdomen:   Soft nontender, no CVA tenderness noted                 extremities: Left arm AV fistula intact                        Pulses:   Pulses palpable in all extremities                            Skin:   Skin is warm and dry,  no rashes or palpable lesions                  Neurologic: Grossly nonfocal       Data Review:    I reviewed the patient's new clinical results.  Results from last 7 days   Lab Units 12/18/20  0334 12/17/20  0453 12/16/20  1111   WBC 10*3/mm3 8.68 8.99 9.13   HEMOGLOBIN g/dL 11.0* 10.5* 9.8*   PLATELETS 10*3/mm3 184 228 246     Results from last 7 days   Lab Units 12/18/20  0334 12/17/20  0453 12/16/20  1111   SODIUM mmol/L 137 136  136 136   POTASSIUM mmol/L 3.6 3.9  4.0 3.9   CHLORIDE mmol/L 98 99  99 103   CO2 mmol/L 24.4 22.0  25.4 23.2   BUN mg/dL 8 9  10 17   CREATININE mg/dL 1.63* 2.16*  2.19* 3.65*   CALCIUM mg/dL 9.2 9.2  9.1 9.0   GLUCOSE mg/dL 80 66  69 102*     Microbiology Results (last 10 days)     Procedure Component Value - Date/Time    Catheter Culture - Cath Tip, Neck [[id number]] Collected: 12/17/20 1112    Lab Status: Final result Specimen: Cath Tip from Neck Updated: 12/19/20 1010     CATHETER CULTURE No growth at 2 days    Urine Culture - Urine, Urine, Catheter [[id number]]  (Abnormal) Collected: 12/16/20 1322    Lab Status: Final result Specimen: Urine, Catheter Updated: 12/18/20 1019     Urine Culture <10,000 CFU/mL Gram Negative Bacilli    Narrative:      Call if further workup needed.      Blood Culture - Blood, Arm, Right [[id number]] Collected: 12/16/20 1139    Lab Status: Preliminary result  Specimen: Blood from Arm, Right Updated: 12/18/20 1200     Blood Culture No growth at 2 days    Blood Culture - Blood, Arm, Right [[id number]] Collected: 12/16/20 1130    Lab Status: Preliminary result Specimen: Blood from Arm, Right Updated: 12/18/20 1200     Blood Culture No growth at 2 days    COVID PRE-OP / PRE-PROCEDURE SCREENING ORDER (NO ISOLATION) - Swab, Nasopharynx [[id number]]  (Normal) Collected: 12/16/20 1113    Lab Status: Final result Specimen: Swab from Nasopharynx Updated: 12/16/20 1247    Narrative:      The following orders were created for panel order COVID PRE-OP / PRE-PROCEDURE SCREENING ORDER (NO ISOLATION) - Swab, Nasopharynx.  Procedure                               Abnormality         Status                     ---------                               -----------         ------                     Respiratory Panel PCR w/...[[id number]]  Normal              Final result                 Please view results for these tests on the individual orders.    Respiratory Panel PCR w/COVID-19(SARS-CoV-2) TG/NERISSA/TAVO/PAD/COR/MAD/[facility]ARAN In-House, NP Swab in UTM/VTM, 3-4 HR TAT - Swab, Nasopharynx [[id number]]  (Normal) Collected: 12/16/20 1113    Lab Status: Final result Specimen: Swab from Nasopharynx Updated: 12/16/20 5657     ADENOVIRUS, PCR Not Detected     Coronavirus 229E Not Detected     Coronavirus HKU1 Not Detected     Coronavirus NL63 Not Detected     Coronavirus OC43 Not Detected     COVID19 Not Detected     Human Metapneumovirus Not Detected     Human Rhinovirus/Enterovirus Not Detected     Influenza A PCR Not Detected     Influenza B PCR Not Detected     Parainfluenza Virus 1 Not Detected     Parainfluenza Virus 2 Not Detected     Parainfluenza Virus 3 Not Detected     Parainfluenza Virus 4 Not Detected     RSV, PCR Not Detected     Bordetella pertussis pcr Not Detected     Bordetella parapertussis PCR Not Detected     Chlamydophila pneumoniae PCR Not Detected     Mycoplasma pneumo by PCR Not  Detected    Narrative:      Fact sheet for providers: https://docs.[employer]/wp-content/uploads/HKU6974-3736-PZ8.1-EUA-Provider-Fact-Sheet-3.pdf    Fact sheet for patients: https://docs.[employer]/wp-content/uploads/KTD0036-4683-ED0.1-EUA-Patient-Fact-Sheet-1.pdf    Test performed by PCR.              Assessment:    PVD (peripheral vascular disease) (CMS/[facility])    Type 2 diabetes mellitus without complication, with long-term current use of insulin (CMS/[facility])    Orthostatic hypotension    UTI (urinary tract infection)    Ischemic cardiomyopathy    Acute on chronic combined systolic and diastolic CHF (congestive heart failure) (CMS/[facility])    ESRD on hemodialysis (CMS/HCC)    Metabolic encephalopathy  1-acute hypercapnic respiratory failure with respiratory acidosis improved  2-possible superimposed toxic metabolic encephalopathy due to urinary tract infection with recent history of ESBL positive E. coli currently on Invanz while urine culture is pending and blood cultures are pending  3-end-stage renal disease on hemodialysis  4-diabetes mellitus  5-coronary artery disease  6-chronic systolic congestive heart failure  7-history of recurrent urinary tract infection  8-retained right IJ tunnel catheter which is not functioning and patient is currently using left brachial axillary AV shunt for her dialysis for the last month.        Recommendations/Discussions:  Agree with changing her care structure to comfort and palliation.  Nothing further to add will sign off.    Kadeem Cruz MD  12/19/2020  10:56 EST    Much of this encounter note is an electronic transcription/translation of spoken language to printed text. The electronic translation of spoken language may permit erroneous, or at times, nonsensical words or phrases to be inadvertently transcribed; Although I have reviewed the note for such errors, some may still exist

## 2020-12-19 NOTE — PLAN OF CARE
Goal Outcome Evaluation:  Plan of Care Reviewed With: patient, son  Progress: Declining  Patient transferred from telemetry this night shift. Pt alert and oriented. Family at bsd and educated on palliative care along with patient. Pt is hemodialysis patient and mostly does not produce urine. Pt turns self at times. Refusing turns at times for comfort. Pt requested pain medication and anxiety medicine - RN gave 2mg of morphine and 0.5mg of ativan. Pt refusing accuchecks and insulin coverage. Pt is currently resting well.

## 2020-12-19 NOTE — PROGRESS NOTES
Name: Randi Martines ADMIT: 2020   : 1950  PCP: Jose Morrison DO    MRN: [id number] LOS: 1 days   AGE/SEX: 70 y.o. female  ROOM: [facility]     Subjective   Subjective    Resting comfortably after getting some medication overnight.  Son says she has been awake intermittently but mostly sleeping.  He is at bedside    Review of Systems   Reason unable to perform ROS: Sleeping/poorly responsive.        Objective   Objective   Vital Signs  Temp:  [97.9 °F (36.6 °C)-98.9 °F (37.2 °C)] 98.9 °F (37.2 °C)  Heart Rate:  [75-82] 75  Resp:  [16-18] 16  BP: (116-147)/(69-72) 147/69  SpO2:  [92 %] 92 %  on   ;   Device (Oxygen Therapy): room air  Body mass index is 26.31 kg/m².  Physical Exam  Vitals signs and nursing note reviewed.   Constitutional:       General: She is not in acute distress.     Appearance: She is ill-appearing. She is not diaphoretic.      Comments: Drowsy but arousable   HENT:      Mouth/Throat:      Mouth: Mucous membranes are dry.   Eyes:      General: No scleral icterus.  Neck:      Musculoskeletal: Neck supple.   Cardiovascular:      Rate and Rhythm: Normal rate and regular rhythm.      Heart sounds: No murmur.   Pulmonary:      Effort: Pulmonary effort is normal.      Breath sounds: No wheezing.      Comments: Decreased breath sounds  Abdominal:      General: Bowel sounds are normal.      Palpations: Abdomen is soft.      Tenderness: There is no abdominal tenderness.   Musculoskeletal:         General: Swelling present.   Skin:     General: Skin is warm and dry.         Results Review     I reviewed the patient's new clinical results.  Results from last 7 days   Lab Units 20  0334 20  0453 20  1111   WBC 10*3/mm3 8.68 8.99 9.13   HEMOGLOBIN g/dL 11.0* 10.5* 9.8*   PLATELETS 10*3/mm3 184 228 246     Results from last 7 days   Lab Units 20  0334 20  0453 20  1111   SODIUM mmol/L 137 136  136 136   POTASSIUM mmol/L 3.6 3.9  4.0 3.9   CHLORIDE  mmol/L 98 99  99 103   CO2 mmol/L 24.4 22.0  25.4 23.2   BUN mg/dL 8 9  10 17   CREATININE mg/dL 1.63* 2.16*  2.19* 3.65*   GLUCOSE mg/dL 80 66  69 102*   Estimated Creatinine Clearance: 25.8 mL/min (A) (by C-G formula based on SCr of 1.63 mg/dL (H)).  Results from last 7 days   Lab Units 12/18/20  0334 12/17/20  0453 12/16/20  1111   ALBUMIN g/dL 3.20* 3.10* 3.00*   BILIRUBIN mg/dL  --  0.5 0.5   ALK PHOS U/L  --  87 90   AST (SGOT) U/L  --  115* 111*   ALT (SGPT) U/L  --  92* 82*     Results from last 7 days   Lab Units 12/18/20  0334 12/17/20  0453 12/16/20  1111   CALCIUM mg/dL 9.2 9.2  9.1 9.0   ALBUMIN g/dL 3.20* 3.10* 3.00*   PHOSPHORUS mg/dL 1.6*  --   --      Results from last 7 days   Lab Units 12/16/20  1111   PROCALCITONIN ng/mL 0.13   LACTATE mmol/L 0.9     COVID19   Date Value Ref Range Status   12/16/2020 Not Detected Not Detected - Ref. Range Final   11/30/2020 Not Detected Not Detected - Ref. Range Final     Glucose   Date/Time Value Ref Range Status   12/18/2020 2058 107 70 - 130 mg/dL Final   12/18/2020 1630 110 70 - 130 mg/dL Final   12/18/2020 1138 135 (H) 70 - 130 mg/dL Final   12/18/2020 0625 98 70 - 130 mg/dL Final   12/17/2020 2013 82 70 - 130 mg/dL Final   12/17/2020 1131 98 70 - 130 mg/dL Final   12/17/2020 0624 80 70 - 130 mg/dL Final       CT Head Without Contrast  Narrative: CT HEAD WITHOUT CONTRAST     HISTORY: Contusion to forehead on the left, headache.     COMPARISON: CT head 06/27/2020.     FINDINGS: The brain ventricles are symmetrical. There is no evidence of  hemorrhage, hydrocephalus or of abnormal extra-axial fluid. No focal  area of decreased attenuation to suggest acute infarction is identified.  Moderate vascular calcification involving the carotid siphons are noted  bilaterally.     Impression: No evidence of fracture, intracranial hemorrhage or of acute  infarction. Further evaluation could be performed with an MRI  examination of the brain as indicated.              Radiation dose reduction techniques were utilized, including automated  exposure control and exposure modulation based on body size.     This report was finalized on 12/17/2020 4:00 PM by Dr. Lasha Rojas M.D.       Scheduled Medications  sodium chloride, 10 mL, Intravenous, Q12H    Infusions   Diet  Diet Soft Texture; [employer]       Assessment/Plan     Active Hospital Problems    Diagnosis  POA   • Metabolic encephalopathy [G93.41]  Yes   • Ischemic cardiomyopathy [I25.5]  Yes   • ESRD on hemodialysis (CMS/[facility]) [N18.6, Z99.2]  Not Applicable   • Acute on chronic combined systolic and diastolic CHF (congestive heart failure) (CMS/[facility]) [I50.43]  Yes   • UTI (urinary tract infection) [N39.0]  Yes   • Orthostatic hypotension [I95.1]  Yes   • Type 2 diabetes mellitus without complication, with long-term current use of insulin (CMS/[facility]) [E11.9, Z79.4]  Not Applicable   • PVD (peripheral vascular disease) (CMS/[facility]) [I73.9]  Yes      Resolved Hospital Problems   No resolved problems to display.       · Continue full palliative care.  Patient has been refusing some of her scheduled meds so I went ahead and discontinued all of them as there are none that directly will add to her comfort.  · Long talk with her son who is in agreement with the treatment plan and is appreciative of efforts at keeping his mother comfortable.  He says that they talked yesterday and she told him that she was tired and ready to go so he is at peace with the decision.  · We will consult [facility]      Harpal Ramirez MD  [facility]  12/19/20  15:15 EST    Patient was wearing facemask when I entered the room and throughout our encounter.  I wore protective equipment throughout this patient encounter including a face mask, gloves and protective eyewear.  Hand hygiene was performed before donning protective equipment and after removal when leaving the room.

## 2020-12-19 NOTE — PROGRESS NOTES
Cardiology weekend coverage  Cardiology Progress Note    Patient Identification:  Name: Randi Martines  Age: 70 y.o.  Sex: female  :  1950  MRN: [id number]                 Follow Up / Chief Complaint: CHF/elevated troponins    Interval History: Conservative management recommended       Subjective:She annita chest pain and shortness of breath      Objective:    Past Medical History:  Past Medical History:   Diagnosis Date   • Blind    • CAD (coronary artery disease)    • Chronic kidney disease    • Chronic systolic congestive heart failure (CMS/[facility])    • Diabetes mellitus (CMS/[facility])    • Dialysis patient (CMS/[facility])     MON, WED, FRI   • Dyslipidemia    • Foot ulcer (CMS/[facility])    • GERD (gastroesophageal reflux disease)    • History of diverticulitis    • History of stomach ulcers    • History of transfusion    • Hyperlipemia    • Hypertension    • Mesenteric ischemia (CMS/[facility])    • NSTEMI (non-ST elevated myocardial infarction) (CMS/[facility])    • Peptic ulcer disease    • PONV (postoperative nausea and vomiting)    • PVD (peripheral vascular disease) (CMS/[facility])    • UTI (urinary tract infection)      Past Surgical History:  Past Surgical History:   Procedure Laterality Date   • APPENDECTOMY     • ARTERIOVENOUS FISTULA/SHUNT SURGERY Left 2020    Procedure: LEFT ARM BRACHIAL ARTERY AXILLARY VEIN GRAFT;  Surgeon: Loida Ugalde Jr., MD;  Location: [facility] OR;  Service: Vascular;  Laterality: Left;   • CARDIAC CATHETERIZATION N/A 2020    Procedure: LEFT HEART CATH  NO LV;  Surgeon: Emilio Serna MD;  Location: [facility] CATH INVASIVE LOCATION;  Service: Cardiology   • CARDIAC CATHETERIZATION N/A 2020    Procedure: CORONARY ANGIOGRAPHY;  Surgeon: Emilio Serna MD;  Location: [facility] CATH INVASIVE LOCATION;  Service: Cardiology   • CARDIAC CATHETERIZATION N/A 2020    Procedure: SAPHENOUS VEIN GRAFT;  Surgeon: Emilio Serna MD;  Location: [facility] CATH INVASIVE LOCATION;  Service:  Cardiology   • CATARACT EXTRACTION     • CHOLECYSTECTOMY     • COLONOSCOPY  2015   • CORONARY ARTERY BYPASS GRAFT      7 coronary bypass surgery   • ENDOSCOPY N/A 11/23/2016    Procedure: ESOPHAGOGASTRODUODENOSCOPY ;  Surgeon: Katherine Green MD;  Location: [facility] ENDOSCOPY;  Service:    • ENDOSCOPY N/A 12/29/2019    Procedure: ESOPHAGOGASTRODUODENOSCOPYwith bx;  Surgeon: Ziyad Manzo MD;  Location: [facility] ENDOSCOPY;  Service: Gastroenterology   • ERCP N/A 10/2/2017    Procedure: ENDOSCOPIC RETROGRADE CHOLANGIOPANCREATOGRAPHY with sphincterotomy, balloon sweep;  Surgeon: Christopher Graf MD;  Location: [facility] ENDOSCOPY;  Service:    • HERNIA REPAIR  2015   • HYSTERECTOMY     • INSERTION HEMODIALYSIS CATHETER Right 6/10/2020    Procedure: HEMODIALYSIS CATHETER INSERTION;  Surgeon: Stevan Graves MD;  Location: [facility] OR;  Service: Vascular;  Laterality: Right;   • INSERTION HEMODIALYSIS CATHETER N/A 9/23/2020    Procedure: HEMODIALYSIS CATHETER INSERTION;  Surgeon: Zay Gilliam MD;  Location: [facility] OR;  Service: Vascular;  Laterality: N/A;   • SC VEIN IN SITU BYPASS GRAFT,FEM-POP Left 5/31/2017    Procedure: ULTRASOUND RIGHT AIF, ARTERIOGRAM W/ LEFT COMMON ILIAC ANGIOPLASTY STENT, LT FEMORAL POPLITEAL BYPASS WITH ARTEGRAFT, LEFT POPLITEAL ENDARTERECTOMY PATCH, LEFT COMMON ARTERIOGRAM, LEFT EXTERNAL ILIAC ANGIOPLASTY STENT PLACEMENT, AORTA,  EXTERNAL, DISTAL PRESSURES;  Surgeon: Jayda Barrietnos MD;  Location: [facility] OR 18/19;  Service: Vascular   • SKIN GRAFT SPLIT THICKNESS Left 6/7/2017    Procedure: DEBRIDEMENT AND SKIN GRAFT TO  LEFT DORSAL FOOT AND TOES;  Surgeon: Maurine Waterhouse, MD;  Location: [facility] OR;  Service:    • UPPER GASTROINTESTINAL ENDOSCOPY  2015    scar in gastric body, gastric ulcers        Social History:   Social History     Tobacco Use   • Smoking status: Former Smoker     Packs/day: 3.00     Years: 20.00     Pack years: 60.00     Types:  Cigarettes     Start date: 1969     Quit date: 2000     Years since quittin.9   • Smokeless tobacco: Never Used   • Tobacco comment: caffeine use   Substance Use Topics   • Alcohol use: No     Comment: caffeine use       Family History:  Family History   Problem Relation Age of Onset   • Heart disease Mother         cardiac disorder   • Cancer Maternal Grandmother    • Stroke Maternal Grandfather    • Hypertension Maternal Grandfather    • Malig Hyperthermia Neg Hx           Allergies:  Allergies   Allergen Reactions   • Augmentin [Amoxicillin-Pot Clavulanate] GI Intolerance and Dizziness   • Codeine GI Intolerance and Dizziness     Scheduled Meds:  sodium chloride, 10 mL, Q12H            INTAKE AND OUTPUT:    Intake/Output Summary (Last 24 hours) at 2020 1634  Last data filed at 2020 1141  Gross per 24 hour   Intake 0 ml   Output --   Net 0 ml       Review of Systems:   GI: Denies nausea and vomiting  Cardiac: Denies chest pain and palpitations   pulmonary: Denies shortness of breath and cough    Constitutional:  Temp:  [97.9 °F (36.6 °C)-98.9 °F (37.2 °C)] 98.9 °F (37.2 °C)  Heart Rate:  [75-82] 75  Resp:  [16-18] 16  BP: (116-147)/(69-72) 147/69    Physical Exam:  General:  Appears in no acute distress, resting in bed. With son at bedside  Eyes: EOM normal no conjunctival drainage  HEENT:  No JVD. Thyroid not visibly enlarged. No mucosal pallor or cyanosis  Respiratory: Respirations regular and unlabored at rest. BBS with good air entry in all fields. No crackles, rubs or wheezes auscultated  Cardiovascular: S1S2 Regular rate and rhythm. No murmur, rub or gallop. No carotid bruits. DP/PT pulses +2    . No pretibial pitting edema  Gastrointestinal: Abdomen soft, flat, non tender. Bowel sounds present. No hepatosplenomegaly. No ascites  Musculoskeletal: SHAIKH x4. No abnormal movements  Neuro: AAO x3 CN II-XII grossly intact  Psych: Mood and affect normal, pleasant and  cooperative          Cardiographics       ECG:     Echocardiogram:   Interpretation Summary    · Estimated right ventricular systolic pressure from tricuspid regurgitation is mildly elevated (35-45 mmHg).  · The left ventricular cavity is mildly dilated.  · Calculated left ventricular EF = 32.5% Estimated left ventricular EF was in agreement with the calculated left ventricular EF.  · Left ventricular diastolic function is consistent with (grade III w/high LAP) reversible restrictive pattern.  · Left atrial volume is moderately increased.  · Moderate tricuspid valve regurgitation is present.  · The following left ventricular wall segments are akinetic: apical anterior, apical lateral, apical inferior, apical septal, mid inferoseptal and apex.  · Calculated left ventricular EF = 32.5%  · There is no evidence of pericardial effusion. .     Conclusion    CARDIAC CATHETERIZATION REPORT     Procedure:Left heart catheterization with selective injection of the LIMA and saphenous vein grafts, L subclavian arteriogram     DATE OF PROCEDURE: 01/02/20                  PROCEDURE PERFORMED BY: Emilio Serna MD, [facility]     INDICATION FOR PROCEDURE:NSTEMI     DESCRIPTION OF PROCEDURE: After consent was obtained, access was gained in his left radial artery using a micropuncture technique. A 6 Colombian short sheath was placed without difficulty.  Intraarterial cocktail was given.  We had a lot of spasm when we injected the left internal mammary artery and the left wrist.  We gave additional intra-arterial nitroglycerin and we switched to a 4 Colombian catheters and were able to get the case completed.  Selective injection of the LIMA followed by selective injection of the left and right coronary arteries were performed using a JL-4 and JR-4 diagnostic catheter.  We then used an AL-1 and multipurpose catheter to inject the saphenous vein grafts followed by left ventriculography.  We measured the pressure across the subclavian stent and  then shot a left subclavian angiogram.  Patient tolerated the procedure well without early complication and EBL was minimal.  At the conclusion, the sheath was removed and hemostasis was obtained. The patient went to the prep holding area in stable condition.     FINDINGS:     LEFT VENTRICULOGRAPHY: The LV pressure was 170/10 . There was no gradient across the aortic valve on pullback. No LV due to renal insuff     CORONARY ANGIOGRAPHY:  Left main: 20% distal luminal irregularities  Left anterior descending: Diffuse 30% proximal disease then 50 to 60% mid disease distally the vessels normal with competitive flow from the LIMA bypass  Ramus intermedius:Not present  Circumflex: Diffuse 40% proximal disease OM1 and OM 2 seem to be occluded  RCA: Is a dominant vessel.  100% proximal occlusion     LIMA to LAD: widely patent and normal.  It was widely patent and looks normal the LAD in the mid LAD to distal portion is normal      SVG to first diagonal is widely patent and looks normal the diagonal is smallish this is the lower most graft     SVG to OM1 is widely patent and looks normal is the upper most graft     SVG to ramus intermedius is widely patent and looks normal     Saphenous vein graft to the RV branch and to the PDA is widely patent and looks normal this is a very big vessel for her     The aortic pressure was 198/58 and when we pulled back across the subclavian stent the distal subclavian pressure was 142/64 peak aortic gradient difference of 56 mmHg.  We subsequently then did a left subclavian angiogram and this showed about a 70% lesion in the very proximal portion of the subclavian stent     SUMMARY: Severe native coronary artery disease 7 of 7 bypasses are patent.  She has a significant lesion left subclavian it is evidently been angioplastied one time before 9 years ago        RECOMMENDATIONS: We will treat her medically but also will likely need re-intervention on the subclavian stent        Emilio  "MD Kareem  01/02/20  11:20 AM           Lab Review   Results from last 7 days   Lab Units 12/17/20  0453 12/16/20 2052 12/16/20  1111   TROPONIN T ng/mL 0.101* 0.108* 0.105*         Results from last 7 days   Lab Units 12/18/20  0334   SODIUM mmol/L 137   POTASSIUM mmol/L 3.6   BUN mg/dL 8   CREATININE mg/dL 1.63*   CALCIUM mg/dL 9.2        Results from last 7 days   Lab Units 12/18/20  0334 12/17/20  0453 12/16/20  1111   WBC 10*3/mm3 8.68 8.99 9.13   HEMOGLOBIN g/dL 11.0* 10.5* 9.8*   HEMATOCRIT % 34.9 32.7* 30.8*   PLATELETS 10*3/mm3 184 228 246             Assessment:  1.  Acute on chronic systolic congestive heart failure: EF 32%, with grade 3 diastolic dysfunction, see full report as above.  2.  Coronary artery disease with history of CABG: Previous cardiac cath in January 2020.  Recommended medical management.  3.  End-stage renal disease: On HD  4.  Chronically elevated troponin in the setting of end-stage renal failure  5.  Metabolic encephalopathy  6.  Paroxysmal atrial fibrillation  7.  Chronic hypotension: Currently on midodrine  8.  Sepsis  9.  Peripheral vascular disease: With prior femoropopliteal bypass.      Plan:  She is currently on palliative care.  We will sign off at this time.         )12/19/2020  EBEN Jin      Greater than 50% of time spent in counseling and or coordination of care.  Total face floor time 35 minutes.    EMR Dragon/Transcription:   \"Dictated utilizing Dragon dictation\".     "

## 2020-12-20 NOTE — PLAN OF CARE
Goal Outcome Evaluation:  Plan of Care Reviewed With: patient, son  Progress: declining  Outcome Summary: Pt resting comfortable throughout the day with son at bedside. At pt request morphine and ativan given for comfort. Will continue monitor

## 2020-12-20 NOTE — CONSULTS
[facility] RN met with patient and family to provide explanation of services.  Family are interested in inpatient hospice services.  Per Dr. Zimmerman, patient is eligible for hospice services in the home/LTC setting but not inpatient appropriate at this time.  Plan for follow up on 12/21 for possible inpatient hospice admission.  Update provided to ARMIDA Tapia.    Please contact [facility] for any questions/concerns.    Thank you for this referral.    Lupe Jaquez, Referral & Admissions Coordinator  [facility]  [phone]

## 2020-12-20 NOTE — PLAN OF CARE
Goal Outcome Evaluation:  Plan of Care Reviewed With: patient, friend  Progress: declining  Outcome Summary: Patient resting well throughout this night shift. No prns given. No complaints. Will continue to montior.

## 2020-12-20 NOTE — CONSULTS
"[facility] finds pt unresponsive sleeping in bed with son and \"adopted' son at bedside.  Son recounts acceptance and comfort with pt's decision regarding current circumstance - states: \"It's her decision not to get additional treatment - its her comfort that's important.\"  I'm the only one for her now and that takes a load off my shoulders.\"   Son reports pt \"is OK with passing, she isn't afraid.\"  Prayer offered for love shared by pt and for mutual support of family in grief.    Son aware of 24/7 availability of [facility].  No requests for further assistance.    "

## 2020-12-20 NOTE — PROGRESS NOTES
Palliative Care/Hospice Follow Up Note       LOS: 2 days   Patient Care Team:  Jose Morrison DO as PCP - General (Family Medicine)  Samantha Uriostegui MD as Consulting Physician (Cardiology)  Emilio Vazquez MD as Referring Physician (Nephrology)    Chief Complaint: She feels sleepy    Interval History: She rested well overnight required no as needed medications.  She said a couple doses during the day yesterday and 1 this morning.  Today she is a little more lethargic she is slow to answer questions.    Patient Complaints: none  Patient Denies: Pain or discomfort  History taken from: The patient and the nursing staff  Palliative Performance Scale  Palliative Performance Scale Score: 40%  [place] Symptom Assessment System Revised  Pain Score: no pain   ESAS Tiredness Score: 7  ESAS Nausea Score: No nausea  ESAS Depression Score: No depression  ESAS Anxiety Score: No anxiety  ESAS Drowsiness Score: 7  ESAS Lack of Appetite Score: 8  ESAS Wellbeing Score: 3  ESAS Dyspnea Score: No shortness of breath  ESAS Other Problem Score: Best possible response  ESAS Source of Information: healthcare professional caregiver  ESAS Intervention: medicated/see MAR  ESAS Intervention Response: tolerated      Review of Systems: Review of systems could not be obtained due to patient confusion.       Vital Signs  Temp:  [96.8 °F (36 °C)-97.4 °F (36.3 °C)] 96.8 °F (36 °C)  Heart Rate:  [76-84] 84  Resp:  [14-20] 20  Device (Oxygen Therapy): room air SpO2:  [92 %-93 %] 93 %    Physical Exam:  General Appearance:    awake and in no acute distress, lethargic and slow to respond     Throat:   No oral lesions, no thrush, oral mucosa moist     Neck:   No adenopathy, supple, trachea midline   Lungs:     Clear to auscultation, respirations regular, even and not labored      Heart:    Regular rhythm and normal rate   Abdomen:     +bowel sounds, no masses, no organomegaly, soft and non-tender, non-distended     Extremities:   + edema,  no cyanosis     Pulses:   Radial pulses palpable and equal bilaterally          Results Review:     I reviewed the patient's new clinical results.    Medication Reviewed.  sodium chloride, 10 mL, Intravenous, Q12H         •  acetaminophen **OR** acetaminophen **OR** acetaminophen  •  acetaminophen **OR** acetaminophen **OR** acetaminophen  •  calcium carbonate  •  diphenoxylate-atropine  •  Glycerin-Hypromellose-  •  glycopyrrolate **OR** glycopyrrolate **OR** glycopyrrolate **OR** glycopyrrolate  •  HYDROmorphone **OR** Morphine **OR** morphine **OR** ketorolac  •  HYDROmorphone **OR** Morphine **OR** morphine  •  lactulose  •  LORazepam **OR** LORazepam **OR** LORazepam  •  LORazepam **OR** LORazepam **OR** LORazepam  •  LORazepam **OR** LORazepam **OR** LORazepam  •  ondansetron **OR** ondansetron  •  Scopolamine  •  sodium chloride  •  [COMPLETED] Insert peripheral IV **AND** sodium chloride      Assessment/Plan       PVD (peripheral vascular disease) (CMS/[facility])    Type 2 diabetes mellitus without complication, with long-term current use of insulin (CMS/[facility])    Orthostatic hypotension    UTI (urinary tract infection)    Ischemic cardiomyopathy    Acute on chronic combined systolic and diastolic CHF (congestive heart failure) (CMS/[facility])    ESRD on hemodialysis (CMS/[facility])    Metabolic encephalopathy    This is a 70-year-old lady with a history of end-stage renal disease type 2 diabetes peripheral vascular disease ischemic cardiomyopathy and combined systolic and diastolic heart failure that presented with generalized weakness, altered mental status and lethargy  -She had components of a toxic and metabolic encephalopathy that initially improved in the hospitalization.  She was treated as a urinary tract infection and this seems to resolve.  -At this point she does not want any further dialysis and this is been discontinued.  Family and patient are agreeable with palliative care.  -With stopping dialysis she  likely has hours to days remaining.  -Continue comfort medications  -Hospice has been consulted      Plan for disposition:Hospice scatter bed when appropriate    Lasha Woodall MD  12/20/20  14:15 EST

## 2020-12-20 NOTE — PLAN OF CARE
Goal Outcome Evaluation:  Plan of Care Reviewed With: patient, son  Progress: declining  Outcome Summary: Pt restless toward end of shift. Spoke with son whose aware pt is declining and requires more medicationf for comfort. Son agreed to medicate pt prior to turns for comfort. Pt given 4mg morphine and 0.5mg ativan, turned on right side and resting well. Will continue to monitor

## 2020-12-21 NOTE — PLAN OF CARE
Goal Outcome Evaluation:  Plan of Care Reviewed With: patient  Progress: declining  Outcome Summary: Pt rested comfortably throughout the shift. Pt is unresponsive, moans at times but no other responses. Pt premedicated for turns and activity with 0.5 mg Ativan and 4 mg Morphine. Pt flipped to hospice scatter bed today and Dr. Tse assumed care. Comfort measures continued and will continue to monitor vital signs and comfort.

## 2020-12-21 NOTE — CONSULTS
Followed up on patient today. Patient has declined since previous evaluation, and requiring more aggressive symptom management. Obtained consents from son. Spoke with Dr. Woodall who completed discharge/readmit. Thank you for the referral and for allowing me to participate in the care of this patient.    Silvia Rojas RN  [facility]  [phone]

## 2020-12-21 NOTE — PLAN OF CARE
Goal Outcome Evaluation:  Plan of Care Reviewed With: patient, daughter  Progress: declining  Outcome Summary: pt arouses to voice, medicated PRN with morphine and ativan, daughter in law at bedside, continue comfort care

## 2020-12-21 NOTE — DISCHARGE SUMMARY
Patient Name: Randi Martines  : 1950  MRN: [id number]    Date of Admission: 2020  Date of Discharge:  2020  Primary Care Physician: Jose Morrison DO      Chief Complaint:   Weakness - Generalized      Discharge Diagnoses     Active Hospital Problems    Diagnosis  POA   • Metabolic encephalopathy [G93.41]  Yes   • Ischemic cardiomyopathy [I25.5]  Yes   • ESRD on hemodialysis (CMS/[facility]) [N18.6, Z99.2]  Not Applicable   • Acute on chronic combined systolic and diastolic CHF (congestive heart failure) (CMS/[facility]) [I50.43]  Yes   • UTI (urinary tract infection) [N39.0]  Yes   • Orthostatic hypotension [I95.1]  Yes   • Type 2 diabetes mellitus without complication, with long-term current use of insulin (CMS/[facility]) [E11.9, Z79.4]  Not Applicable   • PVD (peripheral vascular disease) (CMS/[facility]) [I73.9]  Yes      Resolved Hospital Problems   No resolved problems to display.        Hospital Course     Ms. Martines is a 70 y.o. female with a history of recent end-stage renal disease who presented to [facility] initially complaining of confusion.  Please see the admitting history and physical for further details.  She was found to have metabolic encephalopathy and UTI and was admitted to the hospital for further evaluation and treatment.  She is admitted to the hospital she did not really tolerate dialysis all that well on initiation with the last admission.  On evaluation here infectious disease was consulted and she was initially started on broad-spectrum antibiotics and urine culture was ordered.  Her urine culture did grow less than 10,000 CFU's of gram-negative rods and no further work-up was done.  After further discussion with the family and her lack of improvement with antibiotic therapy and her intolerance to dialysis plans were to move in more of a palliative direction.  The patient did not want to proceed with any further dialysis and understood what that meant.  Aggressive  medical care was withdrawn and she was provided supportive care with medications for comfort.  She is becoming increasingly somnolent and resting comfortably with the medications.  At this point she likely has hours to days remaining.  The plan is to transition to hospice scatter bed today.  Discussed with the patient's family at the bedside they acknowledged understanding and are agreeable with that transition.        Day of Discharge     Subjective:  Resting well with IV medications required for turns.    Review of Systems   Unable to perform ROS: Patient nonverbal       Physical Exam:  Temp:  [97 °F (36.1 °C)-98.7 °F (37.1 °C)] 98.7 °F (37.1 °C)  Heart Rate:  [84-95] 95  Resp:  [12-18] 12  BP: (147)/(74) 147/74  Body mass index is 26.31 kg/m².  Physical Exam  Vitals signs and nursing note reviewed.   Constitutional:       Appearance: She is ill-appearing.   Cardiovascular:      Rate and Rhythm: Normal rate and regular rhythm.   Pulmonary:      Effort: Pulmonary effort is normal. No respiratory distress.   Musculoskeletal:      Right lower leg: Edema present.      Left lower leg: Edema present.   Neurological:      Mental Status: She is alert.         Consultants     Consult Orders (all) (From admission, onward)     Start     Ordered    12/19/20 1519  Inpatient Hospice / [facility] Consult  Once     Specialty:  Hospice and Palliative Medicine  Provider:  (Not yet assigned)    12/19/20 1518    12/16/20 1948  Inpatient Consult to Case Management   Once     Provider:  (Not yet assigned)    12/16/20 1947 12/16/20 1948  Inpatient Infectious Diseases Consult  Once     Specialty:  Infectious Diseases  Provider:  Kadeem Cruz MD    12/16/20 1947 12/16/20 1814  Inpatient Cardiology Consult  Once     Specialty:  Cardiology  Provider:  Tip Godoy MD    12/16/20 1813    12/16/20 1742  Inpatient Vascular Surgery Consult  Once,   Status:  Canceled     Specialty:  Vascular Surgery  Provider:   Harpal Fletcher MD    12/16/20 1742    12/16/20 1719  Inpatient Vascular Surgery Consult  Once     Specialty:  Vascular Surgery  Provider:  Darrin Callse MD    12/16/20 1720    12/16/20 1455  Inpatient Palliative Care Nurse Consult  Once     Provider:  (Not yet assigned)    12/16/20 1454    12/16/20 1331  [facility] (on-call MD unless specified) Details  Once     Specialty:  Hospitalist  Provider:  (Not yet assigned)    12/16/20 1330    12/16/20 1308  Nephrology (on -call MD unless specified)  Once     Specialty:  Nephrology  Provider:  Alberto Vazquez MD    12/16/20 1308              Procedures     Imaging Results (All)     Procedure Component Value Units Date/Time    CT Head Without Contrast [[id number]] Collected: 12/16/20 1308     Updated: 12/17/20 1603    Narrative:      CT HEAD WITHOUT CONTRAST     HISTORY: Contusion to forehead on the left, headache.     COMPARISON: CT head 06/27/2020.     FINDINGS: The brain ventricles are symmetrical. There is no evidence of  hemorrhage, hydrocephalus or of abnormal extra-axial fluid. No focal  area of decreased attenuation to suggest acute infarction is identified.  Moderate vascular calcification involving the carotid siphons are noted  bilaterally.       Impression:      No evidence of fracture, intracranial hemorrhage or of acute  infarction. Further evaluation could be performed with an MRI  examination of the brain as indicated.           Radiation dose reduction techniques were utilized, including automated  exposure control and exposure modulation based on body size.     This report was finalized on 12/17/2020 4:00 PM by Dr. Lasha Rojas M.D.       XR Chest 1 View [[id number]] Collected: 12/16/20 1138     Updated: 12/16/20 1143    Narrative:      XR CHEST 1 VW-     HISTORY:  Weakness. Strokelike symptoms.     COMPARISON:  Chest radiograph 11/20/2020.     FINDINGS:    A single portable view of the chest was obtained. There is a right IJ  central line, unchanged.  There is a vascular stent projecting over the  left upper mediastinum. The cardiac silhouette is enlarged, similar to  the prior study. There are post surgical changes from CABG. There is  calcific aortic atherosclerosis. There is central pulmonary venous  congestion. There are prominent interstitial markings bilaterally,  suggestive of interstitial pulmonary edema. There are small bilateral  pleural effusions with adjacent bibasilar airspace opacity, suggestive  of atelectasis and/or layering pleural fluid with superimposed pneumonia  not entirely excluded. There are fractured median sternotomy wires,  unchanged. There is multilevel degenerative disc disease.     This report was finalized on 12/16/2020 11:40 AM by Dr. Regina Pradhan M.D.             Pertinent Labs     Results from last 7 days   Lab Units 12/18/20 0334 12/17/20 0453 12/16/20  1111   WBC 10*3/mm3 8.68 8.99 9.13   HEMOGLOBIN g/dL 11.0* 10.5* 9.8*   PLATELETS 10*3/mm3 184 228 246     Results from last 7 days   Lab Units 12/18/20 0334 12/17/20 0453 12/16/20  1111   SODIUM mmol/L 137 136  136 136   POTASSIUM mmol/L 3.6 3.9  4.0 3.9   CHLORIDE mmol/L 98 99  99 103   CO2 mmol/L 24.4 22.0  25.4 23.2   BUN mg/dL 8 9  10 17   CREATININE mg/dL 1.63* 2.16*  2.19* 3.65*   GLUCOSE mg/dL 80 66  69 102*   Estimated Creatinine Clearance: 25.8 mL/min (A) (by C-G formula based on SCr of 1.63 mg/dL (H)).  Results from last 7 days   Lab Units 12/18/20 0334 12/17/20 0453 12/16/20  1111   ALBUMIN g/dL 3.20* 3.10* 3.00*   BILIRUBIN mg/dL  --  0.5 0.5   ALK PHOS U/L  --  87 90   AST (SGOT) U/L  --  115* 111*   ALT (SGPT) U/L  --  92* 82*     Results from last 7 days   Lab Units 12/18/20 0334 12/17/20 0453 12/16/20  1111   CALCIUM mg/dL 9.2 9.2  9.1 9.0   ALBUMIN g/dL 3.20* 3.10* 3.00*   PHOSPHORUS mg/dL 1.6*  --   --      Results from last 7 days   Lab Units 12/16/20  1321   AMMONIA umol/L 16     Results from last 7 days   Lab Units 12/17/20  9659  12/16/20 2052 12/16/20  1111   TROPONIN T ng/mL 0.101* 0.108* 0.105*   PROBNP pg/mL  --   --  >70,000.0*           Invalid input(s): LDLCALC  Results from last 7 days   Lab Units 12/16/20  1322 12/16/20  1139 12/16/20  1130   BLOODCX   --  No growth at 5 days No growth at 5 days   URINECX  <10,000 CFU/mL Gram Negative Bacilli*  --   --        Test Results Pending at Discharge       Discharge Details        Discharge Medications      Changes to Medications      Instructions Start Date   Droplet Pen Needles 31G X 8 MM misc  Generic drug: Insulin Pen Needle  What changed:   · how much to take  · how to take this  · when to take this  · additional instructions   USE AS DIRECTED 4 TIMES DAILY         Continue These Medications      Instructions Start Date   Accu-Chek Cecile Plus w/Device kit   1 Device, Does not apply, Daily, Dx. E11.9         ASK your doctor about these medications      Instructions Start Date   acetaminophen 500 MG tablet  Commonly known as: TYLENOL   500 mg, Oral, Every 6 Hours PRN      aspirin 81 MG EC tablet   81 mg, Oral, Nightly      atorvastatin 80 MG tablet  Commonly known as: LIPITOR   TAKE 1 TABLET BY MOUTH EVERY NIGHT.      ciprofloxacin 500 MG tablet  Commonly known as: CIPRO   500 mg, Oral, Daily      docusate sodium 100 MG capsule  Commonly known as: COLACE   200 mg, Oral, Every Night at Bedtime      DULoxetine 60 MG capsule  Commonly known as: CYMBALTA   60 mg, Oral, Daily      ertapenem 500mg/50 ml solution IV  Commonly known as: INVanz   500 mg, Intravenous, Every 24 Hours, For 3 days started 12/13/20       gabapentin 300 MG capsule  Commonly known as: NEURONTIN   300 mg, Oral, Every Night at Bedtime      glucose blood test strip  Commonly known as: Accu-Chek Cecile Plus   1 each, Other, 2 Times Daily, Use as instructed. Dx. E11.9      insulin aspart 100 UNIT/ML solution pen-injector sc pen  Commonly known as: NovoLOG FlexPen   15 Units, Subcutaneous, 3 Times Daily With Meals       lactulose 10 GM/15ML solution  Commonly known as: CHRONULAC   10 g, Oral, Daily PRN      Levemir FlexTouch 100 UNIT/ML injection  Generic drug: insulin detemir   15 Units, Subcutaneous, 2 Times Daily      Liraglutide 18 MG/3ML solution pen-injector injection  Commonly known as: VICTOZA   1.8 mg, Subcutaneous, Daily      melatonin 5 MG tablet tablet   10 mg, Oral, Nightly      midodrine 5 MG tablet  Commonly known as: PROAMATINE   5 mg, Oral, 3 Times Daily Before Meals      pantoprazole 40 MG EC tablet  Commonly known as: PROTONIX   40 mg, Oral, 2 times daily      promethazine 25 MG tablet  Commonly known as: PHENERGAN   25 mg, Oral, Every 6 Hours PRN      Juju-Charlie tablet   1 tablet, Oral, Daily      sucralfate 1 g tablet  Commonly known as: CARAFATE   1 g, Oral, 3 Times Daily      traMADol 50 MG tablet  Commonly known as: ULTRAM   50 mg, Oral, Daily             Allergies   Allergen Reactions   • Augmentin [Amoxicillin-Pot Clavulanate] GI Intolerance and Dizziness   • Codeine GI Intolerance and Dizziness         Discharge Disposition:  Skilled Nursing Facility ([facility])    Discharge Diet:  Diet Order   Procedures   • Diet Soft Texture; [employer]       Discharge Activity:       CODE STATUS:    Code Status and Medical Interventions:   Ordered at: 12/18/20 5526     Level Of Support Discussed With:    Health Care Surrogate     Code Status:    No CPR     Medical Interventions (Level of Support Prior to Arrest):    Comfort Measures     Comments:    please verify with patient or healthcare surrogate that this is indeed their wishes, notify MD if needs to be changed       No future appointments.  Follow-up Information     Harpal Fletcher MD Follow up in 1 month(s).    Specialty: Vascular Surgery  Why: Follow-up with MD or APRN for AV shunt duplex and ABIs and the left lower extremity arterial graft scan in the office on follow-up.  Contact information:  [address]  [address]  [phone]             Jose Morrison DO .    Specialties: Family Medicine, Urgent Care  Contact information:  [address]  [facility] [address]  [place] [id number]  [phone]                   Time Spent on Discharge:  Greater than 30 minutes      Lasha Woodall MD  [facility]  12/21/20  13:08 EST

## 2020-12-22 NOTE — PROGRESS NOTES
Case Management Discharge Note      Final Note: Admitted to a [facility] scattered bed on 12/21/2020. SEGUN Malone RN, CCP    Provided Post Acute Provider List?: Refused  Refused Provider List Comment: Stated will return to [facility] if SNF needed at D/C and uses [facility]. Family unsure of D/C due to pt's decline in health.    Selected Continued Care - Discharged on 12/21/2020 Admission date: 12/16/2020 - Discharge disposition: Hospice/Medical Facility ([facility])    Destination Coordination complete    Service Provider Selected Services Address Phone Fax Patient Preferred    [facility]  Inpatient Hospice [address], [facility] [id number] [phone] --          Durable Medical Equipment    No services have been selected for the patient.              Dialysis/Infusion    No services have been selected for the patient.              Home Medical Care    No services have been selected for the patient.              Therapy    No services have been selected for the patient.              Community Resources    No services have been selected for the patient.                Selected Continued Care - Prior Encounters Includes selections from prior encounters from 9/17/2020 to 12/21/2020    Discharged on 11/30/2020 Admission date: 11/20/2020 - Discharge disposition: Skilled Nursing Facility (DC - External)    Destination     Service Provider Selected Services Address Phone Fax Patient Preferred    [facility]  Skilled Nursing [address] [facility] [address] [phone] --                         Final Discharge Disposition Code: 51 - hospice medical facility

## 2020-12-22 NOTE — PROGRESS NOTES
Discharge Planning Assessment  [facility]     Patient Name: Randi Martines  MRN: [id number]  Today's Date: 12/22/2020    Admit Date: 12/16/2020    Discharge Needs Assessment    No documentation.       Discharge Plan     Row Name 12/22/20 0815       Plan    Final Discharge Disposition Code  51 - hospice medical facility    Final Note  Admitted to a [facility] scattered bed on 12/21/2020. SEGUN Malone RN, CCP        Continued Care and Services - Discharged on 12/21/2020 Admission date: 12/16/2020 - Discharge disposition: Hospice/Medical Facility (D[facility] - [facility])    Destination Coordination complete    Service Provider Request Status Selected Services Address Phone Fax Patient Preferred    [facility]   [facility] [address] [facility] [id number] [phone] --    [facility]  Pending - Request Sent N/A [address] [facility] [address] [phone] --          Home Medical Care     Service Provider Request Status Selected Services Address Phone Fax Patient Preferred    [facility]  Pending - Request Sent N/A [address] [address] [phone] --            Selected Continued Care - Prior Encounters Includes selections from prior encounters from 9/17/2020 to 12/21/2020    Discharged on 11/30/2020 Admission date: 11/20/2020 - Discharge disposition: Skilled Nursing Facility (DC - External)    Destination     Service Provider Selected Services Address Phone Fax Patient Preferred    [facility]  Skilled Nursing [address] [facility] [address] [phone] --                    Expected Discharge Date and Time     Expected Discharge Date Expected Discharge Time    Dec 21, 2020         Demographic Summary    No documentation.       Functional Status    No documentation.       Psychosocial    No documentation.        Abuse/Neglect    No documentation.       Legal    No documentation.       Substance Abuse    No documentation.       Patient Forms    No documentation.           Rosmery Malone RN

## 2020-12-22 NOTE — PROGRESS NOTES
Case Management Discharge Note      Final Note: The patient  on 2020 @ 01:48. BKristine Malone RN, CCP.         Selected Continued Care - Discharged on 2020 Admission date: 2020 - Discharge disposition:     Destination Coordination complete    Service Provider Selected Services Address Phone Fax Patient Preferred    [facility]  Inpatient Hospice [address], [facility] [address] [phone] --          Durable Medical Equipment    No services have been selected for the patient.              Dialysis/Infusion    No services have been selected for the patient.              Home Medical Care    No services have been selected for the patient.              Therapy    No services have been selected for the patient.              Community Resources    No services have been selected for the patient.                Selected Continued Care - Prior Encounters Includes selections from prior encounters from 2020 to 2020    Discharged on 2020 Admission date: 2020 - Discharge disposition: Hospice/Medical Facility (DCR - [facility])    Destination     Service Provider Selected Services Address Phone Fax Patient Preferred    [facility]  Inpatient Hospice [address], [facility] [address] [phone] --                Discharged on 2020 Admission date: 2020 - Discharge disposition: Skilled Nursing Facility (DC - External)    Destination     Service Provider Selected Services Address Phone Fax Patient Preferred    [facility]  Skilled Nursing [address] [place] [id number] [phone] --                         Final Discharge Disposition Code: 41 -  in medical facility

## 2020-12-23 NOTE — PROGRESS NOTES
Case Management Discharge Note      Final Note: The patient  on 20 @ 01:48. BKristine Malone RN, CCP.         Selected Continued Care - Discharged on 2020 Admission date: 2020 - Discharge disposition:     Destination Coordination complete    Service Provider Selected Services Address Phone Fax Patient Preferred    [facility]  Inpatient Hospice [address], [facility] [address] [phone] --          Durable Medical Equipment    No services have been selected for the patient.              Dialysis/Infusion    No services have been selected for the patient.              Home Medical Care    No services have been selected for the patient.              Therapy    No services have been selected for the patient.              Community Resources    No services have been selected for the patient.                Selected Continued Care - Prior Encounters Includes selections from prior encounters from 2020 to 2020    Discharged on 2020 Admission date: 2020 - Discharge disposition: Hospice/Medical Facility ([facility] - [facility])    Destination     Service Provider Selected Services Address Phone Fax Patient Preferred    [facility]  Inpatient Hospice [address], [facility] [address] [phone] --                Discharged on 2020 Admission date: 2020 - Discharge disposition: Skilled Nursing Facility (DC - External)    Destination     Service Provider Selected Services Address Phone Fax Patient Preferred    [facility]  Skilled Nursing [address] [place] [phone] --                         Final Discharge Disposition Code: 41 -  in medical facility

## 2021-07-28 NOTE — TELEPHONE ENCOUNTER
Needs diabetic shoes order to [facility] please fax.  Diabetic foot exam was done 8-31-17 jm   Libtayo Counseling- I discussed with the patient the risks of Libtayo including but not limited to nausea, vomiting, diarrhea, and bone or muscle pain.  The patient verbalized understanding of the proper use and possible adverse effects of Libtayo.  All of the patient's questions and concerns were addressed.

## 2021-09-30 NOTE — PLAN OF CARE
Problem: Tissue Perfusion, Ineffective Peripheral (Adult)  Goal: Adequate Tissue Perfusion  Outcome: Ongoing (interventions implemented as appropriate)    06/09/17 0500   Tissue Perfusion, Ineffective Peripheral (Adult)   Adequate Tissue Perfusion making progress toward outcome            PAST MEDICAL HISTORY:  Afib     CHF (congestive heart failure)     CVA (cerebral vascular accident)     HTN (hypertension)     Mitral valve disease     Respiratory insufficiency

## 2021-11-03 NOTE — OUTREACH NOTE
General Surgery Week 3 Survey      Responses   [facility] patient discharged from?  [place]   Does the patient have one of the following disease processes/diagnoses(primary or secondary)?  General Surgery   Week 3 attempt successful?  No   Unsuccessful attempts  Attempt 2          Ashanti Can RN  
Patient's belongings returned

## 2022-03-25 NOTE — PLAN OF CARE
[facility]  Internal Medicine Teaching Residency Program  Inpatient Daily Progress Note  ______________________________________________________________________________    Patient: Niya Bauer  YOB: 1976   RIM:[id number]    Acct: [de-identified]     Room: 2008/2008-01  Admit date: 3/18/2022  Today's date: 03/25/22  Number of days in the hospital: 7    SUBJECTIVE   Admitting Diagnosis: Pneumonia  CC: Shortness of breath    - Pt examined at bedside. Chart & results reviewed. - No acute events overnight  - Patient resting comfortably in bed this morning  - Concern for discolored urine as described by RN it was fruit punch colored yesterday and torie this morning. Patient denies any urinary symptoms such as dysuria. Patient is not on her menstrual cycle and RN denies any trauma. - Patient denies any difficulty breathing or chest pain  - Patient on 25 L at FiO2 35%  - Total in yesterday 900mL total out 1.9 L  - Afebrile  - Vital signs stable      Plan for today:  - We will follow up on discoloration of urine and continue to monitor. No acute intervention needed at this time regarding urine.  - Wean O2 as tolerated. Goal is to get patient back on 4 to 5 L via nasal cannula.   - Continue with antibiotics    ROS:  Constitutional:  negative for chills, fevers, sweats  Respiratory:  negative for cough, dyspnea on exertion, hemoptysis, shortness of breath, wheezing  Cardiovascular:  negative for chest pain, chest pressure/discomfort, lower extremity edema, palpitations  Gastrointestinal:  negative for abdominal pain, constipation, diarrhea, nausea, vomiting  Neurological:  negative for dizziness, headache    BRIEF HISTORY     The patient is a pleasant 45 y.o. female presents with a chief complaint of shortness of breath.  Past medical history significant for CHF, HIEN, COPD on 3-4 L of oxygen, asthma, hyperlipidemia, hypertension.  Patient states that she usually Problem: Patient Care Overview (Adult)  Goal: Plan of Care Review  Outcome: Ongoing (interventions implemented as appropriate)    06/11/17 0615   Coping/Psychosocial Response Interventions   Plan Of Care Reviewed With patient   Patient Care Overview   Progress improving   Outcome Evaluation   Outcome Summary/Follow up Plan Pt VSS stable. C/O left foot pain and po percocet given. Pt can pivot on right foot to bsc with stand-by assist. Plan to d/c Monday if precert is obtained.             uses 3 to 4 L of oxygen.  Patient was brought in through the EMS when she became significantly short of breath this morning and her saturation dropped to low 70s and high 60s. .  According to the EMS, patient's oxygen tubing was kinked and she was saturating at 70%.  This improved with new tubing and patient's oxygen saturation improved to 90%.  Patient states that EMS placed her on a nonrebreather mask.     Patient states that she experiences chest pain occasionally- the pain is substernal, is at rest and sometimes pain decreases with changing position.  Patient states that the pain is sudden in onset describes the character as a pressure-like sensation, it radiates to the back. OBJECTIVE     Vital Signs:  /86   Pulse 80   Temp 98.3 °F (36.8 °C) (Oral)   Resp 16   Ht 5' 6\" (1.676 m)   Wt (!) 355 lb (161 kg)   SpO2 91%   BMI 57.30 kg/m²     Temp (24hrs), Av.8 °F (36.6 °C), Min:97.6 °F (36.4 °C), Max:98.3 °F (36.8 °C)    In: 900   Out: 2850 [Urine:2850]    Physical Exam:  Physical Exam  Vitals and nursing note reviewed. Constitutional:       Appearance: She is obese. HENT:      Head: Normocephalic and atraumatic. Nose: Nose normal.      Mouth/Throat:      Mouth: Mucous membranes are moist.      Pharynx: Oropharynx is clear. Eyes:      Extraocular Movements: Extraocular movements intact. Conjunctiva/sclera: Conjunctivae normal.      Pupils: Pupils are equal, round, and reactive to light. Cardiovascular:      Rate and Rhythm: Normal rate and regular rhythm. Pulses: Normal pulses. Heart sounds: Normal heart sounds. No murmur heard. No friction rub. No gallop. Pulmonary:      Effort: Respiratory distress present. Breath sounds: Normal breath sounds. No stridor. No wheezing, rhonchi or rales. Chest:      Chest wall: No tenderness. Abdominal:      General: Abdomen is flat. Bowel sounds are normal. There is no distension. Palpations: Abdomen is soft.  There is no mass. Tenderness: There is no abdominal tenderness. There is no guarding or rebound. Hernia: No hernia is present. Musculoskeletal:         General: No swelling, tenderness, deformity or signs of injury. Normal range of motion. Cervical back: Normal range of motion. Right lower leg: No edema. Left lower leg: No edema. Skin:     General: Skin is warm. Neurological:      General: No focal deficit present. Mental Status: She is alert and oriented to person, place, and time. Mental status is at baseline. Psychiatric:         Mood and Affect: Mood normal.         Behavior: Behavior normal.         Thought Content:  Thought content normal.         Judgment: Judgment normal.           Medications:  Scheduled Medications:    potassium chloride  40 mEq Oral Once    bumetanide  2 mg IntraVENous BID    docusate sodium  100 mg Oral Daily    hydrALAZINE  25 mg Oral 3 times per day    sodium chloride flush  5-40 mL IntraVENous 2 times per day    enoxaparin  30 mg SubCUTAneous BID    amLODIPine  5 mg Oral Daily    atorvastatin  40 mg Oral Nightly    budesonide-formoterol  2 puff Inhalation BID    fluticasone  1 spray Nasal Daily    folic acid  1 mg Oral Daily    gabapentin  300 mg Oral TID    isosorbide dinitrate  20 mg Oral TID    pantoprazole  40 mg Oral QAM AC    sertraline  100 mg Oral Daily    spironolactone  25 mg Oral Daily    tiotropium  2 puff Inhalation Daily    guaiFENesin  600 mg Oral BID     Continuous Infusions:    dextrose      sodium chloride 25 mL (03/20/22 0840)     PRN MedicationsLORazepam, 1 mg, Q4H PRN  glucose, 15 g, PRN  dextrose, 12.5 g, PRN  glucagon (rDNA), 1 mg, PRN  dextrose, 100 mL/hr, PRN  sodium chloride flush, 5-40 mL, PRN  sodium chloride, 25 mL, PRN  ondansetron, 4 mg, Q8H PRN   Or  ondansetron, 4 mg, Q6H PRN  polyethylene glycol, 17 g, Daily PRN  acetaminophen, 650 mg, Q6H PRN   Or  acetaminophen, 650 mg, Q6H PRN  oxyCODONE-acetaminophen, 2 tablet, Q6H PRN  sodium chloride, 1 spray, PRN  albuterol sulfate HFA, 2 puff, Q6H PRN        Diagnostic Labs:  CBC:   Recent Labs     03/23/22  0640 03/24/22  0442 03/25/22  0349   WBC 8.5 10.0 9.7   RBC 3.66* 3.69* 4.00   HGB 9.2* 9.3* 10.1*   HCT 32.5* 32.2* 36.3   MCV 88.8 87.3 90.8   RDW 15.4* 15.3* 15.3*    247 261     BMP:   Recent Labs     03/23/22  0640 03/24/22 0442 03/25/22 0349    139 137   K 3.0* 3.7 3.6*   CL 89* 93* 90*   CO2 35* 34* 33*   BUN 28* 33* 35*   CREATININE 1.17* 1.09* 1.05*     BNP: No results for input(s): BNP in the last 72 hours. PT/INR: No results for input(s): PROTIME, INR in the last 72 hours. APTT: No results for input(s): APTT in the last 72 hours. CARDIAC ENZYMES: No results for input(s): CKMB, CKMBINDEX, TROPONINI in the last 72 hours. Invalid input(s): CKTOTAL;3  FASTING LIPID PANEL:  Lab Results   Component Value Date    CHOL 144 11/17/2018    HDL 42 10/07/2020    TRIG 68 11/17/2018     LIVER PROFILE: No results for input(s): AST, ALT, ALB, BILIDIR, BILITOT, ALKPHOS in the last 72 hours. MICROBIOLOGY:   Lab Results   Component Value Date/Time    CULTURE NO GROWTH 6 DAYS 06/29/2021 07:03 PM       Imaging:    XR CHEST PORTABLE    Result Date: 3/22/2022  Improvement in the the right basal infiltrate. XR CHEST PORTABLE    Result Date: 3/19/2022  Findings suggestive of worsening bibasilar pneumonia. XR CHEST PORTABLE    Result Date: 3/18/2022  Stable cardiomegaly with mild pulmonary vascular congestion. CT CHEST PULMONARY EMBOLISM W CONTRAST    Result Date: 3/18/2022  No evidence of pulmonary embolism. There is patchy ill-defined opacification lower lung fields bilaterally suggesting infiltrate with ground-glass opacity concerning for pneumonia as well in the upper lung fields. Reactive adenopathy throughout the mediastinum and hilar regions.  RECOMMENDATIONS: Unavailable     FL MODIFIED BARIUM SWALLOW W VIDEO    Result Date: 3/19/2022  No penetration or aspiration with the above administered substances. Please see separate speech pathology report for full discussion of findings and recommendations. ASSESSMENT & PLAN     Assessment and Plan:    Principal Problem:    Pneumonia  Active Problems:    HTN (hypertension)    COPD exacerbation (HCC)    Pulmonary hypertension, moderate to severe (HCC)    Morbid obesity with BMI of 50.0-59.9, adult (HCC)    STEPH (obstructive sleep apnea)    Normocytic anemia    Dyspnea    Prediabetes    Hyperlipidemia    Hypokalemia    Hypomagnesemia  Resolved Problems:    * No resolved hospital problems. *    Community acquired pneumonia - Day 6 Levaquin today  Acute on chronic CHF; HFpEF EF 75% 02/2019; resumed home isordil 20 mg tid and aldactone 25 mg qd; Continue IV Bumex 2 mg bid. Echo result indicative of EF of 60% with severe pulmonary hypertension  COPD exacerbation - pulmonology on board; continue albuterol, symbicort, spiriva. Started on predisone 40 mg for 5 days completed. Pulmonology following. Recommendations appreciated. Trilogy vent recommended for discharge. Continue to wean oxygen as tolerated. Call this to get patient back on 4 to 5 L via nasal cannula. Acute on chronic hypoxic respiratory failure secondary to #2 and #3  HTN - Norvasc 5 mg qd, hydralazine 25 mg q8h. Will consider optimization of meds with addition of Lisinopril due to DM   HLD - Lipitor 40 mg qd  Type 2 DM HbA1C 6.4 03/22- Sugars well controlled. Neither Lantus nor Sliding scale Insulin needed currently. Hypoglycemia protocol. MDD - Zoloft  Morbid obesity - Counseled      DVT ppx: Lovenox  GI ppx: Protonix     PT/OT/SW: On board  Discharge Planning: Ongoing pending ability to wean from high flow oxygen as appropriate.  efforts appreciated. Celsa Sanon MD  Internal Medicine Resident, PGY-1  [id number];  [place], [place]   7:10 AM 3/25/2022     Please note that part of this chart was generated using voice recognition dictation software. Although every effort was made to ensure the accuracy of this automated transcription, some errors in transcription may have occurred. I have discussed the care of [address] [place] [address] , including pertinent history and exam findings,    today with the resident. I have seen and examined the patient and the key elements of all parts of the encounter have been performed by me . I agree with the assessment, plan and orders as documented by the resident. Principal Problem:    Pneumonia  Active Problems:    HTN (hypertension)    COPD exacerbation (HCC)    Pulmonary hypertension, moderate to severe (HCC)    Morbid obesity with BMI of 50.0-59.9, adult (HCC)    STEPH (obstructive sleep apnea)    Normocytic anemia    Dyspnea    Prediabetes    Hyperlipidemia    Hypokalemia    Hypomagnesemia  Resolved Problems:    * No resolved hospital problems. *        Overall  course ;                                   show no change over time.         Still requiring high flow oxygen  In negative balance of around 5 L on Bumex  Patient is not motivated, as per nursing report, did not work with physical therapy, wants to have catheter so that she did not need to go for urination  Counseling provided  Appreciate input from pulmonary medicine          Electronically signed by Soumya Gomes MD

## 2023-01-01 NOTE — OUTREACH NOTE
Call Center TCM Note      Responses   [facility] patient discharged from?  [place]   COVID-19 Test Status  Negative   Does the patient have one of the following disease processes/diagnoses(primary or secondary)?  Other   TCM attempt successful?  No   Unsuccessful attempts  Attempt 3          Dai Kay MA    9/28/2020, 09:22 EDT      
(2) well flexed

## 2023-02-10 NOTE — PROGRESS NOTES
"Bola Leonardo MD       LOS: 5 days   Patient Care Team:  Canelo Shah MD as PCP - General  Canelo Shah MD as PCP - Family Medicine  Samantha Uriostegui MD as Consulting Physician (Cardiology)    Subjective     66 y.o. female POD #5 s/p left fem pop with iliac stent, popliteal angioplasty    Feels \"pretty good\", no new complaints    Review of Systems  Review of Systems - ezequiel diet.No emesis.no CP    Objective     Vital Signs  Temp:  [97.9 °F (36.6 °C)-98.8 °F (37.1 °C)] 97.9 °F (36.6 °C)  Heart Rate:  [65-71] 71  Resp:  [16-18] 18  BP: (112-132)/(46-80) 112/80    Physical Exam  General: No acute distress. Alert and oriented x 4  CV: RRR  Resp: unlabored breathing on ra  Abd: Abdomen is soft  Extremities:groin and AK incisions clean and dry.foot is stable with no new areas of compromise. Great PT signal    Results Review:       Recent Results (from the past 12 hour(s))   POC Glucose Fingerstick    Collection Time: 06/05/17  7:53 AM   Result Value Ref Range    Glucose 127 70 - 130 mg/dL   ]      Assessment/Plan           Active Problems:    Atherosclerotic PVD with ulceration        Assessment & Plan  66 y.o. female POD 5 LLE revasc- doing well, on aspirin, plavix, crestor    -PT    plastic eval by Dr. Waterhouse- skin graft in the future- timing per Dr. Barrientos and Dr. Waterhouse.      May need temp placement for rehab and dressings after surgery        Bola Leonardo MD  06/05/17  10:40 AM      " Nostril Rim Text: The closure involved the nostril rim.

## (undated) DEVICE — DRAPE,REIN 53X77,STERILE: Brand: MEDLINE

## (undated) DEVICE — NDL HYPO PRECISIONGLIDE REG 25G 1 1/2

## (undated) DEVICE — CANN NASL CO2 TRULINK W/O2 A/

## (undated) DEVICE — SYR LUERLOK 20CC BX/50

## (undated) DEVICE — SUT SILK 4/0 TIES 18IN A183H

## (undated) DEVICE — DECANT BG O JET

## (undated) DEVICE — BANDAGE,GAUZE,BULKEE II,4.5"X4.1YD,STRL: Brand: MEDLINE

## (undated) DEVICE — SOL NS 500ML

## (undated) DEVICE — IRRIGATOR BULB ASEPTO 60CC STRL

## (undated) DEVICE — NAVICROSS SUPPORT CATHETER: Brand: NAVICROSS

## (undated) DEVICE — DEV LK WIREGUIDE FUSN OLYMP SCP

## (undated) DEVICE — KT MANIFLD CARDIAC

## (undated) DEVICE — Device

## (undated) DEVICE — PK AV FISTL/SHNT 40

## (undated) DEVICE — PINNACLE R/O II INTRODUCER SHEATH WITH RADIOPAQUE MARKER: Brand: PINNACLE

## (undated) DEVICE — TUBING, SUCTION, 1/4" X 10', STRAIGHT: Brand: MEDLINE

## (undated) DEVICE — CATH DIAG CARD PERFORMA MPA1 BT 4F 100CM

## (undated) DEVICE — DRSNG GZ CURAD XEROFORM NONADHS 5X9IN STRL

## (undated) DEVICE — WIREGUIDED RETRIEVAL BASKET: Brand: TRAPEZOID RX

## (undated) DEVICE — EXTENSION SET, MALE LUER LOCK ADAPTER WITH RETRACTABLE COLLAR

## (undated) DEVICE — GLV SURG SIGNATURE ESSENTIAL PF LTX SZ7.5

## (undated) DEVICE — CATH SZ ACCUVU SEG/20CM PIG .038IN 5F 70CM

## (undated) DEVICE — SUT ETHLN 4/0 P3 18IN 699G

## (undated) DEVICE — PENCL E/S HNDSWCH ROCKR CB

## (undated) DEVICE — BLOOD TRANSFUSION FILTER: Brand: HAEMONETICS

## (undated) DEVICE — LOU MINOR PROCEDURE: Brand: MEDLINE INDUSTRIES, INC.

## (undated) DEVICE — PK ATS CUST W CARDIOTOMY RESEVOIR

## (undated) DEVICE — BLOOD COLLECTION/INFUSION SET,FEMALE LUER, 12 INCH (30.5 CM) TUBING: Brand: MONOJECT

## (undated) DEVICE — GAUZE,SPONGE,FLUFF,6"X6.75",STRL,10/TRAY: Brand: MEDLINE

## (undated) DEVICE — DRSNG SURESITE WNDW 4X4.5

## (undated) DEVICE — SUT PROLN 6/0 BV1 D/A 30IN 8709H

## (undated) DEVICE — STRIP CLS WND SUTURESTRIP/PLS 0.5X5IN TP1105

## (undated) DEVICE — ISOLATION BAG: Brand: CONVERTORS

## (undated) DEVICE — ANTIBACTERIAL UNDYED BRAIDED (POLYGLACTIN 910), SYNTHETIC ABSORBABLE SUTURE: Brand: COATED VICRYL

## (undated) DEVICE — RADIFOCUS GLIDECATH: Brand: GLIDECATH

## (undated) DEVICE — BIOPATCH™ ANTIMICROBIAL DRESSING WITH CHLORHEXIDINE GLUCONATE IS A HYDROPHILLIC POLYURETHANE ABSORPTIVE FOAM WITH CHLORHEXIDINE GLUCONATE (CHG) WHICH INHIBITS BACTERIAL GROWTH UNDER THE DRESSING. THE DRESSING IS INTENDED TO BE USED TO ABSORB EXUDATE, COVER A WOUND CAUSED BY VASCULAR AND NONVASCULAR PERCUTANEOUS MEDICAL DEVICES DURING SURGERY, AS WELL AS REDUCE LOCAL INFECTION AND COLONIZATION OF MICROORGANISMS.: Brand: BIOPATCH

## (undated) DEVICE — GW EMR FIX EXCHG J STD .035 3MM 260CM

## (undated) DEVICE — DRSNG WND BORDR/ADHS NONADHR/GZ LF 4X14IN STRL

## (undated) DEVICE — KT CATH TAL PALINDROME SAPPHIRE 14.5F23CM

## (undated) DEVICE — NDL PERC 1PRT THNWALL W/BASEPLT 18G 7CM

## (undated) DEVICE — TBG ART PRESS 60 IN

## (undated) DEVICE — RADIFOCUS GLIDEWIRE: Brand: GLIDEWIRE

## (undated) DEVICE — BNDG ELAS ELITE V/CLOSE 6IN 5YD LF STRL

## (undated) DEVICE — ELECTRD NDL EDGE/STD 2.84IN

## (undated) DEVICE — AVANTI + 4F STD W/GW: Brand: AVANTI

## (undated) DEVICE — CVR PROB INTRAOP L TP 12X244CM

## (undated) DEVICE — KT VLV BIOGUARD SXN BIOP AIR/H20 CONN 4PC DISP

## (undated) DEVICE — CVR PROB 96IN LF STRL

## (undated) DEVICE — SUT ETHLN 2/0 PS 18IN 585H

## (undated) DEVICE — PK CATH CARD 40

## (undated) DEVICE — CATH VENT MIV RADL PIG ST TIP 4F 110CM

## (undated) DEVICE — PENCL E/S ULTRAVAC TELESCP NOSE HOLSTR 10FT

## (undated) DEVICE — SKIN PREP TRAY W/CHG: Brand: MEDLINE INDUSTRIES, INC.

## (undated) DEVICE — INTENDED FOR TISSUE SEPARATION, AND OTHER PROCEDURES THAT REQUIRE A SHARP SURGICAL BLADE TO PUNCTURE OR CUT.: Brand: BARD-PARKER ® CARBON RIB-BACK BLADES

## (undated) DEVICE — GW THRUWAY TPR STR SHRT .014 190CM

## (undated) DEVICE — SUT SILK 3/0 TIES 18IN A184H

## (undated) DEVICE — STERILE FOAM PAD: Brand: CENTURION

## (undated) DEVICE — GOWN,NON-REINFORCED,SIRUS,SET IN SLV,XXL: Brand: MEDLINE

## (undated) DEVICE — ADHS SKIN DERMABOND TOP ADVANCED

## (undated) DEVICE — DRSNG SURESITE WNDW 2.38X2.75

## (undated) DEVICE — SUT PROLN 6/0 RB2 D/A 30IN 8711H

## (undated) DEVICE — CATH IV INTROCAN SFTY PUR 16G 2IN

## (undated) DEVICE — CATH DIAG CARD PERFORMA JL4.0 BT 4F100CM

## (undated) DEVICE — FRCP BX RADJAW4 NDL 2.8 240CM LG OG BX40

## (undated) DEVICE — CATH DIAG IMPULSE FL4 5F 100CM

## (undated) DEVICE — BNDG ELAS ELITE V/CLOSE 4IN 5YD LF STRL

## (undated) DEVICE — STPCK 3WY D201 DISCOFIX

## (undated) DEVICE — GLV SURG BIOGEL LTX PF 7 1/2

## (undated) DEVICE — RADIFOCUS TORQUE DEVICE MULTI-TORQUE VISE: Brand: RADIFOCUS TORQUE DEVICE

## (undated) DEVICE — SPNG LAP 18X18IN LF STRL PK/5

## (undated) DEVICE — SUT SILK 2/0 TIES 18IN A185H

## (undated) DEVICE — VESSEL LOOPS X-RAY DETECTABLE: Brand: DEROYAL

## (undated) DEVICE — SENSR O2 OXIMAX FNGR A/ 18IN NONSTR

## (undated) DEVICE — TRAP FLD MINIVAC MEGADYNE 100ML

## (undated) DEVICE — PK AAA 40

## (undated) DEVICE — SYR LUERLOK 5CC

## (undated) DEVICE — GLV SURG SENSICARE PI MIC PF SZ7.5 LF STRL

## (undated) DEVICE — CATH GUIDE SOFTVU SELECT/V HT OMNI .038 5F 65CM

## (undated) DEVICE — MEDI-VAC YANKAUER SUCTION HANDLE W/BULBOUS TIP: Brand: CARDINAL HEALTH

## (undated) DEVICE — SUT VIC 3/0 CTI 36IN J944H

## (undated) DEVICE — SUT SILK 2/0 SH CR5 18IN C0125

## (undated) DEVICE — DERMATOME BLADES: Brand: DERMATOME

## (undated) DEVICE — CATH DIAG IMPULSE FR4 5F 100CM

## (undated) DEVICE — BITEBLOCK OMNI BLOC

## (undated) DEVICE — TOTAL TRAY, 16FR 10ML SIL FOLEY, URN: Brand: MEDLINE

## (undated) DEVICE — GLIDESHEATH BASIC HYDROPHILIC COATED INTRODUCER SHEATH: Brand: GLIDESHEATH

## (undated) DEVICE — ST. SORBAVIEW ULTIMATE IJ SYSTEM A,C: Brand: CENTURION

## (undated) DEVICE — INFLATION DEVICE: Brand: ENCORE™ 26

## (undated) DEVICE — 3M™ STERI-DRAPE™ INSTRUMENT POUCH 1018: Brand: STERI-DRAPE™

## (undated) DEVICE — SUT PROLN 5/0 RB1 D/A 36IN 8556H

## (undated) DEVICE — SPHINCTEROTOME: Brand: HYDRATOME RX 44

## (undated) DEVICE — CATH DIAG SITESEER 4FAL I

## (undated) DEVICE — DRP SLUSH WARMR MACH 52X66IN OM-ORS-301

## (undated) DEVICE — CATH DIAG IMPULSE IMT 5F 100CM

## (undated) DEVICE — CATH DIAG CARD PERFORM JR4.0 BT 4F100CM

## (undated) DEVICE — ERBE NESSY®PLATE 170 SPLIT; 168CM²; CABLE 3M: Brand: ERBE

## (undated) DEVICE — INTENDED FOR TISSUE SEPARATION, AND OTHER PROCEDURES THAT REQUIRE A SHARP SURGICAL BLADE TO PUNCTURE OR CUT.: Brand: BARD-PARKER ® STAINLESS STEEL BLADES

## (undated) DEVICE — DEV ANGIO FLOSWITCH HP BX24

## (undated) DEVICE — CATH IV INTROCAN SFTY PUR 20G 1IN

## (undated) DEVICE — PINNACLE R/O II HIFLO INTRODUCER SHEATH WITH RADIOPAQUE MARKER: Brand: PINNACLE

## (undated) DEVICE — TBG PRESS HI FLX BR 96IN

## (undated) DEVICE — CATH IV INSYTE AUTOGARD 14G 1 1/2IN ORNG

## (undated) DEVICE — SUT PROLN 6/0 C1 D/A 30IN 8706H

## (undated) DEVICE — SPNG GZ WOVN 4X4IN 12PLY 10/BX STRL

## (undated) DEVICE — CVR EQ IMG 48X27

## (undated) DEVICE — GLV SURG BIOGEL LTX PF 6 1/2

## (undated) DEVICE — Device: Brand: DEFENDO AIR/WATER/SUCTION AND BIOPSY VALVE

## (undated) DEVICE — Device: Brand: FABCO

## (undated) DEVICE — CATH IV INTROCAN SFTY PUR 18G 1.25IN

## (undated) DEVICE — TBG 02 CRUSH RESIST LF CLR 7FT

## (undated) DEVICE — SMALL (YELLOW) FOR GRAFTS UP TO 6 MM, 12" / 30.5 CM (1/PKG): Brand: SCANLAN® VASCULAR TUNNELER SHEATHS AND BULLET TIPS

## (undated) DEVICE — PTA BALLOON DILATATION CATHETER: Brand: MUSTANG™

## (undated) DEVICE — MYNXGRIP 6F/7F: Brand: MYNXGRIP

## (undated) DEVICE — STPLR SKIN VISISTAT WD 35CT